# Patient Record
Sex: MALE | Race: WHITE | NOT HISPANIC OR LATINO | Employment: OTHER | URBAN - METROPOLITAN AREA
[De-identification: names, ages, dates, MRNs, and addresses within clinical notes are randomized per-mention and may not be internally consistent; named-entity substitution may affect disease eponyms.]

---

## 2017-02-07 ENCOUNTER — ANTICOAGULATION THERAPY VISIT (OUTPATIENT)
Dept: ANTICOAGULATION | Facility: CLINIC | Age: 82
End: 2017-02-07
Payer: COMMERCIAL

## 2017-02-07 VITALS — HEART RATE: 61 BPM | DIASTOLIC BLOOD PRESSURE: 90 MMHG | SYSTOLIC BLOOD PRESSURE: 159 MMHG

## 2017-02-07 DIAGNOSIS — D68.51 FACTOR V LEIDEN MUTATION (H): ICD-10-CM

## 2017-02-07 DIAGNOSIS — D68.52 PROTHROMBIN MUTATION (H): ICD-10-CM

## 2017-02-07 DIAGNOSIS — Z79.01 LONG-TERM (CURRENT) USE OF ANTICOAGULANTS: Primary | ICD-10-CM

## 2017-02-07 DIAGNOSIS — I82.90 VENOUS THROMBOSIS: ICD-10-CM

## 2017-02-07 LAB — INR POINT OF CARE: 3.2 (ref 0.86–1.14)

## 2017-02-07 PROCEDURE — 99207 ZZC NO CHARGE NURSE ONLY: CPT

## 2017-02-07 PROCEDURE — 85610 PROTHROMBIN TIME: CPT | Mod: QW

## 2017-02-07 PROCEDURE — 36416 COLLJ CAPILLARY BLOOD SPEC: CPT

## 2017-02-07 NOTE — PROGRESS NOTES
ANTICOAGULATION FOLLOW-UP CLINIC VISIT    Patient Name:  Robert Richard  Date:  2/7/2017  Contact Type:  Face to Face    SUBJECTIVE:     Patient Findings     Positives No Problem Findings    Comments Will stay on same dose and increase greens            OBJECTIVE    INR PROTIME   Date Value Ref Range Status   02/07/2017 3.2* 0.86 - 1.14 Final       ASSESSMENT / PLAN  INR assessment SUPRA    Recheck INR In: 4 WEEKS    INR Location Clinic      Anticoagulation Summary as of 2/7/2017     INR goal 2.0-3.0   Selected INR 3.2! (2/7/2017)   Maintenance plan 2.5 mg (5 mg x 0.5) on Fri; 5 mg (5 mg x 1) all other days   Full instructions 2/7: 2.5 mg; Otherwise 2.5 mg on Fri; 5 mg all other days   Weekly total 32.5 mg   Plan last modified Raquel Randle, RN (3/22/2016)   Next INR check 3/7/2017   Target end date     Indications   Factor V Leiden mutation (H) [D68.51]  Venous thrombosis [I82.90]  Prothrombin mutation (H) [D68.59]  Long-term (current) use of anticoagulants [Z79.01] [Z79.01]         Anticoagulation Episode Summary     INR check location     Preferred lab     Send INR reminders to [facility] POOL    Comments 5 mg tablets, use book        Anticoagulation Care Providers     Provider Role Specialty Phone number    Jesús Cifuentesrob Oconnell DO [facility] Internal Medicine [phone]            See the Encounter Report to view Anticoagulation Flowsheet and Dosing Calendar (Go to Encounters tab in chart review, and find the Anticoagulation Therapy Visit)    Dosage adjustment made based on physician directed care plan.    joshua increase greens    2.5 mg x1 then resume     Raquel Randle, RN

## 2017-02-09 ENCOUNTER — ANTICOAGULATION THERAPY VISIT (OUTPATIENT)
Dept: ANTICOAGULATION | Facility: OTHER | Age: 82
End: 2017-02-09
Payer: COMMERCIAL

## 2017-02-09 DIAGNOSIS — Z79.01 LONG-TERM (CURRENT) USE OF ANTICOAGULANTS: Primary | ICD-10-CM

## 2017-02-09 DIAGNOSIS — D68.52 PROTHROMBIN MUTATION (H): ICD-10-CM

## 2017-02-09 DIAGNOSIS — I82.90 VENOUS THROMBOSIS: ICD-10-CM

## 2017-02-09 DIAGNOSIS — D68.51 FACTOR V LEIDEN MUTATION (H): ICD-10-CM

## 2017-02-09 LAB — INR POINT OF CARE: 3.5 (ref 0.86–1.14)

## 2017-02-09 PROCEDURE — 85610 PROTHROMBIN TIME: CPT | Mod: QW

## 2017-02-09 PROCEDURE — 99207 ZZC NO CHARGE NURSE ONLY: CPT

## 2017-02-09 PROCEDURE — 36416 COLLJ CAPILLARY BLOOD SPEC: CPT

## 2017-02-09 NOTE — PROGRESS NOTES
ANTICOAGULATION FOLLOW-UP CLINIC VISIT    Patient Name:  Robert Richard  Date:  2/9/2017  Contact Type:  Face to Face    SUBJECTIVE:     Patient Findings     Positives Dental/Other Procedures (Pt here today becuase he had a dentist appt and is now scheduled to have a tooth pulled tomorrow (fri) around 12. )    Comments Pt's INR is too high today for extraction. He will hold his dose today (5 mg) and I have given him 1 mg Vit K in clinic (Lot [id number], exp 2/8/18). He will see Raquel in [place] tomorrow mid am before dental appt. Also, pt will be starting amoxicillin for 10 days ordered by the dentist for poss abcess.           OBJECTIVE    INR PROTIME   Date Value Ref Range Status   02/09/2017 3.5* 0.86 - 1.14 Final       ASSESSMENT / PLAN  INR assessment SUPRA    Recheck INR In: 1 DAY    INR Location Clinic      Anticoagulation Summary as of 2/9/2017     INR goal 2.0-3.0   Selected INR 3.5! (2/9/2017)   Maintenance plan 2.5 mg (5 mg x 0.5) on Fri; 5 mg (5 mg x 1) all other days   Full instructions 2/9: Hold; Otherwise 2.5 mg on Fri; 5 mg all other days   Weekly total 32.5 mg   Plan last modified Raquel Randle RN (3/22/2016)   Next INR check 2/10/2017   Target end date     Indications   Factor V Leiden mutation (H) [D68.51]  Venous thrombosis [I82.90]  Prothrombin mutation (H) [D68.59]  Long-term (current) use of anticoagulants [Z79.01] [Z79.01]         Anticoagulation Episode Summary     INR check location     Preferred lab     Send INR reminders to [facility] POOL    Comments 5 mg tablets, use book        Anticoagulation Care Providers     Provider Role Specialty Phone number    AftabmeetaJesús crumNaterob Oconnell DO [facility] Internal Medicine [phone]            See the Encounter Report to view Anticoagulation Flowsheet and Dosing Calendar (Go to Encounters tab in chart review, and find the Anticoagulation Therapy Visit)    Dosage adjustment made based on physician directed care plan.    Neelima Arreaga RN

## 2017-02-09 NOTE — MR AVS SNAPSHOT
Robert Richard   2/9/2017 1:45 PM   Anticoagulation Therapy Visit    Description:  81 year old male   Provider:  [facility] ANTI COAG   Department:  [facility]           INR as of 2/9/2017     Selected INR 3.5! (2/9/2017)      Anticoagulation Summary as of 2/9/2017     INR goal 2.0-3.0   Selected INR 3.5! (2/9/2017)   Full instructions 2/9: Hold; Otherwise 2.5 mg on Fri; 5 mg all other days   Next INR check 2/10/2017    Indications   Factor V Leiden mutation (H) [D68.51]  Venous thrombosis [I82.90]  Prothrombin mutation (H) [D68.59]  Long-term (current) use of anticoagulants [Z79.01] [Z79.01]         Your next Anticoagulation Clinic appointment(s)     Feb 10, 2017  8:15 AM   Anticoagulation Visit with PH ANTI COAG   [facility]    [address] [place] [id number]   [phone]            Mar 07, 2017  9:00 AM   Anticoagulation Visit with PH ANTI COAG   [facility] ([facility])    [address] [place] [address]   [phone]              Contact Numbers     Clinic Number:         February 2017 Details    Sun Mon Tue Wed Thu Fri Sat        1               2               3               4                 5               6               7               8               9      Hold   See details      10            11                 12               13               14               15               16               17               18                 19               20               21               22               23               24               25                 26               27               28                    Date Details   02/09 This INR check       Date of next INR:  2/10/2017         How to take your warfarin dose     To take:  2.5 mg Take 0.5 of a 5 mg tablet.    Hold Do not take your warfarin dose. See the Details table to the right for additional instructions.

## 2017-02-10 ENCOUNTER — ANTICOAGULATION THERAPY VISIT (OUTPATIENT)
Dept: ANTICOAGULATION | Facility: CLINIC | Age: 82
End: 2017-02-10
Payer: COMMERCIAL

## 2017-02-10 DIAGNOSIS — I82.90 VENOUS THROMBOSIS: ICD-10-CM

## 2017-02-10 DIAGNOSIS — D68.52 PROTHROMBIN MUTATION (H): ICD-10-CM

## 2017-02-10 DIAGNOSIS — Z79.01 LONG-TERM (CURRENT) USE OF ANTICOAGULANTS: Primary | ICD-10-CM

## 2017-02-10 DIAGNOSIS — D68.51 FACTOR V LEIDEN MUTATION (H): ICD-10-CM

## 2017-02-10 LAB — INR POINT OF CARE: 2.2 (ref 0.86–1.14)

## 2017-02-10 PROCEDURE — 99207 ZZC NO CHARGE NURSE ONLY: CPT

## 2017-02-10 PROCEDURE — 36416 COLLJ CAPILLARY BLOOD SPEC: CPT

## 2017-02-10 PROCEDURE — 85610 PROTHROMBIN TIME: CPT | Mod: QW

## 2017-02-10 NOTE — PROGRESS NOTES
ANTICOAGULATION FOLLOW-UP CLINIC VISIT    Patient Name:  Robert Richard  Date:  2/10/2017  Contact Type:  Face to Face    SUBJECTIVE:     Patient Findings     Positives Medication Changes (given Giana K on 2/9 to decrease INR for today's dental procedure), Intentional hold of therapy (held Thursday due to eleavted INR )           OBJECTIVE    INR PROTIME   Date Value Ref Range Status   02/10/2017 2.2* 0.86 - 1.14 Final       ASSESSMENT / PLAN  INR assessment THER    Recheck INR In: 5 DAYS    INR Location Clinic      Anticoagulation Summary as of 2/10/2017     INR goal 2.0-3.0   Selected INR 2.2 (2/10/2017)   Maintenance plan 2.5 mg (5 mg x 0.5) on Fri; 5 mg (5 mg x 1) all other days   Full instructions 2/10: Hold; 2/13: 2.5 mg; Otherwise 2.5 mg on Fri; 5 mg all other days   Weekly total 32.5 mg   Plan last modified Raquel Randle RN (3/22/2016)   Next INR check 2/15/2017   Target end date     Indications   Factor V Leiden mutation (H) [D68.51]  Venous thrombosis [I82.90]  Prothrombin mutation (H) [D68.59]  Long-term (current) use of anticoagulants [Z79.01] [Z79.01]         Anticoagulation Episode Summary     INR check location     Preferred lab     Send INR reminders to [facility] POOL    Comments 5 mg tablets, use book        Anticoagulation Care Providers     Provider Role Specialty Phone number    Nate CifuentesDO [facility] Internal Medicine [phone]            See the Encounter Report to view Anticoagulation Flowsheet and Dosing Calendar (Go to Encounters tab in chart review, and find the Anticoagulation Therapy Visit)    Dosage adjustment made based on physician directed care plan.    Hold Fri, 5 mg Sat, 5 mg Sun, 2.5 mg Mon, 5 mg Tues = 22.5 mg   Also on Amox TID x 10 days (2/10-2/20)    Raquel Randle, TITUS

## 2017-02-10 NOTE — MR AVS SNAPSHOT
Robert Richard   2/10/2017 8:15 AM   Anticoagulation Therapy Visit    Description:  81 year old male   Provider:  [facility] ANTI [facility]AG   Department:  [facility] Antico[facility]           INR as of 2/10/2017     Selected INR 2.2 (2/10/2017)      Anticoagulation Summary as of 2/10/2017     INR goal 2.0-3.0   Selected INR 2.2 (2/10/2017)   Full instructions 2/10: Hold; 2/13: 2.5 mg; Otherwise 2.5 mg on Fri; 5 mg all other days   Next INR check 2/15/2017    Indications   Factor V Leiden mutation (H) [D68.51]  Venous thrombosis [I82.90]  Prothrombin mutation (H) [D68.59]  Long-term (current) use of anticoagulants [Z79.01] [Z79.01]         Your next Anticoagulation Clinic appointment(s)     Feb 15, 2017  8:15 AM   Anticoagulation [facility] with PH ANTI COAG   [facility]    [address] [place] [address]   [phone]            Mar 07, 2017  9:00 AM   Anticoagulation Visit with PH ANTI COAG   [facility]    [address] [place] [address]   [phone]              Contact Numbers     Clinic Number:         February 2017 Details    Sun Mon Tue Wed Thu Fri Sat        1               2               3               4                 5               6               7               8               9               10      Hold   See details      11      5 mg           12      5 mg         13      2.5 mg         14      5 mg         15            16               17               18                 19               20               21               22               23               24               25                 26               27               28                    Date Details   02/10 This INR check       Date of next INR:  2/15/2017         How to take your warfarin dose     To take:  2.5 mg Take 0.5 of a 5 mg tablet.    To take:  5 mg Take 1 of the 5 mg tablets.    Hold Do not take your warfarin dose. See the Details  table to the right for additional instructions.

## 2017-02-15 ENCOUNTER — ANTICOAGULATION THERAPY VISIT (OUTPATIENT)
Dept: ANTICOAGULATION | Facility: CLINIC | Age: 82
End: 2017-02-15
Payer: COMMERCIAL

## 2017-02-15 VITALS — DIASTOLIC BLOOD PRESSURE: 85 MMHG | SYSTOLIC BLOOD PRESSURE: 157 MMHG | HEART RATE: 59 BPM

## 2017-02-15 DIAGNOSIS — I82.90 VENOUS THROMBOSIS: ICD-10-CM

## 2017-02-15 DIAGNOSIS — Z79.01 LONG-TERM (CURRENT) USE OF ANTICOAGULANTS: ICD-10-CM

## 2017-02-15 DIAGNOSIS — D68.51 FACTOR V LEIDEN MUTATION (H): ICD-10-CM

## 2017-02-15 DIAGNOSIS — D68.52 PROTHROMBIN MUTATION (H): ICD-10-CM

## 2017-02-15 LAB — INR POINT OF CARE: 1.3 (ref 0.86–1.14)

## 2017-02-15 PROCEDURE — 85610 PROTHROMBIN TIME: CPT | Mod: QW

## 2017-02-15 PROCEDURE — 99207 ZZC NO CHARGE NURSE ONLY: CPT

## 2017-02-15 PROCEDURE — 36416 COLLJ CAPILLARY BLOOD SPEC: CPT

## 2017-02-15 NOTE — PROGRESS NOTES
ANTICOAGULATION FOLLOW-UP CLINIC VISIT    Patient Name:  Robert Richard  Date:  2/15/2017  Contact Type:  Face to Face    SUBJECTIVE:     Patient Findings     Positives Change in medications (Amox TID x1 days (2/10-2/20)), Intentional hold of therapy (held on Thurs Fri last week due to procedure)           OBJECTIVE    INR Protime   Date Value Ref Range Status   02/15/2017 1.3 (A) 0.86 - 1.14 Final       ASSESSMENT / PLAN  INR assessment SUB    Recheck INR In: 2 DAYS    INR Location Clinic      Anticoagulation Summary as of 2/15/2017     INR goal 2.0-3.0   Today's INR 1.3!   Maintenance plan 2.5 mg (5 mg x 0.5) on Fri; 5 mg (5 mg x 1) all other days   Full instructions 2/15: 7.5 mg; Otherwise 2.5 mg on Fri; 5 mg all other days   Weekly total 32.5 mg   Plan last modified Raquel Randle RN (3/22/2016)   Next INR check 3/7/2017   Target end date     Indications   Factor V Leiden mutation (H) [D68.51]  Venous thrombosis [I82.90]  Prothrombin mutation (H) [D68.59]  Long-term (current) use of anticoagulants [Z79.01] [Z79.01]         Anticoagulation Episode Summary     INR check location     Preferred lab     Send INR reminders to [facility] POOL    Comments 5 mg tablets, use book        Anticoagulation Care Providers     Provider Role Specialty Phone number    Nate Cifuentes DO [facility] Internal Medicine [phone]            See the Encounter Report to view Anticoagulation Flowsheet and Dosing Calendar (Go to Encounters tab in chart review, and find the Anticoagulation Therapy Visit)    Dosage adjustment made based on physician directed care plan.        Raquel Randle, TITUS

## 2017-02-15 NOTE — MR AVS SNAPSHOT
Robert Richard   2/15/2017 8:15 AM   Anticoagulation Therapy Visit    Description:  81 year old male   Provider:  [facility] ANTI COAG   Department:  [facility]           INR as of 2/15/2017     Today's INR 1.3!      Anticoagulation Summary as of 2/15/2017     INR goal 2.0-3.0   Today's INR 1.3!   Full instructions 2/15: 7.5 mg; Otherwise 2.5 mg on Fri; 5 mg all other days   Next INR check 3/7/2017    Indications   Factor V Leiden mutation (H) [D68.51]  Venous thrombosis [I82.90]  Prothrombin mutation (H) [D68.59]  Long-term (current) use of anticoagulants [Z79.01] [Z79.01]         Your next Anticoagulation Clinic appointment(s)     Mar 07, 2017  9:00 AM CST   Anticoagulation Visit with ZAIRA ANTI IRENE   [facility]    [address] [place] [address]   [phone]              Contact Numbers     Clinic Number:         February 2017 Details    Sun Mon Tue Wed Thu Fri Sat        1               2               3               4                 5               6               7               8               9               10               11                 12               13               14               15      7.5 mg   See details      16      5 mg         17      2.5 mg         18      5 mg           19      5 mg         20      5 mg         21      5 mg         22      5 mg         23      5 mg         24      2.5 mg         25      5 mg           26      5 mg         27      5 mg         28      5 mg              Date Details   02/15 This INR check               How to take your warfarin dose     To take:  2.5 mg Take 0.5 of a 5 mg tablet.    To take:  5 mg Take 1 of the 5 mg tablets.    To take:  7.5 mg Take 1.5 of the 5 mg tablets.           March 2017 Details    Sun Mon Tue Wed Thu Fri Sat        1      5 mg         2      5 mg         3      2.5 mg         4      5 mg           5      5 mg         6      5 mg         7            8               9                10               11                 12               13               14               15               16               17               18                 19               20               21               22               23               24               25                 26               27               28               29               30               31                 Date Details   No additional details    Date of next INR:  3/7/2017         How to take your warfarin dose     To take:  2.5 mg Take 0.5 of a 5 mg tablet.    To take:  5 mg Take 1 of the 5 mg tablets.

## 2017-03-08 ENCOUNTER — ANTICOAGULATION THERAPY VISIT (OUTPATIENT)
Dept: ANTICOAGULATION | Facility: CLINIC | Age: 82
End: 2017-03-08
Payer: COMMERCIAL

## 2017-03-08 DIAGNOSIS — Z79.01 LONG-TERM (CURRENT) USE OF ANTICOAGULANTS: ICD-10-CM

## 2017-03-08 DIAGNOSIS — D68.52 PROTHROMBIN MUTATION (H): ICD-10-CM

## 2017-03-08 DIAGNOSIS — I82.90 VENOUS THROMBOSIS: ICD-10-CM

## 2017-03-08 DIAGNOSIS — D68.51 FACTOR V LEIDEN MUTATION (H): ICD-10-CM

## 2017-03-08 LAB — INR POINT OF CARE: 3.2 (ref 0.86–1.14)

## 2017-03-08 PROCEDURE — 85610 PROTHROMBIN TIME: CPT | Mod: QW

## 2017-03-08 PROCEDURE — 99207 ZZC NO CHARGE NURSE ONLY: CPT

## 2017-03-08 PROCEDURE — 36416 COLLJ CAPILLARY BLOOD SPEC: CPT

## 2017-03-08 NOTE — MR AVS SNAPSHOT
Robert Richard   3/8/2017 2:00 PM   Anticoagulation Therapy Visit    Description:  81 year old male   Provider:  [facility] ANTI COAG   Department:  [facility]           INR as of 3/8/2017     Today's INR 3.2!      Anticoagulation Summary as of 3/8/2017     INR goal 2.0-3.0   Today's INR 3.2!   Full instructions 3/8: 2.5 mg; Otherwise 2.5 mg on Fri; 5 mg all other days   Next INR check 3/28/2017    Indications   Factor V Leiden mutation (H) [D68.51]  Venous thrombosis [I82.90]  Prothrombin mutation (H) [D68.59]  Long-term (current) use of anticoagulants [Z79.01] [Z79.01]         Your next Anticoagulation Clinic appointment(s)     Mar 28, 2017  9:15 AM CDT   Anticoagulation Visit with PH ANTI COAG   [facility]    [address] [place] [id number]   [phone]              Contact Numbers     Clinic Number:         March 2017 Details    Sun Mon Tue Wed Thu Fri Sat        1               2               3               4                 5               6               7               8      2.5 mg   See details      9      5 mg         10      2.5 mg         11      5 mg           12      5 mg         13      5 mg         14      5 mg         15      5 mg         16      5 mg         17      2.5 mg         18      5 mg           19      5 mg         20      5 mg         21      5 mg         22      5 mg         23      5 mg         24      2.5 mg         25      5 mg           26      5 mg         27      5 mg         28            29               30               31                 Date Details   03/08 This INR check       Date of next INR:  3/28/2017         How to take your warfarin dose     To take:  2.5 mg Take 0.5 of a 5 mg tablet.    To take:  5 mg Take 1 of the 5 mg tablets.

## 2017-03-08 NOTE — PROGRESS NOTES
ANTICOAGULATION FOLLOW-UP CLINIC VISIT    Patient Name:  Robert Richard  Date:  3/8/2017  Contact Type:  Face to Face    SUBJECTIVE:     Patient Findings     Positives OTC meds (pt missed a few days of Centrum Silver 50+ which has Giana K in it)           OBJECTIVE    INR Protime   Date Value Ref Range Status   03/08/2017 3.2 (A) 0.86 - 1.14 Final       ASSESSMENT / PLAN  INR assessment THER    Recheck INR In: 3 WEEKS    INR Location Clinic      Anticoagulation Summary as of 3/8/2017     INR goal 2.0-3.0   Today's INR 3.2!   Maintenance plan 2.5 mg (5 mg x 0.5) on Fri; 5 mg (5 mg x 1) all other days   Full instructions 3/8: 2.5 mg; Otherwise 2.5 mg on Fri; 5 mg all other days   Weekly total 32.5 mg   Plan last modified Raquel Randle RN (3/22/2016)   Next INR check 3/28/2017   Target end date     Indications   Factor V Leiden mutation (H) [D68.51]  Venous thrombosis [I82.90]  Prothrombin mutation (H) [D68.59]  Long-term (current) use of anticoagulants [Z79.01] [Z79.01]         Anticoagulation Episode Summary     INR check location     Preferred lab     Send INR reminders to [facility] POOL    Comments 5 mg tablets, use book        Anticoagulation Care Providers     Provider Role Specialty Phone number    Vane Nate Oconnell DO [facility] Internal Medicine [phone]            See the Encounter Report to view Anticoagulation Flowsheet and Dosing Calendar (Go to Encounters tab in chart review, and find the Anticoagulation Therapy Visit)    Dosage adjustment made based on physician directed care plan.    Raquel Randle, RN

## 2017-03-09 ENCOUNTER — OFFICE VISIT (OUTPATIENT)
Dept: INTERNAL MEDICINE | Facility: CLINIC | Age: 82
End: 2017-03-09
Payer: COMMERCIAL

## 2017-03-09 VITALS
WEIGHT: 250 LBS | SYSTOLIC BLOOD PRESSURE: 162 MMHG | OXYGEN SATURATION: 98 % | TEMPERATURE: 96.8 F | HEART RATE: 72 BPM | HEIGHT: 70 IN | DIASTOLIC BLOOD PRESSURE: 92 MMHG | BODY MASS INDEX: 35.79 KG/M2

## 2017-03-09 DIAGNOSIS — G89.29 CHRONIC MIDLINE LOW BACK PAIN WITHOUT SCIATICA: Primary | ICD-10-CM

## 2017-03-09 DIAGNOSIS — I10 ESSENTIAL HYPERTENSION WITH GOAL BLOOD PRESSURE LESS THAN 140/90: ICD-10-CM

## 2017-03-09 DIAGNOSIS — Z86.718 PERSONAL HISTORY OF VENOUS THROMBOSIS AND EMBOLISM: ICD-10-CM

## 2017-03-09 DIAGNOSIS — M54.50 CHRONIC MIDLINE LOW BACK PAIN WITHOUT SCIATICA: Primary | ICD-10-CM

## 2017-03-09 PROCEDURE — 99214 OFFICE O/P EST MOD 30 MIN: CPT | Performed by: INTERNAL MEDICINE

## 2017-03-09 RX ORDER — SPIRONOLACTONE 25 MG/1
25 TABLET ORAL 2 TIMES DAILY
Qty: 60 TABLET | Refills: 1 | Status: SHIPPED | OUTPATIENT
Start: 2017-03-09 | End: 2017-04-23

## 2017-03-09 ASSESSMENT — PAIN SCALES - GENERAL: PAINLEVEL: NO PAIN (0)

## 2017-03-09 NOTE — NURSING NOTE
"Chief Complaint   Patient presents with     Hypertension       Initial BP (!) 162/92 (BP Location: Right arm, Patient Position: Chair, Cuff Size: Adult Large)  Pulse 72  Temp 96.8  F (36  C) (Temporal)  Ht 5' 10\" (1.778 m)  Wt 250 lb (113.4 kg)  SpO2 98%  BMI 35.87 kg/m2 Estimated body mass index is 35.87 kg/(m^2) as calculated from the following:    Height as of this encounter: 5' 10\" (1.778 m).    Weight as of this encounter: 250 lb (113.4 kg).  Medication Reconciliation: complete   Eli MOORE      "

## 2017-03-09 NOTE — PROGRESS NOTES
SUBJECTIVE:                                                    Robert Richard is a 81 year old male who presents to clinic today for the following health issues:    Hypertension Follow-up      Outpatient blood pressures are being checked at home.  Results are high like today.    Low Salt Diet: no added salt       Amount of exercise or physical activity: None    Problems taking medications regularly: No    Medication side effects: none  Diet: low salt    CHIEF COMPLAINT:    The patient is a pleasant 81-year-old gentleman who presents today for follow-up of his hypertension. He continues to have elevated blood pressure. He's had no chest pain, edema, headaches, shortness of breath associated with it. He does have a history of previous DVT and pulmonary embolism, he is currently on anticoagulation for this. Additionally, he's been having more and more back and hip discomfort. The tramadol does seem to give him some relief but does have a tendency to make him a little tired. He has difficulty with ambulating and does require the use of a cane. This is slowly progressing and he anticipates possible hip surgery in the future if symptoms worsen.                         PAST, FAMILY,SOCIAL HISTORY:     Medical  History:   has a past medical history of Basal cell carcinoma; Cancer (H); DVT (deep venous thrombosis) (H) (11/2003); DVT (deep venous thrombosis) (H) (12/08); DVT (deep venous thrombosis) (H) (10/2010); Gout; Other and unspecified hyperlipidemia; Restless leg syndrome; and Unspecified essential hypertension.     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     Social History:   reports that he has never smoked. He has never used smokeless tobacco. He reports that he drinks alcohol. He reports that he does not use illicit drugs.     Family History:  family history includes Prostate  Cancer in his paternal grandfather.            MEDICATIONS  Current Outpatient Prescriptions   Medication Sig Dispense Refill     spironolactone (ALDACTONE) 25 MG tablet Take 1 tablet (25 mg) by mouth 2 times daily 60 tablet 1     sildenafil (VIAGRA) 100 MG cap/tab Take 0.5-1 tablets ( mg) by mouth daily as needed for erectile dysfunction Take 30 min to 4 hours before intercourse.  Never use with nitroglycerin, terazosin or doxazosin. 6 tablet 11     atorvastatin (LIPITOR) 20 MG tablet Take 1 tablet (20 mg) by mouth twice a week 24 tablet 3     losartan (COZAAR) 50 MG tablet Take 1 tablet (50 mg) by mouth daily 90 tablet 1     pramipexole (MIRAPEX) 1 MG tablet TAKE 1 TABLET BY MOUTH AT 4 PM AND 1 TABLET BY MOUTH AT 9 PM AS NEEDED 180 tablet 1     traMADol (ULTRAM) 50 MG tablet Take 1 tablet (50 mg) by mouth At Bedtime 7 tablet 0     triamcinolone (NASACORT AQ) 55 MCG/ACT nasal inhaler Spray 2 sprays into both nostrils daily 1 Bottle 3     aspirin 81 MG tablet Take 81 mg by mouth daily       warfarin (COUMADIN) 5 MG tablet Take 2.5 mg Friday and 5 mg all other days, or as directed by the coumadin clinic. 90 tablet 2     atenolol (TENORMIN) 50 MG tablet Take 2 tablets (100 mg) by mouth 2 times daily 120 tablet 2     colchicine (COLCRYS) 0.6 MG tablet Take 2 tablets at the first sign of flare, take 1 additional tablet one hour later. 30 tablet 0     ORDER FOR DME Wear mask at night 1 Units 0         --------------------------------------------------------------------------------------------------------------------                          REVIEW OF SYSTEMS:         LUNGS: Pt denies: cough,excess sputum, hemoptysis, or shortness of breath.   HEART: Pt denies: chest pain, arrythmia, syncope, tachy or bradyarrhythmia or excess edema.   GI: Pt denies: nausea, vomitting, diarrhea, constipation, melena, or hematochezia.   NEURO: Pt denies: seizures, strokes, diplopia, weakness, paraesthesias, or paralysis.   SKIN: Pt  "denies: itching, rashes, discoloration, or specific lesions of concern. Denies recent hair loss.                          EXAMINATION:         BP (!) 162/92 (BP Location: Right arm, Patient Position: Chair, Cuff Size: Adult Large)  Pulse 72  Temp 96.8  F (36  C) (Temporal)  Ht 5' 10\" (1.778 m)  Wt 250 lb (113.4 kg)  SpO2 98%  BMI 35.87 kg/m2   Constitutional: The patient appears to be in no acute distress. The patient appears to be adequately hydrated. No acute respiratory or hemodynamic distress is noted at this time.   LUNGS: clear bilaterally, airflow is brisk, no intercostal retraction or stridor is noted. No coughing is noted during visit.   HEART:  regular without rubs, clicks, gallops, or murmurs. PMI is nondisplaced. Upstrokes are brisk. S1,S2 are heard.   GI: Abdomen is soft, without rebound, guarding or tenderness. Bowel sounds are appropriate. No renal bruits are heard.    NEURO: Pt is alert and appropriate. No neurologic lateralization is noted. Cranial nerves 2-12 are intact. Peripheral sensory and motor function are grossly normal   SKIN:  warm and dry. No erythema, or rashes are noted. No specific lesions of concern are noted.                           DECISION MAKIN. Chronic midline low back pain without sciatica  Given his chronic back pain and hip discomfort, I recommend against long periods of sitting.  Continue current medications including the tramadol but do not drive while taking it    2. Essential hypertension with goal blood pressure less than 140/90  Will add spironolactone and discontinue Lasix  Patient has not been taking the etodolac for some time. Therefore, will not need to discontinue it.    - spironolactone (ALDACTONE) 25 MG tablet; Take 1 tablet (25 mg) by mouth 2 times daily  Dispense: 60 tablet; Refill: 1  - Basic metabolic panel    3. Personal history of venous thrombosis and embolism  Continue anticoagulation with regular follow-up  Again, avoid long periods of " sitting.                           FOLLOW UP    I have asked the patient to make an appointment for follow up with me in 2 weeks        I have carefully explained the diagnosis and treatment options with the patient. The patient has displayed an understanding of the above, and all subsequent questions were answered.         DO ANANDA Guadarrama    Portions of this note were produced using [employer]  Although every attempt at real-time proof reading has been made, occasional grammar/syntax errors may have been missed.

## 2017-03-09 NOTE — LETTER
[address] [id number] [phone]        April 7, 2017    Robert Richard  [address] [facility] [address]              Dear Robert Richard    This is to remind you that your Basic profile is due.    You may call our office at [phone] to schedule an appointment.    Please disregard this notice if you have already had your labs drawn or made an appointment.        Sincerely,        Nate Cifuentes DO

## 2017-03-09 NOTE — LETTER
[address] [address] [phone]        June 26, 2017    Robert Richard  [address] [facility] [address]              Dear Robert Richard    This is to remind you that your Basic profile is due.    You may call our office at [phone] to schedule an appointment.    Please disregard this notice if you have already had your labs drawn or made an appointment.        Sincerely,        Nate Cifuentes DO

## 2017-03-27 ENCOUNTER — TELEPHONE (OUTPATIENT)
Dept: INTERNAL MEDICINE | Facility: CLINIC | Age: 82
End: 2017-03-27

## 2017-03-31 ENCOUNTER — OFFICE VISIT (OUTPATIENT)
Dept: FAMILY MEDICINE | Facility: OTHER | Age: 82
End: 2017-03-31
Payer: COMMERCIAL

## 2017-03-31 VITALS
BODY MASS INDEX: 36.16 KG/M2 | DIASTOLIC BLOOD PRESSURE: 92 MMHG | HEART RATE: 70 BPM | WEIGHT: 252 LBS | OXYGEN SATURATION: 97 % | TEMPERATURE: 96.8 F | SYSTOLIC BLOOD PRESSURE: 168 MMHG

## 2017-03-31 DIAGNOSIS — I10 HYPERTENSION GOAL BP (BLOOD PRESSURE) < 140/90: ICD-10-CM

## 2017-03-31 DIAGNOSIS — Z86.718 PERSONAL HISTORY OF VENOUS THROMBOSIS AND EMBOLISM: ICD-10-CM

## 2017-03-31 DIAGNOSIS — D68.51 FACTOR V LEIDEN MUTATION (H): Primary | ICD-10-CM

## 2017-03-31 PROCEDURE — 99213 OFFICE O/P EST LOW 20 MIN: CPT | Performed by: INTERNAL MEDICINE

## 2017-03-31 RX ORDER — DOXAZOSIN 1 MG/1
TABLET ORAL
Qty: 70 TABLET | Refills: 0 | Status: SHIPPED | OUTPATIENT
Start: 2017-03-31 | End: 2017-04-24

## 2017-03-31 RX ORDER — ATENOLOL 50 MG/1
50 TABLET ORAL 2 TIMES DAILY
Qty: 120 TABLET | Refills: 2 | COMMUNITY
Start: 2017-03-31 | End: 2018-10-11

## 2017-03-31 ASSESSMENT — PAIN SCALES - GENERAL: PAINLEVEL: NO PAIN (0)

## 2017-03-31 NOTE — NURSING NOTE
"Chief Complaint   Patient presents with     Hypertension       Initial BP (!) 168/92 (BP Location: Left arm, Patient Position: Chair, Cuff Size: Adult Large)  Pulse 70  Temp 96.8  F (36  C) (Temporal)  Wt 252 lb (114.3 kg)  SpO2 97%  BMI 36.16 kg/m2 Estimated body mass index is 36.16 kg/(m^2) as calculated from the following:    Height as of 3/9/17: 5' 10\" (1.778 m).    Weight as of this encounter: 252 lb (114.3 kg).  Medication Reconciliation: complete   Eli MOORE      "

## 2017-03-31 NOTE — MR AVS SNAPSHOT
After Visit Summary   3/31/2017    Robert Richard    MRN: [id number]           Patient Information     Date Of Birth          1935        Visit Information        Provider Department      3/31/2017 9:40 AM Nate Cifuentes DO [facility]        Today's Diagnoses     Factor V Leiden mutation (H)    -  1    Hypertension goal BP (blood pressure) < 140/90        Personal history of venous thrombosis and embolism           Follow-ups after your visit        Who to contact     If you have questions or need follow up information about today's clinic visit or your schedule please contact [facility] directly at [phone].  Normal or non-critical lab and imaging results will be communicated to you by [employer], letter or phone within 4 business days after the clinic has received the results. If you do not hear from us within 7 days, please contact the clinic through [employer] or phone. If you have a critical or abnormal lab result, we will notify you by phone as soon as possible.  Submit refill requests through [facility] or call your pharmacy and they will forward the refill request to us. Please allow 3 business days for your refill to be completed.          Additional Information About Your Visit        MyChart Information     [facility] gives you secure access to your electronic health record. If you see a primary care provider, you can also send messages to your care team and make appointments. If you have questions, please call your primary care clinic.  If you do not have a primary care provider, please call [phone] and they will assist you.        Care EveryWhere ID     This is your Care EveryWhere ID. This could be used by other organizations to access your [facility] medical records  [id number]        Your Vitals Were     Pulse Temperature Pulse Oximetry BMI (Body Mass Index)          70 96.8  F (36  C) (Temporal) 97% 36.16 kg/m2         Blood Pressure from Last  3 Encounters:   03/31/17 (!) 168/92   03/09/17 (!) 162/92   02/15/17 157/85    Weight from Last 3 Encounters:   03/31/17 252 lb (114.3 kg)   03/09/17 250 lb (113.4 kg)   12/21/16 248 lb (112.5 kg)              Today, you had the following     No orders found for display         Today's Medication Changes          These changes are accurate as of: 3/31/17 11:59 PM.  If you have any questions, ask your nurse or doctor.               Start taking these medicines.        Dose/Directions    doxazosin 1 MG tablet   Commonly known as:  CARDURA   Used for:  Hypertension goal BP (blood pressure) < 140/90   Started by:  Nate Cifuentes DO        Start with 1 tab (1 mg) daily for 1 week, 2 tabs (2 mg) daily for 1 week, 3 tabs (3 mg) daily for 1 week, then 4 tabs (4 mg) daily   Quantity:  70 tablet   Refills:  0         These medicines have changed or have updated prescriptions.        Dose/Directions    atenolol 50 MG tablet   Commonly known as:  TENORMIN   This may have changed:  how much to take   Used for:  Hypertension goal BP (blood pressure) < 140/90   Changed by:  Nate Cifuentes DO        Dose:  50 mg   Take 1 tablet (50 mg) by mouth 2 times daily   Quantity:  120 tablet   Refills:  2            Where to get your medicines      These medications were sent to [facility] [address] [place] [address] [address]  [address] [facility] [address]     Phone:  [phone]     doxazosin 1 MG tablet                Primary Care Provider Office Phone # Fax #    Nate Cifuentes -[phone]       [address]   [facility] [address]        Thank you!     Thank you for choosing [facility]  for your care. Our goal is always to provide you with excellent care. Hearing back from our patients is one way we can continue to improve our services. Please take a few minutes to complete the written survey that you may receive in the mail after your  visit with us. Thank you!             Your Updated Medication List - Protect others around you: Learn how to safely use, store and throw away your medicines at www.disposemymeds.org.          This list is accurate as of: 3/31/17 11:59 PM.  Always use your most recent med list.                   Brand Name Dispense Instructions for use    aspirin 81 MG tablet      Take 81 mg by mouth daily       atenolol 50 MG tablet    TENORMIN    120 tablet    Take 1 tablet (50 mg) by mouth 2 times daily       atorvastatin 20 MG tablet    LIPITOR    24 tablet    Take 1 tablet (20 mg) by mouth twice a week       colchicine 0.6 MG tablet    COLCRYS    30 tablet    Take 2 tablets at the first sign of flare, take 1 additional tablet one hour later.       doxazosin 1 MG tablet    CARDURA    70 tablet    Start with 1 tab (1 mg) daily for 1 week, 2 tabs (2 mg) daily for 1 week, 3 tabs (3 mg) daily for 1 week, then 4 tabs (4 mg) daily       losartan 50 MG tablet    COZAAR    90 tablet    Take 1 tablet (50 mg) by mouth daily       order for DME     1 Units    Wear mask at night       pramipexole 1 MG tablet    MIRAPEX    180 tablet    TAKE 1 TABLET BY MOUTH AT 4 PM AND 1 TABLET BY MOUTH AT 9 PM AS NEEDED       sildenafil 100 MG cap/tab    VIAGRA    6 tablet    Take 0.5-1 tablets ( mg) by mouth daily as needed for erectile dysfunction Take 30 min to 4 hours before intercourse.  Never use with nitroglycerin, terazosin or doxazosin.       spironolactone 25 MG tablet    ALDACTONE    60 tablet    Take 1 tablet (25 mg) by mouth 2 times daily       traMADol 50 MG tablet    ULTRAM    7 tablet    Take 1 tablet (50 mg) by mouth At Bedtime       triamcinolone 55 MCG/ACT Inhaler    NASACORT AQ    1 Bottle    Spray 2 sprays into both nostrils daily       warfarin 5 MG tablet    COUMADIN    90 tablet    Take 2.5 mg Friday and 5 mg all other days, or as directed by the coumadin clinic.

## 2017-03-31 NOTE — PROGRESS NOTES
SUBJECTIVE:                                                    Robert Richard is a 81 year old male who presents to clinic today for the following health issues:    Hypertension Follow-up      Outpatient blood pressures are being checked at home.  Results are 137-159 over .    Low Salt Diet: no added salt       Amount of exercise or physical activity: None    Problems taking medications regularly: No    Medication side effects: none    Diet: regular (no restrictions)        CHIEF COMPLAINT:    The patient is a well-known 81-year-old gentleman who presents today for follow-up of his hypertension. Blood pressures are still a bit high with systolic values approaching 150 most of the time. Today it is a little higher at 168/92, he notes no chest pain, shortness of breath, lightheadedness or other symptoms. His home blood pressure log is extremely complete and substantially better. He does have a history of factor V Leiden mutation and is on anticoagulation for this. He's had no bruising or bleeding. He follows his INRs closely. Medications are as listed. He's had no side effects from the medications. Overall, he appears to be substantially younger than his stated age.                       PAST, FAMILY,SOCIAL HISTORY:     Medical  History:   has a past medical history of Basal cell carcinoma; Cancer (H); DVT (deep venous thrombosis) (H) (11/2003); DVT (deep venous thrombosis) (H) (12/08); DVT (deep venous thrombosis) (H) (10/2010); Gout; Other and unspecified hyperlipidemia; Restless leg syndrome; and Unspecified essential hypertension.     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     Social History:   reports that he has never smoked. He has never used smokeless tobacco. He reports that he drinks alcohol. He reports that he does not use illicit drugs.     Family History:  family  history includes Prostate Cancer in his paternal grandfather.            MEDICATIONS  Current Outpatient Prescriptions   Medication Sig Dispense Refill     atenolol (TENORMIN) 50 MG tablet Take 1 tablet (50 mg) by mouth 2 times daily 120 tablet 2     doxazosin (CARDURA) 1 MG tablet Start with 1 tab (1 mg) daily for 1 week, 2 tabs (2 mg) daily for 1 week, 3 tabs (3 mg) daily for 1 week, then 4 tabs (4 mg) daily 70 tablet 0     spironolactone (ALDACTONE) 25 MG tablet Take 1 tablet (25 mg) by mouth 2 times daily 60 tablet 1     sildenafil (VIAGRA) 100 MG cap/tab Take 0.5-1 tablets ( mg) by mouth daily as needed for erectile dysfunction Take 30 min to 4 hours before intercourse.  Never use with nitroglycerin, terazosin or doxazosin. 6 tablet 11     atorvastatin (LIPITOR) 20 MG tablet Take 1 tablet (20 mg) by mouth twice a week 24 tablet 3     losartan (COZAAR) 50 MG tablet Take 1 tablet (50 mg) by mouth daily 90 tablet 1     pramipexole (MIRAPEX) 1 MG tablet TAKE 1 TABLET BY MOUTH AT 4 PM AND 1 TABLET BY MOUTH AT 9 PM AS NEEDED 180 tablet 1     traMADol (ULTRAM) 50 MG tablet Take 1 tablet (50 mg) by mouth At Bedtime 7 tablet 0     triamcinolone (NASACORT AQ) 55 MCG/ACT nasal inhaler Spray 2 sprays into both nostrils daily 1 Bottle 3     aspirin 81 MG tablet Take 81 mg by mouth daily       warfarin (COUMADIN) 5 MG tablet Take 2.5 mg Friday and 5 mg all other days, or as directed by the coumadin clinic. 90 tablet 2     colchicine (COLCRYS) 0.6 MG tablet Take 2 tablets at the first sign of flare, take 1 additional tablet one hour later. 30 tablet 0     ORDER FOR DME Wear mask at night 1 Units 0     [DISCONTINUED] atenolol (TENORMIN) 50 MG tablet Take 2 tablets (100 mg) by mouth 2 times daily 120 tablet 2         --------------------------------------------------------------------------------------------------------------------                            REVIEW OF SYSTEMS:         LUNGS: Pt denies: cough,excess  sputum, hemoptysis, or shortness of breath.   HEART: Pt denies: chest pain, arrythmia, syncope, tachy or bradyarrhythmia or excess edema.   GI: Pt denies: nausea, vomitting, diarrhea, constipation, melena, or hematochezia.   NEURO: Pt denies: seizures, strokes, diplopia, weakness, paraesthesias, or paralysis.   SKIN: Pt denies: itching, rashes, discoloration, or specific lesions of concern. Denies recent hair loss.                          EXAMINATION:         BP (!) 168/92 (BP Location: Left arm, Patient Position: Chair, Cuff Size: Adult Large)  Pulse 70  Temp 96.8  F (36  C) (Temporal)  Wt 252 lb (114.3 kg)  SpO2 97%  BMI 36.16 kg/m2   Constitutional: The patient appears to be in no acute distress. The patient appears to be adequately hydrated. No acute respiratory or hemodynamic distress is noted at this time.   LUNGS: clear bilaterally, airflow is brisk, no intercostal retraction or stridor is noted. No coughing is noted during visit.   HEART:  regular without rubs, clicks, gallops, or murmurs. PMI is nondisplaced. Upstrokes are brisk. S1,S2 are heard.   GI: Abdomen is soft, without rebound, guarding or tenderness. Bowel sounds are appropriate. No renal bruits are heard.    NEURO: Pt is alert and appropriate. No neurologic lateralization is noted. Cranial nerves 2-12 are intact. Peripheral sensory and motor function are grossly normal   SKIN:  warm and dry. No erythema, or rashes are noted. No specific lesions of concern are noted.                           DECISION MAKIN. Hypertension goal BP (blood pressure) < 140/90  Will add doxazosin. Patient has intolerance issues to calcium channel blockers.  Approaching bradycardia limits any further atenolol  Elevated creatinine eliminates any further ACE inhibitor or ARB    - atenolol (TENORMIN) 50 MG tablet; Take 1 tablet (50 mg) by mouth 2 times daily  Dispense: 120 tablet; Refill: 2  - doxazosin (CARDURA) 1 MG tablet; Start with 1 tab (1 mg) daily  for 1 week, 2 tabs (2 mg) daily for 1 week, 3 tabs (3 mg) daily for 1 week, then 4 tabs (4 mg) daily  Dispense: 70 tablet; Refill: 0    2. Factor V Leiden mutation (H)  Continue to follow INR and continue anticoagulation             3. Personal history of venous thrombosis and embolism  As above                FOLLOW UP    I have asked the patient to make an appointment for follow up with me in 1 month for blood pressure check. Or sooner if he has any problems in the interim.          I have carefully explained the diagnosis and treatment options with the patient. The patient has displayed an understanding of the above, and all subsequent questions were answered.         DO ANANDA Guadarrama    Portions of this note were produced using [employer]  Although every attempt at real-time proof reading has been made, occasional grammar/syntax errors may have been missed.

## 2017-04-14 ENCOUNTER — ANTICOAGULATION THERAPY VISIT (OUTPATIENT)
Dept: ANTICOAGULATION | Facility: CLINIC | Age: 82
End: 2017-04-14
Payer: COMMERCIAL

## 2017-04-14 VITALS — DIASTOLIC BLOOD PRESSURE: 80 MMHG | HEART RATE: 72 BPM | SYSTOLIC BLOOD PRESSURE: 144 MMHG

## 2017-04-14 DIAGNOSIS — D68.51 FACTOR V LEIDEN MUTATION (H): ICD-10-CM

## 2017-04-14 DIAGNOSIS — Z79.01 LONG-TERM (CURRENT) USE OF ANTICOAGULANTS: ICD-10-CM

## 2017-04-14 DIAGNOSIS — D68.52 PROTHROMBIN MUTATION (H): ICD-10-CM

## 2017-04-14 DIAGNOSIS — I82.90 VENOUS THROMBOSIS: ICD-10-CM

## 2017-04-14 LAB — INR POINT OF CARE: 2.2 (ref 0.86–1.14)

## 2017-04-14 PROCEDURE — 99207 ZZC NO CHARGE NURSE ONLY: CPT

## 2017-04-14 PROCEDURE — 85610 PROTHROMBIN TIME: CPT | Mod: QW

## 2017-04-14 PROCEDURE — 36416 COLLJ CAPILLARY BLOOD SPEC: CPT

## 2017-04-14 NOTE — MR AVS SNAPSHOT
Robert Hilary   4/14/2017 9:30 AM   Anticoagulation Therapy Visit    Description:  81 year old male   Provider:  [facility]   Department:  Ph Anticoag           INR as of 4/14/2017     Today's INR 2.2      Anticoagulation Summary as of 4/14/2017     INR goal 2.0-3.0   Today's INR 2.2   Full instructions 2.5 mg on Fri; 5 mg all other days   Next INR check 5/9/2017    Indications   Factor V Leiden mutation (H) [D68.51]  Venous thrombosis [I82.90]  Prothrombin mutation (H) [D68.59]  Long-term (current) use of anticoagulants [Z79.01] [Z79.01]         Your next Anticoagulation Clinic appointment(s)     May 10, 2017  8:30 AM CDT   Anticoagulation Visit with [facility]   [facility] ([facility])    [address] [place] [id number]   [phone]              Contact Numbers     Clinic Number:         April 2017 Details    Sun Mon Tue Wed Thu Fri Sat           1                 2               3               4               5               6               7               8                 9               10               11               12               13               14      2.5 mg   See details      15      5 mg           16      5 mg         17      5 mg         18      5 mg         19      5 mg         20      5 mg         21      2.5 mg         22      5 mg           23      5 mg         24      5 mg         25      5 mg         26      5 mg         27      5 mg         28      2.5 mg         29      5 mg           30      5 mg                Date Details   04/14 This INR check               How to take your warfarin dose     To take:  2.5 mg Take 0.5 of a 5 mg tablet.    To take:  5 mg Take 1 of the 5 mg tablets.           May 2017 Details    Sun Mon Tue Wed Thu Fri Sat      1      5 mg         2      5 mg         3      5 mg         4      5 mg         5      2.5 mg         6      5 mg           7      5 mg         8      5 mg         9            10                11               12               13                 14               15               16               17               18               19               20                 21               22               23               24               25               26               27                 28               29               30               31                   Date Details   No additional details    Date of next INR:  5/9/2017         How to take your warfarin dose     To take:  2.5 mg Take 0.5 of a 5 mg tablet.    To take:  5 mg Take 1 of the 5 mg tablets.

## 2017-04-14 NOTE — PROGRESS NOTES
ANTICOAGULATION FOLLOW-UP CLINIC VISIT    Patient Name:  Robert Richard  Date:  4/14/2017  Contact Type:  Face to Face    SUBJECTIVE:     Patient Findings     Positives No Problem Findings           OBJECTIVE    INR Protime   Date Value Ref Range Status   04/14/2017 2.2 (A) 0.86 - 1.14 Final       ASSESSMENT / PLAN  INR assessment THER    Recheck INR In: 4 WEEKS    INR Location Clinic      Anticoagulation Summary as of 4/14/2017     INR goal 2.0-3.0   Today's INR 2.2   Maintenance plan 2.5 mg (5 mg x 0.5) on Fri; 5 mg (5 mg x 1) all other days   Full instructions 2.5 mg on Fri; 5 mg all other days   Weekly total 32.5 mg   No change documented Raquel Randle RN   Plan last modified Raquel Randle RN (3/22/2016)   Next INR check 5/9/2017   Target end date     Indications   Factor V Leiden mutation (H) [D68.51]  Venous thrombosis [I82.90]  Prothrombin mutation (H) [D68.59]  Long-term (current) use of anticoagulants [Z79.01] [Z79.01]         Anticoagulation Episode Summary     INR check location     Preferred lab     Send INR reminders to [facility] POOL    Comments 5 mg tablets, use book        Anticoagulation Care Providers     Provider Role Specialty Phone number    Nate Cifuentes DO [facility] Internal Medicine [phone]            See the Encounter Report to view Anticoagulation Flowsheet and Dosing Calendar (Go to Encounters tab in chart review, and find the Anticoagulation Therapy Visit)    Dosage adjustment made based on physician directed care plan.      Raquel Randle RN

## 2017-04-23 DIAGNOSIS — I10 ESSENTIAL HYPERTENSION WITH GOAL BLOOD PRESSURE LESS THAN 140/90: ICD-10-CM

## 2017-04-23 DIAGNOSIS — I10 HYPERTENSION GOAL BP (BLOOD PRESSURE) < 140/90: ICD-10-CM

## 2017-04-23 DIAGNOSIS — Z86.718 PERSONAL HISTORY OF VENOUS THROMBOSIS AND EMBOLISM: ICD-10-CM

## 2017-04-23 DIAGNOSIS — G25.81 RESTLESS LEG SYNDROME: ICD-10-CM

## 2017-04-24 ENCOUNTER — TELEPHONE (OUTPATIENT)
Dept: INTERNAL MEDICINE | Facility: CLINIC | Age: 82
End: 2017-04-24

## 2017-04-24 DIAGNOSIS — I10 HYPERTENSION GOAL BP (BLOOD PRESSURE) < 140/90: ICD-10-CM

## 2017-04-24 NOTE — TELEPHONE ENCOUNTER
atenolol (TENORMIN) 50 MG tablet     Last Written Prescription Date: 3/31/2017  Last Fill Quantity: 120, # refills: 2    Last Office Visit with [facility], [facility] or [facility] prescribing provider: 4/4/2017     Future Office Visit:        BP Readings from Last 3 Encounters:   04/14/17 144/80   03/31/17 (!) 168/92 03/09/17 (!) 162/92         spironolactone (ALDACTONE) 25 MG tablet  Last Written Prescription Date: 3/9/2017  Last Fill Quantity: 60, # refills: 1  Last Office Visit with [facility], [facility] or [facility] prescribing provider: 4/4/2017       Potassium   Date Value Ref Range Status   12/21/2016 4.2 3.4 - 5.3 mmol/L Final     Creatinine   Date Value Ref Range Status   12/21/2016 1.46 (H) 0.66 - 1.25 mg/dL Final     BP Readings from Last 3 Encounters:   04/14/17 144/80   03/31/17 (!) 168/92 03/09/17 (!) 162/92     pramipexole (MIRAPEX) 1 MG tablet     Last Written Prescription Date: 10/12/2016  Last Fill Quantity: 180, # refills: 1  Last Office Visit with [facility], [facility] or [facility] prescribing provider: 4/4/2017        BP Readings from Last 3 Encounters:   04/14/17 144/80   03/31/17 (!) 168/92 03/09/17 (!) 162/92         warfarin (COUMADIN) 5 MG tablet  Last Written Prescription Date: 4/7/2017  Last Fill Qty: 90, # refills: 2  Last Office Visit with [facility], [facility] or [facility] prescribing provider: 4/4/2017       Date and Result of Last PT/INR:   Lab Results   Component Value Date    INR 2.2 04/14/2017    INR 3.2 03/08/2017    INR 2.1 04/18/2016    INR 1.1 06/01/2015    PT 17.4 02/09/2012      Krystyna Beaulieu [facility]

## 2017-04-24 NOTE — TELEPHONE ENCOUNTER
Doxazosin       Last Written Prescription Date: 3/31/2017  Last Fill Quantity: 70, # refills: 0    Last Office Visit with [facility] or [facility] prescribing provider:  3/31/2017   Future Office Visit:        BP Readings from Last 3 Encounters:   04/14/17 144/80   03/31/17 (!) 168/92   03/09/17 (!) 162/92

## 2017-04-26 RX ORDER — ATENOLOL 50 MG/1
TABLET ORAL
Qty: 90 TABLET | Refills: 0 | Status: SHIPPED | OUTPATIENT
Start: 2017-04-26 | End: 2017-06-19

## 2017-04-26 RX ORDER — PRAMIPEXOLE DIHYDROCHLORIDE 1 MG/1
TABLET ORAL
Qty: 180 TABLET | Refills: 0 | Status: SHIPPED | OUTPATIENT
Start: 2017-04-26 | End: 2017-06-19

## 2017-04-26 RX ORDER — SPIRONOLACTONE 25 MG/1
TABLET ORAL
Qty: 60 TABLET | Refills: 0 | Status: SHIPPED | OUTPATIENT
Start: 2017-04-26 | End: 2017-05-15

## 2017-04-26 RX ORDER — WARFARIN SODIUM 5 MG/1
TABLET ORAL
Qty: 90 TABLET | Refills: 0 | Status: SHIPPED | OUTPATIENT
Start: 2017-04-26 | End: 2017-07-31

## 2017-04-26 NOTE — TELEPHONE ENCOUNTER
Routing refill request to provider for review/approval because:  Drug interaction warning  **Also I changed it to 1 pill of 4mg instead of (4) 1mg tabs since patient should be up to that dose now.        Melanie Mulligan RN

## 2017-04-26 NOTE — TELEPHONE ENCOUNTER
Pt calls in regards to his refill requests.  Pt states he is going out of town on Sunday and will need to have his Rxs approved to take with him.

## 2017-04-26 NOTE — TELEPHONE ENCOUNTER
Attempted to call patient and verify that he is up to 4mg per day.  Unable to reach via home or cell.   Note added on pharmacy script for pharmacist to review change in dose and number of pills since he should have worked up to the higher dose by now.    Melanie Mulligan RN

## 2017-04-26 NOTE — TELEPHONE ENCOUNTER
Routing refill request to provider for review/approval because:  Drug interaction warning: Coumadin and ASA  Labs out of range:  Cr 1.46    Melanie Mulligan RN

## 2017-04-27 RX ORDER — DOXAZOSIN 1 MG/1
TABLET ORAL
Qty: 70 TABLET | Refills: 0 | OUTPATIENT
Start: 2017-04-27 | End: 2024-08-08

## 2017-04-27 RX ORDER — DOXAZOSIN 4 MG/1
4 TABLET ORAL DAILY
Qty: 1 TABLET | Refills: 3 | Status: SHIPPED | OUTPATIENT
Start: 2017-04-27 | End: 2017-04-27

## 2017-04-27 NOTE — TELEPHONE ENCOUNTER
Vlad from [facility] called asking for clarification on the below RX. Please advise.   # [phone]    Thank you,   Sara Martin   for [facility]

## 2017-05-08 ENCOUNTER — DOCUMENTATION ONLY (OUTPATIENT)
Dept: INTERNAL MEDICINE | Facility: CLINIC | Age: 82
End: 2017-05-08

## 2017-05-08 DIAGNOSIS — I10 HYPERTENSION GOAL BP (BLOOD PRESSURE) < 140/90: ICD-10-CM

## 2017-05-08 RX ORDER — DOXAZOSIN 4 MG/1
4 TABLET ORAL DAILY
Qty: 30 TABLET | Refills: 3 | Status: SHIPPED | OUTPATIENT
Start: 2017-05-08 | End: 2017-07-31

## 2017-05-15 DIAGNOSIS — I10 ESSENTIAL HYPERTENSION WITH GOAL BLOOD PRESSURE LESS THAN 140/90: ICD-10-CM

## 2017-05-15 RX ORDER — SPIRONOLACTONE 25 MG/1
25 TABLET ORAL 2 TIMES DAILY
Qty: 60 TABLET | Refills: 0 | Status: SHIPPED | OUTPATIENT
Start: 2017-05-15 | End: 2017-06-19

## 2017-05-15 NOTE — TELEPHONE ENCOUNTER
ALDACTONE      Last Written Prescription Date:  4/26/2017  Last Fill Quantity: 60,   # refills: 0  Last Office Visit with [facility], [facility] or [facility] prescribing provider: 3/31/2017  Future Office visit:

## 2017-06-02 ENCOUNTER — TELEPHONE (OUTPATIENT)
Dept: FAMILY MEDICINE | Facility: OTHER | Age: 82
End: 2017-06-02

## 2017-06-02 NOTE — TELEPHONE ENCOUNTER
Attempted outreach to patient: Left message    Patient is due for:  Blood pressure follow up    If patient had this completed elsewhere, please obtain approximate date, clinic/hospital where test was done and the result (abnormal/normal).    Please assist in scheduling if not completed.      Panel Management Review      Patient has the following on his problem list: None      Composite cancer screening  Chart review shows that this patient is due/due soon for the following None  Summary:    Patient is due/failing the following:   BP CHECK    Action needed:   Patient needs office visit for blood pressure check.    Type of outreach:    Phone, left message for patient to call back.     Questions for provider review:    None                                                                                                                                    Eli MOORE       Chart routed to Care Team .

## 2017-06-19 ENCOUNTER — ANTICOAGULATION THERAPY VISIT (OUTPATIENT)
Dept: ANTICOAGULATION | Facility: CLINIC | Age: 82
End: 2017-06-19
Payer: COMMERCIAL

## 2017-06-19 ENCOUNTER — OFFICE VISIT (OUTPATIENT)
Dept: INTERNAL MEDICINE | Facility: CLINIC | Age: 82
End: 2017-06-19
Payer: COMMERCIAL

## 2017-06-19 VITALS
WEIGHT: 239 LBS | DIASTOLIC BLOOD PRESSURE: 86 MMHG | HEART RATE: 80 BPM | RESPIRATION RATE: 16 BRPM | OXYGEN SATURATION: 96 % | SYSTOLIC BLOOD PRESSURE: 148 MMHG | BODY MASS INDEX: 34.29 KG/M2 | TEMPERATURE: 96.9 F

## 2017-06-19 DIAGNOSIS — I10 ESSENTIAL HYPERTENSION WITH GOAL BLOOD PRESSURE LESS THAN 140/90: ICD-10-CM

## 2017-06-19 DIAGNOSIS — Z79.01 LONG-TERM (CURRENT) USE OF ANTICOAGULANTS: ICD-10-CM

## 2017-06-19 DIAGNOSIS — I10 HYPERTENSION GOAL BP (BLOOD PRESSURE) < 140/90: ICD-10-CM

## 2017-06-19 DIAGNOSIS — D68.52 PROTHROMBIN MUTATION (H): ICD-10-CM

## 2017-06-19 DIAGNOSIS — D68.51 FACTOR V LEIDEN MUTATION (H): ICD-10-CM

## 2017-06-19 DIAGNOSIS — I82.90 VENOUS THROMBOSIS: ICD-10-CM

## 2017-06-19 DIAGNOSIS — G25.81 RESTLESS LEG SYNDROME: ICD-10-CM

## 2017-06-19 DIAGNOSIS — Z86.718 PERSONAL HISTORY OF VENOUS THROMBOSIS AND EMBOLISM: ICD-10-CM

## 2017-06-19 DIAGNOSIS — D68.51 FACTOR V LEIDEN MUTATION (H): Primary | ICD-10-CM

## 2017-06-19 LAB — INR POINT OF CARE: 2.4 (ref 0.86–1.14)

## 2017-06-19 PROCEDURE — 36416 COLLJ CAPILLARY BLOOD SPEC: CPT

## 2017-06-19 PROCEDURE — 99207 ZZC NO CHARGE NURSE ONLY: CPT

## 2017-06-19 PROCEDURE — 99213 OFFICE O/P EST LOW 20 MIN: CPT | Performed by: INTERNAL MEDICINE

## 2017-06-19 PROCEDURE — 85610 PROTHROMBIN TIME: CPT | Mod: QW

## 2017-06-19 RX ORDER — PRAMIPEXOLE DIHYDROCHLORIDE 1 MG/1
TABLET ORAL
Qty: 180 TABLET | Refills: 0 | Status: SHIPPED | OUTPATIENT
Start: 2017-06-19 | End: 2017-07-31

## 2017-06-19 RX ORDER — ATENOLOL 50 MG/1
50 TABLET ORAL 2 TIMES DAILY
Qty: 180 TABLET | Refills: 0 | Status: SHIPPED | OUTPATIENT
Start: 2017-06-19 | End: 2017-07-31

## 2017-06-19 RX ORDER — ATENOLOL 50 MG/1
50 TABLET ORAL 2 TIMES DAILY
Qty: 120 TABLET | Refills: 2 | Status: CANCELLED | OUTPATIENT
Start: 2017-06-19

## 2017-06-19 RX ORDER — SPIRONOLACTONE 25 MG/1
25 TABLET ORAL DAILY
Qty: 90 TABLET | Refills: 0 | Status: SHIPPED | OUTPATIENT
Start: 2017-06-19 | End: 2017-12-04

## 2017-06-19 RX ORDER — LOSARTAN POTASSIUM 50 MG/1
50 TABLET ORAL DAILY
Qty: 90 TABLET | Refills: 1 | Status: SHIPPED | OUTPATIENT
Start: 2017-06-19 | End: 2017-12-04

## 2017-06-19 ASSESSMENT — PAIN SCALES - GENERAL: PAINLEVEL: NO PAIN (0)

## 2017-06-19 NOTE — PROGRESS NOTES
SUBJECTIVE:                                                    Robert Richard is a 81 year old male who presents to clinic today for the following health issues:      Hypertension Follow-up      Outpatient blood pressures are being checked at home.  Results are 122//94.    Low Salt Diet: low salt       Amount of exercise or physical activity: 6-7 days/week for an average of greater than 60 minutes  Plays golf 2 hours every day    Problems taking medications regularly: No    Medication side effects: muscle aches    Diet: low salt and low fat/cholesterol    CHIEF COMPLAINT:    The patient is a pleasant 81-year-old gentleman who presents today for follow-up of his hypertension. Notably, he has been doing quite well. His medications have been reviewed and are as noted. He is taking them compliantly without any side effects from the medication. He does have a history of factor V Leiden mutation but has had no recent blood clots. He continues warfarin without difficulty. He denies any bruising or bleeding. His restless leg syndrome is fairly well controlled at this time and the medications are working well. He does have a history of prostate cancer and has had no bone pain or symptoms of metastasis.                         PAST, FAMILY,SOCIAL HISTORY:     Medical  History:   has a past medical history of Basal cell carcinoma; Cancer (H); DVT (deep venous thrombosis) (H) (11/2003); DVT (deep venous thrombosis) (H) (12/08); DVT (deep venous thrombosis) (H) (10/2010); Gout; Other and unspecified hyperlipidemia; Restless leg syndrome; and Unspecified essential hypertension.     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     Social History:   reports that he has never smoked. He has never used smokeless tobacco. He reports that he drinks alcohol. He reports that he does not use illicit  drugs.     Family History:  family history includes Prostate Cancer in his paternal grandfather.            MEDICATIONS  Current Outpatient Prescriptions   Medication Sig Dispense Refill     spironolactone (ALDACTONE) 25 MG tablet Take 1 tablet (25 mg) by mouth daily 90 tablet 0     pramipexole (MIRAPEX) 1 MG tablet TAKE 1 TABLET BY MOUTH AT 4PM AND 1 TABLET BY MOUTH AT 9PM AS NEEDED 180 tablet 0     losartan (COZAAR) 50 MG tablet Take 1 tablet (50 mg) by mouth daily 90 tablet 1     atenolol (TENORMIN) 50 MG tablet Take 1 tablet (50 mg) by mouth 2 times daily 180 tablet 0     doxazosin (CARDURA) 4 MG tablet Take 1 tablet (4 mg) by mouth daily 30 tablet 3     warfarin (COUMADIN) 5 MG tablet TAKE HALF A TABLET BY MOUTH ON FRIDAY AND 1 TABLET ON ALL OTHER DAYS, OR AS DIRECTED BY THE COUMADIN CLINIC 90 tablet 0     atenolol (TENORMIN) 50 MG tablet Take 1 tablet (50 mg) by mouth 2 times daily 120 tablet 2     sildenafil (VIAGRA) 100 MG cap/tab Take 0.5-1 tablets ( mg) by mouth daily as needed for erectile dysfunction Take 30 min to 4 hours before intercourse.  Never use with nitroglycerin, terazosin or doxazosin. 6 tablet 11     atorvastatin (LIPITOR) 20 MG tablet Take 1 tablet (20 mg) by mouth twice a week 24 tablet 3     traMADol (ULTRAM) 50 MG tablet Take 1 tablet (50 mg) by mouth At Bedtime 7 tablet 0     triamcinolone (NASACORT AQ) 55 MCG/ACT nasal inhaler Spray 2 sprays into both nostrils daily 1 Bottle 3     aspirin 81 MG tablet Take 81 mg by mouth daily       colchicine (COLCRYS) 0.6 MG tablet Take 2 tablets at the first sign of flare, take 1 additional tablet one hour later. 30 tablet 0     ORDER FOR DME Wear mask at night 1 Units 0     [DISCONTINUED] spironolactone (ALDACTONE) 25 MG tablet Take 1 tablet (25 mg) by mouth 2 times daily 60 tablet 0     [DISCONTINUED] pramipexole (MIRAPEX) 1 MG tablet TAKE 1 TABLET BY MOUTH AT 4PM AND 1 TABLET BY MOUTH AT 9PM AS NEEDED 180 tablet 0     [DISCONTINUED] atenolol  (TENORMIN) 50 MG tablet TAKE ONE TABLET BY MOUTH THREE TIMES DAILY 90 tablet 0     [DISCONTINUED] losartan (COZAAR) 50 MG tablet Take 1 tablet (50 mg) by mouth daily 90 tablet 1         --------------------------------------------------------------------------------------------------------------------                          REVIEW OF SYSTEMS:         LUNGS: Pt denies: cough,excess sputum, hemoptysis, or shortness of breath.   HEART: Pt denies: chest pain, arrythmia, syncope, tachy or bradyarrhythmia or excess edema.   GI: Pt denies: nausea, vomitting, diarrhea, constipation, melena, or hematochezia.   NEURO: Pt denies: seizures, strokes, diplopia, weakness, paraesthesias, or paralysis. Restless leg syndrome is fairly well-controlled on current medication.   SKIN: Pt denies: itching, rashes, discoloration, or specific lesions of concern. Denies recent hair loss.                          EXAMINATION:         /86 (Cuff Size: Adult Large)  Pulse 80  Temp 96.9  F (36.1  C) (Tympanic)  Resp 16  Wt 239 lb (108.4 kg)  SpO2 96%  BMI 34.29 kg/m2   Constitutional: The patient appears to be in no acute distress. The patient appears to be adequately hydrated. No acute respiratory or hemodynamic distress is noted at this time.   LUNGS: clear bilaterally, airflow is brisk, no intercostal retraction or stridor is noted. No coughing is noted during visit.   HEART:  regular without rubs, clicks, gallops, or murmurs. PMI is nondisplaced. Upstrokes are brisk. S1,S2 are heard.   GI: Abdomen is soft, without rebound, guarding or tenderness. Bowel sounds are appropriate. No renal bruits are heard.    NEURO: Pt is alert and appropriate. No neurologic lateralization is noted. Cranial nerves 2-12 are intact. Peripheral sensory and motor function are grossly normal                        DECISION MAKIN. Essential hypertension with goal blood pressure less than 140/90  Continue current medication  - spironolactone  (ALDACTONE) 25 MG tablet; Take 1 tablet (25 mg) by mouth daily  Dispense: 90 tablet; Refill: 0  - losartan (COZAAR) 50 MG tablet; Take 1 tablet (50 mg) by mouth daily  Dispense: 90 tablet; Refill: 1    2. Restless leg syndrome  Continue current medication  - pramipexole (MIRAPEX) 1 MG tablet; TAKE 1 TABLET BY MOUTH AT 4PM AND 1 TABLET BY MOUTH AT 9PM AS NEEDED  Dispense: 180 tablet; Refill: 0    3. Hypertension goal BP (blood pressure) < 140/90  As above  - atenolol (TENORMIN) 50 MG tablet; Take 1 tablet (50 mg) by mouth 2 times daily  Dispense: 180 tablet; Refill: 0    4. Factor V Leiden mutation (H)  Continue anticoagulation and follow-up    5. Personal history of venous thrombosis and embolism  As above                               FOLLOW UP    I have asked the patient to make an appointment for follow up with me in 3 or 4 months        I have carefully explained the diagnosis and treatment options with the patient. The patient has displayed an understanding of the above, and all subsequent questions were answered.         DO ANANDA Guadarrama    Portions of this note were produced using [employer]  Although every attempt at real-time proof reading has been made, occasional grammar/syntax errors may have been missed.

## 2017-06-19 NOTE — MR AVS SNAPSHOT
After Visit Summary   6/19/2017    Robert Richard    MRN: [id number]           Patient Information     Date Of Birth          1935        Visit Information        Provider Department      6/19/2017 8:20 AM Nate Cifuentes DO [facility]        Today's Diagnoses     Factor V Leiden mutation (H)    -  1    Essential hypertension with goal blood pressure less than 140/90        Restless leg syndrome        Hypertension goal BP (blood pressure) < 140/90        Personal history of venous thrombosis and embolism           Follow-ups after your visit        Your next 10 appointments already scheduled     Jul 31, 2017 10:30 AM CDT   Anticoagulation Visit with PH ANTI COAG   [facility]    [address] [facility] [address] [address] [phone]              Who to contact     If you have questions or need follow up information about today's clinic visit or your schedule please contact [facility] directly at [phone].  Normal or non-critical lab and imaging results will be communicated to you by [employer], letter or phone within 4 business days after the clinic has received the results. If you do not hear from us within 7 days, please contact the clinic through [employer] or phone. If you have a critical or abnormal lab result, we will notify you by phone as soon as possible.  Submit refill requests through [employer] or call your pharmacy and they will forward the refill request to us. Please allow 3 business days for your refill to be completed.          Additional Information About Your Visit        MyChart Information     [employer] gives you secure access to your electronic health record. If you see a primary care provider, you can also send messages to your care team and make appointments. If you have questions, please call your primary care clinic.  If you do not have a primary care provider, please call [phone]  and they will assist you.        Care EveryWhere ID     This is your Care EveryWhere ID. This could be used by other organizations to access your [facility] medical records  [id number]        Your Vitals Were     Pulse Temperature Respirations Pulse Oximetry BMI (Body Mass Index)       80 96.9  F (36.1  C) (Tympanic) 16 96% 34.29 kg/m2        Blood Pressure from Last 3 Encounters:   06/19/17 148/86   04/14/17 144/80   03/31/17 (!) 168/92    Weight from Last 3 Encounters:   06/19/17 239 lb (108.4 kg)   03/31/17 252 lb (114.3 kg)   03/09/17 250 lb (113.4 kg)              Today, you had the following     No orders found for display         Today's Medication Changes          These changes are accurate as of: 6/19/17  5:49 PM.  If you have any questions, ask your nurse or doctor.               These medicines have changed or have updated prescriptions.        Dose/Directions    * atenolol 50 MG tablet   Commonly known as:  TENORMIN   This may have changed:  Another medication with the same name was changed. Make sure you understand how and when to take each.   Used for:  Hypertension goal BP (blood pressure) < 140/90   Changed by:  Nate Cifuentes DO        Dose:  50 mg   Take 1 tablet (50 mg) by mouth 2 times daily   Quantity:  120 tablet   Refills:  2       * atenolol 50 MG tablet   Commonly known as:  TENORMIN   This may have changed:  See the new instructions.   Used for:  Hypertension goal BP (blood pressure) < 140/90   Changed by:  Nate Cifuentes DO        Dose:  50 mg   Take 1 tablet (50 mg) by mouth 2 times daily   Quantity:  180 tablet   Refills:  0       pramipexole 1 MG tablet   Commonly known as:  MIRAPEX   This may have changed:  See the new instructions.   Used for:  Restless leg syndrome   Changed by:  Nate Cifuentes DO        TAKE 1 TABLET BY MOUTH AT 4PM AND 1 TABLET BY MOUTH AT 9PM AS NEEDED   Quantity:  180 tablet   Refills:  0       spironolactone 25 MG tablet    Commonly known as:  ALDACTONE   This may have changed:  when to take this   Used for:  Essential hypertension with goal blood pressure less than 140/90   Changed by:  Nate Cifuentes DO        Dose:  25 mg   Take 1 tablet (25 mg) by mouth daily   Quantity:  90 tablet   Refills:  0       * Notice:  This list has 2 medication(s) that are the same as other medications prescribed for you. Read the directions carefully, and ask your doctor or other care provider to review them with you.         Where to get your medicines      These medications were sent to [facility] [address] [place] [address] - [address]  [address], [facility] [address]     Phone:  [phone]     atenolol 50 MG tablet    losartan 50 MG tablet    pramipexole 1 MG tablet    spironolactone 25 MG tablet                Primary Care Provider Office Phone # Fax #    Nate Cifuentes [phone]       [address]   [facility] [address]        Thank you!     Thank you for choosing [facility]  for your care. Our goal is always to provide you with excellent care. Hearing back from our patients is one way we can continue to improve our services. Please take a few minutes to complete the written survey that you may receive in the mail after your visit with us. Thank you!             Your Updated Medication List - Protect others around you: Learn how to safely use, store and throw away your medicines at www.disposemymeds.org.          This list is accurate as of: 6/19/17  5:49 PM.  Always use your most recent med list.                   Brand Name Dispense Instructions for use    aspirin 81 MG tablet      Take 81 mg by mouth daily       * atenolol 50 MG tablet    TENORMIN    120 tablet    Take 1 tablet (50 mg) by mouth 2 times daily       * atenolol 50 MG tablet    TENORMIN    180 tablet    Take 1 tablet (50 mg) by mouth 2 times daily       atorvastatin 20 MG tablet    LIPITOR     24 tablet    Take 1 tablet (20 mg) by mouth twice a week       colchicine 0.6 MG tablet    COLCRYS    30 tablet    Take 2 tablets at the first sign of flare, take 1 additional tablet one hour later.       doxazosin 4 MG tablet    CARDURA    30 tablet    Take 1 tablet (4 mg) by mouth daily       losartan 50 MG tablet    COZAAR    90 tablet    Take 1 tablet (50 mg) by mouth daily       order for DME     1 Units    Wear mask at night       pramipexole 1 MG tablet    MIRAPEX    180 tablet    TAKE 1 TABLET BY MOUTH AT 4PM AND 1 TABLET BY MOUTH AT 9PM AS NEEDED       sildenafil 100 MG cap/tab    VIAGRA    6 tablet    Take 0.5-1 tablets ( mg) by mouth daily as needed for erectile dysfunction Take 30 min to 4 hours before intercourse.  Never use with nitroglycerin, terazosin or doxazosin.       spironolactone 25 MG tablet    ALDACTONE    90 tablet    Take 1 tablet (25 mg) by mouth daily       traMADol 50 MG tablet    ULTRAM    7 tablet    Take 1 tablet (50 mg) by mouth At Bedtime       triamcinolone 55 MCG/ACT Inhaler    NASACORT AQ    1 Bottle    Spray 2 sprays into both nostrils daily       warfarin 5 MG tablet    COUMADIN    90 tablet    TAKE HALF A TABLET BY MOUTH ON FRIDAY AND 1 TABLET ON ALL OTHER DAYS, OR AS DIRECTED BY THE COUMADIN CLINIC       * Notice:  This list has 2 medication(s) that are the same as other medications prescribed for you. Read the directions carefully, and ask your doctor or other care provider to review them with you.

## 2017-06-19 NOTE — PROGRESS NOTES
ANTICOAGULATION FOLLOW-UP CLINIC VISIT    Patient Name:  Robert Richard  Date:  6/19/2017  Contact Type:  Face to Face    SUBJECTIVE:     Patient Findings     Positives No Problem Findings           OBJECTIVE    INR Protime   Date Value Ref Range Status   06/19/2017 2.4 (A) 0.86 - 1.14 Final       ASSESSMENT / PLAN  INR assessment THER    Recheck INR In: 6 WEEKS    INR Location Clinic      Anticoagulation Summary as of 6/19/2017     INR goal 2.0-3.0   Today's INR 2.4   Maintenance plan 2.5 mg (5 mg x 0.5) on Fri; 5 mg (5 mg x 1) all other days   Full instructions 2.5 mg on Fri; 5 mg all other days   Weekly total 32.5 mg   No change documented Raquel Pelletier RN   Plan last modified Raquel Pelletier RN (3/22/2016)   Next INR check 7/31/2017   Target end date     Indications   Factor V Leiden mutation (H) [D68.51]  Venous thrombosis [I82.90]  Prothrombin mutation (H) [D68.59]  Long-term (current) use of anticoagulants [Z79.01] [Z79.01]         Anticoagulation Episode Summary     INR check location     Preferred lab     Send INR reminders to [facility] POOL    Comments 5 mg tablets, use book        Anticoagulation Care Providers     Provider Role Specialty Phone number    Nate Cifuentes DO [facility] Internal Medicine [phone]            See the Encounter Report to view Anticoagulation Flowsheet and Dosing Calendar (Go to Encounters tab in chart review, and find the Anticoagulation Therapy Visit)    Dosage adjustment made based on physician directed care plan.      Raquel Pelletier RN

## 2017-06-19 NOTE — MR AVS SNAPSHOT
Robert Hilary   6/19/2017 9:15 AM   Anticoagulation Therapy Visit    Description:  81 year old male   Provider:  ZAIRA ANTI COAG   Department:  Ph Anticoag           INR as of 6/19/2017     Today's INR 2.4      Anticoagulation Summary as of 6/19/2017     INR goal 2.0-3.0   Today's INR 2.4   Full instructions 2.5 mg on Fri; 5 mg all other days   Next INR check 7/31/2017    Indications   Factor V Leiden mutation (H) [D68.51]  Venous thrombosis [I82.90]  Prothrombin mutation (H) [D68.59]  Long-term (current) use of anticoagulants [Z79.01] [Z79.01]         Your next Anticoagulation Clinic appointment(s)     Jul 31, 2017 10:30 AM CDT   Anticoagulation Visit with ZAIRA ANTI COAG   [facility] ([facility]    [address] [place] [place] [address]   [phone]              Contact Numbers     Clinic Number:         June 2017 Details    Sun Mon Tue Wed Thu Fri Sat         1               2               3                 4               5               6               7               8               9               10                 11               12               13               14               15               16               17                 18               19      5 mg   See details      20      5 mg         21      5 mg         22      5 mg         23      2.5 mg         24      5 mg           25      5 mg         26      5 mg         27      5 mg         28      5 mg         29      5 mg         30      2.5 mg           Date Details   06/19 This INR check               How to take your warfarin dose     To take:  2.5 mg Take 0.5 of a 5 mg tablet.    To take:  5 mg Take 1 of the 5 mg tablets.           July 2017 Details    Sun Mon Tue Wed Thu Fri Sat           1      5 mg           2      5 mg         3      5 mg         4      5 mg         5      5 mg         6      5 mg         7      2.5 mg         8      5 mg           9      5 mg         10      5 mg         11       5 mg         12      5 mg         13      5 mg         14      2.5 mg         15      5 mg           16      5 mg         17      5 mg         18      5 mg         19      5 mg         20      5 mg         21      2.5 mg         22      5 mg           23      5 mg         24      5 mg         25      5 mg         26      5 mg         27      5 mg         28      2.5 mg         29      5 mg           30      5 mg         31                  Date Details   No additional details    Date of next INR:  7/31/2017         How to take your warfarin dose     To take:  2.5 mg Take 0.5 of a 5 mg tablet.    To take:  5 mg Take 1 of the 5 mg tablets.

## 2017-06-19 NOTE — NURSING NOTE
"Chief Complaint   Patient presents with     Hypertension     Follow up       Initial /86 (Cuff Size: Adult Large)  Pulse 80  Temp 96.9  F (36.1  C) (Tympanic)  Resp 16  Wt 239 lb (108.4 kg)  SpO2 96%  BMI 34.29 kg/m2 Estimated body mass index is 34.29 kg/(m^2) as calculated from the following:    Height as of 3/9/17: 5' 10\" (1.778 m).    Weight as of this encounter: 239 lb (108.4 kg).  Medication Reconciliation: complete   Patient needs refills.  Travis Posey MA      "

## 2017-06-30 DIAGNOSIS — I10 ESSENTIAL HYPERTENSION WITH GOAL BLOOD PRESSURE LESS THAN 140/90: ICD-10-CM

## 2017-06-30 LAB
ANION GAP SERPL CALCULATED.3IONS-SCNC: 9 MMOL/L (ref 3–14)
BUN SERPL-MCNC: 39 MG/DL (ref 7–30)
CALCIUM SERPL-MCNC: 9.1 MG/DL (ref 8.5–10.1)
CHLORIDE SERPL-SCNC: 111 MMOL/L (ref 94–109)
CO2 SERPL-SCNC: 22 MMOL/L (ref 20–32)
CREAT SERPL-MCNC: 1.7 MG/DL (ref 0.66–1.25)
GFR SERPL CREATININE-BSD FRML MDRD: 39 ML/MIN/1.7M2
GLUCOSE SERPL-MCNC: 117 MG/DL (ref 70–99)
POTASSIUM SERPL-SCNC: 4.7 MMOL/L (ref 3.4–5.3)
SODIUM SERPL-SCNC: 142 MMOL/L (ref 133–144)

## 2017-06-30 PROCEDURE — 80048 BASIC METABOLIC PNL TOTAL CA: CPT | Performed by: INTERNAL MEDICINE

## 2017-06-30 PROCEDURE — 36415 COLL VENOUS BLD VENIPUNCTURE: CPT | Performed by: INTERNAL MEDICINE

## 2017-07-05 NOTE — PROGRESS NOTES
Dear Robert, your recent test results are attached.   Your chemistry test demonstrated a slight worsening of your kidney function.  You should discontinue the spironolactone and we should recheck your kidney function in about 2 weeks.    Feel free to contact me via the office or My Chart if you have any questions regarding the above.  Sincerely,  DO ANANDA Guadarrama

## 2017-07-31 ENCOUNTER — OFFICE VISIT (OUTPATIENT)
Dept: INTERNAL MEDICINE | Facility: CLINIC | Age: 82
End: 2017-07-31
Payer: COMMERCIAL

## 2017-07-31 ENCOUNTER — ANTICOAGULATION THERAPY VISIT (OUTPATIENT)
Dept: ANTICOAGULATION | Facility: CLINIC | Age: 82
End: 2017-07-31
Payer: COMMERCIAL

## 2017-07-31 VITALS
DIASTOLIC BLOOD PRESSURE: 85 MMHG | TEMPERATURE: 96.9 F | HEART RATE: 71 BPM | OXYGEN SATURATION: 96 % | BODY MASS INDEX: 35.67 KG/M2 | SYSTOLIC BLOOD PRESSURE: 145 MMHG | WEIGHT: 248.6 LBS

## 2017-07-31 DIAGNOSIS — D68.51 FACTOR V LEIDEN MUTATION (H): ICD-10-CM

## 2017-07-31 DIAGNOSIS — Z71.89 ADVANCED DIRECTIVES, COUNSELING/DISCUSSION: ICD-10-CM

## 2017-07-31 DIAGNOSIS — Z79.01 LONG-TERM (CURRENT) USE OF ANTICOAGULANTS: ICD-10-CM

## 2017-07-31 DIAGNOSIS — I82.90 VENOUS THROMBOSIS: ICD-10-CM

## 2017-07-31 DIAGNOSIS — I10 HYPERTENSION GOAL BP (BLOOD PRESSURE) < 140/90: Primary | ICD-10-CM

## 2017-07-31 DIAGNOSIS — D68.52 PROTHROMBIN MUTATION (H): ICD-10-CM

## 2017-07-31 DIAGNOSIS — Z86.718 PERSONAL HISTORY OF VENOUS THROMBOSIS AND EMBOLISM: ICD-10-CM

## 2017-07-31 DIAGNOSIS — G25.81 RESTLESS LEG SYNDROME: ICD-10-CM

## 2017-07-31 DIAGNOSIS — E66.01 MORBID OBESITY DUE TO EXCESS CALORIES (H): ICD-10-CM

## 2017-07-31 LAB — INR POINT OF CARE: 2.2 (ref 0.86–1.14)

## 2017-07-31 PROCEDURE — 36416 COLLJ CAPILLARY BLOOD SPEC: CPT

## 2017-07-31 PROCEDURE — 99207 ZZC NO CHARGE NURSE ONLY: CPT

## 2017-07-31 PROCEDURE — 85610 PROTHROMBIN TIME: CPT | Mod: QW

## 2017-07-31 PROCEDURE — 99213 OFFICE O/P EST LOW 20 MIN: CPT | Performed by: INTERNAL MEDICINE

## 2017-07-31 RX ORDER — DOXAZOSIN 8 MG/1
8 TABLET ORAL DAILY
Qty: 30 TABLET | Refills: 3 | Status: SHIPPED | OUTPATIENT
Start: 2017-07-31 | End: 2017-12-04

## 2017-07-31 RX ORDER — WARFARIN SODIUM 5 MG/1
TABLET ORAL
Qty: 90 TABLET | Refills: 0 | Status: SHIPPED | OUTPATIENT
Start: 2017-07-31 | End: 2017-11-06

## 2017-07-31 RX ORDER — PRAMIPEXOLE DIHYDROCHLORIDE 1 MG/1
TABLET ORAL
Qty: 180 TABLET | Refills: 0 | Status: SHIPPED | OUTPATIENT
Start: 2017-07-31 | End: 2017-12-06

## 2017-07-31 ASSESSMENT — PAIN SCALES - GENERAL: PAINLEVEL: NO PAIN (0)

## 2017-07-31 NOTE — PROGRESS NOTES
ANTICOAGULATION FOLLOW-UP CLINIC VISIT    Patient Name:  Robert Richard  Date:  7/31/2017  Contact Type:  Face to Face    SUBJECTIVE:     Patient Findings     Positives No Problem Findings           OBJECTIVE    INR Protime   Date Value Ref Range Status   07/31/2017 2.2 (A) 0.86 - 1.14 Final       ASSESSMENT / PLAN  INR assessment THER    Recheck INR In: 6 WEEKS    INR Location Clinic      Anticoagulation Summary as of 7/31/2017     INR goal 2.0-3.0   Today's INR 2.2   Maintenance plan 2.5 mg (5 mg x 0.5) on Fri; 5 mg (5 mg x 1) all other days   Full instructions 2.5 mg on Fri; 5 mg all other days   Weekly total 32.5 mg   No change documented Raquel Pelletier RN   Plan last modified Raquel Pelletier RN (3/22/2016)   Next INR check 9/11/2017   Target end date     Indications   Factor V Leiden mutation (H) [D68.51]  Venous thrombosis [I82.90]  Prothrombin mutation (H) [D68.52]  Long-term (current) use of anticoagulants [Z79.01] [Z79.01]         Anticoagulation Episode Summary     INR check location     Preferred lab     Send INR reminders to [facility] POOL    Comments 5 mg tablets, use book        Anticoagulation Care Providers     Provider Role Specialty Phone number    Nate Cifuentes DO [facility] [phone]            See the Encounter Report to view Anticoagulation Flowsheet and Dosing Calendar (Go to Encounters tab in chart review, and find the Anticoagulation Therapy Visit)    Dosage adjustment made based on physician directed care plan.      Raquel Pelletier RN

## 2017-07-31 NOTE — MR AVS SNAPSHOT
Robert Hilary   7/31/2017 9:15 AM   Anticoagulation Therapy Visit    Description:  81 year old male   Provider:  PH ANTI COAG   Department:  Ph Anticoag           INR as of 7/31/2017     Today's INR 2.2      Anticoagulation Summary as of 7/31/2017     INR goal 2.0-3.0   Today's INR 2.2   Full instructions 2.5 mg on Fri; 5 mg all other days   Next INR check 9/11/2017    Indications   Factor V Leiden mutation (H) [D68.51]  Venous thrombosis [I82.90]  Prothrombin mutation (H) [D68.52]  Long-term (current) use of anticoagulants [Z79.01] [Z79.01]         Your next Anticoagulation Clinic appointment(s)     Jul 31, 2017  9:15 AM CDT   Anticoagulation Visit with PH ANTI COAG   [facility] ([address] [address]   [phone]            Sep 11, 2017  8:30 AM CDT   Anticoagulation Visit with PH ANTI COAG   [facility] ([address] [address]   [phone]              Contact Numbers     Clinic Number:         July 2017 Details    Sun Mon Tue Wed Thu Fri Sat           1                 2               3               4               5               6               7               8                 9               10               11               12               13               14               15                 16               17               18               19               20               21               22                 23               24               25               26               27               28               29                 30               31      5 mg   See details            Date Details   07/31 This INR check               How to take your warfarin dose     To take:  5 mg Take 1 of the 5 mg tablets.           August 2017 Details    Sun Mon Tue Wed Thu Fri Sat       1      5 mg         2      5 mg         3      5 mg         4      2.5 mg         5       5 mg           6      5 mg         7      5 mg         8      5 mg         9      5 mg         10      5 mg         11      2.5 mg         12      5 mg           13      5 mg         14      5 mg         15      5 mg         16      5 mg         17      5 mg         18      2.5 mg         19      5 mg           20      5 mg         21      5 mg         22      5 mg         23      5 mg         24      5 mg         25      2.5 mg         26      5 mg           27      5 mg         28      5 mg         29      5 mg         30      5 mg         31      5 mg            Date Details   No additional details            How to take your warfarin dose     To take:  2.5 mg Take 0.5 of a 5 mg tablet.    To take:  5 mg Take 1 of the 5 mg tablets.           September 2017 Details    Sun Mon Tue Wed Thu Fri Sat          1      2.5 mg         2      5 mg           3      5 mg         4      5 mg         5      5 mg         6      5 mg         7      5 mg         8      2.5 mg         9      5 mg           10      5 mg         11            12               13               14               15               16                 17               18               19               20               21               22               23                 24               25               26               27               28               29               30                Date Details   No additional details    Date of next INR:  9/11/2017         How to take your warfarin dose     To take:  2.5 mg Take 0.5 of a 5 mg tablet.    To take:  5 mg Take 1 of the 5 mg tablets.

## 2017-07-31 NOTE — NURSING NOTE
"Chief Complaint   Patient presents with     Hypertension       Initial /86 (BP Location: Left arm, Patient Position: Chair, Cuff Size: Adult Large)  Pulse 71  Temp 96.9  F (36.1  C) (Temporal)  Wt 248 lb 9.6 oz (112.8 kg)  SpO2 96%  BMI 35.67 kg/m2 Estimated body mass index is 35.67 kg/(m^2) as calculated from the following:    Height as of 3/9/17: 5' 10\" (1.778 m).    Weight as of this encounter: 248 lb 9.6 oz (112.8 kg).  Medication Reconciliation: complete  Eli MOORE    "

## 2017-07-31 NOTE — MR AVS SNAPSHOT
After Visit Summary   7/31/2017    Robert Richard    MRN: [id number]           Patient Information     Date Of Birth          1935        Visit Information        Provider Department      7/31/2017 7:40 AM Nate Cifuentes DO [facility]        Today's Diagnoses     Hypertension goal BP (blood pressure) < 140/90    -  1    Restless leg syndrome        Personal history of venous thrombosis and embolism        Factor V Leiden mutation (H)        Advanced directives, counseling/discussion        Morbid obesity due to excess calories (H)           Follow-ups after your visit        Your next 10 appointments already scheduled     Jul 31, 2017  9:15 AM CDT   Anticoagulation Visit with PH ANTI COAG   [facility] ([facility]    [address] [place] [address] [phone]              Who to contact     If you have questions or need follow up information about today's clinic visit or your schedule please contact [facility] directly at [phone].  Normal or non-critical lab and imaging results will be communicated to you by [email or web address], letter or phone within 4 business days after the clinic has received the results. If you do not hear from us within 7 days, please contact the clinic through [employer] or phone. If you have a critical or abnormal lab result, we will notify you by phone as soon as possible.  Submit refill requests through [employer] or call your pharmacy and they will forward the refill request to us. Please allow 3 business days for your refill to be completed.          Additional Information About Your Visit        [email or web address] Information     [employer] gives you secure access to your electronic health record. If you see a primary care provider, you can also send messages to your care team and make appointments. If you have questions, please call your primary care clinic.  If you do not have a primary care  provider, please call [phone] and they will assist you.        Care EveryWhere ID     This is your Care EveryWhere ID. This could be used by other organizations to access your [place] medical records  [id number]        Your Vitals Were     Pulse Temperature Pulse Oximetry BMI (Body Mass Index)          71 96.9  F (36.1  C) (Temporal) 96% 35.67 kg/m2         Blood Pressure from Last 3 Encounters:   07/31/17 145/85   06/19/17 148/86   04/14/17 144/80    Weight from Last 3 Encounters:   07/31/17 248 lb 9.6 oz (112.8 kg)   06/19/17 239 lb (108.4 kg)   03/31/17 252 lb (114.3 kg)              Today, you had the following     No orders found for display         Today's Medication Changes          These changes are accurate as of: 7/31/17  8:05 AM.  If you have any questions, ask your nurse or doctor.               These medicines have changed or have updated prescriptions.        Dose/Directions    doxazosin 8 MG tablet   Commonly known as:  CARDURA   This may have changed:    - medication strength  - how much to take   Used for:  Hypertension goal BP (blood pressure) < 140/90   Changed by:  Nate Cifuentes DO        Dose:  8 mg   Take 1 tablet (8 mg) by mouth daily   Quantity:  30 tablet   Refills:  3       warfarin 5 MG tablet   Commonly known as:  COUMADIN   This may have changed:  See the new instructions.   Used for:  Personal history of venous thrombosis and embolism   Changed by:  Nate Cifuentes DO        TAKE HALF A TABLET BY MOUTH ON FRIDAY AND 1 TABLET ON ALL OTHER DAYS, OR AS DIRECTED BY THE COUMADIN CLINIC   Quantity:  90 tablet   Refills:  0            Where to get your medicines      These medications were sent to [facility] [address] [place] [address] - [address]  [address] [facility] [address]     Phone:  [phone]     doxazosin 8 MG tablet    pramipexole 1 MG tablet    warfarin 5 MG tablet                Primary Care Provider Office Phone # Fax #    Nate  Anish Cifuentes,  [phone]       [address]   [place] [address]        Equal Access to Services     PAM CARTWRIGHT : Hadii aad ku hadanup Puentes, wachrissyda luqdorinda, qamarcinta kaalmada mishel, jessica bethin hayaan eunicejuan miguel yuen brendon perez. So [facility] [phone].    ATENCIÓN: Si habla español, tiene a colin disposición servicios gratuitos de asistencia lingüística. Llame al [phone].    We comply with applicable federal civil rights laws and [place] laws. We do not discriminate on the basis of race, color, national origin, age, disability sex, sexual orientation or gender identity.            Thank you!     Thank you for choosing [facility]  for your care. Our goal is always to provide you with excellent care. Hearing back from our patients is one way we can continue to improve our services. Please take a few minutes to complete the written survey that you may receive in the mail after your visit with us. Thank you!             Your Updated Medication List - Protect others around you: Learn how to safely use, store and throw away your medicines at www.disposemymeds.org.          This list is accurate as of: 7/31/17  8:05 AM.  Always use your most recent med list.                   Brand Name Dispense Instructions for use Diagnosis    aspirin 81 MG tablet      Take 81 mg by mouth daily        atenolol 50 MG tablet    TENORMIN    120 tablet    Take 1 tablet (50 mg) by mouth 2 times daily    Hypertension goal BP (blood pressure) < 140/90       colchicine 0.6 MG tablet    COLCRYS    30 tablet    Take 2 tablets at the first sign of flare, take 1 additional tablet one hour later.    Gout       doxazosin 8 MG tablet    CARDURA    30 tablet    Take 1 tablet (8 mg) by mouth daily    Hypertension goal BP (blood pressure) < 140/90       losartan 50 MG tablet    COZAAR    90 tablet    Take 1 tablet (50 mg) by mouth daily    Essential hypertension with goal blood pressure  less than 140/90       order for DME     1 Units    Wear mask at night    Sleep apnea       pramipexole 1 MG tablet    MIRAPEX    180 tablet    TAKE 1 TABLET BY MOUTH AT 4PM AND 1 TABLET BY MOUTH AT 9PM AS NEEDED    Restless leg syndrome       sildenafil 100 MG cap/tab    VIAGRA    6 tablet    Take 0.5-1 tablets ( mg) by mouth daily as needed for erectile dysfunction Take 30 min to 4 hours before intercourse.  Never use with nitroglycerin, terazosin or doxazosin.    Combined arterial insufficiency and corporo-venous occlusive erectile dysfunction       spironolactone 25 MG tablet    ALDACTONE    90 tablet    Take 1 tablet (25 mg) by mouth daily    Essential hypertension with goal blood pressure less than 140/90       traMADol 50 MG tablet    ULTRAM    7 tablet    Take 1 tablet (50 mg) by mouth At Bedtime    Biceps strain, left, initial encounter       triamcinolone 55 MCG/ACT Inhaler    NASACORT AQ    1 Bottle    Spray 2 sprays into both nostrils daily    Seasonal allergic rhinitis       warfarin 5 MG tablet    COUMADIN    90 tablet    TAKE HALF A TABLET BY MOUTH ON FRIDAY AND 1 TABLET ON ALL OTHER DAYS, OR AS DIRECTED BY THE COUMADIN CLINIC    Personal history of venous thrombosis and embolism

## 2017-07-31 NOTE — PROGRESS NOTES
SUBJECTIVE:                                                    Robert Richard is a 81 year old male who presents to clinic today for the following health issues:    Hypertension Follow-up      Outpatient blood pressures are being checked at home..    Low Salt Diet: no added salt        Amount of exercise or physical activity: 4-5 days/week for an average of 30-45 minutes    Problems taking medications regularly: No    Medication side effects: none  Diet: low salt        CHIEF COMPLAINT:    The patient is a pleasant 81-year-old gentleman who presents today for follow-up of his hypertension. He is currently on 50 mg of atenolol twice daily, losartan 50 mg daily, spironolactone 25 mg daily, Cardura 4 mg daily. He does have some chronic renal insufficiency negating the increase of Cozaar. He also has some relative bradycardia negating increasing the atenolol dosage. His blood pressures have been running consistently high in the 150 systolic range. We have discussed many options and at this time I believe increasing the doxazosin 3 mg may be helpful. He is tolerating the medication without difficulty and has had no syncope or near syncope. Does have a history of factor V Leiden mutation and has a personal history of DVT and pulmonary embolism, he is on chronic anticoagulation and has not had any bruising or bleeding. His restless leg syndrome does seem to be controlled well with the Mirapex. He does need a refill.  Extended history                        REVIEW OF SYSTEMS:         LUNGS: Pt denies: cough,excess sputum, hemoptysis, or shortness of breath.   HEART: Pt denies: chest pain, arrythmia, syncope, tachy or bradyarrhythmia or excess edema.   GI: Pt denies: nausea, vomitting, diarrhea, constipation, melena, or hematochezia.   NEURO: Pt denies: seizures, strokes, diplopia, weakness, paraesthesias, or paralysis.   SKIN: Pt denies: itching, rashes, discoloration, or specific lesions of concern. Denies recent hair  loss.                          EXAMINATION:       /85 (BP Location: Left arm, Patient Position: Chair, Cuff Size: Adult Large)  Pulse 71  Temp 96.9  F (36.1  C) (Temporal)  Wt 248 lb 9.6 oz (112.8 kg)  SpO2 96%  BMI 35.67 kg/m2   Constitutional: The patient appears to be in no acute distress. The patient appears to be adequately hydrated. No acute respiratory or hemodynamic distress is noted at this time.   LUNGS: clear bilaterally, airflow is brisk, no intercostal retraction or stridor is noted. No coughing is noted during visit.   HEART:  regular without rubs, clicks, gallops, or murmurs. PMI is nondisplaced. Upstrokes are brisk. S1,S2 are heard.   GI: Abdomen is soft, without rebound, guarding or tenderness. Bowel sounds are appropriate. No renal bruits are heard.    NEURO: Pt is alert and appropriate. No neurologic lateralization is noted. Cranial nerves 2-12 are intact. Peripheral sensory and motor function are grossly normal   SKIN:  warm and dry. No erythema, or rashes are noted. No specific lesions of concern are noted.                           DECISION MAKIN. Advanced directives, counseling/discussion  Discussed    2. Restless leg syndrome  Stable  - pramipexole (MIRAPEX) 1 MG tablet; TAKE 1 TABLET BY MOUTH AT 4PM AND 1 TABLET BY MOUTH AT 9PM AS NEEDED  Dispense: 180 tablet; Refill: 0    3. Personal history of venous thrombosis and embolism  Continue anticoagulation  - warfarin (COUMADIN) 5 MG tablet; TAKE HALF A TABLET BY MOUTH ON FRIDAY AND 1 TABLET ON ALL OTHER DAYS, OR AS DIRECTED BY THE COUMADIN CLINIC  Dispense: 90 tablet; Refill: 0    4. Hypertension goal BP (blood pressure) < 140/90  Increase doxazosin to 8 mg  - doxazosin (CARDURA) 4 MG tablet; Take 2 tablets (8 mg) by mouth daily  Dispense: 30 tablet; Refill: 3    5. Factor V Leiden mutation (H)  Continue anticoagulation    And, of course, obesity                           FOLLOW UP    I have asked the patient to make an  appointment for follow up with me 1 month with blood pressure log        I have carefully explained the diagnosis and treatment options with the patient. The patient has displayed an understanding of the above, and all subsequent questions were answered.         DO ANANDA Guadarrama    Portions of this note were produced using [employer]  Although every attempt at real-time proof reading has been made, occasional grammar/syntax errors may have been missed.

## 2017-09-11 ENCOUNTER — ANTICOAGULATION THERAPY VISIT (OUTPATIENT)
Dept: ANTICOAGULATION | Facility: CLINIC | Age: 82
End: 2017-09-11
Payer: COMMERCIAL

## 2017-09-11 VITALS — HEART RATE: 64 BPM | SYSTOLIC BLOOD PRESSURE: 131 MMHG | DIASTOLIC BLOOD PRESSURE: 81 MMHG

## 2017-09-11 DIAGNOSIS — I82.90 VENOUS THROMBOSIS: ICD-10-CM

## 2017-09-11 DIAGNOSIS — D68.52 PROTHROMBIN MUTATION (H): ICD-10-CM

## 2017-09-11 DIAGNOSIS — Z79.01 LONG-TERM (CURRENT) USE OF ANTICOAGULANTS: ICD-10-CM

## 2017-09-11 DIAGNOSIS — D68.51 FACTOR V LEIDEN MUTATION (H): ICD-10-CM

## 2017-09-11 LAB — INR POINT OF CARE: 1.8 (ref 0.86–1.14)

## 2017-09-11 PROCEDURE — 99207 ZZC NO CHARGE NURSE ONLY: CPT

## 2017-09-11 PROCEDURE — 85610 PROTHROMBIN TIME: CPT | Mod: QW

## 2017-09-11 PROCEDURE — 36416 COLLJ CAPILLARY BLOOD SPEC: CPT

## 2017-09-11 NOTE — PROGRESS NOTES
ANTICOAGULATION FOLLOW-UP CLINIC VISIT    Patient Name:  Robert Richard  Date:  9/11/2017  Contact Type:  Face to Face    SUBJECTIVE:     Patient Findings     Positives Change in diet/appetite (just a little more greens this week)           OBJECTIVE    INR Protime   Date Value Ref Range Status   09/11/2017 1.8 (A) 0.86 - 1.14 Final       ASSESSMENT / PLAN  INR assessment SUB    Recheck INR In: 8 DAYS    INR Location Clinic      Anticoagulation Summary as of 9/11/2017     INR goal 2.0-3.0   Today's INR 1.8!   Maintenance plan 2.5 mg (5 mg x 0.5) on Fri; 5 mg (5 mg x 1) all other days   Full instructions 9/11: 7.5 mg; Otherwise 2.5 mg on Fri; 5 mg all other days   Weekly total 32.5 mg   Plan last modified Raquel Pelletier, RN (3/22/2016)   Next INR check 9/19/2017   Target end date     Indications   Factor V Leiden mutation (H) [D68.51]  Venous thrombosis [I82.90]  Prothrombin mutation (H) [D68.52]  Long-term (current) use of anticoagulants [Z79.01] [Z79.01]         Anticoagulation Episode Summary     INR check location     Preferred lab     Send INR reminders to [facility] POOL    Comments 5 mg tablets, use book        Anticoagulation Care Providers     Provider Role Specialty Phone number    Jesús Cifuentesrob Oconnell DO [facility] Internal Medicine [phone]            See the Encounter Report to view Anticoagulation Flowsheet and Dosing Calendar (Go to Encounters tab in chart review, and find the Anticoagulation Therapy Visit)    Dosage adjustment made based on physician directed care plan.    Pt would like to stay on the same dose and recheck in a week. If INR is still low, dose will need to increase.     Raquel Pelletier, RN

## 2017-09-11 NOTE — MR AVS SNAPSHOT
Robert Richard   9/11/2017 8:30 AM   Anticoagulation Therapy Visit    Description:  82 year old male   Provider:  ZAIRA ANTI COAEDWARDO   Department:   Anticoag           INR as of 9/11/2017     Today's INR 1.8!      Anticoagulation Summary as of 9/11/2017     INR goal 2.0-3.0   Today's INR 1.8!   Full instructions 9/11: 7.5 mg; Otherwise 2.5 mg on Fri; 5 mg all other days   Next INR check 9/19/2017    Indications   Factor V Leiden mutation (H) [D68.51]  Venous thrombosis [I82.90]  Prothrombin mutation (H) [D68.52]  Long-term (current) use of anticoagulants [Z79.01] [Z79.01]         Your next Anticoagulation Clinic appointment(s)     Sep 11, 2017  8:30 AM CDT   Anticoagulation Visit with PH ANTI COAG   [facility]    [address] [place] [address]   [phone]            Sep 20, 2017  1:15 PM CDT   Anticoagulation Visit with PH ANTI COAG   [facility] ([address] [address]   [phone]              Contact Numbers     Clinic Number:         September 2017 Details    Sun Mon Tue Wed Thu Fri Sat          1               2                 3               4               5               6               7               8               9                 10               11      7.5 mg   See details      12      5 mg         13      5 mg         14      5 mg         15      2.5 mg         16      5 mg           17      5 mg         18      5 mg         19            20               21               22               23                 24               25               26               27               28               29               30                Date Details   09/11 This INR check       Date of next INR:  9/19/2017         How to take your warfarin dose     To take:  2.5 mg Take 0.5 of a 5 mg tablet.    To take:  5 mg Take 1 of the 5 mg tablets.    To take:  7.5 mg Take 1.5 of the 5 mg  tablets.

## 2017-09-18 ENCOUNTER — TELEPHONE (OUTPATIENT)
Dept: INTERNAL MEDICINE | Facility: CLINIC | Age: 82
End: 2017-09-18

## 2017-09-18 DIAGNOSIS — I10 ESSENTIAL HYPERTENSION WITH GOAL BLOOD PRESSURE LESS THAN 140/90: Primary | ICD-10-CM

## 2017-09-18 NOTE — TELEPHONE ENCOUNTER
[facility] pharmacy in [place] is requesting a substitute medication for atenolol as there is a shortage. Please send a new prescription. Katharine Jerome CMA (AAMA)

## 2017-09-19 ENCOUNTER — ANTICOAGULATION THERAPY VISIT (OUTPATIENT)
Dept: ANTICOAGULATION | Facility: CLINIC | Age: 82
End: 2017-09-19
Payer: COMMERCIAL

## 2017-09-19 VITALS — HEART RATE: 56 BPM | SYSTOLIC BLOOD PRESSURE: 143 MMHG | DIASTOLIC BLOOD PRESSURE: 89 MMHG

## 2017-09-19 DIAGNOSIS — Z79.01 LONG-TERM (CURRENT) USE OF ANTICOAGULANTS: ICD-10-CM

## 2017-09-19 DIAGNOSIS — I82.90 VENOUS THROMBOSIS: ICD-10-CM

## 2017-09-19 DIAGNOSIS — D68.52 PROTHROMBIN MUTATION (H): ICD-10-CM

## 2017-09-19 DIAGNOSIS — D68.51 FACTOR V LEIDEN MUTATION (H): ICD-10-CM

## 2017-09-19 LAB — INR POINT OF CARE: 2 (ref 0.86–1.14)

## 2017-09-19 PROCEDURE — 85610 PROTHROMBIN TIME: CPT | Mod: QW

## 2017-09-19 PROCEDURE — 36416 COLLJ CAPILLARY BLOOD SPEC: CPT

## 2017-09-19 PROCEDURE — 99207 ZZC NO CHARGE NURSE ONLY: CPT

## 2017-09-19 RX ORDER — METOPROLOL SUCCINATE 50 MG/1
50 TABLET, EXTENDED RELEASE ORAL DAILY
Qty: 90 TABLET | Refills: 3 | Status: SHIPPED | OUTPATIENT
Start: 2017-09-19 | End: 2017-09-21

## 2017-09-19 NOTE — PROGRESS NOTES
ANTICOAGULATION FOLLOW-UP CLINIC VISIT    Patient Name:  Robert Richard  Date:  9/19/2017  Contact Type:  Face to Face    SUBJECTIVE:     Patient Findings     Positives No Problem Findings           OBJECTIVE    INR Protime   Date Value Ref Range Status   09/19/2017 2.3 (A) 0.86 - 1.14 Final       ASSESSMENT / PLAN  INR assessment THER    Recheck INR In: 4 WEEKS    INR Location Clinic      Anticoagulation Summary as of 9/19/2017     INR goal 2.0-3.0   Today's INR 2.3   Maintenance plan 2.5 mg (5 mg x 0.5) on Fri; 5 mg (5 mg x 1) all other days   Full instructions 2.5 mg on Fri; 5 mg all other days   Weekly total 32.5 mg   No change documented Raquel Pelletier RN   Plan last modified Raquel Pelletier RN (3/22/2016)   Next INR check 10/17/2017   Target end date     Indications   Factor V Leiden mutation (H) [D68.51]  Venous thrombosis [I82.90]  Prothrombin mutation (H) [D68.52]  Long-term (current) use of anticoagulants [Z79.01] [Z79.01]         Anticoagulation Episode Summary     INR check location     Preferred lab     Send INR reminders to [facility] POOL    Comments 5 mg tablets, use book        Anticoagulation Care Providers     Provider Role Specialty Phone number    Nate Cifuentes DO [facility] Internal Medicine [phone]            See the Encounter Report to view Anticoagulation Flowsheet and Dosing Calendar (Go to Encounters tab in chart review, and find the Anticoagulation Therapy Visit)    Dosage adjustment made based on physician directed care plan.      Raquel Pelletier RN

## 2017-09-21 ENCOUNTER — OFFICE VISIT (OUTPATIENT)
Dept: INTERNAL MEDICINE | Facility: CLINIC | Age: 82
End: 2017-09-21
Payer: COMMERCIAL

## 2017-09-21 VITALS
HEIGHT: 70 IN | BODY MASS INDEX: 35.65 KG/M2 | DIASTOLIC BLOOD PRESSURE: 62 MMHG | OXYGEN SATURATION: 96 % | HEART RATE: 70 BPM | RESPIRATION RATE: 18 BRPM | WEIGHT: 249 LBS | TEMPERATURE: 98.5 F | SYSTOLIC BLOOD PRESSURE: 126 MMHG

## 2017-09-21 DIAGNOSIS — Z86.39 HISTORY OF ELEVATED GLUCOSE: Primary | ICD-10-CM

## 2017-09-21 DIAGNOSIS — Z86.718 PERSONAL HISTORY OF VENOUS THROMBOSIS AND EMBOLISM: ICD-10-CM

## 2017-09-21 DIAGNOSIS — I10 ESSENTIAL HYPERTENSION WITH GOAL BLOOD PRESSURE LESS THAN 140/90: ICD-10-CM

## 2017-09-21 DIAGNOSIS — Z79.01 LONG-TERM (CURRENT) USE OF ANTICOAGULANTS: ICD-10-CM

## 2017-09-21 PROCEDURE — 99213 OFFICE O/P EST LOW 20 MIN: CPT | Performed by: INTERNAL MEDICINE

## 2017-09-21 ASSESSMENT — PAIN SCALES - GENERAL: PAINLEVEL: NO PAIN (0)

## 2017-09-21 NOTE — NURSING NOTE
"Chief Complaint   Patient presents with     Hypertension     Recheck Medication       Initial /62  Pulse 70  Temp 98.5  F (36.9  C) (Temporal)  Resp 18  Ht 5' 10\" (1.778 m)  Wt 249 lb (112.9 kg)  SpO2 96%  BMI 35.73 kg/m2 Estimated body mass index is 35.73 kg/(m^2) as calculated from the following:    Height as of this encounter: 5' 10\" (1.778 m).    Weight as of this encounter: 249 lb (112.9 kg).  Medication Reconciliation: complete    "

## 2017-09-21 NOTE — PROGRESS NOTES
SUBJECTIVE:   Robert Richard is a 82 year old male who presents to clinic today for the following health issues:      Hypertension Follow-up      Outpatient blood pressures are being checked at home.  Results are brought in numbers .    Low Salt Diet: no added salt        Amount of exercise or physical activity: 2-3 days/week for an average of 30-45 minutes    Problems taking medications regularly: No    Medication side effects: none  Diet: regular (no restrictions)    CHIEF COMPLAINT:    The patient is a very pleasant 82-year-old gentleman who presents today for follow-up of his hypertension. He has been taking his medications compliantly without any problems. He's had no lightheadedness, dizziness or chest pain. 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 He denies any bleeding or bruising. 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                       PAST, FAMILY,SOCIAL HISTORY:     Medical  History:   has a past medical history of Basal cell carcinoma; Cancer (H); DVT (deep venous thrombosis) (H) (11/2003); DVT (deep venous thrombosis) (H) (12/08); DVT (deep venous thrombosis) (H) (10/2010); Gout; Other and unspecified hyperlipidemia; Restless leg syndrome; and Unspecified essential hypertension.     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     Social History:   reports that he has never smoked. He has never used smokeless tobacco. He reports that he drinks alcohol. He reports that he does not use illicit drugs.     Family History:  family history includes Prostate Cancer in his paternal grandfather.            MEDICATIONS  Current Outpatient Prescriptions   Medication Sig Dispense Refill     pramipexole (MIRAPEX) 1 MG tablet TAKE 1 TABLET BY MOUTH AT 4PM AND 1 TABLET BY MOUTH AT 9PM AS NEEDED 180 tablet 0      warfarin (COUMADIN) 5 MG tablet TAKE HALF A TABLET BY MOUTH ON FRIDAY AND 1 TABLET ON ALL OTHER DAYS, OR AS DIRECTED BY THE COUMADIN CLINIC 90 tablet 0     doxazosin (CARDURA) 8 MG tablet Take 1 tablet (8 mg) by mouth daily 30 tablet 3     spironolactone (ALDACTONE) 25 MG tablet Take 1 tablet (25 mg) by mouth daily 90 tablet 0     losartan (COZAAR) 50 MG tablet Take 1 tablet (50 mg) by mouth daily 90 tablet 1     atenolol (TENORMIN) 50 MG tablet Take 1 tablet (50 mg) by mouth 2 times daily 120 tablet 2     sildenafil (VIAGRA) 100 MG cap/tab Take 0.5-1 tablets ( mg) by mouth daily as needed for erectile dysfunction Take 30 min to 4 hours before intercourse.  Never use with nitroglycerin, terazosin or doxazosin. 6 tablet 11     traMADol (ULTRAM) 50 MG tablet Take 1 tablet (50 mg) by mouth At Bedtime 7 tablet 0     triamcinolone (NASACORT AQ) 55 MCG/ACT nasal inhaler Spray 2 sprays into both nostrils daily 1 Bottle 3     aspirin 81 MG tablet Take 81 mg by mouth daily       colchicine (COLCRYS) 0.6 MG tablet Take 2 tablets at the first sign of flare, take 1 additional tablet one hour later. 30 tablet 0     ORDER FOR DME Wear mask at night 1 Units 0         --------------------------------------------------------------------------------------------------------------------                          REVIEW OF SYSTEMS:         LUNGS: Pt denies: cough,excess sputum, hemoptysis, or shortness of breath.   HEART: Pt denies: chest pain, arrythmia, syncope, tachy or bradyarrhythmia or excess edema.   GI: Pt denies: nausea, vomitting, diarrhea, constipation, melena, or hematochezia.   NEURO: Pt denies: seizures, strokes, diplopia, weakness, paraesthesias, or paralysis.   SKIN: Pt denies: itching, rashes, discoloration, or current lesions of concern. Denies recent hair loss. Patient does have a history of multiple skin cancers which have been excised.                          EXAMINATION:         /62  Pulse 70   "Temp 98.5  F (36.9  C) (Temporal)  Resp 18  Ht 5' 10\" (1.778 m)  Wt 249 lb (112.9 kg)  SpO2 96%  BMI 35.73 kg/m2   LUNGS: clear bilaterally, airflow is brisk, no intercostal retraction or stridor is noted. No coughing is noted during visit.   HEART:  regular without rubs, clicks, gallops, or murmurs. PMI is nondisplaced. Upstrokes are brisk. S1,S2 are heard.   GI: Abdomen is soft, without rebound, guarding or tenderness. Bowel sounds are appropriate. No renal bruits are heard. Abdomen is obese.   NEURO: Pt is alert and appropriate. No neurologic lateralization is noted. Cranial nerves 2-12 are intact. Peripheral sensory and motor function are grossly normal   SKIN:  warm and dry. No erythema, or rashes are noted. No specific lesions of concern are noted.                           DECISION MAKIN. History of elevated glucose  We'll follow A1c at next blood draw    2. Long-term (current) use of anticoagulants [Z79.01]  Continue to follow INR    3. Personal history of venous thrombosis and embolism    4. Essential hypertension with goal blood pressure less than 140/90  Currently controlled, continue current medication                                 FOLLOW UP    I have asked the patient to make an appointment for follow up with me in 2 months. He will return fasting.        I have carefully explained the diagnosis and treatment options with the patient. The patient has displayed an understanding of the above, and all subsequent questions were answered.         DO ANANDA Guadarrama    Portions of this note were produced using [employer]  Although every attempt at real-time proof reading has been made, occasional grammar/syntax errors may have been missed.             "

## 2017-09-21 NOTE — MR AVS SNAPSHOT
After Visit Summary   9/21/2017    Robert Richard    MRN: [id number]           Patient Information     Date Of Birth          1935        Visit Information        Provider Department      9/21/2017 9:20 AM Nate Cifuentes DO [facility]        Today's Diagnoses     History of elevated glucose    -  1    Long-term (current) use of anticoagulants [Z79.01]        Personal history of venous thrombosis and embolism        Essential hypertension with goal blood pressure less than 140/90           Follow-ups after your visit        Your next 10 appointments already scheduled     Oct 17, 2017  8:30 AM CDT   Anticoagulation Visit with PH ANTI COAG   [facility] ([facility])    [address] [id number] [phone]              Future tests that were ordered for you today     Open Future Orders        Priority Expected Expires Ordered    **A1C FUTURE 3mo Routine 12/20/2017 1/19/2018 9/21/2017            Who to contact     If you have questions or need follow up information about today's clinic visit or your schedule please contact [facility] directly at [phone].  Normal or non-critical lab and imaging results will be communicated to you by [employer], letter or phone within 4 business days after the clinic has received the results. If you do not hear from us within 7 days, please contact the clinic through [employer] or phone. If you have a critical or abnormal lab result, we will notify you by phone as soon as possible.  Submit refill requests through [facility] or call your pharmacy and they will forward the refill request to us. Please allow 3 business days for your refill to be completed.          Additional Information About Your Visit        [employer] Information     [facility] gives you secure access to your electronic health record. If you see a primary care provider, you can also send messages to your care team  "and make appointments. If you have questions, please call your primary care clinic.  If you do not have a primary care provider, please call [phone] and they will assist you.        Care EveryWhere ID     This is your Care EveryWhere ID. This could be used by other organizations to access your [facility] medical records  [id number]        Your Vitals Were     Pulse Temperature Respirations Height Pulse Oximetry BMI (Body Mass Index)    70 98.5  F (36.9  C) (Temporal) 18 5' 10\" (1.778 m) 96% 35.73 kg/m2       Blood Pressure from Last 3 Encounters:   09/21/17 126/62   09/19/17 143/89   09/11/17 131/81    Weight from Last 3 Encounters:   09/21/17 249 lb (112.9 kg)   07/31/17 248 lb 9.6 oz (112.8 kg)   06/19/17 239 lb (108.4 kg)                 Today's Medication Changes          These changes are accurate as of: 9/21/17  5:47 PM.  If you have any questions, ask your nurse or doctor.               Stop taking these medicines if you haven't already. Please contact your care team if you have questions.     metoprolol 50 MG 24 hr tablet   Commonly known as:  TOPROL-XL   Stopped by:  Nate Cifuentes DO                    Primary Care Provider Office Phone # Fax #    Nate Cifuentes [phone] [address] [place] [address]        Equal Access to Services     [facility] AH: Armand Puentes, waaxda luqadaha, qaybta kaalmajessica coyle. So [facility] [phone].    ATENCIÓN: Si habla español, tiene a colin disposición servicios gratuitos de asistencia lingüística. Llame al [phone].    We comply with applicable federal civil rights laws and [place] laws. We do not discriminate on the basis of race, color, national origin, age, disability sex, sexual orientation or gender identity.            Thank you!     Thank you for choosing [facility]  for your care. Our goal is always to provide you with " excellent care. Hearing back from our patients is one way we can continue to improve our services. Please take a few minutes to complete the written survey that you may receive in the mail after your visit with us. Thank you!             Your Updated Medication List - Protect others around you: Learn how to safely use, store and throw away your medicines at www.disposemymeds.org.          This list is accurate as of: 9/21/17  5:47 PM.  Always use your most recent med list.                   Brand Name Dispense Instructions for use Diagnosis    aspirin 81 MG tablet      Take 81 mg by mouth daily        atenolol 50 MG tablet    TENORMIN    120 tablet    Take 1 tablet (50 mg) by mouth 2 times daily    Hypertension goal BP (blood pressure) < 140/90       colchicine 0.6 MG tablet    COLCRYS    30 tablet    Take 2 tablets at the first sign of flare, take 1 additional tablet one hour later.    Gout       doxazosin 8 MG tablet    CARDURA    30 tablet    Take 1 tablet (8 mg) by mouth daily    Hypertension goal BP (blood pressure) < 140/90       losartan 50 MG tablet    COZAAR    90 tablet    Take 1 tablet (50 mg) by mouth daily    Essential hypertension with goal blood pressure less than 140/90       order for DME     1 Units    Wear mask at night    Sleep apnea       pramipexole 1 MG tablet    MIRAPEX    180 tablet    TAKE 1 TABLET BY MOUTH AT 4PM AND 1 TABLET BY MOUTH AT 9PM AS NEEDED    Restless leg syndrome       sildenafil 100 MG tablet    VIAGRA    6 tablet    Take 0.5-1 tablets ( mg) by mouth daily as needed for erectile dysfunction Take 30 min to 4 hours before intercourse.  Never use with nitroglycerin, terazosin or doxazosin.    Combined arterial insufficiency and corporo-venous occlusive erectile dysfunction       spironolactone 25 MG tablet    ALDACTONE    90 tablet    Take 1 tablet (25 mg) by mouth daily    Essential hypertension with goal blood pressure less than 140/90       traMADol 50 MG tablet     ULTRAM    7 tablet    Take 1 tablet (50 mg) by mouth At Bedtime    Biceps strain, left, initial encounter       triamcinolone 55 MCG/ACT Inhaler    NASACORT AQ    1 Bottle    Spray 2 sprays into both nostrils daily    Seasonal allergic rhinitis       warfarin 5 MG tablet    COUMADIN    90 tablet    TAKE HALF A TABLET BY MOUTH ON FRIDAY AND 1 TABLET ON ALL OTHER DAYS, OR AS DIRECTED BY THE COUMADIN CLINIC    Personal history of venous thrombosis and embolism

## 2017-10-17 ENCOUNTER — ANTICOAGULATION THERAPY VISIT (OUTPATIENT)
Dept: ANTICOAGULATION | Facility: CLINIC | Age: 82
End: 2017-10-17
Payer: COMMERCIAL

## 2017-10-17 VITALS — DIASTOLIC BLOOD PRESSURE: 79 MMHG | HEART RATE: 60 BPM | SYSTOLIC BLOOD PRESSURE: 149 MMHG

## 2017-10-17 DIAGNOSIS — D68.51 FACTOR V LEIDEN MUTATION (H): ICD-10-CM

## 2017-10-17 DIAGNOSIS — I82.90 VENOUS THROMBOSIS: ICD-10-CM

## 2017-10-17 DIAGNOSIS — D68.52 PROTHROMBIN MUTATION (H): ICD-10-CM

## 2017-10-17 DIAGNOSIS — Z79.01 LONG-TERM (CURRENT) USE OF ANTICOAGULANTS: ICD-10-CM

## 2017-10-17 LAB — INR POINT OF CARE: 3 (ref 0.86–1.14)

## 2017-10-17 PROCEDURE — 85610 PROTHROMBIN TIME: CPT | Mod: QW

## 2017-10-17 PROCEDURE — 99207 ZZC NO CHARGE NURSE ONLY: CPT

## 2017-10-17 PROCEDURE — 36416 COLLJ CAPILLARY BLOOD SPEC: CPT

## 2017-10-17 NOTE — PROGRESS NOTES
ANTICOAGULATION FOLLOW-UP CLINIC VISIT    Patient Name:  Robert Richard  Date:  10/17/2017  Contact Type:  Face to Face    SUBJECTIVE:     Patient Findings     Positives No Problem Findings           OBJECTIVE    INR Protime   Date Value Ref Range Status   10/17/2017 3.0 (A) 0.86 - 1.14 Final       ASSESSMENT / PLAN  INR assessment THER    Recheck INR In: 4 WEEKS    INR Location Clinic      Anticoagulation Summary as of 10/17/2017     INR goal 2.0-3.0   Today's INR 3.0   Maintenance plan 2.5 mg (5 mg x 0.5) on Fri; 5 mg (5 mg x 1) all other days   Full instructions 2.5 mg on Fri; 5 mg all other days   Weekly total 32.5 mg   No change documented Raquel Pelletier RN   Plan last modified Raquel Pelletier RN (3/22/2016)   Next INR check 11/14/2017   Target end date     Indications   Factor V Leiden mutation (H) [D68.51]  Venous thrombosis [I82.90]  Prothrombin mutation (H) [D68.52]  Long-term (current) use of anticoagulants [Z79.01] [Z79.01]         Anticoagulation Episode Summary     INR check location     Preferred lab     Send INR reminders to [facility] POOL    Comments 5 mg tablets, use book        Anticoagulation Care Providers     Provider Role Specialty Phone number    Nate Cifuentes DO [facility] Internal Medicine [phone]            See the Encounter Report to view Anticoagulation Flowsheet and Dosing Calendar (Go to Encounters tab in chart review, and find the Anticoagulation Therapy Visit)    Dosage adjustment made based on physician directed care plan.      Raquel Pelletier RN

## 2017-10-17 NOTE — MR AVS SNAPSHOT
Robert Castanongeovany   10/17/2017 8:30 AM   Anticoagulation Therapy Visit    Description:  82 year old male   Provider:  PH ANTI COAG   Department:  Ph Anticoag           INR as of 10/17/2017     Today's INR 3.0      Anticoagulation Summary as of 10/17/2017     INR goal 2.0-3.0   Today's INR 3.0   Full instructions 2.5 mg on Fri; 5 mg all other days   Next INR check 11/14/2017    Indications   Factor V Leiden mutation (H) [D68.51]  Venous thrombosis [I82.90]  Prothrombin mutation (H) [D68.52]  Long-term (current) use of anticoagulants [Z79.01] [Z79.01]         Your next Anticoagulation Clinic appointment(s)     Oct 17, 2017  8:30 AM CDT   Anticoagulation Visit with PH ANTI COAG   [facility] ([address] [address]   [phone]            Nov 14, 2017  8:15 AM CST   Anticoagulation Visit with PH ANTI COAG   [facility] ([address] [address]   [phone]              Contact Numbers     Clinic Number:         October 2017 Details    Sun Mon Tue Wed Thu Fri Sat     1               2               3               4               5               6               7                 8               9               10               11               12               13               14                 15               16               17      5 mg   See details      18      5 mg         19      5 mg         20      2.5 mg         21      5 mg           22      5 mg         23      5 mg         24      5 mg         25      5 mg         26      5 mg         27      2.5 mg         28      5 mg           29      5 mg         30      5 mg         31      5 mg              Date Details   10/17 This INR check               How to take your warfarin dose     To take:  2.5 mg Take 0.5 of a 5 mg tablet.    To take:  5 mg Take 1 of the 5 mg tablets.           November 2017 Details    Sun Mon Tue  Wed Thu Fri Sat        1      5 mg         2      5 mg         3      2.5 mg         4      5 mg           5      5 mg         6      5 mg         7      5 mg         8      5 mg         9      5 mg         10      2.5 mg         11      5 mg           12      5 mg         13      5 mg         14            15               16               17               18                 19               20               21               22               23               24               25                 26               27               28               29               30                  Date Details   No additional details    Date of next INR:  11/14/2017         How to take your warfarin dose     To take:  2.5 mg Take 0.5 of a 5 mg tablet.    To take:  5 mg Take 1 of the 5 mg tablets.

## 2017-10-18 ENCOUNTER — OFFICE VISIT (OUTPATIENT)
Dept: FAMILY MEDICINE | Facility: OTHER | Age: 82
End: 2017-10-18
Payer: COMMERCIAL

## 2017-10-18 VITALS
OXYGEN SATURATION: 96 % | DIASTOLIC BLOOD PRESSURE: 84 MMHG | TEMPERATURE: 96.5 F | SYSTOLIC BLOOD PRESSURE: 136 MMHG | WEIGHT: 249 LBS | HEART RATE: 73 BPM | BODY MASS INDEX: 35.73 KG/M2

## 2017-10-18 DIAGNOSIS — Z86.718 PERSONAL HISTORY OF VENOUS THROMBOSIS AND EMBOLISM: ICD-10-CM

## 2017-10-18 DIAGNOSIS — I10 ESSENTIAL HYPERTENSION WITH GOAL BLOOD PRESSURE LESS THAN 140/90: ICD-10-CM

## 2017-10-18 DIAGNOSIS — E66.812 CLASS 2 OBESITY DUE TO EXCESS CALORIES WITHOUT SERIOUS COMORBIDITY IN ADULT, UNSPECIFIED BMI: ICD-10-CM

## 2017-10-18 DIAGNOSIS — R73.09 ELEVATED GLUCOSE: ICD-10-CM

## 2017-10-18 DIAGNOSIS — Z23 NEED FOR PROPHYLACTIC VACCINATION AND INOCULATION AGAINST INFLUENZA: Primary | ICD-10-CM

## 2017-10-18 DIAGNOSIS — E66.09 CLASS 2 OBESITY DUE TO EXCESS CALORIES WITHOUT SERIOUS COMORBIDITY IN ADULT, UNSPECIFIED BMI: ICD-10-CM

## 2017-10-18 DIAGNOSIS — D68.51 FACTOR V LEIDEN MUTATION (H): ICD-10-CM

## 2017-10-18 DIAGNOSIS — Z85.46 PERSONAL HISTORY OF PROSTATE CANCER: ICD-10-CM

## 2017-10-18 LAB
ANION GAP SERPL CALCULATED.3IONS-SCNC: 4 MMOL/L (ref 3–14)
BUN SERPL-MCNC: 26 MG/DL (ref 7–30)
CALCIUM SERPL-MCNC: 8.9 MG/DL (ref 8.5–10.1)
CHLORIDE SERPL-SCNC: 110 MMOL/L (ref 94–109)
CO2 SERPL-SCNC: 29 MMOL/L (ref 20–32)
CREAT SERPL-MCNC: 1.4 MG/DL (ref 0.66–1.25)
GFR SERPL CREATININE-BSD FRML MDRD: 49 ML/MIN/1.7M2
GLUCOSE SERPL-MCNC: 156 MG/DL (ref 70–99)
HBA1C MFR BLD: 6.6 % (ref 4.3–6)
POTASSIUM SERPL-SCNC: 4.1 MMOL/L (ref 3.4–5.3)
SODIUM SERPL-SCNC: 143 MMOL/L (ref 133–144)

## 2017-10-18 PROCEDURE — 83036 HEMOGLOBIN GLYCOSYLATED A1C: CPT | Performed by: INTERNAL MEDICINE

## 2017-10-18 PROCEDURE — 90662 IIV NO PRSV INCREASED AG IM: CPT | Performed by: INTERNAL MEDICINE

## 2017-10-18 PROCEDURE — 99214 OFFICE O/P EST MOD 30 MIN: CPT | Mod: 25 | Performed by: INTERNAL MEDICINE

## 2017-10-18 PROCEDURE — G0008 ADMIN INFLUENZA VIRUS VAC: HCPCS | Performed by: INTERNAL MEDICINE

## 2017-10-18 PROCEDURE — 84153 ASSAY OF PSA TOTAL: CPT | Performed by: INTERNAL MEDICINE

## 2017-10-18 PROCEDURE — 36415 COLL VENOUS BLD VENIPUNCTURE: CPT | Performed by: INTERNAL MEDICINE

## 2017-10-18 PROCEDURE — 80048 BASIC METABOLIC PNL TOTAL CA: CPT | Performed by: INTERNAL MEDICINE

## 2017-10-18 ASSESSMENT — PAIN SCALES - GENERAL: PAINLEVEL: NO PAIN (0)

## 2017-10-18 NOTE — NURSING NOTE
"Chief Complaint   Patient presents with     Hypertension       Initial /84 (BP Location: Left arm, Patient Position: Chair, Cuff Size: Adult Large)  Pulse 73  Temp 96.5  F (35.8  C) (Temporal)  Wt 249 lb (112.9 kg)  SpO2 96%  BMI 35.73 kg/m2 Estimated body mass index is 35.73 kg/(m^2) as calculated from the following:    Height as of 9/21/17: 5' 10\" (1.778 m).    Weight as of this encounter: 249 lb (112.9 kg).  Medication Reconciliation: complete  Eli VELAZQUEZA    "

## 2017-10-18 NOTE — MR AVS SNAPSHOT
After Visit Summary   10/18/2017    Robert Richard    MRN: [id number]           Patient Information     Date Of Birth          1935        Visit Information        Provider Department      10/18/2017 8:00 AM Nate Cifuentes DO [facility]        Today's Diagnoses     Need for prophylactic vaccination and inoculation against influenza    -  1    Essential hypertension with goal blood pressure less than 140/90        Factor V Leiden mutation (H)        Personal history of venous thrombosis and embolism        Personal history of prostate cancer        Elevated glucose        Class 2 obesity due to excess calories without serious comorbidity in adult, unspecified BMI           Follow-ups after your visit        Additional Services     INR CLINIC REFERRAL       Your provider has referred you to INR Services.    Please be aware that coverage of these services is subject to the terms and limitations of your health insurance plan.  Call member services at your health plan with any benefit or coverage questions.    Indication for Anticoagulation: Pulmonary Embolism  If nonstandard INR is desired, indicate goal range and explanation:   Expected Duration of Therapy: Lifetime                  Your next 10 appointments already scheduled     Nov 14, 2017  8:15 AM CST   [facility]    [address] [place] [id number] [phone]              Who to contact     If you have questions or need follow up information about today's clinic visit or your schedule please contact [facility] directly at [phone].  Normal or non-critical lab and imaging results will be communicated to you by MyChart, letter or phone within 4 business days after the clinic has received the results. If you do not hear from us within 7 days, please contact the clinic through MyChart or phone. If you  have a critical or abnormal lab result, we will notify you by phone as soon as possible.  Submit refill requests through [employer] or call your pharmacy and they will forward the refill request to us. Please allow 3 business days for your refill to be completed.          Additional Information About Your Visit        [employer] Information     [employer] gives you secure access to your electronic health record. If you see a primary care provider, you can also send messages to your care team and make appointments. If you have questions, please call your primary care clinic.  If you do not have a primary care provider, please call [phone] and they will assist you.        Care EveryWhere ID     This is your Care EveryWhere ID. This could be used by other organizations to access your [facility] medical records  [id number]        Your Vitals Were     Pulse Temperature Pulse Oximetry BMI (Body Mass Index)          73 96.5  F (35.8  C) (Temporal) 96% 35.73 kg/m2         Blood Pressure from Last 3 Encounters:   10/18/17 136/84   10/17/17 149/79   09/21/17 126/62    Weight from Last 3 Encounters:   10/18/17 249 lb (112.9 kg)   09/21/17 249 lb (112.9 kg)   07/31/17 248 lb 9.6 oz (112.8 kg)              We Performed the Following     ADMIN INFLUENZA (For MEDICARE Patients ONLY) []     Basic metabolic panel     FLU VACCINE, INCREASED ANTIGEN, PRESV FREE, AGE 65+ [63180]     Hemoglobin A1c     INR CLINIC REFERRAL     PSA, tumor marker        Primary Care Provider Office Phone # Fax #    Nate Anish Cifuentes [phone]       [address] [place] [address]        Equal Access to Services     [facility] : Hadii aad ku hadasho Soomaali, waaxda luqadaha, qaybta kaalmada mishel, jessica perez. So [facility] [phone].    ATENCIÓN: Si habla español, tiene a colin disposición servicios gratuitos de asistencia lingüística. Llame al [phone].    We comply with applicable  federal civil rights laws and [place] laws. We do not discriminate on the basis of race, color, national origin, age, disability, sex, sexual orientation, or gender identity.            Thank you!     Thank you for choosing [facility]  for your care. Our goal is always to provide you with excellent care. Hearing back from our patients is one way we can continue to improve our services. Please take a few minutes to complete the written survey that you may receive in the mail after your visit with us. Thank you!             Your Updated Medication List - Protect others around you: Learn how to safely use, store and throw away your medicines at www.disposemymeds.org.          This list is accurate as of: 10/18/17 11:59 PM.  Always use your most recent med list.                   Brand Name Dispense Instructions for use Diagnosis    aspirin 81 MG tablet      Take 81 mg by mouth daily        atenolol 50 MG tablet    TENORMIN    120 tablet    Take 1 tablet (50 mg) by mouth 2 times daily    Hypertension goal BP (blood pressure) < 140/90       colchicine 0.6 MG tablet    COLCRYS    30 tablet    Take 2 tablets at the first sign of flare, take 1 additional tablet one hour later.    Gout       doxazosin 8 MG tablet    CARDURA    30 tablet    Take 1 tablet (8 mg) by mouth daily    Hypertension goal BP (blood pressure) < 140/90       losartan 50 MG tablet    COZAAR    90 tablet    Take 1 tablet (50 mg) by mouth daily    Essential hypertension with goal blood pressure less than 140/90       order for DME     1 Units    Wear mask at night    Sleep apnea       pramipexole 1 MG tablet    MIRAPEX    180 tablet    TAKE 1 TABLET BY MOUTH AT 4PM AND 1 TABLET BY MOUTH AT 9PM AS NEEDED    Restless leg syndrome       sildenafil 100 MG tablet    VIAGRA    6 tablet    Take 0.5-1 tablets ( mg) by mouth daily as needed for erectile dysfunction Take 30 min to 4 hours before intercourse.  Never use with nitroglycerin,  terazosin or doxazosin.    Combined arterial insufficiency and corporo-venous occlusive erectile dysfunction       spironolactone 25 MG tablet    ALDACTONE    90 tablet    Take 1 tablet (25 mg) by mouth daily    Essential hypertension with goal blood pressure less than 140/90       traMADol 50 MG tablet    ULTRAM    7 tablet    Take 1 tablet (50 mg) by mouth At Bedtime    Biceps strain, left, initial encounter       triamcinolone 55 MCG/ACT Inhaler    NASACORT AQ    1 Bottle    Spray 2 sprays into both nostrils daily    Seasonal allergic rhinitis       warfarin 5 MG tablet    COUMADIN    90 tablet    TAKE HALF A TABLET BY MOUTH ON FRIDAY AND 1 TABLET ON ALL OTHER DAYS, OR AS DIRECTED BY THE COUMADIN CLINIC    Personal history of venous thrombosis and embolism

## 2017-10-18 NOTE — PROGRESS NOTES
"  SUBJECTIVE:   Robert Richard is a 82 year old male who presents to clinic today for the following health issues:    Hypertension Follow-up      Outpatient blood pressures are being checked at home.  Results are 111/73 to 159/96.    Low Salt Diet: no added salt        Amount of exercise or physical activity: 4-5 days/week for an average of 30-45 minutes    Problems taking medications regularly: No    Medication side effects: none  Diet: low salt      CHIEF COMPLAINT:    The patient is a pleasant 82-year-old gentleman who presents today for follow-up of his hypertension. He was recently started on 8 mg of doxazosin at bedtime and notes that he is doing well. On occasion he does have a little lightheadedness when he first gets up in the morning with this lasts only a few minutes. He's had no falls or injuries. His blood pressures are markedly improved post values in the 130 systolic range. He is having no significant fatigue or lethargy. He continues his losartan as well as the beta blocker and is tolerating these well. He does have a history of prostate cancer and we will be checking a PSA today for surveillance. 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 He does have an underlying factor V Leiden mutation.he is extremely active for his age and has a lot of hobbies. He does note that his been gaining some weight and reduce his primary to his wife's excellent cooking. We discussed  Dietary options including not eating so much food. He states that probably would be the most effective course given his decreased ability to \"jog down the road\".                           PAST, FAMILY,SOCIAL HISTORY:     Medical  History:   has a past medical history of Basal cell carcinoma; Cancer (H); DVT (deep venous thrombosis) (H) (11/2003); DVT (deep venous thrombosis) (H) (12/08); DVT (deep venous thrombosis) (H) (10/2010); Gout; Other and unspecified " hyperlipidemia; Restless leg syndrome; and Unspecified essential hypertension.     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     Social History:   reports that he has never smoked. He has never used smokeless tobacco. He reports that he drinks alcohol. He reports that he does not use illicit drugs.     Family History:  family history includes Prostate Cancer in his paternal grandfather.            MEDICATIONS  Current Outpatient Prescriptions   Medication Sig Dispense Refill     pramipexole (MIRAPEX) 1 MG tablet TAKE 1 TABLET BY MOUTH AT 4PM AND 1 TABLET BY MOUTH AT 9PM AS NEEDED 180 tablet 0     warfarin (COUMADIN) 5 MG tablet TAKE HALF A TABLET BY MOUTH ON FRIDAY AND 1 TABLET ON ALL OTHER DAYS, OR AS DIRECTED BY THE COUMADIN CLINIC 90 tablet 0     doxazosin (CARDURA) 8 MG tablet Take 1 tablet (8 mg) by mouth daily 30 tablet 3     spironolactone (ALDACTONE) 25 MG tablet Take 1 tablet (25 mg) by mouth daily 90 tablet 0     losartan (COZAAR) 50 MG tablet Take 1 tablet (50 mg) by mouth daily 90 tablet 1     atenolol (TENORMIN) 50 MG tablet Take 1 tablet (50 mg) by mouth 2 times daily 120 tablet 2     sildenafil (VIAGRA) 100 MG cap/tab Take 0.5-1 tablets ( mg) by mouth daily as needed for erectile dysfunction Take 30 min to 4 hours before intercourse.  Never use with nitroglycerin, terazosin or doxazosin. 6 tablet 11     traMADol (ULTRAM) 50 MG tablet Take 1 tablet (50 mg) by mouth At Bedtime 7 tablet 0     triamcinolone (NASACORT AQ) 55 MCG/ACT nasal inhaler Spray 2 sprays into both nostrils daily 1 Bottle 3     aspirin 81 MG tablet Take 81 mg by mouth daily       colchicine (COLCRYS) 0.6 MG tablet Take 2 tablets at the first sign of flare, take 1 additional tablet one hour later. 30 tablet 0     ORDER FOR DME Wear mask at night 1 Units 0          --------------------------------------------------------------------------------------------------------------------                          REVIEW OF SYSTEMS:         LUNGS: Pt denies: cough,excess sputum, hemoptysis, or shortness of breath.   HEART: Pt denies: chest pain, arrythmia, syncope, tachy or bradyarrhythmia or excess edema.   GI: Pt denies: nausea, vomitting, diarrhea, constipation, melena, or hematochezia.   NEURO: Pt denies: seizures, strokes, diplopia, weakness, paraesthesias, or paralysis.   SKIN: Pt denies: itching, rashes, discoloration, or specific lesions of concern. Denies recent hair loss.                          EXAMINATION:         /84 (BP Location: Left arm, Patient Position: Chair, Cuff Size: Adult Large)  Pulse 73  Temp 96.5  F (35.8  C) (Temporal)  Wt 249 lb (112.9 kg)  SpO2 96%  BMI 35.73 kg/m2   Constitutional: The patient appears to be in no acute distress. The patient appears to be adequately hydrated. No acute respiratory or hemodynamic distress is noted at this time.   LUNGS: clear bilaterally, airflow is brisk, no intercostal retraction or stridor is noted. No coughing is noted during visit.   HEART:  regular without rubs, clicks, gallops, or murmurs. PMI is nondisplaced. Upstrokes are brisk. S1,S2 are heard.   GI: Abdomen is soft, without rebound, guarding or tenderness. Bowel sounds are appropriate. No renal bruits are heard. Abdomen is obese.   NEURO: Pt is alert and appropriate. No neurologic lateralization is noted. Cranial nerves 2-12 are intact. Peripheral sensory and motor function are grossly normal   SKIN:  warm and dry. No erythema, or rashes are noted. No specific lesions of concern are noted.                           DECISION MAKING:     Continue current medications  1. Essential hypertension with goal blood pressure less than 140/90  Continue current medication    2. Factor V Leiden mutation (H)    - INR CLINIC REFERRAL    3. Personal history of  venous thrombosis and embolism  Continue anticoagulation    4. Personal history of prostate cancer  Check PSA  - PSA, tumor marker    5. Elevated glucose  Rule out diabetes  - Basic metabolic panel  - Hemoglobin A1c    6. Class 2 obesity due to excess calories without serious comorbidity in adult, unspecified BMI  Discussed weight loss to responsible  restriction and exercise as tolerated    7. Need for prophylactic vaccination and inoculation against influenza  Given  - FLU VACCINE, INCREASED ANTIGEN, PRESV FREE, AGE 65+ [95814]  - ADMIN INFLUENZA (For MEDICARE Patients ONLY) []                                 FOLLOW UP    I have asked the patient to make an appointment for follow up with me in 3 months          I have carefully explained the diagnosis and treatment options with the patient. The patient has displayed an understanding of the above, and all subsequent questions were answered.         DO ANANDA Guadarrama    Portions of this note were produced using [employer]  Although every attempt at real-time proof reading has been made, occasional grammar/syntax errors may have been missed.                   Injectable Influenza Immunization Documentation    1.  Is the person to be vaccinated sick today?   No    2. Does the person to be vaccinated have an allergy to a component   of the vaccine?   No    3. Has the person to be vaccinated ever had a serious reaction   to influenza vaccine in the past?   No    4. Has the person to be vaccinated ever had Guillain-Barré syndrome?   No    Form completed by Eli MOORE

## 2017-10-19 LAB — PSA SERPL-MCNC: 0.01 UG/L (ref 0–4)

## 2017-10-19 NOTE — PROGRESS NOTES
Please contact the patient and notify him of the following:  His blood sugar was a little high at 156.  Kidney function has improved significantly.  His hemoglobin A1c is stable at 6.6.  This suggests very good blood sugar control.  Thank you.  DO ANANDA Guadarrama

## 2017-10-25 NOTE — PROGRESS NOTES
Dear Robert, your recent test results are attached.  Your PSA is nondetectable.  This is really good since you don't have a prostate.    Feel free to contact me via the office or My Chart if you have any questions regarding the above.  Sincerely,  DO ANANDA Guadarrama

## 2017-11-06 DIAGNOSIS — Z86.718 PERSONAL HISTORY OF VENOUS THROMBOSIS AND EMBOLISM: ICD-10-CM

## 2017-11-06 NOTE — TELEPHONE ENCOUNTER
Warfarin (COUMADIN) 5 MG tablet    Last Written Prescription Date: 07/31/2017  Last Fill Qty: 90, # refills: 0  Last Office Visit with G, P or [facility] prescribing provider: 10/18/2017       Date and Result of Last PT/INR:   Lab Results   Component Value Date    INR 3.0 10/17/2017    INR 2.0 09/19/2017    INR 2.1 04/18/2016    INR 1.1 06/01/2015    PT 17.4 02/09/2012          Kenna Connolly CMA

## 2017-11-07 RX ORDER — WARFARIN SODIUM 5 MG/1
TABLET ORAL
Qty: 80 TABLET | Refills: 1 | Status: SHIPPED | OUTPATIENT
Start: 2017-11-07 | End: 2018-05-02

## 2017-11-14 ENCOUNTER — ANTICOAGULATION THERAPY VISIT (OUTPATIENT)
Dept: ANTICOAGULATION | Facility: CLINIC | Age: 82
End: 2017-11-14
Payer: COMMERCIAL

## 2017-11-14 VITALS — DIASTOLIC BLOOD PRESSURE: 62 MMHG | HEART RATE: 59 BPM | SYSTOLIC BLOOD PRESSURE: 121 MMHG

## 2017-11-14 DIAGNOSIS — D68.52 PROTHROMBIN MUTATION (H): ICD-10-CM

## 2017-11-14 DIAGNOSIS — D68.51 FACTOR V LEIDEN MUTATION (H): ICD-10-CM

## 2017-11-14 DIAGNOSIS — I82.90 VENOUS THROMBOSIS: ICD-10-CM

## 2017-11-14 DIAGNOSIS — Z79.01 LONG-TERM (CURRENT) USE OF ANTICOAGULANTS: ICD-10-CM

## 2017-11-14 LAB — INR POINT OF CARE: 2.1 (ref 0.86–1.14)

## 2017-11-14 PROCEDURE — 99207 ZZC NO CHARGE NURSE ONLY: CPT

## 2017-11-14 PROCEDURE — 36416 COLLJ CAPILLARY BLOOD SPEC: CPT

## 2017-11-14 PROCEDURE — 85610 PROTHROMBIN TIME: CPT | Mod: QW

## 2017-11-14 NOTE — MR AVS SNAPSHOT
Roebrt Hilary   11/14/2017 8:15 AM   Anticoagulation Therapy Visit    Description:  82 year old male   Provider:  PH ANTI COAG   Department:  Ph Anticoag           INR as of 11/14/2017     Today's INR 2.1      Anticoagulation Summary as of 11/14/2017     INR goal 2.0-3.0   Today's INR 2.1   Full instructions 2.5 mg on Fri; 5 mg all other days   Next INR check 12/19/2017    Indications   Factor V Leiden mutation (H) [D68.51]  Venous thrombosis [I82.90]  Prothrombin mutation (H) [D68.52]  Long-term (current) use of anticoagulants [Z79.01] [Z79.01]         Your next Anticoagulation Clinic appointment(s)     Nov 14, 2017  8:15 AM CST   Anticoagulation Visit with PH ANTI COAG   [facility] ([address] [address]   [phone]            Dec 19, 2017  8:15 AM CST   Anticoagulation Visit with PH ANTI COAG   [facility] ([address] [address]   [phone]              Contact Numbers     Clinic Number:         November 2017 Details    Sun Mon Tue Wed Thu Fri Sat        1               2               3               4                 5               6               7               8               9               10               11                 12               13               14      5 mg   See details      15      5 mg         16      5 mg         17      2.5 mg         18      5 mg           19      5 mg         20      5 mg         21      5 mg         22      5 mg         23      5 mg         24      2.5 mg         25      5 mg           26      5 mg         27      5 mg         28      5 mg         29      5 mg         30      5 mg            Date Details   11/14 This INR check               How to take your warfarin dose     To take:  2.5 mg Take 0.5 of a 5 mg tablet.    To take:  5 mg Take 1 of the 5 mg tablets.           December 2017 Details    Sun Mon Tue Wed Thu  Fri Sat          1      2.5 mg         2      5 mg           3      5 mg         4      5 mg         5      5 mg         6      5 mg         7      5 mg         8      2.5 mg         9      5 mg           10      5 mg         11      5 mg         12      5 mg         13      5 mg         14      5 mg         15      2.5 mg         16      5 mg           17      5 mg         18      5 mg         19            20               21               22               23                 24               25               26               27               28               29               30                 31                      Date Details   No additional details    Date of next INR:  12/19/2017         How to take your warfarin dose     To take:  2.5 mg Take 0.5 of a 5 mg tablet.    To take:  5 mg Take 1 of the 5 mg tablets.

## 2017-11-14 NOTE — PROGRESS NOTES
ANTICOAGULATION FOLLOW-UP CLINIC VISIT    Patient Name:  Robert Richard  Date:  11/14/2017  Contact Type:  Face to Face    SUBJECTIVE:     Patient Findings     Positives No Problem Findings           OBJECTIVE    INR Protime   Date Value Ref Range Status   11/14/2017 2.1 (A) 0.86 - 1.14 Final       ASSESSMENT / PLAN  INR assessment THER    Recheck INR In: 5 WEEKS    INR Location Clinic      Anticoagulation Summary as of 11/14/2017     INR goal 2.0-3.0   Today's INR 2.1   Maintenance plan 2.5 mg (5 mg x 0.5) on Fri; 5 mg (5 mg x 1) all other days   Full instructions 2.5 mg on Fri; 5 mg all other days   Weekly total 32.5 mg   No change documented Raquel Pelletier RN   Plan last modified Raquel Pelletier RN (3/22/2016)   Next INR check 12/19/2017   Target end date     Indications   Factor V Leiden mutation (H) [D68.51]  Venous thrombosis [I82.90]  Prothrombin mutation (H) [D68.52]  Long-term (current) use of anticoagulants [Z79.01] [Z79.01]         Anticoagulation Episode Summary     INR check location     Preferred lab     Send INR reminders to [facility] POOL    Comments 5 mg tablets, use book        Anticoagulation Care Providers     Provider Role Specialty Phone number    Nate Cifuentes DO [facility] Internal Medicine [phone]            See the Encounter Report to view Anticoagulation Flowsheet and Dosing Calendar (Go to Encounters tab in chart review, and find the Anticoagulation Therapy Visit)    Dosage adjustment made based on physician directed care plan.      Raquel Pelletier RN

## 2017-12-04 DIAGNOSIS — I10 HYPERTENSION GOAL BP (BLOOD PRESSURE) < 140/90: ICD-10-CM

## 2017-12-04 DIAGNOSIS — I10 ESSENTIAL HYPERTENSION WITH GOAL BLOOD PRESSURE LESS THAN 140/90: ICD-10-CM

## 2017-12-04 NOTE — TELEPHONE ENCOUNTER
Requested Prescriptions   Pending Prescriptions Disp Refills     losartan (COZAAR) 50 MG tablet [Pharmacy Med Name: LOSARTAN 50MG TAB] 90 tablet 1     Sig: TAKE ONE TABLET BY MOUTH ONCE DAILY    Angiotensin-II Receptors Failed    12/4/2017  2:34 PM       Failed - Normal serum creatinine on file in past 12 months    Recent Labs   Lab Test  10/18/17   0820   CR  1.40*            Passed - Blood pressure under 140/90 in past 12 months.    BP Readings from Last 3 Encounters:   11/14/17 121/62   10/18/17 136/84   10/17/17 149/79                Passed - Recent or future visit with authorizing provider's specialty    Patient had office visit in the last year or has a visit in the next 30 days with authorizing provider.  See chart review.              Passed - Patient is age 18 or older       Passed - Normal serum potassium on file in past 12 months    Recent Labs   Lab Test  10/18/17   0820   POTASSIUM  4.1                    doxazosin (CARDURA) 8 MG tablet [Pharmacy Med Name: DOXAZOSIN 8MG       TAB] 30 tablet 3     Sig: TAKE ONE TABLET BY MOUTH ONCE DAILY    Alpha Blockers Failed    12/4/2017  2:34 PM       Failed - Patient does not have Tadalafil, Vardenafil, or Sildenafil on their medication list       Passed - BP is less than 140/90    BP Readings from Last 3 Encounters:   11/14/17 121/62   10/18/17 136/84   10/17/17 149/79                Passed - Recent or future visit with authorizing provider's specialty    Patient had office visit in the last year or has a visit in the next 30 days with authorizing provider.  See chart review.              Passed - Patient is 18 years of age or older        spironolactone (ALDACTONE) 25 MG tablet [Pharmacy Med Name: SPIRONOLACTONE 25MG    TAB] 90 tablet 0     Sig: TAKE ONE TABLET BY MOUTH ONCE DAILY    Diuretics (Including Combos) Protocol Failed    12/4/2017  2:34 PM       Failed - Normal serum creatinine on file in past 12 months    Recent Labs   Lab Test  10/18/17   0820   CR   1.40*             Passed - Blood pressure under 140/90    BP Readings from Last 3 Encounters:   11/14/17 121/62   10/18/17 136/84   10/17/17 149/79                Passed - Recent or future visit with authorizing provider's specialty    Patient had office visit in the last year or has a visit in the next 30 days with authorizing provider.  See chart review.              Passed - Patient is age 18 or older       Passed - Normal serum potassium on file in past 12 months    Recent Labs   Lab Test  10/18/17   0820   POTASSIUM  4.1                   Passed - Normal serum sodium on file in past 12 months    Recent Labs   Lab Test  10/18/17   0820   NA  143

## 2017-12-05 RX ORDER — LOSARTAN POTASSIUM 50 MG/1
TABLET ORAL
Qty: 90 TABLET | Refills: 1 | Status: SHIPPED | OUTPATIENT
Start: 2017-12-05 | End: 2018-06-13

## 2017-12-05 RX ORDER — DOXAZOSIN 8 MG/1
TABLET ORAL
Qty: 90 TABLET | Refills: 1 | Status: SHIPPED | OUTPATIENT
Start: 2017-12-05 | End: 2018-06-20

## 2017-12-05 RX ORDER — SPIRONOLACTONE 25 MG/1
TABLET ORAL
Qty: 90 TABLET | Refills: 1 | Status: SHIPPED | OUTPATIENT
Start: 2017-12-05 | End: 2018-12-10

## 2017-12-05 NOTE — TELEPHONE ENCOUNTER
Routing refill request to provider for review/approval because:  Labs out of range:  Creatinine    Sabrina Haywood RN  [facility]

## 2017-12-06 ENCOUNTER — TELEPHONE (OUTPATIENT)
Dept: FAMILY MEDICINE | Facility: OTHER | Age: 82
End: 2017-12-06

## 2017-12-06 NOTE — TELEPHONE ENCOUNTER
Patient called and is requesting the status of possibly being worked in this morning. He is wondering if he can get a call back as soon as possible. He can be reached at [phone]. Please advise.     Thank you  Dixon Roblero  Patient Representative

## 2017-12-06 NOTE — TELEPHONE ENCOUNTER
Off Mirapex, Gabapentin 100 mg bid since 11-14-17. He would like to taper off Gabapentin because the gabapentin is keeping him awake and he is restless all over now. Eli VELAZQUEZA

## 2017-12-06 NOTE — TELEPHONE ENCOUNTER
Reason for Call:  Same Day Appointment, Requested Provider:  Nate Cifuentes D.O.    PCP: Nate Cifuentes    Reason for visit: trouble with medication (gabapentin given to him from a doctor at [facility])    Duration of symptoms: about 4 days     Have you been treated for this in the past? Yes    Additional comments: Patient called and is wondering if Dr. Cifuentes would be able to work him in some time today, the earlier the better. He said that he was at the [facility] on 11/13 and said they have a restless leg department there so he stopped to see if they could help him. He said he previously took gabapentin before and he didn't like the side effects so he quit taking. When he shared this with then they said maybe trying a different dose would work better so he tried it. He said that it is making his restless leg worse and he feels it in his whole body. He said that he hasn't been able to sleep for about 3 days now and it makes him nervous to drive because of how tired he is. He said on the instructions it says to contact your doctor before discontinuing to take it so he is wondering if Dr. Cifuentes would be able to work him in today so that he can discuss these symptoms from the medication and possibly stop it. He is aware Dr. Cifuentes is in [place] and is okay going there for an appointment. Please advise.     Can we leave a detailed message on this number? YES    Phone number patient can be reached at: Cell number on file:    Telephone Information:   Mobile [phone]       Best Time: any     Call taken on 12/6/2017 at 7:34 AM by Dixon Roblero

## 2017-12-06 NOTE — TELEPHONE ENCOUNTER
Robert Richard is a 82 year old male who calls with Patient reports that he was recently at the [facility] to have his CPAP checked.  He stopped in at the restless leg department and spoke with them about his symptoms.  Patient states they recommended a tapering dose of Mirapex and to start Gabapentin.  On November 11th, patient started taking Gabapentin 100mg 1 cap at 4pm and at 8pm.  Reports that since starting the Gabapentin he has not felt well.  Reports that his restless leg has gotten worse and he is not sleeping at all.  Recommendations on Gabapentin per Pharmacy are to speak with PCP about tapering dose and not just stopping completely.  Patient is requesting a work-in appointment this morning to talk with PCP about Gabapentin.     NURSING ASSESSMENT:  Description:  Medication concern.   Onset/duration:  11/13/2017.  Associated symptoms:  No sleeping well.   Improves/worsens symptoms:  No improvement.   Pain scale (0-10)   4/10  Last exam/Treatment:  10/18/2017  Allergies:   Allergies   Allergen Reactions     Atorvastatin Calcium      Muscle pain, weakness     NURSING PLAN: Routed to provider Yes    RECOMMENDED DISPOSITION:  Patient is requesting a work-in appointment with PCP to discuss tapering Gabapentin.  Reports that he is aware that PCP is currently in [place].   Will comply with recommendation: Yes  If further questions/concerns or if symptoms do not improve, worsen or new symptoms develop, call your PCP or [facility] as soon as possible.    Guideline used:  Telephone Triage Protocols for Nurses, Fifth Edition, Nae Gomez RN

## 2017-12-06 NOTE — TELEPHONE ENCOUNTER
Decrease gabapentin to 1Bwte70 mg daily for one week and then discontinue.  Patient should notify his care providers at the [facility] that these changes have been made.  Otherwise, they will be sitting there thinking that they have done a fabulous job and that he is doing well when in fact he is not.    Vane

## 2018-01-02 ENCOUNTER — ANTICOAGULATION THERAPY VISIT (OUTPATIENT)
Dept: ANTICOAGULATION | Facility: CLINIC | Age: 83
End: 2018-01-02
Payer: COMMERCIAL

## 2018-01-02 VITALS — DIASTOLIC BLOOD PRESSURE: 78 MMHG | HEART RATE: 62 BPM | SYSTOLIC BLOOD PRESSURE: 136 MMHG

## 2018-01-02 DIAGNOSIS — D68.51 FACTOR V LEIDEN MUTATION (H): ICD-10-CM

## 2018-01-02 DIAGNOSIS — I82.90 VENOUS THROMBOSIS: ICD-10-CM

## 2018-01-02 DIAGNOSIS — D68.52 PROTHROMBIN MUTATION (H): ICD-10-CM

## 2018-01-02 DIAGNOSIS — Z79.01 LONG-TERM (CURRENT) USE OF ANTICOAGULANTS: ICD-10-CM

## 2018-01-02 LAB — INR POINT OF CARE: 3.1 (ref 0.86–1.14)

## 2018-01-02 PROCEDURE — 85610 PROTHROMBIN TIME: CPT | Mod: QW

## 2018-01-02 PROCEDURE — 36416 COLLJ CAPILLARY BLOOD SPEC: CPT

## 2018-01-02 PROCEDURE — 99207 ZZC NO CHARGE NURSE ONLY: CPT

## 2018-01-02 NOTE — PROGRESS NOTES
ANTICOAGULATION FOLLOW-UP CLINIC VISIT    Patient Name:  Robert Richard  Date:  1/2/2018  Contact Type:  Face to Face    SUBJECTIVE:     Patient Findings     Positives No Problem Findings           OBJECTIVE    INR Protime   Date Value Ref Range Status   01/02/2018 3.1 (A) 0.86 - 1.14 Final       ASSESSMENT / PLAN  INR assessment THER    Recheck INR In: 6 WEEKS    INR Location Clinic      Anticoagulation Summary as of 1/2/2018     INR goal 2.0-3.0   Today's INR 3.1!   Maintenance plan 2.5 mg (5 mg x 0.5) on Fri; 5 mg (5 mg x 1) all other days   Full instructions 2.5 mg on Fri; 5 mg all other days   Weekly total 32.5 mg   No change documented Raquel Pelletier RN   Plan last modified Raquel Pelletier RN (3/22/2016)   Next INR check 2/13/2018   Target end date     Indications   Factor V Leiden mutation (H) [D68.51]  Venous thrombosis [I82.90]  Prothrombin mutation (H) [D68.52]  Long-term (current) use of anticoagulants [Z79.01] [Z79.01]         Anticoagulation Episode Summary     INR check location     Preferred lab     Send INR reminders to [facility] POOL    Comments 5 mg tablets, use book        Anticoagulation Care Providers     Provider Role Specialty Phone number    Nate Cifuentes DO [facility] [phone]            See the Encounter Report to view Anticoagulation Flowsheet and Dosing Calendar (Go to Encounters tab in chart review, and find the Anticoagulation Therapy Visit)    Dosage adjustment made based on physician directed care plan.      Raquel Pelletier RN

## 2018-01-02 NOTE — MR AVS SNAPSHOT
Robert Richard   1/2/2018 10:45 AM   Anticoagulation Therapy Visit    Description:  82 year old male   Provider:  [facility]   Department:  Ph Anticoag           INR as of 1/2/2018     Today's INR 3.1!      Anticoagulation Summary as of 1/2/2018     INR goal 2.0-3.0   Today's INR 3.1!   Full instructions 2.5 mg on Fri; 5 mg all other days   Next INR check 2/13/2018    Indications   Factor V Leiden mutation (H) [D68.51]  Venous thrombosis [I82.90]  Prothrombin mutation (H) [D68.52]  Long-term (current) use of anticoagulants [Z79.01] [Z79.01]         Your next Anticoagulation Clinic appointment(s)     Feb 13, 2018  9:15 AM CST   [facility] with [facility]   [facility]    [address] [place] [id number]   [phone]              Contact Numbers     Clinic Number:         January 2018 Details    Sun Mon Tue Wed Thu Fri Sat      1               2      5 mg   See details      3      5 mg         4      5 mg         5      2.5 mg         6      5 mg           7      5 mg         8      5 mg         9      5 mg         10      5 mg         11      5 mg         12      2.5 mg         13      5 mg           14      5 mg         15      5 mg         16      5 mg         17      5 mg         18      5 mg         19      2.5 mg         20      5 mg           21      5 mg         22      5 mg         23      5 mg         24      5 mg         25      5 mg         26      2.5 mg         27      5 mg           28      5 mg         29      5 mg         30      5 mg         31      5 mg             Date Details   01/02 This INR check               How to take your warfarin dose     To take:  2.5 mg Take 0.5 of a 5 mg tablet.    To take:  5 mg Take 1 of the 5 mg tablets.           February 2018 Details    Sun Mon Tue Wed Thu Fri Sat         1      5 mg         2      2.5 mg         3      5 mg           4      5 mg         5      5 mg         6      5 mg          7      5 mg         8      5 mg         9      2.5 mg         10      5 mg           11      5 mg         12      5 mg         13            14               15               16               17                 18               19               20               21               22               23               24                 25               26               27               28                   Date Details   No additional details    Date of next INR:  2/13/2018         How to take your warfarin dose     To take:  2.5 mg Take 0.5 of a 5 mg tablet.    To take:  5 mg Take 1 of the 5 mg tablets.

## 2018-02-13 ENCOUNTER — ANTICOAGULATION THERAPY VISIT (OUTPATIENT)
Dept: ANTICOAGULATION | Facility: CLINIC | Age: 83
End: 2018-02-13
Payer: COMMERCIAL

## 2018-02-13 VITALS — SYSTOLIC BLOOD PRESSURE: 138 MMHG | HEART RATE: 57 BPM | DIASTOLIC BLOOD PRESSURE: 85 MMHG

## 2018-02-13 DIAGNOSIS — Z79.01 LONG-TERM (CURRENT) USE OF ANTICOAGULANTS: ICD-10-CM

## 2018-02-13 DIAGNOSIS — I82.90 VENOUS THROMBOSIS: ICD-10-CM

## 2018-02-13 DIAGNOSIS — D68.51 FACTOR V LEIDEN MUTATION (H): ICD-10-CM

## 2018-02-13 DIAGNOSIS — D68.52 PROTHROMBIN MUTATION (H): ICD-10-CM

## 2018-02-13 LAB — INR POINT OF CARE: 2.3 (ref 0.86–1.14)

## 2018-02-13 PROCEDURE — 85610 PROTHROMBIN TIME: CPT | Mod: QW

## 2018-02-13 PROCEDURE — 99207 ZZC NO CHARGE NURSE ONLY: CPT

## 2018-02-13 PROCEDURE — 36416 COLLJ CAPILLARY BLOOD SPEC: CPT

## 2018-02-13 NOTE — PROGRESS NOTES
ANTICOAGULATION FOLLOW-UP CLINIC VISIT    Patient Name:  Robert Richard  Date:  2/13/2018  Contact Type:  Face to Face    SUBJECTIVE:     Patient Findings     Positives No Problem Findings           OBJECTIVE    INR Protime   Date Value Ref Range Status   02/13/2018 2.3 (A) 0.86 - 1.14 Final       ASSESSMENT / PLAN  INR assessment THER    Recheck INR In: 6 WEEKS    INR Location Clinic      Anticoagulation Summary as of 2/13/2018     INR goal 2.0-3.0   Today's INR 2.3   Maintenance plan 2.5 mg (5 mg x 0.5) on Fri; 5 mg (5 mg x 1) all other days   Full instructions 2.5 mg on Fri; 5 mg all other days   Weekly total 32.5 mg   No change documented Raquel Pelletier RN   Plan last modified Raquel Pelletier RN (3/22/2016)   Next INR check 3/26/2018   Target end date     Indications   Factor V Leiden mutation (H) [D68.51]  Venous thrombosis [I82.90]  Prothrombin mutation (H) [D68.52]  Long-term (current) use of anticoagulants [Z79.01] [Z79.01]         Anticoagulation Episode Summary     INR check location     Preferred lab     Send INR reminders to [facility] POOL    Comments 5 mg tablets, use book        Anticoagulation Care Providers     Provider Role Specialty Phone number    Nate Cifuentes DO [facility] Internal Medicine [phone]            See the Encounter Report to view Anticoagulation Flowsheet and Dosing Calendar (Go to Encounters tab in chart review, and find the Anticoagulation Therapy Visit)    Dosage adjustment made based on physician directed care plan.      Raquel Pelletier RN

## 2018-02-13 NOTE — MR AVS SNAPSHOT
Robert Hilary   2/13/2018 8:15 AM   Anticoagulation Therapy Visit    Description:  82 year old male   Provider:  [facility]AG   Department:  Ph Anticoag           INR as of 2/13/2018     Today's INR 2.3      Anticoagulation Summary as of 2/13/2018     INR goal 2.0-3.0   Today's INR 2.3   Full instructions 2.5 mg on Fri; 5 mg all other days   Next INR check 3/26/2018    Indications   Factor V Leiden mutation (H) [D68.51]  Venous thrombosis [I82.90]  Prothrombin mutation (H) [D68.52]  Long-term (current) use of anticoagulants [Z79.01] [Z79.01]         Your next Anticoagulation Clinic appointment(s)     Mar 26, 2018  8:15 AM CDT   Anticoagulation [facility] with [facility]TI [facility]AG   [facility]    [address] [place] [id number]   [phone]              Contact Numbers     Clinic Number:         February 2018 Details    Sun Mon Tue Wed Thu Fri Sat         1               2               3                 4               5               6               7               8               9               10                 11               12               13      5 mg   See details      14      5 mg         15      5 mg         16      2.5 mg         17      5 mg           18      5 mg         19      5 mg         20      5 mg         21      5 mg         22      5 mg         23      2.5 mg         24      5 mg           25      5 mg         26      5 mg         27      5 mg         28      5 mg             Date Details   02/13 This INR check               How to take your warfarin dose     To take:  2.5 mg Take 0.5 of a 5 mg tablet.    To take:  5 mg Take 1 of the 5 mg tablets.           March 2018 Details    Sun Mon Tue Wed Thu Fri Sat         1      5 mg         2      2.5 mg         3      5 mg           4      5 mg         5      5 mg         6      5 mg         7      5 mg         8      5 mg         9      2.5 mg         10      5 mg           11      5 mg          12      5 mg         13      5 mg         14      5 mg         15      5 mg         16      2.5 mg         17      5 mg           18      5 mg         19      5 mg         20      5 mg         21      5 mg         22      5 mg         23      2.5 mg         24      5 mg           25      5 mg         26            27               28               29               30               31                Date Details   No additional details    Date of next INR:  3/26/2018         How to take your warfarin dose     To take:  2.5 mg Take 0.5 of a 5 mg tablet.    To take:  5 mg Take 1 of the 5 mg tablets.

## 2018-03-26 ENCOUNTER — ANTICOAGULATION THERAPY VISIT (OUTPATIENT)
Dept: ANTICOAGULATION | Facility: CLINIC | Age: 83
End: 2018-03-26
Payer: COMMERCIAL

## 2018-03-26 DIAGNOSIS — D68.52 PROTHROMBIN MUTATION (H): ICD-10-CM

## 2018-03-26 DIAGNOSIS — Z79.01 LONG-TERM (CURRENT) USE OF ANTICOAGULANTS: ICD-10-CM

## 2018-03-26 DIAGNOSIS — I82.90 VENOUS THROMBOSIS: ICD-10-CM

## 2018-03-26 DIAGNOSIS — D68.51 FACTOR V LEIDEN MUTATION (H): ICD-10-CM

## 2018-03-26 LAB — INR POINT OF CARE: 2.2 (ref 0.86–1.14)

## 2018-03-26 PROCEDURE — 85610 PROTHROMBIN TIME: CPT | Mod: QW

## 2018-03-26 PROCEDURE — 36416 COLLJ CAPILLARY BLOOD SPEC: CPT

## 2018-03-26 PROCEDURE — 99207 ZZC NO CHARGE NURSE ONLY: CPT

## 2018-03-26 NOTE — MR AVS SNAPSHOT
Robert Castanongeovany   3/26/2018 8:15 AM   Anticoagulation Therapy Visit    Description:  82 year old male   Provider:  [facility]AG   Department:  [facility]           INR as of 3/26/2018     Today's INR 2.2      Anticoagulation Summary as of 3/26/2018     INR goal 2.0-3.0   Today's INR 2.2   Full instructions 2.5 mg on Fri; 5 mg all other days   Next INR check 5/7/2018    Indications   Factor V Leiden mutation (H) [D68.51]  Venous thrombosis [I82.90]  Prothrombin mutation (H) [D68.52]  Long-term (current) use of anticoagulants [Z79.01] [Z79.01]         Your next Anticoagulation Clinic appointment(s)     May 07, 2018  8:15 AM CDT   Anticoagulation Visit with ZAIRA ANTI IRENE   [facility]    [address] [place] [id number]   [phone]              Contact Numbers     Clinic Number:         March 2018 Details    Sun Mon Tue Wed Thu Fri Sat         1               2               3                 4               5               6               7               8               9               10                 11               12               13               14               15               16               17                 18               19               20               21               22               23               24                 25               26      5 mg   See details      27      5 mg         28      5 mg         29      5 mg         30      2.5 mg         31      5 mg          Date Details   03/26 This INR check               How to take your warfarin dose     To take:  2.5 mg Take 0.5 of a 5 mg tablet.    To take:  5 mg Take 1 of the 5 mg tablets.           April 2018 Details    Sun Mon Tue Wed Thu Fri Sat     1      5 mg         2      5 mg         3      5 mg         4      5 mg         5      5 mg         6      2.5 mg         7      5 mg           8      5 mg         9      5 mg         10      5 mg         11      5 mg         12       5 mg         13      2.5 mg         14      5 mg           15      5 mg         16      5 mg         17      5 mg         18      5 mg         19      5 mg         20      2.5 mg         21      5 mg           22      5 mg         23      5 mg         24      5 mg         25      5 mg         26      5 mg         27      2.5 mg         28      5 mg           29      5 mg         30      5 mg               Date Details   No additional details            How to take your warfarin dose     To take:  2.5 mg Take 0.5 of a 5 mg tablet.    To take:  5 mg Take 1 of the 5 mg tablets.           May 2018 Details    Sun Mon Tue Wed Thu Fri Sat       1      5 mg         2      5 mg         3      5 mg         4      2.5 mg         5      5 mg           6      5 mg         7            8               9               10               11               12                 13               14               15               16               17               18               19                 20               21               22               23               24               25               26                 27               28               29               30               31                  Date Details   No additional details    Date of next INR:  5/7/2018         How to take your warfarin dose     To take:  2.5 mg Take 0.5 of a 5 mg tablet.    To take:  5 mg Take 1 of the 5 mg tablets.

## 2018-03-26 NOTE — PROGRESS NOTES
ANTICOAGULATION FOLLOW-UP CLINIC VISIT    Patient Name:  Robert Richard  Date:  3/26/2018  Contact Type:  Face to Face    SUBJECTIVE:     Patient Findings     Positives No Problem Findings           OBJECTIVE    INR Protime   Date Value Ref Range Status   03/26/2018 2.2 (A) 0.86 - 1.14 Final       ASSESSMENT / PLAN  INR assessment THER    Recheck INR In: 6 WEEKS    INR Location Clinic      Anticoagulation Summary as of 3/26/2018     INR goal 2.0-3.0   Today's INR 2.2   Maintenance plan 2.5 mg (5 mg x 0.5) on Fri; 5 mg (5 mg x 1) all other days   Full instructions 2.5 mg on Fri; 5 mg all other days   Weekly total 32.5 mg   No change documented Raquel Pelletier RN   Plan last modified Raquel Pelletier RN (3/22/2016)   Next INR check 5/7/2018   Target end date     Indications   Factor V Leiden mutation (H) [D68.51]  Venous thrombosis [I82.90]  Prothrombin mutation (H) [D68.52]  Long-term (current) use of anticoagulants [Z79.01] [Z79.01]         Anticoagulation Episode Summary     INR check location     Preferred lab     Send INR reminders to [facility] POOL    Comments 5 mg tablets, uses book, BP, PM dose        Anticoagulation Care Providers     Provider Role Specialty Phone number    Nate Cifuentes DO [facility] Internal Medicine [phone]            See the Encounter Report to view Anticoagulation Flowsheet and Dosing Calendar (Go to Encounters tab in chart review, and find the Anticoagulation Therapy Visit)    Dosage adjustment made based on physician directed care plan.        Raquel Pelletier RN

## 2018-05-02 DIAGNOSIS — Z86.718 PERSONAL HISTORY OF VENOUS THROMBOSIS AND EMBOLISM: ICD-10-CM

## 2018-05-02 RX ORDER — WARFARIN SODIUM 5 MG/1
TABLET ORAL
Qty: 80 TABLET | Refills: 1 | Status: SHIPPED | OUTPATIENT
Start: 2018-05-02 | End: 2018-09-17

## 2018-05-02 NOTE — TELEPHONE ENCOUNTER
"Last Written Prescription Date:  11/07/2017  Last Fill Quantity: 80,  # refills: 1   Last office visit: 10/18/2017 with prescribing provider:  Dr. Cifuentes   Future Office Visit:    Requested Prescriptions   Pending Prescriptions Disp Refills     warfarin (COUMADIN) 5 MG tablet [Pharmacy Med Name: WARFARIN 5MG        TAB] 80 tablet 1     Sig: TAKE 1/2 TABLET BY MOUTH ON FRIDAY AND 1 TABLET ON ALL OTHER DAYS, OR AS DIRECTED BY THE COUMADIN CLINIC    Vitamin K Antagonists Failed    5/2/2018 10:11 AM       Failed - INR is within goal in the past 6 weeks    Confirm INR is within goal in the past 6 weeks.     Recent Labs   Lab Test 03/26/18   INR  2.2*                      Passed - Recent (12 mo) or future (30 days) visit within the authorizing provider's specialty    Patient had office visit in the last 12 months or has a visit in the next 30 days with authorizing provider or within the authorizing provider's specialty.  See \"Patient Info\" tab in inbasket, or \"Choose Columns\" in Meds & Orders section of the refill encounter.           Passed - Patient is 18 years of age or older          "

## 2018-05-07 ENCOUNTER — ANTICOAGULATION THERAPY VISIT (OUTPATIENT)
Dept: ANTICOAGULATION | Facility: CLINIC | Age: 83
End: 2018-05-07
Payer: COMMERCIAL

## 2018-05-07 VITALS — HEART RATE: 61 BPM | SYSTOLIC BLOOD PRESSURE: 152 MMHG | DIASTOLIC BLOOD PRESSURE: 84 MMHG

## 2018-05-07 DIAGNOSIS — I82.90 VENOUS THROMBOSIS: ICD-10-CM

## 2018-05-07 DIAGNOSIS — D68.51 FACTOR V LEIDEN MUTATION (H): ICD-10-CM

## 2018-05-07 DIAGNOSIS — D68.52 PROTHROMBIN MUTATION (H): ICD-10-CM

## 2018-05-07 DIAGNOSIS — Z79.01 LONG-TERM (CURRENT) USE OF ANTICOAGULANTS: ICD-10-CM

## 2018-05-07 LAB — INR POINT OF CARE: 2.5 (ref 0.86–1.14)

## 2018-05-07 PROCEDURE — 99207 ZZC NO CHARGE NURSE ONLY: CPT

## 2018-05-07 PROCEDURE — 85610 PROTHROMBIN TIME: CPT | Mod: QW

## 2018-05-07 PROCEDURE — 36416 COLLJ CAPILLARY BLOOD SPEC: CPT

## 2018-05-07 NOTE — PROGRESS NOTES
ANTICOAGULATION FOLLOW-UP CLINIC VISIT    Patient Name:  Robert Richard  Date:  5/7/2018  Contact Type:  Face to Face    SUBJECTIVE:     Patient Findings     Positives No Problem Findings           OBJECTIVE    INR Protime   Date Value Ref Range Status   05/07/2018 2.5 (A) 0.86 - 1.14 Final       ASSESSMENT / PLAN  INR assessment THER    Recheck INR In: 6 WEEKS    INR Location Clinic      Anticoagulation Summary as of 5/7/2018     INR goal 2.0-3.0   Today's INR 2.5   Maintenance plan 2.5 mg (5 mg x 0.5) on Fri; 5 mg (5 mg x 1) all other days   Full instructions 2.5 mg on Fri; 5 mg all other days   Weekly total 32.5 mg   No change documented Raquel Pelletier RN   Plan last modified Raquel Pelletier RN (3/22/2016)   Next INR check 6/18/2018   Target end date     Indications   Factor V Leiden mutation (H) [D68.51]  Venous thrombosis [I82.90]  Prothrombin mutation (H) [D68.52]  Long-term (current) use of anticoagulants [Z79.01] [Z79.01]         Anticoagulation Episode Summary     INR check location     Preferred lab     Send INR reminders to [facility] POOL    Comments 5 mg tablets, uses book, BP, PM dose        Anticoagulation Care Providers     Provider Role Specialty Phone number    Nate Cifuentes DO [facility] Internal Medicine [phone]            See the Encounter Report to view Anticoagulation Flowsheet and Dosing Calendar (Go to Encounters tab in chart review, and find the Anticoagulation Therapy Visit)    Dosage adjustment made based on physician directed care plan.      Raquel Pelletier RN

## 2018-05-07 NOTE — MR AVS SNAPSHOT
Robert Hilary   5/7/2018 8:15 AM   Anticoagulation Therapy Visit    Description:  82 year old male   Provider:  [facility]   Department:  [facility]           INR as of 5/7/2018     Today's INR 2.5      Anticoagulation Summary as of 5/7/2018     INR goal 2.0-3.0   Today's INR 2.5   Full instructions 2.5 mg on Fri; 5 mg all other days   Next INR check 6/18/2018    Indications   Factor V Leiden mutation (H) [D68.51]  Venous thrombosis [I82.90]  Prothrombin mutation (H) [D68.52]  Long-term (current) use of anticoagulants [Z79.01] [Z79.01]         Your next Anticoagulation Clinic appointment(s)     Jun 18, 2018  8:15 AM CDT   Anticoagulation [facility] with ZAIRA ANTI COAG   [facility]    [address] [place] [id number]   [phone]              Contact Numbers     Clinic Number:         May 2018 Details    Sun Mon Tue Wed Thu Fri Sat       1               2               3               4               5                 6               7      5 mg   See details      8      5 mg         9      5 mg         10      5 mg         11      2.5 mg         12      5 mg           13      5 mg         14      5 mg         15      5 mg         16      5 mg         17      5 mg         18      2.5 mg         19      5 mg           20      5 mg         21      5 mg         22      5 mg         23      5 mg         24      5 mg         25      2.5 mg         26      5 mg           27      5 mg         28      5 mg         29      5 mg         30      5 mg         31      5 mg            Date Details   05/07 This INR check               How to take your warfarin dose     To take:  2.5 mg Take 0.5 of a 5 mg tablet.    To take:  5 mg Take 1 of the 5 mg tablets.           June 2018 Details    Sun Mon Tue Wed Thu Fri Sat          1      2.5 mg         2      5 mg           3      5 mg         4      5 mg         5      5 mg         6      5 mg         7      5 mg         8       2.5 mg         9      5 mg           10      5 mg         11      5 mg         12      5 mg         13      5 mg         14      5 mg         15      2.5 mg         16      5 mg           17      5 mg         18            19               20               21               22               23                 24               25               26               27               28               29               30                Date Details   No additional details    Date of next INR:  6/18/2018         How to take your warfarin dose     To take:  2.5 mg Take 0.5 of a 5 mg tablet.    To take:  5 mg Take 1 of the 5 mg tablets.

## 2018-05-15 ENCOUNTER — OFFICE VISIT (OUTPATIENT)
Dept: FAMILY MEDICINE | Facility: OTHER | Age: 83
End: 2018-05-15
Payer: COMMERCIAL

## 2018-05-15 VITALS
HEIGHT: 70 IN | HEART RATE: 65 BPM | WEIGHT: 251.1 LBS | OXYGEN SATURATION: 98 % | BODY MASS INDEX: 35.95 KG/M2 | TEMPERATURE: 97.4 F | RESPIRATION RATE: 20 BRPM | SYSTOLIC BLOOD PRESSURE: 162 MMHG | DIASTOLIC BLOOD PRESSURE: 92 MMHG

## 2018-05-15 DIAGNOSIS — K42.9 UMBILICAL HERNIA WITHOUT OBSTRUCTION AND WITHOUT GANGRENE: ICD-10-CM

## 2018-05-15 DIAGNOSIS — R73.09 ELEVATED GLUCOSE: ICD-10-CM

## 2018-05-15 DIAGNOSIS — Z85.46 PERSONAL HISTORY OF PROSTATE CANCER: ICD-10-CM

## 2018-05-15 DIAGNOSIS — D68.51 FACTOR V LEIDEN MUTATION (H): ICD-10-CM

## 2018-05-15 DIAGNOSIS — I95.1 ORTHOSTATIC HYPOTENSION: Primary | ICD-10-CM

## 2018-05-15 DIAGNOSIS — Z86.718 PERSONAL HISTORY OF VENOUS THROMBOSIS AND EMBOLISM: ICD-10-CM

## 2018-05-15 DIAGNOSIS — G25.81 RESTLESS LEG SYNDROME: ICD-10-CM

## 2018-05-15 DIAGNOSIS — R60.0 LOCALIZED EDEMA: ICD-10-CM

## 2018-05-15 DIAGNOSIS — I10 ESSENTIAL HYPERTENSION WITH GOAL BLOOD PRESSURE LESS THAN 140/90: ICD-10-CM

## 2018-05-15 LAB
ALBUMIN SERPL-MCNC: 3.7 G/DL (ref 3.4–5)
ALP SERPL-CCNC: 117 U/L (ref 40–150)
ALT SERPL W P-5'-P-CCNC: 30 U/L (ref 0–70)
ANION GAP SERPL CALCULATED.3IONS-SCNC: 8 MMOL/L (ref 3–14)
AST SERPL W P-5'-P-CCNC: 20 U/L (ref 0–45)
BILIRUB DIRECT SERPL-MCNC: 0.1 MG/DL (ref 0–0.2)
BILIRUB SERPL-MCNC: 0.3 MG/DL (ref 0.2–1.3)
BUN SERPL-MCNC: 32 MG/DL (ref 7–30)
CALCIUM SERPL-MCNC: 8.6 MG/DL (ref 8.5–10.1)
CHLORIDE SERPL-SCNC: 110 MMOL/L (ref 94–109)
CO2 SERPL-SCNC: 24 MMOL/L (ref 20–32)
CREAT SERPL-MCNC: 1.46 MG/DL (ref 0.66–1.25)
GFR SERPL CREATININE-BSD FRML MDRD: 46 ML/MIN/1.7M2
GLUCOSE SERPL-MCNC: 103 MG/DL (ref 70–99)
HBA1C MFR BLD: 6.5 % (ref 0–5.6)
POTASSIUM SERPL-SCNC: 4.4 MMOL/L (ref 3.4–5.3)
PROT SERPL-MCNC: 7.1 G/DL (ref 6.8–8.8)
PSA SERPL-MCNC: <0.01 UG/L (ref 0–4)
SODIUM SERPL-SCNC: 142 MMOL/L (ref 133–144)

## 2018-05-15 PROCEDURE — 80048 BASIC METABOLIC PNL TOTAL CA: CPT | Performed by: INTERNAL MEDICINE

## 2018-05-15 PROCEDURE — 36415 COLL VENOUS BLD VENIPUNCTURE: CPT | Performed by: INTERNAL MEDICINE

## 2018-05-15 PROCEDURE — 80076 HEPATIC FUNCTION PANEL: CPT | Performed by: INTERNAL MEDICINE

## 2018-05-15 PROCEDURE — 83036 HEMOGLOBIN GLYCOSYLATED A1C: CPT | Performed by: INTERNAL MEDICINE

## 2018-05-15 PROCEDURE — 99214 OFFICE O/P EST MOD 30 MIN: CPT | Performed by: INTERNAL MEDICINE

## 2018-05-15 PROCEDURE — 84153 ASSAY OF PSA TOTAL: CPT | Performed by: INTERNAL MEDICINE

## 2018-05-15 RX ORDER — PRAMIPEXOLE DIHYDROCHLORIDE 1 MG/1
1 TABLET ORAL
COMMUNITY
Start: 2017-05-01 | End: 2018-10-12

## 2018-05-15 ASSESSMENT — PAIN SCALES - GENERAL: PAINLEVEL: NO PAIN (0)

## 2018-05-15 NOTE — MR AVS SNAPSHOT
After Visit Summary   5/15/2018    Robert Richard    MRN: [id number]           Patient Information     Date Of Birth          1935        Visit Information        Provider Department      5/15/2018 10:20 AM Nate Cifuentes DO [facility]        Today's Diagnoses     Orthostatic hypotension    -  1    Personal history of venous thrombosis and embolism        Essential hypertension with goal blood pressure less than 140/90        Localized edema        Restless leg syndrome        Factor V Leiden mutation (H)        Umbilical hernia without obstruction and without gangrene        Elevated glucose        Personal history of prostate cancer           Follow-ups after your visit        Follow-up notes from your care team     Return in about 4 months (around 9/15/2018).      Your next 10 appointments already scheduled     Jun 18, 2018  8:15 AM CDT   [facility] with [facility] ANTI [facility]AG   [facility]    [address] [place] [id number] [phone]              Who to contact     If you have questions or need follow up information about today's clinic visit or your schedule please contact [facility] directly at [phone].  Normal or non-critical lab and imaging results will be communicated to you by [employer], letter or phone within 4 business days after the clinic has received the results. If you do not hear from us within 7 days, please contact the clinic through [employer] or phone. If you have a critical or abnormal lab result, we will notify you by phone as soon as possible.  Submit refill requests through [employer] or call your pharmacy and they will forward the refill request to us. Please allow 3 business days for your refill to be completed.          Additional Information About Your Visit        [employer] Information     [employer] gives you secure access to your electronic health record. If you see a  "primary care provider, you can also send messages to your care team and make appointments. If you have questions, please call your primary care clinic.  If you do not have a primary care provider, please call [phone] and they will assist you.        Care EveryWhere ID     This is your Care EveryWhere ID. This could be used by other organizations to access your [facility] medical records  [id number]        Your Vitals Were     Pulse Temperature Respirations Height Pulse Oximetry BMI (Body Mass Index)    65 97.4  F (36.3  C) (Temporal) 20 5' 10\" (1.778 m) 98% 36.03 kg/m2       Blood Pressure from Last 3 Encounters:   05/15/18 (!) 162/92   05/07/18 152/84   02/13/18 138/85    Weight from Last 3 Encounters:   05/15/18 251 lb 1.6 oz (113.9 kg)   10/18/17 249 lb (112.9 kg)   09/21/17 249 lb (112.9 kg)              We Performed the Following     Basic metabolic panel     Hemoglobin A1c     Hepatic panel     PSA, tumor marker          Today's Medication Changes          These changes are accurate as of 5/15/18 12:01 PM.  If you have any questions, ask your nurse or doctor.               Stop taking these medicines if you haven't already. Please contact your care team if you have questions.     colchicine 0.6 MG tablet   Commonly known as:  COLCRYS   Stopped by:  Nate Cifuentes DO           sildenafil 100 MG tablet   Commonly known as:  VIAGRA   Stopped by:  Nate Cifuentes DO           traMADol 50 MG tablet   Commonly known as:  ULTRAM   Stopped by:  Nate Cifuentes DO           triamcinolone 55 MCG/ACT Inhaler   Commonly known as:  NASACORT AQ   Stopped by:  Nate Cifuentes DO                    Primary Care Provider Office Phone # Fax #    Nate Cifuentes [phone]       [address] [place] [address]        Equal Access to Services     PAM CARTWRIGHT AH: Hadii terrence sears hadasho Soomaali, waaxda luqadaha, qaybta kaalmada adeegyada, waxkulwinder " honey dawsonjuan miguel scott'aan ah. So [facility] [phone].    ATENCIÓN: Si luisla yesi, tiene a colin disposición servicios gratuitos de asistencia lingüística. Beverley coffey [phone].    We comply with applicable federal civil rights laws and [place] laws. We do not discriminate on the basis of race, color, national origin, age, disability, sex, sexual orientation, or gender identity.            Thank you!     Thank you for choosing [facility]  for your care. Our goal is always to provide you with excellent care. Hearing back from our patients is one way we can continue to improve our services. Please take a few minutes to complete the written survey that you may receive in the mail after your visit with us. Thank you!             Your Updated Medication List - Protect others around you: Learn how to safely use, store and throw away your medicines at www.disposemymeds.org.          This list is accurate as of 5/15/18 12:01 PM.  Always use your most recent med list.                   Brand Name Dispense Instructions for use Diagnosis    aspirin 81 MG tablet      Take 81 mg by mouth daily        atenolol 50 MG tablet    TENORMIN    120 tablet    Take 1 tablet (50 mg) by mouth 2 times daily    Hypertension goal BP (blood pressure) < 140/90       doxazosin 8 MG tablet    CARDURA    90 tablet    TAKE ONE TABLET BY MOUTH ONCE DAILY    Hypertension goal BP (blood pressure) < 140/90       losartan 50 MG tablet    COZAAR    90 tablet    TAKE ONE TABLET BY MOUTH ONCE DAILY    Essential hypertension with goal blood pressure less than 140/90       order for DME     1 Units    Wear mask at night    Sleep apnea       pramipexole 1 MG tablet    MIRAPEX     Take 1 tablet by mouth        spironolactone 25 MG tablet    ALDACTONE    90 tablet    TAKE ONE TABLET BY MOUTH ONCE DAILY    Essential hypertension with goal blood pressure less than 140/90       warfarin 5 MG tablet    COUMADIN    80 tablet    TAKE 1/2 TABLET BY  MOUTH ON FRIDAY AND 1 TABLET ON ALL OTHER DAYS, OR AS DIRECTED BY THE COUMADIN CLINIC    Personal history of venous thrombosis and embolism

## 2018-05-21 ENCOUNTER — MYC MEDICAL ADVICE (OUTPATIENT)
Dept: FAMILY MEDICINE | Facility: OTHER | Age: 83
End: 2018-05-21

## 2018-05-21 DIAGNOSIS — M1A.9XX0 CHRONIC GOUT WITHOUT TOPHUS, UNSPECIFIED CAUSE, UNSPECIFIED SITE: Primary | ICD-10-CM

## 2018-05-21 RX ORDER — COLCHICINE 0.6 MG/1
TABLET ORAL
Qty: 30 TABLET | Refills: 0 | Status: SHIPPED | OUTPATIENT
Start: 2018-05-21 | End: 2018-10-12

## 2018-05-21 NOTE — TELEPHONE ENCOUNTER
colchicine      Last Written Prescription Date:  7/15/15  Last Fill Quantity: 30,   # refills: 0  Last Office Visit: 5/15/18  Future Office visit:       Routing refill request to provider for review/approval because:  Drug not active on patient's medication list

## 2018-05-29 NOTE — PROGRESS NOTES
Dear Robert, your recent test results are attached.  The liver tests are normal.  The chemistry panel shows decreased kidney function to be stable.  The prostate antigen is nondetectable.  The hemoglobin A1c is stable at 6.5 suggesting very good blood sugar control.    Feel free to contact me via the office or My Chart if you have any questions regarding the above.  Sincerely,  DO AUNG GuadarramaOI

## 2018-06-01 ENCOUNTER — TELEPHONE (OUTPATIENT)
Dept: FAMILY MEDICINE | Facility: OTHER | Age: 83
End: 2018-06-01

## 2018-06-01 DIAGNOSIS — Z96.60 HISTORY OF ARTHROPLASTY: ICD-10-CM

## 2018-06-01 RX ORDER — AMOXICILLIN 500 MG/1
CAPSULE ORAL
Qty: 20 CAPSULE | Refills: 0 | Status: SHIPPED | OUTPATIENT
Start: 2018-06-01 | End: 2018-07-16

## 2018-06-01 NOTE — TELEPHONE ENCOUNTER
Reason for Call:  Other prescription    Detailed comments: Carlos calls to request Amoxicillin for his dental appt on Monday.  Carlos apologizes for asking for this so late bu the thought he had some left.    Please call him to let him know this is done.    [facility] [place]    Phone Number Patient can be reached at: [phone]    Best Time: any    Can we leave a detailed message on this number? YES    Call taken on 6/1/2018 at 3:42 PM by Raquel Hernandez

## 2018-06-05 ENCOUNTER — TELEPHONE (OUTPATIENT)
Dept: INTERNAL MEDICINE | Facility: CLINIC | Age: 83
End: 2018-06-05

## 2018-06-05 NOTE — TELEPHONE ENCOUNTER
PRIOR AUTHORIZATION DENIED    Medication: colchicine (COLCRYS) 0.6 MG tablet    Denial Date: 6/5/2018    Denial Rational: Insurance covers brand Colcrys        Appeal Information:  If provider would like to appeal please provide a letter of medical necessity and route back to PA team.

## 2018-06-05 NOTE — TELEPHONE ENCOUNTER
Central Prior Authorization Team   Phone: [phone]    PA Initiation    Medication: colchicine (COLCRYS) 0.6 MG tablet  Insurance Company: "[employer] - Phone [phone] Fax [phone]  Pharmacy Filling the Rx: [facility] [address] [place] [address] - [facility] [address]  Filling Pharmacy Phone: [phone]  Filling Pharmacy Fax:    Start Date: 6/5/2018

## 2018-06-05 NOTE — TELEPHONE ENCOUNTER
Prior Authorization Retail Medication Request    Medication/Dose:colchicine (COLCRYS) 0.6 MG tablet   ICD code (if different than what is on RX):    Previously Tried and Failed:  allopurinol (ZYLOPRIM) 100 MG tablet   Rationale:      Insurance Name:  MED.RX-D [employer]MAR[employer]ISA BR[employer]KE  Insurance ID:  [id number]      Covermymeds info  Key: [id number]

## 2018-06-13 DIAGNOSIS — I10 ESSENTIAL HYPERTENSION WITH GOAL BLOOD PRESSURE LESS THAN 140/90: ICD-10-CM

## 2018-06-13 RX ORDER — LOSARTAN POTASSIUM 50 MG/1
TABLET ORAL
Qty: 90 TABLET | Refills: 0 | Status: SHIPPED | OUTPATIENT
Start: 2018-06-13 | End: 2018-09-16

## 2018-06-13 NOTE — TELEPHONE ENCOUNTER
Routing refill request to provider for review/approval because:  Labs out of range:  Cr  Last two Bp's on file are above goal.  Vannessa Marcelino, RN, BSN

## 2018-06-13 NOTE — TELEPHONE ENCOUNTER
"Last Written Prescription Date:  12/05/2017  Last Fill Quantity: 90,  # refills: 1   Last office visit: 5/15/2018 with prescribing provider:  Dr. Cifuentes   Future Office Visit:    Requested Prescriptions   Pending Prescriptions Disp Refills     losartan (COZAAR) 50 MG tablet [Pharmacy Med Name: LOSARTAN 50MG TAB] 90 tablet 1     Sig: TAKE ONE TABLET BY MOUTH ONCE DAILY    Angiotensin-II Receptors Failed    6/13/2018  9:42 AM       Failed - Blood pressure under 140/90 in past 12 months    BP Readings from Last 3 Encounters:   05/15/18 (!) 162/92   05/07/18 152/84   02/13/18 138/85                Failed - Normal serum creatinine on file in past 12 months    Recent Labs   Lab Test  05/15/18   1054   CR  1.46*            Passed - Recent (12 mo) or future (30 days) visit within the authorizing provider's specialty    Patient had office visit in the last 12 months or has a visit in the next 30 days with authorizing provider or within the authorizing provider's specialty.  See \"Patient Info\" tab in inbasket, or \"Choose Columns\" in Meds & Orders section of the refill encounter.           Passed - Patient is age 18 or older       Passed - Normal serum potassium on file in past 12 months    Recent Labs   Lab Test  05/15/18   1054   POTASSIUM  4.4                      "

## 2018-06-20 DIAGNOSIS — G25.81 RESTLESS LEG SYNDROME: ICD-10-CM

## 2018-06-20 DIAGNOSIS — I10 HYPERTENSION GOAL BP (BLOOD PRESSURE) < 140/90: ICD-10-CM

## 2018-06-20 NOTE — TELEPHONE ENCOUNTER
"Requested Prescriptions   Pending Prescriptions Disp Refills     pramipexole (MIRAPEX) 1 MG tablet [Pharmacy Med Name: PRAMIPEXOLE 1MG TAB] 180 tablet 0    Last Written Prescription Date:  5/1/17  Last Fill Quantity: Na,  # refills: NA   Last office visit: 9/21/2017 with prescribing provider:  9/21/17   Future Office Visit:     Sig: TAKE ONE TABLET BY MOUTH TWICE DAILY AT  4PM  AND  AT  9PM  AS  NEEDED    Antiparkinson's Agents Protocol Failed    6/20/2018  2:49 PM       Failed - Blood pressure under 140/90 in past 12 months    BP Readings from Last 3 Encounters:   05/15/18 (!) 162/92   05/07/18 152/84   02/13/18 138/85                Failed - CBC on record in past 12 months    Recent Labs   Lab Test  11/15/13   1102   04/01/11   1210   WBC   --    --   6.5   RBC   --    --   4.25*   HGB  14.1   < >  12.8*   HCT   --    --   39.5*   PLT   --    --   204    < > = values in this interval not displayed.       For [facility] ONLY: [id number] = WBC, HEZL389 = RBC         Passed - ALT on record in past 12 months        Recent Labs   Lab Test  05/15/18   1054   ALT  30            Passed - Serum Creatinine on file in past 12 months    Recent Labs   Lab Test  05/15/18   1054   CR  1.46*            Passed - Patient is age 18 or older       Passed - Recent (6 mo) or future (30 days) visit within the authorizing provider's specialty    Patient had office visit in the last 6 months or has a visit in the next 30 days with authorizing provider or within the authorizing provider's specialty.  See \"Patient Info\" tab in inbasket, or \"Choose Columns\" in Meds & Orders section of the refill encounter.            doxazosin (CARDURA) 8 MG tablet [Pharmacy Med Name: DOXAZOSIN 8MG       TAB] 90 tablet 1    Last Written Prescription Date:  12/5/17  Last Fill Quantity: 90,  # refills: 1   Last office visit: 9/21/2017 with prescribing provider:  9/21/17   Future Office Visit:     Sig: TAKE ONE TABLET BY MOUTH ONCE DAILY    Alpha Blockers Failed    " "6/20/2018  2:49 PM       Failed - Blood pressure under 140/90 in past 12 months    BP Readings from Last 3 Encounters:   05/15/18 (!) 162/92   05/07/18 152/84   02/13/18 138/85                Passed - Recent (12 mo) or future (30 days) visit within the authorizing provider's specialty    Patient had office visit in the last 12 months or has a visit in the next 30 days with authorizing provider or within the authorizing provider's specialty.  See \"Patient Info\" tab in inbasket, or \"Choose Columns\" in Meds & Orders section of the refill encounter.           Passed - Patient does not have Tadalafil, Vardenafil, or Sildenafil on their medication list       Passed - Patient is 18 years of age or older          "

## 2018-06-21 RX ORDER — DOXAZOSIN 8 MG/1
TABLET ORAL
Qty: 90 TABLET | Refills: 1 | Status: SHIPPED | OUTPATIENT
Start: 2018-06-21 | End: 2018-12-10

## 2018-06-21 RX ORDER — PRAMIPEXOLE DIHYDROCHLORIDE 1 MG/1
TABLET ORAL
Qty: 180 TABLET | Refills: 1 | Status: SHIPPED | OUTPATIENT
Start: 2018-06-21 | End: 2018-07-16

## 2018-07-16 ENCOUNTER — OFFICE VISIT (OUTPATIENT)
Dept: FAMILY MEDICINE | Facility: OTHER | Age: 83
End: 2018-07-16
Payer: COMMERCIAL

## 2018-07-16 ENCOUNTER — ANTICOAGULATION THERAPY VISIT (OUTPATIENT)
Dept: ANTICOAGULATION | Facility: OTHER | Age: 83
End: 2018-07-16
Payer: COMMERCIAL

## 2018-07-16 VITALS
BODY MASS INDEX: 34.9 KG/M2 | RESPIRATION RATE: 20 BRPM | HEART RATE: 75 BPM | OXYGEN SATURATION: 96 % | SYSTOLIC BLOOD PRESSURE: 135 MMHG | TEMPERATURE: 97.2 F | WEIGHT: 243.25 LBS | DIASTOLIC BLOOD PRESSURE: 70 MMHG

## 2018-07-16 DIAGNOSIS — Z79.01 LONG-TERM (CURRENT) USE OF ANTICOAGULANTS: ICD-10-CM

## 2018-07-16 DIAGNOSIS — Z86.718 PERSONAL HISTORY OF VENOUS THROMBOSIS AND EMBOLISM: ICD-10-CM

## 2018-07-16 DIAGNOSIS — D68.52 PROTHROMBIN MUTATION (H): ICD-10-CM

## 2018-07-16 DIAGNOSIS — D68.51 FACTOR V LEIDEN MUTATION (H): ICD-10-CM

## 2018-07-16 DIAGNOSIS — L91.8 SKIN TAG: ICD-10-CM

## 2018-07-16 DIAGNOSIS — I87.2 VENOUS STASIS DERMATITIS OF BOTH LOWER EXTREMITIES: ICD-10-CM

## 2018-07-16 DIAGNOSIS — I10 ESSENTIAL HYPERTENSION WITH GOAL BLOOD PRESSURE LESS THAN 140/90: ICD-10-CM

## 2018-07-16 DIAGNOSIS — D68.51 FACTOR V LEIDEN MUTATION (H): Primary | ICD-10-CM

## 2018-07-16 DIAGNOSIS — I82.90 VENOUS THROMBOSIS: ICD-10-CM

## 2018-07-16 LAB — INR BLD: 1.8 (ref 0.86–1.14)

## 2018-07-16 PROCEDURE — 36416 COLLJ CAPILLARY BLOOD SPEC: CPT | Performed by: INTERNAL MEDICINE

## 2018-07-16 PROCEDURE — 99207 ZZC NO CHARGE NURSE ONLY: CPT

## 2018-07-16 PROCEDURE — 85610 PROTHROMBIN TIME: CPT | Mod: QW | Performed by: INTERNAL MEDICINE

## 2018-07-16 PROCEDURE — 99213 OFFICE O/P EST LOW 20 MIN: CPT | Performed by: INTERNAL MEDICINE

## 2018-07-16 ASSESSMENT — PAIN SCALES - GENERAL: PAINLEVEL: NO PAIN (0)

## 2018-07-16 NOTE — PROGRESS NOTES
SUBJECTIVE:   Robert Richard is a 82 year old male who presents to clinic today for the following health issues:    Hypertension Follow-up      Outpatient blood pressures are being checked at home.  Results are vary mid 138-156/78- 90 .    Low Salt Diet: low salt      Amount of exercise or physical activity: 6-7 days/week for an average of 30-45 minutes    Problems taking medications regularly: No    Medication side effects: none    Diet: low salt                     Chief Complaint    The patient is a pleasant 82-year-old gentleman who seems somewhat younger than stated age.  He presents today for follow-up of his hypertension as well as his chronic anticoagulation.  He has been complaining of some hypersensitivity in his lower legs.  He has a history of previous DVT and subsequent venous stasis.  Subsequent discoloration and hyperesthesia is noted in the lower extremities.  No open ulceration is seen at this time.  Is also concerned regarding skin tags.  He has a couple small skin tags in his right axillary and wants to know if he is on his own.  There are very small paste and I recommended that he does in the morning because he noticed his finger on some time.  Also recommended the application of an ice cube before and after to decrease vasoconstriction.  He does not want me to do it today.                       PAST, FAMILY,SOCIAL HISTORY:     Medical  History:   has a past medical history of Basal cell carcinoma; Cancer (H); DVT (deep venous thrombosis) (H) (11/2003); DVT (deep venous thrombosis) (H) (12/08); DVT (deep venous thrombosis) (H) (10/2010); Gout; Other and unspecified hyperlipidemia; Restless leg syndrome; and Unspecified essential hypertension.     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     Social History:   reports that he has never smoked. He has never used  smokeless tobacco. He reports that he drinks alcohol. He reports that he does not use illicit drugs.     Family History:  family history includes Prostate Cancer in his paternal grandfather.            MEDICATIONS  Current Outpatient Prescriptions   Medication Sig Dispense Refill     aspirin 81 MG tablet Take 81 mg by mouth daily       atenolol (TENORMIN) 50 MG tablet Take 1 tablet (50 mg) by mouth 2 times daily 120 tablet 2     colchicine (COLCRYS) 0.6 MG tablet Take 2 tablets at the first sign of flare, take 1 additional tablet one hour later. 30 tablet 0     doxazosin (CARDURA) 8 MG tablet TAKE ONE TABLET BY MOUTH ONCE DAILY 90 tablet 1     losartan (COZAAR) 50 MG tablet TAKE ONE TABLET BY MOUTH ONCE DAILY 90 tablet 0     ORDER FOR DME Wear mask at night 1 Units 0     pramipexole (MIRAPEX) 1 MG tablet Take 1 tablet by mouth       spironolactone (ALDACTONE) 25 MG tablet TAKE ONE TABLET BY MOUTH ONCE DAILY 90 tablet 1     warfarin (COUMADIN) 5 MG tablet TAKE 1/2 TABLET BY MOUTH ON FRIDAY AND 1 TABLET ON ALL OTHER DAYS, OR AS DIRECTED BY THE COUMADIN CLINIC 80 tablet 1     [DISCONTINUED] pramipexole (MIRAPEX) 1 MG tablet TAKE ONE TABLET BY MOUTH TWICE DAILY AT  4PM  AND  AT  9PM  AS  NEEDED 180 tablet 1         --------------------------------------------------------------------------------------------------------------------                              Review of Systems     LUNGS: Pt denies: cough,excess sputum, hemoptysis, or shortness of breath.   HEART: Pt denies: chest pain, arrythmia, syncope, tachy or bradyarrhythmia.   GI: Pt denies: nausea, vomitting, diarrhea, constipation, melena, or hematochezia.   NEURO: Pt denies: seizures, strokes, diplopia, weakness, paraesthesias, or paralysis.   SKIN: Pt denies: itching, rashes, or additional lesions of concern. Denies recent hair loss.  Significant discoloration of the lower legs is noted.  No acute infection is seen.                                    Examination      /70 (BP Location: Left arm, Patient Position: Chair, Cuff Size: Adult Regular)  Pulse 75  Temp 97.2  F (36.2  C) (Temporal)  Resp 20  Wt 243 lb 4 oz (110.3 kg)  SpO2 96%  BMI 34.9 kg/m2   Constitutional: The patient appears to be in no acute distress. The patient appears to be adequately hydrated. No acute respiratory or hemodynamic distress is noted at this time.   LUNGS: clear bilaterally, airflow is brisk, no intercostal retraction or stridor is noted. No coughing is noted during visit.   HEART:  regular without rubs, clicks, gallops, or murmurs. PMI is nondisplaced. Upstrokes are brisk. S1,S2 are heard.   GI: Abdomen is soft, without rebound, guarding or tenderness. Bowel sounds are appropriate. No renal bruits are heard.  Abdomen is obese.   NEURO: Pt is alert and appropriate. No neurologic lateralization is noted. Cranial nerves 2-12 are intact. Peripheral sensory and motor function are grossly normal.  Some hyperesthesia is noted in the lower legs.                                        Decision Making    1. Venous stasis dermatitis of both lower extremities  Hyperesthesia.  Recommend over-the-counter 4% lidocaine cream    2. Personal history of venous thrombosis and embolism  Secondary to factor V Leiden mutation    3. Factor V Leiden mutation (H)  Continue anticoagulation  - INR    4. Long-term (current) use of anticoagulants [Z79.01]  Follow INR    5. Skin tag  Instructed on how to remove skin tags    6. Essential hypertension with goal blood pressure less than 140/90  Currently controlled                            FOLLOW UP   I have asked the patient to make an appointment for followup with me in 4 months or as needed        I have carefully explained the diagnosis and treatment options to the patient.  The patient has displayed an understanding of the above, and all subsequent questions were answered.      DO ANANDA Guadarrama    Portions of this note were produced  using [employer]  Although every attempt at real-time proof reading has been made, occasional grammar/syntax errors may have been missed.

## 2018-07-16 NOTE — MR AVS SNAPSHOT
After Visit Summary   7/16/2018    Robert Richard    MRN: [id number]           Patient Information     Date Of Birth          1935        Visit Information        Provider Department      7/16/2018 1:00 PM Nate Cifuentes DO [facility]        Today's Diagnoses     Factor V Leiden mutation (H)    -  1    Venous stasis dermatitis of both lower extremities        Personal history of venous thrombosis and embolism        Long-term (current) use of anticoagulants [Z79.01]        Skin tag        Essential hypertension with goal blood pressure less than 140/90           Follow-ups after your visit        Your next 10 appointments already scheduled     Jul 16, 2018  1:45 PM CDT   [facility] with  [facility]AG   [facility]    [address] [facility] [address] [phone]              Who to contact     If you have questions or need follow up information about today's clinic visit or your schedule please contact [facility] directly at [phone].  Normal or non-critical lab and imaging results will be communicated to you by Compass[employer], letter or phone within 4 business days after the clinic has received the results. If you do not hear from us within 7 days, please contact the clinic through [employer] or phone. If you have a critical or abnormal lab result, we will notify you by phone as soon as possible.  Submit refill requests through [employer] or call your pharmacy and they will forward the refill request to us. Please allow 3 business days for your refill to be completed.          Additional Information About Your Visit        CompassMDhart Information     [employer] gives you secure access to your electronic health record. If you see a primary care provider, you can also send messages to your care team and make appointments. If you have questions, please call your primary care clinic.  If you do not have a primary care  provider, please call [phone] and they will assist you.        Care EveryWhere ID     This is your Care EveryWhere ID. This could be used by other organizations to access your [place] medical records  [id number]        Your Vitals Were     Pulse Temperature Respirations Pulse Oximetry BMI (Body Mass Index)       75 97.2  F (36.2  C) (Temporal) 20 96% 34.9 kg/m2        Blood Pressure from Last 3 Encounters:   07/16/18 135/70   05/15/18 (!) 162/92   05/07/18 152/84    Weight from Last 3 Encounters:   07/16/18 243 lb 4 oz (110.3 kg)   05/15/18 251 lb 1.6 oz (113.9 kg)   10/18/17 249 lb (112.9 kg)              We Performed the Following     INR          Today's Medication Changes          These changes are accurate as of 7/16/18  1:43 PM.  If you have any questions, ask your nurse or doctor.               Stop taking these medicines if you haven't already. Please contact your care team if you have questions.     amoxicillin 500 MG capsule   Commonly known as:  AMOXIL   Stopped by:  Nate Cifuentes DO                    Primary Care Provider Office Phone # Fax #    Nate Cifuentes [phone]       [address] [place] [address]        Equal Access to Services     [facility]: Hadii terrence umana Soleeann, waaxda luqadaha, qaybta kaalmada adeegyada, jessica olivas . So [facility] [phone].    ATENCIÓN: Si habla español, tiene a colin disposición servicios gratuitos de asistencia lingüística. Llame al [phone].    We comply with applicable federal civil rights laws and [place] laws. We do not discriminate on the basis of race, color, national origin, age, disability, sex, sexual orientation, or gender identity.            Thank you!     Thank you for choosing [facility]  for your care. Our goal is always to provide you with excellent care. Hearing back from our patients is one way we can continue to improve our services.  Please take a few minutes to complete the written survey that you may receive in the mail after your visit with us. Thank you!             Your Updated Medication List - Protect others around you: Learn how to safely use, store and throw away your medicines at www.disposemymeds.org.          This list is accurate as of 7/16/18  1:43 PM.  Always use your most recent med list.                   Brand Name Dispense Instructions for use Diagnosis    aspirin 81 MG tablet      Take 81 mg by mouth daily        atenolol 50 MG tablet    TENORMIN    120 tablet    Take 1 tablet (50 mg) by mouth 2 times daily    Hypertension goal BP (blood pressure) < 140/90       colchicine 0.6 MG tablet    COLCRYS    30 tablet    Take 2 tablets at the first sign of flare, take 1 additional tablet one hour later.    Chronic gout without tophus, unspecified cause, unspecified site       doxazosin 8 MG tablet    CARDURA    90 tablet    TAKE ONE TABLET BY MOUTH ONCE DAILY    Hypertension goal BP (blood pressure) < 140/90       losartan 50 MG tablet    COZAAR    90 tablet    TAKE ONE TABLET BY MOUTH ONCE DAILY    Essential hypertension with goal blood pressure less than 140/90       order for DME     1 Units    Wear mask at night    Sleep apnea       pramipexole 1 MG tablet    MIRAPEX     Take 1 tablet by mouth        spironolactone 25 MG tablet    ALDACTONE    90 tablet    TAKE ONE TABLET BY MOUTH ONCE DAILY    Essential hypertension with goal blood pressure less than 140/90       warfarin 5 MG tablet    COUMADIN    80 tablet    TAKE 1/2 TABLET BY MOUTH ON FRIDAY AND 1 TABLET ON ALL OTHER DAYS, OR AS DIRECTED BY THE COUMADIN CLINIC    Personal history of venous thrombosis and embolism

## 2018-07-16 NOTE — MR AVS SNAPSHOT
Robert Richard   7/16/2018 1:45 PM   Anticoagulation Therapy Visit    Description:  82 year old male   Provider:  [facility]   Department:  [facility]           INR as of 7/16/2018     Today's INR 1.8!      Anticoagulation Summary as of 7/16/2018     INR goal 2.0-3.0   Today's INR 1.8!   Full warfarin instructions 2.5 mg on Fri; 5 mg all other days   Next INR check 8/6/2018    Indications   Factor V Leiden mutation (H) [D68.51]  Venous thrombosis [I82.90]  Prothrombin mutation (H) [D68.52]  Long-term (current) use of anticoagulants [Z79.01] [Z79.01]         Contact Numbers     Clinic Number:         July 2018 Details    Sun Mon Tue Wed Thu Fri Sat     1               2               3               4               5               6               7                 8               9               10               11               12               13               14                 15               16      5 mg   See details      17      5 mg         18      5 mg         19      5 mg         20      2.5 mg         21      5 mg           22      5 mg         23      5 mg         24      5 mg         25      5 mg         26      5 mg         27      2.5 mg         28      5 mg           29      5 mg         30      5 mg         31      5 mg              Date Details   07/16 This INR check               How to take your warfarin dose     To take:  2.5 mg Take 0.5 of a 5 mg tablet.    To take:  5 mg Take 1 of the 5 mg tablets.           August 2018 Details    Sun Mon Tue Wed Thu Fri Sat        1      5 mg         2      5 mg         3      2.5 mg         4      5 mg           5      5 mg         6            7               8               9               10               11                 12               13               14               15               16               17               18                 19               20               21               22               23               24               25                  26               27               28               29               30               31                 Date Details   No additional details    Date of next INR:  8/6/2018         How to take your warfarin dose     To take:  2.5 mg Take 0.5 of a 5 mg tablet.    To take:  5 mg Take 1 of the 5 mg tablets.

## 2018-08-02 NOTE — PROGRESS NOTES
ANTICOAGULATION FOLLOW-UP CLINIC VISIT    Patient Name:  Robert Richard  Date:  8/2/2018  Contact Type:  Telephone/ spoke to wife    SUBJECTIVE:     Patient Findings     Positives No Problem Findings (Patient was never called about this result.  Is called and asked to return for another reading.)           OBJECTIVE    INR Point of Care   Date Value Ref Range Status   07/16/2018 1.8 (H) 0.86 - 1.14 Final     Comment:     This test is intended for monitoring Coumadin therapy.  Results are not   accurate in patients with prolonged INR due to factor deficiency.         ASSESSMENT / PLAN  INR assessment THER    Recheck INR In: 3 WEEKS    INR Location Clinic      Anticoagulation Summary as of 7/16/2018     INR goal 2.0-3.0   Today's INR 1.8!   Warfarin maintenance plan 2.5 mg (5 mg x 0.5) on Fri; 5 mg (5 mg x 1) all other days   Full warfarin instructions 2.5 mg on Fri; 5 mg all other days   Weekly warfarin total 32.5 mg   No change documented Zaina Hughes, RN   Plan last modified Raquel Pelletier RN (3/22/2016)   Next INR check 8/6/2018   Target end date     Indications   Factor V Leiden mutation (H) [D68.51]  Venous thrombosis [I82.90]  Prothrombin mutation (H) [D68.52]  Long-term (current) use of anticoagulants [Z79.01] [Z79.01]         Anticoagulation Episode Summary     INR check location     Preferred lab     Send INR reminders to [facility] POOL    Comments 5 mg tablets, uses book, BP, PM dose        Anticoagulation Care Providers     Provider Role Specialty Phone number    Jesús Cifuentesrob Oconnell DO [facility] Internal Medicine [phone]            See the Encounter Report to view Anticoagulation Flowsheet and Dosing Calendar (Go to Encounters tab in chart review, and find the Anticoagulation Therapy Visit)    Dosage adjustment made based on physician directed care plan.  No Problem Findings (Patient was never called about this result.  Is called and asked to return for another reading.)    Zaina Hughes  RN

## 2018-08-10 ENCOUNTER — ANTICOAGULATION THERAPY VISIT (OUTPATIENT)
Dept: ANTICOAGULATION | Facility: CLINIC | Age: 83
End: 2018-08-10
Payer: COMMERCIAL

## 2018-08-10 DIAGNOSIS — D68.52 PROTHROMBIN MUTATION (H): ICD-10-CM

## 2018-08-10 DIAGNOSIS — Z79.01 LONG-TERM (CURRENT) USE OF ANTICOAGULANTS: ICD-10-CM

## 2018-08-10 DIAGNOSIS — D68.51 FACTOR V LEIDEN MUTATION (H): ICD-10-CM

## 2018-08-10 DIAGNOSIS — I82.90 VENOUS THROMBOSIS: ICD-10-CM

## 2018-08-10 LAB — INR POINT OF CARE: 2.3 (ref 0.86–1.14)

## 2018-08-10 PROCEDURE — 99207 ZZC NO CHARGE NURSE ONLY: CPT

## 2018-08-10 PROCEDURE — 85610 PROTHROMBIN TIME: CPT | Mod: QW

## 2018-08-10 PROCEDURE — 36416 COLLJ CAPILLARY BLOOD SPEC: CPT

## 2018-08-10 NOTE — MR AVS SNAPSHOT
Robert Richard   8/10/2018 8:15 AM   Anticoagulation Therapy Visit    Description:  82 year old male   Provider:  [facility]   Department:  [facility] Antico[facility]           INR as of 8/10/2018     Today's INR 2.3      Anticoagulation Summary as of 8/10/2018     INR goal 2.0-3.0   Today's INR 2.3   Full warfarin instructions 2.5 mg on Fri; 5 mg all other days   Next INR check 9/18/2018    Indications   Factor V Leiden mutation (H) [D68.51]  Venous thrombosis [I82.90]  Prothrombin mutation (H) [D68.52]  Long-term (current) use of anticoagulants [Z79.01] [Z79.01]         Your next Anticoagulation Clinic appointment(s)     Aug 10, 2018  8:15 AM CDT   Anticoagulation Visit with [facility] ANTI COAG   [facility] ([address] [address]   [phone]            Sep 18, 2018  8:15 AM CDT   Anticoagulation Visit with PH ANTI COAG   [facility] ([address] [address]   [phone]              Contact Numbers     Clinic Number:         August 2018 Details    Sun Mon Tue Wed Thu Fri Sat        1               2               3               4                 5               6               7               8               9               10      2.5 mg   See details      11      5 mg           12      5 mg         13      5 mg         14      5 mg         15      5 mg         16      5 mg         17      2.5 mg         18      5 mg           19      5 mg         20      5 mg         21      5 mg         22      5 mg         23      5 mg         24      2.5 mg         25      5 mg           26      5 mg         27      5 mg         28      5 mg         29      5 mg         30      5 mg         31      2.5 mg           Date Details   08/10 This INR check               How to take your warfarin dose     To take:  2.5 mg Take 0.5 of a 5 mg tablet.    To take:  5 mg Take 1 of the 5 mg tablets.            September 2018 Details    Sun Mon Tue Wed Thu Fri Sat           1      5 mg           2      5 mg         3      5 mg         4      5 mg         5      5 mg         6      5 mg         7      2.5 mg         8      5 mg           9      5 mg         10      5 mg         11      5 mg         12      5 mg         13      5 mg         14      2.5 mg         15      5 mg           16      5 mg         17      5 mg         18            19               20               21               22                 23               24               25               26               27               28               29                 30                      Date Details   No additional details    Date of next INR:  9/18/2018         How to take your warfarin dose     To take:  2.5 mg Take 0.5 of a 5 mg tablet.    To take:  5 mg Take 1 of the 5 mg tablets.

## 2018-08-10 NOTE — PROGRESS NOTES
ANTICOAGULATION FOLLOW-UP CLINIC VISIT    Patient Name:  Robert Richard  Date:  8/10/2018  Contact Type:  Face to Face    SUBJECTIVE:     Patient Findings     Positives No Problem Findings           OBJECTIVE    INR Protime   Date Value Ref Range Status   08/10/2018 2.3 (A) 0.86 - 1.14 Final       ASSESSMENT / PLAN  INR assessment THER    Recheck INR In: 6 WEEKS    INR Location Clinic      Anticoagulation Summary as of 8/10/2018     INR goal 2.0-3.0   Today's INR 2.3   Warfarin maintenance plan 2.5 mg (5 mg x 0.5) on Fri; 5 mg (5 mg x 1) all other days   Full warfarin instructions 2.5 mg on Fri; 5 mg all other days   Weekly warfarin total 32.5 mg   Plan last modified Raquel Pelletier RN (3/22/2016)   Next INR check 9/18/2018   Target end date     Indications   Factor V Leiden mutation (H) [D68.51]  Venous thrombosis [I82.90]  Prothrombin mutation (H) [D68.52]  Long-term (current) use of anticoagulants [Z79.01] [Z79.01]         Anticoagulation Episode Summary     INR check location     Preferred lab     Send INR reminders to [facility] POOL    Comments 5 mg tablets, uses book, BP, PM dose        Anticoagulation Care Providers     Provider Role Specialty Phone number    Nate Cifuentes DO [facility] Internal Medicine [phone]            See the Encounter Report to view Anticoagulation Flowsheet and Dosing Calendar (Go to Encounters tab in chart review, and find the Anticoagulation Therapy Visit)    Dosage adjustment made based on physician directed care plan.    Zaina Hughes RN

## 2018-09-16 DIAGNOSIS — I10 ESSENTIAL HYPERTENSION WITH GOAL BLOOD PRESSURE LESS THAN 140/90: ICD-10-CM

## 2018-09-17 DIAGNOSIS — Z86.718 PERSONAL HISTORY OF VENOUS THROMBOSIS AND EMBOLISM: ICD-10-CM

## 2018-09-18 ENCOUNTER — ANTICOAGULATION THERAPY VISIT (OUTPATIENT)
Dept: ANTICOAGULATION | Facility: CLINIC | Age: 83
End: 2018-09-18
Payer: COMMERCIAL

## 2018-09-18 DIAGNOSIS — Z79.01 LONG-TERM (CURRENT) USE OF ANTICOAGULANTS: ICD-10-CM

## 2018-09-18 DIAGNOSIS — D68.52 PROTHROMBIN MUTATION (H): ICD-10-CM

## 2018-09-18 DIAGNOSIS — D68.51 FACTOR V LEIDEN MUTATION (H): ICD-10-CM

## 2018-09-18 DIAGNOSIS — I82.90 VENOUS THROMBOSIS: ICD-10-CM

## 2018-09-18 LAB — INR POINT OF CARE: 2.2 (ref 0.86–1.14)

## 2018-09-18 PROCEDURE — 85610 PROTHROMBIN TIME: CPT | Mod: QW

## 2018-09-18 PROCEDURE — 99207 ZZC NO CHARGE NURSE ONLY: CPT

## 2018-09-18 PROCEDURE — 36416 COLLJ CAPILLARY BLOOD SPEC: CPT

## 2018-09-18 RX ORDER — WARFARIN SODIUM 5 MG/1
TABLET ORAL
Qty: 80 TABLET | Refills: 1 | Status: SHIPPED | OUTPATIENT
Start: 2018-09-18 | End: 2019-02-19

## 2018-09-18 RX ORDER — LOSARTAN POTASSIUM 50 MG/1
TABLET ORAL
Qty: 90 TABLET | Refills: 0 | Status: SHIPPED | OUTPATIENT
Start: 2018-09-18 | End: 2018-12-10

## 2018-09-18 NOTE — MR AVS SNAPSHOT
Robert Hilary   9/18/2018 8:15 AM   Anticoagulation Therapy Visit    Description:  83 year old male   Provider:  [facility]   Department:  [facility]           INR as of 9/18/2018     Today's INR 2.2      Anticoagulation Summary as of 9/18/2018     INR goal 2.0-3.0   Today's INR 2.2   Full warfarin instructions 2.5 mg on Fri; 5 mg all other days   Next INR check 10/30/2018    Indications   Factor V Leiden mutation (H) [D68.51]  Venous thrombosis [I82.90]  Prothrombin mutation (H) [D68.52]  Long-term (current) use of anticoagulants [Z79.01] [Z79.01]         Your next Anticoagulation Clinic appointment(s)     Oct 30, 2018  8:15 AM CDT   Anticoagulation Visit with [facility]   [facility]    [address] [place] [address]   [phone]              Contact Numbers     Clinic Number:         September 2018 Details    Sun Mon Tue Wed Thu Fri Sat           1                 2               3               4               5               6               7               8                 9               10               11               12               13               14               15                 16               17               18      5 mg   See details      19      5 mg         20      5 mg         21      2.5 mg         22      5 mg           23      5 mg         24      5 mg         25      5 mg         26      5 mg         27      5 mg         28      2.5 mg         29      5 mg           30      5 mg                Date Details   09/18 This INR check               How to take your warfarin dose     To take:  2.5 mg Take 0.5 of a 5 mg tablet.    To take:  5 mg Take 1 of the 5 mg tablets.           October 2018 Details    Sun Mon Tue Wed Thu Fri Sat      1      5 mg         2      5 mg         3      5 mg         4      5 mg         5      2.5 mg         6      5 mg           7      5 mg         8      5 mg         9      5 mg         10       5 mg         11      5 mg         12      2.5 mg         13      5 mg           14      5 mg         15      5 mg         16      5 mg         17      5 mg         18      5 mg         19      2.5 mg         20      5 mg           21      5 mg         22      5 mg         23      5 mg         24      5 mg         25      5 mg         26      2.5 mg         27      5 mg           28      5 mg         29      5 mg         30            31                   Date Details   No additional details    Date of next INR:  10/30/2018         How to take your warfarin dose     To take:  2.5 mg Take 0.5 of a 5 mg tablet.    To take:  5 mg Take 1 of the 5 mg tablets.

## 2018-09-18 NOTE — PROGRESS NOTES
ANTICOAGULATION FOLLOW-UP CLINIC VISIT    Patient Name:  Robert Richard  Date:  9/18/2018  Contact Type:  Face to Face    SUBJECTIVE:     Patient Findings     Positives No Problem Findings           OBJECTIVE    INR Protime   Date Value Ref Range Status   09/18/2018 2.2 (A) 0.86 - 1.14 Final       ASSESSMENT / PLAN  INR assessment THER    Recheck INR In: 6 WEEKS    INR Location Clinic      Anticoagulation Summary as of 9/18/2018     INR goal 2.0-3.0   Today's INR 2.2   Warfarin maintenance plan 2.5 mg (5 mg x 0.5) on Fri; 5 mg (5 mg x 1) all other days   Full warfarin instructions 2.5 mg on Fri; 5 mg all other days   Weekly warfarin total 32.5 mg   No change documented Chantell Boston RN   Plan last modified Raquel Pelletier RN (3/22/2016)   Next INR check 10/30/2018   Target end date     Indications   Factor V Leiden mutation (H) [D68.51]  Venous thrombosis [I82.90]  Prothrombin mutation (H) [D68.52]  Long-term (current) use of anticoagulants [Z79.01] [Z79.01]         Anticoagulation Episode Summary     INR check location     Preferred lab     Send INR reminders to [facility] POOL    Comments 5 mg tablets, print out, BP, PM dose        Anticoagulation Care Providers     Provider Role Specialty Phone number    Nate Cifuentes DO [facility] Internal Medicine [phone]            See the Encounter Report to view Anticoagulation Flowsheet and Dosing Calendar (Go to Encounters tab in chart review, and find the Anticoagulation Therapy Visit)    Dosage adjustment made based on physician directed care plan.    Chantell Boston RN

## 2018-09-18 NOTE — TELEPHONE ENCOUNTER
Routing refill request to provider for review/approval because:  Labs out of range:  CR, BP    Creatinine   Date Value Ref Range Status   05/15/2018 1.46 (H) 0.66 - 1.25 mg/dL Final     BP Readings from Last 3 Encounters:   07/16/18 135/70   05/15/18 (!) 162/92   05/07/18 152/84     Eliana Avila RN

## 2018-09-18 NOTE — TELEPHONE ENCOUNTER
"Requested Prescriptions   Pending Prescriptions Disp Refills     losartan (COZAAR) 50 MG tablet [Pharmacy Med Name: LOSARTAN 50MG TAB] 90 tablet 0     Sig: TAKE 1 TABLET BY MOUTH ONCE DAILY    Angiotensin-II Receptors Failed    9/16/2018 11:42 AM       Failed - Normal serum creatinine on file in past 12 months    Recent Labs   Lab Test  05/15/18   1054   CR  1.46*            Passed - Blood pressure under 140/90 in past 12 months    BP Readings from Last 3 Encounters:   07/16/18 135/70   05/15/18 (!) 162/92   05/07/18 152/84                Passed - Recent (12 mo) or future (30 days) visit within the authorizing provider's specialty    Patient had office visit in the last 12 months or has a visit in the next 30 days with authorizing provider or within the authorizing provider's specialty.  See \"Patient Info\" tab in inbasket, or \"Choose Columns\" in Meds & Orders section of the refill encounter.           Passed - Patient is age 18 or older       Passed - Normal serum potassium on file in past 12 months    Recent Labs   Lab Test  05/15/18   1054   POTASSIUM  4.4                    Last Written Prescription Date:  6/13/18  Last Fill Quantity: 90,  # refills: 0   Last office visit: 9/21/2017 with prescribing provider:  Vane - seen 7/16/18   Future Office Visit:      "

## 2018-10-11 DIAGNOSIS — I10 HYPERTENSION GOAL BP (BLOOD PRESSURE) < 140/90: ICD-10-CM

## 2018-10-11 RX ORDER — ATENOLOL 50 MG/1
TABLET ORAL
Qty: 180 TABLET | Refills: 1 | Status: SHIPPED | OUTPATIENT
Start: 2018-10-11 | End: 2018-10-12

## 2018-10-11 NOTE — TELEPHONE ENCOUNTER
Routing refill request to provider for review/approval because:  Medication is reported/historical    DONG BillsN, RN  [facility]

## 2018-10-11 NOTE — TELEPHONE ENCOUNTER
"ATENOLOL 50MG TAB  Requested Prescriptions   Pending Prescriptions Disp Refills     atenolol (TENORMIN) 50 MG tablet [Pharmacy Med Name: ATENOLOL 50MG       TAB] 180 tablet 1     Sig: TAKE ONE TABLET BY MOUTH TWICE DAILY    Beta-Blockers Protocol Passed    10/11/2018  9:23 AM       Passed - Blood pressure under 140/90 in past 12 months    BP Readings from Last 3 Encounters:   07/16/18 135/70   05/15/18 (!) 162/92   05/07/18 152/84                Passed - Patient is age 6 or older       Passed - Recent (12 mo) or future (30 days) visit within the authorizing provider's specialty    Patient had office visit in the last 12 months or has a visit in the next 30 days with authorizing provider or within the authorizing provider's specialty.  See \"Patient Info\" tab in inbasket, or \"Choose Columns\" in Meds & Orders section of the refill encounter.            Last Written Prescription Date:  3/31/2017  Last Fill Quantity: 120,  # refills: 2   Last office visit: 7/16/2018 with prescribing provider:  MERON   Future Office Visit:   Next 5 appointments (look out 90 days)     Oct 12, 2018  7:20 AM CDT   [facility]    [address] [facility] [address] [phone]                   "

## 2018-10-12 ENCOUNTER — OFFICE VISIT (OUTPATIENT)
Dept: SURGERY | Facility: CLINIC | Age: 83
End: 2018-10-12
Payer: COMMERCIAL

## 2018-10-12 ENCOUNTER — OFFICE VISIT (OUTPATIENT)
Dept: FAMILY MEDICINE | Facility: OTHER | Age: 83
End: 2018-10-12
Payer: COMMERCIAL

## 2018-10-12 VITALS
RESPIRATION RATE: 20 BRPM | HEART RATE: 76 BPM | DIASTOLIC BLOOD PRESSURE: 80 MMHG | BODY MASS INDEX: 35.96 KG/M2 | SYSTOLIC BLOOD PRESSURE: 146 MMHG | TEMPERATURE: 97.6 F | OXYGEN SATURATION: 97 % | WEIGHT: 250.6 LBS

## 2018-10-12 VITALS
DIASTOLIC BLOOD PRESSURE: 80 MMHG | SYSTOLIC BLOOD PRESSURE: 146 MMHG | TEMPERATURE: 97 F | BODY MASS INDEX: 35.79 KG/M2 | WEIGHT: 250 LBS | HEIGHT: 70 IN

## 2018-10-12 DIAGNOSIS — E78.5 HYPERLIPIDEMIA LDL GOAL <100: ICD-10-CM

## 2018-10-12 DIAGNOSIS — K42.9 UMBILICAL HERNIA WITHOUT OBSTRUCTION AND WITHOUT GANGRENE: Primary | ICD-10-CM

## 2018-10-12 DIAGNOSIS — Z23 NEED FOR PROPHYLACTIC VACCINATION AND INOCULATION AGAINST INFLUENZA: ICD-10-CM

## 2018-10-12 DIAGNOSIS — E66.01 MORBID OBESITY DUE TO EXCESS CALORIES (H): ICD-10-CM

## 2018-10-12 DIAGNOSIS — I10 HYPERTENSION GOAL BP (BLOOD PRESSURE) < 140/90: ICD-10-CM

## 2018-10-12 DIAGNOSIS — G25.81 RESTLESS LEG SYNDROME: ICD-10-CM

## 2018-10-12 DIAGNOSIS — E11.8 TYPE 2 DIABETES MELLITUS WITH COMPLICATION, WITHOUT LONG-TERM CURRENT USE OF INSULIN (H): ICD-10-CM

## 2018-10-12 DIAGNOSIS — M1A.9XX0 CHRONIC GOUT WITHOUT TOPHUS, UNSPECIFIED CAUSE, UNSPECIFIED SITE: ICD-10-CM

## 2018-10-12 PROBLEM — E11.9 DIABETES MELLITUS, TYPE 2 (H): Status: ACTIVE | Noted: 2018-10-12

## 2018-10-12 PROCEDURE — G0008 ADMIN INFLUENZA VIRUS VAC: HCPCS | Performed by: INTERNAL MEDICINE

## 2018-10-12 PROCEDURE — 90662 IIV NO PRSV INCREASED AG IM: CPT | Performed by: INTERNAL MEDICINE

## 2018-10-12 PROCEDURE — 99214 OFFICE O/P EST MOD 30 MIN: CPT | Mod: 25 | Performed by: INTERNAL MEDICINE

## 2018-10-12 PROCEDURE — 99204 OFFICE O/P NEW MOD 45 MIN: CPT | Performed by: SURGERY

## 2018-10-12 RX ORDER — CEFAZOLIN SODIUM 2 G/100ML
2 INJECTION, SOLUTION INTRAVENOUS
Status: CANCELLED | OUTPATIENT
Start: 2018-10-12

## 2018-10-12 RX ORDER — CEFAZOLIN SODIUM 1 G/50ML
1 SOLUTION INTRAVENOUS SEE ADMIN INSTRUCTIONS
Status: CANCELLED | OUTPATIENT
Start: 2018-10-12

## 2018-10-12 RX ORDER — ATENOLOL 50 MG/1
50 TABLET ORAL 2 TIMES DAILY
Qty: 180 TABLET | Refills: 1 | Status: SHIPPED | OUTPATIENT
Start: 2018-10-12 | End: 2019-06-20

## 2018-10-12 RX ORDER — PRAMIPEXOLE DIHYDROCHLORIDE 1 MG/1
1 TABLET ORAL 2 TIMES DAILY
Qty: 180 TABLET | Refills: 1 | Status: SHIPPED | OUTPATIENT
Start: 2018-10-12 | End: 2019-09-07

## 2018-10-12 RX ORDER — COLCHICINE 0.6 MG/1
TABLET ORAL
Qty: 30 TABLET | Refills: 0 | Status: SHIPPED | OUTPATIENT
Start: 2018-10-12 | End: 2021-06-29

## 2018-10-12 ASSESSMENT — PAIN SCALES - GENERAL: PAINLEVEL: NO PAIN (0)

## 2018-10-12 NOTE — MR AVS SNAPSHOT
After Visit Summary   10/12/2018    Robert Richard    MRN: [id number]           Patient Information     Date Of Birth          1935        Visit Information        Provider Department      10/12/2018 9:30 AM Jamal Thompson,  [facility]        Today's Diagnoses     Umbilical hernia without obstruction and without gangrene    -  1       Follow-ups after your visit        Your next 10 appointments already scheduled     Oct 30, 2018  8:15 AM CDT   Anticoagulation Visit with PH ANTI COAG   [facility] ([facility]    [address] [place] [address] [id number] [phone]              Future tests that were ordered for you today     Open Future Orders        Priority Expected Expires Ordered    Lipid panel reflex to direct LDL Fasting Routine  11/12/2018 10/12/2018    Hemoglobin A1c Routine  11/12/2018 10/12/2018            Who to contact     If you have questions or need follow up information about today's clinic visit or your schedule please contact [facility] directly at [phone].  Normal or non-critical lab and imaging results will be communicated to you by Paywardhart, letter or phone within 4 business days after the clinic has received the results. If you do not hear from us within 7 days, please contact the clinic through [facility] or phone. If you have a critical or abnormal lab result, we will notify you by phone as soon as possible.  Submit refill requests through Beabloo or call your pharmacy and they will forward the refill request to us. Please allow 3 business days for your refill to be completed.          Additional Information About Your Visit        Paywardhart Information     Beabloo gives you secure access to your electronic health record. If you see a primary care provider, you can also send messages to your care team and make appointments. If you have questions, please call your primary care clinic.  If you do  "not have a primary care provider, please call [phone] and they will assist you.        Care EveryWhere ID     This is your Care EveryWhere ID. This could be used by other organizations to access your [facility] medical records  [id number]        Your Vitals Were     Temperature Height BMI (Body Mass Index)             97  F (36.1  C) (Temporal) 1.778 m (5' 10\") 35.87 kg/m2          Blood Pressure from Last 3 Encounters:   10/12/18 146/80   10/12/18 146/80   07/16/18 135/70    Weight from Last 3 Encounters:   10/12/18 113.4 kg (250 lb)   10/12/18 113.7 kg (250 lb 9.6 oz)   07/16/18 110.3 kg (243 lb 4 oz)              We Performed the Following     Chitra-Operative Worksheet - Laparoscopic Ventral Hernia Repair          Today's Medication Changes          These changes are accurate as of 10/12/18 10:02 AM.  If you have any questions, ask your nurse or doctor.               These medicines have changed or have updated prescriptions.        Dose/Directions    atenolol 50 MG tablet   Commonly known as:  TENORMIN   This may have changed:  See the new instructions.   Used for:  Hypertension goal BP (blood pressure) < 140/90   Changed by:  Nate Cifuentes DO        Dose:  50 mg   Take 1 tablet (50 mg) by mouth 2 times daily   Quantity:  180 tablet   Refills:  1       pramipexole 1 MG tablet   Commonly known as:  MIRAPEX   This may have changed:  when to take this   Used for:  Restless leg syndrome   Changed by:  Nate Cifuentes DO        Dose:  1 mg   Take 1 tablet (1 mg) by mouth 2 times daily   Quantity:  180 tablet   Refills:  1            Where to get your medicines      These medications were sent to [facility] [address] [place] [address] - [address]  [address] [facility] [address]     Phone:  [phone]     atenolol 50 MG tablet    colchicine 0.6 MG tablet    pramipexole 1 MG tablet                Primary Care Provider Office Phone # Fax #    Nate Cifuentes DO " [phone]       [address] [place] [address]        Equal Access to Services     PAM CARTWRIGHT : Hadjulio c aad ku hadanup Puentes, wachrissyda luqadaha, qaybta kaalmada mishel, jessica bethin hayaan eunicejuan miguel yuen laGenetshira ana. So [facility] [phone].    ATENCIÓN: Si habla español, tiene a colin disposición servicios gratuitos de asistencia lingüística. Llame al [phone].    We comply with applicable federal civil rights laws and [place] laws. We do not discriminate on the basis of race, color, national origin, age, disability, sex, sexual orientation, or gender identity.            Thank you!     Thank you for choosing [facility]  for your care. Our goal is always to provide you with excellent care. Hearing back from our patients is one way we can continue to improve our services. Please take a few minutes to complete the written survey that you may receive in the mail after your visit with us. Thank you!             Your Updated Medication List - Protect others around you: Learn how to safely use, store and throw away your medicines at www.disposemymeds.org.          This list is accurate as of 10/12/18 10:02 AM.  Always use your most recent med list.                   Brand Name Dispense Instructions for use Diagnosis    aspirin 81 MG tablet      Take 81 mg by mouth daily        atenolol 50 MG tablet    TENORMIN    180 tablet    Take 1 tablet (50 mg) by mouth 2 times daily    Hypertension goal BP (blood pressure) < 140/90       colchicine 0.6 MG tablet    COLCRYS    30 tablet    Take 2 tablets at the first sign of flare, take 1 additional tablet one hour later.    Chronic gout without tophus, unspecified cause, unspecified site       doxazosin 8 MG tablet    CARDURA    90 tablet    TAKE ONE TABLET BY MOUTH ONCE DAILY    Hypertension goal BP (blood pressure) < 140/90       losartan 50 MG tablet    COZAAR    90 tablet    TAKE 1 TABLET BY MOUTH ONCE DAILY    Essential hypertension with  goal blood pressure less than 140/90       MULTIVITAMIN ADULT PO           order for DME     1 Units    Wear mask at night    Sleep apnea       pramipexole 1 MG tablet    MIRAPEX    180 tablet    Take 1 tablet (1 mg) by mouth 2 times daily    Restless leg syndrome       spironolactone 25 MG tablet    ALDACTONE    90 tablet    TAKE ONE TABLET BY MOUTH ONCE DAILY    Essential hypertension with goal blood pressure less than 140/90       warfarin 5 MG tablet    COUMADIN    80 tablet    TAKE 1/2 TABLET BY MOUTH ON FRIDAY AND 1 TABLET ON ALL OTHER DAYS, OR AS DIRECTED BY THE COUMADIN CLINIC    Personal history of venous thrombosis and embolism

## 2018-10-12 NOTE — PROGRESS NOTES
SUBJECTIVE:   Robert Richard is a 83 year old male who presents to clinic today for the following health issues:    Umbilical hernia that he wants checked out.  Has had for several years and doesn't seem to be enlarging but does cause him some discomfort.  Denies constipation.  Denies redness/warmth to the area.    He also has h/o HTN and is tolerating his atenolol, spironolactone and losartan w/o complaints.  Denies HA/CP.  Checks him BP at home and typically 130-140/70-80.  He uses colchicine occasionally for gout flares.  States he only needs this a couple times a year and isn't interested in suppressive therapy at this time.  He uses mirapex for restless leg syndrome and this manages his symptoms.  H/o T2DM w/ A1C mid 6s and he doesn't check BG at home.  Not on T2DM medications.      Problem list and histories reviewed & adjusted, as indicated.  Additional history: as documented    Patient Active Problem List   Diagnosis     Venous thrombosis     Restless leg syndrome     Sleep apnea     Factor V Leiden mutation (H)     Prothrombin mutation (H)     Back pain     Hyperlipidemia LDL goal <100     Gout     Malignant basal cell neoplasm of skin     Advanced directives, counseling/discussion     Low back pain     Personal history of venous thrombosis and embolism     Hip joint replacement status - right     Abnormal gait     Obesity     Personal history of prostate cancer     Spinal stenosis     Long-term (current) use of anticoagulants [Z79.01]     Essential hypertension with goal blood pressure less than 140/90     Morbid obesity due to excess calories (H)     Diabetes mellitus, type 2 (H)     Past Surgical History:   Procedure Laterality Date     C REVISE TOTAL HIP REPLACEMENT  1/18/13    R hip     JOINT REPLACEMENT  4/2011    R hip     LAMINECTOMY LUMBAR ONE LEVEL  12/2008       Social History   Substance Use Topics     Smoking status: Never Smoker     Smokeless tobacco: Never Used     Alcohol use 0.0 oz/week      0 Standard drinks or equivalent per week      Comment: 1 drink every 1-2 weeks.     Family History   Problem Relation Age of Onset     Prostate Cancer Paternal Grandfather          Current Outpatient Prescriptions   Medication Sig Dispense Refill     atenolol (TENORMIN) 50 MG tablet Take 1 tablet (50 mg) by mouth 2 times daily 180 tablet 1     colchicine (COLCRYS) 0.6 MG tablet Take 2 tablets at the first sign of flare, take 1 additional tablet one hour later. 30 tablet 0     doxazosin (CARDURA) 8 MG tablet TAKE ONE TABLET BY MOUTH ONCE DAILY 90 tablet 1     losartan (COZAAR) 50 MG tablet TAKE 1 TABLET BY MOUTH ONCE DAILY 90 tablet 0     Multiple Vitamins-Minerals (MULTIVITAMIN ADULT PO)        ORDER FOR DME Wear mask at night 1 Units 0     pramipexole (MIRAPEX) 1 MG tablet Take 1 tablet (1 mg) by mouth 2 times daily 180 tablet 1     spironolactone (ALDACTONE) 25 MG tablet TAKE ONE TABLET BY MOUTH ONCE DAILY 90 tablet 1     warfarin (COUMADIN) 5 MG tablet TAKE 1/2 TABLET BY MOUTH ON FRIDAY AND 1 TABLET ON ALL OTHER DAYS, OR AS DIRECTED BY THE COUMADIN CLINIC 80 tablet 1     aspirin 81 MG tablet Take 81 mg by mouth daily       [DISCONTINUED] atenolol (TENORMIN) 50 MG tablet TAKE ONE TABLET BY MOUTH TWICE DAILY 180 tablet 1     [DISCONTINUED] pramipexole (MIRAPEX) 1 MG tablet Take 1 tablet by mouth       Allergies   Allergen Reactions     Atorvastatin Calcium      Muscle pain, weakness     Pravastatin Other (See Comments)     muscle aches     Simvastatin Other (See Comments)     muscle aches       Reviewed and updated as needed this visit by clinical staff  Tobacco  Allergies  Meds  Med Hx  Surg Hx  Fam Hx  Soc Hx      Reviewed and updated as needed this visit by Provider         ROS:  CONSTITUTIONAL: NEGATIVE for fever, chills, change in weight  INTEGUMENTARY/SKIN: NEGATIVE for worrisome rashes, moles or lesions  RESP: NEGATIVE for significant cough or SOB  CV: NEGATIVE for chest pain, palpitations or  peripheral edema  GI: POSITIVE for umbilical hernia w/ some discomfort and NEGATIVE for nausea, heartburn, or change in bowel habits  : NEGATIVE for frequency, dysuria, or hematuria  MUSCULOSKELETAL: NEGATIVE for significant arthralgias or myalgia  NEURO: NEGATIVE for weakness, dizziness or paresthesias  ENDOCRINE: NEGATIVE for temperature intolerance, skin/hair changes  HEME: NEGATIVE for bleeding problems  PSYCHIATRIC: NEGATIVE for changes in mood or affect    OBJECTIVE:     /80 (BP Location: Right arm, Patient Position: Chair, Cuff Size: Adult Large)  Pulse 76  Temp 97.6  F (36.4  C) (Temporal)  Resp 20  Wt 250 lb 9.6 oz (113.7 kg)  SpO2 97%  BMI 35.96 kg/m2  Body mass index is 35.96 kg/(m^2).  GENERAL: healthy, alert and no distress  NECK: no adenopathy, no asymmetry, masses, or scars and thyroid normal to palpation  RESP: lungs clear to auscultation - no rales, rhonchi or wheezes  CV: regular rate and rhythm, normal S1 S2, no S3 or S4, no murmur, click or rub, no peripheral edema and peripheral pulses strong  ABDOMEN: Hernia just superior to umbilicus that feels to have an air bubble w/in it.  I am unable to reduce the hernia.  Otherwise, abdomen is soft, nontender, no hepatosplenomegaly and bowel sounds normal  MS: no gross musculoskeletal defects noted, no edema  SKIN: no suspicious lesions or rashes  NEURO: Normal strength and tone, mentation intact and speech normal  PSYCH: mentation appears normal, affect normal/bright    ASSESSMENT/PLAN:     (K42.9) Umbilical hernia without obstruction and without gangrene  (primary encounter diagnosis)  Comment: Will have him see Gen Surg to discuss possible hernia repair due to his discomfort.  This is not strangulated at this time.  Plan: GENERAL SURG ADULT REFERRAL            (I10) Hypertension goal BP (blood pressure) < 140/90  Comment: BP slightly above goal today but home BP readings WNL.  Continue HTN meds  Plan: atenolol (TENORMIN) 50 MG tablet             (M1A.9XX0) Chronic gout without tophus, unspecified cause, unspecified site  Comment: Continue PRN colchicine  Plan: colchicine (COLCRYS) 0.6 MG tablet            (G25.81) Restless leg syndrome  Comment: Continue mirapex  Plan: pramipexole (MIRAPEX) 1 MG tablet            (Z23) Need for prophylactic vaccination and inoculation against influenza  Plan: FLU VACCINE, INCREASED ANTIGEN, PRESV FREE, AGE        65+ [07515], ADMIN INFLUENZA (For MEDICARE         Patients ONLY) []            (E11.8) Type 2 diabetes mellitus with complication, without long-term current use of insulin (H)  Comment: Will update A1C.  Plan: Hemoglobin A1c            (E78.5) Hyperlipidemia LDL goal <100  Comment: He had Michelle[employer] breakfast sandwich this AM so will have him come back another morning for fasting lipid  Plan: Lipid panel reflex to direct LDL Fasting         The patient is obese.  We have discussed weight management and I have recommended responsible caloric restriction in combination with exercise as tolerated.    This patient has been interviewed, examined, diagnosed, and informed of the above by me personally.  Medical records and available pertinent information has been reviewed by me personally.  All decisions and discussion have been between myself and the patient/family.  This was done in the presence of Brian Moritz PA-s  , who acted as a medical scribe and recorded the events above.  No diagnosis or decision making was made by the above-mentioned scribe.  The patient, and or his/her ensurors will not be billed for the presence or actions of this scribe.  The information recorded by the scribe has been reviewed by me and found to be accurate.                             FOLLOW UP   I have asked the patient to make an appointment for followup with me in 4 months      Nate Cifuentes, [facility]    Injectable Influenza Immunization Documentation    1.  Is the person to be vaccinated sick  today?   No    2. Does the person to be vaccinated have an allergy to a component   of the vaccine?   No  Egg Allergy Algorithm Link    3. Has the person to be vaccinated ever had a serious reaction   to influenza vaccine in the past?   No    4. Has the person to be vaccinated ever had Guillain-Barré syndrome?   No    Form completed by Molly Brown MA 10/12/2018  7:56 AM

## 2018-10-12 NOTE — PROGRESS NOTES
Patient seen in consultation for umbilical hernia by Nate Cifuentes    HPI:  Patient is a 83 year old male with ~1 year history of umbilical hernia. He states that he first noticed it at that time and initially it wouldn't bother him that much, More recently he has noticed with pressure on the abdomen or lifting that he has pain in that area. There is always a bulge there but he denies ever having to press it back into place. He has had some hip and back issue but he is still active for his age and plays golf regularly. 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    Review Of Systems    Skin: negative  Ears/Nose/Throat: negative  Respiratory: No shortness of breath, dyspnea on exertion, cough, or hemoptysis  Cardiovascular: negative  Gastrointestinal: negative  Genitourinary: negative  Musculoskeletal: back pain, neck pain and hip pain  Neurologic: negative  Hematologic/Lymphatic/Immunologic: negative  Endocrine: negative      Past Medical History:   Diagnosis Date     Basal cell carcinoma      Cancer (H)      DVT (deep venous thrombosis) (H) 11/2003     DVT (deep venous thrombosis) (H) 12/08     DVT (deep venous thrombosis) (H) 10/2010     Gout      Other and unspecified hyperlipidemia      Restless leg syndrome      Unspecified essential hypertension        Past Surgical History:   Procedure Laterality Date     C REVISE TOTAL HIP REPLACEMENT  1/18/13    R hip     JOINT REPLACEMENT  4/2011    R hip     LAMINECTOMY LUMBAR ONE LEVEL  12/2008       Family History   Problem Relation Age of Onset     Prostate Cancer Paternal Grandfather        Social History     Social History     Marital status:      Spouse name: N/A     Number of children: N/A     Years of education: N/A     Occupational History     Not on file.     Social History Main Topics     Smoking status: Never Smoker     Smokeless tobacco: Never Used     Alcohol use 0.0 oz/week     0 Standard drinks or equivalent per week      Comment: 1 drink  "every 1-2 weeks.     Drug use: No     Sexual activity: Yes     Partners: Female     Other Topics Concern     Not on file     Social History Narrative       Current Outpatient Prescriptions   Medication Sig Dispense Refill     aspirin 81 MG tablet Take 81 mg by mouth daily       atenolol (TENORMIN) 50 MG tablet Take 1 tablet (50 mg) by mouth 2 times daily 180 tablet 1     colchicine (COLCRYS) 0.6 MG tablet Take 2 tablets at the first sign of flare, take 1 additional tablet one hour later. 30 tablet 0     doxazosin (CARDURA) 8 MG tablet TAKE ONE TABLET BY MOUTH ONCE DAILY 90 tablet 1     losartan (COZAAR) 50 MG tablet TAKE 1 TABLET BY MOUTH ONCE DAILY 90 tablet 0     Multiple Vitamins-Minerals (MULTIVITAMIN ADULT PO)        ORDER FOR DME Wear mask at night 1 Units 0     pramipexole (MIRAPEX) 1 MG tablet Take 1 tablet (1 mg) by mouth 2 times daily 180 tablet 1     spironolactone (ALDACTONE) 25 MG tablet TAKE ONE TABLET BY MOUTH ONCE DAILY 90 tablet 1     warfarin (COUMADIN) 5 MG tablet TAKE 1/2 TABLET BY MOUTH ON FRIDAY AND 1 TABLET ON ALL OTHER DAYS, OR AS DIRECTED BY THE COUMADIN CLINIC 80 tablet 1     [DISCONTINUED] atenolol (TENORMIN) 50 MG tablet TAKE ONE TABLET BY MOUTH TWICE DAILY 180 tablet 1     [DISCONTINUED] pramipexole (MIRAPEX) 1 MG tablet Take 1 tablet by mouth         Medications and history reviewed    Physical exam:  Vitals: /80  Temp 97  F (36.1  C) (Temporal)  Ht 1.778 m (5' 10\")  Wt 113.4 kg (250 lb)  BMI 35.87 kg/m2  BMI= Body mass index is 35.87 kg/(m^2).    Constitutional: Healthy, alert, non-distressed   Head: Normo-cephalic, atraumatic, no lesions, masses or tenderness   Cardiovascular: RRR, no new murmurs, +S1, +S2 heart sounds, no clicks, rubs or gallops   Respiratory: CTAB, no rales, rhonchi or wheezing, equal chest rise, good respiratory effort   Gastrointestinal: Soft, obvious umbilical hernia with incarcerated contents, I was able to partially reduce the hernia, this caused him " pain  : Deferred  Musculoskeletal: Moves all extremities, normal  strength, no deformities noted   Skin: No suspicious lesions or rashes   Psychiatric: Mentation appears normal, affect appropriate   Hematologic/Lymphatic/Immunologic: Normal cervical and supraclavicular lymph nodes   Patient able to get up on table with some difficulty, he uses a cane    Labs show:  No results found for this or any previous visit (from the past 24 hour(s)).      Assessment:     ICD-10-CM    1. Umbilical hernia without obstruction and without gangrene K42.9 Chitra-Operative Worksheet - Laparoscopic Ventral Hernia Repair     Plan: Carlos is still very active for his age and his hernia is becoming more symptomatic. For this reason, I recommended laparoscopic hernia repair with mesh. We discussed the procedure in detail. We also discussed the risks, benefits, alternatives and post-op care and restrictions. After our informed discussion we decided to proceed with the proposed surgery.    He is going to call into the clinic to schedule after he discusses things with his wife.      Jamal Thompson, DO

## 2018-10-12 NOTE — MR AVS SNAPSHOT
After Visit Summary   10/12/2018    Robert Richard    MRN: [id number]           Patient Information     Date Of Birth          1935        Visit Information        Provider Department      10/12/2018 7:20 AM Nate Cifuentes DO [facility]        Today's Diagnoses     Umbilical hernia without obstruction and without gangrene    -  1    Hypertension goal BP (blood pressure) < 140/90        Chronic gout without tophus, unspecified cause, unspecified site        Restless leg syndrome        Need for prophylactic vaccination and inoculation against influenza        Type 2 diabetes mellitus with complication, without long-term current use of insulin (H)        Hyperlipidemia LDL goal <100           Follow-ups after your visit        Additional Services     GENERAL SURG ADULT REFERRAL       Dr Anne, please      Your provider has referred you to: [facility]: [facility] [phone]   [email or web address]    Please be aware that coverage of these services is subject to the terms and limitations of your health insurance plan.  Call member services at your health plan with any benefit or coverage questions.      Please bring the following with you to your appointment:    (1) Any X-Rays, CTs or MRIs which have been performed.  Contact the facility where they were done to arrange for  prior to your scheduled appointment.   (2) List of current medications   (3) This referral request   (4) Any documents/labs given to you for this referral                  Your next 10 appointments already scheduled     Oct 12, 2018  9:30 AM CDT   New Visit with Jamal Thompson DO   [facility] ([facility])    [address] [place] [id number] [phone]            Oct 30, 2018  8:15 AM CDT   Anticoagulation Visit with PH ANTI COAG   [facility] ([facility])    [address]  [facility] [id number] [phone]              Future tests that were ordered for you today     Open Future Orders        Priority Expected Expires Ordered    Lipid panel reflex to direct LDL Fasting Routine  11/12/2018 10/12/2018    Hemoglobin A1c Routine  11/12/2018 10/12/2018            Who to contact     If you have questions or need follow up information about today's clinic visit or your schedule please contact [facility] directly at [phone].  Normal or non-critical lab and imaging results will be communicated to you by Newserhart, letter or phone within 4 business days after the clinic has received the results. If you do not hear from us within 7 days, please contact the clinic through Game Cook[employer] or phone. If you have a critical or abnormal lab result, we will notify you by phone as soon as possible.  Submit refill requests through [employer] Te[employer] or call your pharmacy and they will forward the refill request to us. Please allow 3 business days for your refill to be completed.          Additional Information About Your Visit        NewserhareyesFinder Information     [employer] Te[employer] gives you secure access to your electronic health record. If you see a primary care provider, you can also send messages to your care team and make appointments. If you have questions, please call your primary care clinic.  If you do not have a primary care provider, please call [phone] and they will assist you.        Care EveryWhere ID     This is your Care EveryWhere ID. This could be used by other organizations to access your [place] medical records  [id number]        Your Vitals Were     Pulse Temperature Respirations Pulse Oximetry BMI (Body Mass Index)       76 97.6  F (36.4  C) (Temporal) 20 97% 35.96 kg/m2        Blood Pressure from Last 3 Encounters:   10/12/18 146/80   07/16/18 135/70   05/15/18 (!) 162/92    Weight from Last 3 Encounters:   10/12/18 250 lb 9.6 oz (113.7 kg)   07/16/18 243 lb 4 oz (110.3  kg)   05/15/18 251 lb 1.6 oz (113.9 kg)              We Performed the Following     ADMIN INFLUENZA (For MEDICARE Patients ONLY) []     FLU VACCINE, INCREASED ANTIGEN, PRESV FREE, AGE 65+ [40706]     GENERAL SURG ADULT REFERRAL          Today's Medication Changes          These changes are accurate as of 10/12/18  8:39 AM.  If you have any questions, ask your nurse or doctor.               These medicines have changed or have updated prescriptions.        Dose/Directions    atenolol 50 MG tablet   Commonly known as:  TENORMIN   This may have changed:  See the new instructions.   Used for:  Hypertension goal BP (blood pressure) < 140/90   Changed by:  Nate Cifuentes DO        Dose:  50 mg   Take 1 tablet (50 mg) by mouth 2 times daily   Quantity:  180 tablet   Refills:  1       pramipexole 1 MG tablet   Commonly known as:  MIRAPEX   This may have changed:  when to take this   Used for:  Restless leg syndrome   Changed by:  Nate Cifuentes DO        Dose:  1 mg   Take 1 tablet (1 mg) by mouth 2 times daily   Quantity:  180 tablet   Refills:  1            Where to get your medicines      These medications were sent to [facility] [address] [place] [address] [address]  [address] [facility] [address]     Phone:  [phone]     atenolol 50 MG tablet    colchicine 0.6 MG tablet    pramipexole 1 MG tablet                Primary Care Provider Office Phone # Fax #    Nate Cifuentes [phone]       [address] [facility] [facility]   [facility] [address]        Equal Access to Services     [facility] AH: Hadii terrence ku hadasho Soomaali, waaxda luqadaha, qaybta kaalmada adeegyada, jessica perez. So [facility] [phone].    ATENCIÓN: Si habla español, tiene a colin disposición servicios gratuitos de asistencia lingüística. Llame al [phone].    We comply with applicable federal civil rights laws and [place] laws. We do not discriminate on the basis  of race, color, national origin, age, disability, sex, sexual orientation, or gender identity.            Thank you!     Thank you for choosing [facility]  for your care. Our goal is always to provide you with excellent care. Hearing back from our patients is one way we can continue to improve our services. Please take a few minutes to complete the written survey that you may receive in the mail after your visit with us. Thank you!             Your Updated Medication List - Protect others around you: Learn how to safely use, store and throw away your medicines at www.disposemymeds.org.          This list is accurate as of 10/12/18  8:39 AM.  Always use your most recent med list.                   Brand Name Dispense Instructions for use Diagnosis    aspirin 81 MG tablet      Take 81 mg by mouth daily        atenolol 50 MG tablet    TENORMIN    180 tablet    Take 1 tablet (50 mg) by mouth 2 times daily    Hypertension goal BP (blood pressure) < 140/90       colchicine 0.6 MG tablet    COLCRYS    30 tablet    Take 2 tablets at the first sign of flare, take 1 additional tablet one hour later.    Chronic gout without tophus, unspecified cause, unspecified site       doxazosin 8 MG tablet    CARDURA    90 tablet    TAKE ONE TABLET BY MOUTH ONCE DAILY    Hypertension goal BP (blood pressure) < 140/90       losartan 50 MG tablet    COZAAR    90 tablet    TAKE 1 TABLET BY MOUTH ONCE DAILY    Essential hypertension with goal blood pressure less than 140/90       MULTIVITAMIN ADULT PO           order for DME     1 Units    Wear mask at night    Sleep apnea       pramipexole 1 MG tablet    MIRAPEX    180 tablet    Take 1 tablet (1 mg) by mouth 2 times daily    Restless leg syndrome       spironolactone 25 MG tablet    ALDACTONE    90 tablet    TAKE ONE TABLET BY MOUTH ONCE DAILY    Essential hypertension with goal blood pressure less than 140/90       warfarin 5 MG tablet    COUMADIN    80 tablet    TAKE 1/2  TABLET BY MOUTH ON FRIDAY AND 1 TABLET ON ALL OTHER DAYS, OR AS DIRECTED BY THE COUMADIN CLINIC    Personal history of venous thrombosis and embolism

## 2018-10-12 NOTE — NURSING NOTE
Prior to injection verified patient identity using patient's name and date of birth.  Due to injection administration, patient instructed to remain in clinic for 15 minutes  afterwards, and to report any adverse reaction to me immediately.    Molly Brown MA 10/12/2018  8:02 AM

## 2018-10-16 DIAGNOSIS — E78.5 HYPERLIPIDEMIA LDL GOAL <100: ICD-10-CM

## 2018-10-16 DIAGNOSIS — E11.8 TYPE 2 DIABETES MELLITUS WITH COMPLICATION, WITHOUT LONG-TERM CURRENT USE OF INSULIN (H): ICD-10-CM

## 2018-10-16 LAB
CHOLEST SERPL-MCNC: 231 MG/DL
HBA1C MFR BLD: 6.4 % (ref 0–5.6)
HDLC SERPL-MCNC: 64 MG/DL
LDLC SERPL CALC-MCNC: 136 MG/DL
NONHDLC SERPL-MCNC: 167 MG/DL
TRIGL SERPL-MCNC: 153 MG/DL

## 2018-10-16 PROCEDURE — 80061 LIPID PANEL: CPT | Performed by: INTERNAL MEDICINE

## 2018-10-16 PROCEDURE — 36415 COLL VENOUS BLD VENIPUNCTURE: CPT | Performed by: INTERNAL MEDICINE

## 2018-10-16 PROCEDURE — 83036 HEMOGLOBIN GLYCOSYLATED A1C: CPT | Mod: QW | Performed by: INTERNAL MEDICINE

## 2018-10-17 NOTE — PROGRESS NOTES
Dear Robert, your recent test results are attached.  The cholesterol is elevated with an LDL of 136.  The hemoglobin A1c has returned at 6.4 suggesting very good blood sugar control.  Noting that your previous cholesterol levels were markedly improved, I would recommend stepping up your dietary restriction of fat.  I do not believe cholesterol medications are necessary at this time.    Feel free to contact me via the office or My Chart if you have any questions regarding the above.  Sincerely,  DO AUNG GuadarramaOI

## 2018-10-20 ENCOUNTER — MYC MEDICAL ADVICE (OUTPATIENT)
Dept: FAMILY MEDICINE | Facility: OTHER | Age: 83
End: 2018-10-20

## 2018-10-20 DIAGNOSIS — N52.03 COMBINED ARTERIAL INSUFFICIENCY AND CORPORO-VENOUS OCCLUSIVE ERECTILE DYSFUNCTION: Primary | ICD-10-CM

## 2018-10-22 RX ORDER — SILDENAFIL 25 MG/1
TABLET, FILM COATED ORAL
Qty: 20 TABLET | Refills: 11 | Status: SHIPPED | OUTPATIENT
Start: 2018-10-22 | End: 2020-06-05

## 2018-10-30 ENCOUNTER — ANTICOAGULATION THERAPY VISIT (OUTPATIENT)
Dept: ANTICOAGULATION | Facility: CLINIC | Age: 83
End: 2018-10-30
Payer: COMMERCIAL

## 2018-10-30 ENCOUNTER — TELEPHONE (OUTPATIENT)
Dept: ANTICOAGULATION | Facility: CLINIC | Age: 83
End: 2018-10-30

## 2018-10-30 DIAGNOSIS — D68.52 PROTHROMBIN MUTATION (H): ICD-10-CM

## 2018-10-30 DIAGNOSIS — I82.90 VENOUS THROMBOSIS: ICD-10-CM

## 2018-10-30 DIAGNOSIS — D68.52 PROTHROMBIN GENE MUTATION (H): ICD-10-CM

## 2018-10-30 DIAGNOSIS — D68.51 FACTOR V LEIDEN MUTATION (H): ICD-10-CM

## 2018-10-30 DIAGNOSIS — D68.51 FACTOR V LEIDEN (H): ICD-10-CM

## 2018-10-30 DIAGNOSIS — I82.409 DVT (DEEP VENOUS THROMBOSIS) (H): Primary | ICD-10-CM

## 2018-10-30 LAB — INR POINT OF CARE: 2.6 (ref 0.86–1.14)

## 2018-10-30 PROCEDURE — 85610 PROTHROMBIN TIME: CPT | Mod: QW

## 2018-10-30 PROCEDURE — 36416 COLLJ CAPILLARY BLOOD SPEC: CPT

## 2018-10-30 PROCEDURE — 99207 ZZC NO CHARGE NURSE ONLY: CPT

## 2018-10-30 NOTE — TELEPHONE ENCOUNTER
Please review and renew this patients INR referral, orders pending. Thank you!      Raquel Pelletier RN

## 2018-10-30 NOTE — MR AVS SNAPSHOT
Robert Richard   10/30/2018 8:15 AM   Anticoagulation Therapy Visit    Description:  83 year old male   Provider:  ZAIRA ANTI COAG   Department:  Ph Anticoag           INR as of 10/30/2018     Today's INR 2.6      Anticoagulation Summary as of 10/30/2018     INR goal 2.0-3.0   Today's INR 2.6   Full warfarin instructions 2.5 mg on Fri; 5 mg all other days   Next INR check 12/11/2018    Indications   Factor V Leiden mutation (H) [D68.51]  Venous thrombosis [I82.90]  Prothrombin mutation (H) [D68.52]  Long-term (current) use of anticoagulants [Z79.01] [Z79.01]         Your next Anticoagulation Clinic appointment(s)     Dec 11, 2018  9:30 AM CST   Anticoagulation Visit with ZAIRA ANTI IRENE   [facility] ([facility])    [address] [place] [id number]   [phone]              Contact Numbers     Clinic Number:         October 2018 Details    Sun Mon Tue Wed Thu Fri Sat      1               2               3               4               5               6                 7               8               9               10               11               12               13                 14               15               16               17               18               19               20                 21               22               23               24               25               26               27                 28               29               30      5 mg   See details      31      5 mg             Date Details   10/30 This INR check               How to take your warfarin dose     To take:  5 mg Take 1 of the 5 mg tablets.           November 2018 Details    Sun Mon Tue Wed Thu Fri Sat         1      5 mg         2      2.5 mg         3      5 mg           4      5 mg         5      5 mg         6      5 mg         7      5 mg         8      5 mg         9      2.5 mg         10      5 mg           11      5 mg         12      5 mg         13      5 mg          14      5 mg         15      5 mg         16      2.5 mg         17      5 mg           18      5 mg         19      5 mg         20      5 mg         21      5 mg         22      5 mg         23      2.5 mg         24      5 mg           25      5 mg         26      5 mg         27      5 mg         28      5 mg         29      5 mg         30      2.5 mg           Date Details   No additional details            How to take your warfarin dose     To take:  2.5 mg Take 0.5 of a 5 mg tablet.    To take:  5 mg Take 1 of the 5 mg tablets.           December 2018 Details    Sun Mon Tue Wed Thu Fri Sat           1      5 mg           2      5 mg         3      5 mg         4      5 mg         5      5 mg         6      5 mg         7      2.5 mg         8      5 mg           9      5 mg         10      5 mg         11            12               13               14               15                 16               17               18               19               20               21               22                 23               24               25               26               27               28               29                 30               31                     Date Details   No additional details    Date of next INR:  12/11/2018         How to take your warfarin dose     To take:  2.5 mg Take 0.5 of a 5 mg tablet.    To take:  5 mg Take 1 of the 5 mg tablets.

## 2018-10-30 NOTE — PROGRESS NOTES
ANTICOAGULATION FOLLOW-UP CLINIC VISIT    Patient Name:  Robert Richard  Date:  10/30/2018  Contact Type:  Face to Face    SUBJECTIVE:     Patient Findings     Positives No Problem Findings           OBJECTIVE    INR Protime   Date Value Ref Range Status   10/30/2018 2.6 (A) 0.86 - 1.14 Final       ASSESSMENT / PLAN  INR assessment THER    Recheck INR In: 6 WEEKS    INR Location Clinic      Anticoagulation Summary as of 10/30/2018     INR goal 2.0-3.0   Today's INR 2.6   Warfarin maintenance plan 2.5 mg (5 mg x 0.5) on Fri; 5 mg (5 mg x 1) all other days   Full warfarin instructions 2.5 mg on Fri; 5 mg all other days   Weekly warfarin total 32.5 mg   No change documented Raquel Pelletier RN   Plan last modified aRquel Pelletier RN (3/22/2016)   Next INR check 12/11/2018   Target end date     Indications   Factor V Leiden mutation (H) [D68.51]  Venous thrombosis [I82.90]  Prothrombin mutation (H) [D68.52]  Long-term (current) use of anticoagulants [Z79.01] [Z79.01]         Anticoagulation Episode Summary     INR check location     Preferred lab     Send INR reminders to [facility] POOL    Comments 5 mg tablets, print out, BP, PM dose        Anticoagulation Care Providers     Provider Role Specialty Phone number    Nate Cifuentes DO [facility] [phone]            See the Encounter Report to view Anticoagulation Flowsheet and Dosing Calendar (Go to Encounters tab in chart review, and find the Anticoagulation Therapy Visit)    Dosage adjustment made based on physician directed care plan.    Raquel Pelletier RN

## 2018-12-05 ENCOUNTER — TELEPHONE (OUTPATIENT)
Dept: SURGERY | Facility: CLINIC | Age: 83
End: 2018-12-05

## 2018-12-05 NOTE — TELEPHONE ENCOUNTER
Type of surgery: laparoscopic umbilical hernia repair with mesh  Location of surgery: [facility]   Date of surgery: 1/3/19  Surgeon: Dr. Thompson  Pre-Op Appt Date: 12/27/18  Post-Op Appt Date: 1/14/19   Packet sent out: Surgery packet was given to patient in clinic.   Pre-cert/Authorization completed: NA  Date: 12/5/2018    Lashawn Guevara  Surgery Scheduler

## 2018-12-10 DIAGNOSIS — I10 ESSENTIAL HYPERTENSION WITH GOAL BLOOD PRESSURE LESS THAN 140/90: ICD-10-CM

## 2018-12-10 DIAGNOSIS — I10 HYPERTENSION GOAL BP (BLOOD PRESSURE) < 140/90: ICD-10-CM

## 2018-12-11 ENCOUNTER — ANTICOAGULATION THERAPY VISIT (OUTPATIENT)
Dept: ANTICOAGULATION | Facility: CLINIC | Age: 83
End: 2018-12-11
Payer: COMMERCIAL

## 2018-12-11 DIAGNOSIS — I82.90 VENOUS THROMBOSIS: ICD-10-CM

## 2018-12-11 DIAGNOSIS — D68.51 FACTOR V LEIDEN MUTATION (H): ICD-10-CM

## 2018-12-11 DIAGNOSIS — D68.52 PROTHROMBIN MUTATION (H): ICD-10-CM

## 2018-12-11 LAB — INR POINT OF CARE: 2.1 (ref 0.86–1.14)

## 2018-12-11 PROCEDURE — 85610 PROTHROMBIN TIME: CPT | Mod: QW

## 2018-12-11 PROCEDURE — 36416 COLLJ CAPILLARY BLOOD SPEC: CPT

## 2018-12-11 PROCEDURE — 99207 ZZC NO CHARGE NURSE ONLY: CPT

## 2018-12-11 RX ORDER — LOSARTAN POTASSIUM 50 MG/1
TABLET ORAL
Qty: 90 TABLET | Refills: 1 | Status: SHIPPED | OUTPATIENT
Start: 2018-12-11 | End: 2019-06-20

## 2018-12-11 RX ORDER — SPIRONOLACTONE 25 MG/1
TABLET ORAL
Qty: 90 TABLET | Refills: 1 | Status: SHIPPED | OUTPATIENT
Start: 2018-12-11 | End: 2019-06-20

## 2018-12-11 RX ORDER — DOXAZOSIN 8 MG/1
TABLET ORAL
Qty: 90 TABLET | Refills: 1 | Status: SHIPPED | OUTPATIENT
Start: 2018-12-11 | End: 2019-05-13

## 2018-12-11 NOTE — TELEPHONE ENCOUNTER
Routing refill request to provider for review/approval because:  Labs out of range:  BP, Creatinine    JULIAN Bills, RN  [facility]

## 2018-12-11 NOTE — TELEPHONE ENCOUNTER
"Requested Prescriptions   Pending Prescriptions Disp Refills     doxazosin (CARDURA) 8 MG tablet [Pharmacy Med Name: DOXAZOSIN 8MG       TAB] 90 tablet 1    Last Written Prescription Date:  6/21/18  Last Fill Quantity: 90,  # refills: 1   Last office visit: 9/21/2017 with prescribing provider:  10/12/18   Future Office Visit:   Next 5 appointments (look out 90 days)    Dec 27, 2018  8:20 AM CST  Pre-Op physical with Nate Cifuentes DO  [address] [address]  [phone]   Jan 14, 2019  9:30 AM CST  Return Visit with Jamal Thompson DO  [address] [address]  [phone]        Sig: TAKE 1 TABLET BY MOUTH ONCE DAILY    Alpha Blockers Failed - 12/10/2018 11:37 AM       Failed - Blood pressure under 140/90 in past 12 months    BP Readings from Last 3 Encounters:   10/12/18 146/80   10/12/18 146/80   07/16/18 135/70                Failed - Patient does not have Tadalafil, Vardenafil, or Sildenafil on their medication list       Passed - Recent (12 mo) or future (30 days) visit within the authorizing provider's specialty    Patient had office visit in the last 12 months or has a visit in the next 30 days with authorizing provider or within the authorizing provider's specialty.  See \"Patient Info\" tab in inbasket, or \"Choose Columns\" in Meds & Orders section of the refill encounter.             Passed - Patient is 18 years of age or older        spironolactone (ALDACTONE) 25 MG tablet [Pharmacy Med Name: SPIRONOLACTONE 25MG    TAB] 90 tablet 1    Last Written Prescription Date:  12/5/17  Last Fill Quantity: 90,  # refills: 1   Last office visit: 9/21/2017 with prescribing provider:  10/12/18   Future Office Visit:   Next 5 appointments (look out 90 days)    Dec 27, 2018  8:20 AM CST  Pre-Op physical with DO Dulce Maria Tirado " "[facility] ([address] [address]  [phone]   Jan 14, 2019  9:30 AM CST  Return Visit with Jamal Thompson DO  [facility] ([facility]) [address] [place] [address]  [phone]        Sig: TAKE ONE TABLET BY MOUTH ONCE DAILY    Diuretics (Including Combos) Protocol Failed - 12/10/2018 11:37 AM       Failed - Blood pressure under 140/90 in past 12 months    BP Readings from Last 3 Encounters:   10/12/18 146/80   10/12/18 146/80   07/16/18 135/70                Failed - Normal serum creatinine on file in past 12 months    Recent Labs   Lab Test 05/15/18  1054   CR 1.46*             Passed - Recent (12 mo) or future (30 days) visit within the authorizing provider's specialty    Patient had office visit in the last 12 months or has a visit in the next 30 days with authorizing provider or within the authorizing provider's specialty.  See \"Patient Info\" tab in inbasket, or \"Choose Columns\" in Meds & Orders section of the refill encounter.             Passed - Patient is age 18 or older       Passed - Normal serum potassium on file in past 12 months    Recent Labs   Lab Test 05/15/18  1054   POTASSIUM 4.4                   Passed - Normal serum sodium on file in past 12 months    Recent Labs   Lab Test 05/15/18  1054                 losartan (COZAAR) 50 MG tablet [Pharmacy Med Name: LOSARTAN 50MG TAB] 90 tablet 0    Last Written Prescription Date:  9/18/18  Last Fill Quantity: 90,  # refills: 0   Last office visit: 9/21/2017 with prescribing provider:  10/12/18   Future Office Visit:   Next 5 appointments (look out 90 days)    Dec 27, 2018  8:20 AM CST  Pre-Op physical with Nate Cifuentes DO  [facility] ([address] [address]  [phone]   Jan 14, 2019  9:30 AM CST  Return Visit with DO Dulce Maria Richmond" "[facility] ([facility]) [address] [facility] [id number] [phone]        Sig: TAKE 1 TABLET BY MOUTH ONCE DAILY    Angiotensin-II Receptors Failed - 12/10/2018 11:37 AM       Failed - Blood pressure under 140/90 in past 12 months    BP Readings from Last 3 Encounters:   10/12/18 146/80   10/12/18 146/80   07/16/18 135/70                Failed - Normal serum creatinine on file in past 12 months    Recent Labs   Lab Test 05/15/18  1054   CR 1.46*            Passed - Recent (12 mo) or future (30 days) visit within the authorizing provider's specialty    Patient had office visit in the last 12 months or has a visit in the next 30 days with authorizing provider or within the authorizing provider's specialty.  See \"Patient Info\" tab in inbasket, or \"Choose Columns\" in Meds & Orders section of the refill encounter.             Passed - Patient is age 18 or older       Passed - Normal serum potassium on file in past 12 months    Recent Labs   Lab Test 05/15/18  1054   POTASSIUM 4.4                    "

## 2018-12-27 ENCOUNTER — OFFICE VISIT (OUTPATIENT)
Dept: INTERNAL MEDICINE | Facility: CLINIC | Age: 83
End: 2018-12-27
Payer: COMMERCIAL

## 2018-12-27 VITALS
WEIGHT: 249.6 LBS | SYSTOLIC BLOOD PRESSURE: 138 MMHG | HEIGHT: 70 IN | TEMPERATURE: 97 F | BODY MASS INDEX: 35.73 KG/M2 | HEART RATE: 82 BPM | RESPIRATION RATE: 20 BRPM | DIASTOLIC BLOOD PRESSURE: 86 MMHG | OXYGEN SATURATION: 93 %

## 2018-12-27 DIAGNOSIS — K42.9 UMBILICAL HERNIA WITHOUT OBSTRUCTION AND WITHOUT GANGRENE: ICD-10-CM

## 2018-12-27 DIAGNOSIS — I10 ESSENTIAL HYPERTENSION WITH GOAL BLOOD PRESSURE LESS THAN 140/90: ICD-10-CM

## 2018-12-27 DIAGNOSIS — E11.8 TYPE 2 DIABETES MELLITUS WITH COMPLICATION, WITHOUT LONG-TERM CURRENT USE OF INSULIN (H): ICD-10-CM

## 2018-12-27 DIAGNOSIS — Z01.818 PREOP GENERAL PHYSICAL EXAM: Primary | ICD-10-CM

## 2018-12-27 DIAGNOSIS — D68.51 FACTOR V LEIDEN MUTATION (H): ICD-10-CM

## 2018-12-27 DIAGNOSIS — E03.9 ACQUIRED HYPOTHYROIDISM: ICD-10-CM

## 2018-12-27 DIAGNOSIS — Z79.01 LONG TERM CURRENT USE OF ANTICOAGULANT THERAPY: ICD-10-CM

## 2018-12-27 LAB
ANION GAP SERPL CALCULATED.3IONS-SCNC: 6 MMOL/L (ref 3–14)
BUN SERPL-MCNC: 25 MG/DL (ref 7–30)
CALCIUM SERPL-MCNC: 8.6 MG/DL (ref 8.5–10.1)
CHLORIDE SERPL-SCNC: 109 MMOL/L (ref 94–109)
CO2 SERPL-SCNC: 27 MMOL/L (ref 20–32)
CREAT SERPL-MCNC: 1.51 MG/DL (ref 0.66–1.25)
CREAT UR-MCNC: 185 MG/DL
ERYTHROCYTE [DISTWIDTH] IN BLOOD BY AUTOMATED COUNT: 12.9 % (ref 10–15)
GFR SERPL CREATININE-BSD FRML MDRD: 42 ML/MIN/{1.73_M2}
GLUCOSE SERPL-MCNC: 144 MG/DL (ref 70–99)
HCT VFR BLD AUTO: 42.5 % (ref 40–53)
HGB BLD-MCNC: 13.9 G/DL (ref 13.3–17.7)
MCH RBC QN AUTO: 30.4 PG (ref 26.5–33)
MCHC RBC AUTO-ENTMCNC: 32.7 G/DL (ref 31.5–36.5)
MCV RBC AUTO: 93 FL (ref 78–100)
MICROALBUMIN UR-MCNC: 95 MG/L
MICROALBUMIN/CREAT UR: 51.3 MG/G CR (ref 0–17)
PLATELET # BLD AUTO: 142 10E9/L (ref 150–450)
POTASSIUM SERPL-SCNC: 4.1 MMOL/L (ref 3.4–5.3)
RBC # BLD AUTO: 4.57 10E12/L (ref 4.4–5.9)
SODIUM SERPL-SCNC: 142 MMOL/L (ref 133–144)
TSH SERPL DL<=0.005 MIU/L-ACNC: 1.44 MU/L (ref 0.4–4)
WBC # BLD AUTO: 5.8 10E9/L (ref 4–11)

## 2018-12-27 PROCEDURE — 93000 ELECTROCARDIOGRAM COMPLETE: CPT | Performed by: INTERNAL MEDICINE

## 2018-12-27 PROCEDURE — 85027 COMPLETE CBC AUTOMATED: CPT | Performed by: INTERNAL MEDICINE

## 2018-12-27 PROCEDURE — 82043 UR ALBUMIN QUANTITATIVE: CPT | Performed by: INTERNAL MEDICINE

## 2018-12-27 PROCEDURE — 84443 ASSAY THYROID STIM HORMONE: CPT | Performed by: INTERNAL MEDICINE

## 2018-12-27 PROCEDURE — 36415 COLL VENOUS BLD VENIPUNCTURE: CPT | Performed by: INTERNAL MEDICINE

## 2018-12-27 PROCEDURE — 80048 BASIC METABOLIC PNL TOTAL CA: CPT | Performed by: INTERNAL MEDICINE

## 2018-12-27 PROCEDURE — 99214 OFFICE O/P EST MOD 30 MIN: CPT | Performed by: INTERNAL MEDICINE

## 2018-12-27 ASSESSMENT — PAIN SCALES - GENERAL: PAINLEVEL: NO PAIN (0)

## 2018-12-27 ASSESSMENT — MIFFLIN-ST. JEOR: SCORE: 1838.43

## 2018-12-27 NOTE — PROGRESS NOTES
[address] [address]  [phone]  Dept: [phone]    PRE-OP EVALUATION:  Today's date: 2018    Robert Richard (: 1935) presents for pre-operative evaluation assessment as requested by Dr. Thompson.  He requires evaluation and anesthesia risk assessment prior to undergoing surgery/procedure for treatment of hernia .    Primary Physician: Nate Cifuentes  Type of Anesthesia Anticipated: General    Patient has a Health Care Directive or Living Will:  NO    Preop Questions 2018   Who is doing your surgery? Donna   What are you having done? hernia   Date of Surgery/Procedure: 2019   1.  Do you have a history of Heart attack, stroke, stent, coronary bypass surgery, or other heart surgery? No   2.  Do you ever have any pain or discomfort in your chest? No   3.  Do you have a history of  Heart Failure? No   4.   Are you troubled by shortness of breath when:  walking on a level surface, or up a slight hill, or at night? No   5.  Do you currently have a cold, bronchitis or other respiratory infection? No   6.  Do you have a cough, shortness of breath, or wheezing? No   7.  Do you sometimes get pains in the calves of your legs when you walk? No   8. Do you or anyone in your family have previous history of blood clots? YES - right leg   9.  Do you or does anyone in your family have a serious bleeding problem such as prolonged bleeding following surgeries or cuts? No   10. Have you ever had problems with anemia or been told to take iron pills? No   11. Have you had any abnormal blood loss such as black, tarry or bloody stools? No   12. Have you ever had a blood transfusion? UNKNOWN    13. Have you or any of your relatives ever had problems with anesthesia? No   14. Do you have sleep apnea, excessive snoring or daytime drowsiness? YES - snoring, uses CPAP    15. Do you have any prosthetic heart valves? No   16. Do you have  prosthetic joints? YES - right hip         HPI:     HPI related to upcoming procedure: The patient has a umbilical hernia which is becoming somewhat more painful.  No current obstruction or incarceration is noted.      See problem list for active medical problems.  Problems all longstanding and stable, except as noted/documented.  See ROS for pertinent symptoms related to these conditions.                                                                                                                                                          .    MEDICAL HISTORY:     Patient Active Problem List    Diagnosis Date Noted     Diabetes mellitus, type 2 (H) 10/12/2018     Priority: Medium     Morbid obesity due to excess calories (H) 07/31/2017     Priority: Medium     Essential hypertension with goal blood pressure less than 140/90 09/06/2016     Priority: Medium     Long-term (current) use of anticoagulants [Z79.01] 03/21/2016     Priority: Medium     Spinal stenosis 10/17/2014     Priority: Medium     Low back pain 01/21/2014     Priority: Medium     Diagnosis updated by automated process. Provider to review and confirm.       Personal history of venous thrombosis and embolism 01/21/2014     Priority: Medium     Hip joint replacement status - right 01/21/2014     Priority: Medium     Abnormal gait 01/21/2014     Priority: Medium     Obesity 01/21/2014     Priority: Medium     Personal history of prostate cancer 01/21/2014     Priority: Medium     Malignant basal cell neoplasm of skin 07/24/2012     Priority: Medium     Do you wish to do the replacement in the background? yes         Advanced directives, counseling/discussion 07/24/2012     Priority: Medium     Patient states has Advance Directive and will bring in a copy to clinic. 7/24/2012          Gout 07/03/2012     Priority: Medium     Back pain 09/27/2011     Priority: Medium     Hyperlipidemia LDL goal <100 09/27/2011     Priority: Medium     Factor V Leiden  mutation (H) 09/08/2011     Priority: Medium     Prothrombin mutation (H) 09/08/2011     Priority: Medium     [id number]       Sleep apnea 05/19/2011     Priority: Medium     Venous thrombosis 10/20/2010     Priority: Medium     Restless leg syndrome 10/20/2010     Priority: Medium      Past Medical History:   Diagnosis Date     Basal cell carcinoma      Cancer (H)      DVT (deep venous thrombosis) (H) 11/2003     DVT (deep venous thrombosis) (H) 12/08     DVT (deep venous thrombosis) (H) 10/2010     Gout      Other and unspecified hyperlipidemia      Restless leg syndrome      Unspecified essential hypertension      Past Surgical History:   Procedure Laterality Date     C REVISE TOTAL HIP REPLACEMENT  1/18/13    R hip     JOINT REPLACEMENT  4/2011    R hip     LAMINECTOMY LUMBAR ONE LEVEL  12/2008     Current Outpatient Medications   Medication Sig Dispense Refill     atenolol (TENORMIN) 50 MG tablet Take 1 tablet (50 mg) by mouth 2 times daily 180 tablet 1     doxazosin (CARDURA) 8 MG tablet TAKE 1 TABLET BY MOUTH ONCE DAILY 90 tablet 1     losartan (COZAAR) 50 MG tablet TAKE 1 TABLET BY MOUTH ONCE DAILY 90 tablet 1     Multiple Vitamins-Minerals (MULTIVITAMIN ADULT PO)        ORDER FOR DME Wear mask at night 1 Units 0     pramipexole (MIRAPEX) 1 MG tablet Take 1 tablet (1 mg) by mouth 2 times daily 180 tablet 1     sildenafil (VIAGRA) 25 MG tablet Take 1-3 tablets 30 min to 4 hrs before sex. Do not use with nitroglycerin, terazosin or doxazosin. 20 tablet 11     spironolactone (ALDACTONE) 25 MG tablet TAKE ONE TABLET BY MOUTH ONCE DAILY 90 tablet 1     warfarin (COUMADIN) 5 MG tablet TAKE 1/2 TABLET BY MOUTH ON FRIDAY AND 1 TABLET ON ALL OTHER DAYS, OR AS DIRECTED BY THE COUMADIN CLINIC 80 tablet 1     aspirin 81 MG tablet Take 81 mg by mouth daily       colchicine (COLCRYS) 0.6 MG tablet Take 2 tablets at the first sign of flare, take 1 additional tablet one hour later. (Patient not taking: Reported on 12/27/2018)  "30 tablet 0     OTC products: None, except as noted above    Allergies   Allergen Reactions     Atorvastatin Calcium      Muscle pain, weakness     Pravastatin Other (See Comments)     muscle aches     Simvastatin Other (See Comments)     muscle aches      Latex Allergy: NO    Social History     Tobacco Use     Smoking status: Never Smoker     Smokeless tobacco: Never Used   Substance Use Topics     Alcohol use: Yes     Alcohol/week: 0.0 oz     Comment: 1 drink every 1-2 weeks.     History   Drug Use No       REVIEW OF SYSTEMS:   CONSTITUTIONAL: NEGATIVE for fever, chills, change in weight  INTEGUMENTARY/SKIN: NEGATIVE for worrisome rashes, moles or lesions  EYES: NEGATIVE for vision changes or irritation  ENT/MOUTH: NEGATIVE for ear, mouth and throat problems  RESP: NEGATIVE for significant cough or SOB  CV: NEGATIVE for chest pain, palpitations or peripheral edema  GI: NEGATIVE for nausea,  heartburn, or change in bowel habits.  Patient does have some abdominal pain associated with the hernia.  It is still reducible with some effort.  : NEGATIVE for frequency, dysuria, or hematuria  MUSCULOSKELETAL: NEGATIVE for significant arthralgias or myalgia  NEURO: NEGATIVE for weakness, dizziness or paresthesias  ENDOCRINE: NEGATIVE for temperature intolerance, skin/hair changes  HEME: NEGATIVE for bleeding problems  PSYCHIATRIC: NEGATIVE for changes in mood or affect    EXAM:   /86 (BP Location: Left arm, Patient Position: Sitting, Cuff Size: Adult Large)   Pulse 82   Temp 97  F (36.1  C) (Temporal)   Resp 20   Ht 1.778 m (5' 10\")   Wt 113.2 kg (249 lb 9.6 oz)   SpO2 93%   BMI 35.81 kg/m      GENERAL APPEARANCE: healthy, alert and no distress     EYES: EOMI,  PERRL     HENT: ear canals and TM's normal and nose and mouth without ulcers or lesions     NECK: no adenopathy, no asymmetry, masses, or scars and thyroid normal to palpation     RESP: lungs clear to auscultation - no rales, rhonchi or wheezes     CV: " regular rates and rhythm, normal S1 S2, no S3 or S4 and no murmur, click or rub     ABDOMEN: Abdomen is soft without rebound, rigidity or guarding.  Some tenderness in the periumbilical area is noted.  Reducible hernia is present.  It is tender to touch     MS: extremities normal- no gross deformities noted, no evidence of inflammation in joints, FROM in all extremities.     SKIN: no suspicious lesions or rashes     NEURO: Normal strength and tone, sensory exam grossly normal, mentation intact and speech normal     PSYCH: mentation appears normal. and affect normal/bright     LYMPHATICS: No cervical adenopathy    DIAGNOSTICS:   EKG demonstrates normal sinus rhythm without evidence of ischemia or arrhythmia.  Laboratory work is pending and will be reviewed when available in the EMR.      IMPRESSION:   Reason for surgery/procedure: Umbilical hernia with progressing discomfort.  Diagnosis/reason for consult: Perioperative risk assessment in a pleasant 83-year-old gentleman with:  1. Umbilical hernia without obstruction and without gangrene    2. Preop general physical exam  - Albumin Random Urine Quantitative with Creat Ratio    3. Essential hypertension with goal blood pressure less than 140/90  - EKG 12-lead complete w/read - Clinics  - CBC with platelets  - Basic metabolic panel    4. Type 2 diabetes mellitus with complication, without long-term current use of insulin (H)    5. Factor V Leiden mutation (H)    6. Long term current use of anticoagulant therapy    7. Acquired hypothyroidism  - TSH WITH FREE T4 REFLEX      The proposed surgical procedure is considered INTERMEDIATE risk.    REVISED CARDIAC RISK INDEX  The patient has the following serious cardiovascular risks for perioperative complications such as (MI, PE, VFib and 3  AV Block):  No serious cardiac risks  INTERPRETATION: 1 risks: Class II (low risk - 0.9% complication rate)    The patient has the following additional risks for perioperative  complications:  No identified additional risks      ICD-10-CM    1. Preop general physical exam Z01.818 Albumin Random Urine Quantitative with Creat Ratio     TSH WITH FREE T4 REFLEX       RECOMMENDATIONS:         --Patient is to take all scheduled medications on the day of surgery EXCEPT for modifications listed below.    Patient is instructed to discontinue warfarin 5 days prior to the procedure.  Bridging will not be necessary.    APPROVAL GIVEN to proceed with proposed procedure, without further diagnostic evaluation       Signed Electronically by: Nate Cifuentes DO    Copy of this evaluation report is provided to requesting physician.    [facility] Preop Guidelines    Revised Cardiac Risk Index

## 2019-01-03 ENCOUNTER — HOSPITAL ENCOUNTER (OUTPATIENT)
Facility: CLINIC | Age: 84
Discharge: HOME | End: 2019-01-03
Attending: SURGERY | Admitting: SURGERY
Payer: MEDICARE

## 2019-01-03 ENCOUNTER — ANESTHESIA EVENT (OUTPATIENT)
Dept: SURGERY | Facility: CLINIC | Age: 84
End: 2019-01-03
Payer: MEDICARE

## 2019-01-03 ENCOUNTER — ANESTHESIA (OUTPATIENT)
Dept: SURGERY | Facility: CLINIC | Age: 84
End: 2019-01-03
Payer: MEDICARE

## 2019-01-03 VITALS
HEART RATE: 62 BPM | DIASTOLIC BLOOD PRESSURE: 91 MMHG | SYSTOLIC BLOOD PRESSURE: 154 MMHG | TEMPERATURE: 98 F | RESPIRATION RATE: 6 BRPM | OXYGEN SATURATION: 96 %

## 2019-01-03 DIAGNOSIS — Z87.19 S/P LAPAROSCOPIC HERNIA REPAIR: Primary | ICD-10-CM

## 2019-01-03 DIAGNOSIS — Z98.890 S/P LAPAROSCOPIC HERNIA REPAIR: Primary | ICD-10-CM

## 2019-01-03 LAB — INR PPP: 1.02 (ref 0.86–1.14)

## 2019-01-03 PROCEDURE — 49653 ZZHC LAP VENT/ABD HERN PROC COMP: CPT | Performed by: SURGERY

## 2019-01-03 PROCEDURE — 36000056 ZZH SURGERY LEVEL 3 1ST 30 MIN: Performed by: SURGERY

## 2019-01-03 PROCEDURE — 37000008 ZZH ANESTHESIA TECHNICAL FEE, 1ST 30 MIN: Performed by: SURGERY

## 2019-01-03 PROCEDURE — 71000027 ZZH RECOVERY PHASE 2 EACH 15 MINS: Performed by: SURGERY

## 2019-01-03 PROCEDURE — 40000306 ZZH STATISTIC PRE PROC ASSESS II: Performed by: SURGERY

## 2019-01-03 PROCEDURE — 25000128 H RX IP 250 OP 636: Performed by: NURSE ANESTHETIST, CERTIFIED REGISTERED

## 2019-01-03 PROCEDURE — 25000125 ZZHC RX 250: Performed by: SURGERY

## 2019-01-03 PROCEDURE — 85610 PROTHROMBIN TIME: CPT | Performed by: NURSE ANESTHETIST, CERTIFIED REGISTERED

## 2019-01-03 PROCEDURE — 25000566 ZZH SEVOFLURANE, EA 15 MIN: Performed by: SURGERY

## 2019-01-03 PROCEDURE — C1781 MESH (IMPLANTABLE): HCPCS | Performed by: SURGERY

## 2019-01-03 PROCEDURE — 25000132 ZZH RX MED GY IP 250 OP 250 PS 637: Mod: GY | Performed by: SURGERY

## 2019-01-03 PROCEDURE — 36000058 ZZH SURGERY LEVEL 3 EA 15 ADDTL MIN: Performed by: SURGERY

## 2019-01-03 PROCEDURE — 25000128 H RX IP 250 OP 636: Performed by: SURGERY

## 2019-01-03 PROCEDURE — 25000125 ZZHC RX 250: Performed by: NURSE ANESTHETIST, CERTIFIED REGISTERED

## 2019-01-03 PROCEDURE — 71000014 ZZH RECOVERY PHASE 1 LEVEL 2 FIRST HR: Performed by: SURGERY

## 2019-01-03 PROCEDURE — 37000009 ZZH ANESTHESIA TECHNICAL FEE, EACH ADDTL 15 MIN: Performed by: SURGERY

## 2019-01-03 PROCEDURE — 27210794 ZZH OR GENERAL SUPPLY STERILE: Performed by: SURGERY

## 2019-01-03 PROCEDURE — A9270 NON-COVERED ITEM OR SERVICE: HCPCS | Mod: GY | Performed by: SURGERY

## 2019-01-03 DEVICE — IMPLANTABLE DEVICE: Type: IMPLANTABLE DEVICE | Site: UMBILICAL | Status: FUNCTIONAL

## 2019-01-03 RX ORDER — OXYCODONE AND ACETAMINOPHEN 5; 325 MG/1; MG/1
1-2 TABLET ORAL
Status: COMPLETED | OUTPATIENT
Start: 2019-01-03 | End: 2019-01-03

## 2019-01-03 RX ORDER — EPHEDRINE SULFATE 50 MG/ML
INJECTION, SOLUTION INTRAMUSCULAR; INTRAVENOUS; SUBCUTANEOUS PRN
Status: DISCONTINUED | OUTPATIENT
Start: 2019-01-03 | End: 2019-01-03

## 2019-01-03 RX ORDER — OXYCODONE AND ACETAMINOPHEN 5; 325 MG/1; MG/1
1 TABLET ORAL
Status: DISCONTINUED | OUTPATIENT
Start: 2019-01-03 | End: 2019-01-03

## 2019-01-03 RX ORDER — SODIUM CHLORIDE, SODIUM LACTATE, POTASSIUM CHLORIDE, CALCIUM CHLORIDE 600; 310; 30; 20 MG/100ML; MG/100ML; MG/100ML; MG/100ML
INJECTION, SOLUTION INTRAVENOUS CONTINUOUS
Status: DISCONTINUED | OUTPATIENT
Start: 2019-01-03 | End: 2019-01-03 | Stop reason: HOSPADM

## 2019-01-03 RX ORDER — PROPOFOL 10 MG/ML
INJECTION, EMULSION INTRAVENOUS PRN
Status: DISCONTINUED | OUTPATIENT
Start: 2019-01-03 | End: 2019-01-03

## 2019-01-03 RX ORDER — ONDANSETRON 4 MG/1
4 TABLET, ORALLY DISINTEGRATING ORAL EVERY 30 MIN PRN
Status: DISCONTINUED | OUTPATIENT
Start: 2019-01-03 | End: 2019-01-03 | Stop reason: HOSPADM

## 2019-01-03 RX ORDER — NALOXONE HYDROCHLORIDE 0.4 MG/ML
.1-.4 INJECTION, SOLUTION INTRAMUSCULAR; INTRAVENOUS; SUBCUTANEOUS
Status: DISCONTINUED | OUTPATIENT
Start: 2019-01-03 | End: 2019-01-03 | Stop reason: HOSPADM

## 2019-01-03 RX ORDER — BUPIVACAINE HYDROCHLORIDE AND EPINEPHRINE 2.5; 5 MG/ML; UG/ML
INJECTION, SOLUTION INFILTRATION; PERINEURAL PRN
Status: DISCONTINUED | OUTPATIENT
Start: 2019-01-03 | End: 2019-01-03 | Stop reason: HOSPADM

## 2019-01-03 RX ORDER — CEFAZOLIN SODIUM 1 G/3ML
1 INJECTION, POWDER, FOR SOLUTION INTRAMUSCULAR; INTRAVENOUS SEE ADMIN INSTRUCTIONS
Status: DISCONTINUED | OUTPATIENT
Start: 2019-01-03 | End: 2019-01-03 | Stop reason: HOSPADM

## 2019-01-03 RX ORDER — ONDANSETRON 2 MG/ML
4 INJECTION INTRAMUSCULAR; INTRAVENOUS EVERY 30 MIN PRN
Status: DISCONTINUED | OUTPATIENT
Start: 2019-01-03 | End: 2019-01-03 | Stop reason: HOSPADM

## 2019-01-03 RX ORDER — OXYCODONE AND ACETAMINOPHEN 5; 325 MG/1; MG/1
1-2 TABLET ORAL EVERY 4 HOURS PRN
Qty: 16 TABLET | Refills: 0 | Status: SHIPPED | OUTPATIENT
Start: 2019-01-03 | End: 2019-06-20

## 2019-01-03 RX ORDER — LIDOCAINE 40 MG/G
CREAM TOPICAL
Status: DISCONTINUED | OUTPATIENT
Start: 2019-01-03 | End: 2019-01-03 | Stop reason: HOSPADM

## 2019-01-03 RX ORDER — ALBUTEROL SULFATE 0.83 MG/ML
2.5 SOLUTION RESPIRATORY (INHALATION) ONCE
Status: DISCONTINUED | OUTPATIENT
Start: 2019-01-03 | End: 2019-01-03 | Stop reason: HOSPADM

## 2019-01-03 RX ORDER — DEXAMETHASONE SODIUM PHOSPHATE 10 MG/ML
4 INJECTION INTRAMUSCULAR; INTRAVENOUS EVERY 10 MIN PRN
Status: DISCONTINUED | OUTPATIENT
Start: 2019-01-03 | End: 2019-01-03 | Stop reason: HOSPADM

## 2019-01-03 RX ORDER — FENTANYL CITRATE 50 UG/ML
25-50 INJECTION, SOLUTION INTRAMUSCULAR; INTRAVENOUS
Status: DISCONTINUED | OUTPATIENT
Start: 2019-01-03 | End: 2019-01-03 | Stop reason: HOSPADM

## 2019-01-03 RX ORDER — LIDOCAINE HYDROCHLORIDE 20 MG/ML
INJECTION, SOLUTION INFILTRATION; PERINEURAL PRN
Status: DISCONTINUED | OUTPATIENT
Start: 2019-01-03 | End: 2019-01-03

## 2019-01-03 RX ORDER — CEFAZOLIN SODIUM 2 G/100ML
2 INJECTION, SOLUTION INTRAVENOUS
Status: COMPLETED | OUTPATIENT
Start: 2019-01-03 | End: 2019-01-03

## 2019-01-03 RX ORDER — MEPERIDINE HYDROCHLORIDE 25 MG/ML
12.5 INJECTION INTRAMUSCULAR; INTRAVENOUS; SUBCUTANEOUS
Status: DISCONTINUED | OUTPATIENT
Start: 2019-01-03 | End: 2019-01-03 | Stop reason: HOSPADM

## 2019-01-03 RX ADMIN — Medication 10 MG: at 07:54

## 2019-01-03 RX ADMIN — FENTANYL CITRATE 50 MCG: 50 INJECTION, SOLUTION INTRAMUSCULAR; INTRAVENOUS at 07:28

## 2019-01-03 RX ADMIN — ONDANSETRON 4 MG: 2 INJECTION INTRAMUSCULAR; INTRAVENOUS at 08:05

## 2019-01-03 RX ADMIN — LIDOCAINE HYDROCHLORIDE 80 MG: 20 INJECTION, SOLUTION INFILTRATION; PERINEURAL at 07:35

## 2019-01-03 RX ADMIN — ROCURONIUM BROMIDE 20 MG: 10 INJECTION INTRAVENOUS at 07:59

## 2019-01-03 RX ADMIN — FENTANYL CITRATE 50 MCG: 50 INJECTION, SOLUTION INTRAMUSCULAR; INTRAVENOUS at 07:35

## 2019-01-03 RX ADMIN — DEXAMETHASONE SODIUM PHOSPHATE 10 MG: 10 INJECTION INTRAMUSCULAR; INTRAVENOUS at 08:03

## 2019-01-03 RX ADMIN — SODIUM CHLORIDE, POTASSIUM CHLORIDE, SODIUM LACTATE AND CALCIUM CHLORIDE: 600; 310; 30; 20 INJECTION, SOLUTION INTRAVENOUS at 08:24

## 2019-01-03 RX ADMIN — CEFAZOLIN SODIUM 2 G: 2 INJECTION, SOLUTION INTRAVENOUS at 07:41

## 2019-01-03 RX ADMIN — OXYCODONE HYDROCHLORIDE AND ACETAMINOPHEN 2 TABLET: 5; 325 TABLET ORAL at 09:46

## 2019-01-03 RX ADMIN — Medication 5 MG: at 08:09

## 2019-01-03 RX ADMIN — MIDAZOLAM 0.5 MG: 1 INJECTION INTRAMUSCULAR; INTRAVENOUS at 07:28

## 2019-01-03 RX ADMIN — ROCURONIUM BROMIDE 30 MG: 10 INJECTION INTRAVENOUS at 07:35

## 2019-01-03 RX ADMIN — PROPOFOL 170 MG: 10 INJECTION, EMULSION INTRAVENOUS at 07:35

## 2019-01-03 RX ADMIN — SUGAMMADEX 200 MG: 100 INJECTION, SOLUTION INTRAVENOUS at 08:44

## 2019-01-03 RX ADMIN — LIDOCAINE HYDROCHLORIDE 1 ML: 10 INJECTION, SOLUTION EPIDURAL; INFILTRATION; INTRACAUDAL; PERINEURAL at 06:53

## 2019-01-03 RX ADMIN — SODIUM CHLORIDE, POTASSIUM CHLORIDE, SODIUM LACTATE AND CALCIUM CHLORIDE: 600; 310; 30; 20 INJECTION, SOLUTION INTRAVENOUS at 06:53

## 2019-01-03 ASSESSMENT — LIFESTYLE VARIABLES: TOBACCO_USE: 0

## 2019-01-03 NOTE — DISCHARGE INSTRUCTIONS
[facility]    Home Care Following Hernia Repair (Open or Laparoscopic)    Dr. Thompson    Hernia Type:  Umbilical    Care of the Incision:    Remove gauze dressing (if present) after 24 hours and then it is ok to shower.     If surgical glue was used, keep your incision dry for 24 hours.  Then you may shower, but don t submerge under water for at least 2 weeks.  Gently pat your incision dry with a freshly laundered towel.    Do not touch your incision with bare hands or pick at scabs.    Leave your incision open to air.  Cover it only if clothing rubs or irritates it.  Activity:    Gradually increase your activity.  Walk short distances several times each day and increase the distance as your strength allows.    To promote circulation, do not cross your legs while sitting.    No strenuous lifting or straining for 2 weeks.   Do not lift anything over 20 pounds for 2 weeks.    Return to work will be determined by the type of work you do and should be discussed with your physician.    Do not drive or operate equipment while taking prescription pain medicines.  You may drive 1 week after surgery if you have stopped taking prescription pain medicines and are pain-free enough to react quickly and make an emergency stop if necessary.    Diet:    Return to the diet you were on before surgery.    Drink plenty of  water.    Avoid foods that cause constipation.      REMEMBER--most prescription pain pills cause constipation.  Walking, extra fluids, and increased fiber (fresh fruits and vegetables, etc.) are natural remedies for constipation.  You can also take mineral oil, 1-2 Tablespoons per day.  If still constipated you may try a stool softener such as Colace or Miralax.    Call Your Physician if You Have:    Redness, increased swelling or cloudy drainage from your incision.    A temperature of more than 101 degrees F.    Worsening pain in your incision not relieved by your prescription pain pills and/or a  short rest.    Any questions or concerns about your recovery, please call     Business hours [phone]    After hours [phone] Nurse Advice Line (24 hours a day)    Follow-up Care:    Make an appointment 2-3 weeks after your surgery if not already done.  Call [phone]  [facility] Same-Day Surgery   Adult Discharge Orders & Instructions     For 24 hours after surgery    1. Get plenty of rest.  A responsible adult must stay with you for at least 24 hours after you leave the hospital.   2. Do not drive or use heavy equipment.  If you have weakness or tingling, don't drive or use heavy equipment until this feeling goes away.  3. Do not drink alcohol.  4. Avoid strenuous or risky activities.  Ask for help when climbing stairs.   5. You may feel lightheaded.  If so, sit for a few minutes before standing.  Have someone help you get up.   6. You may have a slight fever. Call the doctor if your fever is over 100 F (37.7 C) (taken under the tongue) or lasts longer than 24 hours.  7. You may have a dry mouth, a sore throat, muscle aches or trouble sleeping.  These should go away after 24 hours.  8. Do not make important or legal decisions.  Based on the surgery/procedure that you had today, we do not expect that you will have any problems.  However, we want you to know what to do if you have pain, nausea, bleeding, or infection:  To control pain:  Take medicines your physician has prescribed or or over-the counter medicine he or she advises.  Ice packs and periods of rest are often helpful.  For surgery on an arm or leg, raise it on a pillow to ease swelling.  If your pain is not managed with the above methods, contact your physician.  Copyright [employer], Licensed under [employer]  To control nausea:  Take anti-nausea medicine approved by your physician.  Drink clear liquids such as apple juice, ginger ale, broth or 7-Up. Be sure to drink enough fluids.  Move to a regular diet as you feel  able.  Rest may also help.  Bleeding:  You may see a little blood on your dressing, about the size of a quarter in the first 24 hours.  If you see this, there is no reason to be alarmed.  However, if this continues to increase in size, apply pressure if able, and notify your physician.  Copyright [employer], Licensed under [employer]  Infection: Please contact your physician if you have any of the following signs:  redness, swelling, heat, increasing pain or foul-smelling drainage at your surgery site, fever or chills.    Nurse advice line: [phone]

## 2019-01-03 NOTE — BRIEF OP NOTE
[facility] Brief Operative Note    Pre-operative diagnosis: umbilical hernia    Post-operative diagnosis incarcerated umbilical hernia     Procedure: Procedure(s):  laparoscopic umbilical hernia repair with mesh   Surgeon(s): Surgeon(s) and Role:     * Jamal Thompson, DO - Primary   Estimated blood loss: 10 mL    Specimens: * No specimens in log *   Findings: See dictation

## 2019-01-03 NOTE — ANESTHESIA CARE TRANSFER NOTE
Patient: Robert Richard    Procedure(s):  laparoscopic umbilical hernia repair with mesh    Diagnosis: umbilical hernia   Diagnosis Additional Information: No value filed.    Anesthesia Type:   General, ETT     Note:  Airway :Oral Airway and Face Mask  Patient transferred to:PACU  Handoff Report: Identifed the Patient, Identified the Reponsible Provider, Reviewed the pertinent medical history, Discussed the surgical course, Reviewed Intra-OP anesthesia mangement and issues during anesthesia, Set expectations for post-procedure period and Allowed opportunity for questions and acknowledgement of understanding      Vitals: (Last set prior to Anesthesia Care Transfer)    CRNA VITALS  1/3/2019 0828 - 1/3/2019 0907      1/3/2019             SpO2:  97 %                Electronically Signed By: SKIP Vasquez CRNA  January 3, 2019  9:07 AM

## 2019-01-03 NOTE — ANESTHESIA POSTPROCEDURE EVALUATION
Patient: Robert Richard    Procedure(s):  laparoscopic umbilical hernia repair with mesh    Diagnosis:umbilical hernia   Diagnosis Additional Information: No value filed.    Anesthesia Type:  General, ETT    Note:  Anesthesia Post Evaluation    Patient location during evaluation: Phase 2 and Bedside  Patient participation: Able to fully participate in evaluation  Level of consciousness: awake and alert  Pain management: adequate  Airway patency: patent  Cardiovascular status: acceptable  Respiratory status: acceptable  Hydration status: acceptable  PONV: none     Anesthetic complications: None    Comments: Patient was pleased with his care today. He is comfortable. No complications noted. Will follow as needed.        Last vitals:  Vitals:    01/03/19 1045 01/03/19 1100 01/03/19 1115   BP: (!) 146/96 (!) 159/95 (!) 154/91   Pulse: 60 58 62   Resp:      Temp:      SpO2: 95% 96% 96%         Electronically Signed By: SKIP Vasquez CRNA  January 3, 2019  1:42 PM

## 2019-01-03 NOTE — OP NOTE
Procedure Date: 01/03/2019      PROCEDURE:  Open assisted laparoscopic umbilical hernia repair with mesh.      PREOPERATIVE DIAGNOSIS:  Umbilical hernia.      POSTOPERATIVE DIAGNOSIS:  Incarcerated umbilical hernia.      SURGEON:  Jamal Thompson DO      ASSISTANT:  None.      ANESTHESIA:  General endotracheal anesthesia.      ESTIMATED BLOOD LOSS:  10 mL.      SPECIMENS:  None.      COMPLICATIONS:  None immediately apparent.      INDICATIONS FOR PROCEDURE:  Carlos is an 83-year-old male who I met in the surgical clinic with complaints a long history of umbilical hernia.  He states that when he first noticed this a year or 2 ago, it never bothered him much, but overtime it became larger, more painful and cosmetically displeasing to him.  He is fairly active for an 81-year-old gentleman with comorbidities including atrial fibrillation on Coumadin, diabetes and hypertension.  We discussed watchful waiting versus hernia repair with mesh.  Due to his relative good overall health and his activity level, I recommended a laparoscopic umbilical hernia repair with mesh, possible open.  We discussed the procedure in detail, risks, benefits, alternatives and postop care and after informed discussion, he agreed to schedule the procedure.      DESCRIPTION OF PROCEDURE:  After informed consent was obtained, the patient was brought from the preoperative holding area to the operating room and placed in the supine position.  Anesthesia was induced.  He was prepped and draped in the normal sterile fashion.  A timeout was performed.  After the correct patient and correct procedure were verified, we began by making a left upper quadrant 5 mm incision.  A Veress needle was inserted into the peritoneal cavity, and the abdomen was insufflated to 15 mmHg.  The trocar was inserted and a general survey of the abdomen revealed a medium-sized umbilical hernia with incarcerated preperitoneal fat contents.  No other gross abnormalities were  noted outside of a moderate to large amount of intra-abdominal fat.  I placed 2 additional 5 mm trocars in the mid left lower quadrant under direct visualization.  Using EndoShears and electrocautery, I took down the peritoneum and the preperitoneal fat from several centimeters above the umbilical defect to below the umbilical defect.  Doing this, I took the peritoneum and the preperitoneal fat, as well as the hernia sac and incarcerated fat contents with it.  These were completely  from the posterior sheathing, placed into an Endocatch bag.  I actually made a 1.5 cm curvilinear incision inferior to the umbilicus, placed a 12 mm port directly through this incision through the defect in order to accommodate easier removal of the preperitoneal fat.  Once I removed this through the EndoCatch bag, I placed my 1.2 cm round Bard Ventralight ST Echo mesh through the umbilical incision through the hernia defect.  I then, using the open incision at the umbilicus, closed the umbilical hernia defect with 0 Vicryl suture with a figure-of-eight stitch.  I then grasped the suture on the mesh, pulled it up through this umbilical incision and secured this in place with a hemostat.  Next, I used the OptiFix tacking device to secure the mesh circumferentially on the outer ring with about a 0.5 cm to 1 cm gap in between tacks.  The scaffolding of the mesh was then removed through the left lower quadrant port without issue.  I placed an additional inner ring of tacks using the OptiFix tacking device, and with that the mesh was sufficiently secured against the abdominal wall.  There was no bleeding and no other complications were noted.  The patient's abdomen was then desufflated and all ports were removed under direct visualization.  The skin was instilled with 0.25% Marcaine with epinephrine for local anesthesia.  The skin was then closed with inverted 4-0 Monocryl sutures.  A Dermabond dressing was applied and tonsil ball  was applied to the umbilicus.  At the completion of the case, all instruments, needles and sponges were accounted for, after correct count.  The patient was then awoken from anesthesia and brought to the recovery room in stable condition.         TORSTEN GARDNER DO             D: 2019   T: 2019   MT: ALFRED      Name:     MARC BRAGG   MRN:      [id number]        Account:        [id number]   :      1935           Procedure Date: 2019      Document: [id number]

## 2019-01-03 NOTE — ANESTHESIA PREPROCEDURE EVALUATION
Anesthesia Pre-Procedure Evaluation    Patient: Robert Richard   MRN: [id number] : 1935          Preoperative Diagnosis: umbilical hernia     Procedure(s):  laparoscopic umbilical hernia repair with mesh    Past Medical History:   Diagnosis Date     Basal cell carcinoma      Cancer (H)      DVT (deep venous thrombosis) (H) 2003     DVT (deep venous thrombosis) (H)      DVT (deep venous thrombosis) (H) 10/2010     Gout      Other and unspecified hyperlipidemia      Restless leg syndrome      Unspecified essential hypertension      Past Surgical History:   Procedure Laterality Date     C REVISE TOTAL HIP REPLACEMENT  13    R hip     JOINT REPLACEMENT  2011    R hip     LAMINECTOMY LUMBAR ONE LEVEL  2008       Anesthesia Evaluation     . Pt has had prior anesthetic. Type: General, MAC and Regional           ROS/MED HX    ENT/Pulmonary:     (+)sleep apnea, uses CPAP , . .   (-) tobacco use   Neurologic:  - neg neurologic ROS     Cardiovascular:     (+) hypertension----. : . . . :. . Previous cardiac testing date:results:date: results:ECG reviewed date:18 results:SB date: results:          METS/Exercise Tolerance:     Hematologic:     (+) History of blood clots pt is anticoagulated, -      Musculoskeletal:  - neg musculoskeletal ROS       GI/Hepatic:  - neg GI/hepatic ROS       Renal/Genitourinary:  - ROS Renal section negative       Endo:     (+) type II DM Not using insulin - not using insulin pump Obesity, .   Type I DM: 6.4.   Psychiatric:  - neg psychiatric ROS       Infectious Disease:  - neg infectious disease ROS       Malignancy:      - no malignancy   Other:    - neg other ROS                      Physical Exam  Normal systems: cardiovascular, pulmonary and dental    Airway   Mallampati: II  TM distance: <3 FB  Neck ROM: full    Dental     Cardiovascular   Rhythm and rate: regular and normal      Pulmonary    breath sounds clear to auscultation            Lab Results   Component  "Value Date    WBC 5.8 12/27/2018    HGB 13.9 12/27/2018    HCT 42.5 12/27/2018     (L) 12/27/2018    SED 12 06/14/2012     12/27/2018    POTASSIUM 4.1 12/27/2018    CHLORIDE 109 12/27/2018    CO2 27 12/27/2018    BUN 25 12/27/2018    CR 1.51 (H) 12/27/2018     (H) 12/27/2018    FREDI 8.6 12/27/2018    MAG 1.8 08/26/2015    ALBUMIN 3.7 05/15/2018    PROTTOTAL 7.1 05/15/2018    ALT 30 05/15/2018    AST 20 05/15/2018    ALKPHOS 117 05/15/2018    BILITOTAL 0.3 05/15/2018    PTT 34 10/09/2010    INR 2.1 (A) 12/11/2018    TSH 1.44 12/27/2018       Preop Vitals  BP Readings from Last 3 Encounters:   12/27/18 138/86   10/12/18 146/80   10/12/18 146/80    Pulse Readings from Last 3 Encounters:   12/27/18 82   10/12/18 76   07/16/18 75      Resp Readings from Last 3 Encounters:   12/27/18 20   10/12/18 20   07/16/18 20    SpO2 Readings from Last 3 Encounters:   12/27/18 93%   10/12/18 97%   07/16/18 96%      Temp Readings from Last 1 Encounters:   12/27/18 97  F (36.1  C) (Temporal)    Ht Readings from Last 1 Encounters:   12/27/18 1.778 m (5' 10\")      Wt Readings from Last 1 Encounters:   12/27/18 113.2 kg (249 lb 9.6 oz)    Estimated body mass index is 35.81 kg/m  as calculated from the following:    Height as of 12/27/18: 1.778 m (5' 10\").    Weight as of 12/27/18: 113.2 kg (249 lb 9.6 oz).       Anesthesia Plan      History & Physical Review  History and physical reviewed and following examination; no interval change.    ASA Status:  2 .    NPO Status:  > 8 hours    Plan for General and ETT with Intravenous and Propofol induction. Maintenance will be Balanced.    PONV prophylaxis:  Ondansetron (or other 5HT-3) and Dexamethasone or Solumedrol       Postoperative Care  Postoperative pain management:  IV analgesics and Oral pain medications.      Consents  Anesthetic plan, risks, benefits and alternatives discussed with:  Patient.  Use of blood products discussed: No .   .                 Rakesh Maher, " APRN CRNA

## 2019-01-05 NOTE — RESULT ENCOUNTER NOTE
Dear Robert, your recent test results are attached.  The microalbumin is minimally elevated.  Thyroid is well adjusted.  The chemistry panel shows a slightly elevated blood sugar of 144 with decreased kidney function that is stable.  The blood cell count is normal without any significant abnormality.  No anemia or leukemia is noted.    Feel free to contact me via the office or My Chart if you have any questions regarding the above.  Sincerely,  Nate Cifuentes DO FACOI

## 2019-01-06 ENCOUNTER — APPOINTMENT (OUTPATIENT)
Dept: GENERAL RADIOLOGY | Facility: CLINIC | Age: 84
End: 2019-01-06
Payer: MEDICARE

## 2019-01-06 ENCOUNTER — HOSPITAL ENCOUNTER (EMERGENCY)
Facility: CLINIC | Age: 84
Discharge: HOME | End: 2019-01-06
Attending: PHYSICIAN ASSISTANT | Admitting: PHYSICIAN ASSISTANT
Payer: MEDICARE

## 2019-01-06 VITALS
RESPIRATION RATE: 18 BRPM | HEART RATE: 69 BPM | DIASTOLIC BLOOD PRESSURE: 104 MMHG | OXYGEN SATURATION: 98 % | TEMPERATURE: 97.6 F | SYSTOLIC BLOOD PRESSURE: 186 MMHG

## 2019-01-06 DIAGNOSIS — K59.03 DRUG-INDUCED CONSTIPATION: ICD-10-CM

## 2019-01-06 PROCEDURE — A9270 NON-COVERED ITEM OR SERVICE: HCPCS | Mod: GY | Performed by: PHYSICIAN ASSISTANT

## 2019-01-06 PROCEDURE — 99284 EMERGENCY DEPT VISIT MOD MDM: CPT | Mod: Z6 | Performed by: PHYSICIAN ASSISTANT

## 2019-01-06 PROCEDURE — 74019 RADEX ABDOMEN 2 VIEWS: CPT | Mod: TC

## 2019-01-06 PROCEDURE — 25000132 ZZH RX MED GY IP 250 OP 250 PS 637: Mod: GY | Performed by: PHYSICIAN ASSISTANT

## 2019-01-06 PROCEDURE — 99283 EMERGENCY DEPT VISIT LOW MDM: CPT | Performed by: PHYSICIAN ASSISTANT

## 2019-01-06 RX ADMIN — DOCUSATE SODIUM 286 ML: 50 LIQUID ORAL at 21:14

## 2019-01-06 NOTE — ED AVS SNAPSHOT
[facility] Emergency Department  [address] [place] [address]  Phone:  [phone]  Fax:  [phone]                                    Robert Richard   MRN: [id number]    Department:  [facility] Emergency Department   Date of Visit:  1/6/2019           After Visit Summary Signature Page    I have received my discharge instructions, and my questions have been answered. I have discussed any challenges I see with this plan with the nurse or doctor.    ..........................................................................................................................................  Patient/Patient Representative Signature      ..........................................................................................................................................  Patient Representative Print Name and Relationship to Patient    ..................................................               ................................................  Date                                   Time    ..........................................................................................................................................  Reviewed by Signature/Title    ...................................................              ..............................................  Date                                               Time          22EPIC Rev 08/18

## 2019-01-07 NOTE — ED NOTES
Large results after pink lady, is standing up dressed in the room feeling great.  States he is ready to go home, will update provider

## 2019-01-07 NOTE — ED NOTES
Tolerated all of pink lady enema,  Will follow up with patient in 20 minutes or he will use call light if unable to hold in for that about of time.  Pt ok with plan of care

## 2019-01-07 NOTE — ED PROVIDER NOTES
"  History     Chief Complaint   Patient presents with     Constipation     HPI  Robert Richard is a 83 year old male who presents to the emergency department for concerns of constipation.  On 1/3/19 the patient had a laparoscopic umbilical hernia repair.  He has been taking Percocet for pain, about 1 tablet/day but he did not take anything today.  He went into surgery \"empty\" of stool but has not had a bowel movement since then.  He never had the urge to do so until today and he spent quite a lot of time on the toilet attempting to go without success.  He complains of some mild lower abdominal discomfort and feeling bloated.  He has been eating and drinking normally and denies any nausea or vomiting.  Denies any fevers.  Reports feeling otherwise \"just great.\"  He did try a suppository without relief.  He also drinks 2 glasses of milk because usually that goes through him like liquid but that did not help either.        Problem List:    Patient Active Problem List    Diagnosis Date Noted     Diabetes mellitus, type 2 (H) 10/12/2018     Priority: Medium     Morbid obesity due to excess calories (H) 07/31/2017     Priority: Medium     Essential hypertension with goal blood pressure less than 140/90 09/06/2016     Priority: Medium     Long term current use of anticoagulant therapy 03/21/2016     Priority: Medium     Spinal stenosis 10/17/2014     Priority: Medium     Low back pain 01/21/2014     Priority: Medium     Diagnosis updated by automated process. Provider to review and confirm.       Personal history of venous thrombosis and embolism 01/21/2014     Priority: Medium     Hip joint replacement status - right 01/21/2014     Priority: Medium     Abnormal gait 01/21/2014     Priority: Medium     Obesity 01/21/2014     Priority: Medium     Personal history of prostate cancer 01/21/2014     Priority: Medium     Malignant basal cell neoplasm of skin 07/24/2012     Priority: Medium     Do you wish to do the " replacement in the background? yes         Advanced directives, counseling/discussion 07/24/2012     Priority: Medium     Patient states has Advance Directive and will bring in a copy to clinic. 7/24/2012          Gout 07/03/2012     Priority: Medium     Back pain 09/27/2011     Priority: Medium     Hyperlipidemia LDL goal <100 09/27/2011     Priority: Medium     Factor V Leiden mutation (H) 09/08/2011     Priority: Medium     Prothrombin mutation (H) 09/08/2011     Priority: Medium     [id number]       Sleep apnea 05/19/2011     Priority: Medium     Venous thrombosis 10/20/2010     Priority: Medium     Restless leg syndrome 10/20/2010     Priority: Medium        Past Medical History:    Past Medical History:   Diagnosis Date     Basal cell carcinoma      Cancer (H)      DVT (deep venous thrombosis) (H) 11/2003     DVT (deep venous thrombosis) (H) 12/08     DVT (deep venous thrombosis) (H) 10/2010     Gout      Other and unspecified hyperlipidemia      Restless leg syndrome      Unspecified essential hypertension        Past Surgical History:    Past Surgical History:   Procedure Laterality Date     C REVISE TOTAL HIP REPLACEMENT  1/18/13    R hip     JOINT REPLACEMENT  4/2011    R hip     LAMINECTOMY LUMBAR ONE LEVEL  12/2008     LAPAROSCOPIC HERNIORRHAPHY UMBILICAL N/A 1/3/2019    Procedure: laparoscopic umbilical hernia repair with mesh;  Surgeon: Jamal Thompson DO;  Location: [facility] OR       Family History:    Family History   Problem Relation Age of Onset     Prostate Cancer Paternal Grandfather        Social History:  Marital Status:   [2]  Social History     Tobacco Use     Smoking status: Never Smoker     Smokeless tobacco: Never Used   Substance Use Topics     Alcohol use: Yes     Alcohol/week: 0.0 oz     Comment: 1 drink every 1-2 weeks.     Drug use: No        Medications:      atenolol (TENORMIN) 50 MG tablet   doxazosin (CARDURA) 8 MG tablet   losartan (COZAAR) 50 MG tablet   Multiple  Vitamins-Minerals (MULTIVITAMIN ADULT PO)   ORDER FOR DME   pramipexole (MIRAPEX) 1 MG tablet   spironolactone (ALDACTONE) 25 MG tablet   warfarin (COUMADIN) 5 MG tablet   colchicine (COLCRYS) 0.6 MG tablet   sildenafil (VIAGRA) 25 MG tablet         Review of Systems   All other systems reviewed and are negative.      Physical Exam   BP: (!) 186/104  Pulse: 69  Temp: 97.6  F (36.4  C)  Resp: 18  SpO2: 98 %      Physical Exam   Constitutional: He is oriented to person, place, and time. He appears well-developed and well-nourished. No distress.   HENT:   Head: Normocephalic and atraumatic.   Eyes: Conjunctivae and EOM are normal. Pupils are equal, round, and reactive to light.   Neck: Neck supple.   Cardiovascular: Normal rate, regular rhythm and normal heart sounds.   Pulmonary/Chest: Effort normal and breath sounds normal. No respiratory distress.   Abdominal: Soft. He exhibits distension. Bowel sounds are decreased. There is no tenderness (mild in periumbilical region). There is no rebound and no guarding.   Bruising around umbilical area, surgical incision sites appear intact, no drainage or surrounding erythema   Musculoskeletal: He exhibits no deformity.   Neurological: He is alert and oriented to person, place, and time. Coordination normal.   Skin: Skin is warm and dry. He is not diaphoretic.   Psychiatric: He has a normal mood and affect.   Nursing note and vitals reviewed.      ED Course        Procedures        Results for orders placed or performed during the hospital encounter of 01/06/19 (from the past 24 hour(s))   XR Abdomen 2 Views    Narrative    ABDOMEN TWO VIEWS   1/6/2019 8:52 PM     HISTORY: Abdominal bloating, no bowel movement for 3 days.    COMPARISON: None.    FINDINGS:  There is a moderate amount of stool projected over the  colon. There are no abnormally dilated gas-filled loops of bowel to  suggest bowel obstruction. The stomach is gas-filled and mildly  distended. No free air seen  underneath the hemidiaphragms on the  upright studies. No definite urinary system calculus.    Right hip arthroplasty is noted. There are surgical clips in the  pelvis. Severe degenerative changes throughout the visualized spine  are partially imaged on this study. No obvious acute fracture is seen.      Impression    IMPRESSION:    1. No evidence for bowel obstruction or free air.  2. Moderate amount of stool is seen in the colon.  3. Severe degenerative changes are partially imaged in the spine.  4. Postop changes in the pelvis include right hip arthroplasty and  probable prior reena dissection with clips in the bilateral aspects of  the pelvis.    THEA GUALLPA MD       Medications   pink lady enema (COMPOUNDED: docusate, magnesium citrate, mineral oil, sodium phosphate) (286 mLs Rectal Given 1/6/19 2114)       Assessments & Plan (with Medical Decision Making)  Robert Richard is a 83 year old male who presented to the ED complaining of constipation since his umbilical hernia surgery a few days ago.  Reports feeling uncomfortable in the abdomen but no severe pain, no fevers, no nausea or vomiting.  On arrival to the ED blood pressure elevated, otherwise normal vital signs.  Surgical incisions appeared intact without drainage or surrounding erythema.  Abdomen was distended with mild periumbilical tenderness but otherwise no rebound or guarding.  Since patient was otherwise well-appearing I did not feel the labs indicated at this point.  We obtained an x-ray of his abdomen which was negative for obstruction or free air.  He did have a moderate amount of stool seen suggestive of constipation.  Patient was agreeable to trying an enema here for symptomatic relief of his constipation.  This was administered with excellent results, had a large BM and reported feeling drastically improved.  He was feeling ready to go home at this time.  He was encouraged to use MiraLAX or Colace at home, particularly if he intends to  keep taking his Percocet but he stated he will stick to Tylenol to avoid this complication in the future.  He was provided instructions on when to return to the ED otherwise.  All questions answered and patient discharged home in suitable condition.     I have reviewed the nursing notes.    I have reviewed the findings, diagnosis, plan and need for follow up with the patient.       Medication List      ASK your doctor about these medications    oxyCODONE-acetaminophen 5-325 MG tablet  Commonly known as:  PERCOCET  1-2 tablets, Oral, EVERY 4 HOURS PRN  Ask about: Should I take this medication?            Final diagnoses:   Drug-induced constipation     Note: Chart documentation done in part with Dragon Voice Recognition software. Although reviewed after completion, some word and grammatical errors may remain.     1/6/2019   [facility] EMERGENCY DEPARTMENT     Neelima Go PA-C  01/07/19 [id number]

## 2019-01-14 ENCOUNTER — OFFICE VISIT (OUTPATIENT)
Dept: SURGERY | Facility: CLINIC | Age: 84
End: 2019-01-14
Payer: MEDICARE

## 2019-01-14 VITALS
SYSTOLIC BLOOD PRESSURE: 138 MMHG | HEIGHT: 70 IN | WEIGHT: 248 LBS | BODY MASS INDEX: 35.5 KG/M2 | DIASTOLIC BLOOD PRESSURE: 78 MMHG

## 2019-01-14 DIAGNOSIS — Z87.19 S/P LAPAROSCOPIC HERNIA REPAIR: Primary | ICD-10-CM

## 2019-01-14 DIAGNOSIS — Z98.890 S/P LAPAROSCOPIC HERNIA REPAIR: Primary | ICD-10-CM

## 2019-01-14 PROCEDURE — 99024 POSTOP FOLLOW-UP VISIT: CPT | Performed by: SURGERY

## 2019-01-14 ASSESSMENT — MIFFLIN-ST. JEOR: SCORE: 1831.17

## 2019-01-14 NOTE — LETTER
1/14/2019         RE: [facility]  [address] [facility] [facility] [address]        Dear Colleague,    Thank you for referring your patient, Robert Richard, to the [facility]. Please see a copy of my visit note below.    General Surgery Follow Up    Pt returns for follow up visit s/p lap UHR with mesh    HPI:  Carlos is doing well. He did have constipation for first few days while taking the narcotic pain medication, but this is now resolved. He is experiencing no issues with bladder or bowels. Appetite is good.      Past Medical History:   Diagnosis Date     Basal cell carcinoma      Cancer (H)      DVT (deep venous thrombosis) (H) 11/2003     DVT (deep venous thrombosis) (H) 12/08     DVT (deep venous thrombosis) (H) 10/2010     Gout      Other and unspecified hyperlipidemia      Restless leg syndrome      Unspecified essential hypertension        Past Surgical History:   Procedure Laterality Date     C REVISE TOTAL HIP REPLACEMENT  1/18/13    R hip     JOINT REPLACEMENT  4/2011    R hip     LAMINECTOMY LUMBAR ONE LEVEL  12/2008     LAPAROSCOPIC HERNIORRHAPHY UMBILICAL N/A 1/3/2019    Procedure: laparoscopic umbilical hernia repair with mesh;  Surgeon: Jamal Thompson DO;  Location:  OR       Social History     Socioeconomic History     Marital status:      Spouse name: Not on file     Number of children: Not on file     Years of education: Not on file     Highest education level: Not on file   Social Needs     Financial resource strain: Not on file     Food insecurity - worry: Not on file     Food insecurity - inability: Not on file     Transportation needs - medical: Not on file     Transportation needs - non-medical: Not on file   Occupational History     Not on file   Tobacco Use     Smoking status: Never Smoker     Smokeless tobacco: Never Used   Substance and Sexual Activity     Alcohol use: Yes     Alcohol/week: 0.0 oz     Comment: 1 drink every 1-2 weeks.     Drug  "use: No     Sexual activity: Yes     Partners: Female   Other Topics Concern     Parent/sibling w/ CABG, MI or angioplasty before 65F 55M? Not Asked   Social History Narrative     Not on file       Current Outpatient Medications   Medication Sig Dispense Refill     atenolol (TENORMIN) 50 MG tablet Take 1 tablet (50 mg) by mouth 2 times daily 180 tablet 1     colchicine (COLCRYS) 0.6 MG tablet Take 2 tablets at the first sign of flare, take 1 additional tablet one hour later. (Patient not taking: Reported on 12/27/2018) 30 tablet 0     doxazosin (CARDURA) 8 MG tablet TAKE 1 TABLET BY MOUTH ONCE DAILY 90 tablet 1     losartan (COZAAR) 50 MG tablet TAKE 1 TABLET BY MOUTH ONCE DAILY 90 tablet 1     Multiple Vitamins-Minerals (MULTIVITAMIN ADULT PO)        ORDER FOR DME Wear mask at night 1 Units 0     pramipexole (MIRAPEX) 1 MG tablet Take 1 tablet (1 mg) by mouth 2 times daily 180 tablet 1     sildenafil (VIAGRA) 25 MG tablet Take 1-3 tablets 30 min to 4 hrs before sex. Do not use with nitroglycerin, terazosin or doxazosin. 20 tablet 11     spironolactone (ALDACTONE) 25 MG tablet TAKE ONE TABLET BY MOUTH ONCE DAILY 90 tablet 1     warfarin (COUMADIN) 5 MG tablet TAKE 1/2 TABLET BY MOUTH ON FRIDAY AND 1 TABLET ON ALL OTHER DAYS, OR AS DIRECTED BY THE COUMADIN CLINIC 80 tablet 1       Medications and history reviewed    Physical exam:  Vitals: /78   Ht 1.778 m (5' 10\")   Wt 112.5 kg (248 lb)   BMI 35.58 kg/m     BMI= Body mass index is 35.58 kg/m .    HEART: RRR, no new murmurs  LUNGS: CTAB, equal chest rise, good effort  ABD: soft, non tender, non distended  INCISIONS: c/d/i  EXT: SHEPPARD, no deformities      Assessment:     ICD-10-CM    1. S/P laparoscopic hernia repair Z98.890     Z87.19      Plan: Doing well. Questions answered and restrictions reviewed. He may return prn.    Jamal Thomspon, DO      Again, thank you for allowing me to participate in the care of your patient.        Sincerely,        Jamal" OUMAR Thompson, DO

## 2019-01-22 ENCOUNTER — ANTICOAGULATION THERAPY VISIT (OUTPATIENT)
Dept: ANTICOAGULATION | Facility: CLINIC | Age: 84
End: 2019-01-22
Payer: MEDICARE

## 2019-01-22 VITALS — HEART RATE: 61 BPM | DIASTOLIC BLOOD PRESSURE: 84 MMHG | SYSTOLIC BLOOD PRESSURE: 134 MMHG

## 2019-01-22 DIAGNOSIS — Z79.01 LONG TERM CURRENT USE OF ANTICOAGULANT THERAPY: ICD-10-CM

## 2019-01-22 DIAGNOSIS — D68.52 PROTHROMBIN MUTATION (H): ICD-10-CM

## 2019-01-22 DIAGNOSIS — I82.90 VENOUS THROMBOSIS: ICD-10-CM

## 2019-01-22 DIAGNOSIS — D68.51 FACTOR V LEIDEN MUTATION (H): ICD-10-CM

## 2019-01-22 LAB
INR POINT OF CARE: 2.5 (ref 0.86–1.14)
INR POINT OF CARE: 2.5 (ref 0.86–1.14)

## 2019-01-22 PROCEDURE — 85610 PROTHROMBIN TIME: CPT | Mod: QW

## 2019-01-22 PROCEDURE — 36416 COLLJ CAPILLARY BLOOD SPEC: CPT

## 2019-01-22 PROCEDURE — 99207 ZZC NO CHARGE NURSE ONLY: CPT

## 2019-01-22 NOTE — PROGRESS NOTES
ANTICOAGULATION FOLLOW-UP CLINIC VISIT    Patient Name:  Robert Richard  Date:  2019  Contact Type:  Face to Face    SUBJECTIVE:     Patient Findings     Positives:   No Problem Findings           OBJECTIVE    INR Protime   Date Value Ref Range Status   2019 2.5 (A) 0.86 - 1.14 Final       ASSESSMENT / PLAN  INR assessment THER    Recheck INR In: 6 WEEKS    INR Location Clinic      Anticoagulation Summary  As of 2019    INR goal:   2.0-3.0   TTR:   77.8 % (2.8 y)   INR used for dosin.5 (2019)   Warfarin maintenance plan:   2.5 mg (5 mg x 0.5) every Fri; 5 mg (5 mg x 1) all other days   Full warfarin instructions:   2.5 mg every Fri; 5 mg all other days   Weekly warfarin total:   32.5 mg   No change documented:   Raquel Pelletier RN   Plan last modified:   Raquel Pelletier RN (3/22/2016)   Next INR check:      Target end date:       Indications    Factor V Leiden mutation (H) [D68.51]  Venous thrombosis [I82.90]  Prothrombin mutation (H) [D68.52]  Long term current use of anticoagulant therapy [Z79.01]             Anticoagulation Episode Summary     INR check location:       Preferred lab:       Send INR reminders to:   SHERI YUSUF    Comments:   5 mg tablets, print out, BP, PM dose        Anticoagulation Care Providers     Provider Role Specialty Phone number    Nate Cifuentes DO [facility] Internal Medicine [phone]            See the Encounter Report to view Anticoagulation Flowsheet and Dosing Calendar (Go to Encounters tab in chart review, and find the Anticoagulation Therapy Visit)    Dosage adjustment made based on physician directed care plan.      Raquel Pelletier RN

## 2019-02-19 DIAGNOSIS — Z86.718 PERSONAL HISTORY OF VENOUS THROMBOSIS AND EMBOLISM: ICD-10-CM

## 2019-02-19 RX ORDER — WARFARIN SODIUM 5 MG/1
TABLET ORAL
Qty: 80 TABLET | Refills: 1 | Status: SHIPPED | OUTPATIENT
Start: 2019-02-19 | End: 2019-09-07

## 2019-03-22 ENCOUNTER — ANTICOAGULATION THERAPY VISIT (OUTPATIENT)
Dept: ANTICOAGULATION | Facility: CLINIC | Age: 84
End: 2019-03-22
Payer: MEDICARE

## 2019-03-22 VITALS — SYSTOLIC BLOOD PRESSURE: 140 MMHG | HEART RATE: 65 BPM | DIASTOLIC BLOOD PRESSURE: 84 MMHG

## 2019-03-22 DIAGNOSIS — D68.52 PROTHROMBIN MUTATION (H): ICD-10-CM

## 2019-03-22 DIAGNOSIS — D68.51 FACTOR V LEIDEN MUTATION (H): ICD-10-CM

## 2019-03-22 DIAGNOSIS — I82.90 VENOUS THROMBOSIS: ICD-10-CM

## 2019-03-22 DIAGNOSIS — Z79.01 LONG TERM CURRENT USE OF ANTICOAGULANT THERAPY: ICD-10-CM

## 2019-03-22 LAB — INR POINT OF CARE: 2.9 (ref 0.9–1.1)

## 2019-03-22 PROCEDURE — 85610 PROTHROMBIN TIME: CPT | Mod: QW

## 2019-03-22 PROCEDURE — 36416 COLLJ CAPILLARY BLOOD SPEC: CPT

## 2019-03-22 PROCEDURE — 99207 ZZC NO CHARGE NURSE ONLY: CPT

## 2019-03-22 NOTE — PROGRESS NOTES
ANTICOAGULATION FOLLOW-UP CLINIC VISIT    Patient Name:  Robert Richard  Date:  3/22/2019  Contact Type:  Face to Face    SUBJECTIVE:     Patient Findings     Comments:   The patient was assessed for diet, medication, and activity level changes, missed or extra doses, bruising or bleeding, with no problem findings.  Raquel Pelletier RN             OBJECTIVE    INR Protime   Date Value Ref Range Status   2019 2.9 (A) 0.9 - 1.1 Final       ASSESSMENT / PLAN  INR assessment THER    Recheck INR In: 6 WEEKS    INR Location Clinic      Anticoagulation Summary  As of 3/22/2019    INR goal:   2.0-3.0   TTR:   79.0 % (3 y)   INR used for dosin.9 (3/22/2019)   Warfarin maintenance plan:   2.5 mg (5 mg x 0.5) every Fri; 5 mg (5 mg x 1) all other days   Full warfarin instructions:   2.5 mg every Fri; 5 mg all other days   Weekly warfarin total:   32.5 mg   No change documented:   Raquel Pelletier RN   Plan last modified:   Raquel Pelletier RN (3/22/2016)   Next INR check:   5/10/2019   Target end date:       Indications    Factor V Leiden mutation (H) [D68.51]  Venous thrombosis [I82.90]  Prothrombin mutation (H) [D68.52]  Long term current use of anticoagulant therapy [Z79.01]             Anticoagulation Episode Summary     INR check location:       Preferred lab:       Send INR reminders to:   SHERI YUSUF    Comments:   5 mg tablets, appt card, BP, PM dose        Anticoagulation Care Providers     Provider Role Specialty Phone number    Nate Cifuentes DO [facility] Internal Medicine [phone]            See the Encounter Report to view Anticoagulation Flowsheet and Dosing Calendar (Go to Encounters tab in chart review, and find the Anticoagulation Therapy Visit)    Dosage adjustment made based on physician directed care plan.        Raquel Pelletier RN

## 2019-05-10 ENCOUNTER — ANTICOAGULATION THERAPY VISIT (OUTPATIENT)
Dept: ANTICOAGULATION | Facility: CLINIC | Age: 84
End: 2019-05-10
Payer: MEDICARE

## 2019-05-10 DIAGNOSIS — I82.90 VENOUS THROMBOSIS: ICD-10-CM

## 2019-05-10 DIAGNOSIS — Z79.01 LONG TERM CURRENT USE OF ANTICOAGULANT THERAPY: ICD-10-CM

## 2019-05-10 DIAGNOSIS — D68.51 FACTOR V LEIDEN MUTATION (H): ICD-10-CM

## 2019-05-10 DIAGNOSIS — D68.52 PROTHROMBIN MUTATION (H): ICD-10-CM

## 2019-05-10 LAB — INR POINT OF CARE: 2.7 (ref 0.9–1.1)

## 2019-05-10 PROCEDURE — 85610 PROTHROMBIN TIME: CPT | Mod: QW

## 2019-05-10 PROCEDURE — 36416 COLLJ CAPILLARY BLOOD SPEC: CPT

## 2019-05-10 PROCEDURE — 99207 ZZC NO CHARGE NURSE ONLY: CPT

## 2019-05-10 NOTE — PROGRESS NOTES
ANTICOAGULATION FOLLOW-UP CLINIC VISIT    Patient Name:  Robert Richard  Date:  5/10/2019  Contact Type:  Face to Face    SUBJECTIVE:  Patient Findings     Comments:   The patient was assessed for diet, medication, and activity level changes, missed or extra doses, bruising or bleeding, with no problem findings.  Raquel Pelletier RN          Clinical Outcomes     Negatives:   Major bleeding event, Thromboembolic event, Anticoagulation-related hospital admission, Anticoagulation-related ED visit, Anticoagulation-related fatality    Comments:   The patient was assessed for diet, medication, and activity level changes, missed or extra doses, bruising or bleeding, with no problem findings.  Raquel Pelletier RN             OBJECTIVE    INR Protime   Date Value Ref Range Status   05/10/2019 2.7 (A) 0.9 - 1.1 Final       ASSESSMENT / PLAN  INR assessment THER    Recheck INR In: 6 WEEKS    INR Location Clinic      Anticoagulation Summary  As of 5/10/2019    INR goal:   2.0-3.0   TTR:   79.9 % (3.1 y)   INR used for dosin.7 (5/10/2019)   Warfarin maintenance plan:   2.5 mg (5 mg x 0.5) every Fri; 5 mg (5 mg x 1) all other days   Full warfarin instructions:   2.5 mg every Fri; 5 mg all other days   Weekly warfarin total:   32.5 mg   No change documented:   Raquel Pelletier RN   Plan last modified:   Raquel Pelletier RN (3/22/2016)   Next INR check:   2019   Target end date:       Indications    Factor V Leiden mutation (H) [D68.51]  Venous thrombosis [I82.90]  Prothrombin mutation (H) [D68.52]  Long term current use of anticoagulant therapy [Z79.01]             Anticoagulation Episode Summary     INR check location:       Preferred lab:       Send INR reminders to:   [facility] PARAMJIT    Comments:   5 mg tablets, appt card, BP, PM dose        Anticoagulation Care Providers     Provider Role Specialty Phone number    Nate Cifuentes DO [facility] [phone]            See the  Encounter Report to view Anticoagulation Flowsheet and Dosing Calendar (Go to Encounters tab in chart review, and find the Anticoagulation Therapy Visit)    Dosage adjustment made based on physician directed care plan.    Raquel Pelletier RN               ANTICOAGULATION FOLLOW-UP CLINIC VISIT    Patient Name:  Robert Richard  Date:  5/10/2019  Contact Type:  Face to Face    SUBJECTIVE:  Patient Findings     Comments:   The patient was assessed for diet, medication, and activity level changes, missed or extra doses, bruising or bleeding, with no problem findings.  Raquel Pelletier RN          Clinical Outcomes     Negatives:   Major bleeding event, Thromboembolic event, Anticoagulation-related hospital admission, Anticoagulation-related ED visit, Anticoagulation-related fatality    Comments:   The patient was assessed for diet, medication, and activity level changes, missed or extra doses, bruising or bleeding, with no problem findings.  Raquel Pelletier RN             OBJECTIVE    INR Protime   Date Value Ref Range Status   05/10/2019 2.7 (A) 0.9 - 1.1 Final       ASSESSMENT / PLAN  INR assessment THER    Recheck INR In: 6 WEEKS    INR Location Clinic      Anticoagulation Summary  As of 5/10/2019    INR goal:   2.0-3.0   TTR:   79.9 % (3.1 y)   INR used for dosin.7 (5/10/2019)   Warfarin maintenance plan:   2.5 mg (5 mg x 0.5) every Fri; 5 mg (5 mg x 1) all other days   Full warfarin instructions:   2.5 mg every Fri; 5 mg all other days   Weekly warfarin total:   32.5 mg   No change documented:   Raquel Pelletier RN   Plan last modified:   Raquel Pelletier RN (3/22/2016)   Next INR check:   2019   Target end date:       Indications    Factor V Leiden mutation (H) [D68.51]  Venous thrombosis [I82.90]  Prothrombin mutation (H) [D68.52]  Long term current use of anticoagulant therapy [Z79.01]             Anticoagulation Episode Summary     INR check location:       Preferred lab:       Send INR  reminders to:   SHERI YUSUF    Comments:   5 mg tablets, appt card, BP, PM dose        Anticoagulation Care Providers     Provider Role Specialty Phone number    Nate Cifuentes DO [facility] Internal Medicine [phone]            See the Encounter Report to view Anticoagulation Flowsheet and Dosing Calendar (Go to Encounters tab in chart review, and find the Anticoagulation Therapy Visit)    Delete key to remove if not charging [id number]    Raquel Pelletier RN

## 2019-05-11 DIAGNOSIS — I10 ESSENTIAL HYPERTENSION WITH GOAL BLOOD PRESSURE LESS THAN 140/90: ICD-10-CM

## 2019-05-11 DIAGNOSIS — I10 HYPERTENSION GOAL BP (BLOOD PRESSURE) < 140/90: ICD-10-CM

## 2019-05-14 RX ORDER — LOSARTAN POTASSIUM 50 MG/1
TABLET ORAL
Qty: 90 TABLET | Refills: 1 | Status: SHIPPED | OUTPATIENT
Start: 2019-05-14 | End: 2019-06-20

## 2019-05-14 RX ORDER — DOXAZOSIN 8 MG/1
8 TABLET ORAL DAILY
Qty: 90 TABLET | Refills: 1 | Status: SHIPPED | OUTPATIENT
Start: 2019-05-14 | End: 2019-06-20

## 2019-05-14 NOTE — TELEPHONE ENCOUNTER
Routing refill request to provider for review/approval because:  Labs out of range:  BP, Creatinine    JULIAN Bills, RN  [facility]

## 2019-05-14 NOTE — TELEPHONE ENCOUNTER
"Requested Prescriptions   Pending Prescriptions Disp Refills     losartan (COZAAR) 50 MG tablet [Pharmacy Med Name: LOSARTAN 50MG TAB] 90 tablet 1     Sig: TAKE ONE TABLET BY MOUTH ONCE DAILY   Last Written Prescription Date:  12/11/18  Last Fill Quantity: 90,  # refills: 1   Last office visit: 12/27/2018 with prescribing provider:  12/27/18   Future Office Visit:        Angiotensin-II Receptors Failed - 5/13/2019  3:13 PM        Failed - Blood pressure under 140/90 in past 12 months     BP Readings from Last 3 Encounters:   03/22/19 140/84   01/22/19 134/84   01/14/19 138/78                 Failed - Normal serum creatinine on file in past 12 months     Recent Labs   Lab Test 12/27/18  0908   CR 1.51*             Passed - Recent (12 mo) or future (30 days) visit within the authorizing provider's specialty     Patient had office visit in the last 12 months or has a visit in the next 30 days with authorizing provider or within the authorizing provider's specialty.  See \"Patient Info\" tab in inbasket, or \"Choose Columns\" in Meds & Orders section of the refill encounter.              Passed - Medication is active on med list        Passed - Patient is age 18 or older        Passed - Normal serum potassium on file in past 12 months     Recent Labs   Lab Test 12/27/18  0908   POTASSIUM 4.1                    doxazosin (CARDURA) 8 MG tablet 90 tablet 1     Sig: Take 1 tablet (8 mg) by mouth daily   Last Written Prescription Date:  12/11/18  Last Fill Quantity: 90,  # refills: 1   Last office visit: 12/27/2018 with prescribing provider:  12/27/18   Future Office Visit:        Alpha Blockers Failed - 5/13/2019  3:13 PM        Failed - Blood pressure under 140/90 in past 12 months     BP Readings from Last 3 Encounters:   03/22/19 140/84   01/22/19 134/84   01/14/19 138/78                 Failed - Patient does not have Tadalafil, Vardenafil, or Sildenafil on their medication list        Passed - Recent (12 mo) or future (30 " "days) visit within the authorizing provider's specialty     Patient had office visit in the last 12 months or has a visit in the next 30 days with authorizing provider or within the authorizing provider's specialty.  See \"Patient Info\" tab in inbasket, or \"Choose Columns\" in Meds & Orders section of the refill encounter.              Passed - Medication is active on med list        Passed - Patient is 18 years of age or older        "

## 2019-06-20 ENCOUNTER — OFFICE VISIT (OUTPATIENT)
Dept: FAMILY MEDICINE | Facility: OTHER | Age: 84
End: 2019-06-20
Payer: MEDICARE

## 2019-06-20 VITALS
SYSTOLIC BLOOD PRESSURE: 156 MMHG | TEMPERATURE: 98 F | BODY MASS INDEX: 35.51 KG/M2 | RESPIRATION RATE: 14 BRPM | OXYGEN SATURATION: 95 % | HEART RATE: 76 BPM | WEIGHT: 247.5 LBS | DIASTOLIC BLOOD PRESSURE: 88 MMHG

## 2019-06-20 DIAGNOSIS — Z85.46 PERSONAL HISTORY OF PROSTATE CANCER: ICD-10-CM

## 2019-06-20 DIAGNOSIS — Z86.718 PERSONAL HISTORY OF VENOUS THROMBOSIS AND EMBOLISM: Primary | ICD-10-CM

## 2019-06-20 DIAGNOSIS — N18.30 CKD (CHRONIC KIDNEY DISEASE) STAGE 3, GFR 30-59 ML/MIN (H): ICD-10-CM

## 2019-06-20 DIAGNOSIS — E66.09 CLASS 2 OBESITY DUE TO EXCESS CALORIES WITHOUT SERIOUS COMORBIDITY IN ADULT, UNSPECIFIED BMI: ICD-10-CM

## 2019-06-20 DIAGNOSIS — E11.8 TYPE 2 DIABETES MELLITUS WITH COMPLICATION, WITHOUT LONG-TERM CURRENT USE OF INSULIN (H): ICD-10-CM

## 2019-06-20 DIAGNOSIS — D68.51 FACTOR V LEIDEN MUTATION (H): ICD-10-CM

## 2019-06-20 DIAGNOSIS — E66.812 CLASS 2 OBESITY DUE TO EXCESS CALORIES WITHOUT SERIOUS COMORBIDITY IN ADULT, UNSPECIFIED BMI: ICD-10-CM

## 2019-06-20 DIAGNOSIS — I10 ESSENTIAL HYPERTENSION WITH GOAL BLOOD PRESSURE LESS THAN 140/90: ICD-10-CM

## 2019-06-20 DIAGNOSIS — I95.1 ORTHOSTATIC HYPOTENSION: ICD-10-CM

## 2019-06-20 DIAGNOSIS — I10 HYPERTENSION GOAL BP (BLOOD PRESSURE) < 140/90: ICD-10-CM

## 2019-06-20 LAB
ALBUMIN UR-MCNC: 30 MG/DL
ANION GAP SERPL CALCULATED.3IONS-SCNC: 8 MMOL/L (ref 3–14)
APPEARANCE UR: CLEAR
BACTERIA #/AREA URNS HPF: ABNORMAL /HPF
BILIRUB UR QL STRIP: NEGATIVE
BUN SERPL-MCNC: 31 MG/DL (ref 7–30)
CALCIUM SERPL-MCNC: 8.8 MG/DL (ref 8.5–10.1)
CHLORIDE SERPL-SCNC: 110 MMOL/L (ref 94–109)
CO2 SERPL-SCNC: 24 MMOL/L (ref 20–32)
COLOR UR AUTO: YELLOW
CREAT SERPL-MCNC: 1.39 MG/DL (ref 0.66–1.25)
ERYTHROCYTE [DISTWIDTH] IN BLOOD BY AUTOMATED COUNT: 12.8 % (ref 10–15)
GFR SERPL CREATININE-BSD FRML MDRD: 47 ML/MIN/{1.73_M2}
GLUCOSE SERPL-MCNC: 108 MG/DL (ref 70–99)
GLUCOSE UR STRIP-MCNC: NEGATIVE MG/DL
HBA1C MFR BLD: 7.1 % (ref 0–5.6)
HCT VFR BLD AUTO: 42.3 % (ref 40–53)
HGB BLD-MCNC: 13.8 G/DL (ref 13.3–17.7)
HGB UR QL STRIP: ABNORMAL
KETONES UR STRIP-MCNC: NEGATIVE MG/DL
LEUKOCYTE ESTERASE UR QL STRIP: NEGATIVE
MCH RBC QN AUTO: 30.5 PG (ref 26.5–33)
MCHC RBC AUTO-ENTMCNC: 32.6 G/DL (ref 31.5–36.5)
MCV RBC AUTO: 94 FL (ref 78–100)
NITRATE UR QL: NEGATIVE
PH UR STRIP: 5.5 PH (ref 5–7)
PLATELET # BLD AUTO: 150 10E9/L (ref 150–450)
POTASSIUM SERPL-SCNC: 4.1 MMOL/L (ref 3.4–5.3)
PSA SERPL-MCNC: 0.02 UG/L (ref 0–4)
RBC # BLD AUTO: 4.52 10E12/L (ref 4.4–5.9)
RBC #/AREA URNS AUTO: ABNORMAL /HPF
SODIUM SERPL-SCNC: 142 MMOL/L (ref 133–144)
SOURCE: ABNORMAL
SP GR UR STRIP: 1.02 (ref 1–1.03)
UROBILINOGEN UR STRIP-ACNC: 0.2 EU/DL (ref 0.2–1)
WBC # BLD AUTO: 6 10E9/L (ref 4–11)
WBC #/AREA URNS AUTO: ABNORMAL /HPF

## 2019-06-20 PROCEDURE — 85027 COMPLETE CBC AUTOMATED: CPT | Performed by: INTERNAL MEDICINE

## 2019-06-20 PROCEDURE — 84153 ASSAY OF PSA TOTAL: CPT | Performed by: INTERNAL MEDICINE

## 2019-06-20 PROCEDURE — 83036 HEMOGLOBIN GLYCOSYLATED A1C: CPT | Performed by: INTERNAL MEDICINE

## 2019-06-20 PROCEDURE — 36415 COLL VENOUS BLD VENIPUNCTURE: CPT | Performed by: INTERNAL MEDICINE

## 2019-06-20 PROCEDURE — 99214 OFFICE O/P EST MOD 30 MIN: CPT | Performed by: INTERNAL MEDICINE

## 2019-06-20 PROCEDURE — 80048 BASIC METABOLIC PNL TOTAL CA: CPT | Performed by: INTERNAL MEDICINE

## 2019-06-20 PROCEDURE — 81001 URINALYSIS AUTO W/SCOPE: CPT | Performed by: INTERNAL MEDICINE

## 2019-06-20 RX ORDER — LOSARTAN POTASSIUM 50 MG/1
50 TABLET ORAL DAILY
Qty: 90 TABLET | Refills: 3 | Status: SHIPPED | OUTPATIENT
Start: 2019-06-20 | End: 2021-02-05

## 2019-06-20 RX ORDER — ATENOLOL 50 MG/1
50 TABLET ORAL 2 TIMES DAILY
Qty: 180 TABLET | Refills: 1 | Status: SHIPPED | OUTPATIENT
Start: 2019-06-20 | End: 2020-01-21

## 2019-06-20 RX ORDER — SPIRONOLACTONE 25 MG/1
25 TABLET ORAL DAILY
Qty: 90 TABLET | Refills: 3 | Status: SHIPPED | OUTPATIENT
Start: 2019-06-20 | End: 2020-08-05

## 2019-06-20 ASSESSMENT — LIFESTYLE VARIABLES: SMOKING_STATUS: 0

## 2019-06-20 ASSESSMENT — ENCOUNTER SYMPTOMS: COUGH: 1

## 2019-06-20 ASSESSMENT — PAIN SCALES - GENERAL: PAINLEVEL: NO PAIN (0)

## 2019-06-20 NOTE — PROGRESS NOTES
Subjective     Robert Richard is a 83 year old male who presents to clinic today for the following health issues:      Hypertension: He presents for follow up of hypertension.  He does check blood pressure  regularly outside of the clinic. Outside blood pressures have been over 140/90. He follows a low salt diet.   Cough   This is a new problem. The current episode started more than 1 week ago. The problem occurs hourly. The cough is non-productive. There has been no fever. He has tried decongestants and cough syrup for the symptoms. The treatment provided mild relief. He is not a smoker.                      Chief Complaint         The patient is a pleasant 83-year-old gentleman who presents today for follow-up of his hypertension.  He notes that his blood pressures at outside facility have been somewhat elevated.  Additionally, he is developed a cough of which he has now had for about the last week.  It is minimally productive and not associated with any fever, chills or significant shortness of breath.  He is not on an ACE inhibitor at this time.  He does have a history of factor V Leiden mutation and as a result of this is on chronic anticoagulation.  He denies any bruising or bleeding.  Additionally he has had some previous orthostatic hypotension which is now controlled.  Blood pressure today is 156/88.  Does have a history of type 2 diabetes without significant complication.  His A1c's have been acceptable.  This will be reevaluated today.  His chronic kidney disease is stable, will follow this closely and has had no symptoms.  He does have a history of prostate cancer and will follow his PSA serially.  He is had no signs of recurrence.                       PAST, FAMILY,SOCIAL HISTORY:     Medical  History:   has a past medical history of Basal cell carcinoma, Cancer (H), DVT (deep venous thrombosis) (H) (11/2003), DVT (deep venous thrombosis) (H) (12/08), DVT (deep venous thrombosis) (H) (10/2010), Gout,  Other and unspecified hyperlipidemia, Restless leg syndrome, and Unspecified essential hypertension.     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     Social History:   reports that he has never smoked. He has never used smokeless tobacco. He reports that he drinks alcohol. He reports that he does not use drugs.     Family History:  family history includes Prostate Cancer in his paternal grandfather.            MEDICATIONS  Current Outpatient Medications   Medication Sig Dispense Refill     atenolol (TENORMIN) 50 MG tablet Take 1 tablet (50 mg) by mouth 2 times daily 180 tablet 1     losartan (COZAAR) 50 MG tablet Take 1 tablet (50 mg) by mouth daily 90 tablet 3     Multiple Vitamins-Minerals (MULTIVITAMIN ADULT PO)        ORDER FOR DME Wear mask at night 1 Units 0     pramipexole (MIRAPEX) 1 MG tablet Take 1 tablet (1 mg) by mouth 2 times daily 180 tablet 1     sildenafil (VIAGRA) 25 MG tablet Take 1-3 tablets 30 min to 4 hrs before sex. Do not use with nitroglycerin, terazosin or doxazosin. 20 tablet 11     spironolactone (ALDACTONE) 25 MG tablet Take 1 tablet (25 mg) by mouth daily 90 tablet 3     warfarin (COUMADIN) 5 MG tablet TAKE ONE HALF TABLET BY MOUTH ON FRIDAY AND 1 TABLET BY MOUTH ON ALL OTHER DAYS OR AS DIRECTED BY THE COUMADIN CLINIC 80 tablet 1     amoxicillin (AMOXIL) 500 MG capsule TAKE FOUR CAPSULES BY MOUTH ONE HOUR BEFORE APPOINTMENT 20 capsule 0     colchicine (COLCRYS) 0.6 MG tablet Take 2 tablets at the first sign of flare, take 1 additional tablet one hour later. (Patient not taking: Reported on 12/27/2018) 30 tablet 0         --------------------------------------------------------------------------------------------------------------------                              Review of Systems       LUNGS: Pt  denies:  excess sputum, hemoptysis, or shortness of breath.   HEART: Pt denies: chest pain, arrhythmia, syncope, tachy or bradyarrhythmia.   GI: Pt denies: nausea, vomiting, diarrhea, constipation, melena, or hematochezia.   NEURO: Pt denies: seizures, strokes, diplopia, weakness, paraesthesias, or paralysis.   SKIN: Pt denies: itching, rashes, discoloration, or specific lesions of concern. Denies recent hair loss.   PSYCH: The patient denies significant depression, anxiety, mood imbalance. Specifically denies any suicidal ideation.                                     Examination    /88 (BP Location: Right arm, Patient Position: Sitting, Cuff Size: Adult Large)   Pulse 76   Temp 98  F (36.7  C) (Temporal)   Resp 14   Wt 112.3 kg (247 lb 8 oz)   SpO2 95%   BMI 35.51 kg/m       Constitutional: The patient appears to be in no acute distress. The patient appears to be adequately hydrated. No acute respiratory or hemodynamic distress is noted at this time.   LUNGS: clear bilaterally, airflow is brisk, no intercostal retraction or stridor is noted. No coughing is noted during visit.   HEART:  regular without rubs, clicks, gallops, or murmurs. PMI is nondisplaced. Upstrokes are brisk. S1,S2 are heard.   GI: Abdomen is soft, without rebound, guarding or tenderness. Bowel sounds are appropriate. No renal bruits are heard.   NEURO: Pt is alert and appropriate. No neurologic lateralization is noted. Cranial nerves 2-12 are intact. Peripheral sensory and motor function are grossly normal.    SKIN:  warm and dry. No erythema, or rashes are noted. No specific lesions of concern are noted.    PSYCH: The patient appears grossly appropriate. Maintains good eye contact, does not have any jittery or atypical motion. Displays appropriate affect.                                           Decision Making    1. Personal history of venous thrombosis and embolism  Continue anticoagulation.  No signs recurrence      2.  Hypertension goal BP (blood pressure) < 140/90  Check renal function and continue current medications.  Follow home blood pressures closely  - Basic metabolic panel  - *UA reflex to Microscopic and Culture ([facility] (except [place] and [place])  - losartan (COZAAR) 50 MG tablet; Take 1 tablet (50 mg) by mouth daily  Dispense: 90 tablet; Refill: 3  - atenolol (TENORMIN) 50 MG tablet; Take 1 tablet (50 mg) by mouth 2 times daily  Dispense: 180 tablet; Refill: 1  - spironolactone (ALDACTONE) 25 MG tablet; Take 1 tablet (25 mg) by mouth daily  Dispense: 90 tablet; Refill: 3    3. Type 2 diabetes mellitus with complication, without long-term current use of insulin (H)  Check A1c, continue ARB.  Patient hesitant/refusing statin  - HEMOGLOBIN A1C    4. Factor V Leiden mutation (H)  Check hemoglobin continue anticoagulation  - CBC with platelets    5. Class 2 obesity due to excess calories without serious comorbidity in adult, unspecified BMI  Recommend continued weight loss through exercise restriction of caloric intake*    6. Orthostatic hypotension  Markedly improved, rule out anemia  - CBC with platelets    7. Personal history of prostate cancer  Check tumor markers  - PSA, tumor marker    8. CKD (chronic kidney disease) stage 3, GFR 30-59 ml/min (H)  Renal function evaluated    9. Essential hypertension with goal blood pressure less than 140/90  Continue medications as directed  - losartan (COZAAR) 50 MG tablet; Take 1 tablet (50 mg) by mouth daily  Dispense: 90 tablet; Refill: 3  - spironolactone (ALDACTONE) 25 MG tablet; Take 1 tablet (25 mg) by mouth daily  Dispense: 90 tablet; Refill: 3                           FOLLOW UP   I have asked the patient to make an appointment for followup with me in 4 months assuming blood pressures are stable and lab work is acceptable.        I have carefully explained the diagnosis and treatment options to the patient.  The patient has displayed an understanding  of the above, and all subsequent questions were answered.      DO ANANDA Guadarrama    Portions of this note were produced using [employer]  Although every attempt at real-time proof reading has been made, occasional grammar/syntax errors may have been missed.

## 2019-06-25 NOTE — RESULT ENCOUNTER NOTE
Dear Robert, your recent test results are attached.  The urine sample was ordered  Chemistry panel shows a mildly elevated blood sugar 108.  Kidney function is decreased but actually improved over previous.  The hemoglobin A1c is 7.1 which is up slightly but still adequately controlled.  The prostate-specific antigen is consistent with previous prostate removal.  The blood cell count is normal without evidence of anemia or leukemia.    Feel free to contact me via the office or My Chart if you have any questions regarding the above.  Sincerely,  Nate Cifuentes DO [facility]

## 2019-07-08 DIAGNOSIS — Z96.60 HISTORY OF ARTHROPLASTY: ICD-10-CM

## 2019-07-09 RX ORDER — AMOXICILLIN 500 MG/1
CAPSULE ORAL
Qty: 20 CAPSULE | Refills: 0 | Status: SHIPPED | OUTPATIENT
Start: 2019-07-09 | End: 2019-09-09

## 2019-07-09 NOTE — TELEPHONE ENCOUNTER
"Requested Prescriptions   Pending Prescriptions Disp Refills     amoxicillin (AMOXIL) 500 MG capsule [Pharmacy Med Name: AMOXICILLIN 500MG   CAP] 20 capsule 0     Sig: TAKE FOUR CAPSULES BY MOUTH ONE HOUR BEFORE APPOINTMENT   Last Written Prescription Date:  6/1/18  Last Fill Quantity: 20,  # refills: 0   Last office visit: 6/20/2019 with prescribing provider:  6/20/19   Future Office Visit:        Oral Acne/Rosacea Medications Protocol Failed - 7/8/2019 11:12 AM        Failed - Confirmation of diagnosis is required     Please confirm diagnosis is acne or rosacea.     If NOT acne or rosacea; refer request to provider for further evaluation.    If diagnosis IS acne or rosacea, OK to refill BASED ON PREVIOUS REFILL CLINICAL NOTE RECOMMENDATION.          Failed - Medication is active on med list        Passed - Patient is 12 years of age or older        Passed - Recent (12 mo) or future (30 days) visit within the authorizing provider's specialty     Patient had office visit in the last 12 months or has a visit in the next 30 days with authorizing provider or within the authorizing provider's specialty.  See \"Patient Info\" tab in inbasket, or \"Choose Columns\" in Meds & Orders section of the refill encounter.              "

## 2019-07-09 NOTE — TELEPHONE ENCOUNTER
Routing refill request to provider for review/approval because:  Drug not on the [facility] refill protocol     JULIAN Bills, RN  [facility]

## 2019-07-24 ENCOUNTER — ANTICOAGULATION THERAPY VISIT (OUTPATIENT)
Dept: ANTICOAGULATION | Facility: CLINIC | Age: 84
End: 2019-07-24
Payer: MEDICARE

## 2019-07-24 VITALS — HEART RATE: 64 BPM | DIASTOLIC BLOOD PRESSURE: 94 MMHG | SYSTOLIC BLOOD PRESSURE: 145 MMHG

## 2019-07-24 DIAGNOSIS — Z79.01 LONG TERM CURRENT USE OF ANTICOAGULANT THERAPY: ICD-10-CM

## 2019-07-24 DIAGNOSIS — I82.90 VENOUS THROMBOSIS: ICD-10-CM

## 2019-07-24 DIAGNOSIS — D68.52 PROTHROMBIN MUTATION (H): ICD-10-CM

## 2019-07-24 DIAGNOSIS — D68.51 FACTOR V LEIDEN MUTATION (H): ICD-10-CM

## 2019-07-24 LAB — INR POINT OF CARE: 3.5 (ref 0.9–1.1)

## 2019-07-24 PROCEDURE — 36416 COLLJ CAPILLARY BLOOD SPEC: CPT

## 2019-07-24 PROCEDURE — 99207 ZZC NO CHARGE NURSE ONLY: CPT

## 2019-07-24 PROCEDURE — 85610 PROTHROMBIN TIME: CPT | Mod: QW

## 2019-07-24 NOTE — PROGRESS NOTES
ANTICOAGULATION FOLLOW-UP CLINIC VISIT    Patient Name:  Robert Richard  Date:  7/24/2019  Contact Type:  Face to Face    SUBJECTIVE:  Patient Findings     Positives:   Change in medications (more Tylenol the last few nights)             OBJECTIVE    INR Protime   Date Value Ref Range Status   07/24/2019 3.5 (A) 0.9 - 1.1 Final       ASSESSMENT / PLAN  INR assessment SUPRA    Recheck INR In: 4 WEEKS    INR Location Clinic      Anticoagulation Summary  As of 7/24/2019    INR goal:   2.0-3.0   TTR:   77.2 % (3.3 y)   INR used for dosing:   3.5! (7/24/2019)   Warfarin maintenance plan:   2.5 mg (5 mg x 0.5) every Fri; 5 mg (5 mg x 1) all other days   Full warfarin instructions:   7/24: 2.5 mg; Otherwise 2.5 mg every Fri; 5 mg all other days   Weekly warfarin total:   32.5 mg   Plan last modified:   Raquel Pelletier RN (3/22/2016)   Next INR check:   8/14/2019   Target end date:       Indications    Factor V Leiden mutation (H) [D68.51]  Venous thrombosis [I82.90]  Prothrombin mutation (H) [D68.52]  Long term current use of anticoagulant therapy [Z79.01]             Anticoagulation Episode Summary     INR check location:       Preferred lab:       Send INR reminders to:   [facility] [place]    Comments:   5 mg tablets, appt card, BP, PM dose        Anticoagulation Care Providers     Provider Role Specialty Phone number    Nate CifuentesDO [facility] [phone]            See the Encounter Report to view Anticoagulation Flowsheet and Dosing Calendar (Go to Encounters tab in chart review, and find the Anticoagulation Therapy Visit)    Dosage adjustment made based on physician directed care plan.    Raquel Pelletier, T[facility]

## 2019-08-14 ENCOUNTER — ANTICOAGULATION THERAPY VISIT (OUTPATIENT)
Dept: ANTICOAGULATION | Facility: CLINIC | Age: 84
End: 2019-08-14
Payer: MEDICARE

## 2019-08-14 VITALS — DIASTOLIC BLOOD PRESSURE: 86 MMHG | HEART RATE: 69 BPM | SYSTOLIC BLOOD PRESSURE: 144 MMHG

## 2019-08-14 DIAGNOSIS — D68.52 PROTHROMBIN MUTATION (H): ICD-10-CM

## 2019-08-14 DIAGNOSIS — D68.51 FACTOR V LEIDEN MUTATION (H): ICD-10-CM

## 2019-08-14 DIAGNOSIS — I82.90 VENOUS THROMBOSIS: ICD-10-CM

## 2019-08-14 DIAGNOSIS — Z79.01 LONG TERM CURRENT USE OF ANTICOAGULANT THERAPY: ICD-10-CM

## 2019-08-14 LAB — INR POINT OF CARE: 2.7 (ref 0.9–1.1)

## 2019-08-14 PROCEDURE — 99207 ZZC NO CHARGE NURSE ONLY: CPT

## 2019-08-14 PROCEDURE — 85610 PROTHROMBIN TIME: CPT | Mod: QW

## 2019-08-14 PROCEDURE — 36416 COLLJ CAPILLARY BLOOD SPEC: CPT

## 2019-08-14 NOTE — PROGRESS NOTES
ANTICOAGULATION FOLLOW-UP CLINIC VISIT    Patient Name:  Robert Richard  Date:  2019  Contact Type:  Face to Face    SUBJECTIVE:  Patient Findings     Comments:   The patient was assessed for diet, medication, and activity level changes, missed or extra doses, bruising or bleeding, with no problem findings.  Raquel Pelletier RN          Clinical Outcomes     Negatives:   Major bleeding event, Thromboembolic event, Anticoagulation-related hospital admission, Anticoagulation-related ED visit, Anticoagulation-related fatality    Comments:   The patient was assessed for diet, medication, and activity level changes, missed or extra doses, bruising or bleeding, with no problem findings.  Raquel Pelletier RN             OBJECTIVE    INR Protime   Date Value Ref Range Status   2019 2.7 (A) 0.9 - 1.1 Final       ASSESSMENT / PLAN  INR assessment THER    Recheck INR In: 6 WEEKS    INR Location Clinic      Anticoagulation Summary  As of 2019    INR goal:   2.0-3.0   TTR:   76.6 % (3.4 y)   INR used for dosin.7 (2019)   Warfarin maintenance plan:   2.5 mg (5 mg x 0.5) every Fri; 5 mg (5 mg x 1) all other days   Full warfarin instructions:   2.5 mg every Fri; 5 mg all other days   Weekly warfarin total:   32.5 mg   No change documented:   Raquel Pelletier RN   Plan last modified:   Raquel Pelletier RN (3/22/2016)   Next INR check:   2019   Target end date:       Indications    Factor V Leiden mutation (H) [D68.51]  Venous thrombosis [I82.90]  Prothrombin mutation (H) [D68.52]  Long term current use of anticoagulant therapy [Z79.01]             Anticoagulation Episode Summary     INR check location:       Preferred lab:       Send INR reminders to:   [facility] [place]    Comments:   5 mg tablets, appt card, BP, PM dose        Anticoagulation Care Providers     Provider Role Specialty Phone number    Nate Cifuentes DO [facility] [phone]            See  the Encounter Report to view Anticoagulation Flowsheet and Dosing Calendar (Go to Encounters tab in chart review, and find the Anticoagulation Therapy Visit)    Dosage adjustment made based on physician directed care plan.      Raquel Pelletier RN

## 2019-08-16 ENCOUNTER — HOSPITAL ENCOUNTER (EMERGENCY)
Facility: CLINIC | Age: 84
Discharge: HOME | End: 2019-08-16
Attending: EMERGENCY MEDICINE | Admitting: EMERGENCY MEDICINE
Payer: MEDICARE

## 2019-08-16 VITALS
SYSTOLIC BLOOD PRESSURE: 163 MMHG | DIASTOLIC BLOOD PRESSURE: 99 MMHG | HEART RATE: 62 BPM | RESPIRATION RATE: 18 BRPM | TEMPERATURE: 98.3 F | OXYGEN SATURATION: 95 %

## 2019-08-16 DIAGNOSIS — L98.9 SKIN LESION OF LEFT LEG: ICD-10-CM

## 2019-08-16 DIAGNOSIS — M79.662 PAIN OF LEFT LOWER LEG: ICD-10-CM

## 2019-08-16 PROBLEM — C61 PRIMARY MALIGNANT NEOPLASM OF PROSTATE (H): Status: ACTIVE | Noted: 2017-02-10

## 2019-08-16 LAB
ANION GAP SERPL CALCULATED.3IONS-SCNC: 6 MMOL/L (ref 3–14)
BASOPHILS # BLD AUTO: 0 10E9/L (ref 0–0.2)
BASOPHILS NFR BLD AUTO: 0.8 %
BUN SERPL-MCNC: 30 MG/DL (ref 7–30)
CALCIUM SERPL-MCNC: 8.8 MG/DL (ref 8.5–10.1)
CHLORIDE SERPL-SCNC: 111 MMOL/L (ref 94–109)
CO2 SERPL-SCNC: 26 MMOL/L (ref 20–32)
CREAT SERPL-MCNC: 1.42 MG/DL (ref 0.66–1.25)
CRP SERPL-MCNC: 4.4 MG/L (ref 0–8)
DIFFERENTIAL METHOD BLD: ABNORMAL
EOSINOPHIL NFR BLD AUTO: 7 %
ERYTHROCYTE [DISTWIDTH] IN BLOOD BY AUTOMATED COUNT: 12.6 % (ref 10–15)
GFR SERPL CREATININE-BSD FRML MDRD: 45 ML/MIN/{1.73_M2}
GLUCOSE SERPL-MCNC: 159 MG/DL (ref 70–99)
HCT VFR BLD AUTO: 39.5 % (ref 40–53)
HGB BLD-MCNC: 13.1 G/DL (ref 13.3–17.7)
IMM GRANULOCYTES # BLD: 0 10E9/L (ref 0–0.4)
IMM GRANULOCYTES NFR BLD: 0 %
LYMPHOCYTES # BLD AUTO: 1.2 10E9/L (ref 0.8–5.3)
LYMPHOCYTES NFR BLD AUTO: 24.3 %
MAGNESIUM SERPL-MCNC: 2.2 MG/DL (ref 1.6–2.3)
MCH RBC QN AUTO: 31 PG (ref 26.5–33)
MCHC RBC AUTO-ENTMCNC: 33.2 G/DL (ref 31.5–36.5)
MCV RBC AUTO: 94 FL (ref 78–100)
MONOCYTES # BLD AUTO: 0.5 10E9/L (ref 0–1.3)
MONOCYTES NFR BLD AUTO: 11.4 %
NEUTROPHILS # BLD AUTO: 2.7 10E9/L (ref 1.6–8.3)
NEUTROPHILS NFR BLD AUTO: 56.5 %
NRBC # BLD AUTO: 0 10*3/UL
NRBC BLD AUTO-RTO: 0 /100
PLATELET # BLD AUTO: 153 10E9/L (ref 150–450)
POTASSIUM SERPL-SCNC: 4.1 MMOL/L (ref 3.4–5.3)
RBC # BLD AUTO: 4.22 10E12/L (ref 4.4–5.9)
SODIUM SERPL-SCNC: 143 MMOL/L (ref 133–144)
URATE SERPL-MCNC: 6.7 MG/DL (ref 3.5–7.2)
WBC # BLD AUTO: 4.7 10E9/L (ref 4–11)

## 2019-08-16 PROCEDURE — 86140 C-REACTIVE PROTEIN: CPT | Performed by: EMERGENCY MEDICINE

## 2019-08-16 PROCEDURE — 83735 ASSAY OF MAGNESIUM: CPT | Performed by: EMERGENCY MEDICINE

## 2019-08-16 PROCEDURE — 84550 ASSAY OF BLOOD/URIC ACID: CPT | Performed by: EMERGENCY MEDICINE

## 2019-08-16 PROCEDURE — 80048 BASIC METABOLIC PNL TOTAL CA: CPT | Performed by: EMERGENCY MEDICINE

## 2019-08-16 PROCEDURE — 85025 COMPLETE CBC W/AUTO DIFF WBC: CPT | Performed by: EMERGENCY MEDICINE

## 2019-08-16 PROCEDURE — 99283 EMERGENCY DEPT VISIT LOW MDM: CPT | Performed by: EMERGENCY MEDICINE

## 2019-08-16 PROCEDURE — 99284 EMERGENCY DEPT VISIT MOD MDM: CPT | Mod: Z6 | Performed by: EMERGENCY MEDICINE

## 2019-08-16 RX ORDER — CEPHALEXIN 500 MG/1
500 CAPSULE ORAL 3 TIMES DAILY
Qty: 21 CAPSULE | Refills: 0 | Status: SHIPPED | OUTPATIENT
Start: 2019-08-16 | End: 2019-08-23

## 2019-08-16 NOTE — ED AVS SNAPSHOT
[facility] Emergency Department  [address] [place] [address]  Phone:  [phone]  Fax:  [phone]                                    Robert Richard   MRN: [id number]    Department:  [facility] Emergency Department   Date of Visit:  8/16/2019           After Visit Summary Signature Page    I have received my discharge instructions, and my questions have been answered. I have discussed any challenges I see with this plan with the nurse or doctor.    ..........................................................................................................................................  Patient/Patient Representative Signature      ..........................................................................................................................................  Patient Representative Print Name and Relationship to Patient    ..................................................               ................................................  Date                                   Time    ..........................................................................................................................................  Reviewed by Signature/Title    ...................................................              ..............................................  Date                                               Time          22EPIC Rev 08/18

## 2019-08-16 NOTE — ED TRIAGE NOTES
Patient had a onset of burning type pain  - Left Lower Leg about 1.5 days ago.  Patient states it worsened last night.  Patient has a scabbed over abrasion in the area - medial ankle.

## 2019-08-16 NOTE — ED PROVIDER NOTES
History     Chief Complaint   Patient presents with     Leg Pain     Left Lower     HPI  Robert Richard is a 83 year old male who presents with complaints of a burning sensation of his left lower leg.  Symptoms began a day and half ago.  He had a hard time sleeping last night due to discomfort.  Does have chronic leg issues with previous DVT and chronic stasis changes.  He is a type II diabetic.  Denies fever or chills.  Does admit to scraping the medial part of the ankle a few days ago.  There is been no drainage from the area.  Has generalized weakness or lightheadedness.  Denies chest pain or shortness of breath.  No nausea or vomiting.  No diarrhea.  No dysuria or frequency.  No treatment for his pain prior to arrival.  Patient did have his INR checked on Wednesday and it was 2.7.  That said is consistent numbering is not change the dose of his Coumadin.  History of restless legs.  Patient has had a previous laminectomy.  Denies radicular symptoms today.    Allergies:  Allergies   Allergen Reactions     Atorvastatin Calcium      Muscle pain, weakness     Pravastatin Other (See Comments)     muscle aches     Simvastatin Other (See Comments)     muscle aches       Problem List:    Patient Active Problem List    Diagnosis Date Noted     CKD (chronic kidney disease) stage 3, GFR 30-59 ml/min (H) 06/20/2019     Priority: Medium     Diabetes mellitus, type 2 (H) 10/12/2018     Priority: Medium     Morbid obesity due to excess calories (H) 07/31/2017     Priority: Medium     Primary malignant neoplasm of prostate (H) 02/10/2017     Priority: Medium     Essential hypertension with goal blood pressure less than 140/90 09/06/2016     Priority: Medium     Long term current use of anticoagulant therapy 03/21/2016     Priority: Medium     Spinal stenosis 10/17/2014     Priority: Medium     Low back pain 01/21/2014     Priority: Medium     Diagnosis updated by automated process. Provider to review and confirm.        Personal history of venous thrombosis and embolism 01/21/2014     Priority: Medium     Hip joint replacement status - right 01/21/2014     Priority: Medium     Abnormal gait 01/21/2014     Priority: Medium     Obesity 01/21/2014     Priority: Medium     Personal history of prostate cancer 01/21/2014     Priority: Medium     Malignant basal cell neoplasm of skin 07/24/2012     Priority: Medium     Do you wish to do the replacement in the background? yes         Advanced directives, counseling/discussion 07/24/2012     Priority: Medium     Patient states has Advance Directive and will bring in a copy to clinic. 7/24/2012          Gout 07/03/2012     Priority: Medium     Back pain 09/27/2011     Priority: Medium     Hyperlipidemia LDL goal <100 09/27/2011     Priority: Medium     Factor V Leiden mutation (H) 09/08/2011     Priority: Medium     Prothrombin mutation (H) 09/08/2011     Priority: Medium     [id number]       Sleep apnea 05/19/2011     Priority: Medium     Venous thrombosis 10/20/2010     Priority: Medium     Restless leg syndrome 10/20/2010     Priority: Medium        Past Medical History:    Past Medical History:   Diagnosis Date     Basal cell carcinoma      Cancer (H)      DVT (deep venous thrombosis) (H) 11/2003     DVT (deep venous thrombosis) (H) 12/08     DVT (deep venous thrombosis) (H) 10/2010     Gout      Other and unspecified hyperlipidemia      Restless leg syndrome      Unspecified essential hypertension        Past Surgical History:    Past Surgical History:   Procedure Laterality Date     C REVISE TOTAL HIP REPLACEMENT  1/18/13    R hip     JOINT REPLACEMENT  4/2011    R hip     LAMINECTOMY LUMBAR ONE LEVEL  12/2008     LAPAROSCOPIC HERNIORRHAPHY UMBILICAL N/A 1/3/2019    Procedure: laparoscopic umbilical hernia repair with mesh;  Surgeon: Jamal Thompson DO;  Location: [facility] OR       Family History:    Family History   Problem Relation Age of Onset     Prostate Cancer Paternal  Grandfather        Social History:  Marital Status:   [2]  Social History     Tobacco Use     Smoking status: Never Smoker     Smokeless tobacco: Never Used   Substance Use Topics     Alcohol use: Yes     Alcohol/week: 0.0 oz     Comment: 1 drink every 1-2 weeks.     Drug use: No        Medications:      cephALEXin (KEFLEX) 500 MG capsule   amoxicillin (AMOXIL) 500 MG capsule   atenolol (TENORMIN) 50 MG tablet   colchicine (COLCRYS) 0.6 MG tablet   losartan (COZAAR) 50 MG tablet   Multiple Vitamins-Minerals (MULTIVITAMIN ADULT PO)   ORDER FOR DME   pramipexole (MIRAPEX) 1 MG tablet   sildenafil (VIAGRA) 25 MG tablet   spironolactone (ALDACTONE) 25 MG tablet   warfarin (COUMADIN) 5 MG tablet         Review of Systems all other systems are reviewed and are negative.    Physical Exam   BP: (!) 153/84  Heart Rate: 62  Temp: 98.3  F (36.8  C)  Resp: 18  SpO2: 95 %      Physical Exam alert cooperative male in mild to moderate distress.  Examination of his legs reveal no obvious swelling of the legs.  He does have bronzing of both lower legs.  On the left medial lower calf/ankle he has a 1 cm circular lesion that is nonfluctuant nondraining.  He is got tenderness around this area and also tenderness on the lateral aspect of the distal calf.  Negative Homans.  Joint effusion at the ankle.  No redness above the bronzing area that would suggest cellulitis or lymphangitic spread.  No radicular pain with straight leg raising.        ED Course        Procedures               Critical Care time:  none               Results for orders placed or performed during the hospital encounter of 08/16/19 (from the past 24 hour(s))   CBC with platelets differential   Result Value Ref Range    WBC 4.7 4.0 - 11.0 10e9/L    RBC Count 4.22 (L) 4.4 - 5.9 10e12/L    Hemoglobin 13.1 (L) 13.3 - 17.7 g/dL    Hematocrit 39.5 (L) 40.0 - 53.0 %    MCV 94 78 - 100 fl    MCH 31.0 26.5 - 33.0 pg    MCHC 33.2 31.5 - 36.5 g/dL    RDW 12.6 10.0 - 15.0  %    Platelet Count 153 150 - 450 10e9/L    Diff Method Automated Method     % Neutrophils 56.5 %    % Lymphocytes 24.3 %    % Monocytes 11.4 %    % Eosinophils 7.0 %    % Basophils 0.8 %    % Immature Granulocytes 0.0 %    Nucleated RBCs 0 0 /100    Absolute Neutrophil 2.7 1.6 - 8.3 10e9/L    Absolute Lymphocytes 1.2 0.8 - 5.3 10e9/L    Absolute Monocytes 0.5 0.0 - 1.3 10e9/L    Absolute Basophils 0.0 0.0 - 0.2 10e9/L    Abs Immature Granulocytes 0.0 0 - 0.4 10e9/L    Absolute Nucleated RBC 0.0    Basic metabolic panel   Result Value Ref Range    Sodium 143 133 - 144 mmol/L    Potassium 4.1 3.4 - 5.3 mmol/L    Chloride 111 (H) 94 - 109 mmol/L    Carbon Dioxide 26 20 - 32 mmol/L    Anion Gap 6 3 - 14 mmol/L    Glucose 159 (H) 70 - 99 mg/dL    Urea Nitrogen 30 7 - 30 mg/dL    Creatinine 1.42 (H) 0.66 - 1.25 mg/dL    GFR Estimate 45 (L) >60 mL/min/[1.73_m2]    GFR Estimate If Black 53 (L) >60 mL/min/[1.73_m2]    Calcium 8.8 8.5 - 10.1 mg/dL   CRP inflammation   Result Value Ref Range    CRP Inflammation 4.4 0.0 - 8.0 mg/L   Uric acid   Result Value Ref Range    Uric Acid 6.7 3.5 - 7.2 mg/dL   Magnesium   Result Value Ref Range    Magnesium 2.2 1.6 - 2.3 mg/dL       Medications - No data to display    Assessments & Plan (with Medical Decision Making)   Robert Richard is a 83 year old male who presents with complaints of a burning sensation of his left lower leg.  Symptoms began a day and half ago.  He had a hard time sleeping last night due to discomfort.  Does have chronic leg issues with previous DVT and chronic stasis changes.  He is a type II diabetic.  Denies fever or chills.  Does admit to scraping the medial part of the ankle a few days ago.  There is been no drainage from the area.  Has generalized weakness or lightheadedness.  Denies chest pain or shortness of breath.  No nausea or vomiting.  No diarrhea.  No dysuria or frequency.  No treatment for his pain prior to arrival.  Patient did have his INR checked  on Wednesday and it was 2.7.  That said is consistent numbering is not change the dose of his Coumadin.  History of restless legs.  Patient has had a previous laminectomy.  Denies radicular symptoms today.  On presentation patient was afebrile and vitally stable.  On leg exam he did have redness around the lesion noted above in the photo.  No fluctuance.  Also had some discomfort and paresthesias over the distal lateral calf area.  Negative Homans.  Blood work showed normal white count.  Does have renal insufficiency but this is chronic.  Glucose is elevated at 159.  Suspect he has worsening diabetes and this may be related with his paresthesias.  He is currently not on meds I have asked him to discuss this with his doctor.  Remainder of his blood work was unremarkable including a CRP, uric acid, and magnesium.  At this point we will cover him with some Keflex in case there is a secondary infection related to the leg lesion.  Of asked him to see his doctor next week for reassessment.  Reasons to return to the emergency room were discussed.  I have reviewed the nursing notes.    I have reviewed the findings, diagnosis, plan and need for follow up with the patient.       New Prescriptions    CEPHALEXIN (KEFLEX) 500 MG CAPSULE    Take 1 capsule (500 mg) by mouth 3 times daily for 7 days       Final diagnoses:   Pain of left lower leg   Skin lesion of left leg       8/16/2019   [facility] EMERGENCY DEPARTMENT     Miles Fernandez MD  08/16/19 [id number]

## 2019-08-19 ENCOUNTER — ANTICOAGULATION THERAPY VISIT (OUTPATIENT)
Dept: ANTICOAGULATION | Facility: CLINIC | Age: 84
End: 2019-08-19
Payer: MEDICARE

## 2019-08-19 VITALS — SYSTOLIC BLOOD PRESSURE: 136 MMHG | HEART RATE: 69 BPM | DIASTOLIC BLOOD PRESSURE: 89 MMHG

## 2019-08-19 DIAGNOSIS — D68.51 FACTOR V LEIDEN MUTATION (H): ICD-10-CM

## 2019-08-19 DIAGNOSIS — Z79.01 LONG TERM CURRENT USE OF ANTICOAGULANT THERAPY: ICD-10-CM

## 2019-08-19 DIAGNOSIS — D68.52 PROTHROMBIN MUTATION (H): ICD-10-CM

## 2019-08-19 DIAGNOSIS — I82.90 VENOUS THROMBOSIS: ICD-10-CM

## 2019-08-19 LAB — INR POINT OF CARE: 2.3 (ref 0.9–1.1)

## 2019-08-19 PROCEDURE — 36416 COLLJ CAPILLARY BLOOD SPEC: CPT

## 2019-08-19 PROCEDURE — 99207 ZZC NO CHARGE NURSE ONLY: CPT

## 2019-08-19 PROCEDURE — 85610 PROTHROMBIN TIME: CPT | Mod: QW

## 2019-08-19 NOTE — PROGRESS NOTES
ANTICOAGULATION FOLLOW-UP CLINIC VISIT    Patient Name:  Robert Richard  Date:  2019  Contact Type:  Face to Face    SUBJECTIVE:  Patient Findings     Positives:   Change in medications (done with keflex on )             OBJECTIVE    INR Protime   Date Value Ref Range Status   2019 2.3 (A) 0.9 - 1.1 Final       ASSESSMENT / PLAN  INR assessment THER    Recheck INR In: 4 WEEKS    INR Location Clinic      Anticoagulation Summary  As of 2019    INR goal:   2.0-3.0   TTR:   76.7 % (3.4 y)   INR used for dosin.3 (2019)   Warfarin maintenance plan:   2.5 mg (5 mg x 0.5) every Fri; 5 mg (5 mg x 1) all other days   Full warfarin instructions:   2.5 mg every Fri; 5 mg all other days   Weekly warfarin total:   32.5 mg   No change documented:   Raquel Pelletier RN   Plan last modified:   Raquel Pelletier RN (3/22/2016)   Next INR check:   2019   Target end date:       Indications    Factor V Leiden mutation (H) [D68.51]  Venous thrombosis [I82.90]  Prothrombin mutation (H) [D68.52]  Long term current use of anticoagulant therapy [Z79.01]             Anticoagulation Episode Summary     INR check location:       Preferred lab:       Send INR reminders to:   [facility] [place]    Comments:   5 mg tablets, appt card, BP, PM dose        Anticoagulation Care Providers     Provider Role Specialty Phone number    Nate CifuentesDO [facility] [phone]            See the Encounter Report to view Anticoagulation Flowsheet and Dosing Calendar (Go to Encounters tab in chart review, and find the Anticoagulation Therapy Visit)    Dosage adjustment made based on physician directed care plan.      Raquel Pelletier RN

## 2019-08-23 ENCOUNTER — OFFICE VISIT (OUTPATIENT)
Dept: FAMILY MEDICINE | Facility: OTHER | Age: 84
End: 2019-08-23
Payer: MEDICARE

## 2019-08-23 VITALS
TEMPERATURE: 97.8 F | OXYGEN SATURATION: 96 % | WEIGHT: 249 LBS | HEART RATE: 72 BPM | BODY MASS INDEX: 35.73 KG/M2 | RESPIRATION RATE: 20 BRPM | DIASTOLIC BLOOD PRESSURE: 88 MMHG | SYSTOLIC BLOOD PRESSURE: 132 MMHG

## 2019-08-23 DIAGNOSIS — Z86.718 PERSONAL HISTORY OF VENOUS THROMBOSIS AND EMBOLISM: ICD-10-CM

## 2019-08-23 DIAGNOSIS — E66.01 MORBID OBESITY DUE TO EXCESS CALORIES (H): ICD-10-CM

## 2019-08-23 DIAGNOSIS — I10 ESSENTIAL HYPERTENSION: ICD-10-CM

## 2019-08-23 DIAGNOSIS — G62.9 NEUROPATHY: Primary | ICD-10-CM

## 2019-08-23 DIAGNOSIS — Z79.01 LONG TERM CURRENT USE OF ANTICOAGULANT THERAPY: ICD-10-CM

## 2019-08-23 DIAGNOSIS — C61 PRIMARY MALIGNANT NEOPLASM OF PROSTATE (H): ICD-10-CM

## 2019-08-23 DIAGNOSIS — D68.51 FACTOR V LEIDEN MUTATION (H): ICD-10-CM

## 2019-08-23 PROCEDURE — 99214 OFFICE O/P EST MOD 30 MIN: CPT | Performed by: INTERNAL MEDICINE

## 2019-08-23 RX ORDER — PREGABALIN 25 MG/1
25 CAPSULE ORAL 2 TIMES DAILY
Qty: 14 CAPSULE | Refills: 0 | Status: SHIPPED | OUTPATIENT
Start: 2019-08-23 | End: 2019-09-09

## 2019-08-23 ASSESSMENT — PAIN SCALES - GENERAL: PAINLEVEL: SEVERE PAIN (7)

## 2019-08-23 NOTE — PROGRESS NOTES
Subjective     Robert Richard is a 83 year old male who presents to clinic today for the following health issues:    HPI   ED/UC Followup:    Facility:  [facility]  Date of visit: 8/19/19  Reason for visit: Leg pain  Current Status: Still has burning type pain in left leg                        Chief Complaint         The patient is a pleasant and well-known 83-year-old gentleman who presents today with pain in his lower left leg.  He does have a history of stasis dermatitis as a result of previous DVT.  He is on chronic anticoagulation.  He does have a lot of chronic pigmentation changes lower extremity as well as a small healing ulcer on the inner aspect of his lower left leg.  He notes that the leg has been episodically burning with a neuropathic type pain.  It feels as if there is pins-and-needles.  He gets a little better when he puts it up but not by much.  Laboratory work is reviewed.  Patient does have a history of some mild glucose intolerance and his hemoglobin A1c has been generally around 7.  He watches his diet closely but has recently become somewhat less vigilant.  We have discussed this in detail.  He is extremely hesitant to initiate medication if possible.  Additionally, he was previously on gabapentin and had some side effect with it where he became overly somnolent.  We have discussed lower dosing and slower elevation, he is not interested in the medication at all.                       PAST, FAMILY,SOCIAL HISTORY:     Medical  History:   has a past medical history of Basal cell carcinoma, Cancer (H), DVT (deep venous thrombosis) (H) (11/2003), DVT (deep venous thrombosis) (H) (12/08), DVT (deep venous thrombosis) (H) (10/2010), Gout, Other and unspecified hyperlipidemia, Restless leg syndrome, and Unspecified essential hypertension.     Surgical History:   has a past surgical history that includes Laminectomy lumbar one level (12/2008); joint replacement (4/2011); REVISE TOTAL HIP REPLACEMENT  (1/18/13); and Laparoscopic herniorrhaphy umbilical (N/A, 1/3/2019).     Social History:   reports that he has never smoked. He has never used smokeless tobacco. He reports that he drinks alcohol. He reports that he does not use drugs.     Family History:  family history includes Prostate Cancer in his paternal grandfather.            MEDICATIONS  Current Outpatient Medications   Medication Sig Dispense Refill     atenolol (TENORMIN) 50 MG tablet Take 1 tablet (50 mg) by mouth 2 times daily 180 tablet 1     losartan (COZAAR) 50 MG tablet Take 1 tablet (50 mg) by mouth daily 90 tablet 3     Multiple Vitamins-Minerals (MULTIVITAMIN ADULT PO)        ORDER FOR DME Wear mask at night 1 Units 0     pramipexole (MIRAPEX) 1 MG tablet Take 1 tablet (1 mg) by mouth 2 times daily 180 tablet 1     pregabalin (LYRICA) 25 MG capsule Take 1 capsule (25 mg) by mouth 2 times daily 14 capsule 0     sildenafil (VIAGRA) 25 MG tablet Take 1-3 tablets 30 min to 4 hrs before sex. Do not use with nitroglycerin, terazosin or doxazosin. 20 tablet 11     spironolactone (ALDACTONE) 25 MG tablet Take 1 tablet (25 mg) by mouth daily 90 tablet 3     warfarin (COUMADIN) 5 MG tablet TAKE ONE HALF TABLET BY MOUTH ON FRIDAY AND 1 TABLET BY MOUTH ON ALL OTHER DAYS OR AS DIRECTED BY THE COUMADIN CLINIC 80 tablet 1     amoxicillin (AMOXIL) 500 MG capsule TAKE FOUR CAPSULES BY MOUTH ONE HOUR BEFORE APPOINTMENT (Patient not taking: Reported on 8/23/2019) 20 capsule 0     colchicine (COLCRYS) 0.6 MG tablet Take 2 tablets at the first sign of flare, take 1 additional tablet one hour later. (Patient not taking: Reported on 12/27/2018) 30 tablet 0         --------------------------------------------------------------------------------------------------------------------                              Review of Systems     LUNGS: Pt denies: cough, excess sputum, hemoptysis, or shortness of breath.   HEART: Pt denies: chest pain, arrhythmia, syncope, tachy or  bradyarrhythmia.   GI: Pt denies: nausea, vomiting, diarrhea, constipation, melena, or hematochezia.   NEURO: Pt denies: seizures, strokes, diplopia, weakness, or paralysis.  Patient has the episodic burning superficially in his lower legs bilaterally.  Much more common on the left than the right.                                       Examination      /88 (BP Location: Right arm, Patient Position: Sitting, Cuff Size: Adult Regular)   Pulse 72   Temp 97.8  F (36.6  C) (Temporal)   Resp 20   Wt 112.9 kg (249 lb)   SpO2 96%   BMI 35.73 kg/m     Constitutional: The patient appears to be in no acute distress. The patient appears to be adequately hydrated. No acute respiratory or hemodynamic distress is noted at this time.   LUNGS: clear bilaterally, airflow is brisk, no intercostal retraction or stridor is noted. No coughing is noted during visit.   HEART:  regular without rubs, clicks, gallops, or murmurs. PMI is nondisplaced. Upstrokes are brisk. S1,S2 are heard.   GI: Abdomen is soft, without rebound, guarding or tenderness. Bowel sounds are appropriate. No renal bruits are heard.  Abdomen is obese   NEURO: Pt is alert and appropriate. No neurologic lateralization is noted. Cranial nerves 2-12 are intact. Peripheral motor function is normal.  Sensation is somewhat hyperesthetic in the lower legs   SKIN:  warm and dry. No erythema noted.  Chronic stasis changes of the lower extremities are present with the healing ulcer as described.                                           Decision Making    1. Neuropathy  Start low-dose Lyrica and elevate as tolerated  - pregabalin (LYRICA) 25 MG capsule; Take 1 capsule (25 mg) by mouth 2 times daily  Dispense: 14 capsule; Refill: 0    2. Personal history of venous thrombosis and embolism  Continue anticoagulation    3. Long term current use of anticoagulant therapy  Follow INR    4. Factor V Leiden mutation (H)  As above    5. Essential hypertension  Continue current  meds.  Limit salt intake,  Weight loss.    Patient has history of prostate cancer.  Follow serial PSA                        FOLLOW UP   I have asked the patient to make an appointment for followup with me in 2 weeks        I have carefully explained the diagnosis and treatment options to the patient.  The patient has displayed an understanding of the above, and all subsequent questions were answered.      DO ANANDA Guadarrama    Portions of this note were produced using [employer]  Although every attempt at real-time proof reading has been made, occasional grammar/syntax errors may have been missed.

## 2019-09-07 DIAGNOSIS — Z86.718 PERSONAL HISTORY OF VENOUS THROMBOSIS AND EMBOLISM: ICD-10-CM

## 2019-09-07 DIAGNOSIS — G25.81 RESTLESS LEG SYNDROME: ICD-10-CM

## 2019-09-09 ENCOUNTER — OFFICE VISIT (OUTPATIENT)
Dept: FAMILY MEDICINE | Facility: OTHER | Age: 84
End: 2019-09-09
Payer: MEDICARE

## 2019-09-09 VITALS
RESPIRATION RATE: 20 BRPM | DIASTOLIC BLOOD PRESSURE: 92 MMHG | OXYGEN SATURATION: 95 % | WEIGHT: 248 LBS | TEMPERATURE: 97.4 F | BODY MASS INDEX: 35.58 KG/M2 | HEART RATE: 64 BPM | SYSTOLIC BLOOD PRESSURE: 164 MMHG

## 2019-09-09 DIAGNOSIS — I80.3 THROMBOPHLEBITIS LEG: Primary | ICD-10-CM

## 2019-09-09 DIAGNOSIS — G62.9 NEUROPATHY: ICD-10-CM

## 2019-09-09 PROCEDURE — 99213 OFFICE O/P EST LOW 20 MIN: CPT | Performed by: INTERNAL MEDICINE

## 2019-09-09 RX ORDER — PREGABALIN 100 MG/1
100 CAPSULE ORAL 2 TIMES DAILY
Qty: 60 CAPSULE | Refills: 0 | Status: SHIPPED | OUTPATIENT
Start: 2019-09-09 | End: 2019-09-16

## 2019-09-09 RX ORDER — WARFARIN SODIUM 5 MG/1
TABLET ORAL
Qty: 80 TABLET | Refills: 1 | Status: SHIPPED | OUTPATIENT
Start: 2019-09-09 | End: 2020-02-10

## 2019-09-09 RX ORDER — PRAMIPEXOLE DIHYDROCHLORIDE 1 MG/1
TABLET ORAL
Qty: 60 TABLET | Refills: 0 | Status: SHIPPED | OUTPATIENT
Start: 2019-09-09 | End: 2019-11-14

## 2019-09-09 RX ORDER — PREDNISONE 50 MG/1
50 TABLET ORAL DAILY
Qty: 7 TABLET | Refills: 0 | Status: SHIPPED | OUTPATIENT
Start: 2019-09-09 | End: 2019-09-16

## 2019-09-09 RX ORDER — CEFUROXIME AXETIL 500 MG/1
500 TABLET ORAL 2 TIMES DAILY
Qty: 20 TABLET | Refills: 0 | Status: SHIPPED | OUTPATIENT
Start: 2019-09-09 | End: 2019-09-26

## 2019-09-09 ASSESSMENT — PAIN SCALES - GENERAL: PAINLEVEL: NO PAIN (0)

## 2019-09-09 NOTE — TELEPHONE ENCOUNTER
pramipexole (MIRAPEX) 1 MG tablet [Pharmacy Med Name: PRAMIPEXOLE 1MG TAB] 180 tablet 1    Sig: TAKE 1 TABLET BY MOUTH TWICE DAILY   Routing refill request to provider for review/approval because:  Labs not current:  ALT  T'd up 1 month for provider review.        warfarin (COUMADIN) 5 MG tablet [Pharmacy Med Name: WARFARIN 5MG        TAB] 80 tablet 1    Sig: TAKE 1/2 (ONE-HALF) TABLET BY MOUTH EVERY FRIDAY AND 1 ONCE DAILY THE  REST  OF  WEEK   Routing refill request to provider for review/approval because:  Labs out of range:  INR      Allyn Koch RN

## 2019-09-09 NOTE — PROGRESS NOTES
Subjective     Robert Richard is a 84 year old male who presents to clinic today for the following health issues:    HPI   Chief Complaint   Patient presents with     Follow Up     Still having throbbing pain in his lower left leg      '  Concern - Burning pain in left lower leg  Onset: 2 weeks    Description:   He describes the pain as a burning sensation that begins around his medial malleolus and radiates superiorly to the medial mid tibia    Intensity: moderate    Progression of Symptoms:  intermittent    Accompanying Signs & Symptoms:  None    Previous history of similar problem:      He has a history of restless leg syndrome, but notes that this feels different    Precipitating factors:   Worsened by: sitting on cushioned chairs    Alleviating factors:  Improved by: nothing    Therapies Tried and outcome: He's been prescribed Lyrica for his RLS, but notes that it hasn't helped. He hasn't tried anything for the burning sensation    Patient Active Problem List   Diagnosis     Venous thrombosis     Restless leg syndrome     Sleep apnea     Factor V Leiden mutation (H)     Prothrombin mutation (H)     Back pain     Hyperlipidemia LDL goal <100     Gout     Malignant basal cell neoplasm of skin     Advanced directives, counseling/discussion     Low back pain     Personal history of venous thrombosis and embolism     Hip joint replacement status - right     Abnormal gait     Obesity     Personal history of prostate cancer     Spinal stenosis     Long term current use of anticoagulant therapy     Essential hypertension     Morbid obesity due to excess calories (H)     Diabetes mellitus, type 2 (H)     CKD (chronic kidney disease) stage 3, GFR 30-59 ml/min (H)     Primary malignant neoplasm of prostate (H)     Past Surgical History:   Procedure Laterality Date     C REVISE TOTAL HIP REPLACEMENT  1/18/13    R hip     JOINT REPLACEMENT  4/2011    R hip     LAMINECTOMY LUMBAR ONE LEVEL  12/2008     LAPAROSCOPIC  "HERNIORRHAPHY UMBILICAL N/A 1/3/2019    Procedure: laparoscopic umbilical hernia repair with mesh;  Surgeon: Jamal Thompson DO;  Location: [facility]       Social History     Tobacco Use     Smoking status: Never Smoker     Smokeless tobacco: Never Used   Substance Use Topics     Alcohol use: Yes     Alcohol/week: 0.0 oz     Comment: 1 drink every 1-2 weeks.     Family History   Problem Relation Age of Onset     Prostate Cancer Paternal Grandfather          Current Outpatient Medications   Medication Sig Dispense Refill     atenolol (TENORMIN) 50 MG tablet Take 1 tablet (50 mg) by mouth 2 times daily 180 tablet 1     cefuroxime (CEFTIN) 500 MG tablet Take 1 tablet (500 mg) by mouth 2 times daily 20 tablet 0     losartan (COZAAR) 50 MG tablet Take 1 tablet (50 mg) by mouth daily 90 tablet 3     Multiple Vitamins-Minerals (MULTIVITAMIN ADULT PO)        ORDER FOR DME Wear mask at night 1 Units 0     pramipexole (MIRAPEX) 1 MG tablet Take 1 tablet (1 mg) by mouth 2 times daily 180 tablet 1     predniSONE (DELTASONE) 50 MG tablet Take 1 tablet (50 mg) by mouth daily 7 tablet 0     pregabalin (LYRICA) 100 MG capsule Take 1 capsule (100 mg) by mouth 2 times daily 60 capsule 0     sildenafil (VIAGRA) 25 MG tablet Take 1-3 tablets 30 min to 4 hrs before sex. Do not use with nitroglycerin, terazosin or doxazosin. 20 tablet 11     spironolactone (ALDACTONE) 25 MG tablet Take 1 tablet (25 mg) by mouth daily 90 tablet 3     warfarin (COUMADIN) 5 MG tablet TAKE ONE HALF TABLET BY MOUTH ON FRIDAY AND 1 TABLET BY MOUTH ON ALL OTHER DAYS OR AS DIRECTED BY THE COUMADIN CLINIC 80 tablet 1     colchicine (COLCRYS) 0.6 MG tablet Take 2 tablets at the first sign of flare, take 1 additional tablet one hour later. (Patient not taking: Reported on 12/27/2018) 30 tablet 0                     Chief Complaint           Carlos is a pleasant 84 year old male presenting with a chief complaint of a \"burning sensation\" in his left lower leg. He " notes that it began 2 weeks ago. He describes the pain occurring randomly throughout the day and is keeping him up at night. The pain radiates from his medial malleolus superiorly to the medial mid tibia. Sitting in cushioned chairs aggravates the pain and nothing helps.            Review of Systems   ROS COMP: CONSTITUTIONAL: NEGATIVE for fever, chills, change in weight  INTEGUMENTARY/SKIN: POSITIVE for pigmentation in the bilateral lower extremities and a healing wound on the medial aspect of the left lower leg   ENT/MOUTH: NEGATIVE for ear, mouth and throat problems  RESP: NEGATIVE for significant cough or SOB  CV: POSITIVE for peripheral edema in the lower extremities bilaterally, NEGATIVE chest pain or palpitations   NEURO: NEGATIVE for weakness, dizziness or paresthesias      Objective    BP (!) 164/92 (BP Location: Left arm, Patient Position: Sitting, Cuff Size: Adult Large)   Pulse 64   Temp 97.4  F (36.3  C) (Temporal)   Resp 20   Wt 112.5 kg (248 lb)   SpO2 95%   BMI 35.58 kg/m    Body mass index is 35.58 kg/m .  Physical Exam   GENERAL: healthy, alert and no distress  EYES: Eyes grossly normal to inspection, PERRL and conjunctivae and sclerae normal  NECK: no adenopathy, no asymmetry, masses, or scars and thyroid normal to palpation  RESP: lungs clear to auscultation - no rales, rhonchi or wheezes  CV: regular rates and rhythm, normal S1 S2, no S3 or S4, no murmur, click or rub, decreased dorsalis pedis and posterior tibialis pulses, and + bilateral lower extremity pitting edema 2+, palpable cord in the left lower extremity     ABDOMEN: soft, nontender, no hepatosplenomegaly, no masses and bowel sounds normal  MS: no gross musculoskeletal defects noted, no edema  SKIN: evidence of a healing wound on the medial aspect of his left lower leg   NEURO: Normal strength and tone, mentation intact and speech normal  PSYCH: mentation appears normal, affect normal/bright    Diagnostic Test Results:  none          Assessment & Plan       2. Thrombophlebitis leg    - cefuroxime (CEFTIN) 500 MG tablet; Take 1 tablet (500 mg) by mouth 2 times daily  Dispense: 20 tablet; Refill: 0  - predniSONE (DELTASONE) 50 MG tablet; Take 1 tablet (50 mg) by mouth daily  Dispense: 7 tablet; Refill: 0    3. Neuropathy    - pregabalin (LYRICA) 100 MG capsule; Take 1 capsule (100 mg) by mouth 2 times daily  Dispense: 60 capsule; Refill: 0       RETURN TO CLINIC 1 week      Nate Cifuentes [facility]

## 2019-09-09 NOTE — TELEPHONE ENCOUNTER
"Requested Prescriptions   Pending Prescriptions Disp Refills     pramipexole (MIRAPEX) 1 MG tablet [Pharmacy Med Name: PRAMIPEXOLE 1MG TAB] 180 tablet 1     Sig: TAKE 1 TABLET BY MOUTH TWICE DAILY   Last Written Prescription Date:  10/12/2018  Last Fill Quantity: 180,  # refills: 1   Last office visit: 8/23/2019 with prescribing provider:     Future Office Visit:   Next 5 appointments (look out 90 days)    Sep 09, 2019  8:20 AM CDT  Office Visit with Nate Cifuentes DO  [facility] [address] [facility] [id number] [phone]             Antiparkinson's Agents Protocol Failed - 9/7/2019 12:27 PM        Failed - ALT on record in past 12 months         Recent Labs   Lab Test 05/15/18  1054   ALT 30           Passed - Blood pressure under 140/90 in past 12 months     BP Readings from Last 3 Encounters:   08/23/19 132/88   08/19/19 136/89   08/16/19 (!) 163/99           Passed - CBC on record in past 12 months     Recent Labs   Lab Test 08/16/19  0756   WBC 4.7   RBC 4.22*   HGB 13.1*   HCT 39.5*              Passed - Serum Creatinine on file in past 12 months     Recent Labs   Lab Test 08/16/19  0756   CR 1.42*           Passed - Medication is active on med list        Passed - Patient is age 18 or older        Passed - Recent (6 mo) or future (30 days) visit within the authorizing provider's specialty     Patient had office visit in the last 6 months or has a visit in the next 30 days with authorizing provider or within the authorizing provider's specialty.  See \"Patient Info\" tab in inbasket, or \"Choose Columns\" in Meds & Orders section of the refill encounter.            warfarin (COUMADIN) 5 MG tablet [Pharmacy Med Name: WARFARIN 5MG        TAB] 80 tablet 1     Sig: TAKE 1/2 (ONE-HALF) TABLET BY MOUTH EVERY FRIDAY AND 1 ONCE DAILY THE  REST  OF  WEEK   Last Written Prescription Date:  2/19/2019  Last Fill Quantity: 80,  # refills: 1   Last office " "visit: 8/23/2019 with prescribing provider:     Future Office Visit:   Next 5 appointments (look out 90 days)    Sep 09, 2019  8:20 AM CDT  [facility] [address] [facility] [id number] [phone]             Vitamin K Antagonists Failed - 9/7/2019 12:27 PM        Failed - INR is within goal in the past 6 weeks     Confirm INR is within goal in the past 6 weeks.     Recent Labs   Lab Test 08/19/19   INR 2.3*           Passed - Recent (12 mo) or future (30 days) visit within the authorizing provider's specialty     Patient had office visit in the last 12 months or has a visit in the next 30 days with authorizing provider or within the authorizing provider's specialty.  See \"Patient Info\" tab in inbasket, or \"Choose Columns\" in Meds & Orders section of the refill encounter.          Passed - Medication is active on med list        Passed - Patient is 18 years of age or older          "

## 2019-09-16 ENCOUNTER — OFFICE VISIT (OUTPATIENT)
Dept: FAMILY MEDICINE | Facility: OTHER | Age: 84
End: 2019-09-16
Payer: MEDICARE

## 2019-09-16 VITALS
BODY MASS INDEX: 35.44 KG/M2 | RESPIRATION RATE: 18 BRPM | HEART RATE: 70 BPM | SYSTOLIC BLOOD PRESSURE: 130 MMHG | WEIGHT: 247 LBS | OXYGEN SATURATION: 95 % | TEMPERATURE: 97.3 F | DIASTOLIC BLOOD PRESSURE: 72 MMHG

## 2019-09-16 DIAGNOSIS — G62.9 NEUROPATHY: ICD-10-CM

## 2019-09-16 DIAGNOSIS — G25.81 RESTLESS LEG SYNDROME: Primary | ICD-10-CM

## 2019-09-16 DIAGNOSIS — Z86.72 PERSONAL HISTORY OF THROMBOPHLEBITIS: ICD-10-CM

## 2019-09-16 PROCEDURE — 99213 OFFICE O/P EST LOW 20 MIN: CPT | Performed by: INTERNAL MEDICINE

## 2019-09-16 RX ORDER — PREGABALIN 100 MG/1
100 CAPSULE ORAL 2 TIMES DAILY
Qty: 180 CAPSULE | Refills: 3 | COMMUNITY
Start: 2019-09-16 | End: 2019-11-04

## 2019-09-16 ASSESSMENT — PAIN SCALES - GENERAL: PAINLEVEL: NO PAIN (0)

## 2019-09-16 NOTE — PROGRESS NOTES
Subjective     Robert Richard is a 84 year old male who presents to clinic today for the following health issues:    HPI   Chief Complaint   Patient presents with     Follow Up     follow up legs, has improved some                         Chief Complaint         The patient is a pleasant and well-known 84-year-old gentleman who was recently seen for thrombophlebitis involving the posterior/dorsal aspect of the lower left leg.  He is chronically on anticoagulation and was started on a short course of prednisone in combination with antibiotic.  The discomfort has resolved, the firm ropey sensation of the vein has resolved, he has chronic residual stasis changes in the lower extremity's bilaterally which predated the previous event.  He is feeling well and heading out to play golf.  He is wearing supportive stockings and has no other concerns at this time.  He notes that the neuropathy in his lower extremities has improved significantly as has his restless leg syndrome with the addition of the Lyrica.  He would like to continue this medication as he finds it quite effective he has had no side effects from the medication.    His review of systems is unchanged with exception of resolution of the chief complaint  His examination is unchanged with the exception of resolution of the chief complaint  Vital signs are stable and noted in the EMR                                               Decision Making    1. Neuropathy  Improved, continue Lyrica  - pregabalin (LYRICA) 100 MG capsule; Take 1 capsule (100 mg) by mouth 2 times daily  Dispense: 180 capsule; Refill: 3    2. Restless leg syndrome  Improved, continue Lyrica    3. Personal history of thrombophlebitis  Resolved  Continue anticoagulation and supportive stockings                             FOLLOW UP   I have asked the patient to make an appointment for followup with me In 4 months            I have carefully explained the diagnosis and treatment options to the  patient.  The patient has displayed an understanding of the above, and all subsequent questions were answered.      DO ANANDA Guadarrama    Portions of this note were produced using [employer]  Although every attempt at real-time proof reading has been made, occasional grammar/syntax errors may have been missed.

## 2019-09-23 ENCOUNTER — TELEPHONE (OUTPATIENT)
Dept: FAMILY MEDICINE | Facility: OTHER | Age: 84
End: 2019-09-23

## 2019-09-23 NOTE — TELEPHONE ENCOUNTER
Patient called today with concerns about left ankle.  Patient did see PCP on 09/16/2019 and given prednisone and antibiotics.  Patient states that today the ankle has doubled in size due to swelling and has a small area on the medial side of the ankle that is open and bleeding.  He is unable to describe further.  Patient is currently on coumadin.  Ankle is painful and making ambulation semi-difficult.      Patient is currently in [place] and would like to see Dr. Cifuentes on Wednesday of this week.  He was encouraged to see care in [place], he declined.      Will route to PCP for work-in appointment this week.  Patient was informed that PCP may also recommend seeking care in [place].     Lakeisha Gomez RN

## 2019-09-23 NOTE — TELEPHONE ENCOUNTER
"Patient scheduled for appointment on Thursday with Dr Cifuentes.  He has agreed to go to the Emergency Room this evening in [place]. He will call and keep us \"in the loop\".  I gave him my direct number.    Natalia HAGANO/  "

## 2019-09-23 NOTE — TELEPHONE ENCOUNTER
Obviously, I recommend that he seeks care immediately in [place].  That said, please set him up an appointment for Thursday to see me or Wednesday with any available provider.      Thank you.    Vane

## 2019-09-23 NOTE — TELEPHONE ENCOUNTER
Reason for call:  Patient reporting a symptom    Symptom or request: patient is down in [facility].  He saw you last week for a left ankle injury.  In the last couple hours it has swollen to twice its normal size.  He is asking to talk to you about this.  If you want to see him, it is going to take a day for him to get back.  Please call.    Duration (how long have symptoms been present): ongoing    Have you been treated for this before? Yes    Additional comments: please call.    Phone Number patient can be reached at:  [phone] or [phone]  Best Time:  any    Can we leave a detailed message on this number:  YES    Call taken on 9/23/2019 at 3:07 PM by Ernesto Montalvo

## 2019-09-25 ENCOUNTER — ANTICOAGULATION THERAPY VISIT (OUTPATIENT)
Dept: ANTICOAGULATION | Facility: CLINIC | Age: 84
End: 2019-09-25
Payer: MEDICARE

## 2019-09-25 DIAGNOSIS — D68.51 FACTOR V LEIDEN MUTATION (H): ICD-10-CM

## 2019-09-25 DIAGNOSIS — I82.90 VENOUS THROMBOSIS: ICD-10-CM

## 2019-09-25 DIAGNOSIS — D68.52 PROTHROMBIN MUTATION (H): ICD-10-CM

## 2019-09-25 DIAGNOSIS — Z79.01 LONG TERM CURRENT USE OF ANTICOAGULANT THERAPY: ICD-10-CM

## 2019-09-25 LAB — INR POINT OF CARE: 3.1 (ref 0.9–1.1)

## 2019-09-25 PROCEDURE — 85610 PROTHROMBIN TIME: CPT | Mod: QW

## 2019-09-25 PROCEDURE — 99207 ZZC NO CHARGE NURSE ONLY: CPT

## 2019-09-25 PROCEDURE — 36416 COLLJ CAPILLARY BLOOD SPEC: CPT

## 2019-09-26 ENCOUNTER — OFFICE VISIT (OUTPATIENT)
Dept: FAMILY MEDICINE | Facility: OTHER | Age: 84
End: 2019-09-26
Payer: MEDICARE

## 2019-09-26 VITALS
RESPIRATION RATE: 22 BRPM | BODY MASS INDEX: 35.87 KG/M2 | OXYGEN SATURATION: 98 % | DIASTOLIC BLOOD PRESSURE: 80 MMHG | HEART RATE: 70 BPM | SYSTOLIC BLOOD PRESSURE: 132 MMHG | WEIGHT: 250 LBS | TEMPERATURE: 97.4 F

## 2019-09-26 DIAGNOSIS — Z86.718 PERSONAL HISTORY OF VENOUS THROMBOSIS AND EMBOLISM: ICD-10-CM

## 2019-09-26 DIAGNOSIS — L98.9 SKIN LESION: ICD-10-CM

## 2019-09-26 DIAGNOSIS — G25.81 RESTLESS LEG SYNDROME: Primary | ICD-10-CM

## 2019-09-26 PROCEDURE — 99214 OFFICE O/P EST MOD 30 MIN: CPT | Performed by: INTERNAL MEDICINE

## 2019-09-26 RX ORDER — MUPIROCIN 20 MG/G
OINTMENT TOPICAL 3 TIMES DAILY
Qty: 15 G | Refills: 0 | Status: SHIPPED | OUTPATIENT
Start: 2019-09-26 | End: 2019-11-04

## 2019-09-26 ASSESSMENT — PAIN SCALES - GENERAL: PAINLEVEL: MILD PAIN (3)

## 2019-09-26 NOTE — PROGRESS NOTES
Subjective     Robert Richard is a pleasant 84 year old male who presents to clinic today for an emergency department follow up. He was in [place] visiting his brother when his left lower leg became red and swollen.  Given his clotting history, he was concerned for a clot and promptly went in to seek care.  The emergency department in [place] performed an ultrasound on his leg and found no clots.  They applied antibiotic ointment to the healing lesion on his left lower leg, and told him to follow-up with his primary care provider.  Currently, he notes the swelling has decreased considerably. He is still experiencing intermittent neuropathy in his left leg. He started Lyrica on 9/16/2019, and does not report improvement with the medication.     [facility]   ED/UC Followup:    Facility:  [place], [place]   Date of visit: 9/23/19  Reason for visit: Edema of legs, wound, burning, throbbing in leg   Current Status: no change in symptoms        -------------------------------------    Patient Active Problem List   Diagnosis     Venous thrombosis     Restless leg syndrome     Sleep apnea     Factor V Leiden mutation (H)     Prothrombin mutation (H)     Back pain     Hyperlipidemia LDL goal <100     Gout     Malignant basal cell neoplasm of skin     Advanced directives, counseling/discussion     Low back pain     Personal history of venous thrombosis and embolism     Hip joint replacement status - right     Abnormal gait     Obesity     Personal history of prostate cancer     Spinal stenosis     Long term current use of anticoagulant therapy     Essential hypertension     Morbid obesity due to excess calories (H)     Diabetes mellitus, type 2 (H)     CKD (chronic kidney disease) stage 3, GFR 30-59 ml/min (H)     Primary malignant neoplasm of prostate (H)     Past Surgical History:   Procedure Laterality Date     C REVISE TOTAL HIP REPLACEMENT  1/18/13    R hip     JOINT REPLACEMENT  4/2011    R hip      LAMINECTOMY LUMBAR ONE LEVEL  12/2008     LAPAROSCOPIC HERNIORRHAPHY UMBILICAL N/A 1/3/2019    Procedure: laparoscopic umbilical hernia repair with mesh;  Surgeon: Jamal Thompson DO;  Location: [facility] OR       Social History     Tobacco Use     Smoking status: Never Smoker     Smokeless tobacco: Never Used   Substance Use Topics     Alcohol use: Yes     Alcohol/week: 0.0 standard drinks     Comment: 1 drink every 1-2 weeks.     Family History   Problem Relation Age of Onset     Prostate Cancer Paternal Grandfather          Current Outpatient Medications   Medication Sig Dispense Refill     atenolol (TENORMIN) 50 MG tablet Take 1 tablet (50 mg) by mouth 2 times daily 180 tablet 1     losartan (COZAAR) 50 MG tablet Take 1 tablet (50 mg) by mouth daily 90 tablet 3     Multiple Vitamins-Minerals (MULTIVITAMIN ADULT PO)        ORDER FOR DME Wear mask at night 1 Units 0     pramipexole (MIRAPEX) 1 MG tablet TAKE 1 TABLET BY MOUTH TWICE DAILY 60 tablet 0     pregabalin (LYRICA) 100 MG capsule Take 1 capsule (100 mg) by mouth 2 times daily 180 capsule 3     sildenafil (VIAGRA) 25 MG tablet Take 1-3 tablets 30 min to 4 hrs before sex. Do not use with nitroglycerin, terazosin or doxazosin. 20 tablet 11     spironolactone (ALDACTONE) 25 MG tablet Take 1 tablet (25 mg) by mouth daily 90 tablet 3     warfarin ANTICOAGULANT (COUMADIN) 5 MG tablet TAKE 1/2 (ONE-HALF) TABLET BY MOUTH EVERY FRIDAY AND 1 ONCE DAILY THE  REST  OF  WEEK 80 tablet 1     colchicine (COLCRYS) 0.6 MG tablet Take 2 tablets at the first sign of flare, take 1 additional tablet one hour later. (Patient not taking: Reported on 12/27/2018) 30 tablet 0     Allergies   Allergen Reactions     Atorvastatin Calcium      Muscle pain, weakness     Pravastatin Other (See Comments)     muscle aches     Simvastatin Other (See Comments)     muscle aches       -------------------------------------         Review of Systems     ROS COMP: CONSTITUTIONAL: NEGATIVE for  fever, chills, change in weight  INTEGUMENTARY/SKIN: Stasis dermatitis present on the bilateral lower extremities. Evidence of a healing lesion on the medial aspect of the left lower leg with mild erythema present  EYES: NEGATIVE for vision changes or irritation  ENT/MOUTH: NEGATIVE for ear, mouth and throat problems  RESP: NEGATIVE for significant cough or SOB  CV: NEGATIVE for chest pain, palpitations and POSITIVE for lower extremity edema, worse on the left  GI: NEGATIVE for nausea, abdominal pain, heartburn, or change in bowel habits  : NEGATIVE for frequency, dysuria, or hematuria  MUSCULOSKELETAL: NEGATIVE for significant arthralgias or myalgia  NEURO: NEGATIVE for weakness or dizziness  and POSITIVE for occasional burning sensation that radiates up to the middle lower leg; worse on the left  ENDOCRINE: NEGATIVE for temperature intolerance, skin/hair changes  HEME: NEGATIVE for bleeding problems  PSYCHIATRIC: NEGATIVE for changes in mood or affect      Objective    /80 (BP Location: Right arm, Patient Position: Sitting, Cuff Size: Adult Large)   Pulse 70   Temp 97.4  F (36.3  C) (Temporal)   Resp 22   Wt 113.4 kg (250 lb)   SpO2 98%   BMI 35.87 kg/m    Body mass index is 35.87 kg/m .     Physical Exam   GENERAL: healthy, alert and no distress  EYES: Eyes grossly normal to inspection, PERRL and conjunctivae and sclerae normal  RESP: lungs clear to auscultation - no rales, rhonchi or wheezes  CV: regular rates and rhythm, normal S1 S2, no S3 or S4, no murmur, click or rub and 2+ left lower extremity pitting edema    ABDOMEN: soft, nontender, no hepatosplenomegaly, no masses and bowel sounds normal  SKIN: stasis dermatitis to the middle lower leg bilaterally. Evidence of a healing lesion on the medial aspect of the left lower leg with mild erythema. No warmth or purulence present.  NEURO: Normal strength and tone, mentation intact and speech normal  PSYCH: mentation appears normal, affect  normal/bright    Diagnostic Test Results:  none         Assessment & Plan     1. Restless leg syndrome  Continue with trial of Lyrica.  It's unclear if he didn't see a benefit because of the road trip to [place] worsening his edema.     2. Personal history of venous thrombosis and embolism  Continue with anticoagulation. Monitor INR. A high density foam pad for use in the truck was discussed to help prevent against worsening edema.     3. Skin lesion  Bactroban to prevent infection.   - mupirocin (BACTROBAN) 2 % external ointment; Apply topically 3 times daily  Dispense: 15 g; Refill: 0       Follow up in 1 month to discuss Lyrica benefits      Nate Cifuentes, [facility]

## 2019-09-30 ENCOUNTER — MYC MEDICAL ADVICE (OUTPATIENT)
Dept: FAMILY MEDICINE | Facility: OTHER | Age: 84
End: 2019-09-30

## 2019-09-30 DIAGNOSIS — M25.579 PAIN IN JOINT, ANKLE AND FOOT, UNSPECIFIED LATERALITY: Primary | ICD-10-CM

## 2019-09-30 NOTE — TELEPHONE ENCOUNTER
Spoke with patient and informed of message below. Patient understood and was transferred to specialty clinic.     Jazlyn Bennett MA

## 2019-10-03 ENCOUNTER — ANCILLARY PROCEDURE (OUTPATIENT)
Dept: GENERAL RADIOLOGY | Facility: CLINIC | Age: 84
End: 2019-10-03
Attending: PODIATRIST
Payer: MEDICARE

## 2019-10-03 ENCOUNTER — OFFICE VISIT (OUTPATIENT)
Dept: PODIATRY | Facility: CLINIC | Age: 84
End: 2019-10-03
Payer: MEDICARE

## 2019-10-03 VITALS
SYSTOLIC BLOOD PRESSURE: 134 MMHG | HEIGHT: 70 IN | WEIGHT: 250 LBS | BODY MASS INDEX: 35.79 KG/M2 | DIASTOLIC BLOOD PRESSURE: 78 MMHG | TEMPERATURE: 96.8 F

## 2019-10-03 DIAGNOSIS — I87.2 VENOUS STASIS DERMATITIS OF BOTH LOWER EXTREMITIES: ICD-10-CM

## 2019-10-03 DIAGNOSIS — E11.59 TYPE 2 DIABETES MELLITUS WITH OTHER CIRCULATORY COMPLICATION, WITHOUT LONG-TERM CURRENT USE OF INSULIN (H): ICD-10-CM

## 2019-10-03 DIAGNOSIS — I83.022 VENOUS STASIS ULCER OF LEFT CALF LIMITED TO BREAKDOWN OF SKIN WITH VARICOSE VEINS (H): Primary | ICD-10-CM

## 2019-10-03 DIAGNOSIS — L97.221 VENOUS STASIS ULCER OF LEFT CALF LIMITED TO BREAKDOWN OF SKIN WITH VARICOSE VEINS (H): Primary | ICD-10-CM

## 2019-10-03 DIAGNOSIS — M72.2 PLANTAR FASCIITIS OF LEFT FOOT: ICD-10-CM

## 2019-10-03 PROCEDURE — 99203 OFFICE O/P NEW LOW 30 MIN: CPT | Mod: 25 | Performed by: PODIATRIST

## 2019-10-03 PROCEDURE — 29580 STRAPPING UNNA BOOT: CPT | Mod: LT | Performed by: PODIATRIST

## 2019-10-03 PROCEDURE — 73600 X-RAY EXAM OF ANKLE: CPT | Mod: TC

## 2019-10-03 PROCEDURE — 73630 X-RAY EXAM OF FOOT: CPT | Mod: TC

## 2019-10-03 ASSESSMENT — MIFFLIN-ST. JEOR: SCORE: 1835.24

## 2019-10-03 ASSESSMENT — PAIN SCALES - GENERAL: PAINLEVEL: WORST PAIN (10)

## 2019-10-03 NOTE — PROGRESS NOTES
HPI:  Robert Richard is a 84 year old male who is seen in consultation at the request of Nate Cifuentes DO.    Pt presents for eval of:   (Onset, Location, L/R, Character, Treatments, Injury if yes)    XR Left foot and ankle today 10/3/2019    DM Type 2     Onset 8/14/2019, bumped his medial Left ankle getting off his  and caused a minor abrasion. History of DVT. Did drive to [place], [place]. Patient presents today with medial Left ankle ulcer, that has increased in size since onset, and pain that radiates up lower leg, foot ankle lower leg swelling, sharp, stabbing, burning, throbbing, drainage, pain 10      Retired.    Weight management plan: Patient was referred to their PCP to discuss a diet and exercise plan.       Review of Systems:  Patient denies fever, chills, rash, wound, stiffness, limping, numbness, weakness, heart burn, blood in stool, chest pain with activity, calf pain when walking, shortness of breath with activity, chronic cough, easy bleeding/bruising, swelling of ankles, excessive thirst, fatigue, depression, anxiety.  Patient admits only to symptoms noted in history.     Patient Active Problem List   Diagnosis     Venous thrombosis     Restless leg syndrome     Sleep apnea     Factor V Leiden mutation (H)     Prothrombin mutation (H)     Back pain     Hyperlipidemia LDL goal <100     Gout     Malignant basal cell neoplasm of skin     Advanced directives, counseling/discussion     Low back pain     Personal history of venous thrombosis and embolism     Hip joint replacement status - right     Abnormal gait     Obesity     Personal history of prostate cancer     Spinal stenosis     Long term current use of anticoagulant therapy     Essential hypertension     Morbid obesity due to excess calories (H)     Diabetes mellitus, type 2 (H)     CKD (chronic kidney disease) stage 3, GFR 30-59 ml/min (H)     Primary malignant neoplasm of prostate (H)     PAST MEDICAL HISTORY:    Past Medical History:   Diagnosis Date     Basal cell carcinoma      Cancer (H)      DVT (deep venous thrombosis) (H) 11/2003     DVT (deep venous thrombosis) (H) 12/08     DVT (deep venous thrombosis) (H) 10/2010     Gout      Other and unspecified hyperlipidemia      Restless leg syndrome      Unspecified essential hypertension      PAST SURGICAL HISTORY:   Past Surgical History:   Procedure Laterality Date     C REVISE TOTAL HIP REPLACEMENT  1/18/13    R hip     JOINT REPLACEMENT  4/2011    R hip     LAMINECTOMY LUMBAR ONE LEVEL  12/2008     LAPAROSCOPIC HERNIORRHAPHY UMBILICAL N/A 1/3/2019    Procedure: laparoscopic umbilical hernia repair with mesh;  Surgeon: Jamal Thompson DO;  Location:  OR     MEDICATIONS:   Current Outpatient Medications:      atenolol (TENORMIN) 50 MG tablet, Take 1 tablet (50 mg) by mouth 2 times daily, Disp: 180 tablet, Rfl: 1     colchicine (COLCRYS) 0.6 MG tablet, Take 2 tablets at the first sign of flare, take 1 additional tablet one hour later., Disp: 30 tablet, Rfl: 0     losartan (COZAAR) 50 MG tablet, Take 1 tablet (50 mg) by mouth daily, Disp: 90 tablet, Rfl: 3     Multiple Vitamins-Minerals (MULTIVITAMIN ADULT PO), , Disp: , Rfl:      mupirocin (BACTROBAN) 2 % external ointment, Apply topically 3 times daily, Disp: 15 g, Rfl: 0     ORDER FOR DME, Wear mask at night, Disp: 1 Units, Rfl: 0     pramipexole (MIRAPEX) 1 MG tablet, TAKE 1 TABLET BY MOUTH TWICE DAILY, Disp: 60 tablet, Rfl: 0     pregabalin (LYRICA) 100 MG capsule, Take 1 capsule (100 mg) by mouth 2 times daily, Disp: 180 capsule, Rfl: 3     sildenafil (VIAGRA) 25 MG tablet, Take 1-3 tablets 30 min to 4 hrs before sex. Do not use with nitroglycerin, terazosin or doxazosin., Disp: 20 tablet, Rfl: 11     spironolactone (ALDACTONE) 25 MG tablet, Take 1 tablet (25 mg) by mouth daily, Disp: 90 tablet, Rfl: 3     warfarin ANTICOAGULANT (COUMADIN) 5 MG tablet, TAKE 1/2 (ONE-HALF) TABLET BY MOUTH EVERY FRIDAY AND  1 ONCE DAILY THE  REST  OF  WEEK, Disp: 80 tablet, Rfl: 1  ALLERGIES:    Allergies   Allergen Reactions     Atorvastatin Calcium      Muscle pain, weakness     Pravastatin Other (See Comments)     muscle aches     Simvastatin Other (See Comments)     muscle aches     SOCIAL HISTORY:   Social History     Socioeconomic History     Marital status:      Spouse name: Not on file     Number of children: Not on file     Years of education: Not on file     Highest education level: Not on file   Occupational History     Not on file   Social Needs     Financial resource strain: Not on file     Food insecurity:     Worry: Not on file     Inability: Not on file     Transportation needs:     Medical: Not on file     Non-medical: Not on file   Tobacco Use     Smoking status: Never Smoker     Smokeless tobacco: Never Used   Substance and Sexual Activity     Alcohol use: Yes     Alcohol/week: 0.0 standard drinks     Comment: 1 drink every 1-2 weeks.     Drug use: No     Sexual activity: Yes     Partners: Female   Lifestyle     Physical activity:     Days per week: Not on file     Minutes per session: Not on file     Stress: Not on file   Relationships     Social connections:     Talks on phone: Not on file     Gets together: Not on file     Attends Yarsani service: Not on file     Active member of club or organization: Not on file     Attends meetings of clubs or organizations: Not on file     Relationship status: Not on file     Intimate partner violence:     Fear of current or ex partner: Not on file     Emotionally abused: Not on file     Physically abused: Not on file     Forced sexual activity: Not on file   Other Topics Concern     Parent/sibling w/ CABG, MI or angioplasty before 65F 55M? Not Asked   Social History Narrative     Not on file     FAMILY HISTORY:   Family History   Problem Relation Age of Onset     Prostate Cancer Paternal Grandfather      EXAM:Vitals: /78 (BP Location: Left arm, Cuff Size: Adult  "Large)   Temp 96.8  F (36  C) (Temporal)   Ht 1.778 m (5' 10\")   Wt 113.4 kg (250 lb)   BMI 35.87 kg/m    BMI= Body mass index is 35.87 kg/m .    General appearance: Patient is alert and fully cooperative with history & exam.  No sign of distress is noted during the visit.     Psychiatric: Affect is pleasant & appropriate.  Patient appears motivated to improve health.     Respiratory: Breathing is regular & unlabored while sitting.     HEENT: Hearing is intact to spoken word.  Speech is clear.  No gross evidence of visual impairment that would impact ambulation.     Vascular: DP 1/4 & PT 1/4 left & right.  CFT delayed with dependent rubor noted about the digits.  Diminished hair growth distal to mid tibia and no hair about the foot and toes.  Temperature changes noted, warm to cool proximal to distal.  Hemosiderin pigmentation noted with multiple varicosities legs and feet bilateral. Generalized edema bilateral legs and feet.  Pt denies claudication history.     Neurologic: Normal plantar response bilateral.  Intact protective threshold plus 10/10 applications of a 5.07 monofilament.  Pt admits burning and paraesthesias about the feet and toes with palpation.     Dermatologic: Toenails are in reasonable repair.  Venous stasis ulceration measuring 5 x 6 cm noted about the distal medial left calf.     Musculoskeletal: Patient is ambulatory without assistive device or brace.  There is semi reducible contracture of the lesser digits.    Hemoglobin A1C (%)   Date Value   06/20/2019 7.1 (H)   10/16/2018 6.4 (H)   05/15/2018 6.5 (H)   10/18/2017 6.6 (H)   04/20/2016 6.5     Creatinine (mg/dL)   Date Value   08/16/2019 1.42 (H)   06/20/2019 1.39 (H)   12/27/2018 1.51 (H)   05/15/2018 1.46 (H)   10/18/2017 1.40 (H)   06/30/2017 1.70 (H)          ASSESSMENT:       ICD-10-CM    1. Venous stasis ulcer of left calf limited to breakdown of skin with varicose veins (H) I83.022 WOUND CARE REFERRAL    L97.221 UNNA BOOT " APPLICATION   2. Type 2 diabetes mellitus with other circulatory complication, without long-term current use of insulin (H) E11.59 WOUND CARE REFERRAL     UNNA BOOT APPLICATION   3. Venous stasis dermatitis of both lower extremities I87.2 WOUND CARE REFERRAL     UNNA BOOT APPLICATION       Nursing Note:   Left medial shin wound measured approximately 5 cm x 6 cm x < 0.1 cm. This was covered with silvercel and unna boot. Patient was scheduled for unna boot change with RN on Monday, WOCN consult on Thursday, follow with WOCN until 1 month follow up with Dr. García.    Regina OSEGUERA RN. . .  10/3/2019, 9:48 AM      PLAN:    10/3/2019     We discussed risk factors and preventive measures.    We discussed appropriate hygiene, shoe gear, daily foot exam, and reinforced management of weight, diet, activity goals and HA1C goal for diabetic patients.    Dispensed written foot care instructions.      Order was placed today for wound care referral possibly for Unna boot therapy and follow-up with me in about 1 month and hopefully the wound will be closed at that time and we can move forward with compression therapy chronically.  We discussed the etiology and alternative treatment options as well as potential risks and complications.  The Unna boot was applied today.  Please refer the patient back sooner if he would like to discontinue Unna boots faster.  He will follow-up for Unna boot change here with our RN specialty care RN as it likely will take a week to get the patient into wound care.      Khadar García DPM

## 2019-10-03 NOTE — PATIENT INSTRUCTIONS
"DIABETES AND YOUR FEET    What effect does diabetes have on the feet?  Diabetes can result in several problems in the feet including contractures of the tendons leading to deformities and reduced function of the bones, skin ulcers or open sores on pressure points or prominent deformities, reduced sensation, reduced blood flow and thus reduced oxygen and immune cells to the tiny vessels in our feet. This all leads to higher risk of hospitalization, infections, and amputations.     What is neuropathy?  Neuropathy is a term used to describe a loss of nerve function.  Patients with diabetes are at risk of developing neuropathy if their sugars continue to run high and are above the normal value of 140.  The elevated blood sugar in the body enters the nerves causing it to swell and impair nerve function.  The higher the blood sugar and the longer it is elevated, the more damage is done to nerves.  This damage is permanent and irreversible.  These damaged sensory nerves can then cause reduced feeling or cause pain.  Damaged motor nerves can reduce blood flow and white blood cells into into your foot, skin and bones reducing your ability to heal a small problem. And neuropathy can cause tendons to become unbalanced and contribute to the formation of deformity and contractures in our feet. Often times, neuropathy can be prevented by controlling your blood sugar.  Your risk of developing neuropathy goes up dramatically as your hemoglobin A1C raises above 7.5.      How do I know if I have neuropathy?  When a person develops neuropathy, they usually begin to feel numbness or tingling in their feet and sometimes in their legs.  Other symptoms may include painful burning or hot feet, tingling, electrical sensations or feeling like insects or ants are crawling on your feet or legs.  If blood sugar remains above 140  for long periods of time, neuropathy can also occur in the hands.  When a person loses their \"protective threshold\" " or ability to detect a 5.07 [place] monofilament is when they have elevated risk for developing foot deformity, contractures, foot infections, amputations, Charcot arthropathy, or other complications. Keep your hemoglobin A1C below 7.5 to reduce this risk.    What is vascular disease?  Peripheral vascular disease is a term used to describe a loss or decrease in circulation (blood flow).  There is a problem in getting blood, immune cells, and oxygen to areas that need it.  Similar to neuropathy, sugars can build up in the walls of the arteries (blood vessels) and cause them to become swollen, thickened and hardened.  This decreases the amount of blood that can go to an area that needs it.  Though this is common in the legs of diabetic patients, it can also affect other arteries (blood vessels) in the body such as in the heart, kidney, eyes, and the blood flow into bones.  It is often seen first in the small vessels of her body notably our feet and toes.    How do I know if I have vascular disease?  In the legs, vascular disease usually results in cramping.  Patients who develop leg cramps after walking the same distance every time (i.e. One block, half a mile, ect.) need to let their doctors know so that their circulation may be checked.  Cramps causing severe pain in the feet and/or legs while sleeping and the cramps go away when you stand or hang your legs off the side of the bed, may also be a sign of poor blood circulation.  Occasional cramping in cold weather or on rare occasions with activity may not be due to poor circulation, but you should inform your doctor.    How can these problems be prevented?  The key to prevention is good blood sugar control all day every day.  Inadequate blood sugar control is the most common way patients experience these problems. Reducing, controlling and measuring your daily consumption of sugar or carbohydrates is essential to understanding and managing diabetes.   Physical activity (exercise) is a very good way to help decrease your blood sugars.  Exercise can lower your blood sugar, blood pressure, and cholesterol.  It also reduces your risk for heart disease and stroke, relieves stress, and strengthens your heart, muscles and bones. Physical activity also increases your balance and reduces development of contractures and foot deformities over time. In addition, regular activity helps insulin work better, improves your blood circulation, and keeps your joints flexible.  If you're trying to lose weight, a combination of exercise and wise food choices can help you reach your target weight and maintain it.  Activity and exercise alone can not make up for poor diet choices, eating too much, or eating too many sugars or carbohydrates.  Ask your doctor for help when you are not meeting your blood sugar goals. Changes or increases in medication are powerful tools in reducing your blood sugar.    Know your blood sugar and hemoglobin A1C trend.  Upon first diagnosis or during acute illness, checking your blood sugar 4 times a day can help you understand how your diet, activity, and lifestyle affect your blood sugar.  Monitoring your hemoglobin A1C can help you understand how well you are managing blood sugar over the long run.  Your hemoglobin A1C tells you what your blood sugar averages all day, every day, over the past 90 days.       To experience the lowest risk of complications associated with diabetes such as neuropathy, loss of blood flow, bone or joint infection, charcot arthropathy, or amputation, the American Diabetes Association recommends a target hemoglobin A1C of less than 7.0%, while the American Association of Clinical Endocrinologists' recommendation is 6.5% or less.  Both organizations advise that the goals be individualized based on patient factors such as other health conditions, history of hypoglycemia, education, and life expectancy.  A patients risk of  experiencing complications associated with diabetes is only slightly elevated with a hemoglobin A1C above 6.0.  However, this risk goes up exponentially when the hemoglobin A1C is above 7.5.  The longer the hemoglobin A1C is elevated, the more risk that patient will experience in their lifetime. The damage that occurs to nerves, blood vessels, tendons, bones and body organs, while their hemoglobin A1C is elevated is mostly irreversible and worsens with each additional time period of elevated hemoglobin A1C.     You must understand and manage your disease.  Your health insurance or medical team cannot manage this disease for you.  When you take responsibility for understanding and managing your disease, you can expect to experience fewer problems associated with diabetes in your lifetime.  You will  Also experience a higher quality of life and health and reduced cost of health care.              Diabetic Foot Care Recommendations  The following are recommendations for avoiding serious foot problems or injury    DO'S  1. Be aware of your hemoglobin A1C and continue to follow up with your medical team for adjustments in your lifestyle and medication until your reach your A1C goal.  Keep this below 7.5 to reduce your risk of developing complications associated with diabetes.    2.  Wash your feet with lukewarm water and a mild soap and then dry them thoroughly, especially between the toes.  Gently floss your towel or washcloth between each toe at every bath.  Soaking your feet in water cannot clean dead skin, debris, and bacteria from your feet and is not necessary.   3. Examine your feet daily looking for cuts, corns, blisters, cracks, ect..., especially after wearing new shoes or increased or changed activities.  Make sure to look between your toes.  If you cannot see the bottom of your feet, set a mirror on the floor and hold your foot over it, or ask a family member to examine your feet for you daily.  Contact your  doctor immediately if new problems are noted or if sores are not healing.  4.  Immediately apply moisturizer cream such as Cetaphil to the tops and bottoms of your feet, avoiding areas between the toes.  Apply sunscreen or cover your feet if they will be exposed to extended sunlight.  5.Use clean comfortable shoes.  Socks should not have thick seams or cut off the circulation around the leg.  Break in new shoes slowly and rotate with older shoes until broken in.  Check the inside of your shoes with your hand to look for areas of irritation or objects that may have fallen into your shoes.    6. Keep slippers by the side of your bed for use during the night.  7. Shoes should be fitted by a professional and should not cause areas of irritation.  Check your feet regularly when wearing a new pair of shoes and replace them as needed.  8.  Talk to your doctor about proper exercise.  Exercise and stretching stimulate blood flow to your feet and maintain proper glucose levels.  Use it or lose it!  9.  Monitor your blood glucose level and your hemoglobin A1C.  Notify your doctor immediately if your blood sugar is abnormally high or low.  10.  Cut your nails straight across, but then gently round any sharp edges with a nail file.  If you have neuropathy, peripheral vascular disease or cannot see that well to trim your own toenails, see a medical professional for care.    DONT'S  1.  Do not soak your feet if you have an open sore or your provider has informed you that you have neuropathy or loss of protective threshold.  Use only lukewarm water and always check the temperature with your hand as hot water can easily burn your feet.    2.  Never use a hot water bottle or heating pad on your feet.  Also do not apply hot or cold compresses to your feet.  With decreased sensation, you could burn or freeze your feet.  Do not rest your feet near a heat source such as a heater or heat register.    3.  Do not apply any of these to your  feet:    - over the counter medicine for corns or warts    -  Harsh chemicals like boric acid    -  Do not self-treat corns, cuts, blisters or infections.  Always consult your doctor.   4.  Do not wear sandals, slippers or walk barefoot, especially on harsh surfaces.  5.  If you smoke, stop!!!

## 2019-10-03 NOTE — LETTER
10/3/2019         RE: Robert Richard  [address] [facility] [address]        Dear Colleague,    Thank you for referring your patient, Robert Richard, to the [facility]. Please see a copy of my visit note below.    HPI:  Robert Richard is a 84 year old male who is seen in consultation at the request of Nate Cifuentes DO.    Pt presents for eval of:   (Onset, Location, L/R, Character, Treatments, Injury if yes)    XR Left foot and ankle today 10/3/2019    DM Type 2     Onset 8/14/2019, bumped his medial Left ankle getting off his  and caused a minor abrasion. History of DVT. Did drive to [place], [place]. Patient presents today with medial Left ankle ulcer, that has increased in size since onset, and pain that radiates up lower leg, foot ankle lower leg swelling, sharp, stabbing, burning, throbbing, drainage, pain 10      Retired.    Weight management plan: Patient was referred to their PCP to discuss a diet and exercise plan.       Review of Systems:  Patient denies fever, chills, rash, wound, stiffness, limping, numbness, weakness, heart burn, blood in stool, chest pain with activity, calf pain when walking, shortness of breath with activity, chronic cough, easy bleeding/bruising, swelling of ankles, excessive thirst, fatigue, depression, anxiety.  Patient admits only to symptoms noted in history.     Patient Active Problem List   Diagnosis     Venous thrombosis     Restless leg syndrome     Sleep apnea     Factor V Leiden mutation (H)     Prothrombin mutation (H)     Back pain     Hyperlipidemia LDL goal <100     Gout     Malignant basal cell neoplasm of skin     Advanced directives, counseling/discussion     Low back pain     Personal history of venous thrombosis and embolism     Hip joint replacement status - right     Abnormal gait     Obesity     Personal history of prostate cancer     Spinal stenosis     Long term current use of anticoagulant  therapy     Essential hypertension     Morbid obesity due to excess calories (H)     Diabetes mellitus, type 2 (H)     CKD (chronic kidney disease) stage 3, GFR 30-59 ml/min (H)     Primary malignant neoplasm of prostate (H)     PAST MEDICAL HISTORY:   Past Medical History:   Diagnosis Date     Basal cell carcinoma      Cancer (H)      DVT (deep venous thrombosis) (H) 11/2003     DVT (deep venous thrombosis) (H) 12/08     DVT (deep venous thrombosis) (H) 10/2010     Gout      Other and unspecified hyperlipidemia      Restless leg syndrome      Unspecified essential hypertension      PAST SURGICAL HISTORY:   Past Surgical History:   Procedure Laterality Date     C REVISE TOTAL HIP REPLACEMENT  1/18/13    R hip     JOINT REPLACEMENT  4/2011    R hip     LAMINECTOMY LUMBAR ONE LEVEL  12/2008     LAPAROSCOPIC HERNIORRHAPHY UMBILICAL N/A 1/3/2019    Procedure: laparoscopic umbilical hernia repair with mesh;  Surgeon: Jamal Thompson DO;  Location:  OR     MEDICATIONS:   Current Outpatient Medications:      atenolol (TENORMIN) 50 MG tablet, Take 1 tablet (50 mg) by mouth 2 times daily, Disp: 180 tablet, Rfl: 1     colchicine (COLCRYS) 0.6 MG tablet, Take 2 tablets at the first sign of flare, take 1 additional tablet one hour later., Disp: 30 tablet, Rfl: 0     losartan (COZAAR) 50 MG tablet, Take 1 tablet (50 mg) by mouth daily, Disp: 90 tablet, Rfl: 3     Multiple Vitamins-Minerals (MULTIVITAMIN ADULT PO), , Disp: , Rfl:      mupirocin (BACTROBAN) 2 % external ointment, Apply topically 3 times daily, Disp: 15 g, Rfl: 0     ORDER FOR DME, Wear mask at night, Disp: 1 Units, Rfl: 0     pramipexole (MIRAPEX) 1 MG tablet, TAKE 1 TABLET BY MOUTH TWICE DAILY, Disp: 60 tablet, Rfl: 0     pregabalin (LYRICA) 100 MG capsule, Take 1 capsule (100 mg) by mouth 2 times daily, Disp: 180 capsule, Rfl: 3     sildenafil (VIAGRA) 25 MG tablet, Take 1-3 tablets 30 min to 4 hrs before sex. Do not use with nitroglycerin, terazosin  or doxazosin., Disp: 20 tablet, Rfl: 11     spironolactone (ALDACTONE) 25 MG tablet, Take 1 tablet (25 mg) by mouth daily, Disp: 90 tablet, Rfl: 3     warfarin ANTICOAGULANT (COUMADIN) 5 MG tablet, TAKE 1/2 (ONE-HALF) TABLET BY MOUTH EVERY FRIDAY AND 1 ONCE DAILY THE  REST  OF  WEEK, Disp: 80 tablet, Rfl: 1  ALLERGIES:    Allergies   Allergen Reactions     Atorvastatin Calcium      Muscle pain, weakness     Pravastatin Other (See Comments)     muscle aches     Simvastatin Other (See Comments)     muscle aches     SOCIAL HISTORY:   Social History     Socioeconomic History     Marital status:      Spouse name: Not on file     Number of children: Not on file     Years of education: Not on file     Highest education level: Not on file   Occupational History     Not on file   Social Needs     Financial resource strain: Not on file     Food insecurity:     Worry: Not on file     Inability: Not on file     Transportation needs:     Medical: Not on file     Non-medical: Not on file   Tobacco Use     Smoking status: Never Smoker     Smokeless tobacco: Never Used   Substance and Sexual Activity     Alcohol use: Yes     Alcohol/week: 0.0 standard drinks     Comment: 1 drink every 1-2 weeks.     Drug use: No     Sexual activity: Yes     Partners: Female   Lifestyle     Physical activity:     Days per week: Not on file     Minutes per session: Not on file     Stress: Not on file   Relationships     Social connections:     Talks on phone: Not on file     Gets together: Not on file     Attends [employer] service: Not on file     Active member of club or organization: Not on file     Attends meetings of clubs or organizations: Not on file     Relationship status: Not on file     Intimate partner violence:     Fear of current or ex partner: Not on file     Emotionally abused: Not on file     Physically abused: Not on file     Forced sexual activity: Not on file   Other Topics Concern     Parent/sibling w/ CABG, MI or  "angioplasty before 65F 55M? Not Asked   Social History Narrative     Not on file     FAMILY HISTORY:   Family History   Problem Relation Age of Onset     Prostate Cancer Paternal Grandfather      EXAM:Vitals: /78 (BP Location: Left arm, Cuff Size: Adult Large)   Temp 96.8  F (36  C) (Temporal)   Ht 1.778 m (5' 10\")   Wt 113.4 kg (250 lb)   BMI 35.87 kg/m     BMI= Body mass index is 35.87 kg/m .    General appearance: Patient is alert and fully cooperative with history & exam.  No sign of distress is noted during the visit.     Psychiatric: Affect is pleasant & appropriate.  Patient appears motivated to improve health.     Respiratory: Breathing is regular & unlabored while sitting.     HEENT: Hearing is intact to spoken word.  Speech is clear.  No gross evidence of visual impairment that would impact ambulation.     Vascular: DP 1/4 & PT 1/4 left & right.  CFT delayed with dependent rubor noted about the digits.  Diminished hair growth distal to mid tibia and no hair about the foot and toes.  Temperature changes noted, warm to cool proximal to distal.  Hemosiderin pigmentation noted with multiple varicosities legs and feet bilateral. Generalized edema bilateral legs and feet.  Pt denies claudication history.     Neurologic: Normal plantar response bilateral.  Intact protective threshold plus 10/10 applications of a 5.07 monofilament.  Pt admits burning and paraesthesias about the feet and toes with palpation.     Dermatologic: Toenails are in reasonable repair.  Venous stasis ulceration measuring 5 x 6 cm noted about the distal medial left calf.     Musculoskeletal: Patient is ambulatory without assistive device or brace.  There is semi reducible contracture of the lesser digits.    Hemoglobin A1C (%)   Date Value   06/20/2019 7.1 (H)   10/16/2018 6.4 (H)   05/15/2018 6.5 (H)   10/18/2017 6.6 (H)   04/20/2016 6.5     Creatinine (mg/dL)   Date Value   08/16/2019 1.42 (H)   06/20/2019 1.39 (H)   12/27/2018 " 1.51 (H)   05/15/2018 1.46 (H)   10/18/2017 1.40 (H)   06/30/2017 1.70 (H)          ASSESSMENT:       ICD-10-CM    1. Venous stasis ulcer of left calf limited to breakdown of skin with varicose veins (H) I83.022 WOUND CARE REFERRAL    L97.221 UNNA BOOT APPLICATION   2. Type 2 diabetes mellitus with other circulatory complication, without long-term current use of insulin (H) E11.59 WOUND CARE REFERRAL     UNNA BOOT APPLICATION   3. Venous stasis dermatitis of both lower extremities I87.2 WOUND CARE REFERRAL     UNNA BOOT APPLICATION       Nursing Note:   Left medial shin wound measured approximately 5 cm x 6 cm x < 0.1 cm. This was covered with silvercel and unna boot. Patient was scheduled for unna boot change with RN on Monday, WOCN consult on Thursday, follow with WOCN until 1 month follow up with Dr. García.    Regina OSEGUERA RN. . .  10/3/2019, 9:48 AM      PLAN:    10/3/2019     We discussed risk factors and preventive measures.    We discussed appropriate hygiene, shoe gear, daily foot exam, and reinforced management of weight, diet, activity goals and HA1C goal for diabetic patients.    Dispensed written foot care instructions.      Order was placed today for wound care referral possibly for Unna boot therapy and follow-up with me in about 1 month and hopefully the wound will be closed at that time and we can move forward with compression therapy chronically.  We discussed the etiology and alternative treatment options as well as potential risks and complications.  The Unna boot was applied today.  Please refer the patient back sooner if he would like to discontinue Unna boots faster.  He will follow-up for Unna boot change here with our RN specialty care RN as it likely will take a week to get the patient into wound care.      Khadar García DPM      Again, thank you for allowing me to participate in the care of your patient.        Sincerely,        Khadar García DPM

## 2019-10-07 ENCOUNTER — ALLIED HEALTH/NURSE VISIT (OUTPATIENT)
Dept: FAMILY MEDICINE | Facility: CLINIC | Age: 84
End: 2019-10-07
Payer: MEDICARE

## 2019-10-07 VITALS — TEMPERATURE: 98.1 F

## 2019-10-07 DIAGNOSIS — Z51.89 ENCOUNTER FOR WOUND CARE: Primary | ICD-10-CM

## 2019-10-07 PROCEDURE — 99211 OFF/OP EST MAY X REQ PHY/QHP: CPT

## 2019-10-07 NOTE — NURSING NOTE
Patient here today for left Unna boot change. He will see WOCN on Thursday.  The medial shin wound was cleansed with microklenz and covered with silvercel.  The unna boot, kerlix and guaze were put in place. Patient educated about the s/s that would warrant calling or seeking care.     Regina OSEGUERA RN. . .  10/7/2019, 4:06 PM    
yes

## 2019-10-10 ENCOUNTER — HOSPITAL ENCOUNTER (OUTPATIENT)
Dept: WOUND CARE | Facility: CLINIC | Age: 84
Discharge: HOME | End: 2019-10-10
Attending: PODIATRIST | Admitting: PODIATRIST
Payer: MEDICARE

## 2019-10-10 PROCEDURE — G0463 HOSPITAL OUTPT CLINIC VISIT: HCPCS | Mod: 25

## 2019-10-10 PROCEDURE — 29580 STRAPPING UNNA BOOT: CPT

## 2019-10-10 NOTE — DISCHARGE INSTRUCTIONS
Today it appears the wound on your inner left lower leg is improving well.  It is smaller in size than when Regina saw you this past Monday.    We reapplied the Unna's boot wrap with the use of the silver alginate which goes directly against the wound to provide absorbency and an antimicrobial element to fight any infection.    I will see you again next week on Monday to re assess the wound and re wrap you lower leg.    Keep the wrap dry and intact.  If it slides down or if the wrap gets wet it is best to remove it than let it sit and cause damage.    Any concerns call our scheduling department at [phone] and they will direct your call as needed.      Thanks for coming in today.      Reji Malhotra RN cwocn

## 2019-10-10 NOTE — PROGRESS NOTES
Reason For Visit: Robert Richard, 84 year old male, seen as outpatient to evaluate and treat a left medial lower leg venous stasis ulcer. Referred by Dr García. Patient presents by himself  today.      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  It appeared to be a minor abrasion but increased in size and volume of drainage and pain worsened.    Personal/social history: Pt lives with family independently in private home.    Objective:   Physical appearance: alert and oriented  Ambulation: with use of a single pointed cane  Current treatment plan: Silvercel to wound with Unna's boot and Ace for compression.  Last changed: 10/7/19    Wound #1 Left medial lower leg, initial cause was trauma but complicated by venous insuffiencey and edema.  Stage/tissue depth: partial thickness  3 cm L x 3.5 cm W x 0.1 cm D  Tunneling: none  Undermining: none  Wound bed type/amount: 90 % yellow nonviable tissue and 10 % red nongranular tissue; NA fluctuant  Wound Edges: open  Periwound: edema nonpitting with hemosiderin staining  Drainage: moderate amounts of serosanguinous drainage  Odor: no  Pain: yes at site itself and surrounding radiating pain from entire left lower leg.    Dorsalis Pedal Pulse: is palpable on the left DP: NA doppler: NA phasic  Hair growth: none noted below the knee  Capillary Refill: less than 3 seconds  Feet/toes color: normal skin tone with scattered hemosiderin staining  Nails: thick but wnl on left  R Leg: Edema Not assessed this visit. Ankle circumference NA cm. Calf circumference NA cm.  L Leg: Edema nopitting. Ankle circumference NA cm. Calf circumference 41 cm.    Mobility: limited by pain   Current offloading/footwear: bought larger Crock like shoes/sandals  Sensation: wnl  HgbA1C: 7.1 Date: 6/20/19  Checks Blood Glucose?:  Pt reports he does not check his blood sugars at home.  Other callousing/areas of  concern: none noted today    Diet: Regular  Smoking: no    Discussed: etiology of wound (trauma initially but chronic r/t venous insuffiency), pathophysiology and patient specific goals for wound healing.   Education: On role of woc nurse in the clinic and home care settings.  Wound origin and complicated/delayed healing due to venous insuffiency.  Role of compression/ Unna's boot in healing, role of silvercel in healing.  Rational for continued use and application of both.  F/u plans in the wound and ostomy clinic and then Dr García in a few weeks.      Assessment:  Today it appears the wound on the pt's medial left lower leg is improving well.  It is smaller in size than when Regina saw him this past Monday.  No outward signs of infection noted today.  No new concerns noted      Barriers to wound healing:   Poor nutrition: inadequate supply of protein, carbohydrates, fatty acids, and trace elements essential for all phases of wound healing, teaching on need for increased protein in diet for wound healing  Reduced Blood Supply: inadequate perfusion to heal wound  Medication: NA  Chemotherapy: suppresses the immune system and inflammatory response  Radiotherapy: increases production of free radical which damage cells  Psychological stress: NA  Obesity: decreases tissue perfusion  Infection: prolongs inflammatory phase, uses vital nutrients, impairs epithelialization and releases toxins, none noted at this time  Underlying Disease: diabetes mellitis present, did teaching on why checking blood sugars at least daily would be an early indicator of infection risk  Maceration: reduces wound tensile strength and inhibits epithelial migration  Patient compliance  Unrelieved pressure  Immobility  Substance abuse: NA    Plan: We reapplied the Unna's boot to the pt's left LE and foot with the use of the Silvercel over the open wound.  Pt will return in 4 days for re evaluation and wound cares    Topical care: Silvercel and  Unna's boot  Offloading: NA  Additional recommendations: Keep left LE elevated as much as able    Wound Care: Wound cleansed with Microklenz and gauze, patted dry. Periwound protected with Sween 24. Wound base filled with Silvercel. Covered with Unna's boot distal foot to just below the knee, followed by roll gauze. Secured with Ace wrap. To be changed twice weekly till drainage is more controlled and edema is near base line.    Discussed plan of care with patient. Teaching done with patient for dressing changes; he will have compression/Unna's boot cares done twice weekly in the wound and ostomy clinic or specialty clinic.    The following discharge instructions were reviewed with and sent home with the patient: See discharge instructions    The following supplies were sent home with the patient:  none  Will reassess care plan in 4 days and order patient supplies as needed    Return visit: 10/14/19    Verbal, written, & demonstrative education provided.  Face to face time (excluding procedure): approximately 40 minutes.  Procedure: approximately 20 minutes application of Unna's boot  Care plan was not changed.    [phone]

## 2019-10-14 ENCOUNTER — HOSPITAL ENCOUNTER (OUTPATIENT)
Dept: WOUND CARE | Facility: CLINIC | Age: 84
Discharge: HOME | End: 2019-10-14
Attending: PODIATRIST | Admitting: PODIATRIST
Payer: MEDICARE

## 2019-10-14 PROCEDURE — 29580 STRAPPING UNNA BOOT: CPT

## 2019-10-14 PROCEDURE — G0463 HOSPITAL OUTPT CLINIC VISIT: HCPCS | Mod: 25

## 2019-10-14 NOTE — PROGRESS NOTES
Reason For Visit: Robert Richard, 84 year old male, seen as outpatient to re evaluate and treat a left medial lower leg venous stasis ulcer. Referred by Dr García. Patient presents by himself  today.       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  It appeared to be a minor abrasion but increased in size and volume of drainage and pain worsened.     Personal/social history: Pt lives with family independently in private home.     Objective:   Physical appearance: alert and oriented  Ambulation: with use of a single pointed cane  Current treatment plan: Silvercel to wound with Unna's boot and Ace for compression.  Last changed: 10/10/19     Wound #1 Left medial lower leg, initial cause was trauma but complicated by venous insuffiencey and edema.  Stage/tissue depth: partial thickness  1.5 cm L x 1.5 cm W x 0.1 cm D  Tunneling: none  Undermining: none  Wound bed type/amount: approximately 70 % intact new epidermal tissue and 30 % red nongranular tissue; NA fluctuant  Wound Edges: open  Periwound: edema nonpitting with hemosiderin staining  Drainage: small amounts of serosanguinous drainage  Odor: no  Pain: yes at site itself and surrounding radiating pain from entire left lower leg.     Dorsalis Pedal Pulse: is palpable on the left DP: NA doppler: NA phasic  Hair growth: none noted below the knee  Capillary Refill: less than 3 seconds  Feet/toes color: normal skin tone with scattered hemosiderin staining  Nails: thick but wnl on left  R Leg: Edema Not assessed this visit. Ankle circumference NA cm. Calf circumference NA cm.  L Leg: Edema nopitting. Ankle circumference NA cm. Calf circumference 41 cm.  No change.     Mobility: limited by pain   Current offloading/footwear: bought larger Crock like shoes/sandals  Sensation: wnl  HgbA1C: 7.1 Date: 6/20/19  Checks Blood Glucose?:  Pt reports he does not check his blood sugars at  home.  Other callousing/areas of concern: none noted today     Diet: Regular  Smoking: no     Discussed: etiology of wound (trauma initially but chronic r/t venous insuffiency), pathophysiology and patient specific goals for wound healing.   Education: Wound status, rational to continue with the current cares but to decreased frequency of dressing changes as drainage is reduced, f/u visit for wound and ostomy clinic and contact info for clinics here at [facility].       Assessment:  The wound area continues to improve.  Area measured above has two small open spots, one is just a small 1 mm x 1 mm spot of red nongranular tissue and the other is approximately 1 cm L x 0.5 cm W x 0.1 cm D.  No signs of infection, drainage has decreased, pt is tolerating the Unna's boot compression well.       Barriers to wound healing:   Poor nutrition: inadequate supply of protein, carbohydrates, fatty acids, and trace elements essential for all phases of wound healing, teaching on need for increased protein in diet for wound healing  Reduced Blood Supply: inadequate perfusion to heal wound  Medication: NA  Chemotherapy: suppresses the immune system and inflammatory response  Radiotherapy: increases production of free radical which damage cells  Psychological stress: NA  Obesity: decreases tissue perfusion  Infection: prolongs inflammatory phase, uses vital nutrients, impairs epithelialization and releases toxins, none noted at this time  Underlying Disease: diabetes mellitis present, did teaching on why checking blood sugars at least daily would be an early indicator of infection risk  Maceration: reduces wound tensile strength and inhibits epithelial migration  Patient compliance  Unrelieved pressure  Immobility  Substance abuse: NA     Plan:     We reapplied the Unna's boot wrap with the Silvercel today.  As the drainage was well contained we change decreased the frequency of the dressing changes to once a week.  Pt instructed to  keep the wrap dry and intact.  If it slides down or if the wrap gets wet it is best to remove it than let it sit and cause damage.  He was instructed to contact the scheduling department if he needed to remove the wrap to schedule re wrapping soon than next Monday     Topical care: Silvercel and Unna's boot  Offloading: NA  Additional recommendations: Keep left LE elevated as much as able     Wound Care: Wound cleansed with Microklenz and gauze, patted dry. Periwound protected with Sween 24. Wound base filled with Silvercel. Covered with Unna's boot distal foot to just below the knee, followed by roll gauze. Secured with Ace wrap. To be changed once weekly.     Discussed plan of care with patient. Teaching done with patient for dressing changes; he will have compression/Unna's boot cares done weekly in the wound and ostomy clinic or specialty clinic.     The following discharge instructions were reviewed with and sent home with the patient: See discharge instructions     The following supplies were sent home with the patient:  none  Will reassess care plan in 7 days and order patient supplies as needed     Return visit: 10/21/19     Verbal, written, & demonstrative education provided.  Face to face time (excluding procedure): approximately 10 minutes.  Procedure: approximately 25 minutes application of Unna's boot  Care plan was not changed related to frequency only.     [phone]

## 2019-10-14 NOTE — DISCHARGE INSTRUCTIONS
Today the wound on your inner left lower leg is improving well again today.  It still covers approximately the same total area but within the fragile tissue more new skin has started to cover up the raw tissue.     We reapplied the Unna's boot wrap with the use of the silver alginate which goes directly against the wound to provide absorbency and an antimicrobial element to fight any infection.  As the drainage was well contained we change decreased the frequency of the dressing changes to once a week.    I will see you again next week on Monday to re assess the wound and re wrap you lower leg.    Keep the wrap dry and intact.  If it slides down or if the wrap gets wet it is best to remove it than let it sit and cause damage.    Any concerns call our scheduling department at [phone] and they will direct your call as needed.      Thanks for coming in today.      Reji Malhotra RN cwocn

## 2019-10-21 ENCOUNTER — HOSPITAL ENCOUNTER (OUTPATIENT)
Dept: WOUND CARE | Facility: CLINIC | Age: 84
Discharge: HOME | End: 2019-10-21
Admitting: PODIATRIST
Payer: MEDICARE

## 2019-10-21 PROCEDURE — G0463 HOSPITAL OUTPT CLINIC VISIT: HCPCS | Mod: 25

## 2019-10-21 PROCEDURE — 29580 STRAPPING UNNA BOOT: CPT

## 2019-10-21 NOTE — DISCHARGE INSTRUCTIONS
Today we re applied the Unna's boot to your your left lower leg with the silver alginate material over the wounds.  Both the small wounds appears smaller than last time and the tissue is all clean.    We will plan to re evaluate in one week on Monday the 28 th at 12 noon.    Any concerns or questions call our scheduling department at [phone] and they will direct your call and assist you.    Thanks for coming in today.    Reji Malhotra RN cwocn

## 2019-10-28 ENCOUNTER — HOSPITAL ENCOUNTER (OUTPATIENT)
Dept: WOUND CARE | Facility: CLINIC | Age: 84
End: 2019-10-28
Attending: PODIATRIST
Payer: MEDICARE

## 2019-10-28 NOTE — PROGRESS NOTES
Reason For Visit: Robert Richard, 84 year old male, seen as outpatient to re evaluate and treat a left medial lower leg venous stasis ulcer. Referred by Dr García. Patient presents by himself  today.       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  It appeared to be a minor abrasion but increased in size and volume of drainage and pain worsened.     Personal/social history: Pt lives with family independently in private home.     Objective:   Physical appearance: alert and oriented  Ambulation: with use of a single pointed cane  Current treatment plan: Silvercel to wound with Unna's boot and Ace for compression.  Last changed: 10/14/19     Wound #1 Left medial lower leg, initial cause was trauma but complicated by venous insuffiencey and edema.  Stage/tissue depth: partial thickness  Wound has improved into two separate wounds with healed tissue between.  - Proximal posterior of the two wounds measured 0.7 cm L x 0.7 cm w x 0 cm D  - Distal anterior of the two wounds measured 0.3 cm L x 0.3 cm W x 0 cm D  Tunneling: none  Undermining: none  Wound bed type/amount: both wounds are 100 % red nongranular clean tissue, new epidermal tissue between is intact; NA fluctuant  Wound Edges: flush with surrounding skin.    Periwound: edema nonpitting with hemosiderin staining  Drainage: small amounts of serosanguinous drainage  Odor: no  Pain: yes at site itself and surrounding radiating pain from entire left lower leg.     Dorsalis Pedal Pulse: is palpable on the left DP: NA doppler: NA phasic  Hair growth: none noted below the knee  Capillary Refill: less than 3 seconds  Feet/toes color: normal skin tone with scattered hemosiderin staining  Nails: thick but wnl on left  R Leg: Edema Not assessed this visit. Ankle circumference NA cm. Calf circumference NA cm.  L Leg: Edema nopitting. Ankle circumference NA cm. Calf circumference  41 cm.  No change.     Mobility: limited by pain   Current offloading/footwear: bought larger Crock like shoes/sandals  Sensation: wnl  HgbA1C: 7.1 Date: 6/20/19  Checks Blood Glucose?:  Pt reports he does not check his blood sugars at home.  Other callousing/areas of concern: none noted today     Diet: Regular  Smoking: no     Discussed: etiology of wound (trauma initially but chronic r/t venous insuffiency), pathophysiology and patient specific goals for wound healing.   Education: Wound status, rational to continue with the current cares with once weekly dressing changes as drainage remains well contained, f/u visit for wound and ostomy clinic and contact info for clinics here at [facility].       Assessment:  The site is fully  into two wounds within the original wound size.  The epidermal tissue between is intact and healthy in appearance.  Drainage remains low and well contained by dressings in use, edema appears to be at base line.  No signs of infection.  Pt states pain was well tolerable this past week, wrap stayed in place well.       Barriers to wound healing:   Poor nutrition: inadequate supply of protein, carbohydrates, fatty acids, and trace elements essential for all phases of wound healing, teaching on need for increased protein in diet for wound healing reviewed today.  Reduced Blood Supply: inadequate perfusion to heal wound  Medication: NA  Chemotherapy: suppresses the immune system and inflammatory response  Radiotherapy: increases production of free radical which damage cells  Psychological stress: NA  Obesity: decreases tissue perfusion  Infection: prolongs inflammatory phase, uses vital nutrients, impairs epithelialization and releases toxins, none noted at this time, antimicrobial dressing in use as primary dressing to two open areas.    Underlying Disease: diabetes mellitis present, did teaching on why checking blood sugars at least daily would be an early indicator of infection  risk  Maceration: reduces wound tensile strength and inhibits epithelial migration  Patient compliance  Unrelieved pressure  Immobility  Substance abuse: NA     Plan:     We reapplied the Unna's boot wrap with the Silvercel today and will continue with this plan of care till the pt is fully healed.   Pt instructed to keep the wrap dry and intact.  If it slides down or if the wrap gets wet it is best to remove it than let it sit and cause damage.  He was instructed to contact the scheduling department if he needed to remove the wrap to schedule re wrapping soon than next Monday     Topical care: Silvercel and Unna's boot  Offloading: NA  Additional recommendations: Keep left LE elevated as much as able     Wound Care: Wound cleansed with Microklenz and gauze, patted dry. Periwound protected with Sween 24. Wound base filled with Silvercel. Covered with Unna's boot distal foot to just below the knee, followed by roll gauze. Secured with Ace wrap. To be changed once weekly.     Discussed plan of care with patient. Teaching done with patient for dressing changes; he will have compression/Unna's boot cares done weekly in the wound and ostomy clinic or specialty clinic.     The following discharge instructions were reviewed with and sent home with the patient: See discharge instructions     The following supplies were sent home with the patient:  none  Will reassess care plan in 7 days and order patient supplies as needed     Return visit: 10/28/19     Verbal, written, & demonstrative education provided.  Face to face time (excluding procedure): approximately 15 minutes.  Procedure: approximately 25 minutes application of Unna's boot  Care plan was not changed.     [phone]

## 2019-10-28 NOTE — DISCHARGE INSTRUCTIONS
Today the two small wounds on your left inner lower leg have continued to improve well, smaller in size and less drainage.    I will see you again in one week to re evaluate the wounds and determine if you still need to have the Unna's boots attach.    Any concerns call our scheduling department at [phone] and they will direct your call.    Thanks for coming in today.    Reji Malhotra RN cwocn

## 2019-10-28 NOTE — PROGRESS NOTES
Reason For Visit: Robert Richard, 84 year old male, seen as outpatient to re evaluate and treat a left medial lower leg venous stasis ulcer. Referred by Dr García. Patient presents by himself  today.       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  It appeared to be a minor abrasion but increased in size and volume of drainage and pain worsened.     Personal/social history: Pt lives with family independently in private home.     Objective:   Physical appearance: alert and oriented  Ambulation: with use of a single pointed cane  Current treatment plan: Silvercel to wound with Unna's boot and Ace for compression.  Last changed: 10/14/19     Wound #1 Left medial lower leg, initial cause was trauma but complicated by venous insuffiencey and edema.  Stage/tissue depth: partial thickness  Wound has improved into two separate wounds with healed tissue between.  - Proximal posterior of the two wounds measured 0.1 cm L x 0.1 cm w x 0 cm D  - Distal anterior of the two wounds measured 0.1 cm L x 0.3 cm W x 0 cm D  Tunneling: none  Undermining: none  Wound bed type/amount: both wounds are 100 % red nongranular clean tissue, new epidermal tissue between is intact; NA fluctuant  Wound Edges: flush with surrounding skin.    Periwound: edema nonpitting with hemosiderin staining  Drainage: small amounts of serosanguinous drainage  Odor: no  Pain: Denies pain a the wound sites themselves today, occasional aches in the lower leg in general but no longer feel associated with the wounds.       Dorsalis Pedal Pulse: is palpable on the left DP: NA doppler: NA phasic  Hair growth: none noted below the knee  Capillary Refill: less than 3 seconds  Feet/toes color: normal skin tone with scattered hemosiderin staining  Nails: thick but wnl on left  R Leg: Edema Not assessed this visit. Ankle circumference NA cm. Calf circumference NA cm.  L Leg:  Edema nopitting. Ankle circumference NA cm. Calf circumference 40.5 cm.     Mobility: base line, not full but only limited due to generalized aches and stiffness.  Current offloading/footwear: bought larger Crock like shoes/sandals  Sensation: wnl  HgbA1C: 7.1 Date: 6/20/19  Checks Blood Glucose?:  Pt reports he does not check his blood sugars at home.  Other callousing/areas of concern: none noted today     Diet: Regular  Smoking: no     Discussed: etiology of wound (trauma initially but chronic r/t venous insuffiency), pathophysiology and patient specific goals for wound healing.   Education: Wound status, rational to continue with the current cares with once weekly dressing changes as drainage remains well contained, f/u visit for wound and ostomy clinic and contact info for clinics here at [facility].       Assessment:  The site continues to improve.  Two small open areas within the original total area.  These are both smaller in size since last visit but do remain open.  No other new areas of concerns noted.     Barriers to wound healing:   Poor nutrition: inadequate supply of protein, carbohydrates, fatty acids, and trace elements essential for all phases of wound healing, teaching on need for increased protein in diet for wound healing reviewed today.  Reduced Blood Supply: inadequate perfusion to heal wound  Medication: NA  Chemotherapy: suppresses the immune system and inflammatory response  Radiotherapy: increases production of free radical which damage cells  Psychological stress: NA  Obesity: decreases tissue perfusion  Infection: prolongs inflammatory phase, uses vital nutrients, impairs epithelialization and releases toxins, none noted at this time, antimicrobial dressing in use as primary dressing to two open areas.    Underlying Disease: diabetes mellitis present, did teaching on why checking blood sugars at least daily would be an early indicator of infection risk  Maceration: reduces wound tensile  strength and inhibits epithelial migration  Patient compliance  Unrelieved pressure  Immobility  Substance abuse: NA     Plan:     We reapplied the Unna's boot wrap with the Silvercel today and will continue with this plan of care till the pt is fully healed.   Pt instructed to keep the wrap dry and intact.  If it slides down or if the wrap gets wet it is best to remove it than let it sit and cause damage.  He was instructed to contact the scheduling department if he needed to remove the wrap to schedule re wrapping soon than next Monday     Topical care: Silvercel and Unna's boot  Offloading: NA  Additional recommendations: Keep left LE elevated as much as able     Wound Care: Wound cleansed with soap and water and gauze, patted dry. Periwound protected with Sween 24. Wound base filled with Silvercel. Covered with Unna's boot distal foot to just below the knee, followed by roll gauze. Secured with Ace wrap. To be changed once weekly.     Discussed plan of care with patient. Teaching done with patient for dressing changes; he will have compression/Unna's boot cares done weekly in the wound and ostomy clinic or specialty clinic.     The following discharge instructions were reviewed with and sent home with the patient: See discharge instructions     The following supplies were sent home with the patient:  none  Will reassess care plan in 7 days and order patient supplies as needed     Return visit: 11/4/19     Verbal, written, & demonstrative education provided.  Face to face time (excluding procedure): approximately 15 minutes.  Procedure: approximately 25 minutes application of Unna's boot  Care plan was not changed.     [phone]

## 2019-11-04 ENCOUNTER — ANTICOAGULATION THERAPY VISIT (OUTPATIENT)
Dept: ANTICOAGULATION | Facility: CLINIC | Age: 84
End: 2019-11-04
Payer: MEDICARE

## 2019-11-04 ENCOUNTER — HOSPITAL ENCOUNTER (OUTPATIENT)
Dept: WOUND CARE | Facility: CLINIC | Age: 84
Discharge: HOME | End: 2019-11-04
Attending: INTERNAL MEDICINE | Admitting: INTERNAL MEDICINE
Payer: MEDICARE

## 2019-11-04 ENCOUNTER — OFFICE VISIT (OUTPATIENT)
Dept: INTERNAL MEDICINE | Facility: CLINIC | Age: 84
End: 2019-11-04
Payer: MEDICARE

## 2019-11-04 VITALS
DIASTOLIC BLOOD PRESSURE: 76 MMHG | OXYGEN SATURATION: 94 % | RESPIRATION RATE: 16 BRPM | WEIGHT: 252 LBS | TEMPERATURE: 97.1 F | BODY MASS INDEX: 36.16 KG/M2 | SYSTOLIC BLOOD PRESSURE: 138 MMHG | HEART RATE: 74 BPM

## 2019-11-04 DIAGNOSIS — D68.51 FACTOR V LEIDEN MUTATION (H): ICD-10-CM

## 2019-11-04 DIAGNOSIS — D68.52 PROTHROMBIN MUTATION (H): ICD-10-CM

## 2019-11-04 DIAGNOSIS — E11.8 TYPE 2 DIABETES MELLITUS WITH COMPLICATION, WITHOUT LONG-TERM CURRENT USE OF INSULIN (H): Primary | ICD-10-CM

## 2019-11-04 DIAGNOSIS — Z79.01 LONG TERM CURRENT USE OF ANTICOAGULANT THERAPY: ICD-10-CM

## 2019-11-04 DIAGNOSIS — I10 HYPERTENSION GOAL BP (BLOOD PRESSURE) < 140/90: ICD-10-CM

## 2019-11-04 DIAGNOSIS — I82.90 VENOUS THROMBOSIS: ICD-10-CM

## 2019-11-04 DIAGNOSIS — N18.30 CKD (CHRONIC KIDNEY DISEASE) STAGE 3, GFR 30-59 ML/MIN (H): ICD-10-CM

## 2019-11-04 LAB — INR POINT OF CARE: 2.8 (ref 0.9–1.1)

## 2019-11-04 PROCEDURE — 85610 PROTHROMBIN TIME: CPT | Mod: QW

## 2019-11-04 PROCEDURE — 36416 COLLJ CAPILLARY BLOOD SPEC: CPT

## 2019-11-04 PROCEDURE — G0463 HOSPITAL OUTPT CLINIC VISIT: HCPCS

## 2019-11-04 PROCEDURE — 99207 ZZC NO CHARGE NURSE ONLY: CPT

## 2019-11-04 PROCEDURE — 99214 OFFICE O/P EST MOD 30 MIN: CPT | Performed by: INTERNAL MEDICINE

## 2019-11-04 ASSESSMENT — PAIN SCALES - GENERAL: PAINLEVEL: NO PAIN (0)

## 2019-11-04 NOTE — PROGRESS NOTES
ANTICOAGULATION FOLLOW-UP CLINIC VISIT    Patient Name:  Robert Richard  Date:  2019  Contact Type:  Face to Face    SUBJECTIVE:  Patient Findings     Comments:   The patient was assessed for diet, medication, and activity level changes, missed or extra doses, bruising or bleeding, with no problem findings.  Raquel Pelletier RN          Clinical Outcomes     Negatives:   Major bleeding event, Thromboembolic event, Anticoagulation-related hospital admission, Anticoagulation-related ED visit, Anticoagulation-related fatality    Comments:   The patient was assessed for diet, medication, and activity level changes, missed or extra doses, bruising or bleeding, with no problem findings.  Raquel Pelletier RN             OBJECTIVE    INR Protime   Date Value Ref Range Status   2019 2.8 (A) 0.9 - 1.1 Final       ASSESSMENT / PLAN  INR assessment THER    Recheck INR In: 6 WEEKS    INR Location Clinic      Anticoagulation Summary  As of 2019    INR goal:   2.0-3.0   TTR:   76.7 % (3.6 y)   INR used for dosin.8 (2019)   Warfarin maintenance plan:   2.5 mg (5 mg x 0.5) every Fri; 5 mg (5 mg x 1) all other days   Full warfarin instructions:   2.5 mg every Fri; 5 mg all other days   Weekly warfarin total:   32.5 mg   No change documented:   Raquel Pelletier RN   Plan last modified:   Raquel Pelletier RN (3/22/2016)   Next INR check:   2019   Target end date:       Indications    Factor V Leiden mutation (H) [D68.51]  Venous thrombosis [I82.90]  Prothrombin mutation (H) [D68.52]  Long term current use of anticoagulant therapy [Z79.01]             Anticoagulation Episode Summary     INR check location:       Preferred lab:       Send INR reminders to:   [facility] [place]    Comments:   5 mg tablets, appt card, BP, PM dose        Anticoagulation Care Providers     Provider Role Specialty Phone number    Nate Cifuentes DO [facility] [phone]            See  the Encounter Report to view Anticoagulation Flowsheet and Dosing Calendar (Go to Encounters tab in chart review, and find the Anticoagulation Therapy Visit)    Dosage adjustment made based on physician directed care plan.      Raquel Pelletier RN

## 2019-11-04 NOTE — PROGRESS NOTES
Reason For Visit: Robert Richard, 84 year old male, seen as outpatient to re evaluate and treat a left medial lower leg venous stasis ulcer. Referred by Dr García. Patient presents by himself  today.       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  It appeared to be a minor abrasion but increased in size and volume of drainage and pain worsened.     Personal/social history: Pt lives with family independently in private home.     Objective:   Physical appearance: alert and oriented  Ambulation: with use of a single pointed cane  Current treatment plan: Silvercel to wound with Unna's boot and Ace for compression.  Last changed: 10/28/19     Wound #1 Left medial lower leg, initial cause was trauma but complicated by venous insuffiencey and edema.  Stage/tissue depth: partial thickness  Wound has improved into two separate wounds with healed tissue between.  - Proximal posterior of the two wounds measured 0 cm L x 0 cm w x 0 cm D, no open aspect as of today 11/4/19  - Distal anterior of the two wounds measured 0.1 cm L x 0.1 cm W x 0 cm D  Tunneling: none  Undermining: none  Wound bed type/amount: only open wound is all red nongranular tissue; NA fluctuant  Wound Edges: flush with surrounding skin.    Periwound: edema nonpitting with hemosiderin staining  Drainage: scant to no drainage.    Odor: no  Pain: no pain in the leg or at the wound sites any longer     Dorsalis Pedal Pulse: is palpable on the left DP: NA doppler: NA phasic  Hair growth: none noted below the knee  Capillary Refill: less than 3 seconds  Feet/toes color: normal skin tone with scattered hemosiderin staining  Nails: thick but wnl on left  R Leg: Edema Not assessed this visit. Ankle circumference NA cm. Calf circumference NA cm.  L Leg: Edema nopitting. Ankle circumference NA cm. Calf circumference 40.5 cm at last visit 10/28/19.  Not measured today.        Mobility: base line, not full but only limited due to generalized aches and stiffness.  Current offloading/footwear: bought larger Crock like shoes/sandals  Sensation: wnl  HgbA1C: 7.1 Date: 6/20/19  Checks Blood Glucose?:  Pt reports he does not check his blood sugars at home.  Other callousing/areas of concern: none noted today     Diet: Regular  Smoking: no     Discussed: etiology of wound (trauma initially but chronic r/t venous insuffiency), pathophysiology and patient specific goals for wound healing.   Education: Wound status, rational to continue with the current cares with once weekly dressing changes as drainage remains well contained, f/u visit for wound and ostomy clinic and contact info for clinics here at [facility].       Assessment:  There is only one open wound remaining as of today, the distal more anterior of the two wounds.  Very small with none to scant amounts of drainage, no signs of infection.  Edema appears to be at its base line.     Barriers to wound healing:   Poor nutrition: inadequate supply of protein, carbohydrates, fatty acids, and trace elements essential for all phases of wound healing, teaching on need for increased protein in diet for wound healing reviewed today.  Reduced Blood Supply: inadequate perfusion to heal wound  Medication: NA  Chemotherapy: suppresses the immune system and inflammatory response  Radiotherapy: increases production of free radical which damage cells  Psychological stress: NA  Obesity: decreases tissue perfusion  Infection: prolongs inflammatory phase, uses vital nutrients, impairs epithelialization and releases toxins, none noted at this time, antimicrobial dressing in use as primary dressing.    Underlying Disease: diabetes mellitis present, did teaching on why checking blood sugars at least daily would be an early indicator of infection risk  Maceration: reduces wound tensile strength and inhibits epithelial migration, none noted today  Patient  compliance  Unrelieved pressure  Immobility  Substance abuse: NA     Plan:     We will stop the Unna's boot wrap at this time.  Pt will continue to change the dressing with Silvercel as primary dressing secured with a large band aid, three times weekly.       Topical care: Silvercel   Offloading: NA  Additional recommendations: Keep left LE elevated as much as able     Wound Care: Wound cleansed with soap and water and gauze, patted dry. Periwound protected with Sween 24. Wound base filled with Silvercel.  Covered with NA. Secured with large band aid. To be changed three times weekly.     Discussed plan of care with patient. Teaching done with patient for dressing changes; he will have compression/Unna's boot cares done weekly in the wound and ostomy clinic or specialty clinic.     The following discharge instructions were reviewed with and sent home with the patient: See discharge instructions     The following supplies were sent home with the patient:  Remains of Silvercel opened but not used up.    Will reassess care plan in 14 days and order patient supplies as needed     Return visit: 11/7/19 with Dr García, 11/18/19 again for follow up in the Wound and Ostomy clinic     Verbal, written, & demonstrative education provided.  Face to face time (excluding procedure): approximately 25 minutes.  Procedure: NA  Care plan was changed.     [phone]

## 2019-11-04 NOTE — DISCHARGE INSTRUCTIONS
Today only one small dot of open tissue remains on your left inner ankle.  We will not use the Unna's boot or Ace wrap today, we will just cover the wound area with some of the Silvercel material and a large band aid.    1 Remove old dressing and wash the wound with soap and water in the shower, rinse and dry well.  2 Apply a small square of the Silvercel silver felt like material, over the wound area.  3 Cover and secure the Silvercel with a large band aid.    Change the dressing every other day or Mondays Wednesdays and Fridays.    I will plan to see you again in two weeks in the Wound and Ostomy clinic.    Any concerns call our scheduling department at [phone] and they will direct your call.    Thanks for coming in today.    Reji Malhotra RN cwocn

## 2019-11-04 NOTE — PROGRESS NOTES
Subjective     Robert Richard is a 84 year old male who presents to clinic today for the following health issues:    HPI   Chief Complaint   Patient presents with     Establish Care     Diabetes  Htn  CKD  Lipids       Had a DVT In 2003, prostate cancer, Factor V leiden and prothrombin mutation had another blood clot when he went off coumadin.      Elevated glucose. Restless legs for years, worse with pressure on the back of the legs uses mirapex. Last hgba1c was 7.1 oral control.  Statin gave muscle aches.      LISSETTE and on cpap, uses it daily 8 cm H20.      He feels healthy as a horse, doing ok for his age.    Flu shot done already.      Does botox shots for left eye with [facility].     Past Medical History:   Diagnosis Date     Basal cell carcinoma      Cancer (H)      DVT (deep venous thrombosis) (H) 11/2003     DVT (deep venous thrombosis) (H) 12/08     DVT (deep venous thrombosis) (H) 10/2010     Factor V Leiden (H)      Gout      Other and unspecified hyperlipidemia      Prostate cancer (H) 2003    prostatectomy      Restless leg syndrome      Unspecified essential hypertension      Current Outpatient Medications   Medication     atenolol (TENORMIN) 50 MG tablet     losartan (COZAAR) 50 MG tablet     Multiple Vitamins-Minerals (MULTIVITAMIN ADULT PO)     ORDER FOR DME     pramipexole (MIRAPEX) 1 MG tablet     sildenafil (VIAGRA) 25 MG tablet     spironolactone (ALDACTONE) 25 MG tablet     STATIN NOT PRESCRIBED (INTENTIONAL)     warfarin ANTICOAGULANT (COUMADIN) 5 MG tablet     colchicine (COLCRYS) 0.6 MG tablet     No current facility-administered medications for this visit.      Social History     Tobacco Use     Smoking status: Never Smoker     Smokeless tobacco: Never Used   Substance Use Topics     Alcohol use: Yes     Alcohol/week: 0.0 standard drinks     Comment: 1 drink every 1-2 weeks.     Drug use: No     Review of Systems  Constitutional-No fevers, chills, or weight changes..  ENT-No earpain, sore  "throat, voice changes or rhinitis.  Cardiac-No chest pain or palpitations.  Respiratory-No cough, sob, or hemoptysis.  GI-No nausea, vomitting, diarrhea, constipation, or blood in the stool.  Musculoskeletal-No muscles aches or joint pains.  Skin-No rashes.    Physical Exam  /76   Pulse 74   Temp 97.1  F (36.2  C) (Temporal)   Resp 16   Wt 114.3 kg (252 lb)   SpO2 94%   BMI 36.16 kg/m    General Appearance-healthy, alert, no distress  Cardiac-regular rate and rhythm  with normal S1, S2 ; no murmur, rub or gallops  Lungs-clear to auscultation  GI-Soft, nontender.  Normal bowel sounds.  No hepatosplenomegaly or abnormal masses  Extremities-1+ pitting edema in his legs    ASSESSMENT:  This is a new patient here to establish care as his providers moved up to [place].  Overall he is doing well he feels as \"healthy as a horse\"    He has a long history of clots has had 4 of them has a history of factor V Leiden and prothrombin mutation he is on Coumadin for the rest of his life continues with the Coumadin clinic.    Diabetes is not on a medication but diet controlled his A1c was 7.1 we will recheck this in 6 months.  Does not want to be on a statin as he is had allergies and react.    Mild chronic kidney disease stage III stable  Restless legs is on Mirapex which does help him.    Blood pressure was a little bit high but on recheck was okay at 138/78.    Sleep apnea he does do CPAP and will continue this.    Follow-up with a Medicare wellness check in 6 months.      Electronically signed by Stiven Donahue MD      "

## 2019-11-07 ENCOUNTER — OFFICE VISIT (OUTPATIENT)
Dept: PODIATRY | Facility: CLINIC | Age: 84
End: 2019-11-07
Payer: MEDICARE

## 2019-11-07 VITALS
TEMPERATURE: 96.6 F | WEIGHT: 252 LBS | SYSTOLIC BLOOD PRESSURE: 140 MMHG | DIASTOLIC BLOOD PRESSURE: 84 MMHG | BODY MASS INDEX: 36.08 KG/M2 | HEIGHT: 70 IN

## 2019-11-07 DIAGNOSIS — E11.59 TYPE 2 DIABETES MELLITUS WITH OTHER CIRCULATORY COMPLICATION, WITHOUT LONG-TERM CURRENT USE OF INSULIN (H): ICD-10-CM

## 2019-11-07 DIAGNOSIS — L97.221 VENOUS STASIS ULCER OF LEFT CALF LIMITED TO BREAKDOWN OF SKIN WITH VARICOSE VEINS (H): Primary | ICD-10-CM

## 2019-11-07 DIAGNOSIS — I83.022 VENOUS STASIS ULCER OF LEFT CALF LIMITED TO BREAKDOWN OF SKIN WITH VARICOSE VEINS (H): Primary | ICD-10-CM

## 2019-11-07 PROCEDURE — 99213 OFFICE O/P EST LOW 20 MIN: CPT | Performed by: PODIATRIST

## 2019-11-07 ASSESSMENT — MIFFLIN-ST. JEOR: SCORE: 1844.31

## 2019-11-07 ASSESSMENT — PAIN SCALES - GENERAL: PAINLEVEL: NO PAIN (0)

## 2019-11-07 NOTE — PROGRESS NOTES
"Chief Complaint   Patient presents with     WOUND CARE     ulcer medial Left ankle, onset 8/14/19; LOV 10/3/2019     Diabetes     type 2       Weight management plan: Patient was referred to their PCP to discuss a diet and exercise plan.     Carlos to follow up with Primary Care provider regarding elevated blood pressure.    HPI:  Robert Richard is a 84 year old male who is seen in consultation at the request of Nate Cifuentes DO.    Pt presents for eval of:   (Onset, Location, L/R, Character, Treatments, Injury if yes)    XR Left foot and ankle today 10/3/2019    DM Type 2     Onset 8/14/2019, bumped his medial Left ankle getting off his  and caused a minor abrasion. History of DVT.  We will discontinue the Unna boots last week and notes the ulcer is still present.  He did not return to utilizing compression dressing.     EXAM:Vitals: BP (!) 140/84 (BP Location: Left arm, Cuff Size: Adult Large)   Temp 96.6  F (35.9  C) (Temporal)   Ht 1.778 m (5' 10\")   Wt 114.3 kg (252 lb)   BMI 36.16 kg/m    BMI= Body mass index is 36.16 kg/m .    General appearance: Patient is alert and fully cooperative with history & exam.  No sign of distress is noted during the visit.     Psychiatric: Affect is pleasant & appropriate.  Patient appears motivated to improve health.     Respiratory: Breathing is regular & unlabored while sitting.     HEENT: Hearing is intact to spoken word.  Speech is clear.  No gross evidence of visual impairment that would impact ambulation.     Vascular: DP 1/4 & PT 1/4 left & right.  CFT delayed with dependent rubor noted about the digits.  Diminished hair growth distal to mid tibia and no hair about the foot and toes.  Temperature changes noted, warm to cool proximal to distal.  Hemosiderin pigmentation noted with multiple varicosities legs and feet bilateral. Generalized edema bilateral legs and feet.  Pt denies claudication history.     Neurologic: Normal plantar response " bilateral.  Intact protective threshold plus 10/10 applications of a 5.07 monofilament.  Pt admits burning and paraesthesias about the feet and toes with palpation.     Dermatologic: Toenails are in reasonable repair.  Previous venous stasis ulceration noted about the distal medial left calf.  This appears to be dry today with local areas of irritation about 2 cm at most.  No drainage at this time.  No cellulitis.  Daughter at edema bilateral lower extremities.  Venous discoloration throughout the entire left leg.     Musculoskeletal: Patient is ambulatory without assistive device or brace.  There is semi reducible contracture of the lesser digits.    Hemoglobin A1C (%)   Date Value   06/20/2019 7.1 (H)   10/16/2018 6.4 (H)   05/15/2018 6.5 (H)   10/18/2017 6.6 (H)   04/20/2016 6.5     Creatinine (mg/dL)   Date Value   08/16/2019 1.42 (H)   06/20/2019 1.39 (H)   12/27/2018 1.51 (H)   05/15/2018 1.46 (H)   10/18/2017 1.40 (H)   06/30/2017 1.70 (H)          ASSESSMENT:       ICD-10-CM    1. Venous stasis ulcer of left calf limited to breakdown of skin with varicose veins (H) I83.022 order for DME    L97.221    2. Type 2 diabetes mellitus with other circulatory complication, without long-term current use of insulin (H) E11.59 order for DME       PLAN:    10/3/2019     We discussed risk factors and preventive measures.    We discussed appropriate hygiene, shoe gear, daily foot exam, and reinforced management of weight, diet, activity goals and HA1C goal for diabetic patients.    Dispensed written foot care instructions.      Order was placed today for wound care referral possibly for Unna boot therapy and follow-up with me in about 1 month and hopefully the wound will be closed at that time and we can move forward with compression therapy chronically.  We discussed the etiology and alternative treatment options as well as potential risks and complications.  The Unna boot was applied today.  Please refer the patient back  sooner if he would like to discontinue Unna boots faster.  He will follow-up for Unna boot change here with our RN specialty care RN as it likely will take a week to get the patient into wound care.    11/7/2019  Explained to the patient that it is of utmost importance to continue compression of the lower extremities upon completion of the Unna boots otherwise edema will return which is the cause of the ulceration.  Also describe the importance of managing edema from the toes proximal over the widest point of the calf not just wrapping the calf.  He has a compression dressing but has difficulty applying it.  Therefore order was placed for custom compression 20 to 30 mmHg as well as a donning agent such as a plastic sled or Tyvek sleeve.    Follow-up here in 2 or 3 weeks with any continued concerns and as needed.      Khadar García DPM

## 2019-11-14 DIAGNOSIS — G25.81 RESTLESS LEG SYNDROME: ICD-10-CM

## 2019-11-14 RX ORDER — PRAMIPEXOLE DIHYDROCHLORIDE 1 MG/1
TABLET ORAL
Qty: 60 TABLET | Refills: 3 | Status: SHIPPED | OUTPATIENT
Start: 2019-11-14 | End: 2020-05-19

## 2019-11-14 NOTE — TELEPHONE ENCOUNTER
"mirapex  Last Written Prescription Date:  09/09/2019  Last Fill Quantity: 60,  # refills: 0   Last office visit: 11/04/2019 with prescribing provider:  0   Future Office Visit:   Next 5 appointments (look out 90 days)    Nov 18, 2019 10:00 AM CST  Return Visit with PH WOUND EXAM ROOM  [facility] [address] [place] [address] [id number] [phone]         Requested Prescriptions   Pending Prescriptions Disp Refills     pramipexole (MIRAPEX) 1 MG tablet [Pharmacy Med Name: PRAMIPEXOLE 1MG TAB] 60 tablet 0     Sig: TAKE 1 TABLET BY MOUTH TWICE DAILY       Antiparkinson's Agents Protocol Failed - 11/14/2019 11:50 AM        Failed - Blood pressure under 140/90 in past 12 months     BP Readings from Last 3 Encounters:   11/07/19 (!) 140/84   11/04/19 138/76   10/03/19 134/78                 Failed - ALT on record in past 12 months         Recent Labs   Lab Test 05/15/18  1054   ALT 30             Passed - CBC on record in past 12 months     Recent Labs   Lab Test 08/16/19  0756   WBC 4.7   RBC 4.22*   HGB 13.1*   HCT 39.5*                    Passed - Serum Creatinine on file in past 12 months     Recent Labs   Lab Test 08/16/19  0756   CR 1.42*             Passed - Medication is active on med list        Passed - Patient is age 18 or older        Passed - Recent (6 mo) or future (30 days) visit within the authorizing provider's specialty     Patient had office visit in the last 6 months or has a visit in the next 30 days with authorizing provider or within the authorizing provider's specialty.  See \"Patient Info\" tab in inbasket, or \"Choose Columns\" in Meds & Orders section of the refill encounter.              "

## 2019-11-18 ENCOUNTER — HOSPITAL ENCOUNTER (OUTPATIENT)
Dept: WOUND CARE | Facility: CLINIC | Age: 84
Discharge: HOME | End: 2019-11-18
Attending: INTERNAL MEDICINE | Admitting: INTERNAL MEDICINE
Payer: MEDICARE

## 2019-11-18 PROCEDURE — G0463 HOSPITAL OUTPT CLINIC VISIT: HCPCS

## 2019-11-18 NOTE — DISCHARGE INSTRUCTIONS
Today you have no open wounds on your lower leg.  I would recommend you use a good daily moisturize like the Sween 24 I gave you.  Any low alcohol content moisturizer is best, Cetaphil is a product readily available that is also a good daily moisturizer.    I would try the compression garment recommended by the therapist tomorrow to help keep the swelling down and keep wounds from developing.    If any sores open up again in the same area in the next 3 to 6 months you can set up an appointment to see me again by calling our scheduling department number at [phone].    If sore open up greater than 6 months from now be sure to see your primary MD to be assessed first and they can make a new referral is needed.      Thanks for coming in and good luck in the future.    Reji Malhotra RN [email or web address]

## 2019-11-18 NOTE — PROGRESS NOTES
Reason For Visit: Robert Richard, 84 year old male, seen as outpatient to re evaluate and treat a left medial lower leg venous stasis ulcer. Referred by Dr García. Patient presents by himself  today.       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  It appeared to be a minor abrasion but increased in size and volume of drainage and pain worsened.     Personal/social history: Pt lives with family independently in private home.     Objective:   Physical appearance: alert and oriented  Ambulation: with use of a single pointed cane  Current treatment plan: Silvercel to wound with Unna's boot and Ace for compression.  Last changed: 11/15/19     Wound #1 Left medial lower leg, initial cause was trauma but complicated by venous insuffiencey and edema.  Stage/tissue depth: partial thickness  Wound has improved into two separate wounds with healed tissue between.  - Proximal posterior of the two wounds measured 0 cm L x 0 cm w x 0 cm D, no open aspect as today 11/4/19, remains fully closed as of 11/18/19.  - Distal anterior of the two wounds measured 0 cm L x 0 cm W x 0 cm D, no open aspect as of today 11/18/19  Tunneling: none  Undermining: none  Wound bed type/amount: fully epithelialized; NA fluctuant  Wound Edges: NA    Periwound: edema nonpitting with hemosiderin staining  Drainage: none   Odor: no  Pain: no      Dorsalis Pedal Pulse: is palpable on the left DP: NA doppler: NA phasic  Hair growth: none noted below the knee  Capillary Refill: less than 3 seconds  Feet/toes color: normal skin tone with scattered hemosiderin staining  Nails: thick but wnl on left  R Leg: Edema Not assessed this visit. Ankle circumference NA cm. Calf circumference NA cm.  L Leg: Edema nopitting. Ankle circumference NA cm. Calf circumference Not measured today.       Mobility: base line, not full but only limited due to generalized aches and  stiffness.  Current offloading/footwear: bought larger Crock like shoes/sandals  Sensation: wnl  HgbA1C: 7.1 Date: 6/20/19  Checks Blood Glucose?:  Pt reports he does not check his blood sugars at home.  Other callousing/areas of concern: none noted today     Diet: Regular  Smoking: no     Discussed: etiology of wound (trauma initially but chronic r/t venous insuffiency), pathophysiology and patient specific goals for wound healing.   Education: Wound status resolved, rational to start use of a general low alcohol content moisturizer to the lower legs and feet daily to keep skin from cracking.       Assessment:  No open wounds, no scabs, fully epithelialized, no open wounds or drainage or weeping.       Barriers to wound healing:   Poor nutrition: inadequate supply of protein, carbohydrates, fatty acids, and trace elements essential for all phases of wound healing, teaching on need for increased protein in diet for wound healing reviewed today.  Reduced Blood Supply: inadequate perfusion to heal wound  Medication: NA  Chemotherapy: suppresses the immune system and inflammatory response  Radiotherapy: increases production of free radical which damage cells  Psychological stress: NA  Obesity: decreases tissue perfusion  Infection: prolongs inflammatory phase, uses vital nutrients, impairs epithelialization and releases toxins, none noted at this time,   Underlying Disease: diabetes mellitis present, did teaching on why checking blood sugars at least daily would be an early indicator of infection risk  Maceration: reduces wound tensile strength and inhibits epithelial migration, none noted today  Patient compliance  Unrelieved pressure  Immobility  Substance abuse: NA     Plan:     Pt will use a general low alcohol content moisturizer on his lower legs and feet daily.  He has appointment tomorrow [facility] at [facility] to have compression sock/garment options discussed and reviewed.     Topical  care: NA  Offloading: NA  Additional recommendations: Keep left LE elevated as much as able     Wound Care: NA no open wounds at this time     Discussed plan of care with patient. Teaching done with patient for skin cares; he is able and willing to perform, will learn compression management plan tomorrow in therapy.     The following discharge instructions were reviewed with and sent home with the patient: See discharge instructions     The following supplies were sent home with the patient:  Remains of the Sween 24 opened but not used up today   Will reassess care plan in NA, no f/u planned at this time.    Return visit: None scheduled in Wound and Ostomy clinic.       Verbal, written, & demonstrative education provided.  Face to face time (excluding procedure): approximately 30 minutes.  Procedure: NA  Care plan was changed.     [phone]

## 2019-12-16 ENCOUNTER — ANTICOAGULATION THERAPY VISIT (OUTPATIENT)
Dept: ANTICOAGULATION | Facility: CLINIC | Age: 84
End: 2019-12-16
Payer: MEDICARE

## 2019-12-16 VITALS — SYSTOLIC BLOOD PRESSURE: 149 MMHG | HEART RATE: 64 BPM | DIASTOLIC BLOOD PRESSURE: 80 MMHG

## 2019-12-16 DIAGNOSIS — Z79.01 LONG TERM CURRENT USE OF ANTICOAGULANT THERAPY: ICD-10-CM

## 2019-12-16 DIAGNOSIS — I82.90 VENOUS THROMBOSIS: ICD-10-CM

## 2019-12-16 DIAGNOSIS — D68.51 FACTOR V LEIDEN MUTATION (H): ICD-10-CM

## 2019-12-16 DIAGNOSIS — D68.52 PROTHROMBIN MUTATION (H): ICD-10-CM

## 2019-12-16 LAB — INR POINT OF CARE: 4.2 (ref 0.9–1.1)

## 2019-12-16 PROCEDURE — 85610 PROTHROMBIN TIME: CPT | Mod: QW

## 2019-12-16 PROCEDURE — 36416 COLLJ CAPILLARY BLOOD SPEC: CPT

## 2019-12-16 PROCEDURE — 99207 ZZC NO CHARGE NURSE ONLY: CPT

## 2019-12-16 NOTE — PROGRESS NOTES
ANTICOAGULATION FOLLOW-UP CLINIC VISIT    Patient Name:  Robert Richard  Date:  2019  Contact Type:  Face to Face    SUBJECTIVE:  Patient Findings     Comments:   Patient denies any identifiable changes that caused the supratherapeutic INR. Raquel Pelletier RN            Clinical Outcomes     Comments:   Patient denies any identifiable changes that caused the supratherapeutic INR. Raquel Pelletier RN               OBJECTIVE    INR Protime   Date Value Ref Range Status   2019 4.2 (A) 0.9 - 1.1 Final       ASSESSMENT / PLAN  INR assessment SUPRA    Recheck INR In: 1 WEEK    INR Location Clinic      Anticoagulation Summary  As of 2019    INR goal:   2.0-3.0   TTR:   60.4 % (1 y)   INR used for dosin.2! (2019)   Warfarin maintenance plan:   2.5 mg (5 mg x 0.5) every Fri; 5 mg (5 mg x 1) all other days   Full warfarin instructions:   : Hold; Otherwise 2.5 mg every Fri; 5 mg all other days   Weekly warfarin total:   32.5 mg   Plan last modified:   Raquel Pelletier RN (3/22/2016)   Next INR check:   2019   Priority:   High   Target end date:       Indications    Factor V Leiden mutation (H) [D68.51]  Venous thrombosis [I82.90]  Prothrombin mutation (H) [D68.52]  Long term current use of anticoagulant therapy [Z79.01]             Anticoagulation Episode Summary     INR check location:       Preferred lab:       Send INR reminders to:   [facility] [facility]    Comments:   5 mg tablets, appt card, BP, PM dose        Anticoagulation Care Providers     Provider Role Specialty Phone number    Nate Cifuentes DO [facility] Internal Medicine [phone]            See the Encounter Report to view Anticoagulation Flowsheet and Dosing Calendar (Go to Encounters tab in chart review, and find the Anticoagulation Therapy Visit)    Dosage adjustment made based on physician directed care plan.      Raquel Pelletier RN

## 2019-12-23 ENCOUNTER — ANTICOAGULATION THERAPY VISIT (OUTPATIENT)
Dept: ANTICOAGULATION | Facility: CLINIC | Age: 84
End: 2019-12-23
Payer: MEDICARE

## 2019-12-23 ENCOUNTER — TELEPHONE (OUTPATIENT)
Dept: INTERNAL MEDICINE | Facility: CLINIC | Age: 84
End: 2019-12-23

## 2019-12-23 DIAGNOSIS — Z79.01 LONG TERM CURRENT USE OF ANTICOAGULANT THERAPY: Primary | ICD-10-CM

## 2019-12-23 DIAGNOSIS — D68.52 PROTHROMBIN MUTATION (H): ICD-10-CM

## 2019-12-23 DIAGNOSIS — I82.90 VENOUS THROMBOSIS: ICD-10-CM

## 2019-12-23 DIAGNOSIS — D68.51 FACTOR V LEIDEN MUTATION (H): ICD-10-CM

## 2019-12-23 DIAGNOSIS — Z79.01 LONG TERM CURRENT USE OF ANTICOAGULANT THERAPY: ICD-10-CM

## 2019-12-23 LAB — INR POINT OF CARE: 3.2 (ref 0.86–1.14)

## 2019-12-23 PROCEDURE — 36416 COLLJ CAPILLARY BLOOD SPEC: CPT

## 2019-12-23 PROCEDURE — 99207 ZZC NO CHARGE NURSE ONLY: CPT

## 2019-12-23 PROCEDURE — 85610 PROTHROMBIN TIME: CPT | Mod: QW

## 2019-12-23 NOTE — TELEPHONE ENCOUNTER
Patient is due for annual renewal of INR referral.  Please review referral and sign if appropriate.  Thanks,  DONG RossiN, RN

## 2019-12-23 NOTE — PROGRESS NOTES
ANTICOAGULATION FOLLOW-UP CLINIC VISIT    Patient Name:  Robert Richard  Date:  12/23/2019  Contact Type:  Face to Face    SUBJECTIVE:  Patient Findings     Positives:   Change in alcohol use    Comments:   Had a glass of wine last night.        Clinical Outcomes     Comments:   Had a glass of wine last night.           OBJECTIVE    INR Protime   Date Value Ref Range Status   12/23/2019 3.2 (A) 0.86 - 1.14 Final       ASSESSMENT / PLAN  INR assessment SUPRA    Recheck INR In: 2 WEEKS    INR Location Clinic      Anticoagulation Summary  As of 12/23/2019    INR goal:   2.0-3.0   TTR:   60.4 % (1 y)   INR used for dosing:   3.2! (12/23/2019)   Warfarin maintenance plan:   2.5 mg (5 mg x 0.5) every Fri; 5 mg (5 mg x 1) all other days   Full warfarin instructions:   2.5 mg every Fri; 5 mg all other days   Weekly warfarin total:   32.5 mg   Plan last modified:   Raquel Pelletier RN (3/22/2016)   Next INR check:   1/6/2020   Priority:   High   Target end date:       Indications    Factor V Leiden mutation (H) [D68.51]  Venous thrombosis [I82.90]  Prothrombin mutation (H) [D68.52]  Long term current use of anticoagulant therapy [Z79.01]             Anticoagulation Episode Summary     INR check location:       Preferred lab:       Send INR reminders to:   [facility]    Comments:   5 mg tablets, appt card, BP, PM dose        Anticoagulation Care Providers     Provider Role Specialty Phone number    Mikyin, Nate Oconnell DO [facility] Internal Medicine [phone]            See the Encounter Report to view Anticoagulation Flowsheet and Dosing Calendar (Go to Encounters tab in chart review, and find the Anticoagulation Therapy Visit)    Dosage adjustment made based on physician directed care plan.    Zaina Hughes, TITUS

## 2020-01-06 ENCOUNTER — ANTICOAGULATION THERAPY VISIT (OUTPATIENT)
Dept: ANTICOAGULATION | Facility: CLINIC | Age: 85
End: 2020-01-06
Payer: MEDICARE

## 2020-01-06 VITALS — HEART RATE: 66 BPM | DIASTOLIC BLOOD PRESSURE: 71 MMHG | SYSTOLIC BLOOD PRESSURE: 131 MMHG

## 2020-01-06 DIAGNOSIS — Z79.01 LONG TERM CURRENT USE OF ANTICOAGULANT THERAPY: ICD-10-CM

## 2020-01-06 DIAGNOSIS — D68.52 PROTHROMBIN MUTATION (H): ICD-10-CM

## 2020-01-06 DIAGNOSIS — I82.90 VENOUS THROMBOSIS: ICD-10-CM

## 2020-01-06 DIAGNOSIS — D68.51 FACTOR V LEIDEN MUTATION (H): ICD-10-CM

## 2020-01-06 LAB — INR POINT OF CARE: 2.7 (ref 0.9–1.1)

## 2020-01-06 PROCEDURE — 99207 ZZC NO CHARGE NURSE ONLY: CPT

## 2020-01-06 PROCEDURE — 36416 COLLJ CAPILLARY BLOOD SPEC: CPT

## 2020-01-06 PROCEDURE — 85610 PROTHROMBIN TIME: CPT | Mod: QW

## 2020-01-06 NOTE — PROGRESS NOTES
ANTICOAGULATION FOLLOW-UP CLINIC VISIT    Patient Name:  Robert Richard  Date:  2020  Contact Type:  Face to Face    SUBJECTIVE:  Patient Findings     Comments:   The patient was assessed for diet, medication, and activity level changes, missed or extra doses, bruising or bleeding, with no problem findings.  Raquel Pelletier RN          Clinical Outcomes     Negatives:   Major bleeding event, Thromboembolic event, Anticoagulation-related hospital admission, Anticoagulation-related ED visit, Anticoagulation-related fatality    Comments:   The patient was assessed for diet, medication, and activity level changes, missed or extra doses, bruising or bleeding, with no problem findings.  Raquel Pelletier RN             OBJECTIVE    INR Protime   Date Value Ref Range Status   2020 2.7 (A) 0.9 - 1.1 Final       ASSESSMENT / PLAN  INR assessment THER    Recheck INR In: 6 WEEKS    INR Location Clinic      Anticoagulation Summary  As of 2020    INR goal:   2.0-3.0   TTR:   62.7 % (1 y)   INR used for dosin.7 (2020)   Warfarin maintenance plan:   2.5 mg (5 mg x 0.5) every Fri; 5 mg (5 mg x 1) all other days   Full warfarin instructions:   2.5 mg every Fri; 5 mg all other days   Weekly warfarin total:   32.5 mg   No change documented:   Raquel Pelletier RN   Plan last modified:   Raquel Pelletier RN (3/22/2016)   Next INR check:   2020   Priority:   Maintenance   Target end date:   Indefinite    Indications    Factor V Leiden mutation (H) [D68.51]  Venous thrombosis [I82.90]  Prothrombin mutation (H) [D68.52]  Long term current use of anticoagulant therapy [Z79.01]             Anticoagulation Episode Summary     INR check location:       Preferred lab:       Send INR reminders to:   [facility] [facility]    Comments:   5 mg tablets, appt card, BP, PM dose        Anticoagulation Care Providers     Provider Role Specialty Phone number    Stiven Donahue MD Referring Internal Medicine  [phone]    Nate Cifuentes DO [facility] Internal Medicine [phone]            See the Encounter Report to view Anticoagulation Flowsheet and Dosing Calendar (Go to Encounters tab in chart review, and find the Anticoagulation Therapy Visit)    Dosage adjustment made based on physician directed care plan.    Raquel Pelletier RN

## 2020-01-21 ENCOUNTER — OFFICE VISIT (OUTPATIENT)
Dept: INTERNAL MEDICINE | Facility: CLINIC | Age: 85
End: 2020-01-21
Payer: MEDICARE

## 2020-01-21 ENCOUNTER — TELEPHONE (OUTPATIENT)
Dept: INTERNAL MEDICINE | Facility: CLINIC | Age: 85
End: 2020-01-21

## 2020-01-21 VITALS
RESPIRATION RATE: 16 BRPM | OXYGEN SATURATION: 96 % | HEART RATE: 80 BPM | DIASTOLIC BLOOD PRESSURE: 84 MMHG | HEIGHT: 70 IN | TEMPERATURE: 96.5 F | WEIGHT: 254 LBS | BODY MASS INDEX: 36.36 KG/M2 | SYSTOLIC BLOOD PRESSURE: 158 MMHG

## 2020-01-21 DIAGNOSIS — E11.8 TYPE 2 DIABETES MELLITUS WITH COMPLICATION, WITHOUT LONG-TERM CURRENT USE OF INSULIN (H): ICD-10-CM

## 2020-01-21 DIAGNOSIS — I10 HYPERTENSION GOAL BP (BLOOD PRESSURE) < 140/90: ICD-10-CM

## 2020-01-21 DIAGNOSIS — M20.42 HAMMER TOES OF BOTH FEET: ICD-10-CM

## 2020-01-21 DIAGNOSIS — Z79.01 LONG TERM CURRENT USE OF ANTICOAGULANT THERAPY: ICD-10-CM

## 2020-01-21 DIAGNOSIS — Z00.00 ENCOUNTER FOR MEDICARE ANNUAL WELLNESS EXAM: Primary | ICD-10-CM

## 2020-01-21 DIAGNOSIS — D68.51 FACTOR V LEIDEN MUTATION (H): ICD-10-CM

## 2020-01-21 DIAGNOSIS — E11.8 TYPE 2 DIABETES MELLITUS WITH COMPLICATION, WITHOUT LONG-TERM CURRENT USE OF INSULIN (H): Primary | ICD-10-CM

## 2020-01-21 DIAGNOSIS — M20.41 HAMMER TOES OF BOTH FEET: ICD-10-CM

## 2020-01-21 LAB
ALBUMIN SERPL-MCNC: 3.5 G/DL (ref 3.4–5)
ALP SERPL-CCNC: 133 U/L (ref 40–150)
ALT SERPL W P-5'-P-CCNC: 25 U/L (ref 0–70)
ANION GAP SERPL CALCULATED.3IONS-SCNC: 5 MMOL/L (ref 3–14)
AST SERPL W P-5'-P-CCNC: 18 U/L (ref 0–45)
BILIRUB SERPL-MCNC: 0.3 MG/DL (ref 0.2–1.3)
BUN SERPL-MCNC: 26 MG/DL (ref 7–30)
CALCIUM SERPL-MCNC: 8.7 MG/DL (ref 8.5–10.1)
CHLORIDE SERPL-SCNC: 110 MMOL/L (ref 94–109)
CHOLEST SERPL-MCNC: 241 MG/DL
CO2 SERPL-SCNC: 26 MMOL/L (ref 20–32)
CREAT SERPL-MCNC: 1.48 MG/DL (ref 0.66–1.25)
CREAT UR-MCNC: 159 MG/DL
GFR SERPL CREATININE-BSD FRML MDRD: 43 ML/MIN/{1.73_M2}
GLUCOSE SERPL-MCNC: 151 MG/DL (ref 70–99)
HBA1C MFR BLD: 7.9 % (ref 0–5.6)
HDLC SERPL-MCNC: 61 MG/DL
LDLC SERPL CALC-MCNC: 121 MG/DL
MICROALBUMIN UR-MCNC: 141 MG/L
MICROALBUMIN/CREAT UR: 88.68 MG/G CR (ref 0–17)
NONHDLC SERPL-MCNC: 180 MG/DL
POTASSIUM SERPL-SCNC: 4.1 MMOL/L (ref 3.4–5.3)
PROT SERPL-MCNC: 7 G/DL (ref 6.8–8.8)
SODIUM SERPL-SCNC: 141 MMOL/L (ref 133–144)
TRIGL SERPL-MCNC: 297 MG/DL

## 2020-01-21 PROCEDURE — 36415 COLL VENOUS BLD VENIPUNCTURE: CPT | Performed by: INTERNAL MEDICINE

## 2020-01-21 PROCEDURE — G0438 PPPS, INITIAL VISIT: HCPCS | Performed by: INTERNAL MEDICINE

## 2020-01-21 PROCEDURE — 80061 LIPID PANEL: CPT | Performed by: INTERNAL MEDICINE

## 2020-01-21 PROCEDURE — 82043 UR ALBUMIN QUANTITATIVE: CPT | Performed by: INTERNAL MEDICINE

## 2020-01-21 PROCEDURE — 83036 HEMOGLOBIN GLYCOSYLATED A1C: CPT | Performed by: INTERNAL MEDICINE

## 2020-01-21 PROCEDURE — 80053 COMPREHEN METABOLIC PANEL: CPT | Performed by: INTERNAL MEDICINE

## 2020-01-21 RX ORDER — ATENOLOL 50 MG/1
50 TABLET ORAL 2 TIMES DAILY
Qty: 180 TABLET | Refills: 1 | Status: SHIPPED | OUTPATIENT
Start: 2020-01-21 | End: 2021-02-01

## 2020-01-21 ASSESSMENT — ACTIVITIES OF DAILY LIVING (ADL): CURRENT_FUNCTION: NO ASSISTANCE NEEDED

## 2020-01-21 ASSESSMENT — PAIN SCALES - GENERAL: PAINLEVEL: NO PAIN (0)

## 2020-01-21 ASSESSMENT — MIFFLIN-ST. JEOR: SCORE: 1853.39

## 2020-01-21 NOTE — TELEPHONE ENCOUNTER
----- Message from Stiven Donahue MD sent at 1/21/2020 10:53 AM CST -----  Please call and let him know that his diabetes is getting worse and his sugar is quite high.  I would like to put him on medication glipizide extended release 5 mg once a day.  It may also be affecting his kidneys a little bit and he should be treated.  Repeat an A1c and a basic panel in 3 months then.

## 2020-01-21 NOTE — PATIENT INSTRUCTIONS
Patient Education   Personalized Prevention Plan  You are due for the preventive services outlined below.  Your care team is available to assist you in scheduling these services.  If you have already completed any of these items, please share that information with your care team to update in your medical record.  Health Maintenance Due   Topic Date Due     Diabetic Foot Exam  1935     Eye Exam  1935     Annual Wellness Visit  09/01/2001     Zoster (Shingles) Vaccine (2 of 3) 06/02/2008     FALL RISK ASSESSMENT  10/12/2019     Cholesterol Lab  10/16/2019     INR CLINIC REFERRAL - yearly  10/30/2019     A1C Lab  12/20/2019     Kidney Microalbumin Urine Test  12/27/2019     PHQ-2  01/01/2020       Exercise for a Healthier Heart     Exercise with a friend. When activity is fun, you're more likely to stick with it.   You may wonder how you can improve the health of your heart. If you re thinking about exercise, you re on the right track. You don t need to become an athlete, but you do need a certain amount of brisk exercise to help strengthen your heart. If you have been diagnosed with a heart condition, your doctor may recommend exercise to help stabilize your condition. To help make exercise a habit, choose safe, fun activities.  Be sure to check with your healthcare provider before starting an exercise program.  Why exercise?  Exercising regularly offers many healthy rewards. It can help you do all of the following:    Improve your blood cholesterol level to help prevent further heart trouble    Lower your blood pressure to help prevent a stroke or heart attack    Control diabetes, or reduce your risk of getting this disease    Improve your heart and lung function    Reach and maintain a healthy weight    Make your muscles stronger and more limber so you can stay active    Prevent falls and fractures by slowing the loss of bone mass (osteoporosis)    Manage stress better    Reduce your blood  pressure    Improve your sense of self and your body image  Exercise tips  Ease into your routine. Set small goals. Then build on them.  Exercise on most days. Aim for a total of 150 or more minutes of moderate to  vigorous intensity activity each week. Consider 40 minutes, 3 to 4 times a week. For best results, activity should last for 40 minutes on average. It is OK to work up to the 40 minute period over time. Examples of moderate-intensity activity is walking 1 mile in 15 minutes or 30 to 45 minutes of yard work.  Step up your daily activity level. Along with your exercise program, try being more active throughout the day. Walk instead of drive. Do more household tasks or yard work.  Choose one or more activities you enjoy. Walking is one of the easiest things you can do. You can also try swimming, riding a bike, dancing, or taking an exercise class.  Stop exercising and call your doctor if you:    Have chest pain or feel dizzy or lightheaded    Feel burning, tightness, pressure, or heaviness in your chest, neck, shoulders, back, or arms    Have unusual shortness of breath    Have increased joint or muscle pain    Have palpitations or an irregular heartbeat  Date Last Reviewed: 5/1/2016 2000-2019 [employer]. [address] [place] [address], [place] [address]. All rights reserved. This information is not intended as a substitute for professional medical care. Always follow your healthcare professional's instructions.

## 2020-01-21 NOTE — TELEPHONE ENCOUNTER
Tried to reach patient, left message for patient to call the clinic back.    Kelton Banegas, [facility]

## 2020-01-21 NOTE — TELEPHONE ENCOUNTER
Patient called back, relayed message above.        Mena Mulligan ~ Patient Representative  [address] [address]  [email or web address]  [email or web address]  Office:  [phone]  Fax:  [phone]

## 2020-01-21 NOTE — PROGRESS NOTES
"SUBJECTIVE:   Robert Richard is a 84 year old male who presents for Preventive Visit.  Are you in the first 12 months of your Medicare coverage?  No    Healthy Habits:     In general, how would you rate your overall health?  Very good    Frequency of exercise:  None (stays active)    Duration of exercise:  Less than 15 minutes    Do you usually eat at least 4 servings of fruit and vegetables a day, include whole grains    & fiber and avoid regularly eating high fat or \"junk\" foods?  Yes    Taking medications regularly:  No    Barriers to taking medications:  None    Medication side effects:  None    Ability to successfully perform activities of daily living:  No assistance needed    Home Safety:  No safety concerns identified    Hearing impairment symptoms: has hearing aids.    In the past 6 months, have you been bothered by leaking of urine?  No    In general, how would you rate your overall mental or emotional health?  Excellent      PHQ-2 Total Score: 0    Additional concerns today:  Yes    He is doing ok, no complaints.     Usually wears compression stockings, left leg is bigger then the right leg.  Coumadin has been ok, history of clots but already on coumadin   INR is over 2.    Not taking spironolactone everyday.    Tries to move his legs often.    Do you feel safe in your environment? Yes    Have you ever done Advance Care Planning? (For example, a Health Directive, POLST, or a discussion with a medical provider or your loved ones about your wishes): No, advance care planning information given to patient to review.  Advanced care planning was discussed at today's visit.    Fall risk  Fallen 2 or more times in the past year?: No  Any fall with injury in the past year?: No    Cognitive Screening   1) Repeat 3 items (Leader, Season, Table)    2) Clock draw: NORMAL  3) 3 item recall: Recalls 1 object   Results: NORMAL clock, 1-2 items recalled: COGNITIVE IMPAIRMENT LESS LIKELY    Mini-CogTM Copyright S Myra. " Licensed by the author for use in [facility]; reprinted with permission (sodora@[facility]). All rights reserved.      Do you have sleep apnea, excessive snoring or daytime drowsiness?: yes    Reviewed and updated as needed this visit by clinical staff  Tobacco  Allergies  Meds         Reviewed and updated as needed this visit by Provider        Social History     Tobacco Use     Smoking status: Never Smoker     Smokeless tobacco: Never Used   Substance Use Topics     Alcohol use: Yes     Alcohol/week: 0.0 standard drinks     Comment: 1 drink every 1-2 weeks.       No flowsheet data found.       Current providers sharing in care for this patient include:   Patient Care Team:  Stiven Donahue MD as PCP - General (Internal Medicine)  Nate Cifuentes DO as Assigned PCP    The following health maintenance items are reviewed in Epic and correct as of today:  Health Maintenance   Topic Date Due     DIABETIC FOOT EXAM  1935     EYE EXAM  1935     MEDICARE ANNUAL WELLNESS VISIT  09/01/2001     ZOSTER IMMUNIZATION (2 of 3) 06/02/2008     FALL RISK ASSESSMENT  10/12/2019     LIPID  10/16/2019     OP ANNUAL INR REFERRAL  10/30/2019     A1C  12/20/2019     MICROALBUMIN  12/27/2019     PHQ-2  01/01/2020     BMP  08/16/2020     TSH W/FREE T4 REFLEX  12/27/2020     DTAP/TDAP/TD IMMUNIZATION (3 - Td) 07/10/2022     ADVANCE CARE PLANNING  07/31/2022     INFLUENZA VACCINE  Completed     PNEUMOCOCCAL IMMUNIZATION 65+ LOW/MEDIUM RISK  Completed     IPV IMMUNIZATION  Aged Out     MENINGITIS IMMUNIZATION  Aged Out     Lab work is in process  Pneumonia Vaccine:up to date    Review of Systems  CONSTITUTIONAL: NEGATIVE for fever, chills, change in weight  INTEGUMENTARY/SKIN: swelling in his legs  EYES: NEGATIVE for vision changes or irritation  ENT/MOUTH: NEGATIVE for ear, mouth and throat problems  RESP: NEGATIVE for significant cough or SOB  BREAST: NEGATIVE for masses, tenderness or discharge  CV:  "NEGATIVE for chest pain, palpitations or peripheral edema  GI: NEGATIVE for nausea, abdominal pain, heartburn, or change in bowel habits  : NEGATIVE for frequency, dysuria, or hematuria  MUSCULOSKELETAL: NEGATIVE for significant arthralgias or myalgia  NEURO: NEGATIVE for weakness, dizziness or paresthesias  ENDOCRINE: NEGATIVE for temperature intolerance, skin/hair changes  HEME: NEGATIVE for bleeding problems  PSYCHIATRIC: NEGATIVE for changes in mood or affect    Having some hammertoes, affects his balance.      OBJECTIVE:   BP (!) 158/84   Pulse 80   Temp 96.5  F (35.8  C) (Temporal)   Resp 16   Ht 1.778 m (5' 10\")   Wt 115.2 kg (254 lb)   SpO2 96%   BMI 36.45 kg/m   Estimated body mass index is 36.45 kg/m  as calculated from the following:    Height as of this encounter: 1.778 m (5' 10\").    Weight as of this encounter: 115.2 kg (254 lb).  Physical Exam  GENERAL: healthy, alert and no distress  EYES: Eyes grossly normal to inspection, PERRL and conjunctivae and sclerae normal  HENT: ear canals and TM's normal, nose and mouth without ulcers or lesions  NECK: no adenopathy, no asymmetry, masses, or scars and thyroid normal to palpation  RESP: lungs clear to auscultation - no rales, rhonchi or wheezes  CV: regular rate and rhythm, normal S1 S2, no S3 or S4, no murmur, click or rub, no peripheral edema and peripheral pulses strong  ABDOMEN: soft, nontender, no hepatosplenomegaly, no masses and bowel sounds normal  MS: left leg 2+ pitting edema  SKIN: no suspicious lesions or rashes  NEURO: Normal strength and tone, mentation intact and speech normal  PSYCH: mentation appears normal, affect normal/bright    ASSESSMENT / PLAN:       ICD-10-CM    1. Encounter for Medicare annual wellness exam Z00.00    2. Hypertension goal BP (blood pressure) < 140/90 I10 atenolol (TENORMIN) 50 MG tablet     Comprehensive metabolic panel     Albumin Random Urine Quantitative with Creat Ratio   3. Hammer toes of both feet " "M20.41 PODIATRY/FOOT & ANKLE SURGERY REFERRAL    M20.42    4. Type 2 diabetes mellitus with complication, without long-term current use of insulin (H) E11.8 Hemoglobin A1c     Lipid Profile     Comprehensive metabolic panel   5. Factor V Leiden mutation (H) D68.51    6. Long term current use of anticoagulant therapy Z79.01      Patient here for Medicare wellness check.  He is a complex patient with a history of factor V Leiden mutation he is on anticoagulation.  He has had clots before.  His left leg is more swollen than the right but this may be from overuse as he favors it.  Recommended to get back on spironolactone to help his blood pressure and swelling.  He does not want to do an ultrasound as he is already on Coumadin and his INR is been above 2.    Diabetes we will check his A1c he is not on any sugars but his last A1c was at 7.1.    Hypertension is little bit high but we will restart his spironolactone.    Hammer toes of both toes will refer to podiatry for options or treatment.    Left I twitches or spasms will refer to neurology for Botox injection these had done at [place] before.      COUNSELING:  Reviewed preventive health counseling, as reflected in patient instructions       Regular exercise       Healthy diet/nutrition    Estimated body mass index is 36.45 kg/m  as calculated from the following:    Height as of this encounter: 1.778 m (5' 10\").    Weight as of this encounter: 115.2 kg (254 lb).    Weight management plan: Discussed healthy diet and exercise guidelines     reports that he has never smoked. He has never used smokeless tobacco.      Appropriate preventive services were discussed with this patient, including applicable screening as appropriate for cardiovascular disease, diabetes, osteopenia/osteoporosis, and glaucoma.  As appropriate for age/gender, discussed screening for colorectal cancer, prostate cancer, breast cancer, and cervical cancer. Checklist reviewing preventive services " available has been given to the patient.    Reviewed patients plan of care and provided an AVS. The Basic Care Plan (routine screening as documented in Health Maintenance) for Robert meets the Care Plan requirement. This Care Plan has been established and reviewed with the Patient.    Counseling Resources:  ATP IV Guidelines  Pooled Cohorts Equation Calculator  Breast Cancer Risk Calculator  FRAX Risk Assessment  ICSI Preventive Guidelines  Dietary Guidelines for Americans, 2010  Consumer Agent Portal (CAP)'s MyPlate  ASA Prophylaxis  Lung CA Screening    Stiven Donahue MD  [facility]    Identified Health Risks:    He is at risk for lack of exercise and has been provided with information to increase physical activity for the benefit of his well-being.

## 2020-01-22 RX ORDER — GLIPIZIDE 5 MG/1
5 TABLET, FILM COATED, EXTENDED RELEASE ORAL DAILY
Qty: 90 TABLET | Refills: 1 | Status: SHIPPED | OUTPATIENT
Start: 2020-01-22 | End: 2020-09-09

## 2020-01-22 NOTE — TELEPHONE ENCOUNTER
Lab orders and medication is teed up, please review and approve if appropriate.    Kelton Banegas, CMA

## 2020-01-23 ENCOUNTER — ANCILLARY PROCEDURE (OUTPATIENT)
Dept: GENERAL RADIOLOGY | Facility: CLINIC | Age: 85
End: 2020-01-23
Attending: PODIATRIST
Payer: MEDICARE

## 2020-01-23 ENCOUNTER — OFFICE VISIT (OUTPATIENT)
Dept: PODIATRY | Facility: CLINIC | Age: 85
End: 2020-01-23
Attending: INTERNAL MEDICINE
Payer: MEDICARE

## 2020-01-23 VITALS
DIASTOLIC BLOOD PRESSURE: 86 MMHG | BODY MASS INDEX: 36.36 KG/M2 | WEIGHT: 254 LBS | HEIGHT: 70 IN | SYSTOLIC BLOOD PRESSURE: 146 MMHG

## 2020-01-23 DIAGNOSIS — M48.061 SPINAL STENOSIS OF LUMBAR REGION, UNSPECIFIED WHETHER NEUROGENIC CLAUDICATION PRESENT: ICD-10-CM

## 2020-01-23 DIAGNOSIS — E11.59 TYPE 2 DIABETES MELLITUS WITH OTHER CIRCULATORY COMPLICATION, WITHOUT LONG-TERM CURRENT USE OF INSULIN (H): ICD-10-CM

## 2020-01-23 DIAGNOSIS — R26.89 FUNCTIONAL GAIT ABNORMALITY: ICD-10-CM

## 2020-01-23 DIAGNOSIS — M20.41 HAMMERTOES OF BOTH FEET: ICD-10-CM

## 2020-01-23 DIAGNOSIS — L97.221 VENOUS STASIS ULCER OF LEFT CALF LIMITED TO BREAKDOWN OF SKIN WITH VARICOSE VEINS (H): ICD-10-CM

## 2020-01-23 DIAGNOSIS — I83.022 VENOUS STASIS ULCER OF LEFT CALF LIMITED TO BREAKDOWN OF SKIN WITH VARICOSE VEINS (H): ICD-10-CM

## 2020-01-23 DIAGNOSIS — M20.42 HAMMERTOES OF BOTH FEET: ICD-10-CM

## 2020-01-23 DIAGNOSIS — M20.41 HAMMERTOE OF RIGHT FOOT: Primary | ICD-10-CM

## 2020-01-23 DIAGNOSIS — M20.41 HAMMERTOE OF RIGHT FOOT: ICD-10-CM

## 2020-01-23 PROCEDURE — 73630 X-RAY EXAM OF FOOT: CPT | Mod: TC

## 2020-01-23 PROCEDURE — 99213 OFFICE O/P EST LOW 20 MIN: CPT | Performed by: PODIATRIST

## 2020-01-23 ASSESSMENT — MIFFLIN-ST. JEOR: SCORE: 1853.39

## 2020-01-23 ASSESSMENT — PAIN SCALES - GENERAL: PAINLEVEL: NO PAIN (0)

## 2020-01-23 NOTE — LETTER
"    1/23/2020         RE: Robert Richard  [address] [facility] [id number]        Dear Colleague,    Thank you for referring your patient, Robert iRchard, to the [facility]. Please see a copy of my visit note below.    Chief Complaint   Patient presents with     Consult     B/L jimena; new issue     WOUND CARE     DM ulcer medial Left ankle, onset 8/14/19; LOV 11/7/2019     Diabetes     type 2     Weight management plan: Patient was referred to their PCP to discuss a diet and exercise plan.     Carlos to follow up with Primary Care provider regarding elevated blood pressure.    1/23/2020 - NEW ISSUE  Left and Right hammertoes    HPI:  Robert Richard is a 84 year old male who is seen in consultation at the request of Nate Cifuentes DO.    Pt presents for eval of:   (Onset, Location, L/R, Character, Treatments, Injury if yes)    XR Left foot and ankle today 10/3/2019    DM Type 2     He describes that he continues using compression stockings.  Mostly is here today to discuss options regarding hammertoes and all 10 toes are hammertoes.  He describes irritation on the dorsal toes sometimes bleeding and drainage.     EXAM:Vitals: BP (!) 146/86 (BP Location: Left arm, Cuff Size: Adult Large)   Ht 1.778 m (5' 10\")   Wt 115.2 kg (254 lb)   BMI 36.45 kg/m     BMI= Body mass index is 36.45 kg/m .    General appearance: Patient is alert and fully cooperative with history & exam.  No sign of distress is noted during the visit.     Psychiatric: Affect is pleasant & appropriate.  Patient appears motivated to improve health.     Respiratory: Breathing is regular & unlabored while sitting.     HEENT: Hearing is intact to spoken word.  Speech is clear.  No gross evidence of visual impairment that would impact ambulation.     Vascular: DP 1/4 & PT 1/4 left & right.  CFT delayed with dependent rubor noted about the digits.  Diminished hair growth distal to mid tibia and no hair about the " foot and toes.  Temperature changes noted, warm to cool proximal to distal.  Hemosiderin pigmentation noted with multiple varicosities legs and feet bilateral. Generalized edema bilateral legs and feet.  Pt denies claudication history.     Neurologic: Normal plantar response bilateral.  Intact protective threshold plus 10/10 applications of a 5.07 monofilament.  Pt admits burning and paraesthesias about the feet and toes with palpation.     Dermatologic: Toenails are in reasonable repair.  Previous venous stasis ulceration noted about the distal medial left calf is now closed with epithelium.  Subtle irritation noted over the dorsal aspect of several hammertoes PIPJ.     Musculoskeletal: Patient is ambulatory without assistive device or brace.  There is semi reducible contracture of the lesser digits.    Hemoglobin A1C (%)   Date Value   01/21/2020 7.9 (H)   06/20/2019 7.1 (H)   10/16/2018 6.4 (H)   05/15/2018 6.5 (H)   10/18/2017 6.6 (H)   04/20/2016 6.5     Creatinine (mg/dL)   Date Value   01/21/2020 1.48 (H)   08/16/2019 1.42 (H)   06/20/2019 1.39 (H)   12/27/2018 1.51 (H)   05/15/2018 1.46 (H)   10/18/2017 1.40 (H)     ASSESSMENT:       ICD-10-CM    1. Hammertoe of right foot M20.41 XR Foot Right G/E 3 Views     ORTHOTICS REFERRAL   2. Type 2 diabetes mellitus with other circulatory complication, without long-term current use of insulin (H) E11.59 ORTHOTICS REFERRAL   3. Venous stasis ulcer of left calf limited to breakdown of skin with varicose veins (H) I83.022 ORTHOTICS REFERRAL    L97.221    4. Hammertoes of both feet M20.41 ORTHOTICS REFERRAL    M20.42    5. Spinal stenosis of lumbar region, unspecified whether neurogenic claudication present M48.061 ORTHOTICS REFERRAL   6. Functional gait abnormality R26.89 ORTHOTICS REFERRAL     PLAN:    10/3/2019     We discussed risk factors and preventive measures.    We discussed appropriate hygiene, shoe gear, daily foot exam, and reinforced management of weight,  diet, activity goals and HA1C goal for diabetic patients.    Dispensed written foot care instructions.      Order was placed today for wound care referral possibly for Unna boot therapy and follow-up with me in about 1 month and hopefully the wound will be closed at that time and we can move forward with compression therapy chronically.  We discussed the etiology and alternative treatment options as well as potential risks and complications.  The Unna boot was applied today.  Please refer the patient back sooner if he would like to discontinue Unna boots faster.  He will follow-up for Unna boot change here with our RN specialty care RN as it likely will take a week to get the patient into wound care.    11/7/2019  Explained to the patient that it is of utmost importance to continue compression of the lower extremities upon completion of the Unna boots otherwise edema will return which is the cause of the ulceration.  Also describe the importance of managing edema from the toes proximal over the widest point of the calf not just wrapping the calf.  He has a compression dressing but has difficulty applying it.  Therefore order was placed for custom compression 20 to 30 mmHg as well as a donning agent such as a plastic sled or Tyvek sleeve.    Follow-up here in 2 or 3 weeks with any continued concerns and as needed.    1/23/2020  Order for diabetic shoes to accommodate the rigid hammertoes and still provide stability and poor balance  Follow up once yearly.   Continue compression stockings.  Discussed alternative treatment options.  All questions were answered.    Khadar García DPM    Again, thank you for allowing me to participate in the care of your patient.        Sincerely,        Khadar García DPM

## 2020-01-23 NOTE — PROGRESS NOTES
"Chief Complaint   Patient presents with     Consult     B/L jimena; new issue     WOUND CARE     DM ulcer medial Left ankle, onset 8/14/19; LOV 11/7/2019     Diabetes     type 2     Weight management plan: Patient was referred to their PCP to discuss a diet and exercise plan.     Carlos to follow up with Primary Care provider regarding elevated blood pressure.    1/23/2020 - NEW ISSUE  Left and Right hammertoes    HPI:  Robert Richard is a 84 year old male who is seen in consultation at the request of Nate Cifuentes DO.    Pt presents for eval of:   (Onset, Location, L/R, Character, Treatments, Injury if yes)    XR Left foot and ankle today 10/3/2019    DM Type 2     He describes that he continues using compression stockings.  Mostly is here today to discuss options regarding hammertoes and all 10 toes are hammertoes.  He describes irritation on the dorsal toes sometimes bleeding and drainage.     EXAM:Vitals: BP (!) 146/86 (BP Location: Left arm, Cuff Size: Adult Large)   Ht 1.778 m (5' 10\")   Wt 115.2 kg (254 lb)   BMI 36.45 kg/m    BMI= Body mass index is 36.45 kg/m .    General appearance: Patient is alert and fully cooperative with history & exam.  No sign of distress is noted during the visit.     Psychiatric: Affect is pleasant & appropriate.  Patient appears motivated to improve health.     Respiratory: Breathing is regular & unlabored while sitting.     HEENT: Hearing is intact to spoken word.  Speech is clear.  No gross evidence of visual impairment that would impact ambulation.     Vascular: DP 1/4 & PT 1/4 left & right.  CFT delayed with dependent rubor noted about the digits.  Diminished hair growth distal to mid tibia and no hair about the foot and toes.  Temperature changes noted, warm to cool proximal to distal.  Hemosiderin pigmentation noted with multiple varicosities legs and feet bilateral. Generalized edema bilateral legs and feet.  Pt denies claudication history.   "   Neurologic: Normal plantar response bilateral.  Intact protective threshold plus 10/10 applications of a 5.07 monofilament.  Pt admits burning and paraesthesias about the feet and toes with palpation.     Dermatologic: Toenails are in reasonable repair.  Previous venous stasis ulceration noted about the distal medial left calf is now closed with epithelium.  Subtle irritation noted over the dorsal aspect of several hammertoes PIPJ.     Musculoskeletal: Patient is ambulatory without assistive device or brace.  There is semi reducible contracture of the lesser digits.    Hemoglobin A1C (%)   Date Value   01/21/2020 7.9 (H)   06/20/2019 7.1 (H)   10/16/2018 6.4 (H)   05/15/2018 6.5 (H)   10/18/2017 6.6 (H)   04/20/2016 6.5     Creatinine (mg/dL)   Date Value   01/21/2020 1.48 (H)   08/16/2019 1.42 (H)   06/20/2019 1.39 (H)   12/27/2018 1.51 (H)   05/15/2018 1.46 (H)   10/18/2017 1.40 (H)     ASSESSMENT:       ICD-10-CM    1. Hammertoe of right foot M20.41 XR Foot Right G/E 3 Views     ORTHOTICS REFERRAL   2. Type 2 diabetes mellitus with other circulatory complication, without long-term current use of insulin (H) E11.59 ORTHOTICS REFERRAL   3. Venous stasis ulcer of left calf limited to breakdown of skin with varicose veins (H) I83.022 ORTHOTICS REFERRAL    L97.221    4. Hammertoes of both feet M20.41 ORTHOTICS REFERRAL    M20.42    5. Spinal stenosis of lumbar region, unspecified whether neurogenic claudication present M48.061 ORTHOTICS REFERRAL   6. Functional gait abnormality R26.89 ORTHOTICS REFERRAL     PLAN:    10/3/2019     We discussed risk factors and preventive measures.    We discussed appropriate hygiene, shoe gear, daily foot exam, and reinforced management of weight, diet, activity goals and HA1C goal for diabetic patients.    Dispensed written foot care instructions.      Order was placed today for wound care referral possibly for Unna boot therapy and follow-up with me in about 1 month and hopefully  the wound will be closed at that time and we can move forward with compression therapy chronically.  We discussed the etiology and alternative treatment options as well as potential risks and complications.  The Unna boot was applied today.  Please refer the patient back sooner if he would like to discontinue Unna boots faster.  He will follow-up for Unna boot change here with our RN specialty care RN as it likely will take a week to get the patient into wound care.    11/7/2019  Explained to the patient that it is of utmost importance to continue compression of the lower extremities upon completion of the Unna boots otherwise edema will return which is the cause of the ulceration.  Also describe the importance of managing edema from the toes proximal over the widest point of the calf not just wrapping the calf.  He has a compression dressing but has difficulty applying it.  Therefore order was placed for custom compression 20 to 30 mmHg as well as a donning agent such as a plastic sled or Tyvek sleeve.    Follow-up here in 2 or 3 weeks with any continued concerns and as needed.    1/23/2020  Order for diabetic shoes to accommodate the rigid hammertoes and still provide stability and poor balance  Follow up once yearly.   Continue compression stockings.  Discussed alternative treatment options.  All questions were answered.    Khadar García DPM

## 2020-02-06 NOTE — LETTER
10/12/2018         RE: Robert Richard  [id number] [facility] [id number]        Dear Colleague,    Thank you for referring your patient, Robert Richard, to the [facility]. Please see a copy of my visit note below.    Patient seen in consultation for umbilical hernia by Nate Cifuentes    HPI:  Patient is a 83 year old male with ~1 year history of umbilical hernia. He states that he first noticed it at that time and initially it wouldn't bother him that much, More recently he has noticed with pressure on the abdomen or lifting that he has pain in that area. There is always a bulge there but he denies ever having to press it back into place. He has had some hip and back issue but he is still active for his age and plays golf regularly. 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    Review Of Systems    Skin: negative  Ears/Nose/Throat: negative  Respiratory: No shortness of breath, dyspnea on exertion, cough, or hemoptysis  Cardiovascular: negative  Gastrointestinal: negative  Genitourinary: negative  Musculoskeletal: back pain, neck pain and hip pain  Neurologic: negative  Hematologic/Lymphatic/Immunologic: negative  Endocrine: negative      Past Medical History:   Diagnosis Date     Basal cell carcinoma      Cancer (H)      DVT (deep venous thrombosis) (H) 11/2003     DVT (deep venous thrombosis) (H) 12/08     DVT (deep venous thrombosis) (H) 10/2010     Gout      Other and unspecified hyperlipidemia      Restless leg syndrome      Unspecified essential hypertension        Past Surgical History:   Procedure Laterality Date     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     Social History Main Topics     Smoking status: Never Smoker     Smokeless tobacco: Never Used     Alcohol use 0.0 oz/week     0 Standard drinks or equivalent per week      Comment: 1 drink every 1-2 weeks.     Drug use: No     Sexual activity: Yes     Partners: Female     Other Topics Concern     Not on file     Social History Narrative       Current Outpatient Prescriptions   Medication Sig Dispense Refill     aspirin 81 MG tablet Take 81 mg by mouth daily       atenolol (TENORMIN) 50 MG tablet Take 1 tablet (50 mg) by mouth 2 times daily 180 tablet 1     colchicine (COLCRYS) 0.6 MG tablet Take 2 tablets at the first sign of flare, take 1 additional tablet one hour later. 30 tablet 0     doxazosin (CARDURA) 8 MG tablet TAKE ONE TABLET BY MOUTH ONCE DAILY 90 tablet 1     losartan (COZAAR) 50 MG tablet TAKE 1 TABLET BY MOUTH ONCE DAILY 90 tablet 0     Multiple Vitamins-Minerals (MULTIVITAMIN ADULT PO)        ORDER FOR DME Wear mask at night 1 Units 0     pramipexole (MIRAPEX) 1 MG tablet Take 1 tablet (1 mg) by mouth 2 times daily 180 tablet 1     spironolactone (ALDACTONE) 25 MG tablet TAKE ONE TABLET BY MOUTH ONCE DAILY 90 tablet 1     warfarin (COUMADIN) 5 MG tablet TAKE 1/2 TABLET BY MOUTH ON FRIDAY AND 1 TABLET ON ALL OTHER DAYS, OR AS DIRECTED BY THE COUMADIN CLINIC 80 tablet 1     [DISCONTINUED] atenolol (TENORMIN) 50 MG tablet TAKE ONE TABLET BY MOUTH TWICE DAILY 180 tablet 1     [DISCONTINUED] pramipexole (MIRAPEX) 1 MG tablet Take 1 tablet by mouth         Medications and history reviewed    Physical exam:  Vitals: /80  Temp 97  F (36.1  C) (Temporal)  Ht 1.778 m (5' 10\")  Wt 113.4 kg (250 lb)  BMI 35.87 kg/m2  BMI= Body mass index is 35.87 kg/(m^2).    Constitutional: Healthy, alert, non-distressed   Head: Normo-cephalic, atraumatic, no lesions, masses or tenderness   Cardiovascular: RRR, no new murmurs, +S1, +S2 heart sounds, no " clicks, rubs or gallops   Respiratory: CTAB, no rales, rhonchi or wheezing, equal chest rise, good respiratory effort   Gastrointestinal: Soft, obvious umbilical hernia with incarcerated contents, I was able to partially reduce the hernia, this caused him pain  : Deferred  Musculoskeletal: Moves all extremities, normal  strength, no deformities noted   Skin: No suspicious lesions or rashes   Psychiatric: Mentation appears normal, affect appropriate   Hematologic/Lymphatic/Immunologic: Normal cervical and supraclavicular lymph nodes   Patient able to get up on table with some difficulty, he uses a cane    Labs show:  No results found for this or any previous visit (from the past 24 hour(s)).      Assessment:     ICD-10-CM    1. Umbilical hernia without obstruction and without gangrene K42.9 Chitra-Operative Worksheet - Laparoscopic Ventral Hernia Repair     Plan: Carlos is still very active for his age and his hernia is becoming more symptomatic. For this reason, I recommended laparoscopic hernia repair with mesh. We discussed the procedure in detail. We also discussed the risks, benefits, alternatives and post-op care and restrictions. After our informed discussion we decided to proceed with the proposed surgery.    He is going to call into the clinic to schedule after he discusses things with his wife.      Jamal Thompson, DO      Again, thank you for allowing me to participate in the care of your patient.        Sincerely,        Jamal Thompson, DO

## 2020-02-10 DIAGNOSIS — Z86.718 PERSONAL HISTORY OF VENOUS THROMBOSIS AND EMBOLISM: ICD-10-CM

## 2020-02-10 RX ORDER — WARFARIN SODIUM 5 MG/1
TABLET ORAL
Qty: 80 TABLET | Refills: 0 | Status: SHIPPED | OUTPATIENT
Start: 2020-02-10 | End: 2020-04-29

## 2020-02-17 ENCOUNTER — ANTICOAGULATION THERAPY VISIT (OUTPATIENT)
Dept: ANTICOAGULATION | Facility: CLINIC | Age: 85
End: 2020-02-17
Payer: MEDICARE

## 2020-02-17 VITALS — DIASTOLIC BLOOD PRESSURE: 87 MMHG | HEART RATE: 66 BPM | SYSTOLIC BLOOD PRESSURE: 135 MMHG

## 2020-02-17 DIAGNOSIS — D68.51 FACTOR V LEIDEN MUTATION (H): ICD-10-CM

## 2020-02-17 DIAGNOSIS — I82.90 VENOUS THROMBOSIS: ICD-10-CM

## 2020-02-17 DIAGNOSIS — Z79.01 LONG TERM CURRENT USE OF ANTICOAGULANT THERAPY: ICD-10-CM

## 2020-02-17 DIAGNOSIS — D68.52 PROTHROMBIN MUTATION (H): ICD-10-CM

## 2020-02-17 LAB — INR POINT OF CARE: 2.4 (ref 0.9–1.1)

## 2020-02-17 PROCEDURE — 85610 PROTHROMBIN TIME: CPT | Mod: QW

## 2020-02-17 PROCEDURE — 99207 ZZC NO CHARGE NURSE ONLY: CPT

## 2020-02-17 PROCEDURE — 36416 COLLJ CAPILLARY BLOOD SPEC: CPT

## 2020-02-17 NOTE — PROGRESS NOTES
ANTICOAGULATION FOLLOW-UP CLINIC VISIT    Patient Name:  Robert Richard  Date:  2020  Contact Type:  Face to Face    SUBJECTIVE:  Patient Findings     Comments:   The patient was assessed for diet, medication, and activity level changes, missed or extra doses, bruising or bleeding, with no problem findings.  Raquel Pelletier RN          Clinical Outcomes     Negatives:   Major bleeding event, Thromboembolic event, Anticoagulation-related hospital admission, Anticoagulation-related ED visit, Anticoagulation-related fatality    Comments:   The patient was assessed for diet, medication, and activity level changes, missed or extra doses, bruising or bleeding, with no problem findings.  Raquel Pelletier RN             OBJECTIVE    INR Protime   Date Value Ref Range Status   2020 2.4 (A) 0.9 - 1.1 Final       ASSESSMENT / PLAN  INR assessment THER    Recheck INR In: 6 WEEKS    INR Location Clinic      Anticoagulation Summary  As of 2020    INR goal:   2.0-3.0   TTR:   65.3 % (1 y)   INR used for dosin.4 (2020)   Warfarin maintenance plan:   2.5 mg (5 mg x 0.5) every Fri; 5 mg (5 mg x 1) all other days   Full warfarin instructions:   2.5 mg every Fri; 5 mg all other days   Weekly warfarin total:   32.5 mg   No change documented:   Raquel Pelletier RN   Plan last modified:   Raquel Pelletier RN (3/22/2016)   Next INR check:   3/30/2020   Priority:   Maintenance   Target end date:   Indefinite    Indications    Factor V Leiden mutation (H) [D68.51]  Venous thrombosis [I82.90]  Prothrombin mutation (H) [D68.52]  Long term current use of anticoagulant therapy [Z79.01]             Anticoagulation Episode Summary     INR check location:       Preferred lab:       Send INR reminders to:   [facility]    Comments:   5 mg tablets, appt card, BP, PM dose        Anticoagulation Care Providers     Provider Role Specialty Phone number    Stiven Donahue MD Referring Internal Medicine  [phone]    Nate Cifuentes DO [facility] Internal Medicine [phone]            See the Encounter Report to view Anticoagulation Flowsheet and Dosing Calendar (Go to Encounters tab in chart review, and find the Anticoagulation Therapy Visit)    Dosage adjustment made based on physician directed care plan.      Raquel Pelletier RN

## 2020-02-26 ENCOUNTER — TELEPHONE (OUTPATIENT)
Dept: INTERNAL MEDICINE | Facility: CLINIC | Age: 85
End: 2020-02-26

## 2020-02-26 DIAGNOSIS — J31.0 CHRONIC RHINITIS: Primary | ICD-10-CM

## 2020-02-26 RX ORDER — IPRATROPIUM BROMIDE 42 UG/1
2 SPRAY, METERED NASAL 4 TIMES DAILY
Qty: 1 BOX | Refills: 1 | Status: SHIPPED | OUTPATIENT
Start: 2020-02-26 | End: 2020-03-09

## 2020-03-09 DIAGNOSIS — J31.0 CHRONIC RHINITIS: ICD-10-CM

## 2020-03-09 RX ORDER — IPRATROPIUM BROMIDE 42 UG/1
2 SPRAY, METERED NASAL 4 TIMES DAILY
Qty: 3 BOX | Refills: 3 | Status: SHIPPED | OUTPATIENT
Start: 2020-03-09 | End: 2021-05-03

## 2020-03-09 NOTE — TELEPHONE ENCOUNTER
Pt sent the following message: The nasal spray, Ipratropium Bromide nasal solution 0.06% works very well. Would you please issue another rx for 2 applicators for a 3 month supply.  Katharine Jerome CMA ([facility])

## 2020-03-25 DIAGNOSIS — Z79.01 LONG TERM CURRENT USE OF ANTICOAGULANT THERAPY: Primary | ICD-10-CM

## 2020-03-25 DIAGNOSIS — D68.51 FACTOR V LEIDEN MUTATION (H): ICD-10-CM

## 2020-03-25 DIAGNOSIS — I82.409 DVT (DEEP VENOUS THROMBOSIS) (H): ICD-10-CM

## 2020-04-28 DIAGNOSIS — Z86.718 PERSONAL HISTORY OF VENOUS THROMBOSIS AND EMBOLISM: ICD-10-CM

## 2020-04-29 RX ORDER — WARFARIN SODIUM 5 MG/1
TABLET ORAL
Qty: 90 TABLET | Refills: 0 | Status: SHIPPED | OUTPATIENT
Start: 2020-04-29 | End: 2020-08-05

## 2020-05-16 DIAGNOSIS — G25.81 RESTLESS LEG SYNDROME: ICD-10-CM

## 2020-05-19 RX ORDER — PRAMIPEXOLE DIHYDROCHLORIDE 1 MG/1
TABLET ORAL
Qty: 180 TABLET | Refills: 0 | Status: SHIPPED | OUTPATIENT
Start: 2020-05-19 | End: 2020-09-09

## 2020-05-19 NOTE — TELEPHONE ENCOUNTER
Routing refill request to provider for review/approval because:  Labs out of range:  CBC, Creatinine    DONG BillsN, RN  [facility]

## 2020-06-03 DIAGNOSIS — N52.03 COMBINED ARTERIAL INSUFFICIENCY AND CORPORO-VENOUS OCCLUSIVE ERECTILE DYSFUNCTION: ICD-10-CM

## 2020-06-05 RX ORDER — SILDENAFIL 25 MG/1
TABLET, FILM COATED ORAL
Qty: 14 TABLET | Refills: 0 | Status: SHIPPED | OUTPATIENT
Start: 2020-06-05 | End: 2020-11-13

## 2020-06-05 NOTE — TELEPHONE ENCOUNTER
Routing refill request to provider for review/approval because:  A break in medication, RX  over 6 months ago    DONG BillsN, RN  [facility]

## 2020-07-01 ENCOUNTER — ANTICOAGULATION THERAPY VISIT (OUTPATIENT)
Dept: ANTICOAGULATION | Facility: OTHER | Age: 85
End: 2020-07-01
Payer: MEDICARE

## 2020-07-01 ENCOUNTER — TELEPHONE (OUTPATIENT)
Dept: ANTICOAGULATION | Facility: CLINIC | Age: 85
End: 2020-07-01

## 2020-07-01 DIAGNOSIS — Z79.01 LONG TERM CURRENT USE OF ANTICOAGULANT THERAPY: ICD-10-CM

## 2020-07-01 DIAGNOSIS — D68.51 FACTOR V LEIDEN MUTATION (H): ICD-10-CM

## 2020-07-01 DIAGNOSIS — D68.52 PROTHROMBIN MUTATION (H): ICD-10-CM

## 2020-07-01 DIAGNOSIS — I82.90 VENOUS THROMBOSIS: ICD-10-CM

## 2020-07-01 DIAGNOSIS — I82.409 DVT (DEEP VENOUS THROMBOSIS) (H): ICD-10-CM

## 2020-07-01 LAB — INR BLD: 3.3 (ref 0.86–1.14)

## 2020-07-01 PROCEDURE — 99207 ZZC NO CHARGE NURSE ONLY: CPT | Performed by: INTERNAL MEDICINE

## 2020-07-01 PROCEDURE — 85610 PROTHROMBIN TIME: CPT | Mod: QW | Performed by: INTERNAL MEDICINE

## 2020-07-01 NOTE — TELEPHONE ENCOUNTER
Attempted to contact pt regarding INR of 3.3 from 7/4. VM left to call [facility] back to discuss     I have advised him to take 2.5 mg tonight      Raquel Pelletier RN

## 2020-07-02 NOTE — PROGRESS NOTES
ANTICOAGULATION FOLLOW-UP CLINIC VISIT    Patient Name:  Robert Richard  Date:  7/2/2020  Contact Type:  Telephone    SUBJECTIVE:  Patient Findings         Clinical Outcomes     Negatives:   Major bleeding event, Thromboembolic event, Anticoagulation-related hospital admission, Anticoagulation-related ED visit, Anticoagulation-related fatality           OBJECTIVE    Recent labs: (last 7 days)     07/01/20  0824   INR 3.3*       ASSESSMENT / PLAN  INR assessment SUPRA    Recheck INR In: 2 WEEKS    INR Location Outside lab      Anticoagulation Summary  As of 7/1/2020    INR goal:   2.0-3.0   TTR:   56.0 % (7.6 mo)   INR used for dosing:   3.3! (7/1/2020)   Warfarin maintenance plan:   2.5 mg (5 mg x 0.5) every Fri; 5 mg (5 mg x 1) all other days   Full warfarin instructions:   7/2: 2.5 mg; Otherwise 2.5 mg every Fri; 5 mg all other days   Weekly warfarin total:   32.5 mg   Plan last modified:   Raquel Pelletier RN (3/22/2016)   Next INR check:   7/15/2020   Priority:   Maintenance   Target end date:   Indefinite    Indications    Factor V Leiden mutation (H) [D68.51]  Venous thrombosis [I82.90]  Prothrombin mutation (H) [D68.52]  Long term current use of anticoagulant therapy [Z79.01]             Anticoagulation Episode Summary     INR check location:       Preferred lab:       Send INR reminders to:   [facility]    Comments:   5 mg tablets, appt card, BP, PM dose        Anticoagulation Care Providers     Provider Role Specialty Phone number    Stiven Donahue MD Referring Internal Medicine [phone]    Nate Cifuentes DO [facility] Internal Medicine [phone]            See the Encounter Report to view Anticoagulation Flowsheet and Dosing Calendar (Go to Encounters tab in chart review, and find the Anticoagulation Therapy Visit)    Dosage adjustment made based on physician directed care plan.    Chantell Boston RN

## 2020-07-15 ENCOUNTER — ANTICOAGULATION THERAPY VISIT (OUTPATIENT)
Dept: INTERNAL MEDICINE | Facility: CLINIC | Age: 85
End: 2020-07-15
Payer: MEDICARE

## 2020-07-15 DIAGNOSIS — I82.90 VENOUS THROMBOSIS: ICD-10-CM

## 2020-07-15 DIAGNOSIS — Z79.01 LONG TERM CURRENT USE OF ANTICOAGULANT THERAPY: ICD-10-CM

## 2020-07-15 DIAGNOSIS — D68.51 FACTOR V LEIDEN MUTATION (H): ICD-10-CM

## 2020-07-15 DIAGNOSIS — D68.52 PROTHROMBIN MUTATION (H): ICD-10-CM

## 2020-07-15 DIAGNOSIS — I82.409 DVT (DEEP VENOUS THROMBOSIS) (H): ICD-10-CM

## 2020-07-15 LAB
CAPILLARY BLOOD COLLECTION: NORMAL
INR BLD: 2.8 (ref 0.86–1.14)

## 2020-07-15 PROCEDURE — 85610 PROTHROMBIN TIME: CPT | Mod: QW | Performed by: INTERNAL MEDICINE

## 2020-07-15 PROCEDURE — 36416 COLLJ CAPILLARY BLOOD SPEC: CPT | Performed by: INTERNAL MEDICINE

## 2020-07-15 PROCEDURE — 99207 ZZC NO CHARGE NURSE ONLY: CPT | Performed by: INTERNAL MEDICINE

## 2020-07-15 NOTE — PROGRESS NOTES
ANTICOAGULATION FOLLOW-UP CLINIC VISIT    Patient Name:  Robert Richard  Date:  7/15/2020  Contact Type:  Telephone    SUBJECTIVE:         OBJECTIVE    Recent labs: (last 7 days)     07/15/20  1344   INR 2.8*       ASSESSMENT / PLAN  INR assessment THER    Recheck INR In: 2 WEEKS    INR Location Outside lab      Anticoagulation Summary  As of 7/15/2020    INR goal:   2.0-3.0   TTR:   58.2 % (7.7 mo)   INR used for dosin.8 (7/15/2020)   Warfarin maintenance plan:   2.5 mg (5 mg x 0.5) every Fri; 5 mg (5 mg x 1) all other days   Full warfarin instructions:   2.5 mg every Fri; 5 mg all other days   Weekly warfarin total:   32.5 mg   No change documented:   Zaina Hughes, RN   Plan last modified:   Raquel Pelletier RN (3/22/2016)   Next INR check:   2020   Priority:   Maintenance   Target end date:   Indefinite    Indications    Factor V Leiden mutation (H) [D68.51]  Venous thrombosis [I82.90]  Prothrombin mutation (H) [D68.52]  Long term current use of anticoagulant therapy [Z79.01]             Anticoagulation Episode Summary     INR check location:       Preferred lab:       Send INR reminders to:   [facility]    Comments:   5 mg tablets, appt card, BP, PM dose        Anticoagulation Care Providers     Provider Role Specialty Phone number    Stiven Donahue MD Referring Internal Medicine [phone]    Nate Cifuentes DO [facility] Internal Medicine [phone]            See the Encounter Report to view Anticoagulation Flowsheet and Dosing Calendar (Go to Encounters tab in chart review, and find the Anticoagulation Therapy Visit)    Left a detailed message on VM with this information, pt is advised to call back if pt has any questions, missed doses or concerns such as symptoms of bleeding, clotting, infection, change in diet or other.    Zaina Hughes, TITUS

## 2020-08-05 DIAGNOSIS — Z86.718 PERSONAL HISTORY OF VENOUS THROMBOSIS AND EMBOLISM: ICD-10-CM

## 2020-08-05 DIAGNOSIS — I10 HYPERTENSION GOAL BP (BLOOD PRESSURE) < 140/90: ICD-10-CM

## 2020-08-05 DIAGNOSIS — I10 ESSENTIAL HYPERTENSION WITH GOAL BLOOD PRESSURE LESS THAN 140/90: ICD-10-CM

## 2020-08-05 RX ORDER — SPIRONOLACTONE 25 MG/1
TABLET ORAL
Qty: 90 TABLET | Refills: 0 | Status: SHIPPED | OUTPATIENT
Start: 2020-08-05 | End: 2021-05-03

## 2020-08-05 RX ORDER — WARFARIN SODIUM 5 MG/1
TABLET ORAL
Qty: 90 TABLET | Refills: 0 | Status: SHIPPED | OUTPATIENT
Start: 2020-08-05 | End: 2020-11-13

## 2020-08-05 NOTE — TELEPHONE ENCOUNTER
Routing refill request to provider for review/approval because:  Labs out of range:  Creatinine    DONG BillsN, RN  [facility]

## 2020-09-08 DIAGNOSIS — G25.81 RESTLESS LEG SYNDROME: ICD-10-CM

## 2020-09-08 DIAGNOSIS — E11.8 TYPE 2 DIABETES MELLITUS WITH COMPLICATION, WITHOUT LONG-TERM CURRENT USE OF INSULIN (H): ICD-10-CM

## 2020-09-09 RX ORDER — GLIPIZIDE 5 MG/1
TABLET, FILM COATED, EXTENDED RELEASE ORAL
Qty: 30 TABLET | Refills: 0 | Status: SHIPPED | OUTPATIENT
Start: 2020-09-09 | End: 2020-11-09

## 2020-09-09 RX ORDER — PRAMIPEXOLE DIHYDROCHLORIDE 1 MG/1
TABLET ORAL
Qty: 60 TABLET | Refills: 0 | Status: SHIPPED | OUTPATIENT
Start: 2020-09-09 | End: 2020-10-30

## 2020-09-09 NOTE — TELEPHONE ENCOUNTER
"Requested Prescriptions   Pending Prescriptions Disp Refills     glipiZIDE (GLUCOTROL XL) 5 MG 24 hr tablet [Pharmacy Med Name: glipiZIDE ER 5 MG Oral Tablet Extended Release 24 Hour] 90 tablet 0     Sig: Take 1 tablet by mouth once daily   Last Written Prescription Date:  1/22/2020  Last Fill Quantity: 90,  # refills: 1   Last office visit: 1/21/2020 with prescribing provider:     Future Office Visit:        Sulfonylurea Agents Failed - 9/8/2020  9:39 AM        Failed - Patient has documented A1c within the specified period of time.     If HgbA1C is 8 or greater, it needs to be on file within the past 3 months.  If less than 8, must be on file within the past 6 months.     Recent Labs   Lab Test 01/21/20  0926   A1C 7.9*           Failed - Patient has a recent creatinine (normal) within the past 12 mos.     Recent Labs   Lab Test 01/21/20  0926   CR 1.48*     Ok to refill medication if creatinine is low          Failed - Recent (6 mo) or future (30 days) visit within the authorizing provider's specialty     Patient had office visit in the last 6 months or has a visit in the next 30 days with authorizing provider or within the authorizing provider's specialty.  See \"Patient Info\" tab in inbasket, or \"Choose Columns\" in Meds & Orders section of the refill encounter.            Passed - Medication is active on med list        Passed - Patient is age 18 or older           pramipexole (MIRAPEX) 1 MG tablet [Pharmacy Med Name: Pramipexole Dihydrochloride 1 MG Oral Tablet] 180 tablet 0     Sig: Take 1 tablet by mouth twice daily   Last Written Prescription Date:  5/19/2020  Last Fill Quantity: 180,  # refills: 0   Last office visit: 1/21/2020 with prescribing provider:     Future Office Visit:        Antiparkinson's Agents Protocol Failed - 9/8/2020  9:39 AM        Failed - CBC on record in past 12 months     Recent Labs   Lab Test 08/16/19  0756   WBC 4.7   RBC 4.22*   HGB 13.1*   HCT 39.5*              Failed - " "Recent (6 mo) or future (30 days) visit within the authorizing provider's specialty     Patient had office visit in the last 6 months or has a visit in the next 30 days with authorizing provider or within the authorizing provider's specialty.  See \"Patient Info\" tab in inbasket, or \"Choose Columns\" in Meds & Orders section of the refill encounter.            Passed - Blood pressure under 140/90 in past 12 months     BP Readings from Last 3 Encounters:   02/17/20 135/87   01/23/20 (!) 146/86   01/21/20 (!) 158/84           Passed - ALT on record in past 12 months         Recent Labs   Lab Test 01/21/20  0926   ALT 25           Passed - Serum Creatinine on file in past 12 months     Recent Labs   Lab Test 01/21/20  0926   CR 1.48*       Ok to refill medication if creatinine is low          Passed - Medication is active on med list        Passed - Patient is age 18 or older         Routing refill requests to provider for review/approval   Allyn Koch RN        "

## 2020-09-09 NOTE — TELEPHONE ENCOUNTER
glipiZIDE (GLUCOTROL XL) 5 MG 24 hr tablet [Pharmacy Med Name: glipiZIDE ER 5 MG Oral Tablet Extended Release 24 Hour] 90 tablet 0    Sig: Take 1 tablet by mouth once daily   Routing refill request to provider for review/approval because:  Labs out of range:  CR  Lab Test 01/21/20  0926   CR 1.48*   Labs not current:  A1C  T'd up 1 month for provider review.        pramipexole (MIRAPEX) 1 MG tablet [Pharmacy Med Name: Pramipexole Dihydrochloride 1 MG Oral Tablet] 180 tablet 0    Sig: Take 1 tablet by mouth twice daily   Routing refill request to provider for review/approval because:  Labs out of range:  CR  Lab Test 01/21/20  0926   CR 1.48*   Labs not current:  A1C  T'd up 1 month for provider review.      Allyn Koch RN

## 2020-10-30 DIAGNOSIS — G25.81 RESTLESS LEG SYNDROME: ICD-10-CM

## 2020-10-30 RX ORDER — PRAMIPEXOLE DIHYDROCHLORIDE 1 MG/1
TABLET ORAL
Qty: 180 TABLET | Refills: 0 | Status: SHIPPED | OUTPATIENT
Start: 2020-10-30 | End: 2021-03-16

## 2020-10-30 NOTE — TELEPHONE ENCOUNTER
"Routing refill request to provider for review/approval because:  Labs out of range:  CR  Labs not current:  CBC  T'dup for 3 months per provider request for review      Requested Prescriptions   Pending Prescriptions Disp Refills     pramipexole (MIRAPEX) 1 MG tablet [Pharmacy Med Name: Pramipexole Dihydrochloride 1 MG Oral Tablet] 60 tablet 0     Sig: Take 1 tablet by mouth twice daily   9/9/2020      Antiparkinson's Agents Protocol Failed - 10/30/2020 12:04 PM        Failed - CBC on record in past 12 months     Recent Labs   Lab Test 08/16/19  0756   WBC 4.7   RBC 4.22*   HGB 13.1*   HCT 39.5*              Failed - Recent (6 mo) or future (30 days) visit within the authorizing provider's specialty     Patient had office visit in the last 6 months or has a visit in the next 30 days with authorizing provider or within the authorizing provider's specialty.  See \"Patient Info\" tab in inbasket, or \"Choose Columns\" in Meds & Orders section of the refill encounter.            Passed - Blood pressure under 140/90 in past 12 months     BP Readings from Last 3 Encounters:   02/17/20 135/87   01/23/20 (!) 146/86   01/21/20 (!) 158/84           Passed - ALT on record in past 12 months         Recent Labs   Lab Test 01/21/20  0926   ALT 25           Passed - Serum Creatinine on file in past 12 months     Recent Labs   Lab Test 01/21/20  0926   CR 1.48*     Ok to refill medication if creatinine is low          Passed - Medication is active on med list        Passed - Patient is age 18 or older         Allyn Koch RN      "

## 2020-11-08 DIAGNOSIS — E11.8 TYPE 2 DIABETES MELLITUS WITH COMPLICATION, WITHOUT LONG-TERM CURRENT USE OF INSULIN (H): ICD-10-CM

## 2020-11-09 RX ORDER — GLIPIZIDE 5 MG/1
TABLET, FILM COATED, EXTENDED RELEASE ORAL
Qty: 90 TABLET | Refills: 0 | Status: SHIPPED | OUTPATIENT
Start: 2020-11-09 | End: 2021-07-22

## 2020-11-09 NOTE — TELEPHONE ENCOUNTER
"Routing refill request to provider for review/approval because:  Labs out of range:  CR  Labs not current:  A1C  T'd up A1C labs  T'd up Comprehensive metabolic panel    T'dup for 3 months per provider request for review      Requested Prescriptions   Pending Prescriptions Disp Refills     glipiZIDE (GLUCOTROL XL) 5 MG 24 hr tablet [Pharmacy Med Name: glipiZIDE ER 5 MG Oral Tablet Extended Release 24 Hour] 30 tablet 0     Sig: Take 1 tablet by mouth once daily   Last Written Prescription Date:  9/9/2020  Last Fill Quantity: 30,  # refills: 0   Last office visit: 1/21/2020 with prescribing provider:     Future Office Visit:          Sulfonylurea Agents Failed - 11/8/2020  3:44 PM        Failed - Patient has documented A1c within the specified period of time.     If HgbA1C is 8 or greater, it needs to be on file within the past 3 months.  If less than 8, must be on file within the past 6 months.     Recent Labs   Lab Test 01/21/20  0926   A1C 7.9*           Failed - Patient has a recent creatinine (normal) within the past 12 mos.     Recent Labs   Lab Test 01/21/20  0926   CR 1.48*     Ok to refill medication if creatinine is low          Failed - Recent (6 mo) or future (30 days) visit within the authorizing provider's specialty     Patient had office visit in the last 6 months or has a visit in the next 30 days with authorizing provider or within the authorizing provider's specialty.  See \"Patient Info\" tab in inbasket, or \"Choose Columns\" in Meds & Orders section of the refill encounter.            Passed - Medication is active on med list        Passed - Patient is age 18 or older         Allyn Koch RN      "

## 2020-11-13 DIAGNOSIS — Z86.718 PERSONAL HISTORY OF VENOUS THROMBOSIS AND EMBOLISM: ICD-10-CM

## 2020-11-13 DIAGNOSIS — N52.03 COMBINED ARTERIAL INSUFFICIENCY AND CORPORO-VENOUS OCCLUSIVE ERECTILE DYSFUNCTION: ICD-10-CM

## 2020-11-13 RX ORDER — WARFARIN SODIUM 5 MG/1
TABLET ORAL
Qty: 26 TABLET | Refills: 0 | Status: SHIPPED | OUTPATIENT
Start: 2020-11-13 | End: 2020-12-29

## 2020-11-13 RX ORDER — SILDENAFIL 25 MG/1
TABLET, FILM COATED ORAL
Qty: 6 TABLET | Refills: 0 | Status: SHIPPED | OUTPATIENT
Start: 2020-11-13 | End: 2021-02-05

## 2020-11-13 RX ORDER — SILDENAFIL 25 MG/1
TABLET, FILM COATED ORAL
Qty: 6 TABLET | Refills: 0 | OUTPATIENT
Start: 2020-11-13

## 2020-11-13 NOTE — TELEPHONE ENCOUNTER
Prescription approved per [facility] Refill Protocol.    Eli Damon RN on 11/13/2020 at 2:29 PM

## 2020-12-14 ENCOUNTER — HEALTH MAINTENANCE LETTER (OUTPATIENT)
Age: 85
End: 2020-12-14

## 2020-12-22 DIAGNOSIS — Z86.718 PERSONAL HISTORY OF VENOUS THROMBOSIS AND EMBOLISM: ICD-10-CM

## 2020-12-23 NOTE — TELEPHONE ENCOUNTER
VM left for pt to call clinic to make an appt for an INR. Last INR was done in July. When an appt is made a short term supply of Coumadin can be sent to the pharmacy.   Raquel Pelletier RN

## 2020-12-24 NOTE — TELEPHONE ENCOUNTER
I have attempted to contact this patient by phone with the following results: no answer. Chantell Boston, RN, BSN

## 2020-12-28 NOTE — TELEPHONE ENCOUNTER
I have attempted to contact this patient by phone with the following results: no answer. VM left asking pt to all and schedule a lab only appointment so we can refill his coumadin. Chantell Boston, RN, BSN

## 2020-12-29 DIAGNOSIS — Z86.718 PERSONAL HISTORY OF VENOUS THROMBOSIS AND EMBOLISM: ICD-10-CM

## 2020-12-29 RX ORDER — WARFARIN SODIUM 5 MG/1
TABLET ORAL
Qty: 7 TABLET | Refills: 0 | Status: SHIPPED | OUTPATIENT
Start: 2020-12-29 | End: 2020-12-31

## 2020-12-29 NOTE — TELEPHONE ENCOUNTER
Attempts have been made to contact pt as he is overdue for his INR. Last INR was in July. Pt has not schedule an appt or called us back. Will forward to PCP to review and approve a weeks worth until the pt gets in. Chantell Boston RN, BSN

## 2020-12-30 RX ORDER — WARFARIN SODIUM 5 MG/1
TABLET ORAL
Qty: 7 TABLET | Refills: 0 | OUTPATIENT
Start: 2020-12-30

## 2020-12-31 ENCOUNTER — TELEPHONE (OUTPATIENT)
Dept: ANTICOAGULATION | Facility: CLINIC | Age: 85
End: 2020-12-31

## 2020-12-31 ENCOUNTER — ANTICOAGULATION THERAPY VISIT (OUTPATIENT)
Dept: ANTICOAGULATION | Facility: CLINIC | Age: 85
End: 2020-12-31

## 2020-12-31 DIAGNOSIS — I82.409 DVT (DEEP VENOUS THROMBOSIS) (H): ICD-10-CM

## 2020-12-31 DIAGNOSIS — Z86.718 PERSONAL HISTORY OF VENOUS THROMBOSIS AND EMBOLISM: ICD-10-CM

## 2020-12-31 DIAGNOSIS — D68.51 FACTOR V LEIDEN MUTATION (H): ICD-10-CM

## 2020-12-31 DIAGNOSIS — I82.90 VENOUS THROMBOSIS: ICD-10-CM

## 2020-12-31 DIAGNOSIS — D68.51 FACTOR V LEIDEN MUTATION (H): Primary | ICD-10-CM

## 2020-12-31 DIAGNOSIS — Z79.01 LONG TERM CURRENT USE OF ANTICOAGULANT THERAPY: ICD-10-CM

## 2020-12-31 DIAGNOSIS — D68.52 PROTHROMBIN MUTATION (H): ICD-10-CM

## 2020-12-31 LAB
CAPILLARY BLOOD COLLECTION: NORMAL
INR BLD: 2.6 (ref 0.86–1.14)

## 2020-12-31 PROCEDURE — 85610 PROTHROMBIN TIME: CPT | Performed by: INTERNAL MEDICINE

## 2020-12-31 PROCEDURE — 36416 COLLJ CAPILLARY BLOOD SPEC: CPT | Performed by: INTERNAL MEDICINE

## 2020-12-31 RX ORDER — WARFARIN SODIUM 5 MG/1
TABLET ORAL
Qty: 90 TABLET | Refills: 0 | Status: SHIPPED | OUTPATIENT
Start: 2020-12-31 | End: 2021-01-04

## 2020-12-31 NOTE — TELEPHONE ENCOUNTER
ANTICOAGULATION MANAGEMENT      Robert NO MI [place] due for annual renewal of referral to anticoagulation monitoring. Order pended for your review and signature.      ANTICOAGULATION SUMMARY      Warfarin indication(s)     Factor V Leiden, Prothrombin mutation     Heart valve present?  NO       Current goal range   INR: 2.0-3.0     Goal appropriate for indication? Yes, INR 2-3 appropriate for hx of DVT, PE, hypercoagulable state, Afib, LVAD, or bileaflet AVR without risk factors     Current duration of therapy Indefinite/long term therapy   Time in Therapeutic Range (TTR)  (Goal > 60%) 86.1 %       Office visit with referring provider's group within last year yes on 1/21/20       Raquel Pelletier RN

## 2020-12-31 NOTE — PROGRESS NOTES
ANTICOAGULATION MANAGEMENT     Patient Name:  Robert Richard  Date:  2020    ASSESSMENT /SUBJECTIVE:    Today's INR result of 2.6 is therapeutic. Goal INR of 2.0-3.0      Warfarin dose taken: VM left    Diet: VM left    Medication changes/ interactions: VM left    Previous INR: Therapeutic     S/S of bleeding or thromboembolism: No    New injury or illness: No    Upcoming surgery, procedure or cardioversion: No    Additional findings: None      PLAN:    Detailed message left for Robert regarding INR result and instructed:     Warfarin Dosing Instructions: Continue your current warfarin dose 2.5 mg Fri and 5 mg ROW    Instructed patient to follow up no later than: 6 weeks  Left detailed message with recommended recheck date    Education provided: None required      I left a detailed voicemail with the orders reflected in flowsheet. I have also requested a call back if there have been any missed doses, concerns, illness, fever, or if there have been any changes in medications, activity level, or diet      Instructed to call the Anticoagulation Clinic for any changes, questions or concerns. (#[phone])        Rqauel Pelletier RN      OBJECTIVE:  Recent labs: (last 7 days)     20  1245   INR 2.6*         INR assessment THER    Recheck INR In: 6 WEEKS    INR Location Outside lab      Anticoagulation Summary  As of 2020    INR goal:  2.0-3.0   TTR:  86.1 % (2 mo)   INR used for dosin.6 (2020)   Warfarin maintenance plan:  2.5 mg (5 mg x 0.5) every Fri; 5 mg (5 mg x 1) all other days   Full warfarin instructions:  2.5 mg every Fri; 5 mg all other days   Weekly warfarin total:  32.5 mg   No change documented:  Raquel Pelletier RN   Plan last modified:  Raquel Pelletier RN (3/22/2016)   Next INR check:  2021   Priority:  Maintenance   Target end date:  Indefinite    Indications    Factor V Leiden mutation (H) [D68.51]  Venous thrombosis [I82.90]  Prothrombin mutation (H)  [D68.52]  Long term current use of anticoagulant therapy [Z79.01]             Anticoagulation Episode Summary     INR check location:      Preferred lab:      Send INR reminders to:  [facility]    Comments:  5 mg tablets, appt card, BP, PM dose        Anticoagulation Care Providers     Provider Role Specialty Phone number    Stiven Donahue MD Referring Internal Medicine [phone]    Nate Cifuentes DO Responsible Internal Medicine [phone]

## 2021-01-04 ENCOUNTER — MYC REFILL (OUTPATIENT)
Dept: ANTICOAGULATION | Facility: CLINIC | Age: 86
End: 2021-01-04

## 2021-01-04 DIAGNOSIS — Z86.718 PERSONAL HISTORY OF VENOUS THROMBOSIS AND EMBOLISM: ICD-10-CM

## 2021-01-05 RX ORDER — WARFARIN SODIUM 5 MG/1
TABLET ORAL
Qty: 90 TABLET | Refills: 0 | Status: SHIPPED | OUTPATIENT
Start: 2021-01-05 | End: 2021-07-05

## 2021-02-01 DIAGNOSIS — I10 HYPERTENSION GOAL BP (BLOOD PRESSURE) < 140/90: ICD-10-CM

## 2021-02-01 RX ORDER — ATENOLOL 50 MG/1
TABLET ORAL
Qty: 180 TABLET | Refills: 0 | Status: SHIPPED | OUTPATIENT
Start: 2021-02-01 | End: 2021-05-24

## 2021-02-01 NOTE — TELEPHONE ENCOUNTER
Prescription approved per [facility] Refill Protocol.    Lakeisha Gomez RN

## 2021-02-05 ENCOUNTER — ANTICOAGULATION THERAPY VISIT (OUTPATIENT)
Dept: ANTICOAGULATION | Facility: CLINIC | Age: 86
End: 2021-02-05

## 2021-02-05 ENCOUNTER — OFFICE VISIT (OUTPATIENT)
Dept: INTERNAL MEDICINE | Facility: CLINIC | Age: 86
End: 2021-02-05
Payer: COMMERCIAL

## 2021-02-05 VITALS
BODY MASS INDEX: 36.01 KG/M2 | RESPIRATION RATE: 20 BRPM | OXYGEN SATURATION: 99 % | DIASTOLIC BLOOD PRESSURE: 102 MMHG | TEMPERATURE: 97.8 F | SYSTOLIC BLOOD PRESSURE: 168 MMHG | HEART RATE: 68 BPM | WEIGHT: 251 LBS

## 2021-02-05 DIAGNOSIS — N52.9 ERECTILE DYSFUNCTION, UNSPECIFIED ERECTILE DYSFUNCTION TYPE: ICD-10-CM

## 2021-02-05 DIAGNOSIS — I10 HYPERTENSION GOAL BP (BLOOD PRESSURE) < 140/90: Primary | ICD-10-CM

## 2021-02-05 DIAGNOSIS — E11.8 TYPE 2 DIABETES MELLITUS WITH COMPLICATION, WITHOUT LONG-TERM CURRENT USE OF INSULIN (H): ICD-10-CM

## 2021-02-05 DIAGNOSIS — Z79.01 LONG TERM CURRENT USE OF ANTICOAGULANT THERAPY: ICD-10-CM

## 2021-02-05 DIAGNOSIS — D68.51 FACTOR V LEIDEN MUTATION (H): ICD-10-CM

## 2021-02-05 DIAGNOSIS — I82.90 VENOUS THROMBOSIS: ICD-10-CM

## 2021-02-05 DIAGNOSIS — D68.52 PROTHROMBIN MUTATION (H): ICD-10-CM

## 2021-02-05 LAB
ALBUMIN SERPL-MCNC: 3.3 G/DL (ref 3.4–5)
ALP SERPL-CCNC: 109 U/L (ref 40–150)
ALT SERPL W P-5'-P-CCNC: 21 U/L (ref 0–70)
ANION GAP SERPL CALCULATED.3IONS-SCNC: 3 MMOL/L (ref 3–14)
AST SERPL W P-5'-P-CCNC: 13 U/L (ref 0–45)
BILIRUB SERPL-MCNC: 0.5 MG/DL (ref 0.2–1.3)
BUN SERPL-MCNC: 26 MG/DL (ref 7–30)
CALCIUM SERPL-MCNC: 9.1 MG/DL (ref 8.5–10.1)
CHLORIDE SERPL-SCNC: 107 MMOL/L (ref 94–109)
CHOLEST SERPL-MCNC: 204 MG/DL
CO2 SERPL-SCNC: 29 MMOL/L (ref 20–32)
CREAT SERPL-MCNC: 1.44 MG/DL (ref 0.66–1.25)
ERYTHROCYTE [DISTWIDTH] IN BLOOD BY AUTOMATED COUNT: 12.8 % (ref 10–15)
GFR SERPL CREATININE-BSD FRML MDRD: 44 ML/MIN/{1.73_M2}
GLUCOSE SERPL-MCNC: 183 MG/DL (ref 70–99)
HBA1C MFR BLD: 8.3 % (ref 0–5.6)
HCT VFR BLD AUTO: 41.4 % (ref 40–53)
HDLC SERPL-MCNC: 57 MG/DL
HGB BLD-MCNC: 13.6 G/DL (ref 13.3–17.7)
INR PPP: 2 (ref 0.86–1.14)
LDLC SERPL CALC-MCNC: 126 MG/DL
MCH RBC QN AUTO: 30.6 PG (ref 26.5–33)
MCHC RBC AUTO-ENTMCNC: 32.9 G/DL (ref 31.5–36.5)
MCV RBC AUTO: 93 FL (ref 78–100)
NONHDLC SERPL-MCNC: 147 MG/DL
PLATELET # BLD AUTO: 165 10E9/L (ref 150–450)
POTASSIUM SERPL-SCNC: 4.1 MMOL/L (ref 3.4–5.3)
PROT SERPL-MCNC: 6.8 G/DL (ref 6.8–8.8)
RBC # BLD AUTO: 4.45 10E12/L (ref 4.4–5.9)
SODIUM SERPL-SCNC: 139 MMOL/L (ref 133–144)
TRIGL SERPL-MCNC: 106 MG/DL
WBC # BLD AUTO: 4.7 10E9/L (ref 4–11)

## 2021-02-05 PROCEDURE — 36415 COLL VENOUS BLD VENIPUNCTURE: CPT | Performed by: INTERNAL MEDICINE

## 2021-02-05 PROCEDURE — 80053 COMPREHEN METABOLIC PANEL: CPT | Performed by: INTERNAL MEDICINE

## 2021-02-05 PROCEDURE — 80061 LIPID PANEL: CPT | Performed by: INTERNAL MEDICINE

## 2021-02-05 PROCEDURE — 99214 OFFICE O/P EST MOD 30 MIN: CPT | Performed by: INTERNAL MEDICINE

## 2021-02-05 PROCEDURE — 83036 HEMOGLOBIN GLYCOSYLATED A1C: CPT | Performed by: INTERNAL MEDICINE

## 2021-02-05 PROCEDURE — 85610 PROTHROMBIN TIME: CPT | Performed by: INTERNAL MEDICINE

## 2021-02-05 PROCEDURE — 85027 COMPLETE CBC AUTOMATED: CPT | Performed by: INTERNAL MEDICINE

## 2021-02-05 RX ORDER — LOSARTAN POTASSIUM 100 MG/1
100 TABLET ORAL DAILY
Qty: 90 TABLET | Refills: 3 | COMMUNITY
Start: 2021-02-05 | End: 2022-01-31

## 2021-02-05 RX ORDER — SILDENAFIL 100 MG/1
50 TABLET, FILM COATED ORAL DAILY PRN
Qty: 20 TABLET | Refills: 3 | Status: SHIPPED | OUTPATIENT
Start: 2021-02-05 | End: 2022-11-07

## 2021-02-05 ASSESSMENT — PAIN SCALES - GENERAL: PAINLEVEL: NO PAIN (0)

## 2021-02-05 NOTE — PROGRESS NOTES
Assessment & Plan     Hypertension goal BP (blood pressure) < 140/90  Blood pressure is not at goal.  He says not taking all of his medications he will continue the losartan at 100 mg a day, he has to get back on atenolol 50 twice a day.  The spironolactone.  He will follow his blood pressure at home for the next month if not getting better down in the 1 4150 range we will then have to consider adding something maybe a different diuretic like hydrochlorothiazide or go back to amlodipine and watch for edema.  - Comprehensive metabolic panel  - CBC with platelets    Type 2 diabetes mellitus with complication, without long-term current use of insulin (H)  We will check an A1c is not really checking sugars at this time and make sure is not rising too high.  - Comprehensive metabolic panel  - CBC with platelets  - Lipid Profile  - Hemoglobin A1c    Factor V Leiden mutation (H)  Patient is on Coumadin doing well we will check his INR was 2.6 in December    - INR    Erectile dysfunction, unspecified erectile dysfunction type  Erectile dysfunction we will prescribe Viagra he can use half of 100 mg pill which should help him.  - sildenafil (VIAGRA) 100 MG tablet; Take 0.5 tablets (50 mg) by mouth daily as needed    Review of external notes as documented above  reviewed [facility] notes from this summer.     LISSETTE and on cpap.                     No follow-ups on file.    Stiven Donahue MD  [facility]    Ashley Gonzalez is a 85 year old who presents to clinic today for the following health issues     HPI       Hypertension Follow-up      Do you check your blood pressure regularly outside of the clinic? Yes     Are you following a low salt diet? Yes    Are your blood pressures ever more than 140 on the top number (systolic) OR more   than 90 on the bottom number (diastolic), for example 140/90? Yes      How many servings of fruits and vegetables do you eat daily?  2-3    On average, how many  sweetened beverages do you drink each day (Examples: soda, juice, sweet tea, etc.  Do NOT count diet or artificially sweetened beverages)?   0    How many days per week do you exercise enough to make your heart beat faster? 3 or less    How many minutes a day do you exercise enough to make your heart beat faster? 20 - 29    How many days per week do you miss taking your medication? Sometimes forgets morning meds    [facility]    Feels good, healthy. Golf daily, shovels and is active.  Lives on [facility], one level.     BP runs high, usually 140-150 range.      No chest pains, no sob.     Some balance issues with his hip.  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     Patient has cpap and uses it regularly, may need new supplies.      Past Medical History:   Diagnosis Date     Basal cell carcinoma      Cancer (H)      DVT (deep venous thrombosis) (H) 11/2003     DVT (deep venous thrombosis) (H) 12/08     DVT (deep venous thrombosis) (H) 10/2010     Factor V Leiden (H)      Gout      Other and unspecified hyperlipidemia      Prostate cancer (H) 2003    prostatectomy      Restless leg syndrome      Unspecified essential hypertension      Current Outpatient Medications   Medication     atenolol (TENORMIN) 50 MG tablet     colchicine (COLCRYS) 0.6 MG tablet     glipiZIDE (GLUCOTROL XL) 5 MG 24 hr tablet     ipratropium (ATROVENT) 0.06 % nasal spray     Multiple Vitamins-Minerals (MULTIVITAMIN ADULT PO)     order for DME     ORDER FOR DME     pramipexole (MIRAPEX) 1 MG tablet     sildenafil (VIAGRA) 25 MG tablet     spironolactone (ALDACTONE) 25 MG tablet     warfarin ANTICOAGULANT (COUMADIN) 5 MG tablet     STATIN NOT PRESCRIBED (INTENTIONAL)     No current facility-administered medications for this visit.      Social History     Tobacco Use     Smoking status: Never Smoker     Smokeless tobacco: Never Used   Substance Use Topics     Alcohol use: Yes      Alcohol/week: 0.0 standard drinks     Comment: 1 drink every 1-2 weeks.     Drug use: No         Review of Systems   Constitutional, HEENT, cardiovascular, pulmonary, gi and gu systems are negative, except as otherwise noted.      Objective    BP (!) 168/102 (BP Location: Left arm, Patient Position: Sitting, Cuff Size: Adult Large)   Pulse 68   Temp 97.8  F (36.6  C) (Temporal)   Resp 20   Wt 113.9 kg (251 lb)   SpO2 99%   BMI 36.01 kg/m    Body mass index is 36.01 kg/m .  Physical Exam   NAD  Heart is regular rate and rhythm  Lungs are clear  Extremities he has a little bit of swelling and chronic skin changes

## 2021-02-05 NOTE — PROGRESS NOTES
ANTICOAGULATION MANAGEMENT     Patient Name:  Robert Richard  Date:  2021    ASSESSMENT /SUBJECTIVE:    Today's INR result of 2.0 is therapeutic. Goal INR of 2.0-3.0      Warfarin dose taken: VM left    Diet: VM left    Medication changes/ interactions: VM left    Previous INR: Therapeutic     S/S of bleeding or thromboembolism: VM left    New injury or illness: VM left    Upcoming surgery, procedure or cardioversion: VM left    Additional findings:VM left      PLAN:    Detailed message left for Robert regarding INR result and instructed:     Warfarin Dosing Instructions: Continue your current warfarin dose 2.5 mg Fri and 5 mg ROW    Instructed patient to follow up no later than: 6 weeks  Left detailed message with recommended recheck date    Education provided: Please call back if any changes to your diet, medications or how you've been taking warfarin      I left a detailed voicemail with the orders reflected in flowsheet. I have also requested a call back if there have been any missed doses, concerns, illness, fever, or if there have been any changes in medications, activity level, or diet      Instructed to call the Anticoagulation Clinic for any changes, questions or concerns. (#[phone])        Raquel Pelletier, RN      OBJECTIVE:  Recent labs: (last 7 days)     21  0901   INR 2.00*         INR assessment THER    Recheck INR In: 6 WEEKS    INR Location Outside lab      Anticoagulation Summary  As of 2021    INR goal:  2.0-3.0   TTR:  86.0 % (2 mo)   Prior goal:  2.0-3.0   INR used for dosin.00 (2021)   Warfarin maintenance plan:  2.5 mg (5 mg x 0.5) every Fri; 5 mg (5 mg x 1) all other days   Full warfarin instructions:  2.5 mg every Fri; 5 mg all other days   Weekly warfarin total:  32.5 mg   No change documented:  Raquel Pelletier, RN   Plan last modified:  Raquel Pelletier, RN (3/22/2016)   Next INR check:  3/19/2021   Priority:  Maintenance   Target end date:  Indefinite     Indications    Factor V Leiden mutation (H) [D68.51]  Venous thrombosis [I82.90]  Prothrombin mutation (H) [D68.52]  Long term current use of anticoagulant therapy [Z79.01]             Anticoagulation Episode Summary     INR check location:      Preferred lab:      Send INR reminders to:  [facility]    Comments:  5 mg tablets, appt card, BP, PM dose        Anticoagulation Care Providers     Provider Role Specialty Phone number    Stiven Donahue MD Referring Internal Medicine [phone]    Nate Cifuentes DO [facility] Internal Medicine [phone]

## 2021-02-08 ENCOUNTER — TELEPHONE (OUTPATIENT)
Dept: INTERNAL MEDICINE | Facility: CLINIC | Age: 86
End: 2021-02-08

## 2021-02-08 NOTE — TELEPHONE ENCOUNTER
Called and LM for patient to call back. Please relay results below to patient from Dr. Donahue. Also help set up future lab appointment in 3 months. Orders have been signed by Dr. Donahue.   Jazlyn Bennett MA

## 2021-02-08 NOTE — TELEPHONE ENCOUNTER
----- Message from Stiven Donahue MD sent at 2/8/2021  1:35 PM CST -----  Please call him know his labs are okay except his sugars are too high his A1c is 8.3.  He is to work on getting those lower.  Recheck an A1c in 3 months.

## 2021-02-08 NOTE — LETTER
February 9, 2021      Robert Richard  [address] [facility] [address]        Dear Robert,      labs are okay except his sugars are too high his A1c is 8.3.  He is to work on getting those lower.  Recheck an A1c in 3 months      Sincerely,        Stiven Donahue MD

## 2021-02-16 ENCOUNTER — TELEPHONE (OUTPATIENT)
Dept: INTERNAL MEDICINE | Facility: CLINIC | Age: 86
End: 2021-02-16

## 2021-02-16 NOTE — TELEPHONE ENCOUNTER
Pt was seen on 2/5 but no talk about a c-pap are ok to addendum the office visit or do you want him to see our sleep center for it?  Zaina Hoover MA

## 2021-02-16 NOTE — TELEPHONE ENCOUNTER
Madhuri from [facility] - cpap  [phone] option 1    Looking for face to face notes for cpap - they need replacement for him as his is no longer working.     Fax: [phone]

## 2021-02-17 ENCOUNTER — TELEPHONE (OUTPATIENT)
Dept: INTERNAL MEDICINE | Facility: CLINIC | Age: 86
End: 2021-02-17

## 2021-02-17 NOTE — TELEPHONE ENCOUNTER
Madhuri calling to follow up on the form that was faxed today to the office, prescription for the new CPAP machine. She did receive the office notes but she needs the form/prescription returned for the CPAP and supplies. Krystle Garcia LPN

## 2021-02-18 NOTE — TELEPHONE ENCOUNTER
So they have a person that is in there area and wondering if there is someone that can sign this in his absence for today.    If a different Signs we need to have the NPI number changed.  And that will need to be initial and dated with the changes.     Fax number is [phone]

## 2021-02-18 NOTE — TELEPHONE ENCOUNTER
Steph from [facility] ph.[phone] option 1 stating they received the orders but it is missing the initials of changes that were made and also needs to be dated.  Please correct and re-send asap fax #[phone].

## 2021-02-27 ENCOUNTER — HEALTH MAINTENANCE LETTER (OUTPATIENT)
Age: 86
End: 2021-02-27

## 2021-03-12 DIAGNOSIS — G25.81 RESTLESS LEG SYNDROME: ICD-10-CM

## 2021-03-16 RX ORDER — PRAMIPEXOLE DIHYDROCHLORIDE 1 MG/1
TABLET ORAL
Qty: 180 TABLET | Refills: 0 | Status: SHIPPED | OUTPATIENT
Start: 2021-03-16 | End: 2021-07-22

## 2021-03-16 NOTE — TELEPHONE ENCOUNTER
Routing refill request to provider for review/approval because:  Labs out of range:  BP    DONG BillsN, RN  [facility]

## 2021-03-26 ENCOUNTER — MYC MEDICAL ADVICE (OUTPATIENT)
Dept: INTERNAL MEDICINE | Facility: CLINIC | Age: 86
End: 2021-03-26

## 2021-03-26 DIAGNOSIS — R25.3 EYELID TWITCH: Primary | ICD-10-CM

## 2021-03-29 NOTE — TELEPHONE ENCOUNTER
Appointment request form completed and faxed with recent clinic notes to [facility] , they will contact patient to set up appointment. Gita,

## 2021-04-05 ENCOUNTER — TELEPHONE (OUTPATIENT)
Dept: ANTICOAGULATION | Facility: CLINIC | Age: 86
End: 2021-04-05

## 2021-04-05 NOTE — TELEPHONE ENCOUNTER
ANTICOAGULATION     Outreach 1    Robert Richard is overdue for INR check.      Left message for patient to call and schedule lab appointment as soon as possible. If returning call, please schedule.      Order is up to date     Raquel Pelletier RN

## 2021-04-21 ENCOUNTER — TELEPHONE (OUTPATIENT)
Dept: ANTICOAGULATION | Facility: CLINIC | Age: 86
End: 2021-04-21

## 2021-04-27 ENCOUNTER — ANTICOAGULATION THERAPY VISIT (OUTPATIENT)
Dept: ANTICOAGULATION | Facility: CLINIC | Age: 86
End: 2021-04-27

## 2021-04-27 DIAGNOSIS — Z79.01 LONG TERM CURRENT USE OF ANTICOAGULANT THERAPY: ICD-10-CM

## 2021-04-27 DIAGNOSIS — I82.409 DVT (DEEP VENOUS THROMBOSIS) (H): ICD-10-CM

## 2021-04-27 DIAGNOSIS — I82.90 VENOUS THROMBOSIS: ICD-10-CM

## 2021-04-27 DIAGNOSIS — D68.51 FACTOR V LEIDEN MUTATION (H): ICD-10-CM

## 2021-04-27 DIAGNOSIS — D68.52 PROTHROMBIN MUTATION (H): ICD-10-CM

## 2021-04-27 LAB
CAPILLARY BLOOD COLLECTION: NORMAL
INR BLD: 2.1 (ref 0.86–1.14)

## 2021-04-27 PROCEDURE — 36416 COLLJ CAPILLARY BLOOD SPEC: CPT | Performed by: INTERNAL MEDICINE

## 2021-04-27 PROCEDURE — 85610 PROTHROMBIN TIME: CPT | Performed by: INTERNAL MEDICINE

## 2021-04-27 NOTE — PROGRESS NOTES
ANTICOAGULATION MANAGEMENT     Patient Name:  Robert Richard  Date:  2021    ASSESSMENT /SUBJECTIVE:    Today's INR result of 2.1 is therapeutic. Goal INR of 2.0-3.0      Warfarin dose taken: LM    Diet: LM    Medication changes/ interactions: LM    Previous INR: Therapeutic     S/S of bleeding or thromboembolism: No    New injury or illness: No    Upcoming surgery, procedure or cardioversion: No    Additional findings: None      PLAN:    Detailed message left for Robert regarding INR result and instructed:     Warfarin Dosing Instructions: Continue your current warfarin dose 2.5 mg Fri and 5 mg all other days    Instructed patient to follow up no later than: 6 weeks  Left detailed message with recommended recheck date    Education provided: Please call back if any changes to your diet, medications or how you've been taking warfarin        Instructed to call the Anticoagulation Clinic for any changes, questions or concerns. (#[phone])        Neelima Arreaga RN      OBJECTIVE:  Recent labs: (last 7 days)     21  1438   INR 2.1*         INR assessment THER    Recheck INR In: 6 WEEKS    INR Location Outside lab      Anticoagulation Summary  As of 2021    INR goal:  2.0-3.0   TTR:  93.5 % (4.4 mo)   INR used for dosin.1 (2021)   Warfarin maintenance plan:  2.5 mg (5 mg x 0.5) every Fri; 5 mg (5 mg x 1) all other days   Full warfarin instructions:  2.5 mg every Fri; 5 mg all other days   Weekly warfarin total:  32.5 mg   No change documented:  Neelima Arreaga RN   Plan last modified:  Raquel Pelletier RN (3/22/2016)   Next INR check:  2021   Priority:  Maintenance   Target end date:  Indefinite    Indications    Factor V Leiden mutation (H) [D68.51]  Venous thrombosis [I82.90]  Prothrombin mutation (H) [D68.52]  Long term current use of anticoagulant therapy [Z79.01]             Anticoagulation Episode Summary     INR check location:      Preferred lab:      Send INR reminders to:   [facility]    Comments:  5 mg tablets, appt card, BP, PM dose        Anticoagulation Care Providers     Provider Role Specialty Phone number    Stiven Donahue MD Referring Internal Medicine [phone]    Nate Cifuentes DO [facility] Internal Medicine [phone]

## 2021-05-02 DIAGNOSIS — I10 HYPERTENSION GOAL BP (BLOOD PRESSURE) < 140/90: ICD-10-CM

## 2021-05-02 DIAGNOSIS — J31.0 CHRONIC RHINITIS: ICD-10-CM

## 2021-05-02 DIAGNOSIS — I10 ESSENTIAL HYPERTENSION WITH GOAL BLOOD PRESSURE LESS THAN 140/90: ICD-10-CM

## 2021-05-03 RX ORDER — SPIRONOLACTONE 25 MG/1
TABLET ORAL
Qty: 90 TABLET | Refills: 0 | Status: SHIPPED | OUTPATIENT
Start: 2021-05-03 | End: 2021-09-08

## 2021-05-03 RX ORDER — IPRATROPIUM BROMIDE 42 UG/1
SPRAY, METERED NASAL
Qty: 45 ML | Refills: 0 | Status: SHIPPED | OUTPATIENT
Start: 2021-05-03 | End: 2021-10-28

## 2021-05-03 NOTE — TELEPHONE ENCOUNTER
Prescription approved per [facility] Refill Protocol.    DONG BillsN, RN  [facility]

## 2021-05-03 NOTE — TELEPHONE ENCOUNTER
Routing refill request to provider for review/approval because:  Labs out of range:  BP, Creatinine    JULIAN Bills, RN  [facility]

## 2021-05-06 ENCOUNTER — OFFICE VISIT (OUTPATIENT)
Dept: PODIATRY | Facility: CLINIC | Age: 86
End: 2021-05-06
Payer: COMMERCIAL

## 2021-05-06 VITALS
DIASTOLIC BLOOD PRESSURE: 76 MMHG | WEIGHT: 252 LBS | HEIGHT: 70 IN | BODY MASS INDEX: 36.08 KG/M2 | SYSTOLIC BLOOD PRESSURE: 154 MMHG

## 2021-05-06 DIAGNOSIS — L97.211 VENOUS STASIS ULCER OF RIGHT CALF LIMITED TO BREAKDOWN OF SKIN WITH VARICOSE VEINS (H): ICD-10-CM

## 2021-05-06 DIAGNOSIS — R60.0 EDEMA LEG: ICD-10-CM

## 2021-05-06 DIAGNOSIS — I83.022 VENOUS STASIS ULCER OF LEFT CALF LIMITED TO BREAKDOWN OF SKIN WITH VARICOSE VEINS (H): ICD-10-CM

## 2021-05-06 DIAGNOSIS — M48.061 SPINAL STENOSIS OF LUMBAR REGION, UNSPECIFIED WHETHER NEUROGENIC CLAUDICATION PRESENT: ICD-10-CM

## 2021-05-06 DIAGNOSIS — I83.012 VENOUS STASIS ULCER OF RIGHT CALF LIMITED TO BREAKDOWN OF SKIN WITH VARICOSE VEINS (H): ICD-10-CM

## 2021-05-06 DIAGNOSIS — L97.221 VENOUS STASIS ULCER OF LEFT CALF LIMITED TO BREAKDOWN OF SKIN WITH VARICOSE VEINS (H): ICD-10-CM

## 2021-05-06 DIAGNOSIS — R60.1 GENERALIZED EDEMA: ICD-10-CM

## 2021-05-06 DIAGNOSIS — E11.59 TYPE 2 DIABETES MELLITUS WITH OTHER CIRCULATORY COMPLICATION, WITHOUT LONG-TERM CURRENT USE OF INSULIN (H): Primary | ICD-10-CM

## 2021-05-06 DIAGNOSIS — I87.2 VENOUS STASIS DERMATITIS OF BOTH LOWER EXTREMITIES: ICD-10-CM

## 2021-05-06 PROCEDURE — 99213 OFFICE O/P EST LOW 20 MIN: CPT | Performed by: PODIATRIST

## 2021-05-06 ASSESSMENT — MIFFLIN-ST. JEOR: SCORE: 1839.31

## 2021-05-06 ASSESSMENT — PAIN SCALES - GENERAL: PAINLEVEL: NO PAIN (0)

## 2021-05-06 NOTE — PROGRESS NOTES
"Chief Complaint   Patient presents with     Diabetes     type 2 - DM shoes     Consult     medial Right ankle pain; XR R foot 1/23/2020; new issue     RECHECK     B/L hammertoes; XR R foot 1/23/2020 and L 10/3/2019; LOV 1/23/2019       Weight management plan: Patient was referred to their PCP to discuss a diet and exercise plan.     Carlos to follow up with Primary Care provider regarding elevated blood pressure.    Onset early 2020, medial Right ankle sensitivity w/pressure that radiates to mid lower leg with restless leg. Lesion medial Right ankle. Constant swelling, Intermittent throbbing discomfort. Applying topical pain ointment provides temporary relief. History of gout.    EXAM:Vitals: BP (!) 154/76 (BP Location: Left arm, Patient Position: Sitting, Cuff Size: Adult Regular)   Ht 1.778 m (5' 10\")   Wt 114.3 kg (252 lb)   BMI 36.16 kg/m    BMI= Body mass index is 36.16 kg/m .    General appearance: Patient is alert and fully cooperative with history & exam.  No sign of distress is noted during the visit.     Psychiatric: Affect is pleasant & appropriate.  Patient appears motivated to improve health.     Respiratory: Breathing is regular & unlabored while sitting.     HEENT: Hearing is intact to spoken word.  Speech is clear.  No gross evidence of visual impairment that would impact ambulation.     Vascular: DP 1/4 & PT 1/4 left & right.  CFT delayed with dependent rubor noted about the digits.  Diminished hair growth distal to mid tibia and no hair about the foot and toes.  Temperature changes noted, warm to cool proximal to distal.  Hemosiderin pigmentation noted with multiple varicosities legs and feet bilateral. Generalized edema bilateral legs and feet.  Pt denies claudication history.     Neurologic: Normal plantar response bilateral.  Intact protective threshold plus 10/10 applications of a 5.07 monofilament.  Pt admits burning and paraesthesias about the feet and toes with palpation.     Dermatologic: " Toenails are in reasonable repair.  Previous venous stasis ulceration noted about the distal medial left calf is now closed with epithelium.  Subtle irritation noted over the dorsal aspect of several hammertoes PIPJ.     Musculoskeletal: Patient is ambulatory without assistive device or brace.  There is semi reducible contracture of the lesser digits.    Hemoglobin A1C (%)   Date Value   02/05/2021 8.3 (H)   01/21/2020 7.9 (H)   06/20/2019 7.1 (H)   10/16/2018 6.4 (H)   05/15/2018 6.5 (H)   10/18/2017 6.6 (H)   04/20/2016 6.5     Creatinine (mg/dL)   Date Value   02/05/2021 1.44 (H)   01/21/2020 1.48 (H)   08/16/2019 1.42 (H)   06/20/2019 1.39 (H)   12/27/2018 1.51 (H)   05/15/2018 1.46 (H)     ASSESSMENT:       ICD-10-CM    1. Type 2 diabetes mellitus with other circulatory complication, without long-term current use of insulin (H)  E11.59 LYMPHEDEMA THERAPY REFERRAL   2. Spinal stenosis of lumbar region, unspecified whether neurogenic claudication present  M48.061 LYMPHEDEMA THERAPY REFERRAL   3. Venous stasis dermatitis of both lower extremities  I87.2 LYMPHEDEMA THERAPY REFERRAL   4. Edema leg  R60.0 LYMPHEDEMA THERAPY REFERRAL   5. Venous stasis ulcer of right calf limited to breakdown of skin with varicose veins (H)  I83.012 LYMPHEDEMA THERAPY REFERRAL    L97.211    6. Venous stasis ulcer of left calf limited to breakdown of skin with varicose veins (H)  I83.022     L97.221    7. Generalized edema  R60.1      PLAN:    10/3/2019     We discussed risk factors and preventive measures.    We discussed appropriate hygiene, shoe gear, daily foot exam, and reinforced management of weight, diet, activity goals and HA1C goal for diabetic patients.    Dispensed written foot care instructions.      Order was placed today for wound care referral possibly for Unna boot therapy and follow-up with me in about 1 month and hopefully the wound will be closed at that time and we can move forward with compression therapy  chronically.  We discussed the etiology and alternative treatment options as well as potential risks and complications.  The Unna boot was applied today.  Please refer the patient back sooner if he would like to discontinue Unna boots faster.  He will follow-up for Unna boot change here with our RN specialty care RN as it likely will take a week to get the patient into wound care.    11/7/2019  Explained to the patient that it is of utmost importance to continue compression of the lower extremities upon completion of the Unna boots otherwise edema will return which is the cause of the ulceration.  Also describe the importance of managing edema from the toes proximal over the widest point of the calf not just wrapping the calf.  He has a compression dressing but has difficulty applying it.  Therefore order was placed for custom compression 20 to 30 mmHg as well as a donning agent such as a plastic sled or Tyvek sleeve.    Follow-up here in 2 or 3 weeks with any continued concerns and as needed.    1/23/2020  Order for diabetic shoes to accommodate the rigid hammertoes and still provide stability and poor balance  Follow up once yearly.   Continue compression stockings.  Discussed alternative treatment options.  All questions were answered.    5/6/2021  Patient is having circumferential pain around the distal third of the right leg extending to the ankle even dorsal foot to some degree. Tight edema is noted and pain is worsened with any palpation of this tight edema circumferentially around the right ankle. Multiple varicosities noted. Edema is pitting and nonpitting. Discussed compression options and he is unable to put on most compression stockings due to spinal stenosis and functional capacities. Discussed Velcro wraps versus pneumatic devices. He is willing to proceed with this therefore he placed an order for physical therapy to be evaluated for Velcro wraps or what would be most functional for him. Also  recommended elevation of his feet above his heart. He may also discuss this with his internal medicine provider regarding changes in diuresis. However we did reviewed risks associated with this.  All questions were answered. Follow-up as needed.    Khadar García DPM

## 2021-05-06 NOTE — LETTER
"    5/6/2021         RE: Robert Richard  [address] [facility] [address]        Dear Colleague,    Thank you for referring your patient, Robert Richard, to the [facility]. Please see a copy of my visit note below.    Chief Complaint   Patient presents with     Diabetes     type 2 - DM shoes     Consult     medial Right ankle pain; XR R foot 1/23/2020; new issue     RECHECK     B/L hammertoes; XR R foot 1/23/2020 and L 10/3/2019; LOV 1/23/2019       Weight management plan: Patient was referred to their PCP to discuss a diet and exercise plan.     Carlos to follow up with Primary Care provider regarding elevated blood pressure.    Onset early 2020, medial Right ankle sensitivity w/pressure that radiates to mid lower leg with restless leg. Lesion medial Right ankle. Constant swelling, Intermittent throbbing discomfort. Applying topical pain ointment provides temporary relief. History of gout.    EXAM:Vitals: BP (!) 154/76 (BP Location: Left arm, Patient Position: Sitting, Cuff Size: Adult Regular)   Ht 1.778 m (5' 10\")   Wt 114.3 kg (252 lb)   BMI 36.16 kg/m    BMI= Body mass index is 36.16 kg/m .    General appearance: Patient is alert and fully cooperative with history & exam.  No sign of distress is noted during the visit.     Psychiatric: Affect is pleasant & appropriate.  Patient appears motivated to improve health.     Respiratory: Breathing is regular & unlabored while sitting.     HEENT: Hearing is intact to spoken word.  Speech is clear.  No gross evidence of visual impairment that would impact ambulation.     Vascular: DP 1/4 & PT 1/4 left & right.  CFT delayed with dependent rubor noted about the digits.  Diminished hair growth distal to mid tibia and no hair about the foot and toes.  Temperature changes noted, warm to cool proximal to distal.  Hemosiderin pigmentation noted with multiple varicosities legs and feet bilateral. Generalized edema bilateral legs and feet.  Pt " denies claudication history.     Neurologic: Normal plantar response bilateral.  Intact protective threshold plus 10/10 applications of a 5.07 monofilament.  Pt admits burning and paraesthesias about the feet and toes with palpation.     Dermatologic: Toenails are in reasonable repair.  Previous venous stasis ulceration noted about the distal medial left calf is now closed with epithelium.  Subtle irritation noted over the dorsal aspect of several hammertoes PIPJ.     Musculoskeletal: Patient is ambulatory without assistive device or brace.  There is semi reducible contracture of the lesser digits.    Hemoglobin A1C (%)   Date Value   02/05/2021 8.3 (H)   01/21/2020 7.9 (H)   06/20/2019 7.1 (H)   10/16/2018 6.4 (H)   05/15/2018 6.5 (H)   10/18/2017 6.6 (H)   04/20/2016 6.5     Creatinine (mg/dL)   Date Value   02/05/2021 1.44 (H)   01/21/2020 1.48 (H)   08/16/2019 1.42 (H)   06/20/2019 1.39 (H)   12/27/2018 1.51 (H)   05/15/2018 1.46 (H)     ASSESSMENT:       ICD-10-CM    1. Type 2 diabetes mellitus with other circulatory complication, without long-term current use of insulin (H)  E11.59 LYMPHEDEMA THERAPY REFERRAL   2. Spinal stenosis of lumbar region, unspecified whether neurogenic claudication present  M48.061 LYMPHEDEMA THERAPY REFERRAL   3. Venous stasis dermatitis of both lower extremities  I87.2 LYMPHEDEMA THERAPY REFERRAL   4. Edema leg  R60.0 LYMPHEDEMA THERAPY REFERRAL   5. Venous stasis ulcer of right calf limited to breakdown of skin with varicose veins (H)  I83.012 LYMPHEDEMA THERAPY REFERRAL    L97.211    6. Venous stasis ulcer of left calf limited to breakdown of skin with varicose veins (H)  I83.022     L97.221    7. Generalized edema  R60.1      PLAN:    10/3/2019     We discussed risk factors and preventive measures.    We discussed appropriate hygiene, shoe gear, daily foot exam, and reinforced management of weight, diet, activity goals and HA1C goal for diabetic patients.    Dispensed written  foot care instructions.      Order was placed today for wound care referral possibly for Unna boot therapy and follow-up with me in about 1 month and hopefully the wound will be closed at that time and we can move forward with compression therapy chronically.  We discussed the etiology and alternative treatment options as well as potential risks and complications.  The Unna boot was applied today.  Please refer the patient back sooner if he would like to discontinue Unna boots faster.  He will follow-up for Unna boot change here with our RN specialty care RN as it likely will take a week to get the patient into wound care.    11/7/2019  Explained to the patient that it is of utmost importance to continue compression of the lower extremities upon completion of the Unna boots otherwise edema will return which is the cause of the ulceration.  Also describe the importance of managing edema from the toes proximal over the widest point of the calf not just wrapping the calf.  He has a compression dressing but has difficulty applying it.  Therefore order was placed for custom compression 20 to 30 mmHg as well as a donning agent such as a plastic sled or Tyvek sleeve.    Follow-up here in 2 or 3 weeks with any continued concerns and as needed.    1/23/2020  Order for diabetic shoes to accommodate the rigid hammertoes and still provide stability and poor balance  Follow up once yearly.   Continue compression stockings.  Discussed alternative treatment options.  All questions were answered.    5/6/2021  Patient is having circumferential pain around the distal third of the right leg extending to the ankle even dorsal foot to some degree. Tight edema is noted and pain is worsened with any palpation of this tight edema circumferentially around the right ankle. Multiple varicosities noted. Edema is pitting and nonpitting. Discussed compression options and he is unable to put on most compression stockings due to spinal stenosis  and functional capacities. Discussed Velcro wraps versus pneumatic devices. He is willing to proceed with this therefore he placed an order for physical therapy to be evaluated for Velcro wraps or what would be most functional for him. Also recommended elevation of his feet above his heart. He may also discuss this with his internal medicine provider regarding changes in diuresis. However we did reviewed risks associated with this.  All questions were answered. Follow-up as needed.    Khadar García DPM      Again, thank you for allowing me to participate in the care of your patient.        Sincerely,        Khadar García DPM

## 2021-05-13 ENCOUNTER — HOSPITAL ENCOUNTER (OUTPATIENT)
Dept: PHYSICAL THERAPY | Facility: CLINIC | Age: 86
Setting detail: THERAPIES SERIES
End: 2021-05-13
Attending: PODIATRIST
Payer: COMMERCIAL

## 2021-05-13 DIAGNOSIS — R60.0 EDEMA LEG: ICD-10-CM

## 2021-05-13 DIAGNOSIS — I87.2 VENOUS STASIS DERMATITIS OF BOTH LOWER EXTREMITIES: ICD-10-CM

## 2021-05-13 DIAGNOSIS — L97.211 VENOUS STASIS ULCER OF RIGHT CALF LIMITED TO BREAKDOWN OF SKIN WITH VARICOSE VEINS (H): ICD-10-CM

## 2021-05-13 DIAGNOSIS — M48.061 SPINAL STENOSIS OF LUMBAR REGION, UNSPECIFIED WHETHER NEUROGENIC CLAUDICATION PRESENT: ICD-10-CM

## 2021-05-13 DIAGNOSIS — I83.012 VENOUS STASIS ULCER OF RIGHT CALF LIMITED TO BREAKDOWN OF SKIN WITH VARICOSE VEINS (H): ICD-10-CM

## 2021-05-13 DIAGNOSIS — E11.59 TYPE 2 DIABETES MELLITUS WITH OTHER CIRCULATORY COMPLICATION, WITHOUT LONG-TERM CURRENT USE OF INSULIN (H): ICD-10-CM

## 2021-05-13 PROCEDURE — 97140 MANUAL THERAPY 1/> REGIONS: CPT | Mod: GP | Performed by: PHYSICAL THERAPIST

## 2021-05-13 PROCEDURE — 97162 PT EVAL MOD COMPLEX 30 MIN: CPT | Mod: GP | Performed by: PHYSICAL THERAPIST

## 2021-05-13 NOTE — PROGRESS NOTES
05/13/21 1500   Signing Clinician's Name / Credentials   Signing clinician's name / credentials Zaina Boston, PT, DPT, CLT   Discipline   Discipline PT   Lower Extremity Girth Measurements   RT: Great Toe 12 cms   RT: MTP's 25.5 cms   RT: Arch of Foot 26.9 cms   RT: Malleolus 31.5 cms   RT: 10 cm above heel 29.5 cms   RT: 20 cm above heel 33.9 cms   RT: 30 cm above heel 48 cms   RT: 40 cm above heel 44 cms   RT: Lower Extremity Total Volume 3829.76   LT: Great Toe 10.5 cms   LT: MTP's 26 cms   LT: Arch of Foot 27 cms   LT:  Malleolus 30.5 cms   LT: 10 cm above heel 28.2 cms   LT: 20 cm above heel 33.4 cms   LT: 30 cm above heel 47 cms   LT: 40 cm above heel 44 cms   LT: Lower Extremity Comments Taken in sitting on Lymph bench   LT: Lower Extremity Total Volume 3699.41

## 2021-05-15 ENCOUNTER — MYC MEDICAL ADVICE (OUTPATIENT)
Dept: INTERNAL MEDICINE | Facility: CLINIC | Age: 86
End: 2021-05-15

## 2021-05-15 NOTE — PROGRESS NOTES
[facility]        OUTPATIENT PHYSICAL THERAPY EDEMA EVALUATION  PLAN OF TREATMENT FOR OUTPATIENT REHABILITATION  (COMPLETE FOR INITIAL CLAIMS ONLY)  Patient's Last Name, First Name, NALDOGEOVANI  LgRobert armenta                              Provider s Name:   [facility] Medical Record No.  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 Goal description: Patient will reduce by 100 mls per leg and soften fibrosis for reducing his parathesias and leg discomfort and to maintain skin integrity in 6 weeks       Target date: 06/25/21  2. Goal description: Patient will report 50% reduced parathesias and hyperesthesias in R>L LEs in 8 weeks       Target date: 07/09/21  3. Goal description: Patient will reduced edema related deficits on LLIS from 27 to 20 to better functionally manage his edema and leg conditions in 8 weeks       Target date: 07/09/21               Treatment Frequency: 1x/week   Treatment duration: 12 weeks (Reduced visit frequency as needed)    Zaina Boston, PT                                    I CERTIFY THE NEED FOR THESE SERVICES FURNISHED UNDER        THIS PLAN OF TREATMENT AND WHILE UNDER MY CARE     (Physician co-signature of this  document indicates review and certification of the therapy plan).                   Certification date from: 05/13/21       Certification date to: 08/06/21           Referring physician: Dr García   Initial Assessment  See Epic Evaluation- Start of care: 05/13/21

## 2021-05-15 NOTE — PROGRESS NOTES
05/13/21 1400   Quick Adds   Quick Adds Certification   Rehab Discipline   Discipline PT   Type of Visit   Type of visit Initial Edema Evaluation   General Information   Start of care 05/13/21   Referring physician Dr García   Orders Per therapist evaluation   Order date 05/06/21   Medical diagnosis Venous statis ulcerations of  B LEs, spinal stenosis   Onset of illness / date of surgery 05/06/21  (Date of referral)   Edema onset   (Several years ago)   Affected body parts RLE;LLE   Edema etiology RAMANA;Chronic Venous Insufficiency   Pertinent history of current problem (PT: include personal factors and/or comorbidities that impact the POC; OT: include additional occupational profile info) Reported symptoms: B LE edema, notes restless legs. Referred here by Dr García for compression management options. States does have jerking and jumping in his legs after sitting in his dinner chair. Movement tends to help, as does repositioning. It does not wake or affect his sleeping. Patient has medical history of 3 hip surgeries, (RAMANA, revision d/t MOM implant and recall, and then a debridement and mm releases/necrosis removal). This has led to a lot of therapy with continued deficits in strength and mobility; related to his balance and continued weakness in R hip. Type 2 diabetic managed with diet per his report. History of cervical surgery, lumbar laminectomy, appendectomy. No falls reported but reduced balance and states he has to be careful. 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 R leg has history of clots.  Alleviating factors include:  Elevation. Cannot wear compression stockings because its hard to bend over. He has venous ulcers in the legs , none of which are open wounds on his legs. Prior interventions include elevation. Prior level of function includes Walks with a cane and is cautious due to  his R hip status. Lower legs have discomfort due to sensitivity to touch in the leg R>>>L .  PT: Ideas for compression and restless legs management, sensitivity in the legs.  Uses lidocane and lotion for his sensitivity.    Surgical / medical history reviewed Yes   Prior level of functional mobility THAs R hip x 2(one revised d/t MOM implant with 2yrs of PT).    Prior treatment INEZ hose;Elevation   Patient role / employment history Retired   Living environment House / [facility]   Current assistive devices Standard cane   General observations MD notes from recent visit: MD notes- Patient is having circumferential pain around the distal third of the right leg extending to the ankle even dorsal foot to some degree. Tight edema is noted and pain is worsened with any palpation of this tight edema circumferentially around the right ankle. Multiple varicosities noted. Edema is pitting and nonpitting. Discussed compression options and he is unable to put on most compression stockings due to spinal stenosis and functional capacities. Discussed Velcro wraps versus pneumatic devices. He is willing to proceed with this therefore he placed an order for physical therapy to be evaluated for Velcro wraps or what would be most functional for him. Also recommended elevation of his feet above his heart. He may also discuss this with his internal medicine provider regarding changes in diuresis. However we did reviewed risks associated with this. All questions were answered. Follow-up as needed.   Fall Risk Screen   Fall screen completed by PT   Have you fallen 2 or more times in the past year? No   Have you fallen and had an injury in the past year? No   Timed Up and Go score (seconds) 16   Is patient a fall risk? Department fall risk interventions implemented   Fall screen comments Patient has R hip weakness s/p multiple hip surgeries and sensitivity   System Outcome Measures   Outcome Measures Lymphedema   Lymphedema Life Impact Scale  (score range 0-72). A higher score indicates greater impairment. 27   Subjective Report   Patient report of symptoms Sensitivity R>L lower legs   Precautions   Precautions Other   Precautions comments Factor 5 Leiden   Patient / Family Goals   Patient / family goals statement Get ideas for my swelling and leg sensitivitty   Pain   Patient currently in pain Other   Pain comments Sensitivty   Cognitive Status   Orientation Orientation to person, place and time   Edema Exam / Assessment   Skin condition Non-pitting;Pitting;Venous distention;Dryness;Hemosiderin deposits;Intact;Other   Skin condition comments Non pitting L and pitting R.  Considerable skin color change on the distal 2/3 of legs with a woody firm fibrotic appearance with skin stiffness. Varicosities, Chronic venous skin color changes, hemosiderin staining. Hammer toes B, mild redness dorsum IPs but has specialty fit shoes and inserts for his feet  Per MD notes: Vascular: DP 1/4 & PT 1/4 left & right.  CFT delayed with dependent rubor noted about the digits.  Diminished hair growth distal to mid tibia and no hair about the foot and toes.  Temperature changes noted, warm to cool proximal to distal.  Hemosiderin pigmentation noted with multiple varicosities legs and feet bilateral. Generalized edema bilateral legs and feet.  Pt denies claudication history. Neurologic: Normal plantar response bilateral.  Intact protective threshold plus 10/10 applications of a 5.07 monofilament.  Pt admits burning and paraesthesias about the feet and toes with palpation.   Pitting 2+   Pitting location Anterior R LE not foot.  Non pitting but fibrotic L LE   Stemmer sign Negative   Ulceration Yes   Ulceration comments Patient has one bandaid covering his medial R leg. and one scabbed over opening on R LE medial mid thigh.     Girth Measurements   Girth Measurements Refer to separate girth measurement flowsheet   Volume LE   Right LE (mL) 3829.76   Left LE (mL) 3699.41    Strength   Strength comments Weakness R>L hip and trunk d/t multiple hip surgeries. Can update any HEP as needed for him throughout care, bhanunt had PT in several years for this.    Palpation   Palpation See above   Bed Mobility   Bed mobility Independent   Gait / Locomotion   Gait / Locomotion SBA with L SEC, cautious in his gait pattern without obvious LOB   Sensory   Sensory perception comments Hyperesthesias noted in lower leg R>L, sensation appears intact distal to proximal LEs   Vascular Assessment   Vascular Assessment Rubor of Dependency;Vascular concerns   Coordination   Coordination Gross motor coordination appropriate   Muscle Tone   Muscle tone No deficits were identified   Planned Edema Interventions   Planned edema interventions Gradient compression bandaging;Manual therapy;Education;Exercises;Scar mobilization;Home management program development;Other  (Lumbar assessment to add if needed for hyperesthesias and RL)   Clinical Impression   Criteria for skilled therapeutic intervention met Yes   Therapy diagnosis Stage 2 edema, fibrosis, CVI with wounds   Influenced by the following impairments / conditions Stage 2;Edema   Functional limitations due to impairments / conditions Wounds, comfort with sitting   Clinical Presentation Evolving/Changing   Clinical Presentation Rationale PLOF vs current,    Clinical Decision Making (Complexity) Moderate complexity   Treatment Frequency 1x/week   Treatment duration 12 weeks (Reduced fx as needed)   Patient / family and/or staff in agreement with plan of care Yes   Risks and benefits of therapy have been explained Yes   Clinical impression comments Patient will start in velcro wraps to reduce and soften his legs, gave information to order. Hyperesthesias may have multiple components including lumbar origin, CDT and HEP to discern symptom origin   Goals   Edema Eval Goals 1;3;2   Goal 1   Goal identifier Edema   Goal description Patient will reduce by 100 mls per  leg and soften fibrosis for reducing his parathesias and leg discomfort and to maintain skin integrity in 6 weeks   Target date 06/25/21   Goal 2   Goal identifier Parathesias   Goal description Patient will report 50% reduced parathesias and hyperesthesias in R>L LEs in 8 weeks   Target date 07/09/21   Goal 3   Goal identifier LLIS   Goal description Patient will reduced edema related deficits on LLIS from 27 to 20 to better functionally manage his edema and leg conditions in 8 weeks   Target date 07/09/21   Total Evaluation Time   OT Eval, Moderate Complexity Minutes (35731) 35   Certification   Certification date from 05/13/21   Certification date to 08/06/21   Medical Diagnosis 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

## 2021-05-17 ENCOUNTER — HOSPITAL ENCOUNTER (EMERGENCY)
Facility: CLINIC | Age: 86
Discharge: HOME | End: 2021-05-17
Attending: EMERGENCY MEDICINE | Admitting: EMERGENCY MEDICINE
Payer: COMMERCIAL

## 2021-05-17 ENCOUNTER — APPOINTMENT (OUTPATIENT)
Dept: ULTRASOUND IMAGING | Facility: CLINIC | Age: 86
End: 2021-05-17
Attending: EMERGENCY MEDICINE
Payer: COMMERCIAL

## 2021-05-17 ENCOUNTER — APPOINTMENT (OUTPATIENT)
Dept: GENERAL RADIOLOGY | Facility: CLINIC | Age: 86
End: 2021-05-17
Attending: EMERGENCY MEDICINE
Payer: COMMERCIAL

## 2021-05-17 ENCOUNTER — ANTICOAGULATION THERAPY VISIT (OUTPATIENT)
Dept: ANTICOAGULATION | Facility: CLINIC | Age: 86
End: 2021-05-17

## 2021-05-17 ENCOUNTER — TRANSFERRED RECORDS (OUTPATIENT)
Dept: HEALTH INFORMATION MANAGEMENT | Facility: CLINIC | Age: 86
End: 2021-05-17

## 2021-05-17 VITALS
WEIGHT: 252 LBS | RESPIRATION RATE: 20 BRPM | OXYGEN SATURATION: 93 % | DIASTOLIC BLOOD PRESSURE: 109 MMHG | SYSTOLIC BLOOD PRESSURE: 142 MMHG | TEMPERATURE: 98.5 F | BODY MASS INDEX: 36.16 KG/M2 | HEART RATE: 75 BPM

## 2021-05-17 DIAGNOSIS — I82.90 VENOUS THROMBOSIS: ICD-10-CM

## 2021-05-17 DIAGNOSIS — D68.51 FACTOR V LEIDEN MUTATION (H): ICD-10-CM

## 2021-05-17 DIAGNOSIS — I83.013 VENOUS STASIS ULCER OF RIGHT ANKLE, UNSPECIFIED ULCER STAGE, UNSPECIFIED WHETHER VARICOSE VEINS PRESENT (H): ICD-10-CM

## 2021-05-17 DIAGNOSIS — D68.52 PROTHROMBIN MUTATION (H): ICD-10-CM

## 2021-05-17 DIAGNOSIS — L03.115 CELLULITIS OF RIGHT LOWER EXTREMITY: ICD-10-CM

## 2021-05-17 DIAGNOSIS — L97.319 VENOUS STASIS ULCER OF RIGHT ANKLE, UNSPECIFIED ULCER STAGE, UNSPECIFIED WHETHER VARICOSE VEINS PRESENT (H): ICD-10-CM

## 2021-05-17 DIAGNOSIS — Z79.01 LONG TERM CURRENT USE OF ANTICOAGULANT THERAPY: ICD-10-CM

## 2021-05-17 LAB
ANION GAP SERPL CALCULATED.3IONS-SCNC: 7 MMOL/L (ref 3–14)
BASOPHILS # BLD AUTO: 0 10E9/L (ref 0–0.2)
BASOPHILS NFR BLD AUTO: 0.6 %
BUN SERPL-MCNC: 28 MG/DL (ref 7–30)
CALCIUM SERPL-MCNC: 8.5 MG/DL (ref 8.5–10.1)
CHLORIDE SERPL-SCNC: 114 MMOL/L (ref 94–109)
CO2 SERPL-SCNC: 19 MMOL/L (ref 20–32)
CREAT SERPL-MCNC: 1.47 MG/DL (ref 0.66–1.25)
DIFFERENTIAL METHOD BLD: ABNORMAL
EOSINOPHIL # BLD AUTO: 0.3 10E9/L (ref 0–0.7)
EOSINOPHIL NFR BLD AUTO: 4.8 %
ERYTHROCYTE [DISTWIDTH] IN BLOOD BY AUTOMATED COUNT: 13.2 % (ref 10–15)
GFR SERPL CREATININE-BSD FRML MDRD: 43 ML/MIN/{1.73_M2}
GLUCOSE SERPL-MCNC: 121 MG/DL (ref 70–99)
HCT VFR BLD AUTO: 38.7 % (ref 40–53)
HGB BLD-MCNC: 12.7 G/DL (ref 13.3–17.7)
IMM GRANULOCYTES # BLD: 0 10E9/L (ref 0–0.4)
IMM GRANULOCYTES NFR BLD: 0.3 %
INR PPP: 1.76 (ref 0.86–1.14)
LYMPHOCYTES # BLD AUTO: 1.4 10E9/L (ref 0.8–5.3)
LYMPHOCYTES NFR BLD AUTO: 21.6 %
MCH RBC QN AUTO: 30.5 PG (ref 26.5–33)
MCHC RBC AUTO-ENTMCNC: 32.8 G/DL (ref 31.5–36.5)
MCV RBC AUTO: 93 FL (ref 78–100)
MONOCYTES # BLD AUTO: 0.6 10E9/L (ref 0–1.3)
MONOCYTES NFR BLD AUTO: 8.5 %
NEUTROPHILS # BLD AUTO: 4.2 10E9/L (ref 1.6–8.3)
NEUTROPHILS NFR BLD AUTO: 64.2 %
NRBC # BLD AUTO: 0 10*3/UL
NRBC BLD AUTO-RTO: 0 /100
PLATELET # BLD AUTO: 176 10E9/L (ref 150–450)
POTASSIUM SERPL-SCNC: 4.1 MMOL/L (ref 3.4–5.3)
RBC # BLD AUTO: 4.16 10E12/L (ref 4.4–5.9)
SODIUM SERPL-SCNC: 140 MMOL/L (ref 133–144)
WBC # BLD AUTO: 6.5 10E9/L (ref 4–11)

## 2021-05-17 PROCEDURE — 99285 EMERGENCY DEPT VISIT HI MDM: CPT | Mod: 25 | Performed by: EMERGENCY MEDICINE

## 2021-05-17 PROCEDURE — 99284 EMERGENCY DEPT VISIT MOD MDM: CPT | Performed by: EMERGENCY MEDICINE

## 2021-05-17 PROCEDURE — 85610 PROTHROMBIN TIME: CPT | Performed by: EMERGENCY MEDICINE

## 2021-05-17 PROCEDURE — 85025 COMPLETE CBC W/AUTO DIFF WBC: CPT | Performed by: EMERGENCY MEDICINE

## 2021-05-17 PROCEDURE — 80048 BASIC METABOLIC PNL TOTAL CA: CPT | Performed by: EMERGENCY MEDICINE

## 2021-05-17 PROCEDURE — 36415 COLL VENOUS BLD VENIPUNCTURE: CPT | Performed by: EMERGENCY MEDICINE

## 2021-05-17 PROCEDURE — 93971 EXTREMITY STUDY: CPT | Mod: RT

## 2021-05-17 PROCEDURE — 73590 X-RAY EXAM OF LOWER LEG: CPT | Mod: RT

## 2021-05-17 RX ORDER — CEPHALEXIN 500 MG/1
500 CAPSULE ORAL 3 TIMES DAILY
Qty: 40 CAPSULE | Refills: 0 | Status: SHIPPED | OUTPATIENT
Start: 2021-05-17 | End: 2021-05-27

## 2021-05-17 NOTE — ED PROVIDER NOTES
History     Chief Complaint   Patient presents with     Foot Pain     HPI  Robert Richrad is a 85 year old male who presents with right lower extremity pain.  This is been present over the last week.  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  He is currently on Coumadin.  He did scrape the inside of his ankle over the last week.  There is been some redness and skin sensitivity.  No fever.  No chills.  No other significant trauma.    Allergies:  Allergies   Allergen Reactions     Atorvastatin Calcium      Muscle pain, weakness     Pravastatin Other (See Comments)     muscle aches     Simvastatin Other (See Comments)     muscle aches       Problem List:    Patient Active Problem List    Diagnosis Date Noted     CKD (chronic kidney disease) stage 3, GFR 30-59 ml/min 06/20/2019     Priority: Medium     Diabetes mellitus, type 2 (H) 10/12/2018     Priority: Medium     Morbid obesity due to excess calories (H) 07/31/2017     Priority: Medium     Primary malignant neoplasm of prostate (H) 02/10/2017     Priority: Medium     Essential hypertension 09/06/2016     Priority: Medium     Long term current use of anticoagulant therapy 03/21/2016     Priority: Medium     Spinal stenosis 10/17/2014     Priority: Medium     Low back pain 01/21/2014     Priority: Medium     Diagnosis updated by automated process. Provider to review and confirm.       Personal history of venous thrombosis and embolism 01/21/2014     Priority: Medium     Hip joint replacement status - right 01/21/2014     Priority: Medium     Abnormal gait 01/21/2014     Priority: Medium     Obesity 01/21/2014     Priority: Medium     Personal history of prostate cancer 01/21/2014     Priority: Medium     Malignant basal cell neoplasm of skin 07/24/2012     Priority: Medium     Do you wish to do the replacement in the background? yes         Advanced directives, counseling/discussion 07/24/2012     Priority: Medium     Patient states has Advance Directive and will  bring in a copy to clinic. 7/24/2012          Gout 07/03/2012     Priority: Medium     Back pain 09/27/2011     Priority: Medium     Hyperlipidemia LDL goal <100 09/27/2011     Priority: Medium     Factor V Leiden mutation (H) 09/08/2011     Priority: Medium     Prothrombin mutation (H) 09/08/2011     Priority: Medium     [id number]       Sleep apnea 05/19/2011     Priority: Medium     Venous thrombosis 10/20/2010     Priority: Medium     Restless leg syndrome 10/20/2010     Priority: Medium        Past Medical History:    Past Medical History:   Diagnosis Date     Basal cell carcinoma      Cancer (H)      DVT (deep venous thrombosis) (H) 11/2003     DVT (deep venous thrombosis) (H) 12/08     DVT (deep venous thrombosis) (H) 10/2010     Factor V Leiden (H)      Gout      Other and unspecified hyperlipidemia      Prostate cancer (H) 2003     Restless leg syndrome      Unspecified essential hypertension        Past Surgical History:    Past Surgical History:   Procedure Laterality Date     APPENDECTOMY  1950     C REVISE TOTAL HIP REPLACEMENT  1/18/13    R hip     JOINT REPLACEMENT  4/2011    R hip     LAMINECTOMY LUMBAR ONE LEVEL  12/2008     LAPAROSCOPIC HERNIORRHAPHY UMBILICAL N/A 1/3/2019    Procedure: laparoscopic umbilical hernia repair with mesh;  Surgeon: Jamal Thompson DO;  Location: [facility] OR       Family History:    Family History   Problem Relation Age of Onset     Prostate Cancer Paternal Grandfather        Social History:  Marital Status:  Single [1]  Social History     Tobacco Use     Smoking status: Never Smoker     Smokeless tobacco: Never Used   Substance Use Topics     Alcohol use: Yes     Alcohol/week: 0.0 standard drinks     Comment: 1 drink every 1-2 weeks.     Drug use: No        Medications:    atenolol (TENORMIN) 50 MG tablet  cephALEXin (KEFLEX) 500 MG capsule  glipiZIDE (GLUCOTROL XL) 5 MG 24 hr tablet  incobotulinumtoxin A (XEOMIN) 50 units SOLR solution  losartan (COZAAR) 100 MG  tablet  Multiple Vitamins-Minerals (MULTIVITAMIN ADULT PO)  pramipexole (MIRAPEX) 1 MG tablet  spironolactone (ALDACTONE) 25 MG tablet  warfarin ANTICOAGULANT (COUMADIN) 5 MG tablet  colchicine (COLCRYS) 0.6 MG tablet  ipratropium (ATROVENT) 0.06 % nasal spray  order for DME  ORDER FOR DME  sildenafil (VIAGRA) 100 MG tablet  STATIN NOT PRESCRIBED (INTENTIONAL)          Review of Systems  All other systems are reviewed and are negative    Physical Exam   BP: (!) 142/109  Pulse: 75  Temp: 98.5  F (36.9  C)  Resp: 20  Weight: 114.3 kg (252 lb)  SpO2: 93 %      Physical Exam  Vitals signs reviewed.   Constitutional:       General: He is not in acute distress.     Appearance: He is not diaphoretic.   HENT:      Head: Normocephalic and atraumatic.   Eyes:      General: No scleral icterus.        Right eye: No discharge.         Left eye: No discharge.      Conjunctiva/sclera: Conjunctivae normal.   Neck:      Musculoskeletal: Normal range of motion.   Pulmonary:      Effort: Pulmonary effort is normal.      Breath sounds: No stridor.   Musculoskeletal: Normal range of motion.      Comments: Significant edema noted to both lower extremities.  Skin is tight around the right lower extremity.  There is some chronic brawny changes to the anterior shin.  The medial malleolar region reveals an area of breakdown with some surrounding erythema and warmth as well.   Neurological:      Mental Status: He is alert.      Comments: Normal speech and mentation   Psychiatric:         Judgment: Judgment normal.         ED Course        Procedures               Critical Care time:  none               Results for orders placed or performed during the hospital encounter of 05/17/21 (from the past 24 hour(s))   INR   Result Value Ref Range    INR 1.76 (H) 0.86 - 1.14   CBC with platelets differential   Result Value Ref Range    WBC 6.5 4.0 - 11.0 10e9/L    RBC Count 4.16 (L) 4.4 - 5.9 10e12/L    Hemoglobin 12.7 (L) 13.3 - 17.7 g/dL     Hematocrit 38.7 (L) 40.0 - 53.0 %    MCV 93 78 - 100 fl    MCH 30.5 26.5 - 33.0 pg    MCHC 32.8 31.5 - 36.5 g/dL    RDW 13.2 10.0 - 15.0 %    Platelet Count 176 150 - 450 10e9/L    Diff Method Automated Method     % Neutrophils 64.2 %    % Lymphocytes 21.6 %    % Monocytes 8.5 %    % Eosinophils 4.8 %    % Basophils 0.6 %    % Immature Granulocytes 0.3 %    Nucleated RBCs 0 0 /100    Absolute Neutrophil 4.2 1.6 - 8.3 10e9/L    Absolute Lymphocytes 1.4 0.8 - 5.3 10e9/L    Absolute Monocytes 0.6 0.0 - 1.3 10e9/L    Absolute Eosinophils 0.3 0.0 - 0.7 10e9/L    Absolute Basophils 0.0 0.0 - 0.2 10e9/L    Abs Immature Granulocytes 0.0 0 - 0.4 10e9/L    Absolute Nucleated RBC 0.0    Basic metabolic panel   Result Value Ref Range    Sodium 140 133 - 144 mmol/L    Potassium 4.1 3.4 - 5.3 mmol/L    Chloride 114 (H) 94 - 109 mmol/L    Carbon Dioxide 19 (L) 20 - 32 mmol/L    Anion Gap 7 3 - 14 mmol/L    Glucose 121 (H) 70 - 99 mg/dL    Urea Nitrogen 28 7 - 30 mg/dL    Creatinine 1.47 (H) 0.66 - 1.25 mg/dL    GFR Estimate 43 (L) >60 mL/min/[1.73_m2]    GFR Estimate If Black 50 (L) >60 mL/min/[1.73_m2]    Calcium 8.5 8.5 - 10.1 mg/dL   XR Tibia & Fibula Right 2 Views    Narrative    EXAM: XR TIBIA and FIBULA RT 2 VW  LOCATION: [facility]  DATE/TIME: 5/17/2021 5:18 PM    INDICATION: Right lower leg pain.  COMPARISON: None.      Impression    IMPRESSION:     Right knee negative for fracture or dislocation.    Prominent soft tissue swelling in the calf. No radiopaque foreign body or soft tissue gas.    Moderate degenerative arthritic changes in the right knee. Minimal degenerative changes in the right ankle.   US Lower Extremity Venous Duplex Right    Narrative    EXAM: US LOWER EXTREMITY VENOUS DUPLEX RIGHT  LOCATION: [facility]  DATE/TIME: 5/17/2021 5:40 PM    INDICATION: Right leg pain and swelling. History of deep vein thrombosis.  COMPARISON: None.  TECHNIQUE: Venous Duplex ultrasound of the right  lower extremity with and without compression, augmentation and duplex. Color flow and spectral Doppler with waveform analysis performed.    FINDINGS: Exam includes the common femoral, femoral, popliteal, and contralateral common femoral veins as well as segmentally visualized deep calf veins and greater saphenous vein.     RIGHT: No deep vein thrombosis. No superficial thrombophlebitis. No popliteal cyst.      Impression    IMPRESSION:  1.  No deep venous thrombosis in the right lower extremity.       Medications - No data to display    Assessments & Plan (with Medical Decision Making)  84 y/o male with venous stasis ulcer of right lower extremity with apparent mild surrounding cellulitis.  Will initiate on Keflex and set him up with close follow up in podiatry.       I have reviewed the nursing notes.    I have reviewed the findings, diagnosis, plan and need for follow up with the patient.       Discharge Medication List as of 5/17/2021  7:22 PM      START taking these medications    Details   cephALEXin (KEFLEX) 500 MG capsule Take 1 capsule (500 mg) by mouth 3 times daily for 10 days, Disp-40 capsule, R-0, E-Prescribe             Final diagnoses:   Venous stasis ulcer of right ankle, unspecified ulcer stage, unspecified whether varicose veins present (H)   Cellulitis of right lower extremity       5/17/2021   [facility] EMERGENCY DEPT     Daljit Medina MD  05/17/21 2055

## 2021-05-17 NOTE — TELEPHONE ENCOUNTER
RN sent My Chart message recommending pt to go to the ER with his DVT Hx ans his extreme pain in his leg.   does NOT have an openings today.   TITUS [place]

## 2021-05-18 NOTE — PROGRESS NOTES
Anticoagulation Management    Unable to reach pt today.    5/17 ER INR result of 1.76 is subtherapeutic (goal INR of 2.0-3.0).  Result received from: ER visit    Follow up required to confirm warfarin dose taken and assess for changes    LM I have attempted to contact this patient by phone, left message to return call to the coumadin clinic.  Will try again later.  Advised taking 7.5 mg today.      Anticoagulation clinic to follow up    Neelima Arreaga RN

## 2021-05-19 NOTE — PROGRESS NOTES
ANTICOAGULATION MANAGEMENT     Patient Name:  Robert Richard  Date:  2021    ASSESSMENT /SUBJECTIVE:    INR of  was 1.76 - subtherapeutic. Goal INR of 2.0-3.0      Warfarin dose taken: VM left    Diet: VM Left    Medication changes/ interactions: Keflex prescribed 5/17 x10 days.     Previous INR: Therapeutic     S/S of bleeding or thromboembolism: No    New injury or illness: No    Upcoming surgery, procedure or cardioversion: No    Additional findings: None      PLAN:    Detailed message left for Robert regarding INR result and instructed:     Warfarin Dosing Instructions: 7.5 mg on  then continue your current warfarin dose of 2.5 mg Fri and 5 mg ROW    Instructed patient to follow up no later than: 3 days  Check at provider office visit    Education provided: Potential interaction between warfarin and kelfex  I left a detailed voicemail with the orders reflected in flowsheet. I have also requested a call back if there have been any missed doses, concerns, illness, fever, or if there have been any changes in medications, activity level, or diet      Instructed to call the Anticoagulation Clinic for any changes, questions or concerns. (#[phone])        Raquel Pelletier, RN      OBJECTIVE:  Recent labs: (last 7 days)     21  1709   INR 1.76*         INR assessment SUB    Recheck INR In: 4 DAYS    INR Location Outside lab      Anticoagulation Summary  As of 2021    INR goal:  2.0-3.0   TTR:  85.0 % (5 mo)   INR used for dosin.76 (2021)   Warfarin maintenance plan:  2.5 mg (5 mg x 0.5) every Fri; 5 mg (5 mg x 1) all other days   Full warfarin instructions:  : 7.5 mg; Otherwise 2.5 mg every Fri; 5 mg all other days   Weekly warfarin total:  32.5 mg   Plan last modified:  Raquel Pelletier RN (3/22/2016)   Next INR check:  2021   Priority:  Maintenance   Target end date:  Indefinite    Indications    Factor V Leiden mutation (H) [D68.51]  Venous thrombosis  [I82.90]  Prothrombin mutation (H) [D68.52]  Long term current use of anticoagulant therapy [Z79.01]             Anticoagulation Episode Summary     INR check location:      Preferred lab:      Send INR reminders to:  [facility]    Comments:  5 mg tablets, appt card, BP, PM dose        Anticoagulation Care Providers     Provider Role Specialty Phone number    Stiven Donahue MD Referring Internal Medicine [phone]    Nate Cifuentes DO Responsible Internal Medicine [phone]

## 2021-05-20 ENCOUNTER — OFFICE VISIT (OUTPATIENT)
Dept: PODIATRY | Facility: CLINIC | Age: 86
End: 2021-05-20
Payer: COMMERCIAL

## 2021-05-20 VITALS
SYSTOLIC BLOOD PRESSURE: 142 MMHG | HEIGHT: 70 IN | DIASTOLIC BLOOD PRESSURE: 70 MMHG | TEMPERATURE: 97 F | BODY MASS INDEX: 35.79 KG/M2 | WEIGHT: 250 LBS

## 2021-05-20 DIAGNOSIS — I83.012 VENOUS STASIS ULCER OF RIGHT CALF LIMITED TO BREAKDOWN OF SKIN WITH VARICOSE VEINS (H): Primary | ICD-10-CM

## 2021-05-20 DIAGNOSIS — L97.211 VENOUS STASIS ULCER OF RIGHT CALF LIMITED TO BREAKDOWN OF SKIN WITH VARICOSE VEINS (H): Primary | ICD-10-CM

## 2021-05-20 DIAGNOSIS — M48.061 SPINAL STENOSIS OF LUMBAR REGION, UNSPECIFIED WHETHER NEUROGENIC CLAUDICATION PRESENT: ICD-10-CM

## 2021-05-20 DIAGNOSIS — I87.2 VENOUS STASIS DERMATITIS OF BOTH LOWER EXTREMITIES: ICD-10-CM

## 2021-05-20 DIAGNOSIS — R60.1 GENERALIZED EDEMA: ICD-10-CM

## 2021-05-20 PROCEDURE — 99213 OFFICE O/P EST LOW 20 MIN: CPT | Performed by: PODIATRIST

## 2021-05-20 ASSESSMENT — MIFFLIN-ST. JEOR: SCORE: 1830.24

## 2021-05-20 ASSESSMENT — PAIN SCALES - GENERAL: PAINLEVEL: WORST PAIN (10)

## 2021-05-20 NOTE — LETTER
"    5/20/2021         RE: Robert Richard  [id number] [facility] [address]        Dear Colleague,    Thank you for referring your patient, Robert Richard, to the [facility]. Please see a copy of my visit note below.    Chief Complaint   Patient presents with     WOUND CARE     medial Right ankle pain; XR R foot 1/23/2020; LOV 5/6/2021     Diabetes     type 2     RECHECK     ED Dept 5/17/2021 - US Lower Exremity Venous Duples Right     RECHECK     B/L hammertoes; XR R foot 1/23/2020 and L 10/3/2019; LOV 5/6/2021       Weight management plan: Patient was referred to their PCP to discuss a diet and exercise plan.     Carlos to follow up with Primary Care provider regarding elevated blood pressure.      Onset early 2020, medial Right ankle sensitivity w/pressure that radiates to mid lower leg with restless leg. Lesion medial Right ankle. Constant swelling, Intermittent throbbing discomfort. Applying topical pain ointment provides temporary relief. History of gout.    EXAM:Vitals: BP (!) 142/70 (BP Location: Left arm, Patient Position: Sitting, Cuff Size: Adult Regular)   Temp 97  F (36.1  C) (Temporal)   Ht 1.778 m (5' 10\")   Wt 113.4 kg (250 lb)   BMI 35.87 kg/m    BMI= Body mass index is 35.87 kg/m .    General appearance: Patient is alert and fully cooperative with history & exam.  No sign of distress is noted during the visit.     Psychiatric: Affect is pleasant & appropriate.  Patient appears motivated to improve health.     Respiratory: Breathing is regular & unlabored while sitting.     HEENT: Hearing is intact to spoken word.  Speech is clear.  No gross evidence of visual impairment that would impact ambulation.     Vascular: DP 1/4 & PT 1/4 left & right.  CFT delayed with dependent rubor noted about the digits.  Diminished hair growth distal to mid tibia and no hair about the foot and toes.  Temperature changes noted, warm to cool proximal to distal.  Hemosiderin pigmentation " noted with multiple varicosities legs and feet bilateral. Generalized edema bilateral legs and feet.  Pt denies claudication history.     Neurologic: Normal plantar response bilateral.  Intact protective threshold plus 10/10 applications of a 5.07 monofilament.  Pt admits burning and paraesthesias about the feet and toes with palpation.     Dermatologic: Toenails are in reasonable repair.  Previous venous stasis ulceration noted about the distal medial left calf is now closed with epithelium.  Subtle irritation noted over the dorsal aspect of several hammertoes PIPJ.     Musculoskeletal: Patient is ambulatory without assistive device or brace.  There is semi reducible contracture of the lesser digits.    Hemoglobin A1C (%)   Date Value   02/05/2021 8.3 (H)   01/21/2020 7.9 (H)   06/20/2019 7.1 (H)   10/16/2018 6.4 (H)   05/15/2018 6.5 (H)   10/18/2017 6.6 (H)   04/20/2016 6.5     Creatinine (mg/dL)   Date Value   05/17/2021 1.47 (H)   02/05/2021 1.44 (H)   01/21/2020 1.48 (H)   08/16/2019 1.42 (H)   06/20/2019 1.39 (H)   12/27/2018 1.51 (H)     ASSESSMENT:       ICD-10-CM    1. Venous stasis ulcer of right calf limited to breakdown of skin with varicose veins (H)  I83.012     L97.211    2. Venous stasis dermatitis of both lower extremities  I87.2    3. Generalized edema  R60.1    4. Spinal stenosis of lumbar region, unspecified whether neurogenic claudication present  M48.061      PLAN:    10/3/2019     We discussed risk factors and preventive measures.    We discussed appropriate hygiene, shoe gear, daily foot exam, and reinforced management of weight, diet, activity goals and HA1C goal for diabetic patients.    Dispensed written foot care instructions.      Order was placed today for wound care referral possibly for Unna boot therapy and follow-up with me in about 1 month and hopefully the wound will be closed at that time and we can move forward with compression therapy chronically.  We discussed the etiology and  alternative treatment options as well as potential risks and complications.  The Unna boot was applied today.  Please refer the patient back sooner if he would like to discontinue Unna boots faster.  He will follow-up for Unna boot change here with our RN specialty care RN as it likely will take a week to get the patient into wound care.    11/7/2019  Explained to the patient that it is of utmost importance to continue compression of the lower extremities upon completion of the Unna boots otherwise edema will return which is the cause of the ulceration.  Also describe the importance of managing edema from the toes proximal over the widest point of the calf not just wrapping the calf.  He has a compression dressing but has difficulty applying it.  Therefore order was placed for custom compression 20 to 30 mmHg as well as a donning agent such as a plastic sled or Tyvek sleeve.    Follow-up here in 2 or 3 weeks with any continued concerns and as needed.    1/23/2020  Order for diabetic shoes to accommodate the rigid hammertoes and still provide stability and poor balance  Follow up once yearly.   Continue compression stockings.  Discussed alternative treatment options.  All questions were answered.    5/6/2021  Patient is having circumferential pain around the distal third of the right leg extending to the ankle even dorsal foot to some degree. Tight edema is noted and pain is worsened with any palpation of this tight edema circumferentially around the right ankle. Multiple varicosities noted. Edema is pitting and nonpitting. Discussed compression options and he is unable to put on most compression stockings due to spinal stenosis and functional capacities. Discussed Velcro wraps versus pneumatic devices. He is willing to proceed with this therefore he placed an order for physical therapy to be evaluated for Velcro wraps or what would be most functional for him. Also recommended elevation of his feet above his heart.  He may also discuss this with his internal medicine provider regarding changes in diuresis. However we did reviewed risks associated with this.  All questions were answered. Follow-up as needed.    5/24/2021  Patient has not been utilizing compression or compression stockings or wrap and he has increased pain and is now starting to weep on the medial aspect of his right distal calf.  We reviewed the importance of the developing process of compression or elevation versus diuretics.  Patient does not want to wear compression stockings we discussed utilizing a Velcro wrap.  He would like to increase his compression at home immobilization and elevation and try this for 10 days.  If this does not resolve this we may consider Unna boots and then referral to physical therapy lymphedema clinic.  We discussed alternative treatment options.  All questions were answered.    Khadar García DPM      Again, thank you for allowing me to participate in the care of your patient.        Sincerely,        Khadar García DPM

## 2021-05-20 NOTE — PROGRESS NOTES
"Chief Complaint   Patient presents with     WOUND CARE     medial Right ankle pain; XR R foot 1/23/2020; LOV 5/6/2021     Diabetes     type 2     RECHECK     ED Dept 5/17/2021 - US Lower Exremity Venous Duples Right     RECHECK     B/L jane; XR R foot 1/23/2020 and L 10/3/2019; LOV 5/6/2021       Weight management plan: Patient was referred to their PCP to discuss a diet and exercise plan.     Carlos to follow up with Primary Care provider regarding elevated blood pressure.      Onset early 2020, medial Right ankle sensitivity w/pressure that radiates to mid lower leg with restless leg. Lesion medial Right ankle. Constant swelling, Intermittent throbbing discomfort. Applying topical pain ointment provides temporary relief. History of gout.    EXAM:Vitals: BP (!) 142/70 (BP Location: Left arm, Patient Position: Sitting, Cuff Size: Adult Regular)   Temp 97  F (36.1  C) (Temporal)   Ht 1.778 m (5' 10\")   Wt 113.4 kg (250 lb)   BMI 35.87 kg/m    BMI= Body mass index is 35.87 kg/m .    General appearance: Patient is alert and fully cooperative with history & exam.  No sign of distress is noted during the visit.     Psychiatric: Affect is pleasant & appropriate.  Patient appears motivated to improve health.     Respiratory: Breathing is regular & unlabored while sitting.     HEENT: Hearing is intact to spoken word.  Speech is clear.  No gross evidence of visual impairment that would impact ambulation.     Vascular: DP 1/4 & PT 1/4 left & right.  CFT delayed with dependent rubor noted about the digits.  Diminished hair growth distal to mid tibia and no hair about the foot and toes.  Temperature changes noted, warm to cool proximal to distal.  Hemosiderin pigmentation noted with multiple varicosities legs and feet bilateral. Generalized edema bilateral legs and feet.  Pt denies claudication history.     Neurologic: Normal plantar response bilateral.  Intact protective threshold plus 10/10 applications of a 5.07 " monofilament.  Pt admits burning and paraesthesias about the feet and toes with palpation.     Dermatologic: Toenails are in reasonable repair.  Previous venous stasis ulceration noted about the distal medial left calf is now closed with epithelium.  Subtle irritation noted over the dorsal aspect of several hammertoes PIPJ.     Musculoskeletal: Patient is ambulatory without assistive device or brace.  There is semi reducible contracture of the lesser digits.    Hemoglobin A1C (%)   Date Value   02/05/2021 8.3 (H)   01/21/2020 7.9 (H)   06/20/2019 7.1 (H)   10/16/2018 6.4 (H)   05/15/2018 6.5 (H)   10/18/2017 6.6 (H)   04/20/2016 6.5     Creatinine (mg/dL)   Date Value   05/17/2021 1.47 (H)   02/05/2021 1.44 (H)   01/21/2020 1.48 (H)   08/16/2019 1.42 (H)   06/20/2019 1.39 (H)   12/27/2018 1.51 (H)     ASSESSMENT:       ICD-10-CM    1. Venous stasis ulcer of right calf limited to breakdown of skin with varicose veins (H)  I83.012     L97.211    2. Venous stasis dermatitis of both lower extremities  I87.2    3. Generalized edema  R60.1    4. Spinal stenosis of lumbar region, unspecified whether neurogenic claudication present  M48.061      PLAN:    10/3/2019     We discussed risk factors and preventive measures.    We discussed appropriate hygiene, shoe gear, daily foot exam, and reinforced management of weight, diet, activity goals and HA1C goal for diabetic patients.    Dispensed written foot care instructions.      Order was placed today for wound care referral possibly for Unna boot therapy and follow-up with me in about 1 month and hopefully the wound will be closed at that time and we can move forward with compression therapy chronically.  We discussed the etiology and alternative treatment options as well as potential risks and complications.  The Unna boot was applied today.  Please refer the patient back sooner if he would like to discontinue Unna boots faster.  He will follow-up for Unna boot change here  with our RN specialty care RN as it likely will take a week to get the patient into wound care.    11/7/2019  Explained to the patient that it is of utmost importance to continue compression of the lower extremities upon completion of the Unna boots otherwise edema will return which is the cause of the ulceration.  Also describe the importance of managing edema from the toes proximal over the widest point of the calf not just wrapping the calf.  He has a compression dressing but has difficulty applying it.  Therefore order was placed for custom compression 20 to 30 mmHg as well as a donning agent such as a plastic sled or Tyvek sleeve.    Follow-up here in 2 or 3 weeks with any continued concerns and as needed.    1/23/2020  Order for diabetic shoes to accommodate the rigid hammertoes and still provide stability and poor balance  Follow up once yearly.   Continue compression stockings.  Discussed alternative treatment options.  All questions were answered.    5/6/2021  Patient is having circumferential pain around the distal third of the right leg extending to the ankle even dorsal foot to some degree. Tight edema is noted and pain is worsened with any palpation of this tight edema circumferentially around the right ankle. Multiple varicosities noted. Edema is pitting and nonpitting. Discussed compression options and he is unable to put on most compression stockings due to spinal stenosis and functional capacities. Discussed Velcro wraps versus pneumatic devices. He is willing to proceed with this therefore he placed an order for physical therapy to be evaluated for Velcro wraps or what would be most functional for him. Also recommended elevation of his feet above his heart. He may also discuss this with his internal medicine provider regarding changes in diuresis. However we did reviewed risks associated with this.  All questions were answered. Follow-up as needed.    5/24/2021  Patient has not been utilizing  compression or compression stockings or wrap and he has increased pain and is now starting to weep on the medial aspect of his right distal calf.  We reviewed the importance of the developing process of compression or elevation versus diuretics.  Patient does not want to wear compression stockings we discussed utilizing a Velcro wrap.  He would like to increase his compression at home immobilization and elevation and try this for 10 days.  If this does not resolve this we may consider Unna boots and then referral to physical therapy lymphedema clinic.  We discussed alternative treatment options.  All questions were answered.    Khadar García DPM

## 2021-05-21 DIAGNOSIS — I10 HYPERTENSION GOAL BP (BLOOD PRESSURE) < 140/90: ICD-10-CM

## 2021-05-24 RX ORDER — ATENOLOL 50 MG/1
TABLET ORAL
Qty: 180 TABLET | Refills: 0 | Status: SHIPPED | OUTPATIENT
Start: 2021-05-24 | End: 2021-09-08

## 2021-05-24 NOTE — TELEPHONE ENCOUNTER
Routing refill request to provider for review/approval because:  Labs out of range:  BP    DONG BillsN, RN  [facility]

## 2021-05-27 ENCOUNTER — TELEPHONE (OUTPATIENT)
Dept: ANTICOAGULATION | Facility: CLINIC | Age: 86
End: 2021-05-27

## 2021-05-27 NOTE — TELEPHONE ENCOUNTER
ANTICOAGULATION     Robertdagoberto Richard is overdue for INR check.      Left message for patient to call and schedule lab appointment as soon as possible. If returning call, please schedule.     Chantell Boston RN

## 2021-05-28 ENCOUNTER — MYC MEDICAL ADVICE (OUTPATIENT)
Dept: INTERNAL MEDICINE | Facility: CLINIC | Age: 86
End: 2021-05-28

## 2021-06-01 ENCOUNTER — TELEPHONE (OUTPATIENT)
Dept: OTHER | Facility: CLINIC | Age: 86
End: 2021-06-01

## 2021-06-01 ENCOUNTER — ANTICOAGULATION THERAPY VISIT (OUTPATIENT)
Dept: ANTICOAGULATION | Facility: CLINIC | Age: 86
End: 2021-06-01

## 2021-06-01 ENCOUNTER — OFFICE VISIT (OUTPATIENT)
Dept: PODIATRY | Facility: CLINIC | Age: 86
End: 2021-06-01
Payer: COMMERCIAL

## 2021-06-01 VITALS
DIASTOLIC BLOOD PRESSURE: 80 MMHG | WEIGHT: 250 LBS | SYSTOLIC BLOOD PRESSURE: 142 MMHG | TEMPERATURE: 97.2 F | HEIGHT: 70 IN | BODY MASS INDEX: 35.79 KG/M2

## 2021-06-01 DIAGNOSIS — L97.909 ULCER OF LOWER LIMB (H): Primary | ICD-10-CM

## 2021-06-01 DIAGNOSIS — Z79.01 LONG TERM CURRENT USE OF ANTICOAGULANT THERAPY: ICD-10-CM

## 2021-06-01 DIAGNOSIS — D68.51 FACTOR V LEIDEN MUTATION (H): ICD-10-CM

## 2021-06-01 DIAGNOSIS — I87.2 VENOUS STASIS DERMATITIS OF BOTH LOWER EXTREMITIES: ICD-10-CM

## 2021-06-01 DIAGNOSIS — M48.061 SPINAL STENOSIS OF LUMBAR REGION, UNSPECIFIED WHETHER NEUROGENIC CLAUDICATION PRESENT: ICD-10-CM

## 2021-06-01 DIAGNOSIS — E11.59 TYPE 2 DIABETES MELLITUS WITH OTHER CIRCULATORY COMPLICATION, WITHOUT LONG-TERM CURRENT USE OF INSULIN (H): ICD-10-CM

## 2021-06-01 DIAGNOSIS — L97.211 VENOUS STASIS ULCER OF RIGHT CALF LIMITED TO BREAKDOWN OF SKIN WITH VARICOSE VEINS (H): Primary | ICD-10-CM

## 2021-06-01 DIAGNOSIS — R60.1 GENERALIZED EDEMA: ICD-10-CM

## 2021-06-01 DIAGNOSIS — I82.90 VENOUS THROMBOSIS: ICD-10-CM

## 2021-06-01 DIAGNOSIS — D68.52 PROTHROMBIN MUTATION (H): ICD-10-CM

## 2021-06-01 DIAGNOSIS — I83.012 VENOUS STASIS ULCER OF RIGHT CALF LIMITED TO BREAKDOWN OF SKIN WITH VARICOSE VEINS (H): Primary | ICD-10-CM

## 2021-06-01 DIAGNOSIS — R09.89 DIMINISHED PULSES IN LOWER EXTREMITY: ICD-10-CM

## 2021-06-01 LAB
CAPILLARY BLOOD COLLECTION: NORMAL
INR BLD: 3 (ref 0.86–1.14)

## 2021-06-01 PROCEDURE — 85610 PROTHROMBIN TIME: CPT | Performed by: INTERNAL MEDICINE

## 2021-06-01 PROCEDURE — 36416 COLLJ CAPILLARY BLOOD SPEC: CPT | Performed by: INTERNAL MEDICINE

## 2021-06-01 PROCEDURE — 99213 OFFICE O/P EST LOW 20 MIN: CPT | Performed by: PODIATRIST

## 2021-06-01 ASSESSMENT — PAIN SCALES - GENERAL: PAINLEVEL: EXTREME PAIN (8)

## 2021-06-01 ASSESSMENT — MIFFLIN-ST. JEOR: SCORE: 1830.24

## 2021-06-01 NOTE — TELEPHONE ENCOUNTER
[facility]     Who is the name of the provider? New Consult     What is the location you see this provider at?  [place]     Reason for call:  Referring himself to Dr Villalpando for excruciating pain in his Right Leg    :  Carlos     Phone number to call:  [phone]    Additional Notes:  2 weeks ago went to ER for pain - had an US & XR done - light infection treated with antibiotics (last dose today) - hasnt really helped - his skin is so sensitive that it hurts to even put on a sock - Pt did have a couple blood clots in his R Leg about 10 yrs ago and also 3 hip surgeries - PCP is Dr Stiven Donahue ([facility]) - Pt also has been seeing Dr García (Podiatry) - Pt has records at the [place] as well.

## 2021-06-01 NOTE — PROGRESS NOTES
ANTICOAGULATION MANAGEMENT     Patient Name:  Robert Richard  Date:  6/1/2021    ASSESSMENT /SUBJECTIVE:    Today's INR result of 3.0 is therapeutic. Goal INR of 2.0-3.0      Warfarin dose taken: VM left    Diet: VM left    Medication changes/ interactions: VM left    Previous INR: Subtherapeutic     S/S of bleeding or thromboembolism: VM left    New injury or illness: VM left    Upcoming surgery, procedure or cardioversion: VM left    Additional findings: VM left      PLAN:    Warfarin Dosing Instructions: Continue your current warfarin dose 2.5 mg Fri and 5 mG ORW    Instructed patient to follow up no later than: 5 weeks  Left detailed message with recommended recheck date    Education provided: Please call back if any changes to your diet, medications or how you've been taking warfarin    Detailed voice message left for Robert with dosing instructions and follow up date.     Instructed to call the Anticoagulation Clinic for any changes, questions or concerns. (#[phone])        Raquel Pelletier RN      OBJECTIVE:  Recent labs: (last 7 days)     06/01/21  1204   INR 3.0*         INR assessment THER    Recheck INR In: 5 WEEKS    INR Location Outside lab      Anticoagulation Summary  As of 6/1/2021    INR goal:  2.0-3.0   TTR:  84.6 % (5.5 mo)   INR used for dosing:  3.0 (6/1/2021)   Warfarin maintenance plan:  2.5 mg (5 mg x 0.5) every Fri; 5 mg (5 mg x 1) all other days   Full warfarin instructions:  2.5 mg every Fri; 5 mg all other days   Weekly warfarin total:  32.5 mg   No change documented:  Raquel Pelletier RN   Plan last modified:  Raquel Pelletier RN (3/22/2016)   Next INR check:  7/6/2021   Priority:  Maintenance   Target end date:  Indefinite    Indications    Factor V Leiden mutation (H) [D68.51]  Venous thrombosis [I82.90]  Prothrombin mutation (H) [D68.52]  Long term current use of anticoagulant therapy [Z79.01]             Anticoagulation Episode Summary     INR check location:       Preferred lab:      Send INR reminders to:  [facility] [place]    Comments:  5 mg tablets, appt card, BP, PM dose        Anticoagulation Care Providers     Provider Role Specialty Phone number    Stiven Donahue MD Referring Internal Medicine [phone]    Nate Cifuentes DO [facility] Internal Medicine [phone]

## 2021-06-01 NOTE — LETTER
"    6/1/2021         RE: Robert Richard  [address] [facility] [address]        Dear Colleague,    Thank you for referring your patient, Robert Richard, to the [facility]. Please see a copy of my visit note below.    Chief Complaint   Patient presents with     WOUND CARE     wearng Lower leg compression wraps starting 5/22/21, venous statis ulcer medial Right ankle pain; XR R foot 1/23/2020; LOV 5/20/2021     RECHECK     B/L hammertoes; XR R foot 1/23/2020 and L 10/3/2019; LOV 5/20/2021     Diabetes     type 2       Weight management plan: Patient was referred to their PCP to discuss a diet and exercise plan.     Carlos to follow up with Primary Care provider regarding elevated blood pressure.    Onset early 2020, medial Right ankle sensitivity w/pressure that radiates to mid lower leg with restless leg. Lesion medial Right ankle. Constant swelling, Intermittent throbbing discomfort. Applying topical pain ointment provides temporary relief. History of gout.      Retired from regional sales    EXAM:Vitals: BP (!) 142/80 (BP Location: Left arm, Patient Position: Sitting, Cuff Size: Adult Regular)   Temp 97.2  F (36.2  C) (Temporal)   Ht 1.778 m (5' 10\")   Wt 113.4 kg (250 lb)   BMI 35.87 kg/m    BMI= Body mass index is 35.87 kg/m .    General appearance: Patient is alert and fully cooperative with history & exam.  No sign of distress is noted during the visit.     Psychiatric: Affect is pleasant & appropriate.  Patient appears motivated to improve health.     Respiratory: Breathing is regular & unlabored while sitting.     HEENT: Hearing is intact to spoken word.  Speech is clear.  No gross evidence of visual impairment that would impact ambulation.     Vascular: DP 1/4 & PT 1/4 left & right.  CFT delayed with dependent rubor noted about the digits.  Diminished hair growth distal to mid tibia and no hair about the foot and toes.  Temperature changes noted, warm to cool proximal to " distal.  Hemosiderin pigmentation noted with multiple varicosities legs and feet bilateral. Generalized pitting and nonpitting edema bilateral legs and feet.  Pt denies claudication history.     Neurologic: Normal plantar response bilateral.  Intact protective threshold plus 10/10 applications of a 5.07 monofilament.  Pt admits burning and paraesthesias about the feet and toes with palpation.     Dermatologic: Toenails are in reasonable repair.  venous stasis ulceration noted about the distal medial right calf proximal to the medial malleolus.    Multiple varicosities are noted and considerable staining with hemosiderin pigmentation about the distal 50% of both legs.     Musculoskeletal: Patient is ambulatory without assistive device or brace.  There is semi reducible contracture of the lesser digits.       Hemoglobin A1C (%)   Date Value   02/05/2021 8.3 (H)   01/21/2020 7.9 (H)   06/20/2019 7.1 (H)   10/16/2018 6.4 (H)   05/15/2018 6.5 (H)   10/18/2017 6.6 (H)   04/20/2016 6.5     Creatinine (mg/dL)   Date Value   05/17/2021 1.47 (H)   02/05/2021 1.44 (H)   01/21/2020 1.48 (H)   08/16/2019 1.42 (H)   06/20/2019 1.39 (H)   12/27/2018 1.51 (H)     ASSESSMENT:       ICD-10-CM    1. Venous stasis ulcer of right calf limited to breakdown of skin with varicose veins (H)  I83.012     L97.211    2. Venous stasis dermatitis of both lower extremities  I87.2    3. Spinal stenosis of lumbar region, unspecified whether neurogenic claudication present  M48.061    4. Type 2 diabetes mellitus with other circulatory complication, without long-term current use of insulin (H)  E11.59    5. Generalized edema  R60.1         PLAN:    10/3/2019     We discussed risk factors and preventive measures.    We discussed appropriate hygiene, shoe gear, daily foot exam, and reinforced management of weight, diet, activity goals and HA1C goal for diabetic patients.    Dispensed written foot care instructions.      Order was placed today for wound  care referral possibly for Unna boot therapy and follow-up with me in about 1 month and hopefully the wound will be closed at that time and we can move forward with compression therapy chronically.  We discussed the etiology and alternative treatment options as well as potential risks and complications.  The Unna boot was applied today.  Please refer the patient back sooner if he would like to discontinue Unna boots faster.  He will follow-up for Unna boot change here with our RN specialty care RN as it likely will take a week to get the patient into wound care.    11/7/2019  Explained to the patient that it is of utmost importance to continue compression of the lower extremities upon completion of the Unna boots otherwise edema will return which is the cause of the ulceration.  Also describe the importance of managing edema from the toes proximal over the widest point of the calf not just wrapping the calf.  He has a compression dressing but has difficulty applying it.  Therefore order was placed for custom compression 20 to 30 mmHg as well as a donning agent such as a plastic sled or Tyvek sleeve.    Follow-up here in 2 or 3 weeks with any continued concerns and as needed.    1/23/2020  Order for diabetic shoes to accommodate the rigid hammertoes and still provide stability and poor balance  Follow up once yearly.   Continue compression stockings.  Discussed alternative treatment options.  All questions were answered.    5/6/2021  Patient is having circumferential pain around the distal third of the right leg extending to the ankle even dorsal foot to some degree. Tight edema is noted and pain is worsened with any palpation of this tight edema circumferentially around the right ankle. Multiple varicosities noted. Edema is pitting and nonpitting. Discussed compression options and he is unable to put on most compression stockings due to spinal stenosis and functional capacities. Discussed Velcro wraps versus  pneumatic devices. He is willing to proceed with this therefore he placed an order for physical therapy to be evaluated for Velcro wraps or what would be most functional for him. Also recommended elevation of his feet above his heart. He may also discuss this with his internal medicine provider regarding changes in diuresis. However we did reviewed risks associated with this.  All questions were answered. Follow-up as needed.    5/24/2021  Patient has not been utilizing compression or compression stockings or wrap and he has increased pain and is now starting to weep on the medial aspect of his right distal calf.  We reviewed the importance of the developing process of compression or elevation versus diuretics.  Patient does not want to wear compression stockings we discussed utilizing a Velcro wrap.  He would like to increase his compression at home immobilization and elevation and try this for 10 days.  If this does not resolve this we may consider Unna boots and then referral to physical therapy lymphedema clinic.  We discussed alternative treatment options.  All questions were answered.    6/1/2021  patient refuses unna compression today.  He wants to keep going with the new velcro wrap.  Follow up in two weeks.  Next step if no improvement is starting UNNA boots and change 3 times first week and then maybe twice weekly.       He started compression Velcro wrap approximately mid May.  Recommended elevation of his leg whenever he is not ambulatory.  Would like to begin Unna boot whenever he is willing to attempt this.      Khadar García DPM      Again, thank you for allowing me to participate in the care of your patient.        Sincerely,        Khadar García DPM

## 2021-06-01 NOTE — TELEPHONE ENCOUNTER
Self referred to [facility]  for leg pain.  Patient requests Dr. Villalpando.  Please offer next available surgery appointment.    Diminished pulses noted in 5/20/21 Podiatry note.  Hx Venous stasis ulcer of right calf with varicose veins.   No GIAN on record.    Patient requests Dr. Villalpando.  Please offer next available surgery appointment.    Pt needs to be scheduled for GIAN w/exercise and consult with Vascular Surgery.  Will route to scheduling to coordinate an appointment ASAP.    Note: Self referral. Diminished LE pulses, venous stasis ulcers;  GIAN TBD.    Nuha Lo, DONGN, RN  [facility] [place]

## 2021-06-01 NOTE — PROGRESS NOTES
"Chief Complaint   Patient presents with     WOUND CARE     wearng Lower leg compression wraps starting 5/22/21, venous statis ulcer medial Right ankle pain; XR R foot 1/23/2020; LOV 5/20/2021     RECHECK     B/L jane; XR R foot 1/23/2020 and L 10/3/2019; LOV 5/20/2021     Diabetes     type 2       Weight management plan: Patient was referred to their PCP to discuss a diet and exercise plan.     Carlos to follow up with Primary Care provider regarding elevated blood pressure.    Onset early 2020, medial Right ankle sensitivity w/pressure that radiates to mid lower leg with restless leg. Lesion medial Right ankle. Constant swelling, Intermittent throbbing discomfort. Applying topical pain ointment provides temporary relief. History of gout.      Retired from regional sales    EXAM:Vitals: BP (!) 142/80 (BP Location: Left arm, Patient Position: Sitting, Cuff Size: Adult Regular)   Temp 97.2  F (36.2  C) (Temporal)   Ht 1.778 m (5' 10\")   Wt 113.4 kg (250 lb)   BMI 35.87 kg/m    BMI= Body mass index is 35.87 kg/m .    General appearance: Patient is alert and fully cooperative with history & exam.  No sign of distress is noted during the visit.     Psychiatric: Affect is pleasant & appropriate.  Patient appears motivated to improve health.     Respiratory: Breathing is regular & unlabored while sitting.     HEENT: Hearing is intact to spoken word.  Speech is clear.  No gross evidence of visual impairment that would impact ambulation.     Vascular: DP 1/4 & PT 1/4 left & right.  CFT delayed with dependent rubor noted about the digits.  Diminished hair growth distal to mid tibia and no hair about the foot and toes.  Temperature changes noted, warm to cool proximal to distal.  Hemosiderin pigmentation noted with multiple varicosities legs and feet bilateral. Generalized pitting and nonpitting edema bilateral legs and feet.  Pt denies claudication history.     Neurologic: Normal plantar response bilateral.  Intact " protective threshold plus 10/10 applications of a 5.07 monofilament.  Pt admits burning and paraesthesias about the feet and toes with palpation.     Dermatologic: Toenails are in reasonable repair.  venous stasis ulceration noted about the distal medial right calf proximal to the medial malleolus.    Multiple varicosities are noted and considerable staining with hemosiderin pigmentation about the distal 50% of both legs.     Musculoskeletal: Patient is ambulatory without assistive device or brace.  There is semi reducible contracture of the lesser digits.       Hemoglobin A1C (%)   Date Value   02/05/2021 8.3 (H)   01/21/2020 7.9 (H)   06/20/2019 7.1 (H)   10/16/2018 6.4 (H)   05/15/2018 6.5 (H)   10/18/2017 6.6 (H)   04/20/2016 6.5     Creatinine (mg/dL)   Date Value   05/17/2021 1.47 (H)   02/05/2021 1.44 (H)   01/21/2020 1.48 (H)   08/16/2019 1.42 (H)   06/20/2019 1.39 (H)   12/27/2018 1.51 (H)     ASSESSMENT:       ICD-10-CM    1. Venous stasis ulcer of right calf limited to breakdown of skin with varicose veins (H)  I83.012     L97.211    2. Venous stasis dermatitis of both lower extremities  I87.2    3. Spinal stenosis of lumbar region, unspecified whether neurogenic claudication present  M48.061    4. Type 2 diabetes mellitus with other circulatory complication, without long-term current use of insulin (H)  E11.59    5. Generalized edema  R60.1         PLAN:    10/3/2019     We discussed risk factors and preventive measures.    We discussed appropriate hygiene, shoe gear, daily foot exam, and reinforced management of weight, diet, activity goals and HA1C goal for diabetic patients.    Dispensed written foot care instructions.      Order was placed today for wound care referral possibly for Unna boot therapy and follow-up with me in about 1 month and hopefully the wound will be closed at that time and we can move forward with compression therapy chronically.  We discussed the etiology and alternative  treatment options as well as potential risks and complications.  The Unna boot was applied today.  Please refer the patient back sooner if he would like to discontinue Unna boots faster.  He will follow-up for Unna boot change here with our RN specialty care RN as it likely will take a week to get the patient into wound care.    11/7/2019  Explained to the patient that it is of utmost importance to continue compression of the lower extremities upon completion of the Unna boots otherwise edema will return which is the cause of the ulceration.  Also describe the importance of managing edema from the toes proximal over the widest point of the calf not just wrapping the calf.  He has a compression dressing but has difficulty applying it.  Therefore order was placed for custom compression 20 to 30 mmHg as well as a donning agent such as a plastic sled or Tyvek sleeve.    Follow-up here in 2 or 3 weeks with any continued concerns and as needed.    1/23/2020  Order for diabetic shoes to accommodate the rigid hammertoes and still provide stability and poor balance  Follow up once yearly.   Continue compression stockings.  Discussed alternative treatment options.  All questions were answered.    5/6/2021  Patient is having circumferential pain around the distal third of the right leg extending to the ankle even dorsal foot to some degree. Tight edema is noted and pain is worsened with any palpation of this tight edema circumferentially around the right ankle. Multiple varicosities noted. Edema is pitting and nonpitting. Discussed compression options and he is unable to put on most compression stockings due to spinal stenosis and functional capacities. Discussed Velcro wraps versus pneumatic devices. He is willing to proceed with this therefore he placed an order for physical therapy to be evaluated for Velcro wraps or what would be most functional for him. Also recommended elevation of his feet above his heart. He may also  discuss this with his internal medicine provider regarding changes in diuresis. However we did reviewed risks associated with this.  All questions were answered. Follow-up as needed.    5/24/2021  Patient has not been utilizing compression or compression stockings or wrap and he has increased pain and is now starting to weep on the medial aspect of his right distal calf.  We reviewed the importance of the developing process of compression or elevation versus diuretics.  Patient does not want to wear compression stockings we discussed utilizing a Velcro wrap.  He would like to increase his compression at home immobilization and elevation and try this for 10 days.  If this does not resolve this we may consider Unna boots and then referral to physical therapy lymphedema clinic.  We discussed alternative treatment options.  All questions were answered.    6/1/2021  patient refuses unna compression today.  He wants to keep going with the new velcro wrap.  Follow up in two weeks.  Next step if no improvement is starting UNNA boots and change 3 times first week and then maybe twice weekly.       He started compression Velcro wrap approximately mid May.  Recommended elevation of his leg whenever he is not ambulatory.  Would like to begin Unna boot whenever he is willing to attempt this.      Khadar García DPM

## 2021-06-01 NOTE — TELEPHONE ENCOUNTER
Patient is scheduled for his Ultrasound on 06/03/21 and Consult Appointment with Dr Powers on 06/09/21. (Requesting morning appt)

## 2021-06-08 ENCOUNTER — MYC MEDICAL ADVICE (OUTPATIENT)
Dept: INTERNAL MEDICINE | Facility: CLINIC | Age: 86
End: 2021-06-08

## 2021-06-08 ENCOUNTER — OFFICE VISIT (OUTPATIENT)
Dept: INTERNAL MEDICINE | Facility: CLINIC | Age: 86
End: 2021-06-08
Payer: COMMERCIAL

## 2021-06-08 VITALS
DIASTOLIC BLOOD PRESSURE: 76 MMHG | TEMPERATURE: 97.4 F | WEIGHT: 243 LBS | RESPIRATION RATE: 16 BRPM | BODY MASS INDEX: 34.87 KG/M2 | HEART RATE: 82 BPM | OXYGEN SATURATION: 97 % | SYSTOLIC BLOOD PRESSURE: 142 MMHG

## 2021-06-08 DIAGNOSIS — I83.013 VENOUS ULCER OF ANKLE, RIGHT (H): Primary | ICD-10-CM

## 2021-06-08 DIAGNOSIS — E11.8 TYPE 2 DIABETES MELLITUS WITH COMPLICATION, WITHOUT LONG-TERM CURRENT USE OF INSULIN (H): ICD-10-CM

## 2021-06-08 DIAGNOSIS — L97.319 VENOUS ULCER OF ANKLE, RIGHT (H): Primary | ICD-10-CM

## 2021-06-08 DIAGNOSIS — M79.661 PAIN OF RIGHT LOWER LEG: ICD-10-CM

## 2021-06-08 PROCEDURE — 99213 OFFICE O/P EST LOW 20 MIN: CPT | Performed by: INTERNAL MEDICINE

## 2021-06-08 RX ORDER — HYDROCODONE BITARTRATE AND ACETAMINOPHEN 5; 325 MG/1; MG/1
1 TABLET ORAL EVERY 6 HOURS PRN
Qty: 18 TABLET | Refills: 0 | Status: SHIPPED | OUTPATIENT
Start: 2021-06-08 | End: 2021-06-15

## 2021-06-08 ASSESSMENT — PAIN SCALES - GENERAL: PAINLEVEL: WORST PAIN (10)

## 2021-06-08 NOTE — PROGRESS NOTES
Assessment & Plan   Problem List Items Addressed This Visit     None      Visit Diagnoses     Venous ulcer of ankle, right (H)    -  Primary    Relevant Medications    HYDROcodone-acetaminophen (NORCO) 5-325 MG tablet    Other Relevant Orders    GENERAL SURG ADULT REFERRAL    Pain of right lower leg        Relevant Medications    HYDROcodone-acetaminophen (NORCO) 5-325 MG tablet    Type 2 diabetes mellitus with complication, without long-term current use of insulin (H)        Relevant Orders    AMBULATORY ADULT DIABETES EDUCATOR REFERRAL         Patient who has type 2 diabetes which is uncontrolled, he has chronic swelling of his leg.  He has had a right venous ulceration for some time.  He was treated with antibiotics and did not get better.  I do not think it is acutely infected he probably has decreased blood flow there and is seeing vascular tomorrow.  Further chronic pain other worsening pain of this I will give him some Vicodin to help him.  I do agree with podiatry that he needs an Unna boot.  But I will refer him to general surgery and then our wound clinic as needed for the proper wound care.  Patient has seen podiatry before but would like to change to general surgery for wound care.  I told the patient this does take an extended period of time to get better.    We will refer him to diabetic education he has not been treating his diabetes or wanting to know about his diabetes and this is certainly affecting his wound healing.  A1c was 8.3 last time.  He needs to get a meter, check his sugars and come back and see me for better diabetic care.               No follow-ups on file.    Stiven Donahue MD  [facility] RODRIGO Gonzalez is a 85 year old who presents for the following health issues     HPI     Chief Complaint   Patient presents with     Ulcer     f/u on venous stasis ulcer - seen in ed on 5/17/21     Non healing wound, pain in the heel to the knee, not better with  antibiotics.      Seen podiatry, has been in the ER, sees vascular tomorrow.  He tries to wear a Velcro wrap on it from physical therapy but continues to have a lot of pain.      Review of Systems         Objective    BP (!) 142/76   Pulse 82   Temp 97.4  F (36.3  C) (Temporal)   Resp 16   Wt 110.2 kg (243 lb)   SpO2 97%   BMI 34.87 kg/m    Body mass index is 34.87 kg/m .  Physical Exam   No acute distress  Right leg is swollen, chronic skin changes, scaling wound which is weeping, warm to touch but not overly erythematous

## 2021-06-08 NOTE — TELEPHONE ENCOUNTER
Patient returned call.  Scheduled patient per Dr. Godfrey MD approval at 10:45 today.  Patient stated understanding.    Allyn Koch RN

## 2021-06-09 ENCOUNTER — HOSPITAL ENCOUNTER (OUTPATIENT)
Dept: ULTRASOUND IMAGING | Facility: CLINIC | Age: 86
End: 2021-06-09
Attending: SURGERY
Payer: COMMERCIAL

## 2021-06-09 ENCOUNTER — HOSPITAL ENCOUNTER (OUTPATIENT)
Dept: WOUND CARE | Facility: CLINIC | Age: 86
End: 2021-06-09
Attending: SURGERY
Payer: COMMERCIAL

## 2021-06-09 ENCOUNTER — OFFICE VISIT (OUTPATIENT)
Dept: VASCULAR SURGERY | Facility: CLINIC | Age: 86
End: 2021-06-09
Payer: COMMERCIAL

## 2021-06-09 ENCOUNTER — OFFICE VISIT (OUTPATIENT)
Dept: OTHER | Facility: CLINIC | Age: 86
End: 2021-06-09
Payer: COMMERCIAL

## 2021-06-09 ENCOUNTER — TELEPHONE (OUTPATIENT)
Dept: INTERNAL MEDICINE | Facility: CLINIC | Age: 86
End: 2021-06-09

## 2021-06-09 VITALS — DIASTOLIC BLOOD PRESSURE: 80 MMHG | HEART RATE: 61 BPM | SYSTOLIC BLOOD PRESSURE: 153 MMHG

## 2021-06-09 DIAGNOSIS — R09.89 DIMINISHED PULSES IN LOWER EXTREMITY: Primary | ICD-10-CM

## 2021-06-09 DIAGNOSIS — E66.01 CLASS 2 SEVERE OBESITY DUE TO EXCESS CALORIES WITH SERIOUS COMORBIDITY AND BODY MASS INDEX (BMI) OF 38.0 TO 38.9 IN ADULT (H): ICD-10-CM

## 2021-06-09 DIAGNOSIS — L97.909 ULCER OF LOWER LIMB (H): ICD-10-CM

## 2021-06-09 DIAGNOSIS — I87.2 VENOUS STASIS DERMATITIS OF RIGHT LOWER EXTREMITY: Primary | ICD-10-CM

## 2021-06-09 DIAGNOSIS — I87.8 VENOUS STASIS: ICD-10-CM

## 2021-06-09 DIAGNOSIS — I83.811 VARICOSE VEINS OF RIGHT LOWER EXTREMITY WITH PAIN: ICD-10-CM

## 2021-06-09 DIAGNOSIS — Z86.718 PERSONAL HISTORY OF DVT (DEEP VEIN THROMBOSIS): ICD-10-CM

## 2021-06-09 DIAGNOSIS — I82.90 VENOUS THROMBOSIS: ICD-10-CM

## 2021-06-09 DIAGNOSIS — L97.311 VENOUS STASIS ULCER OF RIGHT ANKLE LIMITED TO BREAKDOWN OF SKIN, UNSPECIFIED WHETHER VARICOSE VEINS PRESENT (H): Primary | ICD-10-CM

## 2021-06-09 DIAGNOSIS — I83.013 VENOUS STASIS ULCER OF RIGHT ANKLE LIMITED TO BREAKDOWN OF SKIN, UNSPECIFIED WHETHER VARICOSE VEINS PRESENT (H): Primary | ICD-10-CM

## 2021-06-09 DIAGNOSIS — E66.812 CLASS 2 SEVERE OBESITY DUE TO EXCESS CALORIES WITH SERIOUS COMORBIDITY AND BODY MASS INDEX (BMI) OF 38.0 TO 38.9 IN ADULT (H): ICD-10-CM

## 2021-06-09 DIAGNOSIS — D68.51 FACTOR V LEIDEN MUTATION (H): ICD-10-CM

## 2021-06-09 DIAGNOSIS — R09.89 DIMINISHED PULSES IN LOWER EXTREMITY: ICD-10-CM

## 2021-06-09 PROBLEM — H93.19 SUBJECTIVE TINNITUS: Status: ACTIVE | Noted: 2021-06-09

## 2021-06-09 PROBLEM — H90.3 ASYMMETRICAL SENSORINEURAL HEARING LOSS: Status: ACTIVE | Noted: 2021-06-09

## 2021-06-09 PROCEDURE — 93922 UPR/L XTREMITY ART 2 LEVELS: CPT

## 2021-06-09 PROCEDURE — 99213 OFFICE O/P EST LOW 20 MIN: CPT | Mod: 95 | Performed by: SURGERY

## 2021-06-09 PROCEDURE — 93971 EXTREMITY STUDY: CPT | Mod: RT | Performed by: SURGERY

## 2021-06-09 PROCEDURE — 99207 PR NO CHARGE NURSE ONLY: CPT

## 2021-06-09 PROCEDURE — G0463 HOSPITAL OUTPT CLINIC VISIT: HCPCS

## 2021-06-09 PROCEDURE — 99205 OFFICE O/P NEW HI 60 MIN: CPT | Performed by: SURGERY

## 2021-06-09 RX ORDER — METHYLDOPA 250 MG
1000 TABLET ORAL 2 TIMES DAILY
Qty: 90 TABLET | Refills: 3 | Status: SHIPPED | OUTPATIENT
Start: 2021-06-09 | End: 2024-01-26

## 2021-06-09 RX ORDER — BETAMETHASONE DIPROPIONATE 0.05 %
OINTMENT (GRAM) TOPICAL DAILY
Qty: 150 G | Refills: 3 | Status: SHIPPED | OUTPATIENT
Start: 2021-06-09 | End: 2024-01-26

## 2021-06-09 RX ORDER — LANOLIN ALCOHOL/MO/W.PET/CERES
1000 CREAM (GRAM) TOPICAL DAILY
Qty: 90 TABLET | Refills: 3 | Status: SHIPPED | OUTPATIENT
Start: 2021-06-09 | End: 2024-04-30

## 2021-06-09 ASSESSMENT — ENCOUNTER SYMPTOMS
COLOR CHANGE: 1
RESPIRATORY NEGATIVE: 1
BACK PAIN: 1
PSYCHIATRIC NEGATIVE: 1
CONSTITUTIONAL NEGATIVE: 1
ENDOCRINE NEGATIVE: 1
GASTROINTESTINAL NEGATIVE: 1
ALLERGIC/IMMUNOLOGIC NEGATIVE: 1
POOR WOUND HEALING: 1
EYES NEGATIVE: 1
BRUISES/BLEEDS EASILY: 1

## 2021-06-09 NOTE — PROGRESS NOTES
Patient arrived for wound care telephone visit. Certified Wound Care Nurse time spent evaluating patient record, completed a full evaluation and documented wound(s) & jose-wound skin; provided recommendation based on treatment plan. Applied dressing, reviewed discharge instructions, patient education, and discussed plan of care with appropriate medical team staff members and patient and/or family members.

## 2021-06-09 NOTE — PROGRESS NOTES
[facility]      Patient is in clinic today to see Dr. Powers.      Patient is here for a consult to discuss Wound(s) on Right Lower Leg.    Pt is currently taking Statin and Warfarin.    Patient's condition is much worse.    BP (!) 153/80 (BP Location: Left arm, Patient Position: Chair, Cuff Size: Adult Large)   Pulse 61     The provider has been notified that the patient has concerns of Right leg pain increase burning sensation, thru knee to heel, with touch feels like a 10, .     Questions patient would like addressed today are: N/A.    Refills are needed: No    At the end of the visit the patient was provided with education both verbally and on their AVS regarding follow up appointment(s).        Zara Bauer MA

## 2021-06-09 NOTE — PROGRESS NOTES
While pt was in the office for a venous competency study, measured for compression stockings and took photos of both of his legs.    DONG HernandezN, RN  [facility]  Vein Clinic

## 2021-06-09 NOTE — PROGRESS NOTES
[facility] [place] Progress Note  Telehealth visit; 11:22 start, end 11:33    Subject: Robert Richard consultation from Dr. Powers for evaluation of lymphedema associated with chronic venous insufficiency, history of factor V Leiden, history of deep venous thrombosis, on chronic warfarin, this is a telehealth visit, photo images reviewed, discussed with patient, his spouse and his daughter.  They are awaiting performance of duplex ultrasound for venous insufficiency today.  Noninvasive arterial study has been reviewed, no evidence of significant peripheral arterial disease.  He is a diabetic, does not utilize tobacco.  As lymphorrhea present at the right medial calf with chronic inflammatory changes.  He has a pending appointment with certified lymphedema therapy for evaluation and management.  Sleeps in a bed, elevate legs when sitting.  Can walk a city block, did so today during medical evaluations.  Denies rest pain night pain.  Not on Norvasc or amlodipine which can contribute to lower extremity edema    PMH:   Past Medical History:   Diagnosis Date     Basal cell carcinoma      Cancer (H)      DVT (deep venous thrombosis) (H) 11/2003     DVT (deep venous thrombosis) (H) 12/08     DVT (deep venous thrombosis) (H) 10/2010     Factor V Leiden (H)      Gout      Other and unspecified hyperlipidemia      Prostate cancer (H) 2003    prostatectomy      Restless leg syndrome      Unspecified essential hypertension      Patient Active Problem List   Diagnosis     Venous thrombosis     Restless leg syndrome     Sleep apnea     Factor V Leiden mutation (H)     Prothrombin mutation (H)     Back pain     Hyperlipidemia LDL goal <100     Gout     Malignant basal cell neoplasm of skin     Advanced directives, counseling/discussion     Low back pain     Personal history of venous thrombosis and embolism     Hip joint replacement status - right     Abnormal gait     Obesity     Personal history of prostate  cancer     Spinal stenosis     Long term current use of anticoagulant therapy     Essential hypertension     Morbid obesity due to excess calories (H)     Diabetes mellitus, type 2 (H)     CKD (chronic kidney disease) stage 3, GFR 30-59 ml/min     Primary malignant neoplasm of prostate (H)     Asymmetrical sensorineural hearing loss     Dyslipidemia     Hemifacial spasm     Osteoarthritis of hip     Subjective tinnitus     Venous stasis dermatitis of right lower extremity     Social Hx:   Social History     Socioeconomic History     Marital status: Single     Spouse name: Not on file     Number of children: Not on file     Years of education: Not on file     Highest education level: Not on file   Occupational History     Not on file   Social Needs     Financial resource strain: Not on file     Food insecurity     Worry: Not on file     Inability: Not on file     Transportation needs     Medical: Not on file     Non-medical: Not on file   Tobacco Use     Smoking status: Never Smoker     Smokeless tobacco: Never Used   Substance and Sexual Activity     Alcohol use: Yes     Alcohol/week: 0.0 standard drinks     Comment: 1 drink every 1-2 weeks.     Drug use: No     Sexual activity: Yes     Partners: Female   Lifestyle     Physical activity     Days per week: Not on file     Minutes per session: Not on file     Stress: Not on file   Relationships     Social connections     Talks on phone: Not on file     Gets together: Not on file     Attends Synagogue service: Not on file     Active member of club or organization: Not on file     Attends meetings of clubs or organizations: Not on file     Relationship status: Not on file     Intimate partner violence     Fear of current or ex partner: Not on file     Emotionally abused: Not on file     Physically abused: Not on file     Forced sexual activity: Not on file   Other Topics Concern     Parent/sibling w/ CABG, MI or angioplasty before 65F 55M? Not Asked   Social History  Narrative     Not on file       Surgical Hx:   Past Surgical History:   Procedure Laterality Date     APPENDECTOMY  1950     C REVISE TOTAL HIP REPLACEMENT  1/18/13    R hip     JOINT REPLACEMENT  4/2011    R hip     LAMINECTOMY LUMBAR ONE LEVEL  12/2008     LAPAROSCOPIC HERNIORRHAPHY UMBILICAL N/A 1/3/2019    Procedure: laparoscopic umbilical hernia repair with mesh;  Surgeon: Jamal Thompson DO;  Location: [facility] OR       Allergies:    Allergies   Allergen Reactions     Lipitor [Atorvastatin Calcium]      Muscle pain, weakness     Pravastatin Other (See Comments)     muscle aches     Simvastatin Other (See Comments)     muscle aches       Medications:   Current Outpatient Medications   Medication     betamethasone dipropionate (DIPROSONE) 0.05 % external ointment     Bioflavonoid Products (DOMINIK-C) TABS     Cholecalciferol (VITAMIN D-3) 125 MCG (5000 UT) TABS     cyanocobalamin (VITAMIN B-12) 1000 MCG tablet     vitamin B-Complex     atenolol (TENORMIN) 50 MG tablet     colchicine (COLCRYS) 0.6 MG tablet     glipiZIDE (GLUCOTROL XL) 5 MG 24 hr tablet     HYDROcodone-acetaminophen (NORCO) 5-325 MG tablet     incobotulinumtoxin A (XEOMIN) 50 units SOLR solution     ipratropium (ATROVENT) 0.06 % nasal spray     losartan (COZAAR) 100 MG tablet     Multiple Vitamins-Minerals (MULTIVITAMIN ADULT PO)     order for DME     ORDER FOR DME     pramipexole (MIRAPEX) 1 MG tablet     sildenafil (VIAGRA) 100 MG tablet     spironolactone (ALDACTONE) 25 MG tablet     STATIN NOT PRESCRIBED (INTENTIONAL)     warfarin ANTICOAGULANT (COUMADIN) 5 MG tablet     No current facility-administered medications for this encounter.        Labs:   Recent Labs   Lab Test 06/01/21  1204 05/17/21  1709 02/05/21  0901 02/05/21  0901 08/16/19  0756 08/16/19  0756   ALBUMIN  --   --   --  3.3*   < >  --    HGB  --  12.7*  --  13.6  --  13.1*   INR 3.0* 1.76*   < > 2.00*   < >  --    WBC  --  6.5  --  4.7  --  4.7   A1C  --   --   --  8.3*   < >   --    CRP  --   --   --   --   --  4.4    < > = values in this interval not displayed.     Creatinine   Date Value Ref Range Status   05/17/2021 1.47 (H) 0.66 - 1.25 mg/dL Final     GFR Estimate   Date Value Ref Range Status   05/17/2021 43 (L) >60 mL/min/[1.73_m2] Final     Comment:     Non  GFR Calc  Starting 12/18/2018, serum creatinine based estimated GFR (eGFR) will be   calculated using the Chronic Kidney Disease Epidemiology Collaboration   (CKD-EPI) equation.       GFR Estimate If Black   Date Value Ref Range Status   05/17/2021 50 (L) >60 mL/min/[1.73_m2] Final     Comment:      GFR Calc  Starting 12/18/2018, serum creatinine based estimated GFR (eGFR) will be   calculated using the Chronic Kidney Disease Epidemiology Collaboration   (CKD-EPI) equation.       Lab Results   Component Value Date    WBC 6.5 05/17/2021     Lab Results   Component Value Date    RBC 4.16 05/17/2021     Lab Results   Component Value Date    HGB 12.7 05/17/2021     Lab Results   Component Value Date    HCT 38.7 05/17/2021     No components found for: MCT  Lab Results   Component Value Date    MCV 93 05/17/2021     Lab Results   Component Value Date    MCH 30.5 05/17/2021     Lab Results   Component Value Date    MCHC 32.8 05/17/2021     Lab Results   Component Value Date    RDW 13.2 05/17/2021     Lab Results   Component Value Date     05/17/2021          Nutrition requirements were discussed with patient today.  Televisit, physical examination, photograph reviewed.    Impression: Lymphedema associate with chronic venous insufficiency, history of venous thrombosis, factor V Leiden, obesity, diabetic, no tobacco use, no evidence of peripheral arterial disease based on arterial testing.      Plan: Application of steroid cream on skin under Saran wrap for an hour on a daily basis given inflammatory changes.  Then apply Navy stripe edema wear, base of toes to knees, wear daily and at night.   Utilization initially of Spandage over edema wear, ultimately would need to be fitted for higher compression static inelastic compression over dynamic dermal edema wear.  After application of edema wear, can put absorbent pad on top of edema wear, not under, direct interaction of edema wear to dermis is important for reduction of interstitial edema and maximizing lymphatic function.  Micronutrients discussed, high-dose vitamin C, B12, B6, folic acid, vitamin D, micronized purified flavonoid fraction, vein formula.  Patient will return to the clinic in 6 weeks time in clinic for measurements for inelastic compression and consideration of lymphedema pump if indicated.  Certified lymphedema therapy consult pending.  They will teach manual lymphatic drainage and complete decongestive physical therapy.    Hardy Carcamo MD on 6/9/2021 at 11:35 AM

## 2021-06-09 NOTE — PROGRESS NOTES
[facility] VASCULAR [facility] CENTER INITIAL VASCULAR SURGERY CONSULT    Impression:   1.  Venous stasis ulceration (8 x 11 cm) of the right medial gaiter region.  See accompanying photo in the physical exam portion of this note.    2.  Factor V Leiden and prothrombin mutation.  On Coumadin since .    3.  History of multiple right leg DVTs and reportedly at least 1 left leg DVT.    4.  Diabetes mellitus type 2.    5.  Obesity    6.  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    Plan:   I had a lengthy discussion with therapeutic , his significant other, and his daughter regarding all the above.  He has palpable pedal pulses and triphasic pedal Doppler signals.  There is no arterial component to this wound.  I suspect that we are dealing with venous hypertension and venous stasis ulceration in a gentleman who has previously been somewhat intolerant to compression therapy.    I will arrange for a right leg venous competency exam this afternoon at our vein office.  I am also trying to arrange for an expedited wound healing consultation with Dr. Carcamo at the [facility].  Ultimate treatment recommendations will be pending that exam.  No further arterial work-up is required.  If he is a candidate for some type of superficial venous intervention I would be happy to assist him or he could be referred to our [place] vein office as well.    HPI:   Robert Richard is an 85-year-old gentleman with factor V Leiden and prothrombin mutations and history of multiple right leg DVTs.  He has been on chronic Coumadin since around .  A few months ago he developed a small, recurrent wound at the right medial gaiter region.  This has significantly worsened here in the last few weeks.  He has previously been treated with Unna boots, Velcro wraps, and a variety of other compression therapy.  He has difficulty applying compression due to longstanding problems with his  right hip, obesity, and spinal stenosis.    At a recent office visit decreased pedal pulses were noted.  He is subsequently referred for vascular surgical consultation to determine if there could be an arterial component to his poor wound healing.  He is accompanied today by his significant other and his daughter who help with the history.    Carlos ambulates on a limited basis typically utilizing a cane.  He is status post a right hip replacement with subsequent revision.  He has significant issues with spinal stenosis and an inability to apply lower extremity compression on his own.  He is a non-smoker and a type II diabetic.      CURRENT MEDICATIONS  atenolol (TENORMIN) 50 MG tablet, Take 1 tablet by mouth twice daily  colchicine (COLCRYS) 0.6 MG tablet, Take 2 tablets at the first sign of flare, take 1 additional tablet one hour later.  glipiZIDE (GLUCOTROL XL) 5 MG 24 hr tablet, Take 1 tablet by mouth once daily  HYDROcodone-acetaminophen (NORCO) 5-325 MG tablet, Take 1 tablet by mouth every 6 hours as needed for pain  incobotulinumtoxin A (XEOMIN) 50 units SOLR solution, Inject 50 Units into the muscle once Every 6 months or so  ipratropium (ATROVENT) 0.06 % nasal spray, USE 2 SPRAY(S) IN EACH NOSTRIL 4 TIMES DAILY  losartan (COZAAR) 100 MG tablet, Take 1 tablet (100 mg) by mouth daily  Multiple Vitamins-Minerals (MULTIVITAMIN ADULT PO),   order for DME, One pair compression garment 20-30 mmHg may substitute per fitter.  Please call [facility] at [phone] for appt.    Will also need education regarding donning agents tyvek sleeve, plastic sled or similar.  ORDER FOR DME, Wear mask at night  pramipexole (MIRAPEX) 1 MG tablet, Take 1 tablet by mouth twice daily (Patient taking differently: Take 1 mg by mouth 2 times daily 4pm and 9pm)  sildenafil (VIAGRA) 100 MG tablet, Take 0.5 tablets (50 mg) by mouth daily as needed  spironolactone (ALDACTONE) 25 MG tablet, Take 1 tablet by mouth once daily  STATIN NOT  PRESCRIBED (INTENTIONAL), Please choose reason not prescribed, below  warfarin ANTICOAGULANT (COUMADIN) 5 MG tablet, TAKE 0.5 TABLET ON FRIDAY AND 1 TABLET ON ALL OTHER DAYS OR AS DIRECTED BY THE COUMADIN CLINIC, INR NEEDED FOR FURTHER REFILLS    No current facility-administered medications on file prior to visit.         PAST MEDICAL HISTORY  Past Medical History:   Diagnosis Date     Basal cell carcinoma      Cancer (H)      DVT (deep venous thrombosis) (H) 11/2003     DVT (deep venous thrombosis) (H) 12/08     DVT (deep venous thrombosis) (H) 10/2010     Factor V Leiden (H)      Gout      Other and unspecified hyperlipidemia      Prostate cancer (H) 2003    prostatectomy      Restless leg syndrome      Unspecified essential hypertension          PAST SURGICAL HISTORY:  Past Surgical History:   Procedure Laterality Date     APPENDECTOMY  1950     C REVISE TOTAL HIP REPLACEMENT  1/18/13    R hip     JOINT REPLACEMENT  4/2011    R hip     LAMINECTOMY LUMBAR ONE LEVEL  12/2008     LAPAROSCOPIC HERNIORRHAPHY UMBILICAL N/A 1/3/2019    Procedure: laparoscopic umbilical hernia repair with mesh;  Surgeon: Jamal Thompson, ;  Location: [facility] OR       ALLERGIES     Allergies   Allergen Reactions     Lipitor [Atorvastatin Calcium]      Muscle pain, weakness     Pravastatin Other (See Comments)     muscle aches     Simvastatin Other (See Comments)     muscle aches       FAMILY HISTORY  Family History   Problem Relation Age of Onset     Prostate Cancer Paternal Grandfather        SOCIAL HISTORY  Social History     Tobacco Use     Smoking status: Never Smoker     Smokeless tobacco: Never Used   Substance Use Topics     Alcohol use: Yes     Alcohol/week: 0.0 standard drinks     Comment: 1 drink every 1-2 weeks.     Drug use: No       ROS:   Review of Systems   Constitution: Negative.   HENT: Negative.    Eyes: Negative.    Cardiovascular: Positive for leg swelling.   Respiratory: Negative.    Endocrine: Negative.     Hematologic/Lymphatic: Bruises/bleeds easily.   Skin: Positive for color change and poor wound healing.   Musculoskeletal: Positive for back pain, joint pain and muscle weakness.   Gastrointestinal: Negative.    Genitourinary: Negative.    Neurological:        Restless legs.   Psychiatric/Behavioral: Negative.    Allergic/Immunologic: Negative.          EXAM:  BP (!) 153/80 (BP Location: Left arm, Patient Position: Chair, Cuff Size: Adult Large)   Pulse 61   Physical Exam  Vitals signs reviewed.   Constitutional:       Appearance: He is obese.   HENT:      Head: Normocephalic and atraumatic.   Eyes:      General: No scleral icterus.     Extraocular Movements: Extraocular movements intact.      Pupils: Pupils are equal, round, and reactive to light.   Cardiovascular:      Rate and Rhythm: Normal rate and regular rhythm.      Pulses:           Dorsalis pedis pulses are 1+ on the right side and 2+ on the left side.        Posterior tibial pulses are 2+ on the right side and 2+ on the left side.      Comments: Right pedal pulses are a bit more difficult to appreciate due to pedal edema.  After localizing with a Doppler he has triphasic Doppler signals on the right which are in fact palpable.    Severe hyperpigmentation of the bilateral lower extremities in a stocking distribution.  No significant varicosities.    Venous stasis ulceration of the right medial gaiter region (11 x 8 cm).  Some serous exudate with crusting but no significant surrounding erythema or signs of active infection.  Musculoskeletal:         General: Swelling present.      Right lower leg: Edema present.      Left lower leg: Edema present.   Skin:     Comments: 11 x 8 cm venous stasis ulceration of the right medial gaiter region.   Neurological:      General: No focal deficit present.      Mental Status: He is alert and oriented to person, place, and time. Mental status is at baseline.   Psychiatric:         Mood and Affect: Mood normal.          Behavior: Behavior normal.         Thought Content: Thought content normal.         Judgment: Judgment normal.           Right medial gaiter region with venous stasis ulceration (11 x 8 cm).    Labs:  LIPID RESULTS:  Lab Results   Component Value Date    CHOL 204 (H) 02/05/2021    HDL 57 02/05/2021     (H) 02/05/2021    TRIG 106 02/05/2021    CHOLHDLRATIO 3.3 07/29/2015       CBC RESULTS:  Lab Results   Component Value Date    WBC 6.5 05/17/2021    RBC 4.16 (L) 05/17/2021    HGB 12.7 (L) 05/17/2021    HCT 38.7 (L) 05/17/2021    MCV 93 05/17/2021    MCH 30.5 05/17/2021    MCHC 32.8 05/17/2021    RDW 13.2 05/17/2021     05/17/2021       BMP RESULTS:  Lab Results   Component Value Date     05/17/2021    POTASSIUM 4.1 05/17/2021    CHLORIDE 114 (H) 05/17/2021    CO2 19 (L) 05/17/2021    ANIONGAP 7 05/17/2021     (H) 05/17/2021    BUN 28 05/17/2021    CR 1.47 (H) 05/17/2021    GFRESTIMATED 43 (L) 05/17/2021    GFRESTBLACK 50 (L) 05/17/2021    FREDI 8.5 05/17/2021        A1C RESULTS:  Lab Results   Component Value Date    A1C 8.3 (H) 02/05/2021         Imaging:  Recent Results (from the past 24 hour(s))   US GIAN Doppler No Exercise    Narrative    IR GIAN US GIAN DOPPLER NO EXERCISE, 1-2 LEVELS, BILAT   6/9/2021 9:06  AM     HISTORY: with TBI if unable to exercise; Ulcer of lower limb (H);  Diminished pulses in lower extremity    COMPARISON: None.    FINDINGS:  Right GIAN:   DP: Unable to obtain as patient did not want right ankle pressure cuff  PT: Unable to obtain as patient did not want right ankle pressure  cuff.    Left GIAN:   DP: 1.24   PT: 1.25    Right Digital brachial index: 0.83.  Left Digital Brachial index: Noncompressible    Waveforms: Multiphasic in the distal tibial arteries. Digital  waveforms are grossly unremarkable.      Impression    IMPRESSION: Nondiagnostic GIAN in the right lower extremity for reasons  as above. Right digital brachial index and waveform analysis in the  right  distal tibial region and right digit would not indicate  significant right lower extremity arterial insufficiency.    Left ankle brachial indices do not indicate significant left lower  extremity arterial insufficiency.    GIAN CRITERIA:  >0.95 Normal  0.90 - 0.94 Mild  0.5 - 0.89 Moderate  0.2 - 0.49 Severe  <0.2 Critical    DIGITAL BRACHIAL DIAGNOSTIC CRITERIA    > 0.7                                                     Normal  0.5-0.7                                                 Mild PAD  0.35-0.5                                               Moderate PAD  <0.35 & Toe pressure 40mmHG      Moderate to Severe PAD  <0.35 & Toe pressure <30mmHG    Severe PAD    NUZHAT BUSTAMANTE MD       EXAM: US LOWER EXTREMITY VENOUS DUPLEX RIGHT  LOCATION: [facility]  DATE/TIME: 5/17/2021 5:40 PM     INDICATION: Right leg pain and swelling. History of deep vein thrombosis.  COMPARISON: None.  TECHNIQUE: Venous Duplex ultrasound of the right lower extremity with and without compression, augmentation and duplex. Color flow and spectral Doppler with waveform analysis performed.     FINDINGS: Exam includes the common femoral, femoral, popliteal, and contralateral common femoral veins as well as segmentally visualized deep calf veins and greater saphenous vein.      RIGHT: No deep vein thrombosis. No superficial thrombophlebitis. No popliteal cyst.                                                                      IMPRESSION:  1.  No deep venous thrombosis in the right lower extremity.       Total length of this encounter was 60 minutes.        Samuel Powers MD

## 2021-06-09 NOTE — TELEPHONE ENCOUNTER
Diabetes Education Scheduling Outreach #1:    Call to patient to schedule. Left message with phone number to call to schedule.    Plan for 2nd outreach attempt within 1 week.    [facility] [employer] [facility]  Diabetes and Nutrition Scheduling

## 2021-06-09 NOTE — PATIENT INSTRUCTIONS
The [facility] [place] will call you at approximately 11am for a video/phone visit with Dr. Carcamo to discuss wound care.    You will then need to proceed to [facility] for a right leg venous competency exam.  Dr. Powers will watch for these results and then we will call you with the plan.  [address] [facility] [address]

## 2021-06-09 NOTE — DISCHARGE INSTRUCTIONS
[facility]  [address] [facility] [address] [address]    Call us at [phone] if you have any questions about your wounds, have redness or swelling around your wound, have a fever of 101 or greater or if you have any other problems or concerns. We answer the phone Monday through Friday 8 am to 4 pm, please leave a message as we check the voicemail frequently throughout the day.     Robert Hilary      1935    Medications/supplements to aid in healin. Vitamin D3 10,000 iu per day  2. Linda C 1,000 mg take twice daily  3. Vitamin B Complex with zinc take 1 tablet daily   4. Vitamin B 12 1000 mcg daily  5. ----->Order from [facility] Hesperidin Diosmin 1,000 mg tablet twice daily -[facility] or call [phone]    Wound care recommendations to Right Lower leg.  Cleanse Lower Legs with mild soap (Cetaphil) and water. Pat Dry.   Apply a thin layer of Betamethasone Ointment to intact skin on legs. Then wrap with saran wrap for one hour. Remove saran wrap and do not rinse ointment. Then apply moisturizing cream (Cera-Ve, Aquaphor, Eucerin, ect.)  Apply Navy Stripe Edemawear from base of toes to knees    Compression:  Apply clean compression Spandigrip  first thing in the morning and remove at night before going to bed.   Remove compression dressing if toes turn blue and/or start to feel numb and is not relieved by elevating the leg for one hour.   Walk as much as you can. When you sit raise your ankle above your hips to promote wound healing.     EDEMAWEAR stockings- apply fresh pair with dressing changes  ? Carefully remove old pair of EdemaWear- lifting over any wounds as you remove  DO NOT THROW AWAY- WASH SOILED EDEMAWEAR- use baby shampoo or laundry soap  ? Clean feet and legs with gentle wash  ? Apply Edemawear from toes to knees with a cuff just below the knee-do not cut it  ? DO NOT THROW AWAY THE OLD PAIR OF EDEMWEAR, WASH IT.   o Wash stocking(s) in  really hot water with laundry soap or baby shampoo, may need to do this several times  o Rinse well   o Hang dry              BRETT Carcamo M.D.. June 9, 2021    Wound Clinic follow up Dr. Powers and Dr. Carcamo      If you had a positive experience please indicate that on your patient satisfaction survey form that [facility] will be sending you.    It was a pleasure meeting with you today.  Thank you for allowing me and my team the privilege of caring for you today.  YOU are the reason we are here, and I truly hope we provided you with the excellent service you deserve.  Please let us know if there is anything else we can do for you so that we can be sure you are leaving completely satisfied with your care experience.      If you have any billing related questions please call the [facility] Business office at [phone]. The clinic staff does not handle billing related matters.

## 2021-06-10 ENCOUNTER — TELEPHONE (OUTPATIENT)
Dept: SURGERY | Facility: CLINIC | Age: 86
End: 2021-06-10

## 2021-06-10 NOTE — TELEPHONE ENCOUNTER
left for patient to call,     Patient has scheduled wound consult for Monday June 14, 2021 with Dr. Salvador. Patient has been seen Dr. Powers , and the [facility] [place].  Call to Elba at [facility] for clarification on appointment, she is unsure of why patient has wound consult appointment scheduled with , she thinks patient was told Dr. Salvador or Dr. Powers could read US report and discuss care plan.    When patient calls back_  Please clarify with patient that he wants to keep wound consult appointment with Dr. Salvador and to start new wound care here or if this appointment needs to be cancelled.        Call back from wife, they are very confused. Not sure what they need to do. Would like to see Dr. Salvador as [place] is much closer for them.  Informed patient we will keep scheduled appointment with Dr. Salvador and he will go over US and discuss wound care plan but if he feels patient should continue care with Dr. Powers, patient states they are ok with traveling if need be.      .........JENNYFER Castaneda CMA  ([facility])

## 2021-06-14 ENCOUNTER — OFFICE VISIT (OUTPATIENT)
Dept: SURGERY | Facility: CLINIC | Age: 86
End: 2021-06-14
Payer: COMMERCIAL

## 2021-06-14 VITALS — BODY MASS INDEX: 35.43 KG/M2 | WEIGHT: 246.9 LBS | DIASTOLIC BLOOD PRESSURE: 70 MMHG | SYSTOLIC BLOOD PRESSURE: 152 MMHG

## 2021-06-14 DIAGNOSIS — L97.211 VARICOSE VEINS OF RIGHT LOWER EXTREMITY WITH ULCER OF CALF LIMITED TO BREAKDOWN OF SKIN (H): Primary | ICD-10-CM

## 2021-06-14 DIAGNOSIS — I83.012 VARICOSE VEINS OF RIGHT LOWER EXTREMITY WITH ULCER OF CALF LIMITED TO BREAKDOWN OF SKIN (H): Primary | ICD-10-CM

## 2021-06-14 DIAGNOSIS — Z86.718 HX OF DEEP VENOUS THROMBOSIS: ICD-10-CM

## 2021-06-14 PROCEDURE — 99214 OFFICE O/P EST MOD 30 MIN: CPT | Performed by: SPECIALIST

## 2021-06-14 NOTE — LETTER
6/14/2021         RE: Robert Richard  [address] [facility] [address]        Dear Colleague,    Thank you for referring your patient, Rboert Richard, to the [facility]. Please see a copy of my visit note below.    Consult requested by Dr. 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  He developed a right leg wound about 6 weeks ago.  He was seen down in [place] for wound care last week.  He was started on some type of cream and a compression wrap.  He has wraps already.  He did not want to drive to a diet again and now presents to me for follow-up.  He does have a history of a wound treated with what sounds like silver cell about 6 years ago.  He also had an ultrasound for which she now follows up.  He now presents to me for evaluation of his right leg wound.    Past Medical History:   Diagnosis Date     Basal cell carcinoma      Cancer (H)      DVT (deep venous thrombosis) (H) 11/2003     DVT (deep venous thrombosis) (H) 12/08     DVT (deep venous thrombosis) (H) 10/2010     Factor V Leiden (H)      Gout      Other and unspecified hyperlipidemia      Prostate cancer (H) 2003    prostatectomy      Restless leg syndrome      Unspecified essential hypertension      Past Surgical History:   Procedure Laterality Date     APPENDECTOMY  1950     C REVISE TOTAL HIP REPLACEMENT  1/18/13    R hip     JOINT REPLACEMENT  4/2011    R hip     LAMINECTOMY LUMBAR ONE LEVEL  12/2008     LAPAROSCOPIC HERNIORRHAPHY UMBILICAL N/A 1/3/2019    Procedure: laparoscopic umbilical hernia repair with mesh;  Surgeon: Jamal Thompson DO;  Location: [facility] OR     Current Outpatient Medications   Medication     atenolol (TENORMIN) 50 MG tablet     betamethasone dipropionate (DIPROSONE) 0.05 % external ointment     Bioflavonoid Products (DOMINIK-C) TABS     Cholecalciferol  (VITAMIN D-3) 125 MCG (5000 UT) TABS     colchicine (COLCRYS) 0.6 MG tablet     cyanocobalamin (VITAMIN B-12) 1000 MCG tablet     glipiZIDE (GLUCOTROL XL) 5 MG 24 hr tablet     incobotulinumtoxin A (XEOMIN) 50 units SOLR solution     ipratropium (ATROVENT) 0.06 % nasal spray     losartan (COZAAR) 100 MG tablet     Multiple Vitamins-Minerals (MULTIVITAMIN ADULT PO)     order for DME     ORDER FOR DME     pramipexole (MIRAPEX) 1 MG tablet     sildenafil (VIAGRA) 100 MG tablet     spironolactone (ALDACTONE) 25 MG tablet     STATIN NOT PRESCRIBED (INTENTIONAL)     vitamin B-Complex     warfarin ANTICOAGULANT (COUMADIN) 5 MG tablet     No current facility-administered medications for this visit.         Allergies   Allergen Reactions     Lipitor [Atorvastatin Calcium]      Muscle pain, weakness     Pravastatin Other (See Comments)     muscle aches     Simvastatin Other (See Comments)     muscle aches     Social History     Socioeconomic History     Marital status: Single     Spouse name: Not on file     Number of children: Not on file     Years of education: Not on file     Highest education level: Not on file   Occupational History     Not on file   Social Needs     Financial resource strain: Not on file     Food insecurity     Worry: Not on file     Inability: Not on file     Transportation needs     Medical: Not on file     Non-medical: Not on file   Tobacco Use     Smoking status: Never Smoker     Smokeless tobacco: Never Used   Substance and Sexual Activity     Alcohol use: Yes     Alcohol/week: 0.0 standard drinks     Comment: 1 drink every 1-2 weeks.     Drug use: No     Sexual activity: Yes     Partners: Female   Lifestyle     Physical activity     Days per week: Not on file     Minutes per session: Not on file     Stress: Not on file   Relationships     Social connections     Talks on phone: Not on file     Gets together: Not on file     Attends Jewish service: Not on file     Active member of club or  organization: Not on file     Attends meetings of clubs or organizations: Not on file     Relationship status: Not on file     Intimate partner violence     Fear of current or ex partner: Not on file     Emotionally abused: Not on file     Physically abused: Not on file     Forced sexual activity: Not on file   Other Topics Concern     Parent/sibling w/ CABG, MI or angioplasty before 65F 55M? Not Asked   Social History Narrative     Not on file     Family History   Problem Relation Age of Onset     Prostate Cancer Paternal Grandfather       ROS: 10 point ROS neg other than the symptoms noted above in the HPI.    PE:  B/P: 152/70, T: Data Unavailable, P: Data Unavailable, R: Data Unavailable  General: well developed, well nourished WM who appears their stated age  HEENT: NC/AT, EOMI, (-)icterus, (-)injection  Neck: Supple, No JVD  Chest: CTA  Heart: S1, S2, (-)m/r/g  Abd: Soft, non tender, non distended, non tender, no masses  Rectal: No masses  Ext; Warm, no edema  Psych: AAOx3  Neuro: No focal deficits    US reviewed-GSV SSV and  reflux.    Impression/plan:  This is an 85-year-old gentleman with a venous ulcer of his right lower extremity.  He is a CEAP 6.  His ultrasound did reveal extensive GSV reflux and  reflux in that leg.  He has not had a trial of compression yet.  I discussed the options of Unna boot versus continuing his wrap and giving him a trial of silver cell as it as he has healed that in the past.  He wishes to continue his wraps with silver cell.  He will wash the wound daily soap and water apply silver cell on his wraps and follow-up with me in 1 week.  He knows to be seen sooner should the wound worsen.  He also understands if it does not improve he may require an Unna boot and possibly treatment of his greater saphenous vein and .    Carlos to follow up with Primary Care provider regarding elevated blood pressure.      Jay Salvador MD, FACS    Carlos to follow up with  Primary Care provider regarding elevated blood pressure.      Again, thank you for allowing me to participate in the care of your patient.        Sincerely,        Jay Salvador MD

## 2021-06-14 NOTE — PROGRESS NOTES
Consult requested by Dr. 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  He developed a right leg wound about 6 weeks ago.  He was seen down in [place] for wound care last week.  He was started on some type of cream and a compression wrap.  He has wraps already.  He did not want to drive to a diet again and now presents to me for follow-up.  He does have a history of a wound treated with what sounds like silver cell about 6 years ago.  He also had an ultrasound for which she now follows up.  He now presents to me for evaluation of his right leg wound.    Past Medical History:   Diagnosis Date     Basal cell carcinoma      Cancer (H)      DVT (deep venous thrombosis) (H) 11/2003     DVT (deep venous thrombosis) (H) 12/08     DVT (deep venous thrombosis) (H) 10/2010     Factor V Leiden (H)      Gout      Other and unspecified hyperlipidemia      Prostate cancer (H) 2003    prostatectomy      Restless leg syndrome      Unspecified essential hypertension      Past Surgical History:   Procedure Laterality Date     APPENDECTOMY  1950     C REVISE TOTAL HIP REPLACEMENT  1/18/13    R hip     JOINT REPLACEMENT  4/2011    R hip     LAMINECTOMY LUMBAR ONE LEVEL  12/2008     LAPAROSCOPIC HERNIORRHAPHY UMBILICAL N/A 1/3/2019    Procedure: laparoscopic umbilical hernia repair with mesh;  Surgeon: Jamal Thompson DO;  Location:  OR     Current Outpatient Medications   Medication     atenolol (TENORMIN) 50 MG tablet     betamethasone dipropionate (DIPROSONE) 0.05 % external ointment     Bioflavonoid Products (DOMINIK-C) TABS     Cholecalciferol (VITAMIN D-3) 125 MCG (5000 UT) TABS     colchicine (COLCRYS) 0.6 MG tablet     cyanocobalamin (VITAMIN B-12) 1000 MCG tablet     glipiZIDE (GLUCOTROL XL) 5 MG 24 hr tablet     incobotulinumtoxin A (XEOMIN) 50 units SOLR solution     ipratropium (ATROVENT)  0.06 % nasal spray     losartan (COZAAR) 100 MG tablet     Multiple Vitamins-Minerals (MULTIVITAMIN ADULT PO)     order for DME     ORDER FOR DME     pramipexole (MIRAPEX) 1 MG tablet     sildenafil (VIAGRA) 100 MG tablet     spironolactone (ALDACTONE) 25 MG tablet     STATIN NOT PRESCRIBED (INTENTIONAL)     vitamin B-Complex     warfarin ANTICOAGULANT (COUMADIN) 5 MG tablet     No current facility-administered medications for this visit.         Allergies   Allergen Reactions     Lipitor [Atorvastatin Calcium]      Muscle pain, weakness     Pravastatin Other (See Comments)     muscle aches     Simvastatin Other (See Comments)     muscle aches     Social History     Socioeconomic History     Marital status: Single     Spouse name: Not on file     Number of children: Not on file     Years of education: Not on file     Highest education level: Not on file   Occupational History     Not on file   Social Needs     Financial resource strain: Not on file     Food insecurity     Worry: Not on file     Inability: Not on file     Transportation needs     Medical: Not on file     Non-medical: Not on file   Tobacco Use     Smoking status: Never Smoker     Smokeless tobacco: Never Used   Substance and Sexual Activity     Alcohol use: Yes     Alcohol/week: 0.0 standard drinks     Comment: 1 drink every 1-2 weeks.     Drug use: No     Sexual activity: Yes     Partners: Female   Lifestyle     Physical activity     Days per week: Not on file     Minutes per session: Not on file     Stress: Not on file   Relationships     Social connections     Talks on phone: Not on file     Gets together: Not on file     Attends Jain service: Not on file     Active member of club or organization: Not on file     Attends meetings of clubs or organizations: Not on file     Relationship status: Not on file     Intimate partner violence     Fear of current or ex partner: Not on file     Emotionally abused: Not on file     Physically abused: Not  on file     Forced sexual activity: Not on file   Other Topics Concern     Parent/sibling w/ CABG, MI or angioplasty before 65F 55M? Not Asked   Social History Narrative     Not on file     Family History   Problem Relation Age of Onset     Prostate Cancer Paternal Grandfather       ROS: 10 point ROS neg other than the symptoms noted above in the HPI.    PE:  B/P: 152/70, T: Data Unavailable, P: Data Unavailable, R: Data Unavailable  General: well developed, well nourished WM who appears their stated age  HEENT: NC/AT, EOMI, (-)icterus, (-)injection  Neck: Supple, No JVD  Chest: CTA  Heart: S1, S2, (-)m/r/g  Abd: Soft, non tender, non distended, non tender, no masses  Rectal: No masses  Ext; Warm, no edema  Psych: AAOx3  Neuro: No focal deficits    US reviewed-GSV SSV and  reflux.    Impression/plan:  This is an 85-year-old gentleman with a venous ulcer of his right lower extremity.  He is a CEAP 6.  His ultrasound did reveal extensive GSV reflux and  reflux in that leg.  He has not had a trial of compression yet.  I discussed the options of Unna boot versus continuing his wrap and giving him a trial of silver cell as it as he has healed that in the past.  He wishes to continue his wraps with silver cell.  He will wash the wound daily soap and water apply silver cell on his wraps and follow-up with me in 1 week.  He knows to be seen sooner should the wound worsen.  He also understands if it does not improve he may require an Unna boot and possibly treatment of his greater saphenous vein and .    Carlos to follow up with Primary Care provider regarding elevated blood pressure.      Jay Salvador MD, FACS

## 2021-06-15 ENCOUNTER — OFFICE VISIT (OUTPATIENT)
Dept: INTERNAL MEDICINE | Facility: CLINIC | Age: 86
End: 2021-06-15
Payer: COMMERCIAL

## 2021-06-15 VITALS
OXYGEN SATURATION: 96 % | WEIGHT: 243 LBS | TEMPERATURE: 97.7 F | BODY MASS INDEX: 34.87 KG/M2 | DIASTOLIC BLOOD PRESSURE: 80 MMHG | HEART RATE: 74 BPM | SYSTOLIC BLOOD PRESSURE: 128 MMHG | RESPIRATION RATE: 16 BRPM

## 2021-06-15 DIAGNOSIS — N18.31 STAGE 3A CHRONIC KIDNEY DISEASE (H): ICD-10-CM

## 2021-06-15 DIAGNOSIS — D68.51 FACTOR V LEIDEN MUTATION (H): ICD-10-CM

## 2021-06-15 DIAGNOSIS — E11.8 TYPE 2 DIABETES MELLITUS WITH COMPLICATION, WITHOUT LONG-TERM CURRENT USE OF INSULIN (H): Primary | ICD-10-CM

## 2021-06-15 DIAGNOSIS — Z79.01 LONG TERM CURRENT USE OF ANTICOAGULANT THERAPY: ICD-10-CM

## 2021-06-15 DIAGNOSIS — D68.52 PROTHROMBIN MUTATION (H): ICD-10-CM

## 2021-06-15 DIAGNOSIS — L97.319 VENOUS ULCER OF ANKLE, RIGHT (H): ICD-10-CM

## 2021-06-15 DIAGNOSIS — I83.013 VENOUS ULCER OF ANKLE, RIGHT (H): ICD-10-CM

## 2021-06-15 DIAGNOSIS — M79.661 PAIN OF RIGHT LOWER LEG: ICD-10-CM

## 2021-06-15 DIAGNOSIS — I82.90 VENOUS THROMBOSIS: ICD-10-CM

## 2021-06-15 LAB — HBA1C MFR BLD: 7.2 % (ref 0–5.6)

## 2021-06-15 PROCEDURE — 83036 HEMOGLOBIN GLYCOSYLATED A1C: CPT | Performed by: INTERNAL MEDICINE

## 2021-06-15 PROCEDURE — 99214 OFFICE O/P EST MOD 30 MIN: CPT | Performed by: INTERNAL MEDICINE

## 2021-06-15 PROCEDURE — 36415 COLL VENOUS BLD VENIPUNCTURE: CPT | Performed by: INTERNAL MEDICINE

## 2021-06-15 RX ORDER — HYDROCODONE BITARTRATE AND ACETAMINOPHEN 5; 325 MG/1; MG/1
1 TABLET ORAL EVERY 6 HOURS PRN
Qty: 18 TABLET | Refills: 0 | Status: SHIPPED | OUTPATIENT
Start: 2021-06-15 | End: 2021-06-29

## 2021-06-15 ASSESSMENT — PAIN SCALES - GENERAL: PAINLEVEL: MODERATE PAIN (4)

## 2021-06-15 NOTE — PROGRESS NOTES
Assessment & Plan     Type 2 diabetes mellitus with complication, without long-term current use of insulin (H)  Patient with type 2 diabetes, he is on glipizide.  With his chronic kidney disease is difficult to get on Metformin.  We will check his A1c again it had been 8.3.  We did do education on diet.  Recommending some exercise.  I will refer him to the diabetic educator.  We will get him a glucometer, strips, lancets.  Follow-up in 3 more months.  - AMBULATORY ADULT DIABETES EDUCATOR REFERRAL; Future  - blood glucose monitoring (NO BRAND SPECIFIED) meter device kit; Use to test blood sugar 1 times daily or as directed.  - blood glucose (NO BRAND SPECIFIED) test strip; Use to test blood sugar 1 times daily or as directed.  - blood glucose (NO BRAND SPECIFIED) lancets standard; Use to test blood sugar 1 times daily or as directed.  - ASPIRIN NOT PRESCRIBED (INTENTIONAL); Please choose reason not prescribed from choices below.  - Hemoglobin A1c    Venous ulcer of ankle, right (H)  This ulcer on the right ankle getting better is as compression wraps.  He has gone through ultrasounds.  Not arterial disease but venous ulcerative disease.  Getting better he says.  - HYDROcodone-acetaminophen (NORCO) 5-325 MG tablet; Take 1 tablet by mouth every 6 hours as needed for pain    Pain of right lower leg  Pain in the right leg when he does the dressing changes I will refill the Norco for him.  - HYDROcodone-acetaminophen (NORCO) 5-325 MG tablet; Take 1 tablet by mouth every 6 hours as needed for pain    Stage 3a chronic kidney disease  Chronic kidney disease stable creatinine of 1.41.47.  Will try to avoid NSAIDs and other nephrotoxic effects.      Review of prior external note(s) from - vascular surgery  30 minutes spent on the date of the encounter doing chart review, history and exam, documentation and further activities per the note, part done earlier today            Return in about 3 months (around 9/15/2021) for  Routine Visit.    Stiven Donahue MD  [facility] RODRIGO Gonzalez is a 85 year old who presents for the following health issues     HPI     Chief Complaint   Patient presents with     Diabetes     discuss diabetes and questions/concerns     Last hgba1c was 8.3 in the winter.  Needs to get his sugars better. Not checking sugars need monitor and diabetic education.    Diet is well rounded.      He has seen vascular and felt not to be arterial leg wounds.  Then saw surgery here and has silver cell pads on there now.      Past Medical History:   Diagnosis Date     Basal cell carcinoma      Cancer (H)      DVT (deep venous thrombosis) (H) 11/2003     DVT (deep venous thrombosis) (H) 12/08     DVT (deep venous thrombosis) (H) 10/2010     Factor V Leiden (H)      Gout      Other and unspecified hyperlipidemia      Prostate cancer (H) 2003    prostatectomy      Restless leg syndrome      Unspecified essential hypertension      Current Outpatient Medications   Medication     atenolol (TENORMIN) 50 MG tablet     betamethasone dipropionate (DIPROSONE) 0.05 % external ointment     Bioflavonoid Products (DOMINIK-C) TABS     Cholecalciferol (VITAMIN D-3) 125 MCG (5000 UT) TABS     cyanocobalamin (VITAMIN B-12) 1000 MCG tablet     glipiZIDE (GLUCOTROL XL) 5 MG 24 hr tablet     ipratropium (ATROVENT) 0.06 % nasal spray     losartan (COZAAR) 100 MG tablet     Multiple Vitamins-Minerals (MULTIVITAMIN ADULT PO)     order for DME     ORDER FOR DME     pramipexole (MIRAPEX) 1 MG tablet     spironolactone (ALDACTONE) 25 MG tablet     STATIN NOT PRESCRIBED (INTENTIONAL)     vitamin B-Complex     warfarin ANTICOAGULANT (COUMADIN) 5 MG tablet     colchicine (COLCRYS) 0.6 MG tablet     incobotulinumtoxin A (XEOMIN) 50 units SOLR solution     sildenafil (VIAGRA) 100 MG tablet     No current facility-administered medications for this visit.      Social History     Tobacco Use     Smoking status: Never Smoker      Smokeless tobacco: Never Used   Substance Use Topics     Alcohol use: Yes     Alcohol/week: 0.0 standard drinks     Comment: 1 drink every 1-2 weeks.     Drug use: No           Review of Systems         Objective    /80   Pulse 74   Temp 97.7  F (36.5  C) (Temporal)   Resp 16   Wt 110.2 kg (243 lb)   SpO2 96%   BMI 34.87 kg/m    Body mass index is 34.87 kg/m .  Physical Exam   NAD  Right leg wrapped in velco wraps.

## 2021-06-16 ENCOUNTER — TELEPHONE (OUTPATIENT)
Dept: VASCULAR SURGERY | Facility: CLINIC | Age: 86
End: 2021-06-16

## 2021-06-16 ENCOUNTER — TELEPHONE (OUTPATIENT)
Dept: INTERNAL MEDICINE | Facility: CLINIC | Age: 86
End: 2021-06-16

## 2021-06-16 ENCOUNTER — VIRTUAL VISIT (OUTPATIENT)
Dept: OTHER | Facility: CLINIC | Age: 86
End: 2021-06-16
Attending: SURGERY
Payer: COMMERCIAL

## 2021-06-16 DIAGNOSIS — I87.2 VENOUS (PERIPHERAL) INSUFFICIENCY: ICD-10-CM

## 2021-06-16 DIAGNOSIS — Z86.718 PERSONAL HISTORY OF DVT (DEEP VEIN THROMBOSIS): ICD-10-CM

## 2021-06-16 DIAGNOSIS — D68.51 FACTOR V LEIDEN MUTATION (H): ICD-10-CM

## 2021-06-16 DIAGNOSIS — E66.01 CLASS 2 SEVERE OBESITY DUE TO EXCESS CALORIES WITH SERIOUS COMORBIDITY AND BODY MASS INDEX (BMI) OF 38.0 TO 38.9 IN ADULT (H): ICD-10-CM

## 2021-06-16 DIAGNOSIS — E66.812 CLASS 2 SEVERE OBESITY DUE TO EXCESS CALORIES WITH SERIOUS COMORBIDITY AND BODY MASS INDEX (BMI) OF 38.0 TO 38.9 IN ADULT (H): ICD-10-CM

## 2021-06-16 DIAGNOSIS — I83.013 VENOUS STASIS ULCER OF RIGHT ANKLE LIMITED TO BREAKDOWN OF SKIN, UNSPECIFIED WHETHER VARICOSE VEINS PRESENT (H): Primary | ICD-10-CM

## 2021-06-16 DIAGNOSIS — L97.311 VENOUS STASIS ULCER OF RIGHT ANKLE LIMITED TO BREAKDOWN OF SKIN, UNSPECIFIED WHETHER VARICOSE VEINS PRESENT (H): Primary | ICD-10-CM

## 2021-06-16 PROCEDURE — 99213 OFFICE O/P EST LOW 20 MIN: CPT | Performed by: SURGERY

## 2021-06-16 NOTE — PROGRESS NOTES
Carlos is a 85 year old who is being evaluated via a billable telephone visit.      What phone number would you like to be contacted at? Home phone number [phone]  How would you like to obtain your AVS? [employer]          Phone call duration:

## 2021-06-16 NOTE — PROGRESS NOTES
Carlos Richard is an 85-year-old gentleman with multiple medical issues and a large venous stasis ulcer of the right medial gaiter region evaluated by me at the [facility] on 2021. Please see my detailed dictation from that office visit. He has a history of right leg DVT in the remote past with a documented factor V Leiden deficiency for which he is on chronic Coumadin. I sent him for a right leg venous competency study that same day. He lives in [place], [place] and I am calling him for a telephone visit today to discuss the results of the right leg ultrasound and my treatment recommendations.    Carlos participated in this morning's telephone conversation along with his wife, Opal and his daughter. He is in good spirits. He is continuing to utilize local wound care and compression for the venous stasis ulcer. He was seen by Dr. Salvador for this wound just 2 days ago. Per their report he has utilized some form of compression therapy for his right calf now for many months and still has the persistent right medial ankle venous stasis ulcer which last week measured 8 x 11 cm.    Imaging:    Lottie Guadarrama on 6/10/2021 12:32 PM    Name:  Robert Richard                                                        Patient ID: [id number]  Date: Karly 10, 2021                                                               : 1935  Sex: male                                                                                Examined by: OUMAR Guadarrama RVT  Age:  85 year old                                                                     Reading MD: LIZ     INDICATION:  Varicose veins of right lower extremity with pain.  Ulcer of right lower extremity. History of multiple right lower extremity                         DVT.       EXAM TYPE  RIGHT LOWER EXTREMITY VENOUS DUPLEX FOR VENOUS INSUFFICIENCY  TECHNICAL SUMMARY     A duplex ultrasound study using color flow was performed to evaluate the right lower extremity  veins for valvular incompetence with the patient in a steep reversed trendelenberg.      RIGHT:     The deep veins demonstrate phasic flow, compress, and respond to augmentations.  There is no evidence of DVT.  The common femoral, distal femoral, and popliteal veins are incompetent and free of thrombus. The remaining deep vein segments are competent and free of thrombus.      The GSV demonstrates phasic flow, compresses, and responds to augmentations from the saphenofemoral junction to the ankle with no evidence of thrombus. The greater saphenous vein measures 7.3 mm at the saphenofemoral junction, 8.9 mm in the proximal thigh, and 6.0 mm at the knee.  The GSV is incompetent from the SFJ to the distal calf with the greatest reflux time of 2739 milliseconds.   The GSV gives rise to multiple incompetent varicose veins, the largest measures 5.8 mm off the proxima calf that courses medially with a reflux time of 1237 milliseconds.     The AASV is not visualized.      The Giacomini vein is absent.     The SSV demonstrates phasic flow, compresses and responds to augmentations from the mid calf to the ankle and is not visualized in the proximal calf. No thrombus is seen. The saphenopopliteal junction is absent. The SSV is incompetent in the mid calf with a reflux time of 1023 milliseconds.   The SSV gives rise to multiple incompetent varicose veins, the largest measuring 4.7 mm off the mid calf that courses posteromedially and has a reflux time of 1204 ms.     Perforators: There is an incompetent  vein ( 4.5  mm) at 12 cm from medial malleolus that communicates with the GSV and incompetent tributaries.     LEFT:     The CFV demonstrates phasic flow, compresses, responds to augmentations, and is incompetent.  There is no DVT.       FINAL SUMMARY:  1.         No evidence of right lower extremity deep vein thrombus.  2.         Right common femoral, distal femoral, and popliteal vein incompetence.  Left common  femoral vein incompetence.  3.         Right greater saphenous vein incompetence.  4.         Right mid small saphenous vein incompetence.  5.         Right incompetent  vein.  6.         Right varicose vein incompetence.  8.         Superficial and perforating veins time of incompetence is >500 ms and >1000 ms for deep vein incompetence.           ASSESSMENT:  1. 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 The vein measures 7.3 mm at the junction, 8.9 mm in the proximal thigh, and 6 mm at the knee. It gives rise to multiple incompetent varicose branches. Greatest reflux time is 2739 ms. His venous stasis ulcer has failed to heal despite many months of compression therapy.    2. History of multiple right leg DVTs.    3. Factor V Leiden and prothrombin mutation. On Coumadin since 2003.    4. 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    5. Diabetes mellitus type 2.    6. Obesity.    RECOMMENDATION:  I had an extended conversation with Carlos and his family reviewing all the above. The wound has recurred and persisted despite very good local wound care and compression. He has documented incompetence of the entire right greater saphenous vein. I feel that radiofrequency ablation of the right greater saphenous vein is medically indicated. I have thoroughly discussed the specifics of this procedure including potential complications and the anticipated postprocedural course. I will ask him to hold his Coumadin for 4 days pre-procedurally and he will need bridging Lovenox. I have asked him to coordinate the Lovenox through his primary MD,  Dr. Donahue. He also needs prophylactic amoxicillin 2 g orally 1 hour prior to the procedure.    In the meantime, he will continue with his local wound care and his  compression. Hopefully, we can gain authorization for right greater saphenous vein radiofrequency ablation in a timely manner and schedule him for this procedure in the very near future.    All of their questions were answered and they verbalized full understanding to the above and complete agreement with this management plan.    Total length of this telephone conversation was 20 minutes.    Gt Powers MD

## 2021-06-16 NOTE — TELEPHONE ENCOUNTER
Diabetes Education Scheduling Outreach #1:    Call to patient to schedule. Left message with phone number to call to schedule.    Plan for 2nd outreach attempt within 1 week.    Raquel Hart  [facility]  Diabetes and Nutrition Scheduling

## 2021-06-16 NOTE — TELEPHONE ENCOUNTER
With seeing both Dr. Salvador and Gio, verified patients desire to follow Dr. Powers for his vein procedure and Dr. Salvador for his wound.  He verbalized understanding the would be traveling to [place] for post procedure care.

## 2021-06-16 NOTE — TELEPHONE ENCOUNTER
Diabetes Education Scheduling Outreach #2:    Call to patient to schedule. Left message with phone number to call to schedule.      Raquel Hart  [facility]  Diabetes and Nutrition Scheduling

## 2021-06-16 NOTE — TELEPHONE ENCOUNTER
Called and spoke to Slim regarding process of insurance submission. Informed patient this process could take up to 14 business days, but once approved, you will be contacted to schedule your procedure.    Further documentation of this process will be documented in the referral.    Elba Camejo, Surgery Scheduler  [facility]

## 2021-06-23 NOTE — TELEPHONE ENCOUNTER
Diabetes Education Scheduling Outreach #2:    Call to patient to schedule. Left message with phone number to call to schedule.    Raquel Hart  [facility]  Diabetes and Nutrition Scheduling

## 2021-06-29 ENCOUNTER — OFFICE VISIT (OUTPATIENT)
Dept: INTERNAL MEDICINE | Facility: CLINIC | Age: 86
End: 2021-06-29
Payer: COMMERCIAL

## 2021-06-29 VITALS
BODY MASS INDEX: 34.44 KG/M2 | RESPIRATION RATE: 16 BRPM | WEIGHT: 240 LBS | DIASTOLIC BLOOD PRESSURE: 84 MMHG | HEART RATE: 68 BPM | OXYGEN SATURATION: 95 % | TEMPERATURE: 97.3 F | SYSTOLIC BLOOD PRESSURE: 128 MMHG

## 2021-06-29 DIAGNOSIS — I83.013 VENOUS ULCER OF ANKLE, RIGHT (H): ICD-10-CM

## 2021-06-29 DIAGNOSIS — E11.8 TYPE 2 DIABETES MELLITUS WITH COMPLICATION, WITHOUT LONG-TERM CURRENT USE OF INSULIN (H): Primary | ICD-10-CM

## 2021-06-29 DIAGNOSIS — L97.319 VENOUS ULCER OF ANKLE, RIGHT (H): ICD-10-CM

## 2021-06-29 PROCEDURE — 99213 OFFICE O/P EST LOW 20 MIN: CPT | Performed by: INTERNAL MEDICINE

## 2021-06-29 ASSESSMENT — PAIN SCALES - GENERAL: PAINLEVEL: NO PAIN (0)

## 2021-06-29 NOTE — PROGRESS NOTES
Assessment & Plan   Problem List Items Addressed This Visit     None      Visit Diagnoses     Type 2 diabetes mellitus with complication, without long-term current use of insulin (H)    -  Primary    Venous ulcer of ankle, right (H)             Patient here to review his type 2 diabetes.  His A1c is better, he will see diabetic Ed next.  He is working on his diet.  He is eating less hydrates he does have some throat still discussed that is fine fructose.  He is having more meats and vegetables.  He is congratulated on improved A1c.    Patient has venous ulcer disease which is improving he is wearing some compressive wraps and these are working well for him.                     No follow-ups on file.    Stiven Donahue MD  [facility]    Ashley Gonzalez is a 85 year old who presents for the following health issues     HPI     Chief Complaint   Patient presents with     Diabetes     f/u     RECHECK     venous stasis with ulcer and discuss upcoming surgery     Has diabetic education on July 14th for a phone consult.    Hgba1c was 7.2, cut out bread, sweets, and doing better. Some meat.     Leg is better, healed over and less burning pain, has wrap around compression that he wears most days.      Past Medical History:   Diagnosis Date     Basal cell carcinoma      Cancer (H)      DVT (deep venous thrombosis) (H) 11/2003     DVT (deep venous thrombosis) (H) 12/08     DVT (deep venous thrombosis) (H) 10/2010     Factor V Leiden (H)      Gout      Other and unspecified hyperlipidemia      Prostate cancer (H) 2003    prostatectomy      Restless leg syndrome      Unspecified essential hypertension      Current Outpatient Medications   Medication     ASPIRIN NOT PRESCRIBED (INTENTIONAL)     atenolol (TENORMIN) 50 MG tablet     betamethasone dipropionate (DIPROSONE) 0.05 % external ointment     Bioflavonoid Products (DOMINIK-C) TABS     blood glucose (NO BRAND SPECIFIED) lancets standard     blood  glucose (NO BRAND SPECIFIED) test strip     blood glucose monitoring (NO BRAND SPECIFIED) meter device kit     Cholecalciferol (VITAMIN D-3) 125 MCG (5000 UT) TABS     cyanocobalamin (VITAMIN B-12) 1000 MCG tablet     glipiZIDE (GLUCOTROL XL) 5 MG 24 hr tablet     incobotulinumtoxin A (XEOMIN) 50 units SOLR solution     ipratropium (ATROVENT) 0.06 % nasal spray     losartan (COZAAR) 100 MG tablet     Multiple Vitamins-Minerals (MULTIVITAMIN ADULT PO)     order for DME     ORDER FOR DME     pramipexole (MIRAPEX) 1 MG tablet     sildenafil (VIAGRA) 100 MG tablet     spironolactone (ALDACTONE) 25 MG tablet     STATIN NOT PRESCRIBED (INTENTIONAL)     vitamin B-Complex     warfarin ANTICOAGULANT (COUMADIN) 5 MG tablet     No current facility-administered medications for this visit.      Social History     Tobacco Use     Smoking status: Never Smoker     Smokeless tobacco: Never Used   Substance Use Topics     Alcohol use: Yes     Alcohol/week: 0.0 standard drinks     Comment: 1 drink every 1-2 weeks.     Drug use: No         Review of Systems         Objective    /84   Pulse 68   Temp 97.3  F (36.3  C) (Temporal)   Resp 16   Wt 108.9 kg (240 lb)   SpO2 95%   BMI 34.44 kg/m    Body mass index is 34.44 kg/m .  Physical Exam   NAD  Heart is regular  Ext trace edema, skin is improved.

## 2021-06-30 DIAGNOSIS — I83.811 VARICOSE VEINS OF RIGHT LOWER EXTREMITY WITH PAIN: Primary | ICD-10-CM

## 2021-06-30 DIAGNOSIS — I87.2 VENOUS STASIS DERMATITIS OF RIGHT LOWER EXTREMITY: ICD-10-CM

## 2021-07-04 DIAGNOSIS — Z86.718 PERSONAL HISTORY OF VENOUS THROMBOSIS AND EMBOLISM: ICD-10-CM

## 2021-07-05 ENCOUNTER — MYC MEDICAL ADVICE (OUTPATIENT)
Dept: INTERNAL MEDICINE | Facility: CLINIC | Age: 86
End: 2021-07-05

## 2021-07-05 DIAGNOSIS — L97.319 VENOUS ULCER OF ANKLE, RIGHT (H): Primary | ICD-10-CM

## 2021-07-05 DIAGNOSIS — I83.013 VENOUS ULCER OF ANKLE, RIGHT (H): Primary | ICD-10-CM

## 2021-07-05 RX ORDER — WARFARIN SODIUM 5 MG/1
TABLET ORAL
Qty: 90 TABLET | Refills: 0 | Status: SHIPPED | OUTPATIENT
Start: 2021-07-05 | End: 2021-10-18

## 2021-07-05 NOTE — PROGRESS NOTES
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 Patient discontinued therapy after the initial session and appears to be wearing his compression as discussed and is being followed by medical care team involving situation. Return to PT with new orders if indicated and found to have further needs.       Client Self Report: Tolerated examination    Objective Measurements:See eval dated 2021    Goals:  Goal Identifier Edema   Goal Description Patient will reduce by 100 mls per leg and soften fibrosis for reducing his parathesias and leg discomfort and to maintain skin integrity in 6 weeks   Target Date 21   Date Met      Progress: Unable to assess     Goal Identifier Parathesias   Goal Description Patient will report 50% reduced parathesias and hyperesthesias in R>L LEs in 8 weeks   Target Date 21   Date Met      Progress:Unable to assess     Goal Identifier LLIS   Goal Description Patient will reduced edema related deficits on LLIS from 27 to 20 to better functionally manage his edema and leg conditions in 8 weeks   Target Date 21   Date Met      Progress: Unable to assess     Progress Toward Goals:   Progress this reporting period: Didn't return, chart review appears is wearing his compression products .       Plan:  Discharge from therapy.    Discharge:    Reason for Discharge: Patient has failed to schedule further appointments.  Cancelled future appointments after evaluation    Equipment Issued: Compression garment recommendations to order online (no insurance coverage)    Discharge Plan: Patient to continue home program.    Thank you  for the referral.  Zaina Boston................... PT, DPT, CLT   7/5/2021, 3:31 PM  [phone]

## 2021-07-08 ENCOUNTER — MYC MEDICAL ADVICE (OUTPATIENT)
Dept: INTERNAL MEDICINE | Facility: CLINIC | Age: 86
End: 2021-07-08

## 2021-07-09 ENCOUNTER — HOSPITAL ENCOUNTER (OUTPATIENT)
Dept: WOUND CARE | Facility: CLINIC | Age: 86
Discharge: HOME | End: 2021-07-09
Attending: INTERNAL MEDICINE | Admitting: INTERNAL MEDICINE
Payer: COMMERCIAL

## 2021-07-09 PROCEDURE — G0463 HOSPITAL OUTPT CLINIC VISIT: HCPCS | Mod: 25

## 2021-07-09 PROCEDURE — 29580 STRAPPING UNNA BOOT: CPT

## 2021-07-09 NOTE — DISCHARGE INSTRUCTIONS
Today the venous ulcers on your right inner lower leg appear to be infection free but need more compression and absorbency to heal.    I applied the Silvercel to the wound sites, an Unna's boot compression to the right lower leg and foot, for absorbency a portion of a baby diaper, secured with gauze and an ace wrap.    Leave this wrap in place till you return on Monday of next week at 8 am.    Over the weekend if your wrap slides down, causes you more pain, or gets wet.    - Take it off, wash your leg with soap and water, rinse well.    - Apply a pice of the Silvercel to the wound area.   - Cover with an Abdominal pad and secure with the Stretch gauze and tape to the gauze.    - Then apply your Ready Wrap compression garment.    I will see you Monday at 8 to re evaluate your leg.    Reji Malhotra RN cwocn

## 2021-07-09 NOTE — PROGRESS NOTES
Reason For Visit: Robert Richard, 85 year old male, seen as outpatient to evaluate and treat right leg venous ulcers. Referred by Dr BLANQUITA Donahue MD. Patient presents by himself today.      History: Pt known to me from past visits to the Wound and Ostomy clinic for left leg venous ulcers, last being in Nov of 2019, when his leg was healed.  After seeing me he was fitted for compression socks, these he could not place.  He was recently fitted for Ready Wrap style compression garments.  He states he consistently wore the garment leg pieces but did not use the foot pieces.  But had to stop their use due to pain.  On 5/17/21 he presented to the ED here at [facility] with c/o right inner ankle pain.  He had scrapped his inner right ankle a few days prior, the site was sensitive and red.  US performed and no DVT found.  He was given oral antibiotics and sent home.  Since then he developed worsening ulcers of the medial right ankle.  He has been seen by Dr García and Dr Salvador, both recommended Unna's boot compression.  Pt was fitted for Ready Wraps and was wearing the leg portion loosely due to pain, and no using the foot piece.  He has been reluctant to try an Unna's boot again due to concerns of limited mobility and effects on his ability to continue weekly golfing with friends.  Past medical history includes DVT's, Venous insuffiencey, Factor V Leiden and prothrombin mutation on coumadin life long.  DM type 2, obesity    Personal/social history: Pt lives independently in the [facility].    Objective:   Physical appearance: alert and oriented, ambulates with single pointed cane  Current treatment plan: Silvercel was most recent plan of care, pt ran out and has been just covering with dry gauze and applying a topical steroid cream  Last changed: this am new gauze applied after cleansing, no cream applied.    Wound #1 Right anterior and medial lower leg.  Venous stasis ulcers.  Stage/tissue depth: full thickness  18 cm L  x 13 cm W x 0.2 cm D  Tunneling: no  Undermining: no  Wound bed type/amount: of the area measured approximately 70 % intact skin, edema, aidan in appearance and hypopigmented in areas and 30 % red nongranular tissue; Not fluctuant  Wound Edges: open where there is depth  Periwound: edema, hemosiderin staining  Drainage: moderate to large amounts serous  Odor: no  Pain: yes at time, burning sharp pain short lived but intense.  Tolerable with wound cares today.      Photo's from today's initial Wound and Ostomy clinic visit 7/9/21.    Dorsalis Pedal Pulse: weak but palpable at the DP: NA doppler: NA phasic  Posterior Tibial Pulse: unable to palpate: NA doppler: NA phasic  Hair growth: none noted below the knee  Capillary Refill: 3 seconds on all five toes of the right foot.  Feet/toes color: pink, some hemosiderin staining present  Nails: thick and yellowed  R Leg: Edema nonpitting to plus 1. Ankle circumference NA cm. Calf circumference 43 cm.  L Leg: Edema NA. Ankle circumference NA cm. Calf circumference NA cm.    Mobility: limited due to leg and hip pain  Current offloading/footwear: supportive shoes  Sensation: occasional burning pain in the plantar feet with ambulation  HgbA1C: 7.2 Date: 6/15/21  Checks Blood Glucose?:  Does not currently check blood sugars in the home.  Average Readings: NA  Other callousing/areas of concern: none noted    Diet: Regular  Smoking: no    Discussed: etiology of wound (venous stasis ulcers), pathophysiology and patient specific goals for wound healing.   Education: role of woc nurse in the clinic setting.  Wound status, rational for use of absorptive products on wounds with an antimicrobial element.  Role of compression in healing, concern that his Ready Wrap garments will not provide therapeutic compression level due to pain and pt's difficulty in applying.  Options for wound and compression cares, rational to apply Silvercel to the wound sites today and an Unna's boot for  therapeutic compression.  Rational for follow up in three days for evaluation and cares.  Reasons to remove the Unna's boot wrap if pain increases, the boot slides down or it becomes wet, and actions to take if this happens.       Assessment:  Today the pt has a large area of scattered distinct venous stasis ulcers on the right anterior/medial lower leg and ankle.  The pt had recent GIAN's, they were unable to complete on the right due to pt requesting no BP cuff application due to pain.  The left LE GIAN was noncompressable.  Vascular MD feels there is no significant arterial component to the wounds.  The wounds are clean but wide spread and draining moderate to large amounts of serous drainage.  No increased warmth to touch noted.     Factors impacting wound healing:   Poor nutrition: inadequate supply of protein, carbohydrates, fatty acids, and trace elements essential for all phases of wound healing, reviewed need for increased protein in diet for healing.  Reduced Blood Supply: inadequate perfusion to heal wound, appear adequate, see Vascular MD visits for greater details  Medication: NA  Chemotherapy: suppresses the immune system and inflammatory response, NA  Radiotherapy: increases production of free radical which damage cells, NA  Psychological stress: related to pain and nonhealing wound  Obesity: decreases tissue perfusion, present  Infection: prolongs inflammatory phase, uses vital nutrients, impairs epithelialization and releases toxins, none noted at this time, antimicrobial dressing resumed today.  Underlying Disease: diabetes mellitis and venous insuffiencey.  Concerning his diabetes, the pt is not taking any oral hyperglycemic medications no insulin, he does not check his blood sugars in the home.  He states today he was told in the past he had pre diabetes but recently was told he has worsened.  He has an appointment with the diabetic education to get set up with glucometer testing and will follow up  with his MD on medications.  His past Hbg A1C levels have been high, last two years between 7.1 and 8.3.  Maceration: reduces wound tensile strength and inhibits epithelial migration, none noted today, addressed with Silvercel and Unna's boot to promote drying of the periwound skin  Patient compliance, TBD was compliant in the past with Unna's boot cares.  Unrelieved pressure, NA  Immobility, limited but no immobile  Substance abuse: NA    Plan:  After assessing the wounds and entire right lower leg and foot the pt and I discussed options.  He feels the wounds are not improving but has still been reluctant to try an Unna's boot compression wrap again.  We discussed care options, and I voiced my opinion that conventional wound cares with mild compression provided by his compression garments, would likely not heal the site or would only slow improve it's status.  The pt agreed to try an Unna's boot wrap with Silvercel to the wound sites, with Kerlix and an ace wrap for compression.  This was applied and per the pt was comfortable.  I will have him return in 3 days for removal and assessment.  Pt instructed that is the Unna's boot causes increased pain, slides down his leg, or gets wet, he needs to remove all the layers of the wraps, cleanse the skin with soap and water, dry well, apply Silvercel over the wound area, cover with an Abdominal pad and secure with stretch gauze and tape to the gauze, and then to either apply an ace wrap or his compression garments.  Note pt is scheduled to have ablation surgery in the right lower leg on August 19 th.      Topical care: Silvercel and Unna's boot  Offloading: NA  Additional recommendations: none at this time    Wound Care: Wound cleansed with saline and gauze, patted dry. Periwound protected with NA. Wound base filled with Silvercel. Covered with Unna's boot visco paste wrap from distal foot to just below the knee, followed by and ABD pad and Kerlix. Secured with ace wrap.  To be changed in three days.    Discussed plan of care with patient. Teaching done with patient for dressing changes; pt is able and willing to perform cares if Unna's boot needs to be removed, he is unable to apply the Unna's boot and will be seen in the Wound and Ostomy clinic two times weekly.    The following discharge instructions were reviewed with and sent home with the patient: See AVS    The following supplies were sent home with the patient:  Extra sheet of Silvercel, ABD pad, Stretch gauze roll and ace wrap.  Will reassess care plan in 3 days and order patient supplies as needed    Return visit: Monday 7/12/21    Verbal, written, & demonstrative education provided.  Face to face time: approximately 50 minutes.  Procedure: 5 minute application of an Unna's boot compression wrap to the right lower leg and foot.    [phone]

## 2021-07-12 ENCOUNTER — HOSPITAL ENCOUNTER (OUTPATIENT)
Dept: WOUND CARE | Facility: CLINIC | Age: 86
Discharge: HOME | End: 2021-07-12
Attending: PHYSICAL MEDICINE & REHABILITATION | Admitting: PHYSICAL MEDICINE & REHABILITATION
Payer: COMMERCIAL

## 2021-07-12 DIAGNOSIS — I83.013 VENOUS ULCER OF ANKLE, RIGHT (H): ICD-10-CM

## 2021-07-12 DIAGNOSIS — L97.319 VENOUS ULCER OF ANKLE, RIGHT (H): ICD-10-CM

## 2021-07-12 PROCEDURE — G0463 HOSPITAL OUTPT CLINIC VISIT: HCPCS | Mod: 25

## 2021-07-12 PROCEDURE — 29580 STRAPPING UNNA BOOT: CPT

## 2021-07-12 NOTE — DISCHARGE INSTRUCTIONS
Today it appears your right leg wounds are improving slowly with the Unna's boot and Silvercel material.  We re applied the Unna's boot wrap today with Silvercel and will plan to change the dressing this Thursday.    If the wrap gets wet, slides down or becomes painful in any area of the leg or foot, remove it.  Wash you leg after and apply the Silvercel and gauze that I am sending home with you.    I will see again this Thursday 7/15/21 at 8 am.    Any concerns call our scheduling line at [phone].    Thanks for coming in today.    Reji Malhotra RN cwocn

## 2021-07-12 NOTE — PROGRESS NOTES
Reason For Visit: Robert Richard, 85 year old male, seen as outpatient to re evaluate and treat right leg venous ulcers. Referred by Dr BLANQUITA Donahue MD. Patient presents by himself today.       History: Pt known to me from past visits to the Wound and Ostomy clinic for left leg venous ulcers, last being in Nov of 2019, when his leg was healed.  After seeing me he was fitted for compression socks, these he could not place.  He was recently fitted for Ready Wrap style compression garments.  He states he consistently wore the garment leg pieces but did not use the foot pieces.  But had to stop their use due to pain.  On 5/17/21 he presented to the ED here at [facility] with c/o right inner ankle pain.  He had scrapped his inner right ankle a few days prior, the site was sensitive and red.  US performed and no DVT found.  He was given oral antibiotics and sent home.  Since then he developed worsening ulcers of the medial right ankle.  He has been seen by Dr García and Dr Salvador, both recommended Unna's boot compression.  Pt was fitted for Ready Wraps and was wearing the leg portion loosely due to pain, and no using the foot piece.  He has been reluctant to try an Unna's boot again due to concerns of limited mobility and effects on his ability to continue weekly golfing with friends.  Initial revisit to the Wound and Ostomy clinic was 7/9/21  Past medical history includes DVT's, Venous insuffiencey, Factor V Leiden and prothrombin mutation on coumadin life long.  DM type 2, obesity     Personal/social history: Pt lives independently in the [facility].     Objective:   Physical appearance: alert and oriented, ambulates with single pointed cane  Current treatment plan: Silvercel to the open wounds, Unna's boot compression wrap to lower leg and foot.  Last changed: Friday 7/9/21     Wound #1 Right anterior and medial lower leg.  Venous stasis ulcers.  Stage/tissue depth: full thickness  17 cm L x 11 cm W x 0.1 cm  D  Tunneling: no  Undermining: no  Wound bed type/amount: of the area measured approximately 70 % intact skin, edema, aidan in appearance and hypopigmented in areas, 20 % red nongranular tissue and 10 % yellow slough; Not fluctuant  Wound Edges: open where there is depth  Periwound: edema, hemosiderin staining  Drainage: moderate amounts serous  Odor: no  Pain: yes at time, burning sharp pain short lived but intense.  Tolerable with wound cares today 2/10.    Photo from today's visit 7/12/21.        Photo's from initial Wound and Ostomy clinic return visit 7/9/21.     Dorsalis Pedal Pulse: weak but palpable at the DP: NA doppler: NA phasic  Posterior Tibial Pulse: unable to palpate: NA doppler: NA phasic  Hair growth: none noted below the knee  Capillary Refill: 3 seconds on all five toes of the right foot.  Feet/toes color: pink, some hemosiderin staining present  Nails: thick and yellowed  R Leg: Edema nonpitting. Ankle circumference NA cm. Calf circumference 39.5 cm.  L Leg: Edema NA. Ankle circumference NA cm. Calf circumference NA cm.     Mobility: limited due to leg and hip pain  Current offloading/footwear: supportive shoes  Sensation: occasional burning pain in the plantar feet with ambulation  HgbA1C: 7.2 Date: 6/15/21  Checks Blood Glucose?:  Does not currently check blood sugars in the home.  Average Readings: NA  Other callousing/areas of concern: none noted     Diet: Regular  Smoking: no     Discussed: etiology of wound (venous stasis ulcers), pathophysiology and patient specific goals for wound healing.   Education: Wound status, rational for use of absorptive products on wounds with an antimicrobial element.  Rational to apply Silvercel to the wound sites today and an Unna's boot for therapeutic compression. Rational for follow up in three days for evaluation and cares.  Reasons to remove the Unna's boot wrap if pain increases, the boot slides down or it becomes wet, and actions to take if this  happens.        Assessment:  Pt arrived today with his Unna's boot compression wrap in place, dry and clean.  He reports it was overall comfortable, he was able to shower with use of a large plastic bag to keep it dry.  Today the pt's right medial and anterior leg wound has improved.  The scattered yet distinct areas about the anterior and medial lower leg are all improved.  The are along the medial distal lower leg and ankle is now more scattered but has developed some slough tissue.  No signs of infection, drainage appears to have decreased some, edema in the leg is no longer pitting and measurements are less this visit.      Factors impacting wound healing:   Poor nutrition: inadequate supply of protein, carbohydrates, fatty acids, and trace elements essential for all phases of wound healing,.  Reduced Blood Supply: inadequate perfusion to heal wound, appear adequate, see Vascular MD visits for greater details  Medication: NA  Chemotherapy: suppresses the immune system and inflammatory response, NA  Radiotherapy: increases production of free radical which damage cells, NA  Psychological stress: related to pain and nonhealing wound  Obesity: decreases tissue perfusion, present  Infection: prolongs inflammatory phase, uses vital nutrients, impairs epithelialization and releases toxins, none noted at this time, antimicrobial dressing resumed today.  Underlying Disease: diabetes mellitis and venous insuffiencey.  Concerning his diabetes, the pt is not taking any oral hyperglycemic medications no insulin, he does not check his blood sugars in the home.  He states today he was told in the past he had pre diabetes but recently was told he has worsened.  He has an appointment with the diabetic education to get set up with glucometer testing and will follow up with his MD on medications.  His past Hbg A1C levels have been high, last two years between 7.1 and 8.3.  Maceration: reduces wound tensile strength and inhibits  epithelial migration, none noted today, addressed with Silvercel and Unna's boot to promote drying of the periwound skin, also added in Sween 24 today on intact skin about the medial lower leg and ankle where drainage is the greatest.  Patient compliance, has been compliant with cares  Unrelieved pressure, NA  Immobility, limited but no immobile  Substance abuse: NA     Plan:  I re applied an Unna's boot with some modifications.  I applied Sween 24 to the intact skin around the medial lower leg and ankle wound area, I used small pieces of Silvercel to the areas with slough and just applied the Unna's boot wrap directly to the now scattered and smaller shallow or no depth wounds.  I added padding to the achilles area for protection and an ABD pad over the Unna's on the Medial aspect.  Two Kerlix rolls and one ace wrap applied.  Pt will leave in place till he returns in 3 days.  Instructed pt that if the wrap slides down, gets wet or is causing new pain any where on the leg, to remove it, wash and dry the leg, apply Silvercel over any open areas, cover with an ABD pad, secured with stretch gauze and tape to the gauze and to use his Ready Wrap garments, both leg and foot pieces, till he returns in 3 days.       Topical care: Silvercel and Unna's boot  Offloading: NA  Additional recommendations: none at this time     Wound Care: Wound cleansed with saline and gauze, patted dry. Periwound protected with Sween 24. Wound base filled with Silvercel. Covered with Unna's boot visco paste wrap from distal foot to just below the knee, followed by and ABD pad and Kerlix. Secured with ace wrap. To be changed in three days.     Discussed plan of care with patient. Teaching done with patient for dressing changes; pt is able and willing to perform cares if Unna's boot needs to be removed, he is unable to apply the Unna's boot and will be seen in the Wound and Ostomy clinic two times weekly.     The following discharge instructions  were reviewed with and sent home with the patient: See AVS     The following supplies were sent home with the patient:  none this visit  Will reassess care plan in 3 days and order patient supplies as needed     Return visit: Thursday 7/15/21     Verbal, written, & demonstrative education provided.  Face to face time: approximately 40 minutes.  Procedure: 5 minute application of an Unna's boot compression wrap to the right lower leg and foot.     [phone]

## 2021-07-14 ENCOUNTER — PATIENT OUTREACH (OUTPATIENT)
Dept: EDUCATION SERVICES | Facility: CLINIC | Age: 86
End: 2021-07-14
Payer: COMMERCIAL

## 2021-07-14 DIAGNOSIS — E11.8 TYPE 2 DIABETES MELLITUS WITH COMPLICATION, WITHOUT LONG-TERM CURRENT USE OF INSULIN (H): ICD-10-CM

## 2021-07-14 PROCEDURE — G0108 DIAB MANAGE TRN  PER INDIV: HCPCS

## 2021-07-14 NOTE — PROGRESS NOTES
"Diabetes Self-Management Education & Support  Type of Service: Telephone Visit    Audio only visit done, as patient does not have access to audio-visual technology.    Individual visit provided, given no group classes are available for 2 months.     How would patient like to obtain AVS? Reginaldo        Presents for: Initial Assessment for new diagnosis    SUBJECTIVE/OBJECTIVE:  Presents for: Initial Assessment for new diagnosis  Accompanied by: Self  Diabetes education in the past 24mo: No  Focus of Visit: Patient Unsure  Diabetes type: Type 2  Date of diagnosis: June 2021  Diabetes management related comments/concerns: has meter and feels comfortable reading instructions  Cultural Influences/Ethnic Background:  Romanian      Diabetes Symptoms & Complications:  Fatigue: No  Slow healing wounds: Yes  Complications assessed today?: No    Patient Problem List and Family Medical History reviewed for relevant medical history, current medical status, and diabetes risk factors.    Vitals:  There were no vitals taken for this visit.  Estimated body mass index is 34.44 kg/m  as calculated from the following:    Height as of 6/1/21: 1.778 m (5' 10\").    Weight as of 6/29/21: 108.9 kg (240 lb).   Last 3 BP:   BP Readings from Last 3 Encounters:   06/29/21 128/84   06/21/21 124/62   06/15/21 128/80       History   Smoking Status     Never Smoker   Smokeless Tobacco     Never Used       Labs:  Lab Results   Component Value Date    A1C 7.2 06/15/2021     Lab Results   Component Value Date     05/17/2021     Lab Results   Component Value Date     02/05/2021     HDL Cholesterol   Date Value Ref Range Status   02/05/2021 57 >39 mg/dL Final   ]  GFR Estimate   Date Value Ref Range Status   05/17/2021 43 (L) >60 mL/min/[1.73_m2] Final     Comment:     Non  GFR Calc  Starting 12/18/2018, serum creatinine based estimated GFR (eGFR) will be   calculated using the Chronic Kidney Disease Epidemiology " Collaboration   (CKD-EPI) equation.       GFR Estimate If Black   Date Value Ref Range Status   05/17/2021 50 (L) >60 mL/min/[1.73_m2] Final     Comment:      GFR Calc  Starting 12/18/2018, serum creatinine based estimated GFR (eGFR) will be   calculated using the Chronic Kidney Disease Epidemiology Collaboration   (CKD-EPI) equation.       Lab Results   Component Value Date    CR 1.47 05/17/2021     No results found for: MICROALBUMIN    Healthy Eating:  Healthy Eating Assessed Today: Yes  Meal planning/habits: Smaller portions  Meals include: Breakfast, Dinner  Breakfast: mid morning. avoid cereals now. might have coffee and muffin or buttered soda crackers  Lunch: snack. leftover meat  Dinner: 3-4 pm meal. grilled burger or chicken or out to eat.  Other: avoid heavy starch meals. vegetables might be corn, peas, baby carrots with ranch or salad. lactose intolerant. a drink or 2 - in moderation  Beverages: Water, Coffee, Alcohol    Being Active:  Being Active Assessed Today: Yes  Exercise:: Currently not exercising  Barrier to exercise: Physical limitation    Monitoring:  Monitoring Assessed Today: No  Blood Glucose Meter: [employer] Solution[employer] One  Times checking blood sugar at home (number): Never    Has not used yet    Taking Medications:  Diabetes Medication(s)     Sulfonylureas       glipiZIDE (GLUCOTROL XL) 5 MG 24 hr tablet    Take 1 tablet by mouth once daily          Taking Medication Assessed Today: Yes  Current Treatments: Oral Medication (taken by mouth)  Problems taking diabetes medications regularly?: No  Diabetes medication side effects?: No    Problem Solving:  Problem Solving Assessed Today: Yes  Is the patient at risk for hypoglycemia?: Yes  Hypoglycemia Frequency: Never  Hypoglycemia Treatment: Other food              Reducing Risks:  Reducing Risks Assessed Today: No  Diabetes Risks: Age over 45 years  CAD Risks: Diabetes Mellitus, Obesity, Male sex    Healthy Coping:  Healthy Coping  Assessed Today: Yes  Emotional response to diabetes: Ready to learn  Informal Support system:: Spouse  Stage of change: ACTION (Actively working towards change)  Support resources: Offerings in Clinic Communities  Patient Activation Measure Survey Score:  DAREN Score (Last Two) 4/1/2011 5/19/2011   DAREN Raw Score 39 47   Activation Score 56.4 77.5   DAREN Level 3 4       Diabetes knowledge and skills assessment:   Patient is knowledgeable in diabetes management concepts related to:     Patient needs further education on the following diabetes management concepts: Healthy Eating, Being Active, Monitoring, Taking Medication, Problem Solving, Reducing Risks and Healthy Coping    Based on learning assessment above, most appropriate setting for further diabetes education would be: Individual setting.      INTERVENTIONS:    Education provided today on:  AADE Self-Care Behaviors:  Healthy Eating: portion control  Taking Medication: action of prescribed medication and side effects of prescribed medications  Problem Solving: low blood glucose - causes, signs/symptoms, treatment and prevention and carrying a carbohydrate source at all times    Opportunities for ongoing education and support in diabetes-self management were discussed.    Pt verbalized understanding of concepts discussed and recommendations provided today.       Education Materials Provided:  No new materials provided today      ASSESSMENT:  Needs video link for meter in case has trouble getting started. Has not felt any symptoms of high or low blood sugars.   Stays active golfing and working in his garage. On Coumadin so asks for guidance on vegetable recommendations for that and diabetes.         Patient's most recent   Lab Results   Component Value Date    A1C 7.2 06/15/2021    is meeting goal of <8.0    PLAN  See Patient Instructions for co-developed, patient-stated behavior change goals.    Fallon Galdamez RDN, ELIJAH, CDCES    Time Spent: 30 minutes  Encounter  Type: Individual    Any diabetes medication dose changes were made via the CDE Protocol and Collaborative Practice Agreement with the patient's primary care provider. A copy of this encounter was shared with the provider.

## 2021-07-15 ENCOUNTER — HOSPITAL ENCOUNTER (OUTPATIENT)
Dept: WOUND CARE | Facility: CLINIC | Age: 86
Discharge: HOME | End: 2021-07-15
Admitting: INTERNAL MEDICINE
Payer: COMMERCIAL

## 2021-07-15 PROCEDURE — 29580 STRAPPING UNNA BOOT: CPT | Mod: RT

## 2021-07-15 PROCEDURE — G0463 HOSPITAL OUTPT CLINIC VISIT: HCPCS | Mod: 25

## 2021-07-15 NOTE — PROGRESS NOTES
Reason For Visit: Robert Richard, 85 year old male, seen as outpatient to re evaluate and treat right leg venous ulcers. Referred by Dr BLANQUITA Donahue MD. Patient presents by himself today.       History: Pt known to me from past visits to the Wound and Ostomy clinic for left leg venous ulcers, last being in Nov of 2019, when his leg was healed.  After seeing me he was fitted for compression socks, these he could not place.  He was recently fitted for Ready Wrap style compression garments.  He states he consistently wore the garment leg pieces but did not use the foot pieces.  But had to stop their use due to pain.  On 5/17/21 he presented to the ED here at [facility] with c/o right inner ankle pain.  He had scrapped his inner right ankle a few days prior, the site was sensitive and red.  US performed and no DVT found.  He was given oral antibiotics and sent home.  Since then he developed worsening ulcers of the medial right ankle.  He has been seen by Dr García and Dr Salvador, both recommended Unna's boot compression.  Pt was fitted for Ready Wraps and was wearing the leg portion loosely due to pain, and no using the foot piece.  He has been reluctant to try an Unna's boot again due to concerns of limited mobility and effects on his ability to continue weekly golfing with friends.  Initial revisit to the Wound and Ostomy clinic was 7/9/21  Past medical history includes DVT's, Venous insuffiencey, Factor V Leiden and prothrombin mutation on coumadin life long.  DM type 2, obesity     Personal/social history: Pt lives independently in the [facility].     Objective:   Physical appearance: alert and oriented, ambulates with single pointed cane  Current treatment plan: Silvercel to the open wounds, Unna's boot compression wrap to lower leg and foot.  Last changed: Friday 7/9/21     Wound #1 Right anterior and medial lower leg.  Venous stasis ulcers.  Stage/tissue depth: full thickness  12 cm L x 10 cm W x 0.1 cm  D  Tunneling: no  Undermining: no  Wound bed type/amount: of the area measured approximately 80 % intact skin, edema, aidan in appearance and hypopigmented in areas, 10 % red nongranular tissue and 10 % yellow slough; Not fluctuant  Wound Edges: open where there is depth  Periwound: edema, hemosiderin staining  Drainage: moderate amounts serous  Odor: no  Pain: yes at time, burning sharp pain short lived but intense.    Photo from today's visit 7/15/21.        Photo's from initial Wound and Ostomy clinic return visit 7/9/21.     Dorsalis Pedal Pulse: weak but palpable at the DP: NA doppler: NA phasic  Posterior Tibial Pulse: unable to palpate: NA doppler: NA phasic  Hair growth: none noted below the knee  Capillary Refill: 3 seconds on all five toes of the right foot.  Feet/toes color: pink, some hemosiderin staining present  Nails: thick and yellowed  R Leg: Edema nonpitting. Ankle circumference NA cm. Calf circumference 39 cm.  L Leg: Edema NA. Ankle circumference NA cm. Calf circumference NA cm.     Mobility: limited due to leg and hip pain  Current offloading/footwear: supportive shoes  Sensation: occasional burning pain in the plantar feet with ambulation  HgbA1C: 7.2 Date: 6/15/21  Checks Blood Glucose?:  Does not currently check blood sugars in the home.  Average Readings: NA  Other callousing/areas of concern: none noted     Diet: Regular  Smoking: no     Discussed: etiology of wound (venous stasis ulcers), pathophysiology and patient specific goals for wound healing.   Education: Wound status.  Rational to apply Silvercel to the wound sites with slough today and an Unna's boot for therapeutic compression. Rational for follow up in four days for evaluation and cares.  Reasons to remove the Unna's boot wrap if pain increases, the boot slides down or it becomes wet, and actions to take if this happens.        Assessment:  Pt arrived today with his Unna's boot compression wrap in place, dry and clean.  He  reports it was overall comfortable with no new concerns.  Today the pt's right medial and anterior leg wound has improved.  The scattered yet distinct areas about the anterior and medial lower leg are all improved with many closed.    The main area of concern on the medial lower leg just proximal to the ankle is improved.  Smaller areas of slough present, more intact tissue though fragile.  Application of the Unna's boot to the medial ankle areas of denudement has made that area mostly dry and intact with only a small open moist wound present.   Edema is stable, no signs of infection noted.  Added padding to the posterior heel was more comfortable for the pt and did not cause any new concerns.        Factors impacting wound healing:   Poor nutrition: inadequate supply of protein, carbohydrates, fatty acids, and trace elements essential for all phases of wound healing,.  Reduced Blood Supply: inadequate perfusion to heal wound, appear adequate, see Vascular MD visits for greater details  Medication: NA  Chemotherapy: suppresses the immune system and inflammatory response, NA  Radiotherapy: increases production of free radical which damage cells, NA  Psychological stress: related to pain and nonhealing wound  Obesity: decreases tissue perfusion, present  Infection: prolongs inflammatory phase, uses vital nutrients, impairs epithelialization and releases toxins, none noted at this time, antimicrobial dressing resumed today.  Underlying Disease: diabetes mellitis and venous insuffiencey.  Pt had his first diabetic education via phone.  Maceration: reduces wound tensile strength and inhibits epithelial migration, none noted today, addressed with Silvercel and Unna's boot to promote drying of the periwound skin, also Sween 24 applied on intact skin about the medial lower leg and ankle where drainage is the greatest.  Patient compliance, has been compliant with cares  Unrelieved pressure, NA  Immobility, limited but no  immobile  Substance abuse: NA     Plan:  I re applied an Unna's boot with small pieces of Silvercel to the areas of the distal medial lower right leg where slough is present and protected the periwound skin with Sween 24.  I added padding to the achilles area for protection and an ABD pad over the Unna's on the medial aspect.  Two Kerlix rolls and one ace wrap applied.  Pt will leave in place till he returns in 4 days.  Instructed pt that if the wrap slides down, gets wet or is causing new pain any where on the leg, to remove it, wash and dry the leg, apply Silvercel over any open areas, cover with an ABD pad, secured with stretch gauze and tape to the gauze and to use his Ready Wrap garments, both leg and foot pieces, till he returns in 4 days.       Topical care: Silvercel and Unna's boot  Offloading: NA  Additional recommendations: none at this time     Wound Care: Wound cleansed with saline and gauze, patted dry. Periwound protected with Sween 24. Wound base filled with Silvercel. Covered with Unna's boot visco paste wrap from distal foot to just below the knee, followed by and ABD pad and Kerlix. Secured with ace wrap. To be changed twice weekly.     Discussed plan of care with patient. Teaching done with patient for dressing changes; pt is able and willing to perform cares if Unna's boot needs to be removed, he is unable to apply the Unna's boot and will be seen in the Wound and Ostomy clinic two times weekly.     The following discharge instructions were reviewed with and sent home with the patient: See AVS     The following supplies were sent home with the patient:  none this visit  Will reassess care plan in 4 days and order patient supplies as needed     Return visit: Monday 7/19/21     Verbal, written, & demonstrative education provided.  Face to face time: approximately 30 minutes.  Procedure: 5 minute application of an Unna's boot compression wrap to the right lower leg and foot.     [phone]

## 2021-07-16 ENCOUNTER — TELEPHONE (OUTPATIENT)
Dept: VASCULAR SURGERY | Facility: CLINIC | Age: 86
End: 2021-07-16

## 2021-07-16 NOTE — TELEPHONE ENCOUNTER
Called and LD for pt to give me a call back. Pt is able to move his procedure up to 8/5/21 at 1045, (1878 check in) if this works for the pt. Also, wanted to review some preop instructions with pt.    Gave pt our call back number.    Raquel Veras, DONGN, RN  [facility]  Vein Clinic

## 2021-07-19 ENCOUNTER — HOSPITAL ENCOUNTER (OUTPATIENT)
Dept: WOUND CARE | Facility: CLINIC | Age: 86
Discharge: HOME | End: 2021-07-19
Attending: INTERNAL MEDICINE | Admitting: INTERNAL MEDICINE
Payer: COMMERCIAL

## 2021-07-19 DIAGNOSIS — E11.8 TYPE 2 DIABETES MELLITUS WITH COMPLICATION, WITHOUT LONG-TERM CURRENT USE OF INSULIN (H): ICD-10-CM

## 2021-07-19 DIAGNOSIS — G25.81 RESTLESS LEG SYNDROME: ICD-10-CM

## 2021-07-19 PROCEDURE — 29580 STRAPPING UNNA BOOT: CPT | Mod: RT

## 2021-07-19 PROCEDURE — G0463 HOSPITAL OUTPT CLINIC VISIT: HCPCS | Mod: 25

## 2021-07-19 NOTE — TELEPHONE ENCOUNTER
Pt called back. Spoke to him regarding preop information.        Vein Clinic Preoperative Nurse Call    Procedure: Right leg VNUS closure GSV(med nec)  Date: Thursday 8/5  Surgeon: Dr. Powers  Time: 1030  Check in time: 0930    Informed patient: when to check in (0930) to sign consent, to bring their preop medications in their original bottle with them (1mg ativan, 0.1mg clonidine, 2g Amoxicillin). Patient will take the medications after signing the consent to the procedure. Instructed patient to wear a mask and wear loose-fitting comfortable clothing. Ensured patient has a /someone that will be responsible for them the rest of the day. Visitors are allowed in the clinic, but they would need to stay in an exam room or wait in the parking lot or leave. If  does leave, IF POSSIBLE, we ask that they do not go more than 15-20 mins from our clinic. Once procedure is completed, we will keep patient in recovery for 30-45 mins, and call  with aftercare instructions.     Pt needs thigh-high compression hose for procedure. Status of the hose: patient cannot tolerate compression hose. Pt has a coolflex wrap and then we'll use an ACE wrap as needed, most likely for his thigh/groin area following the ablation.    Special instructions: Instructed pt he is okay to continue taking his coumadin.     Special COVID-19 instructions: Patient aware they need to wear a mask to our clinic and during their procedure. Patient aware that their  can come in with them, but would need to stay in an exam room during the procedure or wait in the parking lot or leave. Nurse will call pt's  when procedure is over.    Patient understands that if they have any of the following symptoms (fever, cough, shortness of breath, rash), they need to notify us immediately to cancel their procedure and will have to reschedule for a later date.    Informed pt we will be calling him next week when his preop medication will be sent over  to his preferred pharmacy.    Patient is in agreement with preoperative information and has no further questions.    Raquel Veras, RN  7/19/2021

## 2021-07-19 NOTE — PROGRESS NOTES
Reason For Visit: Robert Richard, 85 year old male, seen as outpatient to re evaluate and treat right leg venous ulcers. Referred by Dr BLANQUITA Donahue MD. Patient presents by himself today.       History: Pt known to me from past visits to the Wound and Ostomy clinic for left leg venous ulcers, last being in Nov of 2019, when his leg was healed.  After seeing me he was fitted for compression socks, these he could not place.  He was recently fitted for Ready Wrap style compression garments.  He states he consistently wore the garment leg pieces but did not use the foot pieces.  But had to stop their use due to pain.  On 5/17/21 he presented to the ED here at [facility] with c/o right inner ankle pain.  He had scrapped his inner right ankle a few days prior, the site was sensitive and red.  US performed and no DVT found.  He was given oral antibiotics and sent home.  Since then he developed worsening ulcers of the medial right ankle.  He has been seen by Dr García and Dr Salvador, both recommended Unna's boot compression.  Pt was fitted for Ready Wraps and was wearing the leg portion loosely due to pain, and no using the foot piece.  He has been reluctant to try an Unna's boot again due to concerns of limited mobility and effects on his ability to continue weekly golfing with friends.  Initial revisit to the Wound and Ostomy clinic was 7/9/21  Past medical history includes DVT's, Venous insuffiencey, Factor V Leiden and prothrombin mutation on coumadin life long.  DM type 2, obesity     Personal/social history: Pt lives independently in the [facility].     Objective:   Physical appearance: alert and oriented, ambulates with single pointed cane  Current treatment plan: Silvercel to the open wounds, Unna's boot compression wrap to lower leg and foot.  Last changed: Friday 7/15/21     Wound #1 Right anterior and medial lower leg.  Venous stasis ulcers.  Stage/tissue depth: full thickness  11.5 cm L x 9 cm W x 0.1 cm  D  Tunneling: no  Undermining: no  Wound bed type/amount: of the area measured approximately 80 % intact skin, edema, aidan in appearance and hypopigmented in areas, 10 % red nongranular tissue and 10 % yellow slough; Not fluctuant  Wound Edges: open where there is depth  Periwound: edema, hemosiderin staining  Drainage: moderate amounts serous  Odor: no  Pain: yes at time, burning sharp pain short lived but intense.  No Photo taken this visit 7/19/21.      Photo from 7/15/21.         Photo's from initial Wound and Ostomy clinic return visit 7/9/21.     Dorsalis Pedal Pulse: weak but palpable at the DP: NA doppler: NA phasic  Posterior Tibial Pulse: unable to palpate: NA doppler: NA phasic  Hair growth: none noted below the knee  Capillary Refill: 3 seconds on all five toes of the right foot.  Feet/toes color: pink, some hemosiderin staining present  Nails: thick and yellowed  R Leg: Edema nonpitting. Ankle circumference NA cm. Calf circumference 37.5 cm.  L Leg: Edema NA. Ankle circumference NA cm. Calf circumference NA cm.     Mobility: limited due to leg and hip pain  Current offloading/footwear: supportive shoes  Sensation: occasional burning pain in the plantar feet with ambulation  HgbA1C: 7.2 Date: 6/15/21  Checks Blood Glucose?:  Does not currently check blood sugars in the home.  Average Readings: NA  Other callousing/areas of concern: none noted     Diet: Regular  Smoking: no     Discussed: etiology of wound (venous stasis ulcers), pathophysiology and patient specific goals for wound healing.   Education: Wound status.  Rational to not use the Silvercel, trial of the use of Triad paste to fully debride the wound while still applying the Unna's boot. Rational for follow up in three days for evaluation and cares.  Reasons to remove the Unna's boot wrap if pain increases, the boot slides down or it becomes wet, and actions to take if this happens.        Assessment:  Pt arrived today with his Unna's boot  compression wrap in place, dry and clean.  He reports it was overall comfortable with no new concerns.  Today the pt's right medial and anterior leg wound has improved.  The now more well demarcated area about the medial lower leg however remains with slough present.  Was able to remove some with forcefull irritation today but appears the Silvercel is not fully promoting debriding.  The areas where the Unna's boot were applied directly to the skin and open wound with no slough is all improved.  Edema in the lower leg is reduced this visit also.       Factors impacting wound healing:   Poor nutrition: inadequate supply of protein, carbohydrates, fatty acids, and trace elements essential for all phases of wound healing,.  Reduced Blood Supply: inadequate perfusion to heal wound, appear adequate, see Vascular MD visits for greater details  Medication: NA  Chemotherapy: suppresses the immune system and inflammatory response, NA  Radiotherapy: increases production of free radical which damage cells, NA  Psychological stress: related to pain and nonhealing wound  Obesity: decreases tissue perfusion, present  Infection: prolongs inflammatory phase, uses vital nutrients, impairs epithelialization and releases toxins, none noted at this time.  Underlying Disease: diabetes mellitis and venous insuffiencey.  Pt had his first diabetic education via phone.  Maceration: reduces wound tensile strength and inhibits epithelial migration, none noted today, addressed with Silvercel and Unna's boot to promote drying of the periwound skin, also Sween 24 applied on intact skin about the medial lower leg and ankle where drainage is the greatest.  Patient compliance, has been compliant with cares  Unrelieved pressure, NA  Immobility, limited but no immobile  Substance abuse: NA     Plan:  I re applied an Unna's boot without the use of the Silvercel.  Instead I applied Triad paste to all the areas of slough, after forceful irrigation with  saline.  I applied an ABD pad over the area of wounds and extra gauze for padding on top of the Unna's boot over the achilles tendon area.  Pt will return in three days for cares and evaluation.       Topical care: Silvercel and Unna's boot  Offloading: NA  Additional recommendations: none at this time     Wound Care: Wound cleansed with saline and gauze, patted dry. Periwound protected with Triad paste. Wound base filled with Triad paste. Covered with Unna's boot visco paste wrap from distal foot to just below the knee, followed by and ABD pad and Kerlix. Secured with ace wrap. To be changed twice weekly.     Discussed plan of care with patient. Teaching done with patient for dressing changes; pt is able and willing to perform cares if Unna's boot needs to be removed, he is unable to apply the Unna's boot and will be seen in the Wound and Ostomy clinic two times weekly.     The following discharge instructions were reviewed with and sent home with the patient: See AVS     The following supplies were sent home with the patient:  none this visit  Will reassess care plan in 3 days and order patient supplies as needed     Return visit: Monday 7/22/21     Verbal, written, & demonstrative education provided.  Face to face time: approximately 30 minutes.  Procedure: 5 minute application of an Unna's boot compression wrap to the right lower leg and foot.     [phone]

## 2021-07-19 NOTE — DISCHARGE INSTRUCTIONS
Today we reapplied the Unna's boot.  We applied Triad paste on the slough areas of the wound and the Unna's directly to the skin with no Silvercel.    I will see you again this week Thursday July the 22nd at 8 am and we will re evaluate the need for the Unna's boot at that time.    Any concerns or questions call our scheduling line at [phone] and they will assist you.    Reji Malhotra RN cwocn

## 2021-07-20 ENCOUNTER — TELEPHONE (OUTPATIENT)
Dept: ANTICOAGULATION | Facility: CLINIC | Age: 86
End: 2021-07-20

## 2021-07-20 NOTE — TELEPHONE ENCOUNTER
ANTICOAGULATION     Robert Richard is overdue for INR check.      Left message for patient to call and schedule lab appointment as soon as possible. If returning call, please schedule.    Advised that he check his INR on 7/22 while in the clinic for wound care. He is scheduled for a procedure on 8/5 and they have advised him to stay on his warfarin.     Neelima Arreaga RN

## 2021-07-22 ENCOUNTER — HOSPITAL ENCOUNTER (OUTPATIENT)
Dept: WOUND CARE | Facility: CLINIC | Age: 86
End: 2021-07-22
Attending: INTERNAL MEDICINE
Payer: COMMERCIAL

## 2021-07-22 RX ORDER — PRAMIPEXOLE DIHYDROCHLORIDE 1 MG/1
1 TABLET ORAL 2 TIMES DAILY
Qty: 180 TABLET | Refills: 1 | Status: SHIPPED | OUTPATIENT
Start: 2021-07-22 | End: 2022-02-25

## 2021-07-22 RX ORDER — GLIPIZIDE 5 MG/1
TABLET, FILM COATED, EXTENDED RELEASE ORAL
Qty: 90 TABLET | Refills: 0 | Status: SHIPPED | OUTPATIENT
Start: 2021-07-22 | End: 2021-12-23

## 2021-07-22 NOTE — DISCHARGE INSTRUCTIONS
Today the wound son your right inner ankle appears to be improving well.  Total area of damage is smaller and less severe damage within.  We re applied the Unna's boot to your right leg and foot and we will plan to change it again in four days on Monday July the 26 th at 8 am.    We will keep an eye on your right 3rd toe where you scrapped it.  I took a picture today and will keep that on file for reference.    I will see you again next week Monday July the 26th at 8 am.  Any concerns between now and then call our scheduling line at [phone] and they will assist you.    Reji Malhotra RN cwocn

## 2021-07-22 NOTE — PROGRESS NOTES
Reason For Visit: Robert Richard, 85 year old male, seen as outpatient to re evaluate and treat right leg venous ulcers. Referred by Dr BLANQUITA Donahue MD. Patient presents by himself today.       History: Pt known to me from past visits to the Wound and Ostomy clinic for left leg venous ulcers, last being in Nov of 2019, when his leg was healed.  After seeing me he was fitted for compression socks, these he could not place.  He was recently fitted for Ready Wrap style compression garments.  He states he consistently wore the garment leg pieces but did not use the foot pieces.  But had to stop their use due to pain.  On 5/17/21 he presented to the ED here at [facility] with c/o right inner ankle pain.  He had scrapped his inner right ankle a few days prior, the site was sensitive and red.  US performed and no DVT found.  He was given oral antibiotics and sent home.  Since then he developed worsening ulcers of the medial right ankle.  He has been seen by Dr García and Dr Salvador, both recommended Unna's boot compression.  Pt was fitted for Ready Wraps and was wearing the leg portion loosely due to pain, and no using the foot piece.  He has been reluctant to try an Unna's boot again due to concerns of limited mobility and effects on his ability to continue weekly golfing with friends.  Initial revisit to the Wound and Ostomy clinic was 7/9/21  Past medical history includes DVT's, Venous insuffiencey, Factor V Leiden and prothrombin mutation on coumadin life long.  DM type 2, obesity     Personal/social history: Pt lives independently in the [facility].     Objective:   Physical appearance: alert and oriented, ambulates with single pointed cane  Current treatment plan: Unna's boot compression wrap to lower leg and foot without the use of Silvercel, with the use of a small amount of Triad paste.  Last changed: 7/19/21     Wound #1 Right anterior and medial lower leg.  Venous stasis ulcers.  Stage/tissue depth: full  thickness  5.5 cm L x 5 cm W x 0.1 cm D  Tunneling: no  Undermining: no  Wound bed type/amount: of the area measured approximately 80 % intact skin, edema, aidan in appearance and hypopigmented in areas, 20% red nongranular tissue and scant amounts of yellow slough; Not fluctuant  Wound Edges: open where there is depth  Periwound: edema, hemosiderin staining  Drainage: moderate amounts serous  Odor: no  Pain: yes at time, burning sharp pain short lived but intense.    Photo taken today 7/22/21.        Photo's from initial Wound and Ostomy clinic return visit 7/9/21.     Dorsalis Pedal Pulse: weak but palpable at the DP: NA doppler: NA phasic  Posterior Tibial Pulse: unable to palpate: NA doppler: NA phasic  Hair growth: none noted below the knee  Capillary Refill: 3 seconds on all five toes of the right foot.  Feet/toes color: pink, some hemosiderin staining present  Nails: thick and yellowed  R Leg: Edema nonpitting. Ankle circumference NA cm. Calf circumference 37.5 cm, no change.  L Leg: Edema NA. Ankle circumference NA cm. Calf circumference NA cm.     Mobility: limited due to leg and hip pain  Current offloading/footwear: supportive shoes  Sensation: occasional burning pain in the plantar feet with ambulation  HgbA1C: 7.2 Date: 6/15/21  Checks Blood Glucose?:  Does not currently check blood sugars in the home.  Average Readings: NA  Other callousing/areas of concern: Right foot 3rd toe, abrasion per pt related to trying to file his toe nails himself and missed.  Today applied bacitracin to the site and covered with a band aid, will re eval in 4 days.        Diet: Regular  Smoking: no     Discussed: etiology of wound (venous stasis ulcers), pathophysiology and patient specific goals for wound healing.   Education: Wound status.  Rational to continue with the use of the Unna's boot with no other products as primary dressings. Rational for follow up in four days for evaluation and cares.  Reasons to remove the  Unna's boot wrap if pain increases, the boot slides down or it becomes wet, and actions to take if this happens.        Assessment:  Pt arrived today with his Unna's boot compression wrap in place, dry and clean.  He reports it was overall comfortable with no new concerns.  Today the pt's right medial and anterior leg wound has improved.  The medial ankle area distally is improving and nearly resolved with the distal lower leg still having a cluster of denudement partial and full thickness.  No signs of infection noted today.  New concern of the right foot 3rd toe abrasion. Will monitor and provide cares as needed.       Factors impacting wound healing:   Poor nutrition: inadequate supply of protein, carbohydrates, fatty acids, and trace elements essential for all phases of wound healing.  Reduced Blood Supply: inadequate perfusion to heal wound, appear adequate, see Vascular MD visits for greater details  Medication: NA  Chemotherapy: suppresses the immune system and inflammatory response, NA  Radiotherapy: increases production of free radical which damage cells, NA  Psychological stress: related to pain and nonhealing wound  Obesity: decreases tissue perfusion, present  Infection: prolongs inflammatory phase, uses vital nutrients, impairs epithelialization and releases toxins, none noted at this time.  Underlying Disease: diabetes mellitis and venous insuffiencey.  Pt had his first diabetic education via phone.  Maceration: reduces wound tensile strength and inhibits epithelial migration, none noted today, addressed with Silvercel and Unna's boot to promote drying of the periwound skin, also Sween 24 applied on intact skin about the medial lower leg and ankle where drainage is the greatest.  Patient compliance, has been compliant with cares  Unrelieved pressure, NA  Immobility, limited but no immobile  Substance abuse: NA     Plan:  I re applied an Unna's boot without the use of the Silvercel nor Triad paste.   Appears wound area is now most appropriate for just the visco paste wrap of the Unna's boot as primary dressing.       Topical care: Silvercel and Unna's boot  Offloading: NA  Additional recommendations: none at this time     Wound Care: Wound cleansed with saline and gauze, patted dry. Periwound protected with NA. Wound base filled with NA. Covered with Unna's boot visco paste wrap from distal foot to just below the knee, followed by and ABD pad and Kerlix. Secured with ace wrap. To be changed twice weekly.     Discussed plan of care with patient. Teaching done with patient for dressing changes; pt is able and willing to perform cares if Unna's boot needs to be removed, he is unable to apply the Unna's boot and will be seen in the Wound and Ostomy clinic two times weekly.     The following discharge instructions were reviewed with and sent home with the patient: See AVS     The following supplies were sent home with the patient:  none this visit  Will reassess care plan in 4 days and order patient supplies as needed     Return visit: 7/26/21     Verbal, written, & demonstrative education provided.  Face to face time: approximately 30 minutes.  Procedure: 5 minute application of an Unna's boot compression wrap to the right lower leg and foot.     [phone]

## 2021-07-22 NOTE — TELEPHONE ENCOUNTER
Glipizide  Routing refill request to provider for review/approval because:  Labs out of range:  Creatinine    Lakeisha Gomez RN

## 2021-07-26 ENCOUNTER — HOSPITAL ENCOUNTER (OUTPATIENT)
Dept: WOUND CARE | Facility: CLINIC | Age: 86
Discharge: HOME | End: 2021-07-26
Attending: INTERNAL MEDICINE | Admitting: INTERNAL MEDICINE
Payer: COMMERCIAL

## 2021-07-26 PROCEDURE — 29580 STRAPPING UNNA BOOT: CPT | Mod: RT

## 2021-07-26 PROCEDURE — G0463 HOSPITAL OUTPT CLINIC VISIT: HCPCS | Mod: 25

## 2021-07-26 NOTE — PROGRESS NOTES
Reason For Visit: Robert Richard, 85 year old male, seen as outpatient to re evaluate and treat right leg venous ulcers. Referred by Dr BLANQUITA Donahue MD. Patient presents by himself today.       History: Pt known to me from past visits to the Wound and Ostomy clinic for left leg venous ulcers, last being in Nov of 2019, when his leg was healed.  After seeing me he was fitted for compression socks, these he could not place.  He was recently fitted for Ready Wrap style compression garments.  He states he consistently wore the garment leg pieces but did not use the foot pieces.  But had to stop their use due to pain.  On 5/17/21 he presented to the ED here at [facility] with c/o right inner ankle pain.  He had scrapped his inner right ankle a few days prior, the site was sensitive and red.  US performed and no DVT found.  He was given oral antibiotics and sent home.  Since then he developed worsening ulcers of the medial right ankle.  He has been seen by Dr García and Dr Salvador, both recommended Unna's boot compression.  Pt was fitted for Ready Wraps and was wearing the leg portion loosely due to pain, and no using the foot piece.  He has been reluctant to try an Unna's boot again due to concerns of limited mobility and effects on his ability to continue weekly golfing with friends.  Initial revisit to the Wound and Ostomy clinic was 7/9/21  Past medical history includes DVT's, Venous insuffiencey, Factor V Leiden and prothrombin mutation on coumadin life long.  DM type 2, obesity     Personal/social history: Pt lives independently in the [facility].     Objective:   Physical appearance: alert and oriented, ambulates with single pointed cane  Current treatment plan: Unna's boot compression wrap to lower leg and foot without the use of Silvercel or Triad paste.  Last changed: 7/22/21     Wound #1 Right anterior and medial lower leg.  Venous stasis ulcers.  Stage/tissue depth: full thickness  5 cm L x 4 cm W x 0.1 cm  D  Tunneling: no  Undermining: no  Wound bed type/amount: of the area measured approximately 80 % intact skin, edema, aidan in appearance and hypopigmented in areas with some hyperkeratotic tissue and scabs of dried drainage, 20% red nongranular tissue, See Assessment for details; Not fluctuant  Wound Edges: open where there is depth  Periwound: edema, hemosiderin staining  Drainage: small amounts serous  Odor: no  Pain: yes at time, burning sharp pain short lived but intense.    Photo from today's visit 7/26/21.         Photo's from initial Wound and Ostomy clinic return visit 7/9/21.     Dorsalis Pedal Pulse: weak but palpable at the DP: NA doppler: NA phasic  Posterior Tibial Pulse: unable to palpate: NA doppler: NA phasic  Hair growth: none noted below the knee  Capillary Refill: 3 seconds on all five toes of the right foot.  Feet/toes color: pink, some hemosiderin staining present  Nails: thick and yellowed  R Leg: Edema nonpitting. Ankle circumference NA cm. Calf circumference 37 cm.  L Leg: Edema NA. Ankle circumference NA cm. Calf circumference NA cm.     Mobility: limited due to leg and hip pain  Current offloading/footwear: supportive shoes  Sensation: occasional burning pain in the plantar feet with ambulation  HgbA1C: 7.2 Date: 6/15/21  Checks Blood Glucose?:  Does not currently check blood sugars in the home.  Average Readings: NA  Other callousing/areas of concern: Right foot 3rd toe, abrasion noted at last visit has resolved, no longer any wound, no scabs and no new concerns.       Diet: Regular  Smoking: no     Discussed: etiology of wound (venous stasis ulcers), pathophysiology and patient specific goals for wound healing.   Education: Wound status.  Rational to continue with the use of the Unna's boot with no other products as primary dressings. Rational for follow up in three days for evaluation and cares.  Reasons to remove the Unna's boot wrap if pain increases, the boot slides down or it  becomes wet, and actions to take if this happens. Closed healed status of irritated abraded right foot 3rd toe, to not try to do self cares to nails as causes damage to intact skin.         Assessment:  Pt arrived today with his Unna's boot compression wrap in place, dry and clean.  He reports it was overall comfortable with no new concerns.  Today the pt's right medial and anterior leg wound has improved.  The distal area is mostly resolve with only a few scabs remaining.  The proximal area is smaller in total size and only has two small on aspects in same vertical plane, proximal measures 0.3 cm L x 0.2 cm W x 0.1 cm D and is all clean red nongranular tissue, the distal measures 0.1 cm L x 0.1 cm W x 0.1 cm D, wound bed is all clean red nongranular tissue.   New concern of the right foot 3rd toe abrasion noted at last visit has resolved, no open wound, no scabs and no longer erythema at the site, edema in the toes in general is unchanged.      Factors impacting wound healing:   Poor nutrition: inadequate supply of protein, carbohydrates, fatty acids, and trace elements essential for all phases of wound healing.  Reduced Blood Supply: inadequate perfusion to heal wound, appear adequate, see Vascular MD visits for greater details  Medication: NA  Chemotherapy: suppresses the immune system and inflammatory response, NA  Radiotherapy: increases production of free radical which damage cells, NA  Psychological stress: related to pain and nonhealing wound  Obesity: decreases tissue perfusion, present  Infection: prolongs inflammatory phase, uses vital nutrients, impairs epithelialization and releases toxins, none noted at this time.  Underlying Disease: diabetes mellitis and venous insuffiencey.  Pt had his first diabetic education via phone.  Maceration: reduces wound tensile strength and inhibits epithelial migration, none noted today.  Patient compliance, has been compliant with cares  Unrelieved pressure,  NA  Immobility, limited but no immobile  Substance abuse: NA     Plan:  I re applied an Unna's boot without the use of the Silvercel nor Triad paste.  I applied Kerlix and ace wrap for compression, pt will return later this week for assessment and cares.      Topical care: Unna's boot  Offloading: NA  Additional recommendations: none at this time     Wound Care: Wound cleansed with saline and gauze, patted dry. Periwound protected with NA. Wound base filled with NA. Covered with Unna's boot visco paste wrap from distal foot to just below the knee, followed by Kerlix. Secured with ace wrap. To be changed twice weekly.     Discussed plan of care with patient. Teaching done with patient for dressing changes; pt is able and willing to perform cares if Unna's boot needs to be removed, he is unable to apply the Unna's boot and will be seen in the Wound and Ostomy clinic two times weekly.     The following discharge instructions were reviewed with and sent home with the patient: See AVS     The following supplies were sent home with the patient:  none this visit  Will reassess care plan in 3 days and order patient supplies as needed     Return visit: 7/29/21     Verbal, written, & demonstrative education provided.  Face to face time: approximately 30 minutes.  Procedure: 5 minute application of an Unna's boot compression wrap to the right lower leg and foot.     [phone]

## 2021-07-26 NOTE — DISCHARGE INSTRUCTIONS
Today the wound on your right inner ankle is improved.  We re applied the Unna's boot and added some extra padding to the achilles area for comfort.    Any concerns between now and your next appointment this Thursday July the 29 th at 8 am, call our scheduling line at [phone].    Thanks for coming in today and see you later this week.    Reji Malhotra RN cwocn

## 2021-07-27 ENCOUNTER — MYC MEDICAL ADVICE (OUTPATIENT)
Dept: ANTICOAGULATION | Facility: CLINIC | Age: 86
End: 2021-07-27

## 2021-07-27 DIAGNOSIS — I83.811 VARICOSE VEINS OF RIGHT LOWER EXTREMITY WITH PAIN: Primary | ICD-10-CM

## 2021-07-27 DIAGNOSIS — I87.2 VENOUS STASIS DERMATITIS OF RIGHT LOWER EXTREMITY: ICD-10-CM

## 2021-07-27 RX ORDER — CLONIDINE HYDROCHLORIDE 0.1 MG/1
TABLET ORAL
Qty: 1 TABLET | Refills: 0 | Status: SHIPPED | OUTPATIENT
Start: 2021-07-27 | End: 2021-08-09

## 2021-07-27 RX ORDER — LORAZEPAM 1 MG/1
TABLET ORAL
Qty: 1 TABLET | Refills: 0 | Status: SHIPPED | OUTPATIENT
Start: 2021-07-27 | End: 2021-08-09

## 2021-07-27 RX ORDER — AMOXICILLIN 500 MG/1
CAPSULE ORAL
Qty: 4 CAPSULE | Refills: 0 | Status: SHIPPED | OUTPATIENT
Start: 2021-07-27 | End: 2021-08-09

## 2021-07-27 NOTE — TELEPHONE ENCOUNTER
ANTICOAGULATION     Robertdolly Richard is overdue for INR check.      My chart reminder sent.    Camryn Jara RN

## 2021-07-27 NOTE — TELEPHONE ENCOUNTER
"Called and spoke to pt's wife Opal as pt was not home. Asked her about pt taking sedation medication and she stated he has taken something similar in the past where he was \"a little goofy from it\". With the pt's advanced age and because Dr. Powers is only doing an hour long procedure (GSV ablation), we will plan to do 1mg ativan versus 2mg with the 0.1mg clonidine. Pt's wife in agreement with plan. Pt will also be prescribed Amoxicillin 2g as well. Informed pt's wife Dr. Moody will sign off on pt's medication this week as Dr. Powers is out of town.    Also, changed pt's 72 hour follow up appt to [facility] instead of [facility] [place] on Monday 8/9 due to no nurse in [place] that Monday.    Pt's wife in agreement with plan, will relay information to pt and pt will call back if further questions.    Raquel Veras, DONGN, RN  [facility]    "

## 2021-07-29 ENCOUNTER — HOSPITAL ENCOUNTER (OUTPATIENT)
Dept: WOUND CARE | Facility: CLINIC | Age: 86
Discharge: HOME | End: 2021-07-29
Attending: INTERNAL MEDICINE | Admitting: INTERNAL MEDICINE
Payer: COMMERCIAL

## 2021-07-29 PROCEDURE — 29580 STRAPPING UNNA BOOT: CPT | Mod: RT

## 2021-07-29 PROCEDURE — G0463 HOSPITAL OUTPT CLINIC VISIT: HCPCS | Mod: 25

## 2021-07-29 NOTE — PROGRESS NOTES
Reason For Visit: Robert Richard, 85 year old male, seen as outpatient to re evaluate and treat right leg venous ulcers. Referred by Dr BLANQUITA Donahue MD. Patient presents by himself today.       History: Pt known to me from past visits to the Wound and Ostomy clinic for left leg venous ulcers, last being in Nov of 2019, when his leg was healed.  After seeing me he was fitted for compression socks, these he could not place.  He was recently fitted for Ready Wrap style compression garments.  He states he consistently wore the garment leg pieces but did not use the foot pieces.  But had to stop their use due to pain.  On 5/17/21 he presented to the ED here at [facility] with c/o right inner ankle pain.  He had scrapped his inner right ankle a few days prior, the site was sensitive and red.  US performed and no DVT found.  He was given oral antibiotics and sent home.  Since then he developed worsening ulcers of the medial right ankle.  He has been seen by Dr García and Dr Salvador, both recommended Unna's boot compression.  Pt was fitted for Ready Wraps and was wearing the leg portion loosely due to pain, and no using the foot piece.  He has been reluctant to try an Unna's boot again due to concerns of limited mobility and effects on his ability to continue weekly golfing with friends.  Initial revisit to the Wound and Ostomy clinic was 7/9/21  Past medical history includes DVT's, Venous insuffiencey, Factor V Leiden and prothrombin mutation on coumadin life long.  DM type 2, obesity     Personal/social history: Pt lives independently in the [facility].     Objective:   Physical appearance: alert and oriented, ambulates with single pointed cane  Current treatment plan: Unna's boot compression wrap to lower leg and foot without the use of Silvercel or Triad paste.  Last changed: 7/22/21     Wound #1 Right anterior and medial lower leg.  Venous stasis ulcers.  Stage/tissue depth: full thickness  3.5 cm L x 4 cm W x 0 cm  D  Tunneling: no  Undermining: no  Wound bed type/amount: of the area measured approximately 80 % intact skin, edema, aidan in appearance and hypopigmented in areas with some hyperkeratotic tissue and scabs of dried drainage, 20% red nongranular tissue, See Assessment for details; Not fluctuant  Wound Edges: flush with wound beds and surrounding skin  Periwound: edema, hemosiderin staining  Drainage: scant amounts serous  Odor: no  Pain: yes at time, burning sharp pain short lived but intense.    Photo from today's visit 7/29/21.         Photo's from initial Wound and Ostomy clinic return visit 7/9/21.     Dorsalis Pedal Pulse: weak but palpable at the DP: NA doppler: NA phasic  Posterior Tibial Pulse: unable to palpate: NA doppler: NA phasic  Hair growth: none noted below the knee  Capillary Refill: 3 seconds on all five toes of the right foot.  Feet/toes color: pink, some hemosiderin staining present  Nails: thick and yellowed  R Leg: Edema nonpitting. Ankle circumference NA cm. Calf circumference 36 cm.  L Leg: Edema NA. Ankle circumference NA cm. Calf circumference NA cm.     Mobility: limited due to leg and hip pain  Current offloading/footwear: supportive shoes  Sensation: occasional burning pain in the plantar feet with ambulation  HgbA1C: 7.2 Date: 6/15/21  Checks Blood Glucose?:  Does not currently check blood sugars in the home.  Average Readings: NA  Other callousing/areas of concern: Right foot 3rd toe, abrasion noted at last visit has resolved, no longer any wound, no scabs and no new concerns.       Diet: Regular  Smoking: no     Discussed: etiology of wound (venous stasis ulcers), pathophysiology and patient specific goals for wound healing.   Education: Wound status.  Rational to continue with the use of the Unna's boot with no other products as primary dressings. Rational for follow up in four days for evaluation and cares.  Reasons to remove the Unna's boot wrap if pain increases, the boot slides  down or it becomes wet, and actions to take if this happens.      Assessment:  Pt arrived today with his Unna's boot compression wrap in place, dry and clean.  He reports it was overall comfortable with no new concerns.  Today the pt's right medial and anterior leg wound has improved.  The distal area is now fully resolved with no scabs and no hyperkeratotic tissue of significance.  The proximal area is the only area where some open scattered tissue remains with no depth.  Initially the proximal portion was covered with moderately thick and dry scabs and hyperkeratotic tissue that was thickening.  I used dry gauze and saline damp gauze to bluntly remove the majority of the tissue, leaving the very well adhered aspect.  Rational for this was to allow the Unna's boot visco paste wrap make contact with any newly rawed skin after removing the covering nonviable tissue.  I feel this will promote faster re epithelialization of the site and stop the need for the Unna's bootooner.     Factors impacting wound healing:   Poor nutrition: inadequate supply of protein, carbohydrates, fatty acids, and trace elements essential for all phases of wound healing.  Reduced Blood Supply: inadequate perfusion to heal wound, appear adequate, see Vascular MD visits for greater details  Medication: NA  Chemotherapy: suppresses the immune system and inflammatory response, NA  Radiotherapy: increases production of free radical which damage cells, NA  Psychological stress: related to pain and nonhealing wound  Obesity: decreases tissue perfusion, present  Infection: prolongs inflammatory phase, uses vital nutrients, impairs epithelialization and releases toxins, none noted at this time.  Underlying Disease: diabetes mellitis and venous insuffiencey.  Pt had his first diabetic education via phone.  Maceration: reduces wound tensile strength and inhibits epithelial migration, none noted today.  Patient compliance, has been compliant with  cares  Unrelieved pressure, NA  Immobility, limited but no immobile  Substance abuse: NA     Plan:  I re applied an Unna's boot without the use of the Silvercel nor Triad paste.  I applied Kerlix and ace wrap for compression, pt will return Monday of next week for cares. He is scheduled to be seen by vascular surgery next week for ablation procedure on Thursday.       Topical care: Unna's boot  Offloading: NA  Additional recommendations: none at this time     Wound Care: Wound cleansed with saline and gauze, patted dry. Periwound protected with NA. Wound base filled with NA. Covered with Unna's boot visco paste wrap from distal foot to just below the knee, followed by Kerlix. Secured with ace wrap. To be changed twice weekly.     Discussed plan of care with patient. Teaching done with patient for dressing changes; pt is able and willing to perform cares if Unna's boot needs to be removed, he is unable to apply the Unna's boot and will be seen in the Wound and Ostomy clinic two times weekly.     The following discharge instructions were reviewed with and sent home with the patient: See AVS     The following supplies were sent home with the patient:  none this visit  Will reassess care plan in 4 days and order patient supplies as needed     Return visit: 8/2/21     Verbal, written, & demonstrative education provided.  Face to face time: approximately 35 minutes.  Procedure: 5 minute application of an Unna's boot compression wrap to the right lower leg and foot.     [phone]

## 2021-07-29 NOTE — DISCHARGE INSTRUCTIONS
Today the venous ulcers on your right inner lower leg are well improved again.  Smaller in total area, very little open tissue at this time, some remaining well adhered scabs of drainage and thickened skin.  We re applied the Unna's boot today to the right lower leg and foot and we will plan to remove this on Monday of next week August the 2nd.  I will also see you on Wednesday of next week to check on the wound and dressing and get you set up for your vein procedure on the Thursday.    Any concerns call our scheduling line at [phone] and they will assist you.    Thanks for coming in today.    Reji Malhotra RN cwocn

## 2021-08-02 ENCOUNTER — HOSPITAL ENCOUNTER (OUTPATIENT)
Dept: WOUND CARE | Facility: CLINIC | Age: 86
Discharge: HOME | End: 2021-08-02
Attending: INTERNAL MEDICINE | Admitting: INTERNAL MEDICINE
Payer: COMMERCIAL

## 2021-08-02 PROCEDURE — 29580 STRAPPING UNNA BOOT: CPT

## 2021-08-02 PROCEDURE — G0463 HOSPITAL OUTPT CLINIC VISIT: HCPCS | Mod: 25

## 2021-08-02 NOTE — PROGRESS NOTES
Reason For Visit: Robert Richard, 85 year old male, seen as outpatient to re evaluate and treat right leg venous ulcers. Referred by Dr BLANQUITA Donahue MD. Patient presents by himself today.       History: Pt known to me from past visits to the Wound and Ostomy clinic for left leg venous ulcers, last being in Nov of 2019, when his leg was healed.  After seeing me he was fitted for compression socks, these he could not place.  He was recently fitted for Ready Wrap style compression garments.  He states he consistently wore the garment leg pieces but did not use the foot pieces.  But had to stop their use due to pain.  On 5/17/21 he presented to the ED here at [facility] with c/o right inner ankle pain.  He had scrapped his inner right ankle a few days prior, the site was sensitive and red.  US performed and no DVT found.  He was given oral antibiotics and sent home.  Since then he developed worsening ulcers of the medial right ankle.  He has been seen by Dr García and Dr Salvador, both recommended Unna's boot compression.  Pt was fitted for Ready Wraps and was wearing the leg portion loosely due to pain, and no using the foot piece.  He has been reluctant to try an Unna's boot again due to concerns of limited mobility and effects on his ability to continue weekly golfing with friends.  Initial revisit to the Wound and Ostomy clinic was 7/9/21  Past medical history includes DVT's, Venous insuffiencey, Factor V Leiden and prothrombin mutation on coumadin life long.  DM type 2, obesity     Personal/social history: Pt lives independently in the [facility].     Objective:   Physical appearance: alert and oriented, ambulates with single pointed cane  Current treatment plan: Unna's boot compression wrap to lower leg and foot without the use of Silvercel or Triad paste.  Last changed: 7/29/21     Wound #1 Right anterior and medial lower leg.  Venous stasis ulcers.  Stage/tissue depth: full thickness  2 cm L x 3 cm W x 0 cm  D  Tunneling: no  Undermining: no  Wound bed type/amount: of the area measured approximately 90 % intact skin, edema, aidan in appearance and hypopigmented in areas, 5 % punctate dot of red nongranular tissue and 5 % moist hyperkeratotic tissue and scabs; Not fluctuant  Wound Edges: flush with wound beds and surrounding skin  Periwound: edema, hemosiderin staining  Drainage: scant amounts serous on dressing removed, small amount sanguinous with cares  Odor: no  Pain: yes at time, burning sharp pain short lived but intense.    Photo from today's visit 8/2/21.        Photo's from initial Wound and Ostomy clinic return visit 7/9/21.     Dorsalis Pedal Pulse: weak but palpable at the DP: NA doppler: NA phasic  Posterior Tibial Pulse: unable to palpate: NA doppler: NA phasic  Hair growth: none noted below the knee  Capillary Refill: 3 seconds on all five toes of the right foot.  Feet/toes color: pink, some hemosiderin staining present  Nails: thick and yellowed  R Leg: Edema nonpitting. Ankle circumference NA cm. Calf circumference 37 cm.  L Leg: Edema NA. Ankle circumference NA cm. Calf circumference NA cm.     Mobility: limited due to leg and hip pain  Current offloading/footwear: supportive shoes  Sensation: occasional burning pain in the plantar feet with ambulation  HgbA1C: 7.2 Date: 6/15/21  Checks Blood Glucose?:  Does not currently check blood sugars in the home.  Average Readings: NA  Other callousing/areas of concern: Right foot 3rd toe, abrasion noted at last visit has resolved, no longer any wound, no scabs and no new concerns.       Diet: Regular  Smoking: no     Discussed: etiology of wound (venous stasis ulcers), pathophysiology and patient specific goals for wound healing.   Education: Wound status.  Rational to continue with the use of the Unna's boot with no other products as primary dressings. Rational for follow up in two days for evaluation and removal of the Unna's boot prior to his scheduled venin  procedure this Thursday.  Reasons to remove the Unna's boot wrap if pain increases, the boot slides down or it becomes wet, and actions to take if this happens.      Assessment:  Pt arrived today with his Unna's boot compression wrap in place, dry and clean.  He reports it was overall comfortable with no new concerns.  Today the venous ulcers have improved.  Only small area of remaining semi moist hyperkeratotic tissue remaining and one punctate dot of raw tissue where large piece of semi moist hyperkeratotic tissue was removed with cleansing.  Edema is well controlled, no new concerns, no signs of infection.     Factors impacting wound healing:   Poor nutrition: inadequate supply of protein, carbohydrates, fatty acids, and trace elements essential for all phases of wound healing.  Reduced Blood Supply: inadequate perfusion to heal wound, appear adequate, see Vascular MD visits for greater details  Medication: NA  Chemotherapy: suppresses the immune system and inflammatory response, NA  Radiotherapy: increases production of free radical which damage cells, NA  Psychological stress: related to pain and nonhealing wound  Obesity: decreases tissue perfusion, present  Infection: prolongs inflammatory phase, uses vital nutrients, impairs epithelialization and releases toxins, none noted at this time.  Underlying Disease: diabetes mellitis and venous insuffiencey.  Pt had his first diabetic education via phone.  Maceration: reduces wound tensile strength and inhibits epithelial migration, none noted today.  Patient compliance, has been compliant with cares  Unrelieved pressure, NA  Immobility, limited but no immobile  Substance abuse: NA     Plan:  I re applied an Unna's boot visco paste wrap.  I applied Kerlix and ace wrap for compression, pt will return Wednesday this week for removal and cleansing of the site, likely no need for re application at that visit.  He is scheduled to be seen by vascular surgery for ablation  procedure on Thursday this week.       Topical care: Unna's boot  Offloading: NA  Additional recommendations: none at this time     Wound Care: Wound cleansed with saline and gauze, patted dry. Periwound protected with NA. Wound base filled with NA. Covered with Unna's boot visco paste wrap from distal foot to just below the knee, followed by Kerlix. Secured with ace wrap. To be changed twice weekly.     Discussed plan of care with patient. Teaching done with patient for dressing changes; pt is able and willing to perform cares if Unna's boot needs to be removed, he is unable to apply the Unna's boot and will be seen in the Wound and Ostomy clinic two times weekly.     The following discharge instructions were reviewed with and sent home with the patient: See AVS     The following supplies were sent home with the patient:  none this visit  Will reassess care plan in 2 days and order patient supplies as needed     Return visit: 8/4/21     Verbal, written, & demonstrative education provided.  Face to face time: approximately 35 minutes.  Procedure: 5 minute application of an Unna's boot compression wrap to the right lower leg and foot.     [phone]

## 2021-08-02 NOTE — DISCHARGE INSTRUCTIONS
Today the wounds on your right inner ankle are well improved.  Mostly healed.  We re applied the Unna's boot today and will plan to remove and re assess on Wednesday this week August the 4 th at 3 pm.  Any concerns or questions call our scheduling line at [phone].    Reji Malhotra RN cwocn.

## 2021-08-04 ENCOUNTER — HOSPITAL ENCOUNTER (OUTPATIENT)
Dept: WOUND CARE | Facility: CLINIC | Age: 86
Discharge: HOME | End: 2021-08-04
Attending: INTERNAL MEDICINE | Admitting: INTERNAL MEDICINE
Payer: COMMERCIAL

## 2021-08-04 PROCEDURE — G0463 HOSPITAL OUTPT CLINIC VISIT: HCPCS

## 2021-08-04 NOTE — PROGRESS NOTES
Reason For Visit: Robert Richard, 85 year old male, seen as outpatient to re evaluate and treat right leg venous ulcers. Referred by Dr BLANQUITA Donahue MD. Patient presents by himself today.       History: Pt known to me from past visits to the Wound and Ostomy clinic for left leg venous ulcers, last being in Nov of 2019, when his leg was healed.  After seeing me he was fitted for compression socks, these he could not place.  He was recently fitted for Ready Wrap style compression garments.  He states he consistently wore the garment leg pieces but did not use the foot pieces.  But had to stop their use due to pain.  On 5/17/21 he presented to the ED here at [facility] with c/o right inner ankle pain.  He had scrapped his inner right ankle a few days prior, the site was sensitive and red.  US performed and no DVT found.  He was given oral antibiotics and sent home.  Since then he developed worsening ulcers of the medial right ankle.  He has been seen by Dr García and Dr Salvador, both recommended Unna's boot compression.  Pt was fitted for Ready Wraps and was wearing the leg portion loosely due to pain, and no using the foot piece.  He has been reluctant to try an Unna's boot again due to concerns of limited mobility and effects on his ability to continue weekly golfing with friends.  Initial revisit to the Wound and Ostomy clinic was 7/9/21  Past medical history includes DVT's, Venous insuffiencey, Factor V Leiden and prothrombin mutation on coumadin life long.  DM type 2, obesity     Personal/social history: Pt lives independently in the [facility].     Objective:   Physical appearance: alert and oriented, ambulates with single pointed cane  Current treatment plan: Unna's boot compression wrap to lower leg and foot without the use of Silvercel or Triad paste.  Last changed: 8/2/21     Wound #1 Right anterior and medial lower leg.  Venous stasis ulcers.  Stage/tissue depth: full thickness  2 cm L x 3 cm W x 0 cm  D  Tunneling: no  Undermining: no  Wound bed type/amount: of the area measured approximately 95 % intact skin and 5 % dry hyperkeratotic tissue and scabs; Not fluctuant  Wound Edges: flush with wound beds and surrounding skin  Periwound: edema, hemosiderin staining  Drainage: scant serosanguinous staining on dressing removed, none noted after cleansing and drying the site today.  Odor: no  Pain: yes at time, none with cares today.    Photo from today's visit 8/4/21.      Photo from 8/2/21.         Photo's from initial Wound and Ostomy clinic return visit 7/9/21.     Dorsalis Pedal Pulse: weak but palpable at the DP: NA doppler: NA phasic  Posterior Tibial Pulse: unable to palpate: NA doppler: NA phasic  Hair growth: none noted below the knee  Capillary Refill: 3 seconds on all five toes of the right foot.  Feet/toes color: pink, some hemosiderin staining present  Nails: thick and yellowed  R Leg: Edema nonpitting. Ankle circumference NA cm. Calf circumference 37 cm.  No change  L Leg: Edema NA. Ankle circumference NA cm. Calf circumference NA cm.     Mobility: limited due to leg and hip pain  Current offloading/footwear: supportive shoes  Sensation: occasional burning pain in the plantar feet with ambulation  HgbA1C: 7.2 Date: 6/15/21  Checks Blood Glucose?:  Does not currently check blood sugars in the home.  Average Readings: NA  Other callousing/areas of concern: Right foot 3rd toe, abrasion noted at last visit has resolved, no longer any wound, no scabs and no new concerns.       Diet: Regular  Smoking: no     Discussed: etiology of wound (venous stasis ulcers), pathophysiology and patient specific goals for wound healing.   Education: Wound status.  Rational to not apply any dressing nor compression to the right lower leg today.  Contact information for wound and ostomy clinic.       Assessment:  Pt arrived today with his Unna's boot compression wrap in place, dry and clean.  He reports it was overall  comfortable with no new concerns.  After removal and cleansing the lower leg with soap and water, rinsing with water and drying well, no open wounds noted.  Scattered area remains of now dry hyperkeratotic tissue and a few scabs of dried bloody drainage.  Edema in the leg is stable.  No new concerns noted.       Factors impacting wound healing:   Poor nutrition: inadequate supply of protein, carbohydrates, fatty acids, and trace elements essential for all phases of wound healing.  Reduced Blood Supply: inadequate perfusion to heal wound, appear adequate, see Vascular MD visits for greater details  Medication: NA  Chemotherapy: suppresses the immune system and inflammatory response, NA  Radiotherapy: increases production of free radical which damage cells, NA  Psychological stress: related to pain and nonhealing wound  Obesity: decreases tissue perfusion, present  Infection: prolongs inflammatory phase, uses vital nutrients, impairs epithelialization and releases toxins, none noted at this time.  Underlying Disease: diabetes mellitis and venous insuffiencey.  Pt had his first diabetic education via phone.  Maceration: reduces wound tensile strength and inhibits epithelial migration, none noted today.  Patient compliance, has been compliant with cares  Unrelieved pressure, NA  Immobility, limited but no immobile  Substance abuse: NA     Plan:  I removed the old wrap, cleansed the leg and foot, rinsed and dried well.  No dressings needed at this time.  Pt is scheduled with [facility] for procedure tomorrow and will follow the pre procedure instructions on cleansing the site this evening and tomorrow morning.  Instructed pt that after the procedure if new wounds arise he should first alert vascular MD and request instructions for assessment and cares.  This may include a referral back to the Wound and Ostomy clinic but since the wound is currently closed and he will have new ablation performed, new wounds should  first be brought to MD's attention.     Topical care: HALLE  Offloading: NA  Additional recommendations: none at this time     Wound Care: Wound cleansed with soap and water, rinsed with water and gauze, patted dry. Periwound protected with NA. Wound base filled with NA. Covered with NA, followed by NA. Secured with NA. To be changed NA.  Now leaving open to air.       Discussed plan of care with patient. Teaching done with patient for ongoing assessments and follow up visits; pt is able and willing to perform.     The following discharge instructions were reviewed with and sent home with the patient: declined AVS     The following supplies were sent home with the patient:  none this visit  Will reassess care plan in; TBD     Return visit: no follow up visit planned, see Plan for details.       Verbal, written, & demonstrative education provided.  Face to face time: approximately 25 minutes.  Procedure: HERNÁN     [phone]

## 2021-08-05 ENCOUNTER — OFFICE VISIT (OUTPATIENT)
Dept: VASCULAR SURGERY | Facility: CLINIC | Age: 86
End: 2021-08-05
Payer: COMMERCIAL

## 2021-08-05 VITALS — DIASTOLIC BLOOD PRESSURE: 106 MMHG | OXYGEN SATURATION: 98 % | HEART RATE: 73 BPM | SYSTOLIC BLOOD PRESSURE: 153 MMHG

## 2021-08-05 DIAGNOSIS — I83.811 VARICOSE VEINS OF RIGHT LOWER EXTREMITY WITH PAIN: ICD-10-CM

## 2021-08-05 DIAGNOSIS — I83.891 VENOUS STASIS ULCER OF RIGHT LOWER LEG WITH EDEMA OF RIGHT LOWER LEG (H): Primary | ICD-10-CM

## 2021-08-05 DIAGNOSIS — I83.019 VENOUS STASIS ULCER OF RIGHT LOWER LEG WITH EDEMA OF RIGHT LOWER LEG (H): Primary | ICD-10-CM

## 2021-08-05 DIAGNOSIS — R60.0 VENOUS STASIS ULCER OF RIGHT LOWER LEG WITH EDEMA OF RIGHT LOWER LEG (H): Primary | ICD-10-CM

## 2021-08-05 DIAGNOSIS — L97.919 VENOUS STASIS ULCER OF RIGHT LOWER LEG WITH EDEMA OF RIGHT LOWER LEG (H): Primary | ICD-10-CM

## 2021-08-05 PROCEDURE — 36475 ENDOVENOUS RF 1ST VEIN: CPT | Mod: RT | Performed by: SURGERY

## 2021-08-05 NOTE — PROGRESS NOTES
Pre-procedure Nursing Note    Robert Richard presents to clinic for No chief complaint on file.  .   /Person Responsible for Patient: Opal Richard  Phone Number: [phone]    Prophylactic Medication:Antibiotics, Amoxicillin 500MG 4 Tab,   Time Taken: 9:20am   Sedation Medication: Ativan, 1MG 1 Tab ,   Time Taken: 9:20am and Clonidine, 0.1MG 1 Tab,   Time Taken: 9:20am  Compression Stockings: Patient brought  The procedure is being performed on RLE.  Patient understanding of procedure matches consent? YES    Patient's pre-procedure medications verified by Elba Camejo on 8/5/2021 at 9:17 AM

## 2021-08-05 NOTE — LETTER
8/5/2021         RE: Robert Richard  [address] [facility] [address]        Dear Colleague,    Thank you for referring your patient, Robert Richard, to the [facility] [employer]. Please see a copy of my visit note below.    Pre-procedure Nursing Note    Robert Richard presents to clinic for No chief complaint on file.  .   /Person Responsible for Patient: Opal Richard  Phone Number: [phone]    Prophylactic Medication:Antibiotics, Amoxicillin 500MG 4 Tab,   Time Taken: 9:20am   Sedation Medication: Ativan, 1MG 1 Tab ,   Time Taken: 9:20am and Clonidine, 0.1MG 1 Tab,   Time Taken: 9:20am  Compression Stockings: Patient brought  The procedure is being performed on RLE.  Patient understanding of procedure matches consent? YES    Patient's pre-procedure medications verified by Elba Camejo on 8/5/2021 at 9:17 AM          [facility] Procedure Note    Robert Richard  August 6, 2021    Robert Richard is a 85 year old year old male. He presents for Vein Procedure  .    BP (!) 153/106   Pulse 73   SpO2 98%     Flowsheet Data 8/5/2021   Procedure Start Time: 11:21 AM   Prep: Chloraprep   Side: Right   Tx Length (cm): RIGHT PROXIMAL GSV: 29.5   Junction (cm): RIGHT PROXIMAL GSV: 2.34   RF Cycles: RIGHT PROXIMAL GSV: 9   RF TX Time (Minutes): RIGHT PROXIMAL GSV: 3:00   How many additional vein treatments are being performed? 1   Tx Length (cm) - 2: RIGHT DISTAL GSV (RETROGRADE): 30    Junction 2 (cm): RIGHT DISTAL GSV (RETROGRADE): 0   RF Cycles 2 : RIGHT DISTAL GSV (RETROGRADE): 5   RF TX Time (Minutes) 2: RIGHT DISTAL GSV (RETROGRADE): 1:40   Sedation taken: Yes   Pre Pt. Physical / Cognitive Limitations: WNL   TOTAL Local anesthesia Injected (ml): 9   Max Volume Local Anesthesia (ml): 11   TOTAL Tumescent Injected volume (ml): 472   Max Volume Tumescent (ml): 572   Post Pt. Physical / Cognitive Limitations: WNL   Procedure End Time: 12:41 PM   D/C Instructions  given, states readiness to leave and escorted to car: Yes       Venus Closure    Date/Time: 8/5/2021 12:50 PM  Performed by: Samuel Powers MD  Authorized by: Samuel Powers MD     Time out: Immediately prior to the procedure a time out was called    Preparation: Patient was prepped and draped in usual sterile fashion    1st Assist:  Yadira Go CST/CSFA  Circulator:  Raquel Veras RN  Procedure:  VNUS  Procedure side:  Right  Vein Treated:  GSV  Patient tolerance:  Patient tolerated the procedure well with no immediate complications  Wrap/Hose:  Hose      OPERATIVE DESCRIPTION:  Details of the procedure including risks of bleeding, infection, nerve injury, deep vein thrombosis, and a 1% chance of recanalization of the close vein was discussed.  The patient verbalized understanding and proceeded to the treatment room under informed consent.  Blood pressure, pulse, and pulse oximetry were monitored continuously by Raquel Chowdhury RN.    The patient was placed supine on the operating table and his right groin and entire right lower extremity were prepped and draped sterilely.  Timeout was called and we verified the patient's identity, the operative site, and the proposed procedure.  The ultrasound probe was used to identify the right greater saphenous vein which was found to be continuous from the ankle to the saphenofemoral junction.  This entire procedure was complicated by this gentlemen's profound restless leg syndrome and his inability to hold his legs still.  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  I chose to attempt ablation of the right thigh greater saphenous vein where his skin was of a more normal consistency.  I then hoped that I could reaccess the vein just distal to that point and perform a retrograde ablation of the remaining right calf GSV.    The skin overlying the right greater saphenous vein at the level of the  knee joint was infiltrated with 1% lidocaine and with moderate difficulty the right GSV was accessed using a micropuncture needle.  A guidewire and 7 Mauritanian sheath were placed.  I then attempted my access of the right GSV in the proximal calf in a retrograde manner.  Again this was technically challenging due to the patient's profound restless leg syndrome.  Ultimately I was able to reaccess the right greater saphenous vein in the proximal calf using a micropuncture needle.  A guidewire and 7 Mauritanian sheath were passed retrograde down the GSV towards the ankle.  With both sheaths secure a ClosureFast catheter was flushed and then was inserted into the sheath at the knee and passed antegrade up the greater saphenous vein towards the saphenofemoral junction.  The tip of the catheter was positioned 2.34 cm from the saphenofemoral junction under ultrasound guidance.  The right greater saphenous vein was then anesthetized with tumescent anesthetic composed of lidocaine with epinephrine and bicarbonate combined with saline solution.  This was performed under ultrasound guidance.  Prior to beginning treatment I confirmed with ultrasound that the tip of the catheter was about 2.5 cm from the saphenofemoral junction.  I applied compression over the tip of the catheter using the ultrasound probe and the additional width of 2 fingerbreadths.  I treated the initial 3 separate 7 cm segments with two 20-second sessions apiece.  I continued withdrawal of the catheter at 7 cm increments treating the remainder for single 20 second sessions.  In each case I ensured adequate impedance.  After I completed the pullback I reimage the right GSV of the thigh and found to be thick walled and minimally compressible while the right common femoral vein remained freely compressible.    The ClosureFast catheter was flushed and then was inserted into the sheath of the proximal right calf.  It was advanced retrograde down the GSV to the level of  the ankle joint.  Again the saphenous vein of the right calf was anesthetized with our same tumescent.  I applied compression over the tip of the catheter using the ultrasound probe and the additional width of 2 fingerbreadths.  I continued withdrawal of the catheter at 7 cm increments treating the remainder for single 20 second sessions.  I ensured adequate impedance throughout this procedure.  The sheath and catheter were then removed and hemostasis was secured with pressure.  The patient tolerated the procedure without incident.  His leg was cleansed and dried.  Compression was held over the 2 access sites for a minute or so and following this we had satisfactory hemostasis.  He was helped into his thigh-high compression stocking.  He was kept on the table for 30 minutes to ensure excellent hemostasis and then was taken to his vehicle in a wheelchair.  Post procedure instructions were reviewed with the patient and his wife and daughter.    MD Samuel Perez MD      Again, thank you for allowing me to participate in the care of your patient.        Sincerely,        Samuel Powers MD

## 2021-08-06 NOTE — PROGRESS NOTES
[facility] Procedure Note    Robert Richard  August 6, 2021    Robert Richard is a 85 year old year old male. He presents for Vein Procedure  .    BP (!) 153/106   Pulse 73   SpO2 98%     Flowsheet Data 8/5/2021   Procedure Start Time: 11:21 AM   Prep: Chloraprep   Side: Right   Tx Length (cm): RIGHT PROXIMAL GSV: 29.5   Junction (cm): RIGHT PROXIMAL GSV: 2.34   RF Cycles: RIGHT PROXIMAL GSV: 9   RF TX Time (Minutes): RIGHT PROXIMAL GSV: 3:00   How many additional vein treatments are being performed? 1   Tx Length (cm) - 2: RIGHT DISTAL GSV (RETROGRADE): 30    Junction 2 (cm): RIGHT DISTAL GSV (RETROGRADE): 0   RF Cycles 2 : RIGHT DISTAL GSV (RETROGRADE): 5   RF TX Time (Minutes) 2: RIGHT DISTAL GSV (RETROGRADE): 1:40   Sedation taken: Yes   Pre Pt. Physical / Cognitive Limitations: WNL   TOTAL Local anesthesia Injected (ml): 9   Max Volume Local Anesthesia (ml): 11   TOTAL Tumescent Injected volume (ml): 472   Max Volume Tumescent (ml): 572   Post Pt. Physical / Cognitive Limitations: WNL   Procedure End Time: 12:41 PM   D/C Instructions given, states readiness to leave and escorted to car: Yes       Venus Closure    Date/Time: 8/5/2021 12:50 PM  Performed by: Samuel Powers MD  Authorized by: Samuel Powers MD     Time out: Immediately prior to the procedure a time out was called    Preparation: Patient was prepped and draped in usual sterile fashion    1st Assist:  Yadira Go CST/CSFDORIAN  Circulator:  Raquel Veras RN  Procedure:  VNUS  Procedure side:  Right  Vein Treated:  GSV  Patient tolerance:  Patient tolerated the procedure well with no immediate complications  Wrap/Hose:  Hose      OPERATIVE DESCRIPTION:  Details of the procedure including risks of bleeding, infection, nerve injury, deep vein thrombosis, and a 1% chance of recanalization of the close vein was discussed.  The patient verbalized understanding and proceeded to the treatment room under informed consent.  Blood  pressure, pulse, and pulse oximetry were monitored continuously by Raquel Chowdhury RN.    The patient was placed supine on the operating table and his right groin and entire right lower extremity were prepped and draped sterilely.  Timeout was called and we verified the patient's identity, the operative site, and the proposed procedure.  The ultrasound probe was used to identify the right greater saphenous vein which was found to be continuous from the ankle to the saphenofemoral junction.  This entire procedure was complicated by this gentlemen's profound restless leg syndrome and his inability to hold his legs still.  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  I chose to attempt ablation of the right thigh greater saphenous vein where his skin was of a more normal consistency.  I then hoped that I could reaccess the vein just distal to that point and perform a retrograde ablation of the remaining right calf GSV.    The skin overlying the right greater saphenous vein at the level of the knee joint was infiltrated with 1% lidocaine and with moderate difficulty the right GSV was accessed using a micropuncture needle.  A guidewire and 7 Khmer sheath were placed.  I then attempted my access of the right GSV in the proximal calf in a retrograde manner.  Again this was technically challenging due to the patient's profound restless leg syndrome.  Ultimately I was able to reaccess the right greater saphenous vein in the proximal calf using a micropuncture needle.  A guidewire and 7 Khmer sheath were passed retrograde down the GSV towards the ankle.  With both sheaths secure a ClosureFast catheter was flushed and then was inserted into the sheath at the knee and passed antegrade up the greater saphenous vein towards the saphenofemoral junction.  The tip of the catheter was positioned 2.34 cm from the saphenofemoral junction under ultrasound guidance.  The  right greater saphenous vein was then anesthetized with tumescent anesthetic composed of lidocaine with epinephrine and bicarbonate combined with saline solution.  This was performed under ultrasound guidance.  Prior to beginning treatment I confirmed with ultrasound that the tip of the catheter was about 2.5 cm from the saphenofemoral junction.  I applied compression over the tip of the catheter using the ultrasound probe and the additional width of 2 fingerbreadths.  I treated the initial 3 separate 7 cm segments with two 20-second sessions apiece.  I continued withdrawal of the catheter at 7 cm increments treating the remainder for single 20 second sessions.  In each case I ensured adequate impedance.  After I completed the pullback I reimage the right GSV of the thigh and found to be thick walled and minimally compressible while the right common femoral vein remained freely compressible.    The ClosureFast catheter was flushed and then was inserted into the sheath of the proximal right calf.  It was advanced retrograde down the GSV to the level of the ankle joint.  Again the saphenous vein of the right calf was anesthetized with our same tumescent.  I applied compression over the tip of the catheter using the ultrasound probe and the additional width of 2 fingerbreadths.  I continued withdrawal of the catheter at 7 cm increments treating the remainder for single 20 second sessions.  I ensured adequate impedance throughout this procedure.  The sheath and catheter were then removed and hemostasis was secured with pressure.  The patient tolerated the procedure without incident.  His leg was cleansed and dried.  Compression was held over the 2 access sites for a minute or so and following this we had satisfactory hemostasis.  He was helped into his thigh-high compression stocking.  He was kept on the table for 30 minutes to ensure excellent hemostasis and then was taken to his vehicle in a wheelchair.  Post  procedure instructions were reviewed with the patient and his wife and daughter.    MD Samuel Perez MD

## 2021-08-09 ENCOUNTER — ANCILLARY PROCEDURE (OUTPATIENT)
Dept: ULTRASOUND IMAGING | Facility: CLINIC | Age: 86
End: 2021-08-09
Attending: SURGERY
Payer: COMMERCIAL

## 2021-08-09 ENCOUNTER — OFFICE VISIT (OUTPATIENT)
Dept: VASCULAR SURGERY | Facility: CLINIC | Age: 86
End: 2021-08-09
Attending: SURGERY
Payer: COMMERCIAL

## 2021-08-09 DIAGNOSIS — I83.811 VARICOSE VEINS OF RIGHT LOWER EXTREMITY WITH PAIN: ICD-10-CM

## 2021-08-09 DIAGNOSIS — I87.2 VENOUS STASIS DERMATITIS OF RIGHT LOWER EXTREMITY: ICD-10-CM

## 2021-08-09 DIAGNOSIS — Z09 POSTOP CHECK: Primary | ICD-10-CM

## 2021-08-09 PROCEDURE — 99207 PR NO CHARGE NURSE ONLY: CPT

## 2021-08-09 PROCEDURE — 93971 EXTREMITY STUDY: CPT | Mod: RT | Performed by: SURGERY

## 2021-08-09 NOTE — PROGRESS NOTES
Vein Clinic Postoperative Nurse Note    Patient is here with his spouse for his 72 hour postoperative visit.    Procedure: Right leg VNUS closure GSV(med nec)  Procedure Date: 8/5/21  Surgeon: Dr. Powers    Ultrasound Result: The right GSV is closed 28.7mm from the SFJ to the mid calf with evidence of thrombus throughout. No evidence of RLE DVT.    Physical Exam: Incisions are approximated without signs of infection.  Ecchymosis: minimal  Swelling: minimal  Paresthesia: pt denies numbness    Patient Questions or Concerns: Pt is doing well and has no concerns. Denies pain.    Reapplied spandigrip to pt's right lower leg (covering his foot as well) and then applied his coolflex wrap to his lower leg. Then, used a 10 yard ACE (cast padding underneath) and wrapped from below pt's knee up to his groin. Instructed pt to try and wear this until Wednesday to ensure compression on his thigh. Pt and pt's spouse in agreement with plan.    Reviewed postoperative instructions with patient and provided them with written material of common things to expect from their procedure.     Patient's Next Vein Clinic Appointment: 6 week post op with Dr. Powers (9/21/21)    Raquel Veras RN

## 2021-08-09 NOTE — LETTER
8/9/2021         RE: Robert Richard  [address] [facility] [address]        Dear Colleague,    Thank you for referring your patient, Robert Richard, to the [facility] [place]. Please see a copy of my visit note below.        Vein Clinic Postoperative Nurse Note    Patient is here with his spouse for his 72 hour postoperative visit.    Procedure: Right leg VNUS closure GSV(med nec)  Procedure Date: 8/5/21  Surgeon: Dr. Powers    Ultrasound Result: The right GSV is closed 28.7mm from the SFJ to the mid calf with evidence of thrombus throughout. No evidence of RLE DVT.    Physical Exam: Incisions are approximated without signs of infection.  Ecchymosis: minimal  Swelling: minimal  Paresthesia: pt denies numbness    Patient Questions or Concerns: Pt is doing well and has no concerns. Denies pain.    Reapplied spandigrip to pt's right lower leg (covering his foot as well) and then applied his coolflex wrap to his lower leg. Then, used a 10 yard ACE (cast padding underneath) and wrapped from below pt's knee up to his groin. Instructed pt to try and wear this until Wednesday to ensure compression on his thigh. Pt and pt's spouse in agreement with plan.    Reviewed postoperative instructions with patient and provided them with written material of common things to expect from their procedure.     Patient's Next Vein Clinic Appointment: 6 week post op with Dr. Powers (9/21/21)    Raquel Veras RN      Again, thank you for allowing me to participate in the care of your patient.        Sincerely,         Vein Nurse

## 2021-08-09 NOTE — PATIENT INSTRUCTIONS
Vein Closure (Ablation)  Common Things to Expect    - A small lump may develop at the site(s) where the vein closure device was inserted. This is a normal step in healing. These should not be painful, but may be tender to the touch. It can take 6 weeks to 3 months for the lumps/firmness to resolve.   - Bruising will look worse before it looks better and can last for 4-6 weeks.  - After about 10 days, you may notice tightness/pulling on the inside thigh and knee. As your ablated vein is healing, it contracts, causing a tightness or pulling sensation. This may last for several weeks, but will resolve. Treat it with Ibuprofen or Advil.  - Numbness will get better with time, but may take 3 months to a year to resolve.  - You may notice that the skin on your legs has become ultra-sensitive to touch. For example, the weight of your sheets may feel painful. This usually resolves in 6 weeks.  - Ankle swelling is not uncommon and may last 4-6 weeks.   - To get optimal results from your procedure, wearing your compression hose is key for the first 7 days. This is necessary to ensure proper closure of the ablated vein.    - For 2 weeks, no weight lifting over 25lbs, no running, and no vigorous aerobic exercise. After this time, ease back into your normal activities. If you do too much too soon, you will have more pain and bruising and possibly re-open the vein that was closed. It takes about 2 weeks for the ablated vein to permanently close. Keep in mind your body is still healing.  - For 2 weeks, do not shave your legs or use lotions, powders, creams to allow proper healing of vein access sites.      If you are experiencing any of the following symptoms, please seek immediate medical attention at your local emergency department.  - Significant pain in the back of the calf possibly with difficulty walking  - Significant swelling and/or tenderness in the back of the calf  - Redness that continues to spread  - Chest pain and/or  shortness of breath

## 2021-08-25 ENCOUNTER — VIRTUAL VISIT (OUTPATIENT)
Dept: EDUCATION SERVICES | Facility: CLINIC | Age: 86
End: 2021-08-25
Payer: COMMERCIAL

## 2021-08-25 ENCOUNTER — TELEPHONE (OUTPATIENT)
Dept: INTERNAL MEDICINE | Facility: CLINIC | Age: 86
End: 2021-08-25

## 2021-08-25 DIAGNOSIS — E11.8 TYPE 2 DIABETES MELLITUS WITH COMPLICATION, WITHOUT LONG-TERM CURRENT USE OF INSULIN (H): ICD-10-CM

## 2021-08-25 DIAGNOSIS — Z53.9 NO SHOW: Primary | ICD-10-CM

## 2021-08-25 PROCEDURE — 98967 PH1 ASSMT&MGMT NQHP 11-20: CPT

## 2021-08-25 NOTE — PROGRESS NOTES
Patient did call back after receiving my message and we completed visit.       Had procedure on leg and is all healed and feels great.   Glipizide.   Diabetes Self-Management Education & Support    Presents for: Follow-up    Type of Visit: Telephone Visit    How would patient like to obtain AVS? [place]    ASSESSMENT:    Blood sugars are almost all in target range according to patient report. A1c is in target. He is feeling well.     Patient's most recent   Lab Results   Component Value Date    A1C 7.2 06/15/2021    is meeting goal of <8.0    Diabetes knowledge and skills assessment:   Patient is knowledgeable in diabetes management concepts related to: Healthy Eating, Being Active, Monitoring, Taking Medication and Healthy Coping    Continue education with the following diabetes management concepts: Problem Solving    Based on learning assessment above, most appropriate setting for further diabetes education would be: Individual setting.    INTERVENTIONS:    Education provided today on:  AADE Self-Care Behaviors:  Problem Solving: low blood glucose - causes, signs/symptoms, treatment and prevention and when to call health care provider    Opportunities for ongoing education and support in diabetes-self management were discussed. Pt verbalized understanding of concepts discussed and recommendations provided today.       Education Materials Provided:  No new materials provided today          PLAN    Follow up as needed.       SUBJECTIVE / OBJECTIVE:  Presents for: Follow-up  Accompanied by: Self  Diabetes education in the past 24mo: Yes  Focus of Visit: Monitoring  Diabetes type: Type 2  Date of diagnosis: June 2021  Disease course: Improving  Diabetes management related comments/concerns: Figured out meter and reports numbers are in the target range, had one at 76 but that is not common.  Cultural Influences/Ethnic Background:  [place]      Diabetes Symptoms & Complications:  Fatigue: No  Slow healing wounds:  "No  Symptom course: Improving  Complications assessed today?: No    Patient Problem List and Family Medical History reviewed for relevant medical history, current medical status, and diabetes risk factors.    Vitals:  There were no vitals taken for this visit.  Estimated body mass index is 34.44 kg/m  as calculated from the following:    Height as of 6/1/21: 1.778 m (5' 10\").    Weight as of 6/29/21: 108.9 kg (240 lb).   Last 3 BP:   BP Readings from Last 3 Encounters:   08/05/21 (!) 153/106   06/29/21 128/84   06/21/21 124/62       History   Smoking Status     Never Smoker   Smokeless Tobacco     Never Used       Labs:  Lab Results   Component Value Date    A1C 7.2 06/15/2021     Lab Results   Component Value Date     05/17/2021     Lab Results   Component Value Date     02/05/2021     HDL Cholesterol   Date Value Ref Range Status   02/05/2021 57 >39 mg/dL Final   ]  GFR Estimate   Date Value Ref Range Status   05/17/2021 43 (L) >60 mL/min/[1.73_m2] Final     Comment:     Non  GFR Calc  Starting 12/18/2018, serum creatinine based estimated GFR (eGFR) will be   calculated using the Chronic Kidney Disease Epidemiology Collaboration   (CKD-EPI) equation.       GFR Estimate If Black   Date Value Ref Range Status   05/17/2021 50 (L) >60 mL/min/[1.73_m2] Final     Comment:      GFR Calc  Starting 12/18/2018, serum creatinine based estimated GFR (eGFR) will be   calculated using the Chronic Kidney Disease Epidemiology Collaboration   (CKD-EPI) equation.       Lab Results   Component Value Date    CR 1.47 05/17/2021     No results found for: MICROALBUMIN    Healthy Eating:  Healthy Eating Assessed Today: Yes  Meal planning/habits: Smaller portions  Meals include: Breakfast, Dinner  Breakfast: mid morning. avoid cereals now. might have coffee and muffin or buttered soda crackers  Lunch: snack. leftover meat  Dinner: 3-4 pm meal. grilled burger or chicken or out to eat.  Other: " avoid heavy starch meals. vegetables might be corn, peas, baby carrots with ranch or salad. lactose intolerant. a drink or 2 - in moderation  Beverages: Water, Coffee, Alcohol    Being Active:  Being Active Assessed Today: Yes  Exercise:: Currently not exercising  Barrier to exercise: Physical limitation    Monitoring:  Monitoring Assessed Today: No  Blood Glucose Meter: ContourNext  Times checking blood sugar at home (number): Never      Taking Medications:  Diabetes Medication(s)     Sulfonylureas       glipiZIDE (GLUCOTROL XL) 5 MG 24 hr tablet    Take 1 tablet by mouth once daily          Taking Medication Assessed Today: Yes  Current Treatments: Oral Medication (taken by mouth)  Problems taking diabetes medications regularly?: No  Diabetes medication side effects?: No    Problem Solving:  Problem Solving Assessed Today: Yes  Is the patient at risk for hypoglycemia?: Yes  Hypoglycemia Frequency: Never  Hypoglycemia Treatment: Other food      Reducing Risks:  Reducing Risks Assessed Today: No  Diabetes Risks: Age over 45 years  CAD Risks: Diabetes Mellitus, Obesity, Male sex    Healthy Coping:  Healthy Coping Assessed Today: Yes  Emotional response to diabetes: Acceptance  Informal Support system:: Spouse  Stage of change: MAINTENANCE (Working to maintain change, with risk of relapse)  Support resources: Offerings in Clinic Communities  Patient Activation Measure Survey Score:  DAREN Score (Last Two) 4/1/2011 5/19/2011   DAREN Raw Score 39 47   Activation Score 56.4 77.5   DAREN Level 3 4       Fallon Galdamez RDN, ELIJAH, [facility]    Time Spent: 14 minutes  Encounter Type: Individual        Any diabetes medication dose changes were made via the Certified Diabetes Care & Education Protocol in collaboration with the patient's primary care provider. A copy of this encounter was shared with the provider.

## 2021-08-25 NOTE — TELEPHONE ENCOUNTER
ANTICOAGULATION     Robert Richard is overdue for INR check.      Left message for patient to call and schedule lab appointment as soon as possible. If returning call, please schedule.     Karlene Monsivais RN

## 2021-08-26 ENCOUNTER — LAB (OUTPATIENT)
Dept: LAB | Facility: CLINIC | Age: 86
End: 2021-08-26
Payer: COMMERCIAL

## 2021-08-26 ENCOUNTER — ANTICOAGULATION THERAPY VISIT (OUTPATIENT)
Dept: ANTICOAGULATION | Facility: CLINIC | Age: 86
End: 2021-08-26

## 2021-08-26 DIAGNOSIS — D68.52 PROTHROMBIN MUTATION (H): ICD-10-CM

## 2021-08-26 DIAGNOSIS — Z79.01 LONG TERM CURRENT USE OF ANTICOAGULANT THERAPY: ICD-10-CM

## 2021-08-26 DIAGNOSIS — I82.90 VENOUS THROMBOSIS: ICD-10-CM

## 2021-08-26 DIAGNOSIS — D68.51 FACTOR V LEIDEN MUTATION (H): Primary | ICD-10-CM

## 2021-08-26 DIAGNOSIS — D68.51 FACTOR V LEIDEN MUTATION (H): ICD-10-CM

## 2021-08-26 LAB — INR BLD: 2.1 (ref 0.9–1.1)

## 2021-08-26 PROCEDURE — 36416 COLLJ CAPILLARY BLOOD SPEC: CPT

## 2021-08-26 PROCEDURE — 85610 PROTHROMBIN TIME: CPT

## 2021-08-26 NOTE — PROGRESS NOTES
ANTICOAGULATION MANAGEMENT     Robert Richard 85 year old male is on warfarin with therapeutic INR result. (Goal INR 2.0-3.0)    Recent labs: (last 7 days)     08/26/21  0929   INR 2.1*       ASSESSMENT     Source(s): Chart Review I left a detailed voicemail with the orders below. I have also requested a call back if there have been any missed doses, concerns, illness, fever, or if there have been any changes in medications, activity level, or diet        Warfarin doses taken: Warfarin taken as instructed    Diet: No new diet changes identified    New illness, injury, or hospitalization: No    Medication/supplement changes: None noted    Signs or symptoms of bleeding or clotting: No    Previous INR: Therapeutic last 2(+) visits    Additional findings: None     PLAN     Recommended plan for no diet, medication or health factor changes affecting INR     Dosing Instructions: Continue your current warfarin dose with next INR in 5 weeks       Summary  As of 8/26/2021    Full warfarin instructions:  2.5 mg every Fri; 5 mg all other days   Next INR check:  9/30/2021             Detailed voice message left for Robert with dosing instructions and follow up date.     Contact [phone]  to schedule and with any changes, questions or concerns.     Education provided: Please call back if any changes to your diet, medications or how you've been taking warfarin, Goal range and significance of current result, Importance of therapeutic range, Importance of following up at instructed interval and Importance of taking warfarin as instructed    Plan made per [facility] anticoagulation protocol    Chantell Boston RN  Anticoagulation Clinic  8/26/2021    _______________________________________________________________________     Anticoagulation Episode Summary     Current INR goal:  2.0-3.0   TTR:  92.8 % (7.9 mo)   Target end date:  Indefinite   [facility]    Indications    Factor V Leiden mutation (H)  [D68.51]  Venous thrombosis [I82.90]  Prothrombin mutation (H) [D68.52]  Long term current use of anticoagulant therapy [Z79.01]           Comments:  5 mg tablets, appt card, BP, PM dose           Anticoagulation Care Providers     Provider Role Specialty Phone number    Stiven Donahue MD Referring Internal Medicine [phone]    Nate Cifuentes DO [facility] Internal Medicine [phone]

## 2021-09-03 DIAGNOSIS — I10 ESSENTIAL HYPERTENSION WITH GOAL BLOOD PRESSURE LESS THAN 140/90: ICD-10-CM

## 2021-09-03 DIAGNOSIS — I10 HYPERTENSION GOAL BP (BLOOD PRESSURE) < 140/90: ICD-10-CM

## 2021-09-08 RX ORDER — SPIRONOLACTONE 25 MG/1
TABLET ORAL
Qty: 90 TABLET | Refills: 0 | Status: SHIPPED | OUTPATIENT
Start: 2021-09-08 | End: 2021-12-31

## 2021-09-08 RX ORDER — ATENOLOL 50 MG/1
TABLET ORAL
Qty: 180 TABLET | Refills: 0 | Status: SHIPPED | OUTPATIENT
Start: 2021-09-08 | End: 2021-12-23

## 2021-09-08 NOTE — TELEPHONE ENCOUNTER
Pending Prescriptions:                       Disp   Refills    spironolactone (ALDACTONE) 25 MG tablet [P*90 tab*0        Sig: Take 1 tablet by mouth once daily    atenolol (TENORMIN) 50 MG tablet [Pharmacy*180 ta*0        Sig: Take 1 tablet by mouth twice daily    Routing refill request to provider for review/approval because:  Labs out of range:    Creatinine   Date Value Ref Range Status   05/17/2021 1.47 (H) 0.66 - 1.25 mg/dL Final     BP Readings from Last 3 Encounters:   08/05/21 (!) 153/106   06/29/21 128/84   06/21/21 124/62

## 2021-09-21 ENCOUNTER — OFFICE VISIT (OUTPATIENT)
Dept: VASCULAR SURGERY | Facility: CLINIC | Age: 86
End: 2021-09-21
Payer: COMMERCIAL

## 2021-09-21 DIAGNOSIS — I83.019 VENOUS STASIS ULCER OF RIGHT LOWER LEG WITH EDEMA OF RIGHT LOWER LEG (H): Primary | ICD-10-CM

## 2021-09-21 DIAGNOSIS — Z09 SURGICAL FOLLOW-UP CARE: ICD-10-CM

## 2021-09-21 DIAGNOSIS — I83.891 VENOUS STASIS ULCER OF RIGHT LOWER LEG WITH EDEMA OF RIGHT LOWER LEG (H): Primary | ICD-10-CM

## 2021-09-21 DIAGNOSIS — L97.919 VENOUS STASIS ULCER OF RIGHT LOWER LEG WITH EDEMA OF RIGHT LOWER LEG (H): Primary | ICD-10-CM

## 2021-09-21 DIAGNOSIS — R60.0 VENOUS STASIS ULCER OF RIGHT LOWER LEG WITH EDEMA OF RIGHT LOWER LEG (H): Primary | ICD-10-CM

## 2021-09-21 PROCEDURE — 99207 PR VEINSOLUTIONS POST OPERATIVE VISIT: CPT | Performed by: SURGERY

## 2021-09-21 NOTE — LETTER
9/21/2021         RE: Robert Richard  [address] [facility] [address]        Dear Colleague,    Thank you for referring your patient, Robert Richard, to the [facility] [place]. Please see a copy of my visit note below.    Robert Richard returns 6 weeks status post a technically challenging radiofrequency ablation of the right greater saphenous vein performed for an incompetent right GSV with a right medial ankle venous stasis ulcer.  72-hour post procedural ultrasound demonstrated an appropriately ablated vein.  Carlos returns today for routine follow-up.  He is very pleased with his results.  His right medial ankle venous stasis ulcer has completely healed.  He continues to wear bilateral knee-high compression stockings and a right leg wrap for edema control.  He has resumed golfing.  At today's visit he was without complaints.    Exam:  Well-developed male in no acute distress.  His right leg looks excellent with no significant edema.  The medial ulceration has completely healed.  No residual varicosities.  Palpable pedal pulses bilaterally.    ASSESSMENT:  6 weeks status post right GSV radiofrequency ablation clinically doing well with a healed right medial ankle venous stasis ulcer.    RECOMMENDATION:  I reviewed all the above with Carlos, his wife and daughter.  He does have a component of right leg deep venous insufficiency.  He should continue to utilize moisturizers on his right lower extremity on a daily basis.  He should continue with his compression stockings and his right leg wraps for edema control.  I have no venous concerns.  Follow-up will be with me in 6 months for repeat right leg venous ultrasound as part of our surveillance protocol.    Gt Powers MD         Again, thank you for allowing me to participate in the care of your patient.        Sincerely,        Samuel Powers MD

## 2021-09-21 NOTE — PROGRESS NOTES
Robert Richard returns 6 weeks status post a technically challenging radiofrequency ablation of the right greater saphenous vein performed for an incompetent right GSV with a right medial ankle venous stasis ulcer.  72-hour post procedural ultrasound demonstrated an appropriately ablated vein.  Carlos returns today for routine follow-up.  He is very pleased with his results.  His right medial ankle venous stasis ulcer has completely healed.  He continues to wear bilateral knee-high compression stockings and a right leg wrap for edema control.  He has resumed golfing.  At today's visit he was without complaints.    Exam:  Well-developed male in no acute distress.  His right leg looks excellent with no significant edema.  The medial ulceration has completely healed.  No residual varicosities.  Palpable pedal pulses bilaterally.    ASSESSMENT:  6 weeks status post right GSV radiofrequency ablation clinically doing well with a healed right medial ankle venous stasis ulcer.    RECOMMENDATION:  I reviewed all the above with Carlos, his wife and daughter.  He does have a component of right leg deep venous insufficiency.  He should continue to utilize moisturizers on his right lower extremity on a daily basis.  He should continue with his compression stockings and his right leg wraps for edema control.  I have no venous concerns.  Follow-up will be with me in 6 months for repeat right leg venous ultrasound as part of our surveillance protocol.    Gt Powers MD

## 2021-10-02 ENCOUNTER — HEALTH MAINTENANCE LETTER (OUTPATIENT)
Age: 86
End: 2021-10-02

## 2021-10-07 ENCOUNTER — LAB (OUTPATIENT)
Dept: LAB | Facility: CLINIC | Age: 86
End: 2021-10-07
Payer: COMMERCIAL

## 2021-10-07 ENCOUNTER — TELEPHONE (OUTPATIENT)
Dept: ANTICOAGULATION | Facility: CLINIC | Age: 86
End: 2021-10-07

## 2021-10-07 ENCOUNTER — ANTICOAGULATION THERAPY VISIT (OUTPATIENT)
Dept: ANTICOAGULATION | Facility: CLINIC | Age: 86
End: 2021-10-07

## 2021-10-07 DIAGNOSIS — I82.90 VENOUS THROMBOSIS: ICD-10-CM

## 2021-10-07 DIAGNOSIS — Z79.01 LONG TERM CURRENT USE OF ANTICOAGULANT THERAPY: ICD-10-CM

## 2021-10-07 DIAGNOSIS — D68.51 FACTOR V LEIDEN MUTATION (H): ICD-10-CM

## 2021-10-07 DIAGNOSIS — D68.52 PROTHROMBIN MUTATION (H): ICD-10-CM

## 2021-10-07 DIAGNOSIS — D68.51 FACTOR V LEIDEN MUTATION (H): Primary | ICD-10-CM

## 2021-10-07 LAB — INR BLD: 2.5 (ref 0.9–1.1)

## 2021-10-07 PROCEDURE — 36416 COLLJ CAPILLARY BLOOD SPEC: CPT

## 2021-10-07 PROCEDURE — 85610 PROTHROMBIN TIME: CPT

## 2021-10-07 NOTE — PROGRESS NOTES
ANTICOAGULATION MANAGEMENT     Robert Richard 86 year old male is on warfarin with therapeutic INR result. (Goal INR 2.0-3.0)    Recent labs: (last 7 days)     10/07/21  1336   INR 2.5*       ASSESSMENT     Source(s): Chart Review I left a detailed voicemail with the orders below. I have also requested a call back if there have been any missed doses, concerns, illness, fever, or if there have been any changes in medications, activity level, or diet        Warfarin doses taken: Warfarin taken as instructed    Diet: No new diet changes identified    New illness, injury, or hospitalization: No    Medication/supplement changes: None noted    Signs or symptoms of bleeding or clotting: No    Previous INR: Therapeutic last 2(+) visits    Additional findings: None     PLAN     Recommended plan for no diet, medication or health factor changes affecting INR     Dosing Instructions: Continue your current warfarin dose with next INR in 6 weeks       Summary  As of 10/7/2021    Full warfarin instructions:  2.5 mg every Fri; 5 mg all other days   Next INR check:  11/18/2021             Detailed voice message left for Robert with dosing instructions and follow up date.     Contact [phone]  to schedule and with any changes, questions or concerns.     Education provided: Please call back if any changes to your diet, medications or how you've been taking warfarin    Plan made per [facility] anticoagulation protocol    Chantell Boston RN  Anticoagulation Clinic  10/7/2021    _______________________________________________________________________     Anticoagulation Episode Summary     Current INR goal:  2.0-3.0   TTR:  93.9 % (9.3 mo)   Target end date:  Indefinite   [facility] IN[facility]ers to:  [facility]    Indications    Factor V Leiden mutation (H) [D68.51]  Venous thrombosis [I82.90]  Prothrombin mutation (H) [D68.52]  Long term current use of anticoagulant therapy [Z79.01]           Comments:  5 mg tablets, appt card, BP,  PM dose           Anticoagulation Care Providers     Provider Role Specialty Phone number    Stiven Donahue MD Referring Internal Medicine [phone]    Nate Cifuentes DO [facility] Internal Medicine [phone]

## 2021-10-18 DIAGNOSIS — Z86.718 PERSONAL HISTORY OF VENOUS THROMBOSIS AND EMBOLISM: ICD-10-CM

## 2021-10-18 RX ORDER — WARFARIN SODIUM 5 MG/1
TABLET ORAL
Qty: 90 TABLET | Refills: 0 | Status: SHIPPED | OUTPATIENT
Start: 2021-10-18 | End: 2021-12-23

## 2021-10-27 DIAGNOSIS — J31.0 CHRONIC RHINITIS: ICD-10-CM

## 2021-10-28 RX ORDER — IPRATROPIUM BROMIDE 42 UG/1
SPRAY, METERED NASAL
Qty: 45 ML | Refills: 1 | Status: SHIPPED | OUTPATIENT
Start: 2021-10-28 | End: 2022-09-13

## 2021-11-29 ENCOUNTER — TELEPHONE (OUTPATIENT)
Dept: ANTICOAGULATION | Facility: CLINIC | Age: 86
End: 2021-11-29
Payer: COMMERCIAL

## 2021-12-06 ENCOUNTER — ANTICOAGULATION THERAPY VISIT (OUTPATIENT)
Dept: ANTICOAGULATION | Facility: CLINIC | Age: 86
End: 2021-12-06

## 2021-12-06 ENCOUNTER — LAB (OUTPATIENT)
Dept: LAB | Facility: CLINIC | Age: 86
End: 2021-12-06
Payer: COMMERCIAL

## 2021-12-06 DIAGNOSIS — D68.51 FACTOR V LEIDEN MUTATION (H): ICD-10-CM

## 2021-12-06 DIAGNOSIS — D68.52 PROTHROMBIN MUTATION (H): ICD-10-CM

## 2021-12-06 DIAGNOSIS — D68.51 FACTOR V LEIDEN MUTATION (H): Primary | ICD-10-CM

## 2021-12-06 DIAGNOSIS — Z79.01 LONG TERM CURRENT USE OF ANTICOAGULANT THERAPY: ICD-10-CM

## 2021-12-06 DIAGNOSIS — I82.90 VENOUS THROMBOSIS: ICD-10-CM

## 2021-12-06 LAB — INR BLD: 1.8 (ref 0.9–1.1)

## 2021-12-06 PROCEDURE — 85610 PROTHROMBIN TIME: CPT

## 2021-12-06 PROCEDURE — 36416 COLLJ CAPILLARY BLOOD SPEC: CPT

## 2021-12-06 NOTE — PROGRESS NOTES
ANTICOAGULATION MANAGEMENT     Robert Richard 86 year old male is on warfarin with subtherapeutic INR result. (Goal INR 2.0-3.0)    Recent labs: (last 7 days)     12/06/21  1519   INR 1.8*       ASSESSMENT     Source(s): Chart Review and Patient/Caregiver Call       Warfarin doses taken: Warfarin taken as instructed    Diet: No new diet changes identified    New illness, injury, or hospitalization: No    Medication/supplement changes: None noted    Signs or symptoms of bleeding or clotting: No    Previous INR: Therapeutic last 2(+) visits    Additional findings: None     PLAN     Recommended plan for no diet, medication or health factor changes affecting INR     Dosing Instructions: Booster dose then continue your current warfarin dose with next INR in 2 weeks       Summary  As of 12/6/2021    Full warfarin instructions:  12/6: 7.5 mg; Otherwise 2.5 mg every Fri; 5 mg all other days   Next INR check:  12/20/2021             Telephone call with  Spouse who verbalizes understanding and agrees to plan and who agrees to plan and repeated back plan correctly    Lab visit scheduled    Education provided: Please call back if any changes to your diet, medications or how you've been taking warfarin    Plan made per [facility] anticoagulation protocol    Chantell Boston, RN  Anticoagulation Clinic  12/6/2021    _______________________________________________________________________     Anticoagulation Episode Summary     Current INR goal:  2.0-3.0   TTR:  89.9 % (11.3 mo)   Target end date:  Indefinite   Send INR reminders to:  [facility]    Indications    Factor V Leiden mutation (H) [D68.51]  Venous thrombosis [I82.90]  Prothrombin mutation (H) [D68.52]  Long term current use of anticoagulant therapy [Z79.01]           Comments:  5 mg tablets, appt card, BP, PM dose           Anticoagulation Care Providers     Provider Role Specialty Phone number    Stiven Donahue MD Referring Internal Medicine [phone]    [place]  Nate Oconnell DO [facility] Internal Medicine [phone]

## 2021-12-20 ENCOUNTER — ANTICOAGULATION THERAPY VISIT (OUTPATIENT)
Dept: ANTICOAGULATION | Facility: CLINIC | Age: 86
End: 2021-12-20

## 2021-12-20 ENCOUNTER — LAB (OUTPATIENT)
Dept: LAB | Facility: CLINIC | Age: 86
End: 2021-12-20
Payer: COMMERCIAL

## 2021-12-20 DIAGNOSIS — I82.90 VENOUS THROMBOSIS: ICD-10-CM

## 2021-12-20 DIAGNOSIS — D68.51 FACTOR V LEIDEN MUTATION (H): Primary | ICD-10-CM

## 2021-12-20 DIAGNOSIS — Z79.01 LONG TERM CURRENT USE OF ANTICOAGULANT THERAPY: ICD-10-CM

## 2021-12-20 DIAGNOSIS — D68.52 PROTHROMBIN MUTATION (H): ICD-10-CM

## 2021-12-20 DIAGNOSIS — D68.51 FACTOR V LEIDEN MUTATION (H): ICD-10-CM

## 2021-12-20 LAB — INR BLD: 2.3 (ref 0.9–1.1)

## 2021-12-20 PROCEDURE — 85610 PROTHROMBIN TIME: CPT

## 2021-12-20 PROCEDURE — 36416 COLLJ CAPILLARY BLOOD SPEC: CPT

## 2021-12-20 NOTE — PROGRESS NOTES
ANTICOAGULATION MANAGEMENT     Robert Richard 86 year old male is on warfarin with therapeutic INR result. (Goal INR 2.0-3.0)    Recent labs: (last 7 days)     12/20/21  1413   INR 2.3*       ASSESSMENT     Source(s): Chart Review and Patient/Caregiver Call       Warfarin doses taken: Warfarin taken as instructed    Diet: No new diet changes identified    New illness, injury, or hospitalization: No    Medication/supplement changes: None noted    Signs or symptoms of bleeding or clotting: No    Previous INR: Subtherapeutic    Additional findings: None     PLAN     Recommended plan for no diet, medication or health factor changes affecting INR     Dosing Instructions: Continue your current warfarin dose with next INR in 4 weeks       Summary  As of 12/20/2021    Full warfarin instructions:  2.5 mg every Fri; 5 mg all other days   Next INR check:               Telephone call with Robert who verbalizes understanding and agrees to plan    Lab visit scheduled    Education provided: None required    Plan made per [facility] anticoagulation protocol    Neelima Arreaga RN  Anticoagulation Clinic  12/20/2021    _______________________________________________________________________     Anticoagulation Episode Summary     Current INR goal:  2.0-3.0   TTR:  88.8 % (11.8 mo)   Target end date:  Indefinite   Send INR reminders to:  [facility]    Indications    Factor V Leiden mutation (H) [D68.51]  Venous thrombosis [I82.90]  Prothrombin mutation (H) [D68.52]  Long term current use of anticoagulant therapy [Z79.01]           Comments:  5 mg tablets, PM dose         Anticoagulation Care Providers     Provider Role Specialty Phone number    Stiven Donahue MD Referring Internal Medicine [phone]    Nate Cifuentes DO Responsible Internal Medicine [phone]             negative - no chest pain

## 2021-12-22 DIAGNOSIS — I10 HYPERTENSION GOAL BP (BLOOD PRESSURE) < 140/90: ICD-10-CM

## 2021-12-22 DIAGNOSIS — Z86.718 PERSONAL HISTORY OF VENOUS THROMBOSIS AND EMBOLISM: ICD-10-CM

## 2021-12-22 DIAGNOSIS — E11.8 TYPE 2 DIABETES MELLITUS WITH COMPLICATION, WITHOUT LONG-TERM CURRENT USE OF INSULIN (H): ICD-10-CM

## 2021-12-23 RX ORDER — ATENOLOL 50 MG/1
TABLET ORAL
Qty: 180 TABLET | Refills: 0 | Status: SHIPPED | OUTPATIENT
Start: 2021-12-23 | End: 2022-02-25

## 2021-12-23 RX ORDER — GLIPIZIDE 5 MG/1
TABLET, FILM COATED, EXTENDED RELEASE ORAL
Qty: 90 TABLET | Refills: 0 | Status: SHIPPED | OUTPATIENT
Start: 2021-12-23 | End: 2022-02-25

## 2021-12-23 RX ORDER — WARFARIN SODIUM 5 MG/1
TABLET ORAL
Qty: 90 TABLET | Refills: 0 | Status: SHIPPED | OUTPATIENT
Start: 2021-12-23 | End: 2022-02-25

## 2021-12-23 NOTE — TELEPHONE ENCOUNTER
Routing refill request to provider for review/approval because:  Labs out of range:  Creatinine, BP  Labs not current:  A1C    DONG BillsN, RN  [facility]

## 2021-12-30 DIAGNOSIS — I10 ESSENTIAL HYPERTENSION WITH GOAL BLOOD PRESSURE LESS THAN 140/90: ICD-10-CM

## 2021-12-30 DIAGNOSIS — I10 HYPERTENSION GOAL BP (BLOOD PRESSURE) < 140/90: ICD-10-CM

## 2021-12-30 NOTE — TELEPHONE ENCOUNTER
Aldactone  Routing refill request to provider for review/approval because:  Labs out of range:  CR  BP failed RN refill protocol    Allyn Koch RN

## 2021-12-30 NOTE — TELEPHONE ENCOUNTER
Routing refill request to provider for review/approval because:  Labs out of range:  CRE  BP elevated      Brenna ShawRN

## 2021-12-31 RX ORDER — SPIRONOLACTONE 25 MG/1
TABLET ORAL
Qty: 90 TABLET | Refills: 0 | Status: SHIPPED | OUTPATIENT
Start: 2021-12-31 | End: 2022-02-25

## 2022-01-05 ENCOUNTER — TELEPHONE (OUTPATIENT)
Dept: ANTICOAGULATION | Facility: CLINIC | Age: 87
End: 2022-01-05
Payer: COMMERCIAL

## 2022-01-05 DIAGNOSIS — D68.51 FACTOR V LEIDEN MUTATION (H): Primary | ICD-10-CM

## 2022-01-05 NOTE — TELEPHONE ENCOUNTER
ANTICOAGULATION MANAGEMENT      Robert Richard due for annual renewal of referral to anticoagulation monitoring. Order pended for your review and signature.      ANTICOAGULATION SUMMARY      Warfarin indication(s)     Factor V Leiden mutation, Venous thrombus, Prothrombin mutation     Heart valve present?  NO       Current goal range   INR: 2.0-3.0     Goal appropriate for indication? Yes, INR 2-3 appropriate for hx of DVT, PE, hypercoagulable state, Afib, LVAD, or bileaflet AVR without risk factors     Current duration of therapy Indefinite/long term therapy   Time in Therapeutic Range (TTR)  (Goal > 60%) 88.8%       Office visit with referring provider's group within last year yes on 6/29/21       Raquel Pelletier RN

## 2022-01-18 ENCOUNTER — ANTICOAGULATION THERAPY VISIT (OUTPATIENT)
Dept: ANTICOAGULATION | Facility: CLINIC | Age: 87
End: 2022-01-18

## 2022-01-18 ENCOUNTER — LAB (OUTPATIENT)
Dept: LAB | Facility: CLINIC | Age: 87
End: 2022-01-18
Payer: COMMERCIAL

## 2022-01-18 DIAGNOSIS — I82.90 VENOUS THROMBOSIS: ICD-10-CM

## 2022-01-18 DIAGNOSIS — D68.51 FACTOR V LEIDEN MUTATION (H): Primary | ICD-10-CM

## 2022-01-18 DIAGNOSIS — Z79.01 LONG TERM CURRENT USE OF ANTICOAGULANT THERAPY: ICD-10-CM

## 2022-01-18 DIAGNOSIS — D68.51 FACTOR V LEIDEN MUTATION (H): ICD-10-CM

## 2022-01-18 DIAGNOSIS — D68.52 PROTHROMBIN MUTATION (H): ICD-10-CM

## 2022-01-18 LAB — INR BLD: 2.8 (ref 0.9–1.1)

## 2022-01-18 PROCEDURE — 85610 PROTHROMBIN TIME: CPT

## 2022-01-18 PROCEDURE — 36416 COLLJ CAPILLARY BLOOD SPEC: CPT

## 2022-01-18 NOTE — PROGRESS NOTES
ANTICOAGULATION MANAGEMENT     Robert Richard 86 year old male is on warfarin with therapeutic INR result. (Goal INR 2.0-3.0)    Recent labs: (last 7 days)     01/18/22  1044   INR 2.8*       ASSESSMENT     Source(s): Chart Review and Patient/Caregiver Call       Warfarin doses taken: Warfarin taken as instructed    Diet: No new diet changes identified    New illness, injury, or hospitalization: No    Medication/supplement changes: None noted    Signs or symptoms of bleeding or clotting: No    Previous INR: Therapeutic last visit; previously outside of goal range    Additional findings: None     PLAN     Recommended plan for no diet, medication or health factor changes affecting INR     Dosing Instructions: Continue your current warfarin dose with next INR in 5 weeks       Summary  As of 1/18/2022    Full warfarin instructions:  2.5 mg every Fri; 5 mg all other days   Next INR check:  2/22/2022             Telephone call with Robert who verbalizes understanding and agrees to plan    Lab visit scheduled    Education provided: Please call back if any changes to your diet, medications or how you've been taking warfarin    Plan made per [facility] anticoagulation protocol    Raquel Pelletier RN  Anticoagulation Clinic  1/18/2022    _______________________________________________________________________     Anticoagulation Episode Summary     Current INR goal:  2.0-3.0   TTR:  89.1 % (1 y)   Target end date:  Indefinite   Send INR reminders to:  [facility]    Indications    Factor V Leiden mutation (H) [D68.51]  Venous thrombosis [I82.90]  Prothrombin mutation (H) [D68.52]  Long term current use of anticoagulant therapy [Z79.01]           Comments:  5 mg tablets, PM dose         Anticoagulation Care Providers     Provider Role Specialty Phone number    Stiven Donahue MD Referring Internal Medicine [phone]    Nate Cifuentes DO Responsible Internal Medicine [phone]

## 2022-01-22 ENCOUNTER — HEALTH MAINTENANCE LETTER (OUTPATIENT)
Age: 87
End: 2022-01-22

## 2022-01-25 ENCOUNTER — TELEPHONE (OUTPATIENT)
Dept: INTERNAL MEDICINE | Facility: CLINIC | Age: 87
End: 2022-01-25

## 2022-01-25 ENCOUNTER — LAB (OUTPATIENT)
Dept: LAB | Facility: CLINIC | Age: 87
End: 2022-01-25
Payer: COMMERCIAL

## 2022-01-25 DIAGNOSIS — E11.8 TYPE 2 DIABETES MELLITUS WITH COMPLICATION, WITHOUT LONG-TERM CURRENT USE OF INSULIN (H): Primary | ICD-10-CM

## 2022-01-25 DIAGNOSIS — E11.8 TYPE 2 DIABETES MELLITUS WITH COMPLICATION, WITHOUT LONG-TERM CURRENT USE OF INSULIN (H): ICD-10-CM

## 2022-01-25 LAB
ALBUMIN SERPL-MCNC: 3.4 G/DL (ref 3.4–5)
ALP SERPL-CCNC: 101 U/L (ref 40–150)
ALT SERPL W P-5'-P-CCNC: 25 U/L (ref 0–70)
ANION GAP SERPL CALCULATED.3IONS-SCNC: 7 MMOL/L (ref 3–14)
AST SERPL W P-5'-P-CCNC: 15 U/L (ref 0–45)
BILIRUB SERPL-MCNC: 0.4 MG/DL (ref 0.2–1.3)
BUN SERPL-MCNC: 39 MG/DL (ref 7–30)
CALCIUM SERPL-MCNC: 8.6 MG/DL (ref 8.5–10.1)
CHLORIDE BLD-SCNC: 113 MMOL/L (ref 94–109)
CHOLEST SERPL-MCNC: 222 MG/DL
CO2 SERPL-SCNC: 23 MMOL/L (ref 20–32)
CREAT SERPL-MCNC: 1.55 MG/DL (ref 0.66–1.25)
FASTING STATUS PATIENT QL REPORTED: YES
GFR SERPL CREATININE-BSD FRML MDRD: 44 ML/MIN/1.73M2
GLUCOSE BLD-MCNC: 97 MG/DL (ref 70–99)
HBA1C MFR BLD: 7.1 % (ref 0–5.6)
HDLC SERPL-MCNC: 59 MG/DL
LDLC SERPL CALC-MCNC: 127 MG/DL
NONHDLC SERPL-MCNC: 163 MG/DL
POTASSIUM BLD-SCNC: 4.1 MMOL/L (ref 3.4–5.3)
PROT SERPL-MCNC: 6.9 G/DL (ref 6.8–8.8)
SODIUM SERPL-SCNC: 143 MMOL/L (ref 133–144)
TRIGL SERPL-MCNC: 181 MG/DL

## 2022-01-25 PROCEDURE — 36415 COLL VENOUS BLD VENIPUNCTURE: CPT

## 2022-01-25 PROCEDURE — 80061 LIPID PANEL: CPT

## 2022-01-25 PROCEDURE — 83036 HEMOGLOBIN GLYCOSYLATED A1C: CPT

## 2022-01-25 PROCEDURE — 80053 COMPREHEN METABOLIC PANEL: CPT

## 2022-01-25 NOTE — TELEPHONE ENCOUNTER
----- Message from Chen Pelaez sent at 1/24/2022  7:03 PM CST -----  Regarding: orders  Patient has a lab appointment on 1/25. According to the appointment note it is for an A1C, but there are currently no orders in his chart.

## 2022-01-31 DIAGNOSIS — I10 HYPERTENSION GOAL BP (BLOOD PRESSURE) < 140/90: Primary | ICD-10-CM

## 2022-01-31 RX ORDER — LOSARTAN POTASSIUM 100 MG/1
100 TABLET ORAL DAILY
Qty: 90 TABLET | Refills: 3 | Status: SHIPPED | OUTPATIENT
Start: 2022-01-31 | End: 2023-01-25

## 2022-02-25 ENCOUNTER — ANTICOAGULATION THERAPY VISIT (OUTPATIENT)
Dept: ANTICOAGULATION | Facility: CLINIC | Age: 87
End: 2022-02-25

## 2022-02-25 ENCOUNTER — LAB (OUTPATIENT)
Dept: LAB | Facility: CLINIC | Age: 87
End: 2022-02-25
Payer: COMMERCIAL

## 2022-02-25 ENCOUNTER — OFFICE VISIT (OUTPATIENT)
Dept: INTERNAL MEDICINE | Facility: CLINIC | Age: 87
End: 2022-02-25
Payer: COMMERCIAL

## 2022-02-25 VITALS
HEIGHT: 70 IN | WEIGHT: 253 LBS | TEMPERATURE: 97.8 F | OXYGEN SATURATION: 94 % | BODY MASS INDEX: 36.22 KG/M2 | DIASTOLIC BLOOD PRESSURE: 84 MMHG | RESPIRATION RATE: 20 BRPM | SYSTOLIC BLOOD PRESSURE: 122 MMHG | HEART RATE: 72 BPM

## 2022-02-25 DIAGNOSIS — D68.51 FACTOR V LEIDEN MUTATION (H): ICD-10-CM

## 2022-02-25 DIAGNOSIS — E11.8 TYPE 2 DIABETES MELLITUS WITH COMPLICATION, WITHOUT LONG-TERM CURRENT USE OF INSULIN (H): ICD-10-CM

## 2022-02-25 DIAGNOSIS — I82.90 VENOUS THROMBOSIS: ICD-10-CM

## 2022-02-25 DIAGNOSIS — Z00.00 MEDICARE ANNUAL WELLNESS VISIT, SUBSEQUENT: Primary | ICD-10-CM

## 2022-02-25 DIAGNOSIS — E66.01 CLASS 2 SEVERE OBESITY DUE TO EXCESS CALORIES WITH SERIOUS COMORBIDITY AND BODY MASS INDEX (BMI) OF 38.0 TO 38.9 IN ADULT (H): ICD-10-CM

## 2022-02-25 DIAGNOSIS — Z86.718 PERSONAL HISTORY OF VENOUS THROMBOSIS AND EMBOLISM: ICD-10-CM

## 2022-02-25 DIAGNOSIS — Z79.01 LONG TERM CURRENT USE OF ANTICOAGULANT THERAPY: ICD-10-CM

## 2022-02-25 DIAGNOSIS — N18.31 STAGE 3A CHRONIC KIDNEY DISEASE (H): ICD-10-CM

## 2022-02-25 DIAGNOSIS — G25.81 RESTLESS LEG SYNDROME: ICD-10-CM

## 2022-02-25 DIAGNOSIS — I10 ESSENTIAL HYPERTENSION WITH GOAL BLOOD PRESSURE LESS THAN 140/90: ICD-10-CM

## 2022-02-25 DIAGNOSIS — D68.52 PROTHROMBIN MUTATION (H): ICD-10-CM

## 2022-02-25 DIAGNOSIS — D68.51 FACTOR V LEIDEN MUTATION (H): Primary | ICD-10-CM

## 2022-02-25 DIAGNOSIS — E66.812 CLASS 2 SEVERE OBESITY DUE TO EXCESS CALORIES WITH SERIOUS COMORBIDITY AND BODY MASS INDEX (BMI) OF 38.0 TO 38.9 IN ADULT (H): ICD-10-CM

## 2022-02-25 PROBLEM — C61 PRIMARY MALIGNANT NEOPLASM OF PROSTATE (H): Status: RESOLVED | Noted: 2017-02-10 | Resolved: 2022-02-25

## 2022-02-25 LAB — INR BLD: 2.8 (ref 0.9–1.1)

## 2022-02-25 PROCEDURE — 99397 PER PM REEVAL EST PAT 65+ YR: CPT | Performed by: INTERNAL MEDICINE

## 2022-02-25 PROCEDURE — 85610 PROTHROMBIN TIME: CPT

## 2022-02-25 PROCEDURE — 99214 OFFICE O/P EST MOD 30 MIN: CPT | Mod: 25 | Performed by: INTERNAL MEDICINE

## 2022-02-25 PROCEDURE — 36416 COLLJ CAPILLARY BLOOD SPEC: CPT

## 2022-02-25 RX ORDER — WARFARIN SODIUM 5 MG/1
TABLET ORAL
Qty: 90 TABLET | Refills: 3 | Status: SHIPPED | OUTPATIENT
Start: 2022-02-25 | End: 2023-03-07

## 2022-02-25 RX ORDER — SPIRONOLACTONE 25 MG/1
25 TABLET ORAL DAILY
Qty: 90 TABLET | Refills: 3 | Status: SHIPPED | OUTPATIENT
Start: 2022-02-25 | End: 2024-01-26

## 2022-02-25 RX ORDER — PRAMIPEXOLE DIHYDROCHLORIDE 1 MG/1
1 TABLET ORAL 2 TIMES DAILY
Qty: 180 TABLET | Refills: 1 | Status: SHIPPED | OUTPATIENT
Start: 2022-02-25 | End: 2022-11-07

## 2022-02-25 RX ORDER — GLIPIZIDE 5 MG/1
5 TABLET, FILM COATED, EXTENDED RELEASE ORAL DAILY
Qty: 90 TABLET | Refills: 3 | Status: SHIPPED | OUTPATIENT
Start: 2022-02-25 | End: 2024-01-26

## 2022-02-25 RX ORDER — ATENOLOL 50 MG/1
50 TABLET ORAL 2 TIMES DAILY
Qty: 180 TABLET | Refills: 3 | Status: SHIPPED | OUTPATIENT
Start: 2022-02-25 | End: 2023-09-05

## 2022-02-25 ASSESSMENT — ENCOUNTER SYMPTOMS
HEMATOCHEZIA: 0
NAUSEA: 0
HEADACHES: 0
NERVOUS/ANXIOUS: 0
PARESTHESIAS: 0
ABDOMINAL PAIN: 0
JOINT SWELLING: 0
FEVER: 0
PALPITATIONS: 0
WEAKNESS: 0
DIARRHEA: 0
FREQUENCY: 0
HEMATURIA: 0
ARTHRALGIAS: 0
SORE THROAT: 0
DIZZINESS: 0
SHORTNESS OF BREATH: 0
DYSURIA: 0
HEARTBURN: 0
COUGH: 0
CHILLS: 0
MYALGIAS: 0
EYE PAIN: 0
CONSTIPATION: 0

## 2022-02-25 ASSESSMENT — PAIN SCALES - GENERAL: PAINLEVEL: NO PAIN (0)

## 2022-02-25 ASSESSMENT — ACTIVITIES OF DAILY LIVING (ADL): CURRENT_FUNCTION: NO ASSISTANCE NEEDED

## 2022-02-25 NOTE — PROGRESS NOTES
"SUBJECTIVE:   Robert Richard is a 86 year old male who presents for Preventive Visit.    Patient has been advised of split billing requirements and indicates understanding: Yes  Are you in the first 12 months of your Medicare coverage?  No    Healthy Habits:     In general, how would you rate your overall health?  Good    Frequency of exercise:  1 day/week    Duration of exercise:  Less than 15 minutes    Do you usually eat at least 4 servings of fruit and vegetables a day, include whole grains    & fiber and avoid regularly eating high fat or \"junk\" foods?  No    Taking medications regularly:  Yes    Medication side effects:  None    Ability to successfully perform activities of daily living:  No assistance needed    Home Safety:  No safety concerns identified    Hearing Impairment:  Difficulty following a conversation in a noisy restaurant or crowded room    In the past 6 months, have you been bothered by leaking of urine?  No    In general, how would you rate your overall mental or emotional health?  Excellent      PHQ-2 Total Score: 0    Additional concerns today:  No    Doing ok, life is good.     Had vascular surgery procedure that worked, leg is healed up.  No pains or aches in the leg.    Weight is up from the summer.   Lives at [facility].      Not checking glucose too much, on glipizide.    Coumadin is good, no bleeding.    Restless legs is treated and stable.        Do you feel safe in your environment? Yes    Have you ever done Advance Care Planning? (For example, a Health Directive, POLST, or a discussion with a medical provider or your loved ones about your wishes): No, advance care planning information given to patient to review.  Advanced care planning was discussed at today's visit.       Fall risk  Fallen 2 or more times in the past year?: No  Any fall with injury in the past year?: No    Cognitive Screening   1) Repeat 3 items (Leader, Season, Table)    2) Clock draw: NORMAL  3) 3 item " recall: Recalls 1 object   Results: NORMAL clock, 1-2 items recalled: COGNITIVE IMPAIRMENT LESS LIKELY    Mini-CogTM Copyright MANUELITO Renteria. Licensed by the author for use in [facility]; reprinted with permission ([email or web address]). All rights reserved.      Do you have sleep apnea, excessive snoring or daytime drowsiness?: yes    Reviewed and updated as needed this visit by clinical staff   Tobacco  Allergies  Meds              Reviewed and updated as needed this visit by Provider                 Social History     Tobacco Use     Smoking status: Never Smoker     Smokeless tobacco: Never Used   Substance Use Topics     Alcohol use: Yes     Alcohol/week: 0.0 standard drinks     Comment: 1 drink every 1-2 weeks.     If you drink alcohol do you typically have >3 drinks per day or >7 drinks per week? No    Alcohol Use 2/25/2022   Prescreen: >3 drinks/day or >7 drinks/week? No       Current providers sharing in care for this patient include:   Patient Care Team:  Stiven Donahue MD as PCP - General (Internal Medicine)  Khadar García DPM as Assigned Musculoskeletal Provider  Stiven Donahue MD as Assigned PCP  Samuel Powers MD as Assigned Heart and Vascular Provider  Jay Salvador MD as Assigned Surgical Provider  Fallon Galdamez RD as Diabetes Educator (Dietitian, Registered)    The following health maintenance items are reviewed in Epic and correct as of today:  Health Maintenance Due   Topic Date Due     DIABETIC FOOT EXAM  Never done     ANNUAL REVIEW OF HM ORDERS  Never done     EYE EXAM  Never done     ZOSTER IMMUNIZATION (2 of 3) 06/02/2008     MEDICARE ANNUAL WELLNESS VISIT  01/21/2021     MICROALBUMIN  01/21/2021     FALL RISK ASSESSMENT  02/05/2022     HEMOGLOBIN  05/17/2022     Lab work is in process  Pneumonia Vaccine: up to date    Review of Systems   Constitutional: Negative for chills and fever.   HENT: Negative for congestion, ear pain, hearing loss and sore throat.    Eyes:  "Negative for pain and visual disturbance.   Respiratory: Negative for cough and shortness of breath.    Cardiovascular: Negative for chest pain, palpitations and peripheral edema.   Gastrointestinal: Negative for abdominal pain, constipation, diarrhea, heartburn, hematochezia and nausea.   Genitourinary: Negative for dysuria, frequency, genital sores, hematuria and urgency.   Musculoskeletal: Negative for arthralgias, joint swelling and myalgias.   Skin: Negative for rash.   Neurological: Negative for dizziness, weakness, headaches and paresthesias.   Psychiatric/Behavioral: Negative for mood changes. The patient is not nervous/anxious.        OBJECTIVE:   /84   Pulse 72   Temp 97.8  F (36.6  C) (Temporal)   Resp 20   Ht 1.765 m (5' 9.5\")   Wt 114.8 kg (253 lb)   SpO2 94%   BMI 36.83 kg/m   Estimated body mass index is 36.83 kg/m  as calculated from the following:    Height as of this encounter: 1.765 m (5' 9.5\").    Weight as of this encounter: 114.8 kg (253 lb).  Physical Exam  GENERAL: healthy, alert and no distress  EYES: Eyes grossly normal to inspection, PERRL and conjunctivae and sclerae normal  NECK: no adenopathy, no asymmetry, masses, or scars and thyroid normal to palpation  RESP: lungs clear to auscultation - no rales, rhonchi or wheezes  CV: regular rate and rhythm, normal S1 S2, no S3 or S4, no murmur, click or rub, no peripheral edema and peripheral pulses strong  ABDOMEN: soft, nontender, no hepatosplenomegaly, no masses and bowel sounds normal  MS: wearing wraps today.   SKIN: no suspicious lesions or rashes  NEURO: Normal strength and tone, mentation intact and speech normal  PSYCH: mentation appears normal, affect normal/bright        ASSESSMENT / PLAN:       ICD-10-CM    1. Medicare annual wellness visit, subsequent  Z00.00    2. Type 2 diabetes mellitus with complication, without long-term current use of insulin (H)  E11.8 glipiZIDE (GLUCOTROL XL) 5 MG 24 hr tablet   3. Restless leg " "syndrome  G25.81 pramipexole (MIRAPEX) 1 MG tablet   4. Essential hypertension with goal blood pressure less than 140/90  I10 spironolactone (ALDACTONE) 25 MG tablet   5. Personal history of venous thrombosis and embolism  Z86.718 warfarin ANTICOAGULANT (COUMADIN) 5 MG tablet   6. Class 2 severe obesity due to excess calories with serious comorbidity and body mass index (BMI) of 38.0 to 38.9 in adult (H)  E66.01     Z68.38    7. Stage 3a chronic kidney disease (H)  N18.31        Patient is overall doing well.  He is quite happy with his life.  He is 86 years old lives at [facility] and golfs every day when it is open.    Venous stasis disease has been treated with a venous procedure on the right leg in August.  Doing well has follow-up next week continues to wear his wraps regularly.    Diabetes is on glipizide A1c was 7.1.  Continue as he is doing try to lose some weight.    History of blood clot he is on Coumadin stable.    Obesity would like him to lose weight as his BMI is 37.    Blood pressure is treated with losartan and atenolol and spironolactone.    Chronic kidney disease stage IIIa chronic    COUNSELING:  Reviewed preventive health counseling, as reflected in patient instructions       Regular exercise       Healthy diet/nutrition    Estimated body mass index is 36.83 kg/m  as calculated from the following:    Height as of this encounter: 1.765 m (5' 9.5\").    Weight as of this encounter: 114.8 kg (253 lb).    Weight management plan: Discussed healthy diet and exercise guidelines    He reports that he has never smoked. He has never used smokeless tobacco.      Appropriate preventive services were discussed with this patient, including applicable screening as appropriate for cardiovascular disease, diabetes, osteopenia/osteoporosis, and glaucoma.  As appropriate for age/gender, discussed screening for colorectal cancer, prostate cancer, breast cancer, and cervical cancer. Checklist reviewing preventive " services available has been given to the patient.    Reviewed patients plan of care and provided an AVS. The Basic Care Plan (routine screening as documented in Health Maintenance) for Robert meets the Care Plan requirement. This Care Plan has been established and reviewed with the Patient.    Counseling Resources:  ATP IV Guidelines  Pooled Cohorts Equation Calculator  Breast Cancer Risk Calculator  Breast Cancer: Medication to Reduce Risk  FRAX Risk Assessment  ICSI Preventive Guidelines  Dietary Guidelines for Americans, 2010  [employer]'s MyPlate  ASA Prophylaxis  Lung CA Screening    Stiven Donahue MD  [facility]    Identified Health Risks:

## 2022-02-25 NOTE — PROGRESS NOTES
ANTICOAGULATION MANAGEMENT     Robert Richard 86 year old male is on warfarin with therapeutic INR result. (Goal INR 2.0-3.0)    Recent labs: (last 7 days)     02/25/22  0912   INR 2.8*       ASSESSMENT     Source(s): Chart Review I left a detailed voicemail with the orders below. I have also requested a call back if there have been any missed doses, concerns, illness, fever, or if there have been any changes in medications, activity level, or diet        Warfarin doses taken: Reviewed in chart    Diet: No new diet changes identified    New illness, injury, or hospitalization: No    Medication/supplement changes: None noted    Signs or symptoms of bleeding or clotting: No    Previous INR: Therapeutic last 2(+) visits    Additional findings: None     PLAN     Recommended plan for no diet, medication or health factor changes affecting INR     Dosing Instructions: Continue your current warfarin dose with next INR in 6 weeks       Summary  As of 2/25/2022    Full warfarin instructions:  2.5 mg every Fri; 5 mg all other days   Next INR check:  4/8/2022             Detailed voice message left for Robert with dosing instructions and follow up date.     Contact [phone]  to schedule and with any changes, questions or concerns.     Education provided: Please call back if any changes to your diet, medications or how you've been taking warfarin    Plan made per [facility] anticoagulation protocol    Chantell Boston RN  Anticoagulation Clinic  2/25/2022    _______________________________________________________________________     Anticoagulation Episode Summary     Current INR goal:  2.0-3.0   TTR:  89.1 % (1 y)   Target end date:  Indefinite   [facility] IN[facility]  [facility]    Indications    Factor V Leiden mutation (H) [D68.51]  Venous thrombosis [I82.90]  Prothrombin mutation (H) [D68.52]  Long term current use of anticoagulant therapy [Z79.01]           Comments:  5 mg tablets, PM dose         Anticoagulation  Care Providers     Provider Role Specialty Phone number    Stiven Donahue MD Referring Internal Medicine [phone]    Nate Cifuentes DO [facility] Internal Medicine [phone]

## 2022-03-01 ENCOUNTER — ANCILLARY PROCEDURE (OUTPATIENT)
Dept: ULTRASOUND IMAGING | Facility: CLINIC | Age: 87
End: 2022-03-01
Attending: SURGERY
Payer: COMMERCIAL

## 2022-03-01 ENCOUNTER — OFFICE VISIT (OUTPATIENT)
Dept: VASCULAR SURGERY | Facility: CLINIC | Age: 87
End: 2022-03-01
Attending: SURGERY
Payer: COMMERCIAL

## 2022-03-01 VITALS — SYSTOLIC BLOOD PRESSURE: 162 MMHG | DIASTOLIC BLOOD PRESSURE: 78 MMHG | HEART RATE: 52 BPM

## 2022-03-01 DIAGNOSIS — I83.019 VENOUS STASIS ULCER OF RIGHT LOWER LEG WITH EDEMA OF RIGHT LOWER LEG (H): ICD-10-CM

## 2022-03-01 DIAGNOSIS — I87.2 VENOUS STASIS DERMATITIS OF RIGHT LOWER EXTREMITY: ICD-10-CM

## 2022-03-01 DIAGNOSIS — R60.0 VENOUS STASIS ULCER OF RIGHT LOWER LEG WITH EDEMA OF RIGHT LOWER LEG (H): ICD-10-CM

## 2022-03-01 DIAGNOSIS — I83.891 VENOUS STASIS ULCER OF RIGHT LOWER LEG WITH EDEMA OF RIGHT LOWER LEG (H): ICD-10-CM

## 2022-03-01 DIAGNOSIS — I83.811 VARICOSE VEINS OF RIGHT LOWER EXTREMITY WITH PAIN: Primary | ICD-10-CM

## 2022-03-01 DIAGNOSIS — L97.919 VENOUS STASIS ULCER OF RIGHT LOWER LEG WITH EDEMA OF RIGHT LOWER LEG (H): ICD-10-CM

## 2022-03-01 PROCEDURE — 99213 OFFICE O/P EST LOW 20 MIN: CPT | Performed by: SURGERY

## 2022-03-01 PROCEDURE — 93971 EXTREMITY STUDY: CPT | Mod: RT | Performed by: SURGERY

## 2022-03-01 NOTE — PROGRESS NOTES
Robert Richard is 6 months status post a technically challenging radiofrequency ablation of the right greater saphenous vein performed for an incompetent right GSV with a right medial ankle venous stasis ulcer.  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  He presents today for routine 6-month post procedure venous ultrasound.    He is very pleased with his results and the performance of his right leg.  He still utilizes compression wraps to control his bilateral calf edema.  He utilizes skin moisturizers on a daily basis.  He remains active.  At today's visit he was accompanied by his wife and daughter.  He has no complaints whatsoever.    Exam:  Well-developed male in no acute distress.  His right leg looks excellent with no significant edema.  The medial ankle ulceration has completely healed.  No residual varicosities.  Bilateral hyperpigmentation in a stocking distribution.  Palpable pedal pulses bilaterally.    Imaging:    Lottie Guadarrama on 3/1/2022 10:33 AM   Name:  Robert Richard                                                        Patient ID: [id number]  Date: 2022                                                               : 1935  Sex: male                                                                                Examined by: OUMAR Guadarrama RVT  Age:  86 year old                                                                     Reading MD: LIZ     INDICATIONS:    Post VNUS Closure     EXAM TYPE  RIGHT LOWER EXTREMITY VENOUS DUPLEX   6 MONTH POST VNUS CLOSURE  GSV     TECHNICAL SUMMARY     Multiple transverse and longitudinal images of right lower extremity were obtained.     RIGHT:       The CFV demonstrates phasic flow, compresses, and responds to augmentations. Chronic non-occlusive thrombus is noted in the popliteal vein with filling defects on color doppler  (previously imaged on duplex exam 6/9/21). The posterior tibial and peroneal veins are widely patent and fully compressible with no evidence for DVT at this time.     The GSV is closed 25.6 mm from the SFJ to the distal calf.  A 10 cm segment of the GSV at the knee appears patent.      FINAL SUMMARY:  1. Right CFV is patent.  2. The right GSV is closed 25.6 mm from the SFJ to the distal calf.      ASSESSMENT:  6 months status post right GSV radiofrequency ablation clinically doing well.    RECOMMENDATION:  I reviewed all the above with Carlos and his family.  He understands that he has a component of right leg deep venous insufficiency.  He will continue with daily compression and skin moisturizers.  I have no venous concerns.  Despite the left leg hyperpigmentation he has no other significant symptoms.  Presently, he politely declines a left leg venous competency exam.  Venous surgical follow-up will therefore be with me on an as-needed basis.    Total length of this encounter was 20 minutes.    Gt Powers MD

## 2022-03-01 NOTE — LETTER
3/1/2022         RE: Robert Richard  [address] [place] [facility] [address]        Dear Colleague,    Thank you for referring your patient, Robert Richard, to the [facility] [place]. Please see a copy of my visit note below.    Robert Richard is 6 months status post a technically challenging radiofrequency ablation of the right greater saphenous vein performed for an incompetent right GSV with a right medial ankle venous stasis ulcer.  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  He presents today for routine 6-month post procedure venous ultrasound.    He is very pleased with his results and the performance of his right leg.  He still utilizes compression wraps to control his bilateral calf edema.  He utilizes skin moisturizers on a daily basis.  He remains active.  At today's visit he was accompanied by his wife and daughter.  He has no complaints whatsoever.    Exam:  Well-developed male in no acute distress.  His right leg looks excellent with no significant edema.  The medial ankle ulceration has completely healed.  No residual varicosities.  Bilateral hyperpigmentation in a stocking distribution.  Palpable pedal pulses bilaterally.    Imaging:    Lottie Guadarrama on 3/1/2022 10:33 AM   Name:  Robert Richard                                                        Patient ID: [id number]  Date: 2022                                                               : 1935  Sex: male                                                                                Examined by: OUMAR Guadarrama RVT  Age:  86 year old                                                                     Reading MD: LIZ     INDICATIONS:    Post VNUS Closure     EXAM TYPE  RIGHT LOWER EXTREMITY VENOUS DUPLEX   6 MONTH POST VNUS CLOSURE  GSV     TECHNICAL SUMMARY     Multiple transverse and  longitudinal images of right lower extremity were obtained.     RIGHT:       The CFV demonstrates phasic flow, compresses, and responds to augmentations. Chronic non-occlusive thrombus is noted in the popliteal vein with filling defects on color doppler (previously imaged on duplex exam 6/9/21). The posterior tibial and peroneal veins are widely patent and fully compressible with no evidence for DVT at this time.     The GSV is closed 25.6 mm from the SFJ to the distal calf.  A 10 cm segment of the GSV at the knee appears patent.      FINAL SUMMARY:  1. Right CFV is patent.  2. The right GSV is closed 25.6 mm from the SFJ to the distal calf.      ASSESSMENT:  6 months status post right GSV radiofrequency ablation clinically doing well.    RECOMMENDATION:  I reviewed all the above with Carlos and his family.  He understands that he has a component of right leg deep venous insufficiency.  He will continue with daily compression and skin moisturizers.  I have no venous concerns.  Despite the left leg hyperpigmentation he has no other significant symptoms.  Presently, he politely declines a left leg venous competency exam.  Venous surgical follow-up will therefore be with me on an as-needed basis.    Total length of this encounter was 20 minutes.    Gt Powers MD        Again, thank you for allowing me to participate in the care of your patient.        Sincerely,        Samuel Powers MD

## 2022-04-18 ENCOUNTER — TELEPHONE (OUTPATIENT)
Dept: ANTICOAGULATION | Facility: CLINIC | Age: 87
End: 2022-04-18
Payer: COMMERCIAL

## 2022-04-18 NOTE — TELEPHONE ENCOUNTER
ANTICOAGULATION     Robert Richard is overdue for INR check.      Was unable to reach patient and was unable to leave a voicemail. If patient calls, please schedule INR check as soon as possible.     Chantell Boston RN

## 2022-04-25 ENCOUNTER — TELEPHONE (OUTPATIENT)
Dept: ANTICOAGULATION | Facility: CLINIC | Age: 87
End: 2022-04-25
Payer: COMMERCIAL

## 2022-04-26 ENCOUNTER — TELEPHONE (OUTPATIENT)
Dept: INTERNAL MEDICINE | Facility: CLINIC | Age: 87
End: 2022-04-26
Payer: COMMERCIAL

## 2022-04-26 NOTE — TELEPHONE ENCOUNTER
Please check why he needs the amoxicillin.  If it is for dental prophylaxis for his joint.  He should not need it if he has had the joint replaced over a year ago.  Unless he has a history of septic joints.

## 2022-04-26 NOTE — TELEPHONE ENCOUNTER
Reason for Call:  Medication or medication refill:    Do you use a [facility]?  Name of the pharmacy and phone number for the current request:  [facility] [place] - [phone]     Name of the medication requested: Amoxicilian 500mg    Other request: patient request per pharmacy. Not in current meds    Can we leave a detailed message on this number? Not Applicable    Phone number patient can be reached at: Other phone number:     Best Time:     Call taken on 4/26/2022 at 10:12 AM by Margarita Burgos

## 2022-04-29 ENCOUNTER — ANTICOAGULATION THERAPY VISIT (OUTPATIENT)
Dept: ANTICOAGULATION | Facility: CLINIC | Age: 87
End: 2022-04-29

## 2022-04-29 ENCOUNTER — LAB (OUTPATIENT)
Dept: LAB | Facility: CLINIC | Age: 87
End: 2022-04-29
Payer: COMMERCIAL

## 2022-04-29 DIAGNOSIS — I82.90 VENOUS THROMBOSIS: ICD-10-CM

## 2022-04-29 DIAGNOSIS — D68.51 FACTOR V LEIDEN MUTATION (H): Primary | ICD-10-CM

## 2022-04-29 DIAGNOSIS — Z79.01 LONG TERM CURRENT USE OF ANTICOAGULANT THERAPY: ICD-10-CM

## 2022-04-29 DIAGNOSIS — D68.52 PROTHROMBIN MUTATION (H): ICD-10-CM

## 2022-04-29 DIAGNOSIS — D68.51 FACTOR V LEIDEN MUTATION (H): ICD-10-CM

## 2022-04-29 LAB — INR BLD: 3.4 (ref 0.9–1.1)

## 2022-04-29 PROCEDURE — 85610 PROTHROMBIN TIME: CPT

## 2022-04-29 PROCEDURE — 36416 COLLJ CAPILLARY BLOOD SPEC: CPT

## 2022-04-29 NOTE — PROGRESS NOTES
ANTICOAGULATION MANAGEMENT     Robert Richard 86 year old male is on warfarin with supratherapeutic INR result. (Goal INR 2.0-3.0)    Recent labs: (last 7 days)     04/29/22  1329   INR 3.4*       ASSESSMENT       Source(s): Chart Review and Patient/Caregiver Call       Warfarin doses taken: Warfarin taken as instructed    Diet: No new diet changes identified    New illness, injury, or hospitalization: No    Medication/supplement changes: None noted    Signs or symptoms of bleeding or clotting: No    Previous INR: Therapeutic last 2(+) visits    Additional findings: None       PLAN     Recommended plan for no diet, medication or health factor changes affecting INR     Dosing Instructions: continue your current warfarin dose (will have some extra greens/Giana K tonight) with next INR in 2 weeks       Summary  As of 4/29/2022    Full warfarin instructions:  2.5 mg every Fri; 5 mg all other days   Next INR check:  5/13/2022             Telephone call with Carlos who verbalizes understanding and agrees to plan    Patient offered & declined to schedule next visit    Education provided: Please call back if any changes to your diet, medications or how you've been taking warfarin, Goal range and significance of current result, Importance of therapeutic range, Importance of following up at instructed interval and Monitoring for bleeding signs and symptoms    Plan made per [facility] anticoagulation protocol    Raquel Pelletier, RN  Anticoagulation Clinic  4/29/2022    _______________________________________________________________________     Anticoagulation Episode Summary     Current INR goal:  2.0-3.0   TTR:  77.6 % (1 y)   Target end date:  Indefinite   Send INR reminders to:  [facility]    Indications    Factor V Leiden mutation (H) [D68.51]  Venous thrombosis [I82.90]  Prothrombin mutation (H) [D68.52]  Long term current use of anticoagulant therapy [Z79.01]           Comments:  5 mg tablets, PM dose          Anticoagulation Care Providers     Provider Role Specialty Phone number    Stiven Donahue MD Referring Internal Medicine [phone]    Nate Cifuentes DO [facility] Internal Medicine [phone]

## 2022-05-05 ENCOUNTER — TELEPHONE (OUTPATIENT)
Dept: INTERNAL MEDICINE | Facility: CLINIC | Age: 87
End: 2022-05-05
Payer: COMMERCIAL

## 2022-05-05 DIAGNOSIS — Z96.641 STATUS POST RIGHT HIP REPLACEMENT: Primary | ICD-10-CM

## 2022-05-05 RX ORDER — AMOXICILLIN 500 MG/1
CAPSULE ORAL
Qty: 8 CAPSULE | Refills: 1 | Status: SHIPPED | OUTPATIENT
Start: 2022-05-05

## 2022-05-05 NOTE — TELEPHONE ENCOUNTER
New prescription:    Amoxicillin 500 mg Oral Capsule (Amoxil)  Qty #4  SIG:  Bring to clinic in original bottle one hour prior to procedure where you will be instructed to take all 4 capsules          Routing refill request to provider for review/approval because:  Drug not active on patient's medication list    Sammie Banuelos CMA

## 2022-05-20 ENCOUNTER — TELEPHONE (OUTPATIENT)
Dept: ANTICOAGULATION | Facility: CLINIC | Age: 87
End: 2022-05-20
Payer: COMMERCIAL

## 2022-05-23 ENCOUNTER — ANTICOAGULATION THERAPY VISIT (OUTPATIENT)
Dept: ANTICOAGULATION | Facility: CLINIC | Age: 87
End: 2022-05-23

## 2022-05-23 ENCOUNTER — LAB (OUTPATIENT)
Dept: LAB | Facility: CLINIC | Age: 87
End: 2022-05-23
Payer: COMMERCIAL

## 2022-05-23 DIAGNOSIS — D68.51 FACTOR V LEIDEN MUTATION (H): ICD-10-CM

## 2022-05-23 DIAGNOSIS — Z79.01 LONG TERM CURRENT USE OF ANTICOAGULANT THERAPY: ICD-10-CM

## 2022-05-23 DIAGNOSIS — D68.52 PROTHROMBIN MUTATION (H): ICD-10-CM

## 2022-05-23 DIAGNOSIS — I82.90 VENOUS THROMBOSIS: ICD-10-CM

## 2022-05-23 DIAGNOSIS — D68.51 FACTOR V LEIDEN MUTATION (H): Primary | ICD-10-CM

## 2022-05-23 LAB — INR BLD: 2.2 (ref 0.9–1.1)

## 2022-05-23 PROCEDURE — 85610 PROTHROMBIN TIME: CPT

## 2022-05-23 PROCEDURE — 36416 COLLJ CAPILLARY BLOOD SPEC: CPT

## 2022-05-23 NOTE — PROGRESS NOTES
ANTICOAGULATION MANAGEMENT     Robert Richard 86 year old male is on warfarin with therapeutic INR result. (Goal INR 2.0-3.0)    Recent labs: (last 7 days)     05/23/22  0919   INR 2.2*       ASSESSMENT       Source(s): Chart Review       Warfarin doses taken: Reviewed in chart    Diet: No new diet changes identified    New illness, injury, or hospitalization: No    Medication/supplement changes: None noted    Signs or symptoms of bleeding or clotting: No    Previous INR: Supratherapeutic    Additional findings: None       PLAN     Recommended plan for no diet, medication or health factor changes affecting INR     Dosing Instructions: continue your current warfarin dose with next INR in 5 weeks       Summary  As of 5/23/2022    Full warfarin instructions:  2.5 mg every Fri; 5 mg all other days   Next INR check:  6/27/2022             Detailed voice message left for Carlos with dosing instructions and follow up date.     Contact [phone]  to schedule and with any changes, questions or concerns.     Education provided: Please call back if any changes to your diet, medications or how you've been taking warfarin    Plan made per [facility] anticoagulation protocol    Neelima Arreaga RN  Anticoagulation Clinic  5/23/2022    _______________________________________________________________________     Anticoagulation Episode Summary     Current INR goal:  2.0-3.0   TTR:  80.1 % (1 y)   Target end date:  Indefinite   Send INR reminders to:  [facility]    Indications    Factor V Leiden mutation (H) [D68.51]  Venous thrombosis [I82.90]  Prothrombin mutation (H) [D68.52]  Long term current use of anticoagulant therapy [Z79.01]           Comments:  5 mg tablets, PM dose         Anticoagulation Care Providers     Provider Role Specialty Phone number    Stiven Donahue MD Referring Internal Medicine [phone]    Nate Cifuentes DO Responsible Internal Medicine [phone]

## 2022-07-05 ENCOUNTER — TELEPHONE (OUTPATIENT)
Dept: ANTICOAGULATION | Facility: CLINIC | Age: 87
End: 2022-07-05

## 2022-07-05 NOTE — TELEPHONE ENCOUNTER
ANTICOAGULATION     Robertdagoberto Richard is overdue for INR check.      Left message for patient to call and schedule lab appointment as soon as possible. If returning call, please schedule.     Neelima Arreaga RN

## 2022-07-13 ENCOUNTER — ANTICOAGULATION THERAPY VISIT (OUTPATIENT)
Dept: ANTICOAGULATION | Facility: CLINIC | Age: 87
End: 2022-07-13

## 2022-07-13 ENCOUNTER — LAB (OUTPATIENT)
Dept: LAB | Facility: CLINIC | Age: 87
End: 2022-07-13
Payer: COMMERCIAL

## 2022-07-13 DIAGNOSIS — Z79.01 LONG TERM CURRENT USE OF ANTICOAGULANT THERAPY: ICD-10-CM

## 2022-07-13 DIAGNOSIS — I82.90 VENOUS THROMBOSIS: ICD-10-CM

## 2022-07-13 DIAGNOSIS — D68.51 FACTOR V LEIDEN MUTATION (H): Primary | ICD-10-CM

## 2022-07-13 DIAGNOSIS — D68.51 FACTOR V LEIDEN MUTATION (H): ICD-10-CM

## 2022-07-13 DIAGNOSIS — D68.52 PROTHROMBIN MUTATION (H): ICD-10-CM

## 2022-07-13 LAB — INR BLD: 3.2 (ref 0.9–1.1)

## 2022-07-13 PROCEDURE — 36416 COLLJ CAPILLARY BLOOD SPEC: CPT

## 2022-07-13 PROCEDURE — 85610 PROTHROMBIN TIME: CPT

## 2022-07-13 NOTE — PROGRESS NOTES
ANTICOAGULATION MANAGEMENT     Robert Richard 86 year old male is on warfarin with supratherapeutic INR result. (Goal INR 2.0-3.0)    Recent labs: (last 7 days)     07/13/22  1333   INR 3.2*       ASSESSMENT       Source(s): Chart Review    Previous INR was Therapeutic last visit; previously outside of goal range    Medication, diet, health changes since last INR chart reviewed; none identified           PLAN     Unable to reach pt today.    VM left to call [facility] back. Will try again later.     Follow up required to confirm warfarin dose taken and assess for changes    Raquel Pelletier, RN  Anticoagulation Clinic  7/13/2022

## 2022-07-13 NOTE — PROGRESS NOTES
ANTICOAGULATION MANAGEMENT     Robert Richard 86 year old male is on warfarin with supratherapeutic INR result. (Goal INR 2.0-3.0)    Recent labs: (last 7 days)     07/13/22  1333   INR 3.2*       ASSESSMENT       Source(s): Chart Review    Previous INR was Therapeutic last visit; previously outside of goal range    Medication, diet, health changes since last INR chart reviewed; none identified           PLAN     Unable to reach pt today.    Left message to continue current dose of warfarin 5 mg tonight. Request call back for assessment.    Follow up required to confirm warfarin dose taken and assess for changes         Raquel Pelletier, RN  Anticoagulation Clinic  7/13/2022

## 2022-07-14 NOTE — PROGRESS NOTES
ANTICOAGULATION MANAGEMENT     Robert Richard 86 year old male is on warfarin with supratherapeutic INR result. (Goal INR 2.0-3.0)    Recent labs: (last 7 days)     07/13/22  1333   INR 3.2*       ASSESSMENT       Source(s): Chart Review and Patient/Caregiver Call       Warfarin doses taken: Warfarin taken as instructed    Diet: Decreased greens/vitamin K in diet; plans to resume previous intake    New illness, injury, or hospitalization: No    Medication/supplement changes: None noted    Signs or symptoms of bleeding or clotting: No    Previous INR: Supratherapeutic    Additional findings: None       PLAN     Recommended plan for temporary change(s) affecting INR     Dosing Instructions: continue your current warfarin dose with next INR in 2 weeks       Summary  As of 7/13/2022    Full warfarin instructions:  2.5 mg every Fri; 5 mg all other days   Next INR check:  7/27/2022             Telephone call with Carlos who verbalizes understanding and agrees to plan    Lab visit scheduled    Education provided: Importance of consistent vitamin K intake    Plan made per [facility] anticoagulation protocol    Neelima Arreaga, RN  Anticoagulation Clinic  7/14/2022    _______________________________________________________________________     Anticoagulation Episode Summary     Current INR goal:  2.0-3.0   TTR:  77.2 % (1 y)   Target end date:  Indefinite   Send INR reminders to:  [facility]    Indications    Factor V Leiden mutation (H) [D68.51]  Venous thrombosis [I82.90]  Prothrombin mutation (H) [D68.52]  Long term current use of anticoagulant therapy [Z79.01]           Comments:           Anticoagulation Care Providers     Provider Role Specialty Phone number    Stiven Donahue MD Referring Internal Medicine [phone]    Nate Cifuentes DO Responsible Internal Medicine [phone]

## 2022-07-28 ENCOUNTER — ANTICOAGULATION THERAPY VISIT (OUTPATIENT)
Dept: ANTICOAGULATION | Facility: CLINIC | Age: 87
End: 2022-07-28

## 2022-07-28 ENCOUNTER — LAB (OUTPATIENT)
Dept: LAB | Facility: CLINIC | Age: 87
End: 2022-07-28
Payer: COMMERCIAL

## 2022-07-28 DIAGNOSIS — D68.51 FACTOR V LEIDEN MUTATION (H): Primary | ICD-10-CM

## 2022-07-28 DIAGNOSIS — D68.52 PROTHROMBIN MUTATION (H): ICD-10-CM

## 2022-07-28 DIAGNOSIS — Z79.01 LONG TERM CURRENT USE OF ANTICOAGULANT THERAPY: ICD-10-CM

## 2022-07-28 DIAGNOSIS — D68.51 FACTOR V LEIDEN MUTATION (H): ICD-10-CM

## 2022-07-28 DIAGNOSIS — I82.90 VENOUS THROMBOSIS: ICD-10-CM

## 2022-07-28 LAB — INR BLD: 3.7 (ref 0.9–1.1)

## 2022-07-28 PROCEDURE — 85610 PROTHROMBIN TIME: CPT

## 2022-07-28 PROCEDURE — 36416 COLLJ CAPILLARY BLOOD SPEC: CPT

## 2022-07-28 NOTE — PROGRESS NOTES
ANTICOAGULATION MANAGEMENT     Robert Richard 86 year old male is on warfarin with supratherapeutic INR result. (Goal INR 2.0-3.0)    Recent labs: (last 7 days)     07/28/22  1245   INR 3.7*       ASSESSMENT       Source(s): Chart Review and Patient/Caregiver Call       Warfarin doses taken: Warfarin taken as instructed    Diet: Decreased greens/vitamin K in diet; plans to resume previous intake Pt insists that he'll eat more greens this time and would like to keep dosing the same    New illness, injury, or hospitalization: No    Medication/supplement changes: None noted    Signs or symptoms of bleeding or clotting: No    Previous INR: Supratherapeutic    Additional findings: None       PLAN     Recommended plan for temporary change(s) affecting INR     Dosing Instructions: partial hold then continue your current warfarin dose with next INR in 2 weeks       Summary  As of 7/28/2022    Full warfarin instructions:  7/28: 2.5 mg; Otherwise 2.5 mg every Fri; 5 mg all other days   Next INR check:  8/11/2022             Telephone call with Carlos who verbalizes understanding and agrees to plan    Patient offered & declined to schedule next visit    Education provided: Importance of consistent vitamin K intake, Impact of vitamin K foods on INR and Monitoring for bleeding signs and symptoms    Plan made per [facility] anticoagulation protocol    Neelima Arreaga RN  Anticoagulation Clinic  7/28/2022    _______________________________________________________________________     Anticoagulation Episode Summary     Current INR goal:  2.0-3.0   TTR:  73.2 % (1 y)   Target end date:  Indefinite   Send INR reminders to:  [facility]    Indications    Factor V Leiden mutation (H) [D68.51]  Venous thrombosis [I82.90]  Prothrombin mutation (H) [D68.52]  Long term current use of anticoagulant therapy [Z79.01]           Comments:           Anticoagulation Care Providers     Provider Role Specialty Phone number    Stiven Donahue MD  [facility] Internal Medicine [phone]    Nate Cifuentes DO [facility] Internal Medicine [phone]

## 2022-08-12 ENCOUNTER — LAB (OUTPATIENT)
Dept: LAB | Facility: CLINIC | Age: 87
End: 2022-08-12
Payer: COMMERCIAL

## 2022-08-12 ENCOUNTER — ANTICOAGULATION THERAPY VISIT (OUTPATIENT)
Dept: ANTICOAGULATION | Facility: CLINIC | Age: 87
End: 2022-08-12

## 2022-08-12 DIAGNOSIS — I82.90 VENOUS THROMBOSIS: ICD-10-CM

## 2022-08-12 DIAGNOSIS — D68.52 PROTHROMBIN MUTATION (H): ICD-10-CM

## 2022-08-12 DIAGNOSIS — Z79.01 LONG TERM CURRENT USE OF ANTICOAGULANT THERAPY: ICD-10-CM

## 2022-08-12 DIAGNOSIS — D68.51 FACTOR V LEIDEN MUTATION (H): Primary | ICD-10-CM

## 2022-08-12 DIAGNOSIS — D68.51 FACTOR V LEIDEN MUTATION (H): ICD-10-CM

## 2022-08-12 LAB — INR BLD: 3.3 (ref 0.9–1.1)

## 2022-08-12 PROCEDURE — 85610 PROTHROMBIN TIME: CPT

## 2022-08-12 PROCEDURE — 36416 COLLJ CAPILLARY BLOOD SPEC: CPT

## 2022-08-12 NOTE — PROGRESS NOTES
ANTICOAGULATION MANAGEMENT     Robert Richard 86 year old male is on warfarin with supratherapeutic INR result. (Goal INR 2.0-3.0)    Recent labs: (last 7 days)     08/12/22  0747   INR 3.3*       ASSESSMENT       Source(s): Chart Review    Previous INR was Supratherapeutic    Medication, diet, health changes since last INR chart reviewed; none identified      I left a detailed voicemail with the orders reflected in flowsheet. I have also requested a call back if there have been any missed doses, concerns, illness, fever, or if there have been any changes in medications, activity level, or diet       PLAN     Recommended plan for no diet, medication or health factor changes affecting INR     Dosing Instructions: decrease your warfarin dose (7.7% change) with next INR in 2 weeks       Summary  As of 8/12/2022    Full warfarin instructions:  2.5 mg every Mon, Fri; 5 mg all other days   Next INR check:  8/26/2022             Detailed voice message left for Carlos with dosing instructions and follow up date.     Contact [phone]  to schedule and with any changes, questions or concerns.     Education provided: Goal range and significance of current result, Importance of therapeutic range, Importance of following up at instructed interval, Monitoring for bleeding signs and symptoms and Contact [phone]  with any changes, questions or concerns.     Plan made per [facility] anticoagulation protocol    Raquel Pelletier, RN  Anticoagulation Clinic  8/12/2022    _______________________________________________________________________     Anticoagulation Episode Summary     Current INR goal:  2.0-3.0   TTR:  69.1 % (1 y)   Target end date:  Indefinite   Send INR reminders to:  [facility]    Indications    Factor V Leiden mutation (H) [D68.51]  Venous thrombosis [I82.90]  Prothrombin mutation (H) [D68.52]  Long term current use of anticoagulant therapy [Z79.01]           Comments:           Anticoagulation Care Providers      Provider Role Specialty Phone number    Stiven Donahue MD Referring Internal Medicine [phone]    Nate Cifuentes DO [facility] Internal Medicine [phone]

## 2022-08-14 NOTE — DISCHARGE INSTRUCTIONS
Today the wounds on your right inner ankle appear improved.  Center area of damage is better, more scattered with more intact tissue within.  The scattered areas of distinct wound around the main area are also improved, fewer in number and all appear to be improving well.    We re applied the Silvercel in small pieces to the distinct areas of slough.  The Unna's boot and an absorptive pad all secured with gauze and an ace wrap.  I will see you again on Monday of next week July the 19th at 8 am.    Any concerns between now and then call our scheduling line at [phone] to get a message to me.    Thanks for coming in today.    Reji Malhotra RN [email or web address]   For information on Fall & Injury Prevention, visit: [email or web address] OR  [email or web address] OR  https://www.cdc.gov/steadi/patient.html

## 2022-08-28 ENCOUNTER — HEALTH MAINTENANCE LETTER (OUTPATIENT)
Age: 87
End: 2022-08-28

## 2022-08-29 ENCOUNTER — TELEPHONE (OUTPATIENT)
Dept: ANTICOAGULATION | Facility: CLINIC | Age: 87
End: 2022-08-29

## 2022-09-06 ENCOUNTER — LAB (OUTPATIENT)
Dept: LAB | Facility: CLINIC | Age: 87
End: 2022-09-06
Payer: COMMERCIAL

## 2022-09-06 ENCOUNTER — ANTICOAGULATION THERAPY VISIT (OUTPATIENT)
Dept: ANTICOAGULATION | Facility: CLINIC | Age: 87
End: 2022-09-06

## 2022-09-06 DIAGNOSIS — Z79.01 LONG TERM CURRENT USE OF ANTICOAGULANT THERAPY: ICD-10-CM

## 2022-09-06 DIAGNOSIS — D68.51 FACTOR V LEIDEN MUTATION (H): Primary | ICD-10-CM

## 2022-09-06 DIAGNOSIS — D68.51 FACTOR V LEIDEN MUTATION (H): ICD-10-CM

## 2022-09-06 DIAGNOSIS — I82.90 VENOUS THROMBOSIS: ICD-10-CM

## 2022-09-06 DIAGNOSIS — D68.52 PROTHROMBIN MUTATION (H): ICD-10-CM

## 2022-09-06 LAB — INR BLD: 2.2 (ref 0.9–1.1)

## 2022-09-06 PROCEDURE — 36416 COLLJ CAPILLARY BLOOD SPEC: CPT

## 2022-09-06 PROCEDURE — 85610 PROTHROMBIN TIME: CPT

## 2022-09-06 NOTE — PROGRESS NOTES
ANTICOAGULATION MANAGEMENT     Robert Richard 87 year old male is on warfarin with therapeutic INR result. (Goal INR 2.0-3.0)    Recent labs: (last 7 days)     09/06/22  1209   INR 2.2*       ASSESSMENT       Source(s): Chart Review    Previous INR was Supratherapeutic    Medication, diet, health changes since last INR chart reviewed; none identified    I left a detailed voicemail with the orders reflected in flowsheet. I have also requested a call back if there have been any missed doses, concerns, illness, fever, or if there have been any changes in medications, activity level, or diet         PLAN     Recommended plan for no diet, medication or health factor changes affecting INR     Dosing Instructions: Continue your current warfarin dose with next INR in 3 weeks       Summary  As of 9/6/2022    Full warfarin instructions:  2.5 mg every Mon, Fri; 5 mg all other days   Next INR check:  9/27/2022             Detailed voice message left for Carlos with dosing instructions and follow up date.     Contact [phone]  to schedule and with any changes, questions or concerns.     Education provided: Contact [phone]  with any changes, questions or concerns.     Plan made per [facility] anticoagulation protocol    Raquel Pelletier, RN  Anticoagulation Clinic  9/6/2022    _______________________________________________________________________     Anticoagulation Episode Summary     Current INR goal:  2.0-3.0   TTR:  67.3 % (1 y)   Target end date:  Indefinite   [facility]    Indications    Factor V Leiden mutation (H) [D68.51]  Venous thrombosis [I82.90]  Prothrombin mutation (H) [D68.52]  Long term current use of anticoagulant therapy [Z79.01]           Comments:           Anticoagulation Care Providers     Provider Role Specialty Phone number    Stiven Donahue MD Referring Internal Medicine [phone]    Nate Cifuentes DO Responsible Internal Medicine [phone]

## 2022-09-09 DIAGNOSIS — J31.0 CHRONIC RHINITIS: ICD-10-CM

## 2022-09-13 RX ORDER — IPRATROPIUM BROMIDE 42 UG/1
SPRAY, METERED NASAL
Qty: 45 ML | Refills: 1 | Status: SHIPPED | OUTPATIENT
Start: 2022-09-13 | End: 2023-11-20

## 2022-09-26 NOTE — PROGRESS NOTES
ANTICOAGULATION FOLLOW-UP CLINIC VISIT    Patient Name:  Robert Richard  Date:  9/25/2019  Contact Type:  Face to Face    SUBJECTIVE:  Patient Findings     Positives:   Change in medications (ceftin 9/9-9/19)             OBJECTIVE    INR Protime   Date Value Ref Range Status   09/25/2019 3.1 (A) 0.9 - 1.1 Final       ASSESSMENT / PLAN  INR assessment SUPRA    Recheck INR In: 6 WEEKS    INR Location Clinic      Anticoagulation Summary  As of 9/25/2019    INR goal:   2.0-3.0   TTR:   77.0 % (3.5 y)   INR used for dosing:   3.1! (9/25/2019)   Warfarin maintenance plan:   2.5 mg (5 mg x 0.5) every Fri; 5 mg (5 mg x 1) all other days   Full warfarin instructions:   2.5 mg every Fri; 5 mg all other days   Weekly warfarin total:   32.5 mg   No change documented:   Raquel Pelletier RN   Plan last modified:   Raquel Pelletier RN (3/22/2016)   Next INR check:   11/6/2019   Target end date:       Indications    Factor V Leiden mutation (H) [D68.51]  Venous thrombosis [I82.90]  Prothrombin mutation (H) [D68.52]  Long term current use of anticoagulant therapy [Z79.01]             Anticoagulation Episode Summary     INR check location:       Preferred lab:       Send INR reminders to:   [facility] [place]    Comments:   5 mg tablets, appt card, BP, PM dose        Anticoagulation Care Providers     Provider Role Specialty Phone number    Jesús Cifuentesrob Oconnell DO [facility] Internal Medicine [phone]            See the Encounter Report to view Anticoagulation Flowsheet and Dosing Calendar (Go to Encounters tab in chart review, and find the Anticoagulation Therapy Visit)    Dosage adjustment made based on physician directed care plan.    Raquel Pelletier RN                 
immune

## 2022-09-27 ENCOUNTER — ANTICOAGULATION THERAPY VISIT (OUTPATIENT)
Dept: ANTICOAGULATION | Facility: CLINIC | Age: 87
End: 2022-09-27

## 2022-09-27 ENCOUNTER — LAB (OUTPATIENT)
Dept: LAB | Facility: CLINIC | Age: 87
End: 2022-09-27
Payer: COMMERCIAL

## 2022-09-27 DIAGNOSIS — I82.90 VENOUS THROMBOSIS: ICD-10-CM

## 2022-09-27 DIAGNOSIS — D68.51 FACTOR V LEIDEN MUTATION (H): ICD-10-CM

## 2022-09-27 DIAGNOSIS — Z79.01 LONG TERM CURRENT USE OF ANTICOAGULANT THERAPY: ICD-10-CM

## 2022-09-27 DIAGNOSIS — D68.52 PROTHROMBIN MUTATION (H): ICD-10-CM

## 2022-09-27 DIAGNOSIS — D68.51 FACTOR V LEIDEN MUTATION (H): Primary | ICD-10-CM

## 2022-09-27 LAB — INR BLD: 2.4 (ref 0.9–1.1)

## 2022-09-27 PROCEDURE — 85610 PROTHROMBIN TIME: CPT

## 2022-09-27 PROCEDURE — 36416 COLLJ CAPILLARY BLOOD SPEC: CPT

## 2022-09-27 NOTE — PROGRESS NOTES
ANTICOAGULATION MANAGEMENT     Robert Richard 87 year old male is on warfarin with therapeutic INR result. (Goal INR 2.0-3.0)    Recent labs: (last 7 days)     09/27/22  0838   INR 2.4*       ASSESSMENT       Source(s): Chart Review       Warfarin doses taken: Reviewed in chart    Diet: No new diet changes identified    New illness, injury, or hospitalization: No    Medication/supplement changes: None noted    Signs or symptoms of bleeding or clotting: No    Previous INR: Therapeutic last 2(+) visits    Additional findings: None       PLAN     Recommended plan for no diet, medication or health factor changes affecting INR     Dosing Instructions: Continue your current warfarin dose with next INR in 4 weeks       Summary  As of 9/27/2022    Full warfarin instructions:  2.5 mg every Mon, Fri; 5 mg all other days   Next INR check:  10/25/2022             Detailed voice message left for Carlos with dosing instructions and follow up date.     Contact [phone]  to schedule and with any changes, questions or concerns.     Education provided: Please call back if any changes to your diet, medications or how you've been taking warfarin    Plan made per [facility] anticoagulation protocol    Neelima Arreaga RN  Anticoagulation Clinic  9/27/2022    _______________________________________________________________________     Anticoagulation Episode Summary     Current INR goal:  2.0-3.0   TTR:  67.2 % (1 y)   Target end date:  Indefinite   Send INR reminders to:  [facility]    Indications    Factor V Leiden mutation (H) [D68.51]  Venous thrombosis [I82.90]  Prothrombin mutation (H) [D68.52]  Long term current use of anticoagulant therapy [Z79.01]           Comments:           Anticoagulation Care Providers     Provider Role Specialty Phone number    Stiven Donahue MD Referring Internal Medicine [phone]    Nate Cifuentes DO Responsible Internal Medicine [phone]

## 2022-11-07 ENCOUNTER — ANTICOAGULATION THERAPY VISIT (OUTPATIENT)
Dept: ANTICOAGULATION | Facility: CLINIC | Age: 87
End: 2022-11-07

## 2022-11-07 ENCOUNTER — OFFICE VISIT (OUTPATIENT)
Dept: INTERNAL MEDICINE | Facility: CLINIC | Age: 87
End: 2022-11-07
Payer: COMMERCIAL

## 2022-11-07 VITALS
HEART RATE: 62 BPM | RESPIRATION RATE: 20 BRPM | DIASTOLIC BLOOD PRESSURE: 94 MMHG | SYSTOLIC BLOOD PRESSURE: 166 MMHG | OXYGEN SATURATION: 98 % | BODY MASS INDEX: 36.97 KG/M2 | TEMPERATURE: 97.7 F | WEIGHT: 254 LBS

## 2022-11-07 DIAGNOSIS — D68.51 FACTOR V LEIDEN MUTATION (H): Primary | ICD-10-CM

## 2022-11-07 DIAGNOSIS — M54.50 LUMBAR BACK PAIN: ICD-10-CM

## 2022-11-07 DIAGNOSIS — N52.9 ERECTILE DYSFUNCTION, UNSPECIFIED ERECTILE DYSFUNCTION TYPE: ICD-10-CM

## 2022-11-07 DIAGNOSIS — Z79.01 LONG TERM CURRENT USE OF ANTICOAGULANT THERAPY: ICD-10-CM

## 2022-11-07 DIAGNOSIS — D68.52 PROTHROMBIN MUTATION (H): ICD-10-CM

## 2022-11-07 DIAGNOSIS — G25.81 RESTLESS LEG SYNDROME: ICD-10-CM

## 2022-11-07 DIAGNOSIS — R29.898 WEAKNESS OF BOTH LOWER EXTREMITIES: ICD-10-CM

## 2022-11-07 DIAGNOSIS — I82.90 VENOUS THROMBOSIS: ICD-10-CM

## 2022-11-07 DIAGNOSIS — Z23 NEED FOR PROPHYLACTIC VACCINATION AND INOCULATION AGAINST INFLUENZA: ICD-10-CM

## 2022-11-07 DIAGNOSIS — N18.32 STAGE 3B CHRONIC KIDNEY DISEASE (H): ICD-10-CM

## 2022-11-07 DIAGNOSIS — E11.9 TYPE 2 DIABETES MELLITUS WITHOUT COMPLICATION, WITHOUT LONG-TERM CURRENT USE OF INSULIN (H): Primary | ICD-10-CM

## 2022-11-07 LAB
ANION GAP SERPL CALCULATED.3IONS-SCNC: 6 MMOL/L (ref 3–14)
BUN SERPL-MCNC: 33 MG/DL (ref 7–30)
CALCIUM SERPL-MCNC: 9 MG/DL (ref 8.5–10.1)
CHLORIDE BLD-SCNC: 112 MMOL/L (ref 94–109)
CO2 SERPL-SCNC: 23 MMOL/L (ref 20–32)
CREAT SERPL-MCNC: 1.48 MG/DL (ref 0.66–1.25)
GFR SERPL CREATININE-BSD FRML MDRD: 46 ML/MIN/1.73M2
GLUCOSE BLD-MCNC: 171 MG/DL (ref 70–99)
HBA1C MFR BLD: 6.9 % (ref 0–5.6)
INR BLD: 2.3 (ref 0.9–1.1)
POTASSIUM BLD-SCNC: 4 MMOL/L (ref 3.4–5.3)
SODIUM SERPL-SCNC: 141 MMOL/L (ref 133–144)

## 2022-11-07 PROCEDURE — 36415 COLL VENOUS BLD VENIPUNCTURE: CPT | Performed by: INTERNAL MEDICINE

## 2022-11-07 PROCEDURE — 85610 PROTHROMBIN TIME: CPT | Performed by: INTERNAL MEDICINE

## 2022-11-07 PROCEDURE — 90662 IIV NO PRSV INCREASED AG IM: CPT | Performed by: INTERNAL MEDICINE

## 2022-11-07 PROCEDURE — 80048 BASIC METABOLIC PNL TOTAL CA: CPT | Performed by: INTERNAL MEDICINE

## 2022-11-07 PROCEDURE — G0008 ADMIN INFLUENZA VIRUS VAC: HCPCS | Performed by: INTERNAL MEDICINE

## 2022-11-07 PROCEDURE — 83036 HEMOGLOBIN GLYCOSYLATED A1C: CPT | Performed by: INTERNAL MEDICINE

## 2022-11-07 PROCEDURE — 99214 OFFICE O/P EST MOD 30 MIN: CPT | Mod: 25 | Performed by: INTERNAL MEDICINE

## 2022-11-07 RX ORDER — SILDENAFIL 100 MG/1
50 TABLET, FILM COATED ORAL DAILY PRN
Qty: 20 TABLET | Refills: 3 | Status: SHIPPED | OUTPATIENT
Start: 2022-11-07 | End: 2024-06-17

## 2022-11-07 RX ORDER — MULTIVITAMIN
1 TABLET ORAL DAILY
COMMUNITY

## 2022-11-07 RX ORDER — PRAMIPEXOLE DIHYDROCHLORIDE 1 MG/1
1 TABLET ORAL 2 TIMES DAILY
Qty: 180 TABLET | Refills: 1 | Status: SHIPPED | OUTPATIENT
Start: 2022-11-07 | End: 2023-11-22

## 2022-11-07 NOTE — PROGRESS NOTES
"  Assessment & Plan     Type 2 diabetes mellitus without complication, without long-term current use of insulin (H)  Doing ok, will check hgba1c, continue glipizide.   - Hemoglobin A1c; Future  - Basic metabolic panel  (Ca, Cl, CO2, Creat, Gluc, K, Na, BUN); Future  - Hemoglobin A1c  - Basic metabolic panel  (Ca, Cl, CO2, Creat, Gluc, K, Na, BUN)    Stage 3b chronic kidney disease (H)  Stable but no metformin   - Basic metabolic panel  (Ca, Cl, CO2, Creat, Gluc, K, Na, BUN); Future  - Basic metabolic panel  (Ca, Cl, CO2, Creat, Gluc, K, Na, BUN)    Need for prophylactic vaccination and inoculation against influenza  Updated.   - INFLUENZA, QUAD, HIGH DOSE, PF, 65YR + (FLUZONE HD)    Restless leg syndrome  Ok but needs to take bid more often to help   - pramipexole (MIRAPEX) 1 MG tablet; Take 1 tablet (1 mg) by mouth 2 times daily 4pm and 9pm    Lumbar back pain  Will start with PT, then possible MRI and injections if needed with past back surgeries.   - Physical Therapy Referral; Future    Weakness of both lower extremities  PT to help him   - Physical Therapy Referral; Future    Erectile dysfunction, unspecified erectile dysfunction type  Refill viagra   - sildenafil (VIAGRA) 100 MG tablet; Take 0.5 tablets (50 mg) by mouth daily as needed    Long term current use of anticoagulant therapy  Continue coumadin   - INR point of care             BMI:   Estimated body mass index is 36.97 kg/m  as calculated from the following:    Height as of 2/25/22: 1.765 m (5' 9.5\").    Weight as of this encounter: 115.2 kg (254 lb).           No follow-ups on file.    Stiven Donahue MD  [facility]    Ashley Gonzalez is a 87 year old, presenting for the following health issues:  Back Pain and Diabetes    History of Present Illness       Back Pain:  He presents for follow up of back pain. Patient's back pain is a chronic problem.  Location of back pain:  Left lower back and right hip  Description of back pain: " cramping, gnawing and sharp  Back pain spreads: right thigh    Since patient first noticed back pain, pain is: always present, but gets better and worse  Does back pain interfere with his job:  Not applicable      CKD: He is not using over the counter pain medicine.     Diabetes:   He presents for follow up of diabetes.  He is not checking blood glucose. He has no concerns regarding his diabetes at this time.  He is having weight gain. The patient has not had a diabetic eye exam in the last 12 months.         Hypertension: He presents for follow up of hypertension.  He does check blood pressure  regularly outside of the clinic. Outside blood pressures have been over 140/90. He does not follow a low salt diet.       Taking his medications.  Losartan and spironolactone and atenolol but bp runs 150/90.  Will watch his bp and then may do amlodipine.     Glipizide for diabetes not checking sugars, a1c has been good 7.1    Restless leg is more frequent when sitting, less time to occur, happens at 30 minutes. Takes Mirapex at 4 and 8 pm     CKD and has been stable, on losartan.     Back pain, has stenosis before, can really catch him at times.  Very tight in his lower back and feels weaker in his legs. Previous back surgery     Past Medical History:   Diagnosis Date     Basal cell carcinoma      Cancer (H)      DVT (deep venous thrombosis) (H) 11/2003     DVT (deep venous thrombosis) (H) 12/08     DVT (deep venous thrombosis) (H) 10/2010     Factor V Leiden (H)      Gout      Other and unspecified hyperlipidemia      Prostate cancer (H) 2003    prostatectomy      Restless leg syndrome      Unspecified essential hypertension      Current Outpatient Medications   Medication     ASPIRIN NOT PRESCRIBED (INTENTIONAL)     atenolol (TENORMIN) 50 MG tablet     Bioflavonoid Products (DOMINIK-C) TABS     blood glucose (NO BRAND SPECIFIED) lancets standard     blood glucose (NO BRAND SPECIFIED) test strip     blood glucose monitoring  (NO BRAND SPECIFIED) meter device kit     glipiZIDE (GLUCOTROL XL) 5 MG 24 hr tablet     losartan (COZAAR) 100 MG tablet     multiple vitamin  tablet TABS     order for DME     ORDER FOR DME     pramipexole (MIRAPEX) 1 MG tablet     sildenafil (VIAGRA) 100 MG tablet     spironolactone (ALDACTONE) 25 MG tablet     warfarin ANTICOAGULANT (COUMADIN) 5 MG tablet     amoxicillin (AMOXIL) 500 MG capsule     betamethasone dipropionate (DIPROSONE) 0.05 % external ointment     cyanocobalamin (VITAMIN B-12) 1000 MCG tablet     incobotulinumtoxin A (XEOMIN) 50 units SOLR solution     ipratropium (ATROVENT) 0.06 % nasal spray     Multiple Vitamins-Minerals (MULTIVITAMIN ADULT PO)     STATIN NOT PRESCRIBED (INTENTIONAL)     No current facility-administered medications for this visit.     Social History     Tobacco Use     Smoking status: Never     Smokeless tobacco: Never   Substance Use Topics     Alcohol use: Yes     Alcohol/week: 0.0 standard drinks     Comment: 1 drink every 1-2 weeks.     Drug use: No       Review of Systems         Objective    BP (!) 166/94   Pulse 62   Temp 97.7  F (36.5  C) (Temporal)   Resp 20   Wt 115.2 kg (254 lb)   SpO2 98%   BMI 36.97 kg/m    There is no height or weight on file to calculate BMI.  Physical Exam   NAD  RRR  Lungs clear  Back is tight and stiff.

## 2022-11-07 NOTE — PROGRESS NOTES
ANTICOAGULATION MANAGEMENT     Robert Richard 87 year old male is on warfarin with therapeutic INR result. (Goal INR 2.0-3.0)    Recent labs: (last 7 days)     11/07/22  1708   INR 2.3*       ASSESSMENT       Source(s): Chart Review and Patient/Caregiver Call       Warfarin doses taken: Warfarin taken as instructed    Diet: No new diet changes identified    New illness, injury, or hospitalization: No    Medication/supplement changes: None noted    Signs or symptoms of bleeding or clotting: No    Previous INR: Therapeutic last 2(+) visits    Additional findings: None       PLAN     Recommended plan for no diet, medication or health factor changes affecting INR     Dosing Instructions: Continue your current warfarin dose with next INR in 4 weeks       Summary  As of 11/7/2022    Full warfarin instructions:  2.5 mg every Mon, Fri; 5 mg all other days; Starting 11/7/2022   Next INR check:  12/5/2022             Telephone call with Carlos who verbalizes understanding and agrees to plan and who agrees to plan and repeated back plan correctly    Lab visit scheduled    Education provided:     Please call back if any changes to your diet, medications or how you've been taking warfarin    Plan made per [facility] anticoagulation protocol    Chantell Boston RN  Anticoagulation Clinic  11/7/2022    _______________________________________________________________________     Anticoagulation Episode Summary     Current INR goal:  2.0-3.0   TTR:  67.3 % (1 y)   Target end date:  Indefinite   Send INR reminders to:  [facility]    Indications    Factor V Leiden mutation (H) [D68.51]  Venous thrombosis [I82.90]  Prothrombin mutation (H) [D68.52]  Long term current use of anticoagulant therapy [Z79.01]           Comments:           Anticoagulation Care Providers     Provider Role Specialty Phone number    Stiven Donahue MD Referring Internal Medicine [phone]    Nate Cifuentes DO Responsible Internal Medicine  [phone]

## 2022-12-05 ENCOUNTER — LAB (OUTPATIENT)
Dept: LAB | Facility: CLINIC | Age: 87
End: 2022-12-05
Payer: COMMERCIAL

## 2022-12-05 ENCOUNTER — ANTICOAGULATION THERAPY VISIT (OUTPATIENT)
Dept: ANTICOAGULATION | Facility: CLINIC | Age: 87
End: 2022-12-05

## 2022-12-05 DIAGNOSIS — I82.90 VENOUS THROMBOSIS: ICD-10-CM

## 2022-12-05 DIAGNOSIS — Z79.01 LONG TERM CURRENT USE OF ANTICOAGULANT THERAPY: ICD-10-CM

## 2022-12-05 DIAGNOSIS — D68.51 FACTOR V LEIDEN MUTATION (H): Primary | ICD-10-CM

## 2022-12-05 DIAGNOSIS — D68.52 PROTHROMBIN MUTATION (H): ICD-10-CM

## 2022-12-05 LAB — INR BLD: 2.8 (ref 0.9–1.1)

## 2022-12-05 PROCEDURE — 36416 COLLJ CAPILLARY BLOOD SPEC: CPT

## 2022-12-05 PROCEDURE — 85610 PROTHROMBIN TIME: CPT

## 2022-12-05 NOTE — PROGRESS NOTES
ANTICOAGULATION MANAGEMENT     Robert Richard 87 year old male is on warfarin with therapeutic INR result. (Goal INR 2.0-3.0)    Recent labs: (last 7 days)     12/05/22  0951   INR 2.8*       ASSESSMENT       Source(s): Chart Review    Previous INR was Therapeutic last 2(+) visits    Medication, diet, health changes since last INR chart reviewed; none identified       PLAN     Recommended plan for no diet, medication or health factor changes affecting INR     Dosing Instructions: Continue your current warfarin dose with next INR in 5 weeks       Summary  As of 12/5/2022    Full warfarin instructions:  2.5 mg every Mon, Fri; 5 mg all other days; Starting 12/5/2022   Next INR check:  1/9/2023             Detailed voice message left for Carlos with dosing instructions and follow up date.     Contact [phone]  to schedule and with any changes, questions or concerns.     Education provided:     Please call back if any changes to your diet, medications or how you've been taking warfarin    Goal range and lab monitoring: goal range and significance of current result    Plan made per [facility] anticoagulation protocol    Sara Donohue RN  Anticoagulation Clinic  12/5/2022    _______________________________________________________________________     Anticoagulation Episode Summary     Current INR goal:  2.0-3.0   TTR:  71.5 % (1 y)   Target end date:  Indefinite   Send INR reminders to:  [facility]    Indications    Factor V Leiden mutation (H) [D68.51]  Venous thrombosis [I82.90]  Prothrombin mutation (H) [D68.52]  Long term current use of anticoagulant therapy [Z79.01]           Comments:           Anticoagulation Care Providers     Provider Role Specialty Phone number    Stiven Donahue MD Referring Internal Medicine [phone]    Nate Cifuentes DO Responsible Internal Medicine [phone]

## 2022-12-07 DIAGNOSIS — Z86.718 PERSONAL HISTORY OF VENOUS THROMBOSIS AND EMBOLISM: ICD-10-CM

## 2022-12-07 DIAGNOSIS — D68.51 FACTOR V LEIDEN MUTATION (H): Primary | ICD-10-CM

## 2022-12-07 NOTE — PROGRESS NOTES
[facility] Same Day Surgery  Discharge Call Back  Robert Richard  1935  MRN: [id number]  Home: [phone] (home)   PCP: Nate Cifuentes    We are calling to see how you're doing since your surgery/procedure with us?   Comments: Felling good!  Clinical Questions  1. Have you had time to look at your discharge instructions? Do you have any questions in regards to the instructions?   Comment: yes, no  2. Do you feel your pain is being controlled with the regimen the surgeon sent you home on? (ie: prescription medications, over the counter pain medications, ice packs)   Comments: yes  3. Have you noticed any drainage on your dressing? Do you know what to do if you have bleeding as a result of your procedure?   Comments: no  4. Have you had any nausea/vomiting? Do you know how to treat this?   Comment: no  5. Have you had any signs/symptoms of infection? (ie: fever, swelling, heat, drainage or redness) Do you know what to do if you have?   Comment: no  6. Do you have a follow up appointment made with your surgeon? Do you have a number to contact them at if you need it?   Comment: yes, yes  Retained Foreign Object (MONICA, Hemovac, Penrose, Wound Packing, Vaginal Packing, Nasal Splints, Urethral Stents, Smith Catheter)  1. Do you still have na in place?   2. If the item is still in place, can you review the plan for removal with me? na  Recognition  Is there anyone from your care team that you would like to recognize?   Comments: Malik was great. Everybody was so friendly and great smiles.  Improvement  Our goal is to be the best. Do you have any suggestions for things that we could improve on?   Comments: no  You may be randomly selected to fill out a [facility] survey. We would appreciate you taking the time to fill this out. It is important to us if you would answer all of the questions on the survey.               Patent

## 2022-12-29 ENCOUNTER — ANCILLARY PROCEDURE (OUTPATIENT)
Dept: GENERAL RADIOLOGY | Facility: CLINIC | Age: 87
End: 2022-12-29
Attending: PODIATRIST
Payer: COMMERCIAL

## 2022-12-29 ENCOUNTER — OFFICE VISIT (OUTPATIENT)
Dept: PODIATRY | Facility: CLINIC | Age: 87
End: 2022-12-29
Payer: COMMERCIAL

## 2022-12-29 VITALS
HEIGHT: 70 IN | SYSTOLIC BLOOD PRESSURE: 152 MMHG | DIASTOLIC BLOOD PRESSURE: 78 MMHG | WEIGHT: 254 LBS | BODY MASS INDEX: 36.36 KG/M2

## 2022-12-29 DIAGNOSIS — R60.0 EDEMA OF LEFT FOOT: ICD-10-CM

## 2022-12-29 DIAGNOSIS — M15.3 POST-TRAUMATIC OSTEOARTHRITIS OF MULTIPLE JOINTS: ICD-10-CM

## 2022-12-29 DIAGNOSIS — E11.40 CONTROLLED TYPE 2 DIABETES WITH NEUROPATHY (H): ICD-10-CM

## 2022-12-29 DIAGNOSIS — R60.0 EDEMA OF LEFT FOOT: Primary | ICD-10-CM

## 2022-12-29 PROCEDURE — 73630 X-RAY EXAM OF FOOT: CPT | Mod: TC | Performed by: RADIOLOGY

## 2022-12-29 PROCEDURE — 99213 OFFICE O/P EST LOW 20 MIN: CPT | Performed by: PODIATRIST

## 2022-12-29 ASSESSMENT — PAIN SCALES - GENERAL: PAINLEVEL: EXTREME PAIN (8)

## 2022-12-29 NOTE — PROGRESS NOTES
HPI:  Robert Richard is a 87 year old male who is seen in consultation at the request of self.    Pt presents for eval of:   (Onset, Location, L/R, Character, Treatments, Injury if yes)    XR left foot 12/29/2022    DM type 2     Onset 12/25/2022, edema dorsal left foot, ankle and lower left leg. No injury noted. Presents minimal WB w/cane and DM shoes.  Constant, swelling. With any pressure and activity, instability, sharp, pain 8. No pain with rest.    Retired.    ROS:  10 point ROS neg other than the symptoms noted above in the HPI.    Patient Active Problem List   Diagnosis     Venous thrombosis     Restless leg syndrome     Sleep apnea     Factor V Leiden mutation (H)     Prothrombin mutation (H)     Back pain     Hyperlipidemia LDL goal <100     Gout     Malignant basal cell neoplasm of skin     Advanced directives, counseling/discussion     Low back pain     Personal history of venous thrombosis and embolism     Hip joint replacement status - right     Abnormal gait     Obesity     Personal history of prostate cancer     Spinal stenosis     Long term current use of anticoagulant therapy     Essential hypertension     Morbid obesity due to excess calories (H)     Diabetes mellitus, type 2 (H)     CKD (chronic kidney disease) stage 3, GFR 30-59 ml/min (H)     Asymmetrical sensorineural hearing loss     Dyslipidemia     Hemifacial spasm     Osteoarthritis of hip     Subjective tinnitus     Venous stasis dermatitis of right lower extremity       PAST MEDICAL HISTORY:   Past Medical History:   Diagnosis Date     Basal cell carcinoma      Cancer (H)      DVT (deep venous thrombosis) (H) 11/2003     DVT (deep venous thrombosis) (H) 12/08     DVT (deep venous thrombosis) (H) 10/2010     Factor V Leiden (H)      Gout      Other and unspecified hyperlipidemia      Prostate cancer (H) 2003    prostatectomy      Restless leg syndrome      Unspecified essential hypertension         PAST SURGICAL HISTORY:   Past Surgical  History:   Procedure Laterality Date     APPENDECTOMY  1950     JOINT REPLACEMENT  4/2011    R hip     LAMINECTOMY LUMBAR ONE LEVEL  12/2008     LAPAROSCOPIC HERNIORRHAPHY UMBILICAL N/A 1/3/2019    Procedure: laparoscopic umbilical hernia repair with mesh;  Surgeon: Jamal Thompson DO;  Location:  OR     [facility] REVISE TOTAL HIP REPLACEMENT  1/18/13    R hip        MEDICATIONS:   Current Outpatient Medications:      ASPIRIN NOT PRESCRIBED (INTENTIONAL), Please choose reason not prescribed from choices below., Disp:  , Rfl:      atenolol (TENORMIN) 50 MG tablet, Take 1 tablet (50 mg) by mouth 2 times daily, Disp: 180 tablet, Rfl: 3     Bioflavonoid Products (DOMINIK-C) TABS, Take 1,000 mg by mouth 2 times daily, Disp: 90 tablet, Rfl: 3     blood glucose (NO BRAND SPECIFIED) lancets standard, Use to test blood sugar 1 times daily or as directed., Disp: 100 lancet, Rfl: 3     blood glucose (NO BRAND SPECIFIED) test strip, Use to test blood sugar 1 times daily or as directed., Disp: 100 strip, Rfl: 3     blood glucose monitoring (NO BRAND SPECIFIED) meter device kit, Use to test blood sugar 1 times daily or as directed., Disp: 1 kit, Rfl: 0     glipiZIDE (GLUCOTROL XL) 5 MG 24 hr tablet, Take 1 tablet (5 mg) by mouth daily, Disp: 90 tablet, Rfl: 3     incobotulinumtoxin A (XEOMIN) 50 units SOLR solution, Inject 50 Units into the muscle once Every 6 months or so, Disp: , Rfl:      ipratropium (ATROVENT) 0.06 % nasal spray, USE 2 SPRAY(S) IN EACH NOSTRIL 4 TIMES DAILY, Disp: 45 mL, Rfl: 1     losartan (COZAAR) 100 MG tablet, Take 1 tablet (100 mg) by mouth daily, Disp: 90 tablet, Rfl: 3     multiple vitamin  tablet TABS, Take 1 tablet by mouth daily, Disp: , Rfl:      Multiple Vitamins-Minerals (MULTIVITAMIN ADULT PO), , Disp: , Rfl:      order for DME, One pair compression garment 20-30 mmHg may substitute per fitter.  Please call [facility] at [phone] for appt.  Will also need education regarding donning  agents tyvek sleeve, plastic sled or similar., Disp: 1 Units, Rfl: 0     ORDER FOR DME, Wear mask at night, Disp: 1 Units, Rfl: 0     pramipexole (MIRAPEX) 1 MG tablet, Take 1 tablet (1 mg) by mouth 2 times daily 4pm and 9pm, Disp: 180 tablet, Rfl: 1     sildenafil (VIAGRA) 100 MG tablet, Take 0.5 tablets (50 mg) by mouth daily as needed, Disp: 20 tablet, Rfl: 3     spironolactone (ALDACTONE) 25 MG tablet, Take 1 tablet (25 mg) by mouth daily, Disp: 90 tablet, Rfl: 3     STATIN NOT PRESCRIBED (INTENTIONAL), Please choose reason not prescribed, below, Disp: , Rfl:      warfarin ANTICOAGULANT (COUMADIN) 5 MG tablet, TAKE 0.5 TABLET EVERY FRIDAY AND 1 TABLET ON ALL OTHER DAYS OF THE WEEK, Disp: 90 tablet, Rfl: 3     amoxicillin (AMOXIL) 500 MG capsule, Take 4 caps 1 hour before procedure., Disp: 8 capsule, Rfl: 1     betamethasone dipropionate (DIPROSONE) 0.05 % external ointment, Apply topically daily, Disp: 150 g, Rfl: 3     cyanocobalamin (VITAMIN B-12) 1000 MCG tablet, Take 1 tablet (1,000 mcg) by mouth daily, Disp: 90 tablet, Rfl: 3     ALLERGIES:    Allergies   Allergen Reactions     Lipitor [Atorvastatin Calcium]      Muscle pain, weakness     Pravastatin Other (See Comments)     muscle aches     Simvastatin Other (See Comments)     muscle aches        SOCIAL HISTORY:   Social History     Socioeconomic History     Marital status: Single     Spouse name: Not on file     Number of children: Not on file     Years of education: Not on file     Highest education level: Not on file   Occupational History     Not on file   Tobacco Use     Smoking status: Never     Smokeless tobacco: Never   Substance and Sexual Activity     Alcohol use: Yes     Alcohol/week: 0.0 standard drinks     Comment: 1 drink every 1-2 weeks.     Drug use: No     Sexual activity: Yes     Partners: Female   Other Topics Concern     Parent/sibling w/ CABG, MI or angioplasty before 65F 55M? Not Asked   Social History Narrative     Not on file  "    Social Determinants of Health     Financial Resource Strain: Not on file   Food Insecurity: Not on file   Transportation Needs: Not on file   Physical Activity: Not on file   Stress: Not on file   Social Connections: Not on file   Intimate Partner Violence: Not on file   Housing Stability: Not on file        FAMILY HISTORY:   Family History   Problem Relation Age of Onset     Prostate Cancer Paternal Grandfather         EXAM:Vitals: BP (!) 152/78 (BP Location: Left arm, Patient Position: Sitting, Cuff Size: Adult Large)   Ht 1.765 m (5' 9.5\")   Wt 115.2 kg (254 lb)   BMI 36.97 kg/m    BMI= Body mass index is 36.97 kg/m .    General appearance: Patient is alert and fully cooperative with history & exam.  No sign of distress is noted during the visit.     Psychiatric: Affect is pleasant & appropriate.  Patient appears motivated to improve health.     Respiratory: Breathing is regular & unlabored while sitting.     HEENT: Hearing is intact to spoken word.  Speech is clear.  No gross evidence of visual impairment that would impact ambulation.     Vascular: DP & PT pulses are diminished bilateral temperature warm to warm proximal to distal on the left and warm to cool on the right.  Considerable edema tight edema noted bilateral and he normally wears Velcro boots to reduce edema.     Neurologic: Lower extremity sensation is intact to light touch.  Complete loss of protective threshold on the left foot.    Dermatologic: Skin is intact to both lower extremities with diminished texture, turgor and tone about the integument.  No open lesions or abrasions noted.    Musculoskeletal: Patient is ambulatory without assistive device or brace.  Considerable edema bilateral lower extremities.  There is limited range of motion through the ankle and rear foot bilateral equal bilateral.  Pain is noted with firm palpation throughout the left midfoot and tight edema.  Manual muscle strength is adequate for ambulation with " discomfort and guarding throughout the midfoot.  I am not able to isolate pinpoint area of pain other than across the midfoot rear foot of the left foot and ankle.  Not able to localize pain to any specific tendon crossing the ankle joint.    Radiographs 3 views left foot 12/29/2022 demonstrate degenerative changes throughout the midfoot rear foot with osteophytes noted dorsally consistent with osteoarthritis.  No acute cortical reaction or fracture or joint diastases.     ASSESSMENT:       ICD-10-CM    1. Edema of left foot  R60.0 XR Foot Left G/E 3 Views      2. 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      12/29/2022   Obtained and interpreted radiographs    Fracture boot, WB to tolerance in the boot and may rest without the fracture boot  compression during the day and off at night   RTC 3 weeks  Discussed risk of developing Charcot.    Khadar García DPM

## 2022-12-29 NOTE — LETTER
12/29/2022         RE: Robert Richard  [address] [facility] [address]        Dear Colleague,    Thank you for referring your patient, Robert Richard, to the [facility]. Please see a copy of my visit note below.    HPI:  Robert Richard is a 87 year old male who is seen in consultation at the request of self.    Pt presents for eval of:   (Onset, Location, L/R, Character, Treatments, Injury if yes)    XR left foot 12/29/2022    DM type 2     Onset 12/25/2022, edema dorsal left foot, ankle and lower left leg. No injury noted. Presents minimal WB w/cane and DM shoes.  Constant, swelling. With any pressure and activity, instability, sharp, pain 8. No pain with rest.    Retired.    ROS:  10 point ROS neg other than the symptoms noted above in the HPI.    Patient Active Problem List   Diagnosis     Venous thrombosis     Restless leg syndrome     Sleep apnea     Factor V Leiden mutation (H)     Prothrombin mutation (H)     Back pain     Hyperlipidemia LDL goal <100     Gout     Malignant basal cell neoplasm of skin     Advanced directives, counseling/discussion     Low back pain     Personal history of venous thrombosis and embolism     Hip joint replacement status - right     Abnormal gait     Obesity     Personal history of prostate cancer     Spinal stenosis     Long term current use of anticoagulant therapy     Essential hypertension     Morbid obesity due to excess calories (H)     Diabetes mellitus, type 2 (H)     CKD (chronic kidney disease) stage 3, GFR 30-59 ml/min (H)     Asymmetrical sensorineural hearing loss     Dyslipidemia     Hemifacial spasm     Osteoarthritis of hip     Subjective tinnitus     Venous stasis dermatitis of right lower extremity       PAST MEDICAL HISTORY:   Past Medical History:   Diagnosis Date     Basal cell carcinoma      Cancer (H)      DVT (deep venous thrombosis) (H) 11/2003     DVT (deep venous thrombosis) (H) 12/08     DVT (deep venous  thrombosis) (H) 10/2010     Factor V Leiden (H)      Gout      Other and unspecified hyperlipidemia      Prostate cancer (H) 2003    prostatectomy      Restless leg syndrome      Unspecified essential hypertension         PAST SURGICAL HISTORY:   Past Surgical History:   Procedure Laterality Date     APPENDECTOMY  1950     JOINT REPLACEMENT  4/2011    R hip     LAMINECTOMY LUMBAR ONE LEVEL  12/2008     LAPAROSCOPIC HERNIORRHAPHY UMBILICAL N/A 1/3/2019    Procedure: laparoscopic umbilical hernia repair with mesh;  Surgeon: Jamal Thompson DO;  Location:  OR     [facility] REVISE TOTAL HIP REPLACEMENT  1/18/13    R hip        MEDICATIONS:   Current Outpatient Medications:      ASPIRIN NOT PRESCRIBED (INTENTIONAL), Please choose reason not prescribed from choices below., Disp:  , Rfl:      atenolol (TENORMIN) 50 MG tablet, Take 1 tablet (50 mg) by mouth 2 times daily, Disp: 180 tablet, Rfl: 3     Bioflavonoid Products (DOMINIK-C) TABS, Take 1,000 mg by mouth 2 times daily, Disp: 90 tablet, Rfl: 3     blood glucose (NO BRAND SPECIFIED) lancets standard, Use to test blood sugar 1 times daily or as directed., Disp: 100 lancet, Rfl: 3     blood glucose (NO BRAND SPECIFIED) test strip, Use to test blood sugar 1 times daily or as directed., Disp: 100 strip, Rfl: 3     blood glucose monitoring (NO BRAND SPECIFIED) meter device kit, Use to test blood sugar 1 times daily or as directed., Disp: 1 kit, Rfl: 0     glipiZIDE (GLUCOTROL XL) 5 MG 24 hr tablet, Take 1 tablet (5 mg) by mouth daily, Disp: 90 tablet, Rfl: 3     incobotulinumtoxin A (XEOMIN) 50 units SOLR solution, Inject 50 Units into the muscle once Every 6 months or so, Disp: , Rfl:      ipratropium (ATROVENT) 0.06 % nasal spray, USE 2 SPRAY(S) IN EACH NOSTRIL 4 TIMES DAILY, Disp: 45 mL, Rfl: 1     losartan (COZAAR) 100 MG tablet, Take 1 tablet (100 mg) by mouth daily, Disp: 90 tablet, Rfl: 3     multiple vitamin  tablet TABS, Take 1 tablet by mouth daily, Disp: ,  Rfl:      Multiple Vitamins-Minerals (MULTIVITAMIN ADULT PO), , Disp: , Rfl:      order for DME, One pair compression garment 20-30 mmHg may substitute per fitter.  Please call [facility] at [phone] for appt.  Will also need education regarding donning agents tyvek sleeve, plastic sled or similar., Disp: 1 Units, Rfl: 0     ORDER FOR DME, Wear mask at night, Disp: 1 Units, Rfl: 0     pramipexole (MIRAPEX) 1 MG tablet, Take 1 tablet (1 mg) by mouth 2 times daily 4pm and 9pm, Disp: 180 tablet, Rfl: 1     sildenafil (VIAGRA) 100 MG tablet, Take 0.5 tablets (50 mg) by mouth daily as needed, Disp: 20 tablet, Rfl: 3     spironolactone (ALDACTONE) 25 MG tablet, Take 1 tablet (25 mg) by mouth daily, Disp: 90 tablet, Rfl: 3     STATIN NOT PRESCRIBED (INTENTIONAL), Please choose reason not prescribed, below, Disp: , Rfl:      warfarin ANTICOAGULANT (COUMADIN) 5 MG tablet, TAKE 0.5 TABLET EVERY FRIDAY AND 1 TABLET ON ALL OTHER DAYS OF THE WEEK, Disp: 90 tablet, Rfl: 3     amoxicillin (AMOXIL) 500 MG capsule, Take 4 caps 1 hour before procedure., Disp: 8 capsule, Rfl: 1     betamethasone dipropionate (DIPROSONE) 0.05 % external ointment, Apply topically daily, Disp: 150 g, Rfl: 3     cyanocobalamin (VITAMIN B-12) 1000 MCG tablet, Take 1 tablet (1,000 mcg) by mouth daily, Disp: 90 tablet, Rfl: 3     ALLERGIES:    Allergies   Allergen Reactions     Lipitor [Atorvastatin Calcium]      Muscle pain, weakness     Pravastatin Other (See Comments)     muscle aches     Simvastatin Other (See Comments)     muscle aches        SOCIAL HISTORY:   Social History     Socioeconomic History     Marital status: Single     Spouse name: Not on file     Number of children: Not on file     Years of education: Not on file     Highest education level: Not on file   Occupational History     Not on file   Tobacco Use     Smoking status: Never     Smokeless tobacco: Never   Substance and Sexual Activity     Alcohol use: Yes     Alcohol/week: 0.0  "standard drinks     Comment: 1 drink every 1-2 weeks.     Drug use: No     Sexual activity: Yes     Partners: Female   Other Topics Concern     Parent/sibling w/ CABG, MI or angioplasty before 65F 55M? Not Asked   Social History Narrative     Not on file     Social Determinants of Health     Financial Resource Strain: Not on file   Food Insecurity: Not on file   Transportation Needs: Not on file   Physical Activity: Not on file   Stress: Not on file   Social Connections: Not on file   Intimate Partner Violence: Not on file   Housing Stability: Not on file        FAMILY HISTORY:   Family History   Problem Relation Age of Onset     Prostate Cancer Paternal Grandfather         EXAM:Vitals: BP (!) 152/78 (BP Location: Left arm, Patient Position: Sitting, Cuff Size: Adult Large)   Ht 1.765 m (5' 9.5\")   Wt 115.2 kg (254 lb)   BMI 36.97 kg/m    BMI= Body mass index is 36.97 kg/m .    General appearance: Patient is alert and fully cooperative with history & exam.  No sign of distress is noted during the visit.     Psychiatric: Affect is pleasant & appropriate.  Patient appears motivated to improve health.     Respiratory: Breathing is regular & unlabored while sitting.     HEENT: Hearing is intact to spoken word.  Speech is clear.  No gross evidence of visual impairment that would impact ambulation.     Vascular: DP & PT pulses are diminished bilateral temperature warm to warm proximal to distal on the left and warm to cool on the right.  Considerable edema tight edema noted bilateral and he normally wears Velcro boots to reduce edema.     Neurologic: Lower extremity sensation is intact to light touch.  Complete loss of protective threshold on the left foot.    Dermatologic: Skin is intact to both lower extremities with diminished texture, turgor and tone about the integument.  No open lesions or abrasions noted.    Musculoskeletal: Patient is ambulatory without assistive device or brace.  Considerable edema bilateral " lower extremities.  There is limited range of motion through the ankle and rear foot bilateral equal bilateral.  Pain is noted with firm palpation throughout the left midfoot and tight edema.  Manual muscle strength is adequate for ambulation with discomfort and guarding throughout the midfoot.  I am not able to isolate pinpoint area of pain other than across the midfoot rear foot of the left foot and ankle.  Not able to localize pain to any specific tendon crossing the ankle joint.    Radiographs 3 views left foot 12/29/2022 demonstrate degenerative changes throughout the midfoot rear foot with osteophytes noted dorsally consistent with osteoarthritis.  No acute cortical reaction or fracture or joint diastases.     ASSESSMENT:       ICD-10-CM    1. Edema of left foot  R60.0 XR Foot Left G/E 3 Views      2. 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      12/29/2022   Obtained and interpreted radiographs    Fracture boot, WB to tolerance in the boot and may rest without the fracture boot  compression during the day and off at night   RTC 3 weeks  Discussed risk of developing Charcot.    Khadar García DPM        Again, thank you for allowing me to participate in the care of your patient.        Sincerely,        Khadar García DPM

## 2022-12-29 NOTE — NURSING NOTE
Dispensed 1 Pneumatic Walking Brace, Size large, with [facility] agreement signed by patient. Mendy Becerra CMA, December 29, 2022

## 2023-01-23 ENCOUNTER — TELEPHONE (OUTPATIENT)
Dept: ANTICOAGULATION | Facility: CLINIC | Age: 88
End: 2023-01-23

## 2023-01-23 ENCOUNTER — ANTICOAGULATION THERAPY VISIT (OUTPATIENT)
Dept: ANTICOAGULATION | Facility: CLINIC | Age: 88
End: 2023-01-23

## 2023-01-23 ENCOUNTER — OFFICE VISIT (OUTPATIENT)
Dept: PODIATRY | Facility: CLINIC | Age: 88
End: 2023-01-23
Payer: COMMERCIAL

## 2023-01-23 VITALS
HEIGHT: 70 IN | SYSTOLIC BLOOD PRESSURE: 134 MMHG | DIASTOLIC BLOOD PRESSURE: 70 MMHG | BODY MASS INDEX: 36.36 KG/M2 | WEIGHT: 254 LBS

## 2023-01-23 DIAGNOSIS — D68.51 FACTOR V LEIDEN MUTATION (H): Primary | ICD-10-CM

## 2023-01-23 DIAGNOSIS — Q66.6 PES VALGUS OF LEFT FOOT: ICD-10-CM

## 2023-01-23 DIAGNOSIS — D68.52 PROTHROMBIN MUTATION (H): ICD-10-CM

## 2023-01-23 DIAGNOSIS — I82.90 VENOUS THROMBOSIS: ICD-10-CM

## 2023-01-23 DIAGNOSIS — Z79.01 LONG TERM CURRENT USE OF ANTICOAGULANT THERAPY: ICD-10-CM

## 2023-01-23 DIAGNOSIS — M15.3 POST-TRAUMATIC OSTEOARTHRITIS OF MULTIPLE JOINTS: ICD-10-CM

## 2023-01-23 DIAGNOSIS — E11.40 CONTROLLED TYPE 2 DIABETES WITH NEUROPATHY (H): Primary | ICD-10-CM

## 2023-01-23 LAB — INR BLD: 2.9 (ref 0.9–1.1)

## 2023-01-23 PROCEDURE — 99213 OFFICE O/P EST LOW 20 MIN: CPT | Performed by: PODIATRIST

## 2023-01-23 PROCEDURE — 36416 COLLJ CAPILLARY BLOOD SPEC: CPT | Performed by: PODIATRIST

## 2023-01-23 PROCEDURE — 85610 PROTHROMBIN TIME: CPT | Performed by: PODIATRIST

## 2023-01-23 ASSESSMENT — PAIN SCALES - GENERAL: PAINLEVEL: NO PAIN (0)

## 2023-01-23 NOTE — PROGRESS NOTES
Chief Complaint   Patient presents with     RECHECK     dorsal pain is improved with minimal WB w/tall gray fx boot - Left foot/ankle/lower leg post traumatic osteoarthritis multiple joint with Edema, onset 12/25/2022; XR L foot 12/29/2022; LOV 12/29/2022     Diabetes     Type 2     Other     Present alone 1/23/2023  Warfarin     HPI:  Robert Richard is a 87 year old male who is seen in consultation at the request of self.    Pt presents for eval of:   (Onset, Location, L/R, Character, Treatments, Injury if yes)    XR left foot 12/29/2022    DM type 2     Onset 12/25/2022, edema dorsal left foot, ankle and lower left leg. No injury noted. Presents minimal WB w/cane and DM shoes.  Constant, swelling. With any pressure and activity, instability, sharp, pain 8. No pain with rest.    Retired.    ROS:  10 point ROS neg other than the symptoms noted above in the HPI.    Patient Active Problem List   Diagnosis     Venous thrombosis     Restless leg syndrome     Sleep apnea     Factor V Leiden mutation (H)     Prothrombin mutation (H)     Back pain     Hyperlipidemia LDL goal <100     Gout     Malignant basal cell neoplasm of skin     Advanced directives, counseling/discussion     Low back pain     Personal history of venous thrombosis and embolism     Hip joint replacement status - right     Abnormal gait     Obesity     Personal history of prostate cancer     Spinal stenosis     Long term current use of anticoagulant therapy     Essential hypertension     Morbid obesity due to excess calories (H)     Diabetes mellitus, type 2 (H)     CKD (chronic kidney disease) stage 3, GFR 30-59 ml/min (H)     Asymmetrical sensorineural hearing loss     Dyslipidemia     Hemifacial spasm     Osteoarthritis of hip     Subjective tinnitus     Venous stasis dermatitis of right lower extremity       PAST MEDICAL HISTORY:   Past Medical History:   Diagnosis Date     Basal cell carcinoma      Cancer (H)      DVT (deep venous thrombosis) (H)  11/2003     DVT (deep venous thrombosis) (H) 12/08     DVT (deep venous thrombosis) (H) 10/2010     Factor V Leiden (H)      Gout      Other and unspecified hyperlipidemia      Prostate cancer (H) 2003    prostatectomy      Restless leg syndrome      Unspecified essential hypertension         PAST SURGICAL HISTORY:   Past Surgical History:   Procedure Laterality Date     APPENDECTOMY  1950     JOINT REPLACEMENT  4/2011    R hip     LAMINECTOMY LUMBAR ONE LEVEL  12/2008     LAPAROSCOPIC HERNIORRHAPHY UMBILICAL N/A 1/3/2019    Procedure: laparoscopic umbilical hernia repair with mesh;  Surgeon: Jamal Thompson DO;  Location:  OR     [facility] REVISE TOTAL HIP REPLACEMENT  1/18/13    R hip        MEDICATIONS:   Current Outpatient Medications:      ASPIRIN NOT PRESCRIBED (INTENTIONAL), Please choose reason not prescribed from choices below., Disp:  , Rfl:      atenolol (TENORMIN) 50 MG tablet, Take 1 tablet (50 mg) by mouth 2 times daily, Disp: 180 tablet, Rfl: 3     betamethasone dipropionate (DIPROSONE) 0.05 % external ointment, Apply topically daily, Disp: 150 g, Rfl: 3     Bioflavonoid Products (DOMINIK-C) TABS, Take 1,000 mg by mouth 2 times daily, Disp: 90 tablet, Rfl: 3     blood glucose (NO BRAND SPECIFIED) lancets standard, Use to test blood sugar 1 times daily or as directed., Disp: 100 lancet, Rfl: 3     blood glucose (NO BRAND SPECIFIED) test strip, Use to test blood sugar 1 times daily or as directed., Disp: 100 strip, Rfl: 3     blood glucose monitoring (NO BRAND SPECIFIED) meter device kit, Use to test blood sugar 1 times daily or as directed., Disp: 1 kit, Rfl: 0     cyanocobalamin (VITAMIN B-12) 1000 MCG tablet, Take 1 tablet (1,000 mcg) by mouth daily, Disp: 90 tablet, Rfl: 3     glipiZIDE (GLUCOTROL XL) 5 MG 24 hr tablet, Take 1 tablet (5 mg) by mouth daily, Disp: 90 tablet, Rfl: 3     incobotulinumtoxin A (XEOMIN) 50 units SOLR solution, Inject 50 Units into the muscle once Every 6 months or so,  Disp: , Rfl:      ipratropium (ATROVENT) 0.06 % nasal spray, USE 2 SPRAY(S) IN EACH NOSTRIL 4 TIMES DAILY, Disp: 45 mL, Rfl: 1     losartan (COZAAR) 100 MG tablet, Take 1 tablet (100 mg) by mouth daily, Disp: 90 tablet, Rfl: 3     multiple vitamin  tablet TABS, Take 1 tablet by mouth daily, Disp: , Rfl:      Multiple Vitamins-Minerals (MULTIVITAMIN ADULT PO), , Disp: , Rfl:      order for DME, One pair compression garment 20-30 mmHg may substitute per fitter.  Please call [facility] at [phone] for appt.  Will also need education regarding donning agents tyvek sleeve, plastic sled or similar., Disp: 1 Units, Rfl: 0     ORDER FOR DME, Wear mask at night, Disp: 1 Units, Rfl: 0     pramipexole (MIRAPEX) 1 MG tablet, Take 1 tablet (1 mg) by mouth 2 times daily 4pm and 9pm, Disp: 180 tablet, Rfl: 1     sildenafil (VIAGRA) 100 MG tablet, Take 0.5 tablets (50 mg) by mouth daily as needed, Disp: 20 tablet, Rfl: 3     spironolactone (ALDACTONE) 25 MG tablet, Take 1 tablet (25 mg) by mouth daily, Disp: 90 tablet, Rfl: 3     STATIN NOT PRESCRIBED (INTENTIONAL), Please choose reason not prescribed, below, Disp: , Rfl:      warfarin ANTICOAGULANT (COUMADIN) 5 MG tablet, TAKE 0.5 TABLET EVERY FRIDAY AND 1 TABLET ON ALL OTHER DAYS OF THE WEEK, Disp: 90 tablet, Rfl: 3     amoxicillin (AMOXIL) 500 MG capsule, Take 4 caps 1 hour before procedure., Disp: 8 capsule, Rfl: 1     ALLERGIES:    Allergies   Allergen Reactions     Lipitor [Atorvastatin Calcium]      Muscle pain, weakness     Pravastatin Other (See Comments)     muscle aches     Simvastatin Other (See Comments)     muscle aches        SOCIAL HISTORY:   Social History     Socioeconomic History     Marital status: Single     Spouse name: Not on file     Number of children: Not on file     Years of education: Not on file     Highest education level: Not on file   Occupational History     Not on file   Tobacco Use     Smoking status: Never     Smokeless tobacco: Never  "  Substance and Sexual Activity     Alcohol use: Yes     Alcohol/week: 0.0 standard drinks     Comment: 1 drink every 1-2 weeks.     Drug use: No     Sexual activity: Yes     Partners: Female   Other Topics Concern     Parent/sibling w/ CABG, MI or angioplasty before 65F 55M? Not Asked   Social History Narrative     Not on file     Social Determinants of Health     Financial Resource Strain: Not on file   Food Insecurity: Not on file   Transportation Needs: Not on file   Physical Activity: Not on file   Stress: Not on file   Social Connections: Not on file   Intimate Partner Violence: Not on file   Housing Stability: Not on file        FAMILY HISTORY:   Family History   Problem Relation Age of Onset     Prostate Cancer Paternal Grandfather         EXAM:Vitals: /70 (BP Location: Left arm, Patient Position: Sitting, Cuff Size: Adult Large)   Ht 1.765 m (5' 9.5\")   Wt 115.2 kg (254 lb)   BMI 36.97 kg/m    BMI= Body mass index is 36.97 kg/m .    General appearance: Patient is alert and fully cooperative with history & exam.  No sign of distress is noted during the visit.     Psychiatric: Affect is pleasant & appropriate.  Patient appears motivated to improve health.     Respiratory: Breathing is regular & unlabored while sitting.     HEENT: Hearing is intact to spoken word.  Speech is clear.  No gross evidence of visual impairment that would impact ambulation.     Vascular: DP & PT pulses are diminished bilateral temperature warm to warm proximal to distal on the left and warm to cool on the right.  Considerable edema tight edema noted bilateral and he normally wears Velcro boots to reduce edema.     Neurologic: Lower extremity sensation is intact to light touch.  Complete loss of protective threshold on the left foot.    Dermatologic: Skin is intact to both lower extremities with diminished texture, turgor and tone about the integument.  No open lesions or abrasions noted.    Musculoskeletal: Patient is " ambulatory without assistive device or brace.  Edema is resolved  There is limited range of motion through the ankle and rear foot bilateral equal bilateral.  Minimal to no discomfort is noted with firm palpation throughout the left midfoot and tight edema.  Manual muscle strength is adequate for ambulation with discomfort and guarding throughout the midfoot.  I am not able to isolate pinpoint area of pain other than across the midfoot rear foot of the left foot and ankle.  Not able to localize pain to any specific tendon crossing the ankle joint.    Radiographs 3 views left foot 12/29/2022 demonstrate degenerative changes throughout the midfoot rear foot with osteophytes noted dorsally consistent with osteoarthritis.  No acute cortical reaction or fracture or joint diastases.    Hemoglobin A1C (%)   Date Value   11/07/2022 6.9 (H)   01/25/2022 7.1 (H)   06/15/2021 7.2 (H)   02/05/2021 8.3 (H)   01/21/2020 7.9 (H)   06/20/2019 7.1 (H)   10/16/2018 6.4 (H)   05/15/2018 6.5 (H)   10/18/2017 6.6 (H)   04/20/2016 6.5     Creatinine (mg/dL)   Date Value   11/07/2022 1.48 (H)   01/25/2022 1.55 (H)   05/17/2021 1.47 (H)   02/05/2021 1.44 (H)   01/21/2020 1.48 (H)   08/16/2019 1.42 (H)   06/20/2019 1.39 (H)   12/27/2018 1.51 (H)       ASSESSMENT:       ICD-10-CM    1. 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 Pes valgus of left foot  Q66.6            PLAN:  Reviewed patient[facility] chart in [facility].      12/29/2022   Obtained and interpreted radiographs    Fracture boot, WB to tolerance in the boot and may rest without the fracture boot  compression during the day and off at night   RTC 3 weeks  Discussed risk of developing Charcot.    1/23/2023  Last DM shoes 2 years ago.  Ordered new DM shoes  Is almost pain free so start coming out of boot as tolerated and stay in DM shoes.  He  was wearing slip on shoes and increased standing in shop likely cause this.   Follow-up once yearly for diabetic foot exam and if he is unable to progress out of the fracture boot.    Khadar García DPM

## 2023-01-23 NOTE — TELEPHONE ENCOUNTER
ANTICOAGULATION CLINIC REFERRAL RENEWAL REQUEST       An annual renewal order is required for all patients referred to [facility].?  Please review and sign the pended referral order for Robert Richard.       ANTICOAGULATION SUMMARY      Warfarin indication(s)   Factor 5 leiden mutation, venous thrombosis    Mechanical heart valve present?  NO       Current goal range   INR: 2.0-3.0     Goal appropriate for indication? Goal INR 2-3, standard for indication(s) above     Time in Therapeutic Range (TTR)  (Goal > 60%) 73.5%       Office visit with referring provider's group within last year yes on 11/7/22       Neelima Arreaga RN  [facility]

## 2023-01-23 NOTE — LETTER
1/23/2023         RE: Robert Richard  [id number] [facility] [address]        Dear Colleague,    Thank you for referring your patient, Robert Richard, to the [facility]. Please see a copy of my visit note below.    Chief Complaint   Patient presents with     RECHECK     dorsal pain is improved with minimal WB w/tall gray fx boot - Left foot/ankle/lower leg post traumatic osteoarthritis multiple joint with Edema, onset 12/25/2022; XR L foot 12/29/2022; LOV 12/29/2022     Diabetes     Type 2     Other     Present alone 1/23/2023  Warfarin     HPI:  Robert Richard is a 87 year old male who is seen in consultation at the request of self.    Pt presents for eval of:   (Onset, Location, L/R, Character, Treatments, Injury if yes)    XR left foot 12/29/2022    DM type 2     Onset 12/25/2022, edema dorsal left foot, ankle and lower left leg. No injury noted. Presents minimal WB w/cane and DM shoes.  Constant, swelling. With any pressure and activity, instability, sharp, pain 8. No pain with rest.    Retired.    ROS:  10 point ROS neg other than the symptoms noted above in the HPI.    Patient Active Problem List   Diagnosis     Venous thrombosis     Restless leg syndrome     Sleep apnea     Factor V Leiden mutation (H)     Prothrombin mutation (H)     Back pain     Hyperlipidemia LDL goal <100     Gout     Malignant basal cell neoplasm of skin     Advanced directives, counseling/discussion     Low back pain     Personal history of venous thrombosis and embolism     Hip joint replacement status - right     Abnormal gait     Obesity     Personal history of prostate cancer     Spinal stenosis     Long term current use of anticoagulant therapy     Essential hypertension     Morbid obesity due to excess calories (H)     Diabetes mellitus, type 2 (H)     CKD (chronic kidney disease) stage 3, GFR 30-59 ml/min (H)     Asymmetrical sensorineural hearing loss     Dyslipidemia     Hemifacial spasm      Osteoarthritis of hip     Subjective tinnitus     Venous stasis dermatitis of right lower extremity       PAST MEDICAL HISTORY:   Past Medical History:   Diagnosis Date     Basal cell carcinoma      Cancer (H)      DVT (deep venous thrombosis) (H) 11/2003     DVT (deep venous thrombosis) (H) 12/08     DVT (deep venous thrombosis) (H) 10/2010     Factor V Leiden (H)      Gout      Other and unspecified hyperlipidemia      Prostate cancer (H) 2003    prostatectomy      Restless leg syndrome      Unspecified essential hypertension         PAST SURGICAL HISTORY:   Past Surgical History:   Procedure Laterality Date     APPENDECTOMY  1950     JOINT REPLACEMENT  4/2011    R hip     LAMINECTOMY LUMBAR ONE LEVEL  12/2008     LAPAROSCOPIC HERNIORRHAPHY UMBILICAL N/A 1/3/2019    Procedure: laparoscopic umbilical hernia repair with mesh;  Surgeon: Jamal Thompson DO;  Location:  OR     [facility] REVISE TOTAL HIP REPLACEMENT  1/18/13    R hip        MEDICATIONS:   Current Outpatient Medications:      ASPIRIN NOT PRESCRIBED (INTENTIONAL), Please choose reason not prescribed from choices below., Disp:  , Rfl:      atenolol (TENORMIN) 50 MG tablet, Take 1 tablet (50 mg) by mouth 2 times daily, Disp: 180 tablet, Rfl: 3     betamethasone dipropionate (DIPROSONE) 0.05 % external ointment, Apply topically daily, Disp: 150 g, Rfl: 3     Bioflavonoid Products (DOMINIK-C) TABS, Take 1,000 mg by mouth 2 times daily, Disp: 90 tablet, Rfl: 3     blood glucose (NO BRAND SPECIFIED) lancets standard, Use to test blood sugar 1 times daily or as directed., Disp: 100 lancet, Rfl: 3     blood glucose (NO BRAND SPECIFIED) test strip, Use to test blood sugar 1 times daily or as directed., Disp: 100 strip, Rfl: 3     blood glucose monitoring (NO BRAND SPECIFIED) meter device kit, Use to test blood sugar 1 times daily or as directed., Disp: 1 kit, Rfl: 0     cyanocobalamin (VITAMIN B-12) 1000 MCG tablet, Take 1 tablet (1,000 mcg) by mouth daily,  Disp: 90 tablet, Rfl: 3     glipiZIDE (GLUCOTROL XL) 5 MG 24 hr tablet, Take 1 tablet (5 mg) by mouth daily, Disp: 90 tablet, Rfl: 3     incobotulinumtoxin A (XEOMIN) 50 units SOLR solution, Inject 50 Units into the muscle once Every 6 months or so, Disp: , Rfl:      ipratropium (ATROVENT) 0.06 % nasal spray, USE 2 SPRAY(S) IN EACH NOSTRIL 4 TIMES DAILY, Disp: 45 mL, Rfl: 1     losartan (COZAAR) 100 MG tablet, Take 1 tablet (100 mg) by mouth daily, Disp: 90 tablet, Rfl: 3     multiple vitamin  tablet TABS, Take 1 tablet by mouth daily, Disp: , Rfl:      Multiple Vitamins-Minerals (MULTIVITAMIN ADULT PO), , Disp: , Rfl:      order for DME, One pair compression garment 20-30 mmHg may substitute per fitter.  Please call [facility] at [phone] for appt.  Will also need education regarding donning agents tyvek sleeve, plastic sled or similar., Disp: 1 Units, Rfl: 0     ORDER FOR DME, Wear mask at night, Disp: 1 Units, Rfl: 0     pramipexole (MIRAPEX) 1 MG tablet, Take 1 tablet (1 mg) by mouth 2 times daily 4pm and 9pm, Disp: 180 tablet, Rfl: 1     sildenafil (VIAGRA) 100 MG tablet, Take 0.5 tablets (50 mg) by mouth daily as needed, Disp: 20 tablet, Rfl: 3     spironolactone (ALDACTONE) 25 MG tablet, Take 1 tablet (25 mg) by mouth daily, Disp: 90 tablet, Rfl: 3     STATIN NOT PRESCRIBED (INTENTIONAL), Please choose reason not prescribed, below, Disp: , Rfl:      warfarin ANTICOAGULANT (COUMADIN) 5 MG tablet, TAKE 0.5 TABLET EVERY FRIDAY AND 1 TABLET ON ALL OTHER DAYS OF THE WEEK, Disp: 90 tablet, Rfl: 3     amoxicillin (AMOXIL) 500 MG capsule, Take 4 caps 1 hour before procedure., Disp: 8 capsule, Rfl: 1     ALLERGIES:    Allergies   Allergen Reactions     Lipitor [Atorvastatin Calcium]      Muscle pain, weakness     Pravastatin Other (See Comments)     muscle aches     Simvastatin Other (See Comments)     muscle aches        SOCIAL HISTORY:   Social History     Socioeconomic History     Marital status: Single      "Spouse name: Not on file     Number of children: Not on file     Years of education: Not on file     Highest education level: Not on file   Occupational History     Not on file   Tobacco Use     Smoking status: Never     Smokeless tobacco: Never   Substance and Sexual Activity     Alcohol use: Yes     Alcohol/week: 0.0 standard drinks     Comment: 1 drink every 1-2 weeks.     Drug use: No     Sexual activity: Yes     Partners: Female   Other Topics Concern     Parent/sibling w/ CABG, MI or angioplasty before 65F 55M? Not Asked   Social History Narrative     Not on file     Social Determinants of Health     Financial Resource Strain: Not on file   Food Insecurity: Not on file   Transportation Needs: Not on file   Physical Activity: Not on file   Stress: Not on file   Social Connections: Not on file   Intimate Partner Violence: Not on file   Housing Stability: Not on file        FAMILY HISTORY:   Family History   Problem Relation Age of Onset     Prostate Cancer Paternal Grandfather         EXAM:Vitals: /70 (BP Location: Left arm, Patient Position: Sitting, Cuff Size: Adult Large)   Ht 1.765 m (5' 9.5\")   Wt 115.2 kg (254 lb)   BMI 36.97 kg/m    BMI= Body mass index is 36.97 kg/m .    General appearance: Patient is alert and fully cooperative with history & exam.  No sign of distress is noted during the visit.     Psychiatric: Affect is pleasant & appropriate.  Patient appears motivated to improve health.     Respiratory: Breathing is regular & unlabored while sitting.     HEENT: Hearing is intact to spoken word.  Speech is clear.  No gross evidence of visual impairment that would impact ambulation.     Vascular: DP & PT pulses are diminished bilateral temperature warm to warm proximal to distal on the left and warm to cool on the right.  Considerable edema tight edema noted bilateral and he normally wears Velcro boots to reduce edema.     Neurologic: Lower extremity sensation is intact to light touch.  " Complete loss of protective threshold on the left foot.    Dermatologic: Skin is intact to both lower extremities with diminished texture, turgor and tone about the integument.  No open lesions or abrasions noted.    Musculoskeletal: Patient is ambulatory without assistive device or brace.  Edema is resolved  There is limited range of motion through the ankle and rear foot bilateral equal bilateral.  Minimal to no discomfort is noted with firm palpation throughout the left midfoot and tight edema.  Manual muscle strength is adequate for ambulation with discomfort and guarding throughout the midfoot.  I am not able to isolate pinpoint area of pain other than across the midfoot rear foot of the left foot and ankle.  Not able to localize pain to any specific tendon crossing the ankle joint.    Radiographs 3 views left foot 12/29/2022 demonstrate degenerative changes throughout the midfoot rear foot with osteophytes noted dorsally consistent with osteoarthritis.  No acute cortical reaction or fracture or joint diastases.    Hemoglobin A1C (%)   Date Value   11/07/2022 6.9 (H)   01/25/2022 7.1 (H)   06/15/2021 7.2 (H)   02/05/2021 8.3 (H)   01/21/2020 7.9 (H)   06/20/2019 7.1 (H)   10/16/2018 6.4 (H)   05/15/2018 6.5 (H)   10/18/2017 6.6 (H)   04/20/2016 6.5     Creatinine (mg/dL)   Date Value   11/07/2022 1.48 (H)   01/25/2022 1.55 (H)   05/17/2021 1.47 (H)   02/05/2021 1.44 (H)   01/21/2020 1.48 (H)   08/16/2019 1.42 (H)   06/20/2019 1.39 (H)   12/27/2018 1.51 (H)       ASSESSMENT:       ICD-10-CM    1. 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 Pes valgus of left foot  Q66.6            PLAN:  Reviewed patient[facility] chart in [facility].      12/29/2022   Obtained and interpreted radiographs    Fracture boot, WB to tolerance in the boot and may rest without the fracture  boot  compression during the day and off at night   RTC 3 weeks  Discussed risk of developing Charcot.    1/23/2023  Last DM shoes 2 years ago.  Ordered new DM shoes  Is almost pain free so start coming out of boot as tolerated and stay in DM shoes.  He was wearing slip on shoes and increased standing in shop likely cause this.   Follow-up once yearly for diabetic foot exam and if he is unable to progress out of the fracture boot.    Khadar García DPM        Again, thank you for allowing me to participate in the care of your patient.        Sincerely,        Khadar García DPM

## 2023-01-23 NOTE — PATIENT INSTRUCTIONS
"DIABETES AND YOUR FEET    What effect does diabetes have on the feet?  Diabetes can result in several problems in the feet including contractures of the tendons leading to deformities and reduced function of the bones, skin ulcers or open sores on pressure points or prominent deformities, reduced sensation, reduced blood flow and thus reduced oxygen and immune cells to the tiny vessels in our feet. This all leads to higher risk of hospitalization, infections, and amputations.     What is neuropathy?  Neuropathy is a term used to describe a loss of nerve function.  Patients with diabetes are at risk of developing neuropathy if their sugars continue to run high and are above the normal value of 140.  The elevated blood sugar in the body enters the nerves causing it to swell and impair nerve function.  The higher the blood sugar and the longer it is elevated, the more damage is done to nerves.  This damage is permanent and irreversible.  These damaged sensory nerves can then cause reduced feeling or cause pain.  Damaged motor nerves can reduce blood flow and white blood cells into into your foot, skin and bones reducing your ability to heal a small problem. And neuropathy can cause tendons to become unbalanced and contribute to the formation of deformity and contractures in our feet. Often times, neuropathy can be prevented by controlling your blood sugar.  Your risk of developing neuropathy goes up dramatically as your hemoglobin A1C raises above 7.5.      How do I know if I have neuropathy?  When a person develops neuropathy, they usually begin to feel numbness or tingling in their feet and sometimes in their legs.  Other symptoms may include painful burning or hot feet, tingling, electrical sensations or feeling like insects or ants are crawling on your feet or legs.  If blood sugar remains above 140  for long periods of time, neuropathy can also occur in the hands.  When a person loses their \"protective threshold\" " or ability to detect a 5.07 Fleming [address]iliam monofilament is when they have elevated risk for developing foot deformity, contractures, foot infections, amputations, Charcot arthropathy, or other complications. Keep your hemoglobin A1C below 7.5 to reduce this risk.    What is vascular disease?  Peripheral vascular disease is a term used to describe a loss or decrease in circulation (blood flow).  There is a problem in getting blood, immune cells, and oxygen to areas that need it.  Similar to neuropathy, sugars can build up in the walls of the arteries (blood vessels) and cause them to become swollen, thickened and hardened.  This decreases the amount of blood that can go to an area that needs it.  Though this is common in the legs of diabetic patients, it can also affect other arteries (blood vessels) in the body such as in the heart, kidney, eyes, and the blood flow into bones.  It is often seen first in the small vessels of her body notably our feet and toes.    How do I know if I have vascular disease?  In the legs, vascular disease usually results in cramping.  Patients who develop leg cramps after walking the same distance every time (i.e. One block, half a mile, ect.) need to let their doctors know so that their circulation may be checked.  Cramps causing severe pain in the feet and/or legs while sleeping and the cramps go away when you stand or hang your legs off the side of the bed, may also be a sign of poor blood circulation.  Occasional cramping in cold weather or on rare occasions with activity may not be due to poor circulation, but you should inform your doctor.    How can these problems be prevented?  The key to prevention is good blood sugar control all day every day.  Inadequate blood sugar control is the most common way patients experience these problems. Reducing, controlling and measuring your daily consumption of sugar or carbohydrates is essential to understanding and managing diabetes.   Physical activity (exercise) is a very good way to help decrease your blood sugars.  Exercise can lower your blood sugar, blood pressure, and cholesterol.  It also reduces your risk for heart disease and stroke, relieves stress, and strengthens your heart, muscles and bones. Physical activity also increases your balance and reduces development of contractures and foot deformities over time. In addition, regular activity helps insulin work better, improves your blood circulation, and keeps your joints flexible.  If you're trying to lose weight, a combination of exercise and wise food choices can help you reach your target weight and maintain it.  Activity and exercise alone can not make up for poor diet choices, eating too much, or eating too many sugars or carbohydrates.  Ask your doctor for help when you are not meeting your blood sugar goals. Changes or increases in medication are powerful tools in reducing your blood sugar.    Know your blood sugar and hemoglobin A1C trend.  Upon first diagnosis or during acute illness, checking your blood sugar 4 times a day can help you understand how your diet, activity, and lifestyle affect your blood sugar.  Monitoring your hemoglobin A1C can help you understand how well you are managing blood sugar over the long run.  Your hemoglobin A1C tells you what your blood sugar averages all day, every day, over the past 90 days.       To experience the lowest risk of complications associated with diabetes such as neuropathy, loss of blood flow, bone or joint infection, charcot arthropathy, or amputation, the American Diabetes Association recommends a target hemoglobin A1C of less than 7.0%, while the American Association of Clinical Endocrinologists' recommendation is 6.5% or less.  Both organizations advise that the goals be individualized based on patient factors such as other health conditions, history of hypoglycemia, education, and life expectancy.  A patients risk of  experiencing complications associated with diabetes is only slightly elevated with a hemoglobin A1C above 6.0.  However, this risk goes up exponentially when the hemoglobin A1C is above 7.5.  The longer the hemoglobin A1C is elevated, the more risk that patient will experience in their lifetime. The damage that occurs to nerves, blood vessels, tendons, bones and body organs, while their hemoglobin A1C is elevated is mostly irreversible and worsens with each additional time period of elevated hemoglobin A1C.     You must understand and manage your disease.  Your health insurance or medical team cannot manage this disease for you.  When you take responsibility for understanding and managing your disease, you can expect to experience fewer problems associated with diabetes in your lifetime.  You will  Also experience a higher quality of life and health and reduced cost of health care.              Diabetic Foot Care Recommendations  The following are recommendations for avoiding serious foot problems or injury    DO'S  1. Be aware of your hemoglobin A1C and continue to follow up with your medical team for adjustments in your lifestyle and medication until your reach your A1C goal.  Keep this below 7.5 to reduce your risk of developing complications associated with diabetes.    2.  Wash your feet with lukewarm water and a mild soap and then dry them thoroughly, especially between the toes.  Gently floss your towel or washcloth between each toe at every bath.  Soaking your feet in water cannot clean dead skin, debris, and bacteria from your feet and is not necessary.   3. Examine your feet daily looking for cuts, corns, blisters, cracks, ect..., especially after wearing new shoes or increased or changed activities.  Make sure to look between your toes.  If you cannot see the bottom of your feet, set a mirror on the floor and hold your foot over it, or ask a family member to examine your feet for you daily.  Contact your  doctor immediately if new problems are noted or if sores are not healing.  4.  Immediately apply moisturizer cream such as Cetaphil to the tops and bottoms of your feet, avoiding areas between the toes.  Apply sunscreen or cover your feet if they will be exposed to extended sunlight.  5.Use clean comfortable shoes.  Socks should not have thick seams or cut off the circulation around the leg.  Break in new shoes slowly and rotate with older shoes until broken in.  Check the inside of your shoes with your hand to look for areas of irritation or objects that may have fallen into your shoes.    6. Keep slippers by the side of your bed for use during the night.  7. Shoes should be fitted by a professional and should not cause areas of irritation.  Check your feet regularly when wearing a new pair of shoes and replace them as needed.  8.  Talk to your doctor about proper exercise.  Exercise and stretching stimulate blood flow to your feet and maintain proper glucose levels.  Use it or lose it!  9.  Monitor your blood glucose level and your hemoglobin A1C.  Notify your doctor immediately if your blood sugar is abnormally high or low.  10.  Cut your nails straight across, but then gently round any sharp edges with a nail file.  If you have neuropathy, peripheral vascular disease or cannot see that well to trim your own toenails, see a medical professional for care.    DONT'S  1.  Do not soak your feet if you have an open sore or your provider has informed you that you have neuropathy or loss of protective threshold.  Use only lukewarm water and always check the temperature with your hand as hot water can easily burn your feet.    2.  Never use a hot water bottle or heating pad on your feet.  Also do not apply hot or cold compresses to your feet.  With decreased sensation, you could burn or freeze your feet.  Do not rest your feet near a heat source such as a heater or heat register.    3.  Do not apply any of these to your  feet:    - over the counter medicine for corns or warts    -  Harsh chemicals like boric acid    -  Do not self-treat corns, cuts, blisters or infections.  Always consult your doctor.   4.  Do not wear sandals, slippers or walk barefoot, especially on harsh surfaces.  5.  If you smoke, stop!!!      b

## 2023-01-23 NOTE — PROGRESS NOTES
ANTICOAGULATION MANAGEMENT     Robert Richard 87 year old male is on warfarin with therapeutic INR result. (Goal INR 2.0-3.0)    Recent labs: (last 7 days)     01/23/23  1050   INR 2.9*       ASSESSMENT       Source(s): Chart Review and Patient/Caregiver Call       Warfarin doses taken: Warfarin taken as instructed    Diet: No new diet changes identified    New illness, injury, or hospitalization: No    Medication/supplement changes: None noted    Signs or symptoms of bleeding or clotting: No    Previous INR: Therapeutic last 2(+) visits    Additional findings: None       PLAN     Recommended plan for no diet, medication or health factor changes affecting INR     Dosing Instructions: Continue your current warfarin dose with next INR in 4 weeks       Summary  As of 1/23/2023    Full warfarin instructions:  2.5 mg every Mon, Fri; 5 mg all other days   Next INR check:  2/20/2023             Telephone call with Carlos who verbalizes understanding and agrees to plan and who agrees to plan and repeated back plan correctly    Lab visit scheduled    Education provided:     None required    Plan made per [facility] anticoagulation protocol    Neelima Arreaga RN  Anticoagulation Clinic  1/23/2023    _______________________________________________________________________     Anticoagulation Episode Summary     Current INR goal:  2.0-3.0   TTR:  73.5 % (1 y)   Target end date:  Indefinite   Send INR reminders to:  [facility]    Indications    Factor V Leiden mutation (H) [D68.51]  Venous thrombosis [I82.90]  Prothrombin mutation (H) [D68.52]  Long term current use of anticoagulant therapy [Z79.01]           Comments:           Anticoagulation Care Providers     Provider Role Specialty Phone number    Stiven Donahue MD Referring Internal Medicine [phone]    Nate Cifuentes DO Responsible Internal Medicine [phone]

## 2023-01-24 DIAGNOSIS — I10 HYPERTENSION GOAL BP (BLOOD PRESSURE) < 140/90: ICD-10-CM

## 2023-01-25 RX ORDER — LOSARTAN POTASSIUM 100 MG/1
TABLET ORAL
Qty: 90 TABLET | Refills: 0 | Status: SHIPPED | OUTPATIENT
Start: 2023-01-25 | End: 2023-05-01

## 2023-02-17 ENCOUNTER — TELEPHONE (OUTPATIENT)
Dept: PODIATRY | Facility: CLINIC | Age: 88
End: 2023-02-17
Payer: COMMERCIAL

## 2023-02-17 NOTE — TELEPHONE ENCOUNTER
I received a message indicating patient tried calling back. LM for call back if needed.    Zaina Smith RN on 2/17/2023 at 9:56 AM

## 2023-02-17 NOTE — TELEPHONE ENCOUNTER
Approval form faxed to our office from [employer]. Sent to scanning.    Zaina Smith RN on 2/17/2023 at 12:15 PM

## 2023-02-17 NOTE — TELEPHONE ENCOUNTER
Request for records pertaining to a recent DME from [facility] with [facility]. No VINCENT on file. Obtained verbal permission over the phone from the patient to fax the records to [facility] with [facility]. Faxed DME order and last OV note to [facility] [phone]. Verified via [employer].    Zaina Smith RN on 2/17/2023 at 8:59 AM

## 2023-02-20 ENCOUNTER — LAB (OUTPATIENT)
Dept: LAB | Facility: CLINIC | Age: 88
End: 2023-02-20
Payer: COMMERCIAL

## 2023-02-20 ENCOUNTER — ANTICOAGULATION THERAPY VISIT (OUTPATIENT)
Dept: ANTICOAGULATION | Facility: CLINIC | Age: 88
End: 2023-02-20

## 2023-02-20 DIAGNOSIS — I82.90 VENOUS THROMBOSIS: ICD-10-CM

## 2023-02-20 DIAGNOSIS — D68.52 PROTHROMBIN MUTATION (H): ICD-10-CM

## 2023-02-20 DIAGNOSIS — D68.51 FACTOR V LEIDEN MUTATION (H): Primary | ICD-10-CM

## 2023-02-20 DIAGNOSIS — Z79.01 LONG TERM CURRENT USE OF ANTICOAGULANT THERAPY: ICD-10-CM

## 2023-02-20 LAB — INR BLD: 2.9 (ref 0.9–1.1)

## 2023-02-20 PROCEDURE — 85610 PROTHROMBIN TIME: CPT

## 2023-02-20 PROCEDURE — 36416 COLLJ CAPILLARY BLOOD SPEC: CPT

## 2023-02-20 NOTE — PROGRESS NOTES
ANTICOAGULATION MANAGEMENT     Robert Richard 87 year old male is on warfarin with therapeutic INR result. (Goal INR 2.0-3.0)    Recent labs: (last 7 days)     02/20/23  0859   INR 2.9*       ASSESSMENT       Source(s): Chart Review       Warfarin doses taken: Warfarin taken as instructed    Diet: No new diet changes identified    New illness, injury, or hospitalization: No    Medication/supplement changes: None noted    Signs or symptoms of bleeding or clotting: No    Previous INR: Therapeutic last 2(+) visits    Additional findings: None       PLAN     Recommended plan for no diet, medication or health factor changes affecting INR     Dosing Instructions: Continue your current warfarin dose with next INR in 4 weeks       Summary  As of 2/20/2023    Full warfarin instructions:  2.5 mg every Mon, Fri; 5 mg all other days   Next INR check:  3/20/2023             Telephone call with Carlos who verbalizes understanding and agrees to plan    Lab visit scheduled    Education provided:     Please call back if any changes to your diet, medications or how you've been taking warfarin    Plan made per [facility] anticoagulation protocol    Regina Luo RN  Anticoagulation Clinic  2/20/2023    _______________________________________________________________________     Anticoagulation Episode Summary     Current INR goal:  2.0-3.0   TTR:  73.5 % (1 y)   Target end date:  Indefinite   Send INR reminders to:  [facility]    Indications    Factor V Leiden mutation (H) [D68.51]  Venous thrombosis [I82.90]  Prothrombin mutation (H) [D68.52]  Long term current use of anticoagulant therapy [Z79.01]           Comments:           Anticoagulation Care Providers     Provider Role Specialty Phone number    Stiven Donahue MD Referring Internal Medicine [phone]    Nate Cifuentes DO Responsible Internal Medicine [phone]

## 2023-03-05 DIAGNOSIS — Z86.718 PERSONAL HISTORY OF VENOUS THROMBOSIS AND EMBOLISM: ICD-10-CM

## 2023-03-07 RX ORDER — WARFARIN SODIUM 5 MG/1
TABLET ORAL
Qty: 90 TABLET | Refills: 1 | Status: SHIPPED | OUTPATIENT
Start: 2023-03-07 | End: 2023-09-18

## 2023-03-07 NOTE — TELEPHONE ENCOUNTER
"Routing refill request to anticoagulant team for review/approval because:    Requested Prescriptions   Pending Prescriptions Disp Refills    warfarin ANTICOAGULANT (COUMADIN) 5 MG tablet [Pharmacy Med Name: Warfarin Sodium 5 MG Oral Tablet] 40 tablet 0     Sig: TAKE 1/2 TAB BY MOUTH EVERY FRIDAY AND 1 TAB ON ALL OTHER DAYS OF THE WEEK       Vitamin K Antagonists Failed - 3/5/2023  2:55 PM        Failed - INR is within goal in the past 6 weeks     Confirm INR is within goal in the past 6 weeks.     Recent Labs   Lab Test 02/20/23  0859   INR 2.9*                       Passed - Recent (12 mo) or future (30 days) visit within the authorizing provider's specialty     Patient has had an office visit with the authorizing provider or a provider within the authorizing providers department within the previous 12 mos or has a future within next 30 days. See \"Patient Info\" tab in inbasket, or \"Choose Columns\" in Meds & Orders section of the refill encounter.              Passed - Medication is active on med list        Passed - Patient is 18 years of age or older                   "

## 2023-03-07 NOTE — TELEPHONE ENCOUNTER
ANTICOAGULATION MANAGEMENT:  Medication Refill    Anticoagulation Summary  As of 2/20/2023    Warfarin maintenance plan:  2.5 mg (5 mg x 0.5) every Mon, Fri; 5 mg (5 mg x 1) all other days   Next INR check:  3/20/2023   Target end date:  Indefinite    Indications    Factor V Leiden mutation (H) [D68.51]  Venous thrombosis [I82.90]  Prothrombin mutation (H) [D68.52]  Long term current use of anticoagulant therapy [Z79.01]             Anticoagulation Care Providers     Provider Role Specialty Phone number    Stiven Donahue MD Referring Internal Medicine [phone]    Nate Cifuentes DO Responsible Internal Medicine [phone]          Visit with referring provider/group within last year: Yes    [facility] referral signed within last year: Yes    Robert meets all criteria for refill (current [facility] referral, office visit with referring provider/group in last year, lab monitoring up to date or not exceeding 2 weeks overdue). Rx instructions and quantity supplied updated to match patient's current dosing plan. Warfarin 90 day supply with 1 refill granted per [facility] protocol     Amina Bertrand, RN  Anticoagulation Clinic

## 2023-03-13 ENCOUNTER — OFFICE VISIT (OUTPATIENT)
Dept: PODIATRY | Facility: CLINIC | Age: 88
End: 2023-03-13
Payer: COMMERCIAL

## 2023-03-13 VITALS
BODY MASS INDEX: 36.79 KG/M2 | SYSTOLIC BLOOD PRESSURE: 144 MMHG | DIASTOLIC BLOOD PRESSURE: 82 MMHG | WEIGHT: 257 LBS | HEIGHT: 70 IN

## 2023-03-13 DIAGNOSIS — M15.3 POST-TRAUMATIC OSTEOARTHRITIS OF MULTIPLE JOINTS: ICD-10-CM

## 2023-03-13 DIAGNOSIS — M19.072 DJD (DEGENERATIVE JOINT DISEASE), ANKLE AND FOOT, LEFT: Primary | ICD-10-CM

## 2023-03-13 DIAGNOSIS — Q66.6 PES VALGUS OF LEFT FOOT: ICD-10-CM

## 2023-03-13 DIAGNOSIS — E11.40 CONTROLLED TYPE 2 DIABETES WITH NEUROPATHY (H): ICD-10-CM

## 2023-03-13 PROCEDURE — 99213 OFFICE O/P EST LOW 20 MIN: CPT | Performed by: PODIATRIST

## 2023-03-13 NOTE — PROGRESS NOTES
Chief Complaint   Patient presents with     RECHECK     D/C WB w/tall gray fx boot, dorsal pain has resolved @ 3/13/23 OV  - Left foot/ankle/lower leg post traumatic osteoarthritis multiple joint with Edema, onset 12/25/2022; XR L foot 12/29/2022; LOV 1/23/2023     Diabetes     Type 2, DM shoes obtained 3/13/2023     Other     Present alone 3/13/2023  Warfarin     Consult     Medial Left ankle pain after D/ C fx boot; new issue @ 3/13/2023 OV     HPI:  Robert Richard is a 87 year old male who is seen in consultation at the request of self.    Pt presents for eval of:   (Onset, Location, L/R, Character, Treatments, Injury if yes)    XR left foot 12/29/2022    DM type 2     Onset 12/25/2022, edema dorsal left foot, ankle and lower left leg. No injury noted. Presents minimal WB w/cane and DM shoes.  Constant, swelling. With any pressure and activity, instability, sharp, pain 8. No pain with rest.    Retired.    ROS:  10 point ROS neg other than the symptoms noted above in the HPI.    Patient Active Problem List   Diagnosis     Venous thrombosis     Restless leg syndrome     Sleep apnea     Factor V Leiden mutation (H)     Prothrombin mutation (H)     Back pain     Hyperlipidemia LDL goal <100     Gout     Malignant basal cell neoplasm of skin     Advanced directives, counseling/discussion     Low back pain     Personal history of venous thrombosis and embolism     Hip joint replacement status - right     Abnormal gait     Obesity     Personal history of prostate cancer     Spinal stenosis     Long term current use of anticoagulant therapy     Essential hypertension     Morbid obesity due to excess calories (H)     Diabetes mellitus, type 2 (H)     CKD (chronic kidney disease) stage 3, GFR 30-59 ml/min (H)     Asymmetrical sensorineural hearing loss     Dyslipidemia     Hemifacial spasm     Osteoarthritis of hip     Subjective tinnitus     Venous stasis dermatitis of right lower extremity       PAST MEDICAL HISTORY:    Past Medical History:   Diagnosis Date     Basal cell carcinoma      Cancer (H)      DVT (deep venous thrombosis) (H) 11/2003     DVT (deep venous thrombosis) (H) 12/08     DVT (deep venous thrombosis) (H) 10/2010     Factor V Leiden (H)      Gout      Other and unspecified hyperlipidemia      Prostate cancer (H) 2003    prostatectomy      Restless leg syndrome      Unspecified essential hypertension         PAST SURGICAL HISTORY:   Past Surgical History:   Procedure Laterality Date     APPENDECTOMY  1950     JOINT REPLACEMENT  4/2011    R hip     LAMINECTOMY LUMBAR ONE LEVEL  12/2008     LAPAROSCOPIC HERNIORRHAPHY UMBILICAL N/A 1/3/2019    Procedure: laparoscopic umbilical hernia repair with mesh;  Surgeon: Jamal Thompson DO;  Location:  OR     [facility] REVISE TOTAL HIP REPLACEMENT  1/18/13    R hip        MEDICATIONS:   Current Outpatient Medications:      ASPIRIN NOT PRESCRIBED (INTENTIONAL), Please choose reason not prescribed from choices below., Disp:  , Rfl:      atenolol (TENORMIN) 50 MG tablet, Take 1 tablet (50 mg) by mouth 2 times daily, Disp: 180 tablet, Rfl: 3     betamethasone dipropionate (DIPROSONE) 0.05 % external ointment, Apply topically daily, Disp: 150 g, Rfl: 3     Bioflavonoid Products (DOMINIK-C) TABS, Take 1,000 mg by mouth 2 times daily, Disp: 90 tablet, Rfl: 3     blood glucose (NO BRAND SPECIFIED) lancets standard, Use to test blood sugar 1 times daily or as directed., Disp: 100 lancet, Rfl: 3     blood glucose (NO BRAND SPECIFIED) test strip, Use to test blood sugar 1 times daily or as directed., Disp: 100 strip, Rfl: 3     blood glucose monitoring (NO BRAND SPECIFIED) meter device kit, Use to test blood sugar 1 times daily or as directed., Disp: 1 kit, Rfl: 0     cyanocobalamin (VITAMIN B-12) 1000 MCG tablet, Take 1 tablet (1,000 mcg) by mouth daily, Disp: 90 tablet, Rfl: 3     glipiZIDE (GLUCOTROL XL) 5 MG 24 hr tablet, Take 1 tablet (5 mg) by mouth daily, Disp: 90 tablet, Rfl:  3     incobotulinumtoxin A (XEOMIN) 50 units SOLR solution, Inject 50 Units into the muscle once Every 6 months or so, Disp: , Rfl:      ipratropium (ATROVENT) 0.06 % nasal spray, USE 2 SPRAY(S) IN EACH NOSTRIL 4 TIMES DAILY, Disp: 45 mL, Rfl: 1     losartan (COZAAR) 100 MG tablet, Take 1 tablet by mouth once daily, Disp: 90 tablet, Rfl: 0     multiple vitamin  tablet TABS, Take 1 tablet by mouth daily, Disp: , Rfl:      Multiple Vitamins-Minerals (MULTIVITAMIN ADULT PO), , Disp: , Rfl:      order for DME, One pair compression garment 20-30 mmHg may substitute per fitter.  Please call [facility] at [phone] for appt.  Will also need education regarding donning agents tyvek sleeve, plastic sled or similar., Disp: 1 Units, Rfl: 0     ORDER FOR DME, Wear mask at night, Disp: 1 Units, Rfl: 0     pramipexole (MIRAPEX) 1 MG tablet, Take 1 tablet (1 mg) by mouth 2 times daily 4pm and 9pm, Disp: 180 tablet, Rfl: 1     sildenafil (VIAGRA) 100 MG tablet, Take 0.5 tablets (50 mg) by mouth daily as needed, Disp: 20 tablet, Rfl: 3     spironolactone (ALDACTONE) 25 MG tablet, Take 1 tablet (25 mg) by mouth daily, Disp: 90 tablet, Rfl: 3     STATIN NOT PRESCRIBED (INTENTIONAL), Please choose reason not prescribed, below, Disp: , Rfl:      warfarin ANTICOAGULANT (COUMADIN) 5 MG tablet, TAKE 1/2 TAB BY MOUTH EVERY Mon and FRIDAY AND 1 TAB ON ALL OTHER DAYS OF THE WEEK as directed, Disp: 90 tablet, Rfl: 1     amoxicillin (AMOXIL) 500 MG capsule, Take 4 caps 1 hour before procedure., Disp: 8 capsule, Rfl: 1     ALLERGIES:    Allergies   Allergen Reactions     Lipitor [Atorvastatin Calcium]      Muscle pain, weakness     Pravastatin Other (See Comments)     muscle aches     Simvastatin Other (See Comments)     muscle aches        SOCIAL HISTORY:   Social History     Socioeconomic History     Marital status: Single     Spouse name: Not on file     Number of children: Not on file     Years of education: Not on file     Highest  "education level: Not on file   Occupational History     Not on file   Tobacco Use     Smoking status: Never     Smokeless tobacco: Never   Substance and Sexual Activity     Alcohol use: Yes     Alcohol/week: 0.0 standard drinks     Comment: 1 drink every 1-2 weeks.     Drug use: No     Sexual activity: Yes     Partners: Female   Other Topics Concern     Parent/sibling w/ CABG, MI or angioplasty before 65F 55M? Not Asked   Social History Narrative     Not on file     Social Determinants of Health     Financial Resource Strain: Not on file   Food Insecurity: Not on file   Transportation Needs: Not on file   Physical Activity: Not on file   Stress: Not on file   Social Connections: Not on file   Intimate Partner Violence: Not on file   Housing Stability: Not on file        FAMILY HISTORY:   Family History   Problem Relation Age of Onset     Prostate Cancer Paternal Grandfather         EXAM:Vitals: BP (!) 144/82 (BP Location: Left arm, Patient Position: Sitting, Cuff Size: Adult Large)   Ht 1.765 m (5' 9.5\")   Wt 116.6 kg (257 lb)   BMI 37.41 kg/m    BMI= Body mass index is 37.41 kg/m .    General appearance: Patient is alert and fully cooperative with history & exam.  No sign of distress is noted during the visit.     Psychiatric: Affect is pleasant & appropriate.  Patient appears motivated to improve health.     Respiratory: Breathing is regular & unlabored while sitting.     HEENT: Hearing is intact to spoken word.  Speech is clear.  No gross evidence of visual impairment that would impact ambulation.     Vascular: DP & PT pulses are diminished bilateral temperature warm to warm proximal to distal on the left and warm to cool on the right.  Considerable edema tight edema noted bilateral and he normally wears Velcro boots to reduce edema.     Neurologic: Lower extremity sensation is intact to light touch.  Complete loss of protective threshold on the left foot.    Dermatologic: Skin is intact to both lower " extremities with diminished texture, turgor and tone about the integument.  No open lesions or abrasions noted.    Musculoskeletal: Patient is ambulatory without assistive device or brace.  Patient does have mild generalized venous stasis edema bilateral lower extremities both pitting and nonpitting edema are noted.  Severe hemosiderin pigmentation bilateral lower extremities.  Today most discomfort is noted about the anterior medial joint line of the left ankle.  Subtle crepitus is noted throughout and mildly reduced range of motion through bilateral ankles.  Manual muscle strength equal bilateral.    Radiographs 3 views left foot 12/29/2022 demonstrate degenerative changes throughout the midfoot rear foot with osteophytes noted dorsally consistent with osteoarthritis.  No acute cortical reaction or fracture or joint diastases.    Hemoglobin A1C (%)   Date Value   11/07/2022 6.9 (H)   01/25/2022 7.1 (H)   06/15/2021 7.2 (H)   02/05/2021 8.3 (H)   01/21/2020 7.9 (H)   06/20/2019 7.1 (H)   10/16/2018 6.4 (H)   05/15/2018 6.5 (H)   10/18/2017 6.6 (H)   04/20/2016 6.5     Creatinine (mg/dL)   Date Value   11/07/2022 1.48 (H)   01/25/2022 1.55 (H)   05/17/2021 1.47 (H)   02/05/2021 1.44 (H)   01/21/2020 1.48 (H)   08/16/2019 1.42 (H)   06/20/2019 1.39 (H)   12/27/2018 1.51 (H)       ASSESSMENT:       ICD-10-CM    1. DJD (degenerative joint disease), ankle and foot, left  M19.072       2. 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 Post-traumatic osteoarthritis of multiple joints  M15.3       4. Pes valgus of left foot  Q66.6           PLAN:  Reviewed patient's chart in [facility].      12/29/2022   Obtained and interpreted radiographs    Fracture boot, WB to tolerance in the boot and may rest without the fracture boot  compression during the day and off at night   RTC 3 weeks  Discussed risk of developing Charcot.    1/23/2023  Last DM shoes 2 years ago.  Ordered new DM shoes  Is almost pain free so start  coming out of boot as tolerated and stay in DM shoes.  He was wearing slip on shoes and increased standing in shop likely cause this.   Follow-up once yearly for diabetic foot exam and if he is unable to progress out of the fracture boot.    3/13/2023  Patient does have donning aid for compression socks.    He has a $30 pay for PT and does not want to do that.  Offered it to him  Also offered injection if he would like  Follow-up as needed    Khadar García DPM

## 2023-03-13 NOTE — LETTER
3/13/2023         RE: Robert Richard  [address] [facility] [address]        Dear Colleague,    Thank you for referring your patient, Robert Richard, to the [facility]. Please see a copy of my visit note below.    Chief Complaint   Patient presents with     RECHECK     D/C WB w/tall gray fx boot, dorsal pain has resolved @ 3/13/23 OV  - Left foot/ankle/lower leg post traumatic osteoarthritis multiple joint with Edema, onset 12/25/2022; XR L foot 12/29/2022; LOV 1/23/2023     Diabetes     Type 2, DM shoes obtained 3/13/2023     Other     Present alone 3/13/2023  Warfarin     Consult     Medial Left ankle pain after D/ C fx boot; new issue @ 3/13/2023 OV     HPI:  Robert Richard is a 87 year old male who is seen in consultation at the request of self.    Pt presents for eval of:   (Onset, Location, L/R, Character, Treatments, Injury if yes)    XR left foot 12/29/2022    DM type 2     Onset 12/25/2022, edema dorsal left foot, ankle and lower left leg. No injury noted. Presents minimal WB w/cane and DM shoes.  Constant, swelling. With any pressure and activity, instability, sharp, pain 8. No pain with rest.    Retired.    ROS:  10 point ROS neg other than the symptoms noted above in the HPI.    Patient Active Problem List   Diagnosis     Venous thrombosis     Restless leg syndrome     Sleep apnea     Factor V Leiden mutation (H)     Prothrombin mutation (H)     Back pain     Hyperlipidemia LDL goal <100     Gout     Malignant basal cell neoplasm of skin     Advanced directives, counseling/discussion     Low back pain     Personal history of venous thrombosis and embolism     Hip joint replacement status - right     Abnormal gait     Obesity     Personal history of prostate cancer     Spinal stenosis     Long term current use of anticoagulant therapy     Essential hypertension     Morbid obesity due to excess calories (H)     Diabetes mellitus, type 2 (H)     CKD (chronic kidney  disease) stage 3, GFR 30-59 ml/min (H)     Asymmetrical sensorineural hearing loss     Dyslipidemia     Hemifacial spasm     Osteoarthritis of hip     Subjective tinnitus     Venous stasis dermatitis of right lower extremity       PAST MEDICAL HISTORY:   Past Medical History:   Diagnosis Date     Basal cell carcinoma      Cancer (H)      DVT (deep venous thrombosis) (H) 11/2003     DVT (deep venous thrombosis) (H) 12/08     DVT (deep venous thrombosis) (H) 10/2010     Factor V Leiden (H)      Gout      Other and unspecified hyperlipidemia      Prostate cancer (H) 2003    prostatectomy      Restless leg syndrome      Unspecified essential hypertension         PAST SURGICAL HISTORY:   Past Surgical History:   Procedure Laterality Date     APPENDECTOMY  1950     JOINT REPLACEMENT  4/2011    R hip     LAMINECTOMY LUMBAR ONE LEVEL  12/2008     LAPAROSCOPIC HERNIORRHAPHY UMBILICAL N/A 1/3/2019    Procedure: laparoscopic umbilical hernia repair with mesh;  Surgeon: Jamal Thompson DO;  Location:  OR     [email or web address] REVISE TOTAL HIP REPLACEMENT  1/18/13    R hip        MEDICATIONS:   Current Outpatient Medications:      ASPIRIN NOT PRESCRIBED (INTENTIONAL), Please choose reason not prescribed from choices below., Disp:  , Rfl:      atenolol (TENORMIN) 50 MG tablet, Take 1 tablet (50 mg) by mouth 2 times daily, Disp: 180 tablet, Rfl: 3     betamethasone dipropionate (DIPROSONE) 0.05 % external ointment, Apply topically daily, Disp: 150 g, Rfl: 3     Bioflavonoid Products (DOMINIK-C) TABS, Take 1,000 mg by mouth 2 times daily, Disp: 90 tablet, Rfl: 3     blood glucose (NO BRAND SPECIFIED) lancets standard, Use to test blood sugar 1 times daily or as directed., Disp: 100 lancet, Rfl: 3     blood glucose (NO BRAND SPECIFIED) test strip, Use to test blood sugar 1 times daily or as directed., Disp: 100 strip, Rfl: 3     blood glucose monitoring (NO BRAND SPECIFIED) meter device kit, Use to test blood sugar 1 times daily or as  directed., Disp: 1 kit, Rfl: 0     cyanocobalamin (VITAMIN B-12) 1000 MCG tablet, Take 1 tablet (1,000 mcg) by mouth daily, Disp: 90 tablet, Rfl: 3     glipiZIDE (GLUCOTROL XL) 5 MG 24 hr tablet, Take 1 tablet (5 mg) by mouth daily, Disp: 90 tablet, Rfl: 3     incobotulinumtoxin A (XEOMIN) 50 units SOLR solution, Inject 50 Units into the muscle once Every 6 months or so, Disp: , Rfl:      ipratropium (ATROVENT) 0.06 % nasal spray, USE 2 SPRAY(S) IN EACH NOSTRIL 4 TIMES DAILY, Disp: 45 mL, Rfl: 1     losartan (COZAAR) 100 MG tablet, Take 1 tablet by mouth once daily, Disp: 90 tablet, Rfl: 0     multiple vitamin  tablet TABS, Take 1 tablet by mouth daily, Disp: , Rfl:      Multiple Vitamins-Minerals (MULTIVITAMIN ADULT PO), , Disp: , Rfl:      order for DME, One pair compression garment 20-30 mmHg may substitute per fitter.  Please call [facility] at [phone] for appt.  Will also need education regarding donning agents tyvek sleeve, plastic sled or similar., Disp: 1 Units, Rfl: 0     ORDER FOR DME, Wear mask at night, Disp: 1 Units, Rfl: 0     pramipexole (MIRAPEX) 1 MG tablet, Take 1 tablet (1 mg) by mouth 2 times daily 4pm and 9pm, Disp: 180 tablet, Rfl: 1     sildenafil (VIAGRA) 100 MG tablet, Take 0.5 tablets (50 mg) by mouth daily as needed, Disp: 20 tablet, Rfl: 3     spironolactone (ALDACTONE) 25 MG tablet, Take 1 tablet (25 mg) by mouth daily, Disp: 90 tablet, Rfl: 3     STATIN NOT PRESCRIBED (INTENTIONAL), Please choose reason not prescribed, below, Disp: , Rfl:      warfarin ANTICOAGULANT (COUMADIN) 5 MG tablet, TAKE 1/2 TAB BY MOUTH EVERY Mon and FRIDAY AND 1 TAB ON ALL OTHER DAYS OF THE WEEK as directed, Disp: 90 tablet, Rfl: 1     amoxicillin (AMOXIL) 500 MG capsule, Take 4 caps 1 hour before procedure., Disp: 8 capsule, Rfl: 1     ALLERGIES:    Allergies   Allergen Reactions     Lipitor [Atorvastatin Calcium]      Muscle pain, weakness     Pravastatin Other (See Comments)     muscle aches      "Simvastatin Other (See Comments)     muscle aches        SOCIAL HISTORY:   Social History     Socioeconomic History     Marital status: Single     Spouse name: Not on file     Number of children: Not on file     Years of education: Not on file     Highest education level: Not on file   Occupational History     Not on file   Tobacco Use     Smoking status: Never     Smokeless tobacco: Never   Substance and Sexual Activity     Alcohol use: Yes     Alcohol/week: 0.0 standard drinks     Comment: 1 drink every 1-2 weeks.     Drug use: No     Sexual activity: Yes     Partners: Female   Other Topics Concern     Parent/sibling w/ CABG, MI or angioplasty before 65F 55M? Not Asked   Social History Narrative     Not on file     Social Determinants of Health     Financial Resource Strain: Not on file   Food Insecurity: Not on file   Transportation Needs: Not on file   Physical Activity: Not on file   Stress: Not on file   Social Connections: Not on file   Intimate Partner Violence: Not on file   Housing Stability: Not on file        FAMILY HISTORY:   Family History   Problem Relation Age of Onset     Prostate Cancer Paternal Grandfather         EXAM:Vitals: BP (!) 144/82 (BP Location: Left arm, Patient Position: Sitting, Cuff Size: Adult Large)   Ht 1.765 m (5' 9.5\")   Wt 116.6 kg (257 lb)   BMI 37.41 kg/m    BMI= Body mass index is 37.41 kg/m .    General appearance: Patient is alert and fully cooperative with history & exam.  No sign of distress is noted during the visit.     Psychiatric: Affect is pleasant & appropriate.  Patient appears motivated to improve health.     Respiratory: Breathing is regular & unlabored while sitting.     HEENT: Hearing is intact to spoken word.  Speech is clear.  No gross evidence of visual impairment that would impact ambulation.     Vascular: DP & PT pulses are diminished bilateral temperature warm to warm proximal to distal on the left and warm to cool on the right.  Considerable edema " tight edema noted bilateral and he normally wears Velcro boots to reduce edema.     Neurologic: Lower extremity sensation is intact to light touch.  Complete loss of protective threshold on the left foot.    Dermatologic: Skin is intact to both lower extremities with diminished texture, turgor and tone about the integument.  No open lesions or abrasions noted.    Musculoskeletal: Patient is ambulatory without assistive device or brace.  Patient does have mild generalized venous stasis edema bilateral lower extremities both pitting and nonpitting edema are noted.  Severe hemosiderin pigmentation bilateral lower extremities.  Today most discomfort is noted about the anterior medial joint line of the left ankle.  Subtle crepitus is noted throughout and mildly reduced range of motion through bilateral ankles.  Manual muscle strength equal bilateral.    Radiographs 3 views left foot 12/29/2022 demonstrate degenerative changes throughout the midfoot rear foot with osteophytes noted dorsally consistent with osteoarthritis.  No acute cortical reaction or fracture or joint diastases.    Hemoglobin A1C (%)   Date Value   11/07/2022 6.9 (H)   01/25/2022 7.1 (H)   06/15/2021 7.2 (H)   02/05/2021 8.3 (H)   01/21/2020 7.9 (H)   06/20/2019 7.1 (H)   10/16/2018 6.4 (H)   05/15/2018 6.5 (H)   10/18/2017 6.6 (H)   04/20/2016 6.5     Creatinine (mg/dL)   Date Value   11/07/2022 1.48 (H)   01/25/2022 1.55 (H)   05/17/2021 1.47 (H)   02/05/2021 1.44 (H)   01/21/2020 1.48 (H)   08/16/2019 1.42 (H)   06/20/2019 1.39 (H)   12/27/2018 1.51 (H)       ASSESSMENT:       ICD-10-CM    1. DJD (degenerative joint disease), ankle and foot, left  M19.072       2. 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 Post-traumatic osteoarthritis of multiple joints  M15.3       4. Pes valgus of left foot  Q66.6           PLAN:  Reviewed patient's chart in [facility].      12/29/2022   Obtained and interpreted radiographs    Fracture boot, WB to  tolerance in the boot and may rest without the fracture boot  compression during the day and off at night   RTC 3 weeks  Discussed risk of developing Charcot.    1/23/2023  Last DM shoes 2 years ago.  Ordered new DM shoes  Is almost pain free so start coming out of boot as tolerated and stay in DM shoes.  He was wearing slip on shoes and increased standing in shop likely cause this.   Follow-up once yearly for diabetic foot exam and if he is unable to progress out of the fracture boot.    3/13/2023  Patient does have donning aid for compression socks.    He has a $30 pay for PT and does not want to do that.  Offered it to him  Also offered injection if he would like  Follow-up as needed    Khadar García DPM        Again, thank you for allowing me to participate in the care of your patient.        Sincerely,        Khadar García DPM

## 2023-03-20 ENCOUNTER — ANTICOAGULATION THERAPY VISIT (OUTPATIENT)
Dept: ANTICOAGULATION | Facility: CLINIC | Age: 88
End: 2023-03-20

## 2023-03-20 ENCOUNTER — LAB (OUTPATIENT)
Dept: LAB | Facility: CLINIC | Age: 88
End: 2023-03-20
Payer: COMMERCIAL

## 2023-03-20 DIAGNOSIS — I82.90 VENOUS THROMBOSIS: ICD-10-CM

## 2023-03-20 DIAGNOSIS — D68.51 FACTOR V LEIDEN MUTATION (H): Primary | ICD-10-CM

## 2023-03-20 DIAGNOSIS — D68.52 PROTHROMBIN MUTATION (H): ICD-10-CM

## 2023-03-20 DIAGNOSIS — Z79.01 LONG TERM CURRENT USE OF ANTICOAGULANT THERAPY: ICD-10-CM

## 2023-03-20 LAB — INR BLD: 2.7 (ref 0.9–1.1)

## 2023-03-20 PROCEDURE — 36416 COLLJ CAPILLARY BLOOD SPEC: CPT

## 2023-03-20 PROCEDURE — 85610 PROTHROMBIN TIME: CPT

## 2023-03-20 NOTE — PROGRESS NOTES
ANTICOAGULATION MANAGEMENT     Robert Richard 87 year old male is on warfarin with therapeutic INR result. (Goal INR 2.0-3.0)    Recent labs: (last 7 days)     03/20/23  1037   INR 2.7*       ASSESSMENT       Source(s): Chart Review       Warfarin doses taken: Reviewed in chart    Diet: LM    New illness, injury, or hospitalization: No    Medication/supplement changes: None noted    Signs or symptoms of bleeding or clotting: No    Previous INR: Therapeutic last 2(+) visits    Additional findings: None         PLAN     Recommended plan for no diet, medication or health factor changes affecting INR     Dosing Instructions: Continue your current warfarin dose with next INR in 5 weeks       Summary  As of 3/20/2023    Full warfarin instructions:  2.5 mg every Mon, Fri; 5 mg all other days   Next INR check:  4/24/2023             Detailed voice message left for Carlos with dosing instructions and follow up date.     Contact [phone]  to schedule and with any changes, questions or concerns.     Education provided:     Please call back if any changes to your diet, medications or how you've been taking warfarin    Plan made per [facility] anticoagulation protocol    Neelima Arreaga RN  Anticoagulation Clinic  3/20/2023    _______________________________________________________________________     Anticoagulation Episode Summary     Current INR goal:  2.0-3.0   TTR:  73.9 % (1 y)   Target end date:  Indefinite   Send INR reminders to:  [facility]    Indications    Factor V Leiden mutation (H) [D68.51]  Venous thrombosis [I82.90]  Prothrombin mutation (H) [D68.52]  Long term current use of anticoagulant therapy [Z79.01]           Comments:           Anticoagulation Care Providers     Provider Role Specialty Phone number    Stiven Donahue MD Referring Internal Medicine [phone]    Nate Cifuentes DO Responsible Internal Medicine [phone]

## 2023-04-23 ENCOUNTER — HEALTH MAINTENANCE LETTER (OUTPATIENT)
Age: 88
End: 2023-04-23

## 2023-04-24 ENCOUNTER — LAB (OUTPATIENT)
Dept: LAB | Facility: CLINIC | Age: 88
End: 2023-04-24
Payer: COMMERCIAL

## 2023-04-24 ENCOUNTER — ANTICOAGULATION THERAPY VISIT (OUTPATIENT)
Dept: ANTICOAGULATION | Facility: CLINIC | Age: 88
End: 2023-04-24

## 2023-04-24 DIAGNOSIS — D68.52 PROTHROMBIN MUTATION (H): ICD-10-CM

## 2023-04-24 DIAGNOSIS — D68.51 FACTOR V LEIDEN MUTATION (H): Primary | ICD-10-CM

## 2023-04-24 DIAGNOSIS — Z79.01 LONG TERM CURRENT USE OF ANTICOAGULANT THERAPY: ICD-10-CM

## 2023-04-24 DIAGNOSIS — I82.90 VENOUS THROMBOSIS: ICD-10-CM

## 2023-04-24 LAB — INR BLD: 1.9 (ref 0.9–1.1)

## 2023-04-24 PROCEDURE — 36416 COLLJ CAPILLARY BLOOD SPEC: CPT

## 2023-04-24 PROCEDURE — 85610 PROTHROMBIN TIME: CPT

## 2023-04-24 NOTE — PROGRESS NOTES
ANTICOAGULATION MANAGEMENT     Robert Richard 87 year old male is on warfarin with subtherapeutic INR result. (Goal INR 2.0-3.0)    Recent labs: (last 7 days)     04/24/23  0921   INR 1.9*       ASSESSMENT       Source(s): Chart Review    Previous INR was Therapeutic last 2(+) visits    Medication, diet, health changes since last INR chart reviewed; none identified             PLAN     Recommended plan for no diet, medication or health factor changes affecting INR     Dosing Instructions: Continue your current warfarin dose with next INR in 2 weeks       Summary  As of 4/24/2023    Full warfarin instructions:  2.5 mg every Mon, Fri; 5 mg all other days   Next INR check:  5/8/2023             Detailed voice message left for Carlos with dosing instructions and follow up date.     Contact [phone]  to schedule and with any changes, questions or concerns.     Education provided:     Please call back if any changes to your diet, medications or how you've been taking warfarin    Plan made per [facility] anticoagulation protocol    Chantell Boston RN  Anticoagulation Clinic  4/24/2023    _______________________________________________________________________     Anticoagulation Episode Summary     Current INR goal:  2.0-3.0   TTR:  82.3 % (1 y)   Target end date:  Indefinite   Send INR reminders to:  [facility]    Indications    Factor V Leiden mutation (H) [D68.51]  Venous thrombosis [I82.90]  Prothrombin mutation (H) [D68.52]  Long term current use of anticoagulant therapy [Z79.01]           Comments:           Anticoagulation Care Providers     Provider Role Specialty Phone number    Stiven Donahue MD Referring Internal Medicine [phone]    Nate Cifuentes DO Responsible Internal Medicine [phone]

## 2023-04-28 DIAGNOSIS — I10 HYPERTENSION GOAL BP (BLOOD PRESSURE) < 140/90: ICD-10-CM

## 2023-05-01 RX ORDER — LOSARTAN POTASSIUM 100 MG/1
TABLET ORAL
Qty: 90 TABLET | Refills: 0 | Status: SHIPPED | OUTPATIENT
Start: 2023-05-01 | End: 2023-07-03

## 2023-05-01 NOTE — TELEPHONE ENCOUNTER
Pending Prescriptions:                       Disp   Refills    losartan (COZAAR) 100 MG tablet [Pharmacy *90 tab*0        Sig: Take 1 tablet by mouth once daily      Routing refill request to provider for review/approval because:  Labs out of range:  blood pressure, creatinine    Eli Damon RN on 5/1/2023 at 1:31 PM

## 2023-05-16 ENCOUNTER — DOCUMENTATION ONLY (OUTPATIENT)
Dept: ANTICOAGULATION | Facility: CLINIC | Age: 88
End: 2023-05-16
Payer: COMMERCIAL

## 2023-05-16 NOTE — PROGRESS NOTES
ANTICOAGULATION     Robert Lggeovany is overdue for INR check.      Left message for patient to call and schedule lab appointment as soon as possible. If returning call, please schedule.     Amina Bertrand RN

## 2023-05-24 ENCOUNTER — TELEPHONE (OUTPATIENT)
Dept: ANTICOAGULATION | Facility: CLINIC | Age: 88
End: 2023-05-24
Payer: COMMERCIAL

## 2023-05-24 NOTE — LETTER
[facility]  [address] [facility] [address]  Phone: [phone]  Fax: [phone]     May 24, 2023        Robert Richard  [address] [facility] [address]            Dear Robert,    You are currently under the care of [facility] for your warfarin (Coumadin , Jantoven ) therapy.  We are contacting you because our records show you were due for an INR on 5/8/23.    There are potentially serious risks when taking warfarin without careful monitoring and we want to make sure you are safely managed.  Routine lab monitoring is required for warfarin refills.     Please call [phone]  as soon as possible to schedule an appointment.  If there has been a change in your care or other concerns, please let us know so we can help and or update our records.     Sincerely,       [facility]

## 2023-05-24 NOTE — TELEPHONE ENCOUNTER
ANTICOAGULATION     Robert Richard is overdue for INR check.      Left message for patient to call and schedule lab appointment as soon as possible. If returning call, please schedule.  and Reminder letter sent    Camryn WILLS RN

## 2023-05-30 ENCOUNTER — LAB (OUTPATIENT)
Dept: LAB | Facility: CLINIC | Age: 88
End: 2023-05-30
Payer: COMMERCIAL

## 2023-05-30 ENCOUNTER — ANTICOAGULATION THERAPY VISIT (OUTPATIENT)
Dept: ANTICOAGULATION | Facility: CLINIC | Age: 88
End: 2023-05-30

## 2023-05-30 DIAGNOSIS — Z79.01 LONG TERM CURRENT USE OF ANTICOAGULANT THERAPY: ICD-10-CM

## 2023-05-30 DIAGNOSIS — I82.90 VENOUS THROMBOSIS: ICD-10-CM

## 2023-05-30 DIAGNOSIS — D68.52 PROTHROMBIN MUTATION (H): ICD-10-CM

## 2023-05-30 DIAGNOSIS — D68.51 FACTOR V LEIDEN MUTATION (H): Primary | ICD-10-CM

## 2023-05-30 LAB — INR BLD: 3.1 (ref 0.9–1.1)

## 2023-05-30 PROCEDURE — 36416 COLLJ CAPILLARY BLOOD SPEC: CPT

## 2023-05-30 PROCEDURE — 85610 PROTHROMBIN TIME: CPT

## 2023-05-30 NOTE — PROGRESS NOTES
ANTICOAGULATION MANAGEMENT     Robert Richard 87 year old male is on warfarin with supratherapeutic INR result. (Goal INR 2.0-3.0)    Recent labs: (last 7 days)     05/30/23  0740   INR 3.1*       ASSESSMENT       Source(s): Chart Review and Patient/Caregiver Call       Warfarin doses taken: Warfarin taken as instructed    Diet: Change in alcohol intake may be affecting INR. had some beer yesterday     Medication/supplement changes: None noted    New illness, injury, or hospitalization: No    Signs or symptoms of bleeding or clotting: No    Previous result: Subtherapeutic    Additional findings: None         PLAN     Recommended plan for temporary change(s) affecting INR     Dosing Instructions: Continue your current warfarin dose with next INR in 2 weeks       Summary  As of 5/30/2023    Full warfarin instructions:  2.5 mg every Mon, Fri; 5 mg all other days   Next INR check:  6/13/2023             Telephone call with Carlos who verbalizes understanding and agrees to plan    Lab visit scheduled    Education provided:     Symptom monitoring: monitoring for bleeding signs and symptoms    Plan made per [facility] anticoagulation protocol    Regina Luo RN  Anticoagulation Clinic  5/30/2023    _______________________________________________________________________     Anticoagulation Episode Summary     Current INR goal:  2.0-3.0   TTR:  84.3 % (1 y)   Target end date:  Indefinite   Send INR reminders to:  [facility]    Indications    Factor V Leiden mutation (H) [D68.51]  Venous thrombosis [I82.90]  Prothrombin mutation (H) [D68.52]  Long term current use of anticoagulant therapy [Z79.01]           Comments:           Anticoagulation Care Providers     Provider Role Specialty Phone number    Stiven Donahue MD Referring Internal Medicine [phone]    Nate Cifuentes DO Responsible Internal Medicine [phone]

## 2023-06-02 ENCOUNTER — HEALTH MAINTENANCE LETTER (OUTPATIENT)
Age: 88
End: 2023-06-02

## 2023-06-13 ENCOUNTER — ANTICOAGULATION THERAPY VISIT (OUTPATIENT)
Dept: ANTICOAGULATION | Facility: CLINIC | Age: 88
End: 2023-06-13

## 2023-06-13 ENCOUNTER — LAB (OUTPATIENT)
Dept: LAB | Facility: CLINIC | Age: 88
End: 2023-06-13
Payer: COMMERCIAL

## 2023-06-13 DIAGNOSIS — D68.52 PROTHROMBIN MUTATION (H): ICD-10-CM

## 2023-06-13 DIAGNOSIS — D68.51 FACTOR V LEIDEN MUTATION (H): Primary | ICD-10-CM

## 2023-06-13 DIAGNOSIS — Z79.01 LONG TERM CURRENT USE OF ANTICOAGULANT THERAPY: ICD-10-CM

## 2023-06-13 DIAGNOSIS — I82.90 VENOUS THROMBOSIS: ICD-10-CM

## 2023-06-13 LAB — INR BLD: 2.3 (ref 0.9–1.1)

## 2023-06-13 PROCEDURE — 36416 COLLJ CAPILLARY BLOOD SPEC: CPT

## 2023-06-13 PROCEDURE — 85610 PROTHROMBIN TIME: CPT

## 2023-06-13 NOTE — PROGRESS NOTES
ANTICOAGULATION MANAGEMENT     Robert Richard 87 year old male is on warfarin with therapeutic INR result. (Goal INR 2.0-3.0)    Recent labs: (last 7 days)     06/13/23  0733   INR 2.3*       ASSESSMENT       Source(s): Chart Review    Previous INR was Supratherapeutic    Medication, diet, health changes since last INR chart reviewed; none identified             PLAN     Recommended plan for no diet, medication or health factor changes affecting INR     Dosing Instructions: Continue your current warfarin dose with next INR in 3 weeks       Summary  As of 6/13/2023    Full warfarin instructions:  2.5 mg every Mon, Fri; 5 mg all other days   Next INR check:  7/5/2023             Detailed voice message left for Carlos with dosing instructions and follow up date.     Contact [phone]  to schedule and with any changes, questions or concerns.     Education provided:     Contact [phone]  with any changes, questions or concerns.     Plan made per [facility] anticoagulation protocol    Amina Bertrand RN  Anticoagulation Clinic  6/13/2023    _______________________________________________________________________     Anticoagulation Episode Summary     Current INR goal:  2.0-3.0   TTR:  83.9 % (1 y)   Target end date:  Indefinite   [facility]    Indications    Factor V Leiden mutation (H) [D68.51]  Venous thrombosis [I82.90]  Prothrombin mutation (H) [D68.52]  Long term current use of anticoagulant therapy [Z79.01]           Comments:           Anticoagulation Care Providers     Provider Role Specialty Phone number    Stiven Donahue MD Referring Internal Medicine [phone]    Nate Cifuentes DO Responsible Internal Medicine [phone]

## 2023-07-03 DIAGNOSIS — I10 HYPERTENSION GOAL BP (BLOOD PRESSURE) < 140/90: ICD-10-CM

## 2023-07-03 RX ORDER — LOSARTAN POTASSIUM 100 MG/1
TABLET ORAL
Qty: 90 TABLET | Refills: 0 | Status: SHIPPED | OUTPATIENT
Start: 2023-07-03 | End: 2023-10-31

## 2023-07-03 NOTE — TELEPHONE ENCOUNTER
"Requested Prescriptions   Pending Prescriptions Disp Refills    losartan (COZAAR) 100 MG tablet [Pharmacy Med Name: Losartan Potassium 100 MG Oral Tablet] 90 tablet 0     Sig: Take 1 tablet by mouth once daily       Angiotensin-II Receptors Failed - 7/3/2023  9:49 AM        Failed - Last blood pressure under 140/90 in past 12 months     BP Readings from Last 3 Encounters:   03/13/23 (!) 144/82   01/23/23 134/70   12/29/22 (!) 152/78                 Failed - Normal serum creatinine on file in past 12 months     Recent Labs   Lab Test 11/07/22  1708   CR 1.48*       Ok to refill medication if creatinine is low          Passed - Recent (12 mo) or future (30 days) visit within the authorizing provider's specialty     Patient has had an office visit with the authorizing provider or a provider within the authorizing providers department within the previous 12 mos or has a future within next 30 days. See \"Patient Info\" tab in inbasket, or \"Choose Columns\" in Meds & Orders section of the refill encounter.              Passed - Medication is active on med list        Passed - Patient is age 18 or older        Passed - Normal serum potassium on file in past 12 months     Recent Labs   Lab Test 11/07/22  1708   POTASSIUM 4.0                         "

## 2023-07-14 ENCOUNTER — TELEPHONE (OUTPATIENT)
Dept: ANTICOAGULATION | Facility: CLINIC | Age: 88
End: 2023-07-14
Payer: COMMERCIAL

## 2023-07-24 ENCOUNTER — ANTICOAGULATION THERAPY VISIT (OUTPATIENT)
Dept: ANTICOAGULATION | Facility: CLINIC | Age: 88
End: 2023-07-24

## 2023-07-24 ENCOUNTER — LAB (OUTPATIENT)
Dept: LAB | Facility: CLINIC | Age: 88
End: 2023-07-24
Payer: COMMERCIAL

## 2023-07-24 DIAGNOSIS — Z79.01 LONG TERM CURRENT USE OF ANTICOAGULANT THERAPY: ICD-10-CM

## 2023-07-24 DIAGNOSIS — I82.90 VENOUS THROMBOSIS: ICD-10-CM

## 2023-07-24 DIAGNOSIS — D68.52 PROTHROMBIN MUTATION (H): ICD-10-CM

## 2023-07-24 DIAGNOSIS — D68.51 FACTOR V LEIDEN MUTATION (H): Primary | ICD-10-CM

## 2023-07-24 LAB — INR BLD: 2.3 (ref 0.9–1.1)

## 2023-07-24 PROCEDURE — 85610 PROTHROMBIN TIME: CPT

## 2023-07-24 PROCEDURE — 36416 COLLJ CAPILLARY BLOOD SPEC: CPT

## 2023-07-24 NOTE — PROGRESS NOTES
ANTICOAGULATION MANAGEMENT     Robert Richard 87 year old male is on warfarin with therapeutic INR result. (Goal INR 2.0-3.0)    Recent labs: (last 7 days)     07/24/23  0840   INR 2.3*       ASSESSMENT     Source(s): Chart Review and Patient/Caregiver Call     Warfarin doses taken: Warfarin taken as instructed  Diet: No new diet changes identified  Medication/supplement changes: None noted  New illness, injury, or hospitalization: No  Signs or symptoms of bleeding or clotting: No  Previous result: Therapeutic last 2(+) visits  Additional findings: None       PLAN     Recommended plan for no diet, medication or health factor changes affecting INR     Dosing Instructions: Continue your current warfarin dose with next INR in 6 weeks       Summary  As of 7/24/2023      Full warfarin instructions:  2.5 mg every Mon, Fri; 5 mg all other days   Next INR check:  9/4/2023               Telephone call with Carlos who verbalizes understanding and agrees to plan and who agrees to plan and repeated back plan correctly    Patient offered & declined to schedule next visit    Education provided:   None required    Plan made per [facility] anticoagulation protocol    Neelima Arreaga RN  Anticoagulation Clinic  7/24/2023    _______________________________________________________________________     Anticoagulation Episode Summary       Current INR goal:  2.0-3.0   TTR:  89.5 % (1 y)   Target end date:  Indefinite   Send INR reminders to:  [facility]    Indications    Factor V Leiden mutation (H) [D68.51]  Venous thrombosis [I82.90]  Prothrombin mutation (H) [D68.52]  Long term current use of anticoagulant therapy [Z79.01]             Comments:               Anticoagulation Care Providers       Provider Role Specialty Phone number    Stiven Donahue MD Referring Internal Medicine [phone]    Nate Cifuentes DO Responsible Internal Medicine [phone]

## 2023-08-23 ENCOUNTER — PRE VISIT (OUTPATIENT)
Dept: NEUROSURGERY | Facility: CLINIC | Age: 88
End: 2023-08-23
Payer: COMMERCIAL

## 2023-08-23 NOTE — TELEPHONE ENCOUNTER
NEUROSURGERY- NEW PREVISIT PLANNING       Record Status/Location     Referring Provider  Self   Diagnosis Appt note New lower back pain   MRI (HEAD, NECK, SPINE) Na    CT Na    X-ray Na    INJECTION Pacs 12/31/12 XR Fluro Guided    PHYSICAL THERAPY Na    SURGERY Na

## 2023-08-24 NOTE — PROGRESS NOTES
ANTICOAGULATION FOLLOW-UP CLINIC VISIT    Patient Name:  Robert Richard  Date:  2018  Contact Type:  Face to Face    SUBJECTIVE:     Patient Findings     Positives:   No Problem Findings           OBJECTIVE    INR Protime   Date Value Ref Range Status   2018 2.1 (A) 0.86 - 1.14 Final       ASSESSMENT / PLAN  INR assessment THER    Recheck INR In: 6 WEEKS    INR Location Clinic      Anticoagulation Summary  As of 2018    INR goal:   2.0-3.0   TTR:   80.3 % (2.7 y)   INR used for dosin.1 (2018)   Warfarin maintenance plan:   2.5 mg (5 mg x 0.5) every Fri; 5 mg (5 mg x 1) all other days   Full warfarin instructions:   2.5 mg every Fri; 5 mg all other days   Weekly warfarin total:   32.5 mg   No change documented:   Raquel Pelletier RN   Plan last modified:   Raquel Pelletier RN (3/22/2016)   Next INR check:   2019   Target end date:       Indications    Factor V Leiden mutation (H) [D68.51]  Venous thrombosis [I82.90]  Prothrombin mutation (H) [D68.52]  Long-term (current) use of anticoagulants [Z79.01] [Z79.01]             Anticoagulation Episode Summary     INR check location:       Preferred lab:       Send INR reminders to:   [facility]ERI [facility]    Comments:   5 mg tablets, print out, BP, PM dose        Anticoagulation Care Providers     Provider Role Specialty Phone number    Jesús Cifuentesifford DO [facility] Internal Medicine [phone]            See the Encounter Report to view Anticoagulation Flowsheet and Dosing Calendar (Go to Encounters tab in chart review, and find the Anticoagulation Therapy Visit)    Dosage adjustment made based on physician directed care plan.      Raquel Pelletier RN                 
Abnormal Lactate: > 2

## 2023-08-28 ENCOUNTER — TELEPHONE (OUTPATIENT)
Dept: NEUROSURGERY | Facility: CLINIC | Age: 88
End: 2023-08-28
Payer: COMMERCIAL

## 2023-08-28 NOTE — TELEPHONE ENCOUNTER
Left message for patient to call and reschedule 8-31-23 appointment as the clinic has been cancelled due to provider has a family emergency.

## 2023-09-04 DIAGNOSIS — I10 HYPERTENSION GOAL BP (BLOOD PRESSURE) < 140/90: Primary | ICD-10-CM

## 2023-09-05 RX ORDER — ATENOLOL 50 MG/1
50 TABLET ORAL 2 TIMES DAILY
Qty: 180 TABLET | Refills: 3 | Status: SHIPPED | OUTPATIENT
Start: 2023-09-05 | End: 2024-01-26

## 2023-09-07 ENCOUNTER — OFFICE VISIT (OUTPATIENT)
Dept: NEUROSURGERY | Facility: CLINIC | Age: 88
End: 2023-09-07
Payer: COMMERCIAL

## 2023-09-07 ENCOUNTER — ANCILLARY PROCEDURE (OUTPATIENT)
Dept: GENERAL RADIOLOGY | Facility: CLINIC | Age: 88
End: 2023-09-07
Attending: NURSE PRACTITIONER
Payer: COMMERCIAL

## 2023-09-07 VITALS
BODY MASS INDEX: 35.81 KG/M2 | HEART RATE: 76 BPM | SYSTOLIC BLOOD PRESSURE: 136 MMHG | TEMPERATURE: 96.7 F | OXYGEN SATURATION: 94 % | WEIGHT: 246 LBS | DIASTOLIC BLOOD PRESSURE: 72 MMHG

## 2023-09-07 DIAGNOSIS — M54.50 CHRONIC MIDLINE LOW BACK PAIN WITHOUT SCIATICA: ICD-10-CM

## 2023-09-07 DIAGNOSIS — G89.29 CHRONIC MIDLINE LOW BACK PAIN WITHOUT SCIATICA: Primary | ICD-10-CM

## 2023-09-07 DIAGNOSIS — Z98.890 HX OF LUMBOSACRAL SPINE SURGERY: ICD-10-CM

## 2023-09-07 DIAGNOSIS — M54.50 CHRONIC MIDLINE LOW BACK PAIN WITHOUT SCIATICA: Primary | ICD-10-CM

## 2023-09-07 DIAGNOSIS — G89.29 CHRONIC MIDLINE LOW BACK PAIN WITHOUT SCIATICA: ICD-10-CM

## 2023-09-07 PROCEDURE — 72100 X-RAY EXAM L-S SPINE 2/3 VWS: CPT | Mod: TC | Performed by: RADIOLOGY

## 2023-09-07 PROCEDURE — 99203 OFFICE O/P NEW LOW 30 MIN: CPT | Performed by: NURSE PRACTITIONER

## 2023-09-07 ASSESSMENT — PAIN SCALES - GENERAL: PAINLEVEL: NO PAIN (0)

## 2023-09-07 NOTE — NURSING NOTE
"Robert Richard is a 88 year old male who presents for:  Chief Complaint   Patient presents with    Neurologic Problem     Low back pain, Weakness in bilateral legs.        Initial Vitals:  Pulse 76   Temp (!) 96.7  F (35.9  C) (Temporal)   Wt 246 lb (111.6 kg)   SpO2 94%   BMI 35.81 kg/m   Estimated body mass index is 35.81 kg/m  as calculated from the following:    Height as of 3/13/23: 5' 9.5\" (1.765 m).    Weight as of this encounter: 246 lb (111.6 kg).. Body surface area is 2.34 meters squared. BP completed using cuff size: large  No Pain (0)    Nursing Comments:     Coby Jeronimo    "

## 2023-09-07 NOTE — PROGRESS NOTES
[facility]  Neurosurgery Clinic Visit      CC: back pain    Primary care Provider: Stiven Donahue    Reason For Visit:   I was asked by self to consult on the patient for back pain.    HPI: Robert Richard is a 88 year old male with a history of lumbar decompression in 2008 who presents for evaluation of 1 year of symptoms. He describes a difficulty with balance and leg fatigue. He also reports midline low back pain as well. He states the symptoms have gradually gotten worse over the past year. No bowel/bladder complaints, radicular leg pain, or overt weakness. Uses a cane for ambulation.       Past Medical History:   Diagnosis Date    Basal cell carcinoma     Cancer (H)     DVT (deep venous thrombosis) (H) 11/2003    DVT (deep venous thrombosis) (H) 12/08    DVT (deep venous thrombosis) (H) 10/2010    Factor V Leiden (H)     Gout     Other and unspecified hyperlipidemia     Prostate cancer (H) 2003    prostatectomy     Restless leg syndrome     Unspecified essential hypertension        Past Medical History reviewed with patient during visit.    Past Surgical History:   Procedure Laterality Date    APPENDECTOMY  1950    JOINT REPLACEMENT  4/2011    R hip    LAMINECTOMY LUMBAR ONE LEVEL  12/2008    LAPAROSCOPIC HERNIORRHAPHY UMBILICAL N/A 1/3/2019    Procedure: laparoscopic umbilical hernia repair with mesh;  Surgeon: Jamal Thompson DO;  Location:  OR    [facility] REVISE TOTAL HIP REPLACEMENT  1/18/13    R hip     Past Surgical History reviewed with patient during visit.    Current Outpatient Medications   Medication    incobotulinumtoxin A (XEOMIN) 50 units SOLR solution    amoxicillin (AMOXIL) 500 MG capsule    ASPIRIN NOT PRESCRIBED (INTENTIONAL)    atenolol (TENORMIN) 50 MG tablet    betamethasone dipropionate (DIPROSONE) 0.05 % external ointment    Bioflavonoid Products (DOMINIK-C) TABS    blood glucose (NO BRAND SPECIFIED) lancets standard    blood glucose (NO BRAND SPECIFIED) test  strip    blood glucose monitoring (NO BRAND SPECIFIED) meter device kit    cyanocobalamin (VITAMIN B-12) 1000 MCG tablet    glipiZIDE (GLUCOTROL XL) 5 MG 24 hr tablet    ipratropium (ATROVENT) 0.06 % nasal spray    losartan (COZAAR) 100 MG tablet    multiple vitamin  tablet TABS    Multiple Vitamins-Minerals (MULTIVITAMIN ADULT PO)    order for DME    ORDER FOR DME    pramipexole (MIRAPEX) 1 MG tablet    sildenafil (VIAGRA) 100 MG tablet    spironolactone (ALDACTONE) 25 MG tablet    STATIN NOT PRESCRIBED (INTENTIONAL)    warfarin ANTICOAGULANT (COUMADIN) 5 MG tablet     No current facility-administered medications for this visit.       Allergies   Allergen Reactions    Lipitor [Atorvastatin Calcium]      Muscle pain, weakness    Pravastatin Other (See Comments)     muscle aches    Simvastatin Other (See Comments)     muscle aches       Social History     Socioeconomic History    Marital status: Single     Spouse name: None    Number of children: None    Years of education: None    Highest education level: None   Tobacco Use    Smoking status: Never    Smokeless tobacco: Never   Substance and Sexual Activity    Alcohol use: Yes     Alcohol/week: 0.0 standard drinks of alcohol     Comment: 1 drink every 1-2 weeks.    Drug use: No    Sexual activity: Yes     Partners: Female       Family History   Problem Relation Age of Onset    Prostate Cancer Paternal Grandfather          ROS: 10 point ROS neg other than the symptoms noted above in the HPI.    Vital Signs:   /72   Pulse 76   Temp (!) 96.7  F (35.9  C) (Temporal)   Wt 246 lb (111.6 kg)   SpO2 94%   BMI 35.81 kg/m        Examination:  Constitutional:  Alert, well nourished, NAD.  Memory: recent and remote memory   HEENT: Normocephalic, atraumatic.   Pulm:  Without shortness of breath   CV:  No pitting edema of BLE.      Neurological:  Awake  Alert  Oriented x 3  Speech clear    Motor exam:   Hip Flexion:                 Right: 5/5  Left:  5/5  Hip  Abduction:             Right:  5/5  Left:  5/5  Hip Adduction:             Right:  5/5  Left:  5/5  Plantar Flexion:           Right:  5/5  Left:  5/5  Dorsal Flexion:            Right:  5/5  Left:  5/5  EHL:                            Right:  5/5  Left:  5/5     Sensation normal to bilateral upper and lower extremities  Muscle tone to bilateral upper and lower extremities   Gait: Able to stand from a seated position. Normal non-antalgic, non-myelopathic gait.  Able to heel/toe walk without loss of balance    Lumbar examination reveals no tenderness of the spine or paraspinous muscles.  Hip height is symmetrical. Negative SI joint, sciatic notch or greater trochanteric tenderness to palpation bilaterally.  Straight leg raise is negative bilaterally.      Imaging:   No imaging to review.    Assessment/Plan:   Chronic midline low back pain without sciatica  Leg fatigue    Will proceed with lumbar xray and PT at this time. I will contact him with the lumbar xray results when they are available. Consideration for MRI will be dependent on XR imaging. He verbalized understanding and agreement.    Patient Instructions   -Lumbar xray to be completed today. I will contact you with the results and further recommendations.  -Physical therapy ordered. They will contact you to schedule.  -Please contact our clinic with questions or concerns at [phone].    Radha Lozano, [facility]  [address] [place] [id number]  Tel [phone]  Fax [phone]

## 2023-09-07 NOTE — PATIENT INSTRUCTIONS
-Lumbar xray to be completed today. I will contact you with the results and further recommendations.  -Physical therapy ordered. They will contact you to schedule.  -Please contact our clinic with questions or concerns at [phone].

## 2023-09-07 NOTE — LETTER
9/7/2023         RE: Robert Richard  [address] [facility] [address]        Dear Colleague,    Thank you for referring your patient, Robert Richard, to the [facility] NEUROSURGERY [facility]IC [place]. Please see a copy of my visit note below.    [facility] Neurosurgery  Neurosurgery Clinic Visit      CC: back pain    Primary care Provider: Stiven Donahue    Reason For Visit:   I was asked by self to consult on the patient for back pain.    HPI: Robert Richard is a 88 year old male with a history of lumbar decompression in 2008 who presents for evaluation of 1 year of symptoms. He describes a difficulty with balance and leg fatigue. He also reports midline low back pain as well. He states the symptoms have gradually gotten worse over the past year. No bowel/bladder complaints, radicular leg pain, or overt weakness. Uses a cane for ambulation.       Past Medical History:   Diagnosis Date     Basal cell carcinoma      Cancer (H)      DVT (deep venous thrombosis) (H) 11/2003     DVT (deep venous thrombosis) (H) 12/08     DVT (deep venous thrombosis) (H) 10/2010     Factor V Leiden (H)      Gout      Other and unspecified hyperlipidemia      Prostate cancer (H) 2003    prostatectomy      Restless leg syndrome      Unspecified essential hypertension        Past Medical History reviewed with patient during visit.    Past Surgical History:   Procedure Laterality Date     APPENDECTOMY  1950     JOINT REPLACEMENT  4/2011    R hip     LAMINECTOMY LUMBAR ONE LEVEL  12/2008     LAPAROSCOPIC HERNIORRHAPHY UMBILICAL N/A 1/3/2019    Procedure: laparoscopic umbilical hernia repair with mesh;  Surgeon: Jamal Thompson DO;  Location:  OR     [facility] REVISE TOTAL HIP REPLACEMENT  1/18/13    R hip     Past Surgical History reviewed with patient during visit.    Current Outpatient Medications   Medication     incobotulinumtoxin A (XEOMIN) 50 units SOLR solution     amoxicillin (AMOXIL) 500 MG capsule      ASPIRIN NOT PRESCRIBED (INTENTIONAL)     atenolol (TENORMIN) 50 MG tablet     betamethasone dipropionate (DIPROSONE) 0.05 % external ointment     Bioflavonoid Products (DOMINIK-C) TABS     blood glucose (NO BRAND SPECIFIED) lancets standard     blood glucose (NO BRAND SPECIFIED) test strip     blood glucose monitoring (NO BRAND SPECIFIED) meter device kit     cyanocobalamin (VITAMIN B-12) 1000 MCG tablet     glipiZIDE (GLUCOTROL XL) 5 MG 24 hr tablet     ipratropium (ATROVENT) 0.06 % nasal spray     losartan (COZAAR) 100 MG tablet     multiple vitamin  tablet TABS     Multiple Vitamins-Minerals (MULTIVITAMIN ADULT PO)     order for DME     ORDER FOR DME     pramipexole (MIRAPEX) 1 MG tablet     sildenafil (VIAGRA) 100 MG tablet     spironolactone (ALDACTONE) 25 MG tablet     STATIN NOT PRESCRIBED (INTENTIONAL)     warfarin ANTICOAGULANT (COUMADIN) 5 MG tablet     No current facility-administered medications for this visit.       Allergies   Allergen Reactions     Lipitor [Atorvastatin Calcium]      Muscle pain, weakness     Pravastatin Other (See Comments)     muscle aches     Simvastatin Other (See Comments)     muscle aches       Social History     Socioeconomic History     Marital status: Single     Spouse name: None     Number of children: None     Years of education: None     Highest education level: None   Tobacco Use     Smoking status: Never     Smokeless tobacco: Never   Substance and Sexual Activity     Alcohol use: Yes     Alcohol/week: 0.0 standard drinks of alcohol     Comment: 1 drink every 1-2 weeks.     Drug use: No     Sexual activity: Yes     Partners: Female       Family History   Problem Relation Age of Onset     Prostate Cancer Paternal Grandfather          ROS: 10 point ROS neg other than the symptoms noted above in the HPI.    Vital Signs:   /72   Pulse 76   Temp (!) 96.7  F (35.9  C) (Temporal)   Wt 246 lb (111.6 kg)   SpO2 94%   BMI 35.81 kg/m        Examination:  Constitutional:   Alert, well nourished, NAD.  Memory: recent and remote memory   HEENT: Normocephalic, atraumatic.   Pulm:  Without shortness of breath   CV:  No pitting edema of BLE.      Neurological:  Awake  Alert  Oriented x 3  Speech clear    Motor exam:   Hip Flexion:                 Right: 5/5  Left:  5/5  Hip Abduction:             Right:  5/5  Left:  5/5  Hip Adduction:             Right:  5/5  Left:  5/5  Plantar Flexion:           Right:  5/5  Left:  5/5  Dorsal Flexion:            Right:  5/5  Left:  5/5  EHL:                            Right:  5/5  Left:  5/5     Sensation normal to bilateral upper and lower extremities  Muscle tone to bilateral upper and lower extremities   Gait: Able to stand from a seated position. Normal non-antalgic, non-myelopathic gait.  Able to heel/toe walk without loss of balance    Lumbar examination reveals no tenderness of the spine or paraspinous muscles.  Hip height is symmetrical. Negative SI joint, sciatic notch or greater trochanteric tenderness to palpation bilaterally.  Straight leg raise is negative bilaterally.      Imaging:   No imaging to review.    Assessment/Plan:   Chronic midline low back pain without sciatica  Leg fatigue    Will proceed with lumbar xray and PT at this time. I will contact him with the lumbar xray results when they are available. Consideration for MRI will be dependent on XR imaging. He verbalized understanding and agreement.    Patient Instructions   -Lumbar xray to be completed today. I will contact you with the results and further recommendations.  -Physical therapy ordered. They will contact you to schedule.  -Please contact our clinic with questions or concerns at [phone].    Radha Lozano, [facility] Neurosurgery  [address] [place] [id number]  Tel [phone]  Fax [phone]      Again, thank you for allowing me to participate in the care of your patient.        Sincerely,        Radha FOOTE  Blake, NP

## 2023-09-08 ENCOUNTER — TELEPHONE (OUTPATIENT)
Dept: NEUROSURGERY | Facility: CLINIC | Age: 88
End: 2023-09-08
Payer: COMMERCIAL

## 2023-09-08 NOTE — TELEPHONE ENCOUNTER
Attempted to contact patient to discuss lumbar XR. Left message to return call. Lumbar xray shows anterolisthesis of L4 on L5 with severe disc space narrowing at L5-S1, moderate to severe disc space narrowing at L4-5, and moderate disc space narrowing with facet arthropathy. Would recommend lumbar MRI due to findings on XR.

## 2023-09-11 ENCOUNTER — TELEPHONE (OUTPATIENT)
Dept: NEUROSURGERY | Facility: CLINIC | Age: 88
End: 2023-09-11
Payer: COMMERCIAL

## 2023-09-11 ENCOUNTER — TELEPHONE (OUTPATIENT)
Dept: ANTICOAGULATION | Facility: CLINIC | Age: 88
End: 2023-09-11
Payer: COMMERCIAL

## 2023-09-12 ENCOUNTER — LAB (OUTPATIENT)
Dept: LAB | Facility: CLINIC | Age: 88
End: 2023-09-12
Payer: COMMERCIAL

## 2023-09-12 ENCOUNTER — ANTICOAGULATION THERAPY VISIT (OUTPATIENT)
Dept: ANTICOAGULATION | Facility: CLINIC | Age: 88
End: 2023-09-12

## 2023-09-12 DIAGNOSIS — Z79.01 LONG TERM CURRENT USE OF ANTICOAGULANT THERAPY: ICD-10-CM

## 2023-09-12 DIAGNOSIS — D68.52 PROTHROMBIN MUTATION (H): ICD-10-CM

## 2023-09-12 DIAGNOSIS — I82.90 VENOUS THROMBOSIS: ICD-10-CM

## 2023-09-12 DIAGNOSIS — D68.51 FACTOR V LEIDEN MUTATION (H): Primary | ICD-10-CM

## 2023-09-12 LAB — INR BLD: 2.9 (ref 0.9–1.1)

## 2023-09-12 PROCEDURE — 85610 PROTHROMBIN TIME: CPT

## 2023-09-12 PROCEDURE — 36416 COLLJ CAPILLARY BLOOD SPEC: CPT

## 2023-09-13 DIAGNOSIS — M43.16 ANTEROLISTHESIS OF LUMBAR SPINE: ICD-10-CM

## 2023-09-13 DIAGNOSIS — Z98.890 HX OF LUMBOSACRAL SPINE SURGERY: ICD-10-CM

## 2023-09-13 DIAGNOSIS — M54.50 CHRONIC MIDLINE LOW BACK PAIN WITHOUT SCIATICA: Primary | ICD-10-CM

## 2023-09-13 DIAGNOSIS — G89.29 CHRONIC MIDLINE LOW BACK PAIN WITHOUT SCIATICA: Primary | ICD-10-CM

## 2023-09-13 NOTE — TELEPHONE ENCOUNTER
Patient returning call from provider regarding results of recent imaging. Patient stated they had been leaving messages back and forth. Patient was asked if there was a good time to reach patient. Patient stated he is typically available anytime.

## 2023-09-13 NOTE — TELEPHONE ENCOUNTER
Returned patient call. Discussed lumbar xray results. Will proceed with lumbar MRI at this time. He verbalized understanding and agreement.

## 2023-09-18 DIAGNOSIS — Z86.718 PERSONAL HISTORY OF VENOUS THROMBOSIS AND EMBOLISM: ICD-10-CM

## 2023-09-18 RX ORDER — WARFARIN SODIUM 5 MG/1
TABLET ORAL
Qty: 90 TABLET | Refills: 1 | Status: SHIPPED | OUTPATIENT
Start: 2023-09-18 | End: 2024-01-26

## 2023-09-18 NOTE — TELEPHONE ENCOUNTER
ANTICOAGULATION MANAGEMENT:  Medication Refill    Anticoagulation Summary  As of 9/12/2023      Warfarin maintenance plan:  2.5 mg (5 mg x 0.5) every Mon, Fri; 5 mg (5 mg x 1) all other days   Next INR check:  10/24/2023   Target end date:  Indefinite    Indications    Factor V Leiden mutation (H) [D68.51]  Venous thrombosis [I82.90]  Prothrombin mutation (H) [D68.52]  Long term current use of anticoagulant therapy [Z79.01]                 Anticoagulation Care Providers       Provider Role Specialty Phone number    Stiven Donahue MD Referring Internal Medicine [phone]    Nate Cifuentes DO Responsible Internal Medicine [phone]            Refill Criteria    Visit with referring provider/group: Meets criteria: office visit within referring provider group in the last 1 year on 11/7/22    [facility] referral signed last signed: 01/23/2023; within last year: Yes    Lab monitoring not exceeding 2 weeks overdue: Yes    Robert meets all criteria for refill. Rx instructions and quantity match patient's current dosing plan. Warfarin 90 day supply with 1 refill granted per [facility] protocol     Amina Bertrand, RN  Anticoagulation Clinic

## 2023-09-21 ENCOUNTER — HOSPITAL ENCOUNTER (OUTPATIENT)
Dept: MRI IMAGING | Facility: CLINIC | Age: 88
Discharge: HOME | End: 2023-09-21
Attending: NURSE PRACTITIONER | Admitting: NURSE PRACTITIONER
Payer: COMMERCIAL

## 2023-09-21 DIAGNOSIS — Z98.890 HX OF LUMBOSACRAL SPINE SURGERY: ICD-10-CM

## 2023-09-21 DIAGNOSIS — M54.50 CHRONIC MIDLINE LOW BACK PAIN WITHOUT SCIATICA: ICD-10-CM

## 2023-09-21 DIAGNOSIS — M43.16 ANTEROLISTHESIS OF LUMBAR SPINE: ICD-10-CM

## 2023-09-21 DIAGNOSIS — G89.29 CHRONIC MIDLINE LOW BACK PAIN WITHOUT SCIATICA: ICD-10-CM

## 2023-09-21 PROCEDURE — 72148 MRI LUMBAR SPINE W/O DYE: CPT

## 2023-09-22 ENCOUNTER — TELEPHONE (OUTPATIENT)
Dept: NEUROSURGERY | Facility: CLINIC | Age: 88
End: 2023-09-22
Payer: COMMERCIAL

## 2023-09-22 NOTE — TELEPHONE ENCOUNTER
Contacted patient to discuss lumbar MRI results. Will proceed with PT and if not improved, would recommend follow up with Dr. Cleary in clinic to discuss other options. He verbalized understanding and agreement.

## 2023-10-02 ENCOUNTER — THERAPY VISIT (OUTPATIENT)
Dept: PHYSICAL THERAPY | Facility: CLINIC | Age: 88
End: 2023-10-02
Attending: NURSE PRACTITIONER
Payer: COMMERCIAL

## 2023-10-02 DIAGNOSIS — G89.29 CHRONIC MIDLINE LOW BACK PAIN WITHOUT SCIATICA: Primary | ICD-10-CM

## 2023-10-02 DIAGNOSIS — Z98.890 HX OF LUMBOSACRAL SPINE SURGERY: ICD-10-CM

## 2023-10-02 DIAGNOSIS — M54.50 CHRONIC MIDLINE LOW BACK PAIN WITHOUT SCIATICA: Primary | ICD-10-CM

## 2023-10-02 PROCEDURE — 97161 PT EVAL LOW COMPLEX 20 MIN: CPT | Mod: GP | Performed by: PHYSICAL THERAPIST

## 2023-10-02 PROCEDURE — 97112 NEUROMUSCULAR REEDUCATION: CPT | Mod: GP | Performed by: PHYSICAL THERAPIST

## 2023-10-02 NOTE — PROGRESS NOTES
PHYSICAL THERAPY EVALUATION  Type of Visit: Evaluation    See electronic medical record for Abuse and Falls Screening details.    Subjective Pt reports ongoing low back pain and feelings of weakness and imbalance that has progressed gradually over the past few years. Pt has history of Lumbar decompression in 2008 which resolved his radicular symptoms into his L leg.  Pt has avoided golf due to imbalance and L LE weakness. Pt reports his symptoms are more of a discomfort and main concern is more his balance and strength.  Pt reports history of multiple revisions of R RAMANA due to components wearing out.  Pt had done PT for 2.5 years with gradual improvements to a certain point.  Pt currently walking with a SEC due to imbalance.          Presenting condition or subjective complaint: Lower back and hip  Date of onset: 10/02/21    Relevant medical history: Cancer; DVT (blood clot); Hearing problems; High blood pressure; Overweight; Sleep disorder like apnea   Dates & types of surgery: Appendectomy 1950, R RAMANA, back surgery 2008.    Prior diagnostic imaging/testing results: MRI     Prior therapy history for the same diagnosis, illness or injury: Yes        Living Environment  Social support: With a significant other or spouse   Type of home: [address] [place] [address]; [facility]   Stairs to enter the home: No   Is there a railing: No   Ramp: No   Stairs inside the home: No   Is there a railing: No   Help at home: None  Equipment owned: Straight Cane; Four-point cane; Walker with wheels     Employment: No    Hobbies/Interests: golf, hunting, fishing, wood work, wood carving.    Patient goals for therapy: 1/23    Pain assessment: Pain present     Objective   LUMBAR SPINE EVALUATION  PAIN: Pain Level at Rest: 1/10  Pain Level with Use: 3/10  Pain Location: lumbar spine, hip, knee, and ankle  Pain Quality: Aching and Dull  Pain Frequency: intermittent  Pain is Worst: daytime  Pain is Exacerbated By: walking, sitting, sitting in chair with  compression on backs of legs.  Pain is Relieved By: otc medications and rest  Pain Progression: Unchanged  INTEGUMENTARY (edema, incisions): WNL  POSTURE: Sitting Posture: Rounded shoulders, Forward head  GAIT:   Weightbearing Status: WBAT  Assistive Device(s): Cane (single end)  Gait Deviations: Base of support increased  BALANCE/PROPRIOCEPTION:  Wilkes balance: 30/56  WEIGHTBEARING ALIGNMENT: WNL  NON-WEIGHTBEARING ALIGNMENT: WNL   ROM: Lumbar spine: 60%, Lumbar extension 30%  PELVIC/SI SCREEN:  NA  STRENGTH: WNL  MYOTOMES: WNL  DTR S: WNL  CORD SIGNS: WNL  DERMATOMES: WNL  NEURAL TENSION: Lumbar WNL  FLEXIBILITY:  Restricted through B LE's with calves and HS.   LUMBAR/HIP Special Tests:  NA    PELVIS/SI SPECIAL TESTS: WNL  FUNCTIONAL TESTS:  NA  PALPATION: WNL  Wilkes balance: 30/56.    Assessment & Plan   CLINICAL IMPRESSIONS  Medical Diagnosis: Chronic midline low back pain without sciatica, history of lumbosacral surgery.    Treatment Diagnosis: Chronic midline low back pain without sciatica, history of lumbosacral surgery.   Impression/Assessment: Patient is a 88 year old male with Low back pain, LE weakness, and imbalance complaints.  The following significant findings have been identified: Pain, Decreased ROM/flexibility, Decreased joint mobility, Decreased strength, Impaired balance, and Decreased proprioception. These impairments interfere with their ability to perform recreational activities, household mobility, and community mobility as compared to previous level of function.     Clinical Decision Making (Complexity):  Clinical Presentation: Stable/Uncomplicated  Clinical Presentation Rationale: based on medical and personal factors listed in PT evaluation  Clinical Decision Making (Complexity): Low complexity    PLAN OF CARE  Treatment Interventions:  Interventions: Gait Training, Manual Therapy, Neuromuscular Re-education, Therapeutic Activity, Therapeutic Exercise    Long Term Goals     PT Goal 1  Goal  Identifier: HEP  Goal Description: Pt will establish an HEP to address balance and walking activity tolerance in order to improve tolerance to recreational activities such as golf.  Target Date: 11/27/23  PT Goal 2  Goal Identifier: TUG  Goal Description: Pt will demonstrate <14 seconds  with least restrictive AD on TUG in order to demonstrate improved walking speed and reduced falls risk.  Target Date: 11/27/23      Frequency of Treatment: 1x/week  Duration of Treatment: 8 weeks    Recommended Referrals to Other Professionals: None  Education Assessment:   Learner/Method: Patient;Demonstration;Pictures/Video;No Barriers to Learning  Education Comments: HEP, AD use    Risks and benefits of evaluation/treatment have been explained.   Patient/Family/caregiver agrees with Plan of Care.     Evaluation Time:     PT Eval, Low Complexity Minutes (63554): 35     Signing Clinician: Enrico Mulligan PT      [facility]                                                                                   OUTPATIENT PHYSICAL THERAPY      PLAN OF TREATMENT FOR OUTPATIENT REHABILITATION   Patient's Last Name, First Name, Robert Camp    YOB: 1935   Provider's Name   [facility]   Medical Record No.  [id number]     Onset Date: 10/02/21  Start of Care Date: 10/02/23     Medical Diagnosis:  Chronic midline low back pain without sciatica, history of lumbosacral surgery.      PT Treatment Diagnosis:  Chronic midline low back pain without sciatica, history of lumbosacral surgery. Plan of Treatment  Frequency/Duration: 1x/week/ 8 weeks    Certification date from 10/02/23 to 11/27/23         See note for plan of treatment details and functional goals     Enrico Mulligan, PT                         I CERTIFY THE NEED FOR THESE SERVICES FURNISHED UNDER        THIS PLAN OF TREATMENT AND WHILE UNDER MY CARE .             Physician Signature                Date    X_____________________________________________________                    Referring Provider:  Radha Lozano NP      Initial Assessment  See Epic Evaluation- Start of Care Date: 10/02/23

## 2023-10-09 ENCOUNTER — THERAPY VISIT (OUTPATIENT)
Dept: PHYSICAL THERAPY | Facility: CLINIC | Age: 88
End: 2023-10-09
Attending: NURSE PRACTITIONER
Payer: COMMERCIAL

## 2023-10-09 DIAGNOSIS — Z98.890 HX OF LUMBOSACRAL SPINE SURGERY: ICD-10-CM

## 2023-10-09 DIAGNOSIS — M54.50 LOW BACK PAIN: Primary | ICD-10-CM

## 2023-10-09 PROCEDURE — 97110 THERAPEUTIC EXERCISES: CPT | Mod: GP | Performed by: PHYSICAL THERAPIST

## 2023-10-09 PROCEDURE — 97112 NEUROMUSCULAR REEDUCATION: CPT | Mod: GP | Performed by: PHYSICAL THERAPIST

## 2023-10-16 ENCOUNTER — THERAPY VISIT (OUTPATIENT)
Dept: PHYSICAL THERAPY | Facility: CLINIC | Age: 88
End: 2023-10-16
Attending: NURSE PRACTITIONER
Payer: COMMERCIAL

## 2023-10-16 DIAGNOSIS — M54.50 CHRONIC MIDLINE LOW BACK PAIN WITHOUT SCIATICA: Primary | ICD-10-CM

## 2023-10-16 DIAGNOSIS — Z98.890 HX OF LUMBOSACRAL SPINE SURGERY: ICD-10-CM

## 2023-10-16 DIAGNOSIS — G89.29 CHRONIC MIDLINE LOW BACK PAIN WITHOUT SCIATICA: Primary | ICD-10-CM

## 2023-10-16 PROCEDURE — 97110 THERAPEUTIC EXERCISES: CPT | Mod: GP

## 2023-10-16 PROCEDURE — 97112 NEUROMUSCULAR REEDUCATION: CPT | Mod: GP

## 2023-10-23 ENCOUNTER — THERAPY VISIT (OUTPATIENT)
Dept: PHYSICAL THERAPY | Facility: CLINIC | Age: 88
End: 2023-10-23
Attending: NURSE PRACTITIONER
Payer: COMMERCIAL

## 2023-10-23 DIAGNOSIS — M54.50 CHRONIC MIDLINE LOW BACK PAIN WITHOUT SCIATICA: Primary | ICD-10-CM

## 2023-10-23 DIAGNOSIS — Z98.890 HX OF LUMBOSACRAL SPINE SURGERY: ICD-10-CM

## 2023-10-23 DIAGNOSIS — G89.29 CHRONIC MIDLINE LOW BACK PAIN WITHOUT SCIATICA: Primary | ICD-10-CM

## 2023-10-23 PROCEDURE — 97112 NEUROMUSCULAR REEDUCATION: CPT | Mod: GP

## 2023-10-23 PROCEDURE — 97110 THERAPEUTIC EXERCISES: CPT | Mod: GP

## 2023-10-30 ENCOUNTER — THERAPY VISIT (OUTPATIENT)
Dept: PHYSICAL THERAPY | Facility: CLINIC | Age: 88
End: 2023-10-30
Attending: NURSE PRACTITIONER
Payer: COMMERCIAL

## 2023-10-30 DIAGNOSIS — I10 HYPERTENSION GOAL BP (BLOOD PRESSURE) < 140/90: ICD-10-CM

## 2023-10-30 DIAGNOSIS — Z98.890 HX OF LUMBOSACRAL SPINE SURGERY: ICD-10-CM

## 2023-10-30 DIAGNOSIS — M54.50 CHRONIC MIDLINE LOW BACK PAIN WITHOUT SCIATICA: Primary | ICD-10-CM

## 2023-10-30 DIAGNOSIS — G89.29 CHRONIC MIDLINE LOW BACK PAIN WITHOUT SCIATICA: Primary | ICD-10-CM

## 2023-10-30 PROCEDURE — 97110 THERAPEUTIC EXERCISES: CPT | Mod: GP

## 2023-10-30 PROCEDURE — 97112 NEUROMUSCULAR REEDUCATION: CPT | Mod: GP

## 2023-10-31 RX ORDER — LOSARTAN POTASSIUM 100 MG/1
TABLET ORAL
Qty: 90 TABLET | Refills: 0 | Status: SHIPPED | OUTPATIENT
Start: 2023-10-31 | End: 2024-01-26

## 2023-11-06 ENCOUNTER — THERAPY VISIT (OUTPATIENT)
Dept: PHYSICAL THERAPY | Facility: CLINIC | Age: 88
End: 2023-11-06
Attending: NURSE PRACTITIONER
Payer: COMMERCIAL

## 2023-11-06 DIAGNOSIS — G89.29 CHRONIC MIDLINE LOW BACK PAIN WITHOUT SCIATICA: Primary | ICD-10-CM

## 2023-11-06 DIAGNOSIS — M54.50 CHRONIC MIDLINE LOW BACK PAIN WITHOUT SCIATICA: Primary | ICD-10-CM

## 2023-11-06 DIAGNOSIS — Z98.890 HX OF LUMBOSACRAL SPINE SURGERY: ICD-10-CM

## 2023-11-06 PROCEDURE — 97112 NEUROMUSCULAR REEDUCATION: CPT | Mod: GP

## 2023-11-06 PROCEDURE — 97110 THERAPEUTIC EXERCISES: CPT | Mod: GP

## 2023-11-07 ENCOUNTER — TELEPHONE (OUTPATIENT)
Dept: ANTICOAGULATION | Facility: CLINIC | Age: 88
End: 2023-11-07
Payer: COMMERCIAL

## 2023-11-07 NOTE — TELEPHONE ENCOUNTER
ANTICOAGULATION     Robertdagoberto Richard is overdue for an INR check.     Left message for patient to call and schedule lab appointment as soon as possible. If returning call, please schedule.     Amina Bertrand RN

## 2023-11-09 ENCOUNTER — ANTICOAGULATION THERAPY VISIT (OUTPATIENT)
Dept: ANTICOAGULATION | Facility: CLINIC | Age: 88
End: 2023-11-09

## 2023-11-09 ENCOUNTER — LAB (OUTPATIENT)
Dept: LAB | Facility: CLINIC | Age: 88
End: 2023-11-09
Payer: COMMERCIAL

## 2023-11-09 DIAGNOSIS — I82.90 VENOUS THROMBOSIS: ICD-10-CM

## 2023-11-09 DIAGNOSIS — Z79.01 LONG TERM CURRENT USE OF ANTICOAGULANT THERAPY: ICD-10-CM

## 2023-11-09 DIAGNOSIS — D68.51 FACTOR V LEIDEN MUTATION (H): Primary | ICD-10-CM

## 2023-11-09 DIAGNOSIS — D68.52 PROTHROMBIN MUTATION (H): ICD-10-CM

## 2023-11-09 LAB — INR BLD: 3 (ref 0.9–1.1)

## 2023-11-09 PROCEDURE — 85610 PROTHROMBIN TIME: CPT

## 2023-11-09 PROCEDURE — 36416 COLLJ CAPILLARY BLOOD SPEC: CPT

## 2023-11-09 NOTE — PROGRESS NOTES
ANTICOAGULATION MANAGEMENT     Robert Richard 88 year old male is on warfarin with therapeutic INR result. (Goal INR 2.0-3.0)    Recent labs: (last 7 days)     11/09/23  1401   INR 3.0*       ASSESSMENT     Source(s): Chart Review  Previous INR was Therapeutic last 2(+) visits  Medication, diet, health changes since last INR chart reviewed; none identified         PLAN     Recommended plan for no diet, medication or health factor changes affecting INR     Dosing Instructions: Continue your current warfarin dose with next INR in 6 weeks       Summary  As of 11/9/2023      Full warfarin instructions:  2.5 mg every Mon, Fri; 5 mg all other days   Next INR check:  12/21/2023               Detailed voice message left for Carlos with dosing instructions and follow up date.     Contact [phone]  to schedule and with any changes, questions or concerns.     Education provided:   Contact [phone]  with any changes, questions or concerns.     Plan made per [facility] anticoagulation protocol    Amina Bertrand RN  Anticoagulation Clinic  11/9/2023    _______________________________________________________________________     Anticoagulation Episode Summary       Current INR goal:  2.0-3.0   TTR:  96.7% (1 y)   Target end date:  Indefinite   Send INR reminders to:  [facility]    Indications    Factor V Leiden mutation (H24) [D68.51]  Venous thrombosis [I82.90]  Prothrombin mutation (H24) [D68.52]  Long term current use of anticoagulant therapy [Z79.01]             Comments:               Anticoagulation Care Providers       Provider Role Specialty Phone number    Stiven Donahue MD Referring Internal Medicine [phone]    Nate Cifuentes DO Responsible Internal Medicine [phone]

## 2023-11-15 ENCOUNTER — HOSPITAL ENCOUNTER (OUTPATIENT)
Dept: WOUND CARE | Facility: CLINIC | Age: 88
Discharge: HOME | End: 2023-11-15
Attending: INTERNAL MEDICINE | Admitting: INTERNAL MEDICINE
Payer: COMMERCIAL

## 2023-11-15 DIAGNOSIS — D68.52 PROTHROMBIN MUTATION (H): Primary | ICD-10-CM

## 2023-11-15 PROCEDURE — 29580 STRAPPING UNNA BOOT: CPT

## 2023-11-15 PROCEDURE — G0463 HOSPITAL OUTPT CLINIC VISIT: HCPCS | Mod: 25

## 2023-11-15 NOTE — DISCHARGE INSTRUCTIONS
Today the wound to your inner left ankle is a venous ulcer as you have had in the past.  No other sites of wounds noted.    We applied the Unna's boot compression wrap to the left leg today and will re evaluate it on Monday of next week 11/20/23 at 2 pm.    Call with any concerns or question.  [phone].    Reji Malhotra RN [email or web address]

## 2023-11-15 NOTE — PROGRESS NOTES
[facility] Wound Clinic [facility]    Start of Care in [facility] Wound Clinic: 11/15/2023   Referring Provider: Godfrey Saleem MD  Primary Care Provider: Stiven Donahue   Wound Location: Left medial ankle     Wound Clinic Visit: Initial return visit today 11/15/23.    Reason for Visit:  Open venous ulcer.     Subjective:  On arrival today 11/15/23 the pt adds to the Wound History listed below.     Wound History: Pt well known to the Wound and Ostomy clinic here at [facility] from past visits.  Woney BROOKE RN cwocn has cared for the pt for a series of visits in October and November of 2019 for Left medial lower leg/ankle venous ulcers and again July and August of 2021 for right medial lower leg/ankle venous ulcers. During those series of visits the pt was primarily treated with Unna's boot compression to the affected leg with weekly changes, both the sites healed slowly but were intact for the past few years.  Pt reports today 11/15/23 on his initial return visit that the left inner ankle area started to give him new troubles in the spring of 2023.  Site would crust over and then some of the crust would come off and there would be some weeping.  He treated the site with  moisturizers and it remains pretty stable.  However in early/mid October the sites scab area slowly increased in size.  Pt has a past medical history which includes four episodes of DVT, he is anticoagulated INR values therapeutic and stable.  He in the past underwent venous ablation procedures with Dr Gio NUNN.      Past Medical History:  Patient Active Problem List   Diagnosis    Venous thrombosis    Restless leg syndrome    Sleep apnea    Factor V Leiden mutation (H24)    Prothrombin mutation (H24)    Back pain    Hyperlipidemia LDL goal <100    Gout    Malignant basal cell neoplasm of skin    Advanced directives, counseling/discussion    Low back pain    Personal history of venous thrombosis and embolism    Hip joint replacement  status - right    Abnormal gait    Obesity    Personal history of prostate cancer    Spinal stenosis    Long term current use of anticoagulant therapy    Essential hypertension    Morbid obesity due to excess calories (H)    Diabetes mellitus, type 2 (H)    CKD (chronic kidney disease) stage 3, GFR 30-59 ml/min (H)    Asymmetrical sensorineural hearing loss    Dyslipidemia    Hemifacial spasm    Osteoarthritis of hip    Subjective tinnitus    Venous stasis dermatitis of right lower extremity    Hx of lumbosacral spine surgery                Tobacco Use:     Tobacco Use      Smoking status: Never      Smokeless tobacco: Never     Diabetic: Type 2  HgbA1C:   Hemoglobin A1C   Date Value Ref Range Status   11/07/2022 6.9 (H) 0.0 - 5.6 % Final     Comment:     Normal <5.7%   Prediabetes 5.7-6.4%    Diabetes 6.5% or higher     Note: Adopted from ADA consensus guidelines.   06/15/2021 7.2 (H) 0 - 5.6 % Final     Comment:     Normal <5.7% Prediabetes 5.7-6.4%  Diabetes 6.5% or higher - adopted from ADA   consensus guidelines.     Checks Blood Glucose?:  No.  Average Readings: NA    Personal/social history:  Pt lives independently, is active and a daily golfer this past summer.    Objective:   Current treatment plan: pt is cleansing and leaving open to air  Last changed: NA    Wound #1 Left medial ankle, venous stasis ulcer.  Stage/tissue depth: partial thickness  3.5 cm L x 3.5 cm W x 0 cm D  Tunneling: none  Undermining: none  Wound bed type/amount: approximately 20 % open red nongranular tissue and 80 % mix of scabs of dried drainage and hyperkeratotic tissue; Not fluctuant  Wound Edges: NA no current depth.  Periwound: edema, dry skin, deep hemosiderin staining.  Drainage: small amounts serous  Odor: no  Pain: 2/10 with direct cares, feet are hypersensitive per pt.    Photo's from today's visit 11/15/23, initial return visit to Wound and Ostomy clinic.   Photo's L - R, left medial ankle, left lateral lower leg left  medial lower leg, left dorsal foot.    Dorsalis Pedal Pulse: weak but palpable: NA doppler: NA phasic  Posterior Tibial Pulse: unable to palpate: NA doppler: NA phasic  Hair growth: diminished knee distally  Capillary Refill: 3 to 4 seconds  Feet/toes color: hemosiderin staining, hammer toes present  Nails: wnl  R Leg: Edema Not assessed this visit. Ankle circumference NA cm. Calf circumference NA cm.  L Leg: Edema plus 2 pitting in ankle and foot, nonpitting firm woody edema in the lower leg. Ankle circumference 38 cm. Calf circumference 45 cm.    Mobility: with single pointed cane is independent, slow with altered gait.    Current offloading/footwear: large diabetic shoes  Sensation: peripheral neuropathy and hypersensitivity    Other callusing/areas of concern: None noted    Diet: Regular    Assessment:  Pt has an area of the left medial ankle consistent with venous dermatitis and small open ulcerations where the hyperkeratotic tissue and dried drainage has slit.  No signs of infection noted.  Very deep hemosiderin staining present about the entire right and lower leg lowers and the left foot, right foot not assessed today.  Overall the is noted to be slower moving with more difficulty and a more altered gait than I have noticed in our past visits and since then when we have seen each other in passing her at [facility].    Factors impacting wound healing:   Poor nutrition: inadequate supply of protein, carbohydrates, fatty acids, and trace elements essential for all phases of wound healing.  Reminder today on need for increased protein in diet for healing.    Delayed healing as part of normal aging process  Reduced Blood Supply: inadequate perfusion to heal wound  Medication: None, not currently taking prednisone as of initial return visit 11/15/23.  Chemotherapy: suppresses the immune system and inflammatory response, NA  Radiotherapy: increases production of free radical which damage cells, NA  Psychological  stress: none noted  Obesity: decreases tissue perfusion  Infection: prolongs inflammatory phase, uses vital nutrients, impairs epithelialization and releases toxins, none noted  Underlying Disease: diabetes mellitis well controlled, venous insufficiency.   Maceration: reduces wound tensile strength and inhibits epithelial migration, none noted  Patient compliance, has been compliant in all past encounters for wound and compression care.  Unrelieved pressure, NA  Immobility, limited but not immobile  Substance abuse: NA    Plan:  Today we discussed options and pt is in agreement have Unna's boot compression applied to the left lower leg and foot as described below.  He will keep the wrap in place, keep dry and return in just under one week for reevaluation and cares.  Instructed the pt that if the wrap become painful, slides down, feels like it is cutting into his skin or gets very wet, to contact the wound and ostomy clinic for work in visit.  Over the weekend when clinic is closed if he is able to have the wrap removed that would be the best option or to seek emergent cares if needed.    Lower left leg and foot washed with soap and water, wound site rinsed with saline, all dried well with gauze.  Unna's boot visco paste wrap applied spiral fashion from distal foot to just below the knee.  (I do not see a need for Silvercel or other direct topical primary dressing for the wound site today).  Kerlix roll gauze applied over Unna's boot.  Ace wrap with compression applied figure 8 fashion from distal foot to just below the knee.     Discussed/Education provided: plan of care with rationale;     Topical care: Wound/surrounding skin cleansed with soap and water and saline and gauze. Patted dry. See Plan for care provided today.    Pt is unable to perform the above compression/wound cares, all Unna's boot applications will be done in the Wound and Ostomy clinic.    Additional recommendations: None at this time.    The  following discharge instructions were reviewed with and sent home with the patient:  See AVS    The following supplies were sent home with the patient:  None    Return visit: 11/20/23.    Verbal, written, & demonstrative education provided.  Face to face time: approximately 30 minutes  Procedure: approximately 4 minutes initial application of an Unna's boot compression wrap to the left lower leg and foot.    Reji Malhotra RN, [facility]  [phone]

## 2023-11-17 ENCOUNTER — THERAPY VISIT (OUTPATIENT)
Dept: PHYSICAL THERAPY | Facility: CLINIC | Age: 88
End: 2023-11-17
Attending: NURSE PRACTITIONER
Payer: COMMERCIAL

## 2023-11-17 DIAGNOSIS — Z98.890 HX OF LUMBOSACRAL SPINE SURGERY: ICD-10-CM

## 2023-11-17 DIAGNOSIS — M54.50 CHRONIC MIDLINE LOW BACK PAIN WITHOUT SCIATICA: Primary | ICD-10-CM

## 2023-11-17 DIAGNOSIS — G89.29 CHRONIC MIDLINE LOW BACK PAIN WITHOUT SCIATICA: Primary | ICD-10-CM

## 2023-11-17 PROCEDURE — 97110 THERAPEUTIC EXERCISES: CPT | Mod: GP

## 2023-11-17 PROCEDURE — 97112 NEUROMUSCULAR REEDUCATION: CPT | Mod: GP

## 2023-11-20 ENCOUNTER — HOSPITAL ENCOUNTER (OUTPATIENT)
Dept: WOUND CARE | Facility: CLINIC | Age: 88
Discharge: HOME | End: 2023-11-20
Attending: INTERNAL MEDICINE | Admitting: INTERNAL MEDICINE
Payer: COMMERCIAL

## 2023-11-20 DIAGNOSIS — J31.0 CHRONIC RHINITIS: ICD-10-CM

## 2023-11-20 PROCEDURE — 29580 STRAPPING UNNA BOOT: CPT

## 2023-11-20 RX ORDER — IPRATROPIUM BROMIDE 42 UG/1
SPRAY, METERED NASAL
Qty: 45 ML | Refills: 0 | Status: SHIPPED | OUTPATIENT
Start: 2023-11-20 | End: 2024-03-15

## 2023-11-20 NOTE — PROGRESS NOTES
[facility] Wound Clinic [facility]    Start of Care in [facility] Wound Clinic: 11/15/2023   Referring Provider: Godfrey Saleem MD  Primary Care Provider: Stiven Donahue   Wound Location: Left medial ankle     Wound Clinic Visit: Scheduled follow up.    Reason for Visit:  Unna's boot compression to left lower leg and foot to treat left medial ankle ulcer.      Subjective:  On arrival today 11/20/23 alone, pt reports the following: Unna's boot applied to the left lower leg and foot on Wednesday of last week had no concerns.  Was comfortable, felt good, soothing, stayed in place well, was able to keep dry and clean.       Wound History: Pt well known to the Wound and Ostomy clinic here at [facility] from past visits.  Woney BROOKE RN cwocn has cared for the pt for a series of visits in October and November of 2019 for Left medial lower leg/ankle venous ulcers and again July and August of 2021 for right medial lower leg/ankle venous ulcers. During those series of visits the pt was primarily treated with Unna's boot compression to the affected leg with weekly changes, both the sites healed slowly but were intact for the past few years.  Pt reports today 11/15/23 on his initial return visit that the left inner ankle area started to give him new troubles in the spring of 2023.  Site would crust over and then some of the crust would come off and there would be some weeping.  He treated the site with  moisturizers and it remains pretty stable.  However in early/mid October the sites scab area slowly increased in size.  Pt has a past medical history which includes four episodes of DVT, he is anticoagulated INR values therapeutic and stable.  He in the past underwent venous ablation procedures with Dr Gio NUNN.  Unna's boot applied 11/15/23 at initial return visit to the Wound and Ostomy clinic.      Past Medical History:  Patient Active Problem List   Diagnosis    Venous thrombosis    Restless leg syndrome     Sleep apnea    Factor V Leiden mutation (H24)    Prothrombin mutation (H24)    Back pain    Hyperlipidemia LDL goal <100    Gout    Malignant basal cell neoplasm of skin    Advanced directives, counseling/discussion    Low back pain    Personal history of venous thrombosis and embolism    Hip joint replacement status - right    Abnormal gait    Obesity    Personal history of prostate cancer    Spinal stenosis    Long term current use of anticoagulant therapy    Essential hypertension    Morbid obesity due to excess calories (H)    Diabetes mellitus, type 2 (H)    CKD (chronic kidney disease) stage 3, GFR 30-59 ml/min (H)    Asymmetrical sensorineural hearing loss    Dyslipidemia    Hemifacial spasm    Osteoarthritis of hip    Subjective tinnitus    Venous stasis dermatitis of right lower extremity    Hx of lumbosacral spine surgery                Tobacco Use:     Tobacco Use      Smoking status: Never      Smokeless tobacco: Never     Diabetic: Type 2  HgbA1C:   Hemoglobin A1C   Date Value Ref Range Status   11/07/2022 6.9 (H) 0.0 - 5.6 % Final     Comment:     Normal <5.7%   Prediabetes 5.7-6.4%    Diabetes 6.5% or higher     Note: Adopted from ADA consensus guidelines.   06/15/2021 7.2 (H) 0 - 5.6 % Final     Comment:     Normal <5.7% Prediabetes 5.7-6.4%  Diabetes 6.5% or higher - adopted from ADA   consensus guidelines.     Checks Blood Glucose?:  No.  Average Readings: NA    Personal/social history:  Pt lives independently, is active and a daily golfer this past summer.    Objective:   Current treatment plan: Unna's boot compression distal left foot to just below the knee, Kerlix and Ace wrap   Last changed: Initial Unna's boot compression to the left lower leg and foot 11/15/23.    Wound #1 Left medial ankle, venous stasis ulcer.  Stage/tissue depth: partial thickness  3 cm L x 2.9 cm W x 0 cm D  Tunneling: none  Undermining: none  Wound bed type/amount: approximately 10 % open red nongranular tissue, 30 %  intact skin and 60 % mix of scabs of dried drainage and hyperkeratotic tissue; Not fluctuant  Wound Edges: NA no current depth.  Periwound: edema, dry skin, deep hemosiderin staining.  Drainage: none noted from wound during cares, small amount serosanguinous strike through drainage noted on the Kerlix of the dressing removed.    Odor: no  Pain: 2/10 with direct cares, feet are hypersensitive per pt.    Photo's from today's visit 11/20/23.  Photo's L - R, left medial ankle, left medial lower leg, left dorsal foot. Left anterior/lateral lower leg.      Photo's from 11/15/23, initial return visit to Wound and Ostomy clinic.   Photo's L - R, left medial ankle, left lateral lower leg left medial lower leg, left dorsal foot.    Dorsalis Pedal Pulse: weak but palpable: NA doppler: NA phasic  Posterior Tibial Pulse: unable to palpate: NA doppler: NA phasic  Hair growth: diminished knee distally  Capillary Refill: 3 to 4 seconds  Feet/toes color: hemosiderin staining, hammer toes present  Nails: wnl  R Leg: Edema Not assessed this visit. Ankle circumference NA cm. Calf circumference NA cm.  L Leg: Edema plus 2 pitting in ankle and foot, nonpitting firm woody edema in the lower leg. Ankle circumference 38.5 cm. Calf circumference 43 cm.    Mobility: with single pointed cane is independent, slow with altered gait.    Current offloading/footwear: large diabetic shoes  Sensation: peripheral neuropathy and hypersensitivity    Other callusing/areas of concern: None noted    Diet: Regular    Assessment:  The dressing removed was clean and dry on the outer surface of the ace wrap, inner Kerlix had small amount of serosanguinous strike through drainage present, all dry.  Lower leg and foot cleansed with soap and water, left medial ankle wound rinsed with saline and dried with gauze.  The wound site to the left medial ankle has less open visible tissue, less hyperkeratotic tissue present and now intact present where some of the  hyperkeratotic tissue came off today with removal of the Unna's boot.  Very deep hemosiderin staining present about the entire right and lower leg lowers and the left foot.  Edema in the left ankle is stable and edema in left calf is reduced some today, area of pitting edema remain in the foot and ankle.      Factors impacting wound healing:   Poor nutrition: inadequate supply of protein, carbohydrates, fatty acids, and trace elements essential for all phases of wound healing.  Reminder today on need for increased protein in diet for healing.    Delayed healing as part of normal aging process  Reduced Blood Supply: inadequate perfusion to heal wound  Medication: None, not currently taking prednisone as of initial return visit 11/15/23.  Chemotherapy: suppresses the immune system and inflammatory response, NA  Radiotherapy: increases production of free radical which damage cells, NA  Psychological stress: none noted  Obesity: decreases tissue perfusion  Infection: prolongs inflammatory phase, uses vital nutrients, impairs epithelialization and releases toxins, none noted  Underlying Disease: diabetes mellitis well controlled, venous insufficiency.   Maceration: reduces wound tensile strength and inhibits epithelial migration, none noted  Patient compliance, has been compliant in all past encounters for wound and compression care.  Unrelieved pressure, NA  Immobility, limited but not immobile  Substance abuse: NA    Plan:  We ill continue with the same plan of care as last week.    Lower left leg and foot washed with soap and water, wound site rinsed with saline, all dried well with gauze.  Unna's boot visco paste wrap applied spiral fashion from distal foot to just below the knee.   (I do not see a need for Silvercel or other direct topical primary dressing for the wound site today).  Kerlix roll gauze applied over Unna's boot.  Ace wrap with compression applied figure 8 fashion from distal foot to just below the  knee.     Discussed/Education provided: plan of care with rationale;     Topical care: Wound/surrounding skin cleansed with soap and water and saline and gauze. Patted dry. See Plan for care provided today.    Pt is unable to perform the above compression/wound cares, all Unna's boot applications will be done in the Wound and Ostomy clinic.    Additional recommendations: None at this time.    The following discharge instructions were reviewed with and sent home with the patient:  See AVS    The following supplies were sent home with the patient:  None    Return visit: 11/27/23.    Verbal, written, & demonstrative education provided.  Face to face time: approximately 20 minutes  Procedure: approximately 3 minutes initial application of an Unna's boot compression wrap to the left lower leg and foot.    Reji Malhotra RN, OCN  [phone]

## 2023-11-20 NOTE — DISCHARGE INSTRUCTIONS
Today the Unna's boot appears to be working to dry up that wound to your left inner ankle and we will re apply the wrap again today.    I will see you again next week Monday 11/27/23 at 11 am after your physical therapy appointment.     Call with any concerns or questions [phone].    Reji Malhotra RN cwocn

## 2023-11-22 DIAGNOSIS — G25.81 RESTLESS LEG SYNDROME: ICD-10-CM

## 2023-11-22 RX ORDER — PRAMIPEXOLE DIHYDROCHLORIDE 1 MG/1
TABLET ORAL
Qty: 180 TABLET | Refills: 0 | Status: SHIPPED | OUTPATIENT
Start: 2023-11-22 | End: 2024-01-26

## 2023-11-27 ENCOUNTER — HOSPITAL ENCOUNTER (OUTPATIENT)
Dept: WOUND CARE | Facility: CLINIC | Age: 88
Discharge: HOME | End: 2023-11-27
Attending: INTERNAL MEDICINE | Admitting: INTERNAL MEDICINE
Payer: COMMERCIAL

## 2023-11-27 ENCOUNTER — THERAPY VISIT (OUTPATIENT)
Dept: PHYSICAL THERAPY | Facility: CLINIC | Age: 88
End: 2023-11-27
Attending: NURSE PRACTITIONER
Payer: COMMERCIAL

## 2023-11-27 DIAGNOSIS — M54.50 CHRONIC MIDLINE LOW BACK PAIN WITHOUT SCIATICA: Primary | ICD-10-CM

## 2023-11-27 DIAGNOSIS — Z98.890 HX OF LUMBOSACRAL SPINE SURGERY: ICD-10-CM

## 2023-11-27 DIAGNOSIS — G89.29 CHRONIC MIDLINE LOW BACK PAIN WITHOUT SCIATICA: Primary | ICD-10-CM

## 2023-11-27 PROCEDURE — 29580 STRAPPING UNNA BOOT: CPT | Mod: LT

## 2023-11-27 PROCEDURE — 97110 THERAPEUTIC EXERCISES: CPT | Mod: GP

## 2023-11-27 PROCEDURE — 97112 NEUROMUSCULAR REEDUCATION: CPT | Mod: GP

## 2023-11-27 NOTE — PROGRESS NOTES
[facility]                                                                                   OUTPATIENT PHYSICAL THERAPY    PLAN OF TREATMENT FOR OUTPATIENT REHABILITATION   Patient's Last Name, First Name, Robert Camp    YOB: 1935   Provider's Name   [facility]   Medical Record No.  [id number]     Onset Date: 10/02/21  Start of Care Date: 10/02/23     Medical Diagnosis:  Chronic midline low back pain without sciatica, history of lumbosacral surgery.      PT Treatment Diagnosis:  Chronic midline low back pain without sciatica, history of lumbosacral surgery. Plan of Treatment  Frequency/Duration: 1x/week/ 10 weeks    Certification date from 11/28/2023 to 02/05/2024         See note for plan of treatment details and functional goals        11/27/23 0500   Appointment Info   Signing clinician's name / credentials Randall Cerna, PT, DPT   Visits Used 8   Medical Diagnosis Chronic midline low back pain without sciatica, history of lumbosacral surgery.   PT Tx Diagnosis Chronic midline low back pain without sciatica, history of lumbosacral surgery.   Progress Note/Certification   Start of Care Date 10/02/23   Onset of illness/injury or Date of Surgery 10/02/21   Therapy Frequency 1x/week   Predicted Duration 8 weeks   Certification date from 10/02/23   Certification date to 11/27/23   Progress Note Due Date 11/27/23       Present No   GOALS   PT Goals 2   PT Goal 1   Goal Identifier HEP   Goal Description Pt will establish an HEP to address balance and walking activity tolerance in order to improve tolerance to recreational activities such as golf.   Rationale to maximize safety and independence with performance of ADLs and functional tasks;to maximize safety and independence within the home;to maximize safety and independence within the community   Goal Progress Patient reports mod adherence to his HEP since  his previous session.   Target Date 11/27/23   PT Goal 2   Goal Identifier TUG   Goal Description Pt will demonstrate <14 seconds  with least restrictive AD on TUG in order to demonstrate improved walking speed and reduced falls risk.   Rationale to maximize safety and independence with performance of ADLs and functional tasks;to maximize safety and independence within the home;to maximize safety and independence within the community   Goal Progress 16.03 seconds (11/27/2023)   Target Date 11/27/23   Subjective Report   Subjective Report Patient in good spirits during today's session. Patient reports improved adherence to his HEP without abnormal increase in his LBP, and with no falls since his previous session. Patient ambulating with his SEC to today's session.   Treatment Interventions (PT)   Interventions Therapeutic Procedure/Exercise;Neuromuscular Re-education   Therapeutic Procedure/Exercise   Therapeutic Procedures: strength, endurance, ROM, flexibillity minutes (87238) 15   Ther Proc 1 - Details Seated BLE green TB ankle DF and PF 1x10-15 each; Standing green TB sidestepping x5 reps of 12 feet along raised plinth with BUE support on plinth; Time taken for education to emphasize to patient importance of  targeted strengthening exercises from his required upright mobility with respect to strengthening of his LE's, that his strengthening is targeted at strengthening of his LE's with eventual translation to improved functional stability from stronger musculature to better control his stability with these demands.   Skilled Intervention Selection, instruction, and modification of selected exercises for optimal therapeutic benefit. Education and cueing as detailed above.   Patient Response/Progress Patient reports and demonstrates understanding of today's given education. Improved BLE calf strengthening as compared to previous session today.   Neuromuscular Re-education   Neuromuscular re-ed of [facility],  balance, coord, kinesthetic sense, posture, proprioception minutes (97891) 25   Neuro Re-ed 1 - Details Wilkes Balance Scale and TUG assessment and performance by patient (36/56 BBS and 16.03 seconds for TUG); Education regarding importance of performance of his balance related HEP exercises to translate to improved performance of activities within the BBS and for subsequent improvements with general upright balance demands.   Skilled Intervention Wilkes and TUG instruction and assessment; Education as detailed above.   Patient Response/Progress Patient reports understanding of today's given education.   Education   Learner/Method Patient;Listening;Demonstration;No Barriers to Learning   Education Comments Patient reports understanding of future therapeutic progression.   Plan   Home program Tandem stance with each foot forward x2 reps of 30 seconds each; Tandem walking x2 reps of 30 feet each; Green TB sidestepping x2 reps of 20 feet each; Green TB BLE hip flexion and extension SLR's 1x10; Romberg stance x30 seconds with head turns horizontally; BLE ankle pumps throughout the day; Seated green TB LLE ankle PF and DF 1x10; Each exercise performed 2-3 times per day as tolerated.   Updates to plan of care 1x/week   Plan for next session Strengthening, balance.  Follow compliance with walking program.   Comments   Comments Patient reports wishing to continue with OP PT services for continued progression of balance and LE strengthening for improved stability with required functional mobility and ambulation demands.   Total Session Time   Timed Code Treatment Minutes 40   Total Treatment Time (sum of timed and untimed services) 40     - All goal dates updated to 02/05/2024.    Randall Cerna, PT, DPT    [facility]  O: [phone]  E: [email or web address]                          I CERTIFY THE NEED FOR THESE SERVICES FURNISHED UNDER        THIS PLAN OF TREATMENT AND WHILE UNDER MY CARE .              Physician Signature               Date    X_____________________________________________________                Referring Provider:  Radha Lozano NP    Initial Assessment  See Epic Evaluation- Start of Care Date: 10/02/23

## 2023-11-27 NOTE — DISCHARGE INSTRUCTIONS
Today the wound to your inner left ankle is all closed, no longer any of the thickened tissue or scabs remaining and no distinct open wounds.  The site remains very fragile and has a aidan appearance to it.  We reapplied the Unna's boot today and will plan to change it again next week on Tuesday Dec the 5th at 10 am after your therapy appointment.  [phone] call with any concerns or questions or to reschedule date or time of your next appointment.    Reji Malhotra RN cwocn

## 2023-11-27 NOTE — PROGRESS NOTES
[facility]    Start of Care in [facility]ound [facility]: 11/15/2023   Referring Provider: Godfrey Saleem MD  Primary Care Provider: Stiven Donahue   Wound Location: Left medial ankle     Wound Clinic Visit: Scheduled follow up.    Reason for Visit:  Unna's boot compression to left lower leg and foot to treat left medial ankle ulcer.      Subjective:  On arrival today 11/27/23 alone, pt reports the following: Unna's boot applied to the left lower leg and foot on week ago was comfortable, felt good, soothing, stayed in place well, was able to keep dry and clean.       Wound History: Pt well known to the Wound and Ostomy clinic here at [facility] from past visits.  c Reji BROOKE RN cwocn has cared for the pt for a series of visits in October and November of 2019 for Left medial lower leg/ankle venous ulcers and again July and August of 2021 for right medial lower leg/ankle venous ulcers. During those series of visits the pt was primarily treated with Unna's boot compression to the affected leg with weekly changes, both the sites healed slowly but were intact for the past few years.  Pt reports today 11/15/23 on his initial return visit that the left inner ankle area started to give him new troubles in the spring of 2023.  Site would crust over and then some of the crust would come off and there would be some weeping.  He treated the site with  moisturizers and it remains pretty stable.  However in early/mid October the sites scab area slowly increased in size.  Pt has a past medical history which includes four episodes of DVT, he is anticoagulated INR values therapeutic and stable.  He in the past underwent venous ablation procedures with Dr Gio NUNN.  Unna's boot applied 11/15/23 at initial return visit to the Wound and Ostomy clinic.      Past Medical History:  Patient Active Problem List   Diagnosis    Venous thrombosis    Restless leg syndrome    Sleep apnea    Factor V Leiden  mutation (H24)    Prothrombin mutation (H24)    Back pain    Hyperlipidemia LDL goal <100    Gout    Malignant basal cell neoplasm of skin    Advanced directives, counseling/discussion    Low back pain    Personal history of venous thrombosis and embolism    Hip joint replacement status - right    Abnormal gait    Obesity    Personal history of prostate cancer    Spinal stenosis    Long term current use of anticoagulant therapy    Essential hypertension    Morbid obesity due to excess calories (H)    Diabetes mellitus, type 2 (H)    CKD (chronic kidney disease) stage 3, GFR 30-59 ml/min (H)    Asymmetrical sensorineural hearing loss    Dyslipidemia    Hemifacial spasm    Osteoarthritis of hip    Subjective tinnitus    Venous stasis dermatitis of right lower extremity    Hx of lumbosacral spine surgery                Tobacco Use:     Tobacco Use      Smoking status: Never      Smokeless tobacco: Never     Diabetic: Type 2  HgbA1C:   Hemoglobin A1C   Date Value Ref Range Status   11/07/2022 6.9 (H) 0.0 - 5.6 % Final     Comment:     Normal <5.7%   Prediabetes 5.7-6.4%    Diabetes 6.5% or higher     Note: Adopted from ADA consensus guidelines.   06/15/2021 7.2 (H) 0 - 5.6 % Final     Comment:     Normal <5.7% Prediabetes 5.7-6.4%  Diabetes 6.5% or higher - adopted from ADA   consensus guidelines.     Checks Blood Glucose?:  No.  Average Readings: NA    Personal/social history:  Pt lives independently, is active and a daily golfer this past summer.    Objective:   Current treatment plan: Unna's boot compression distal left foot to just below the knee, Kerlix and Ace wrap   Last changed: 11/20/23    Wound #1 Left medial ankle, venous stasis ulcer.  Stage/tissue depth: partial thickness  2 cm L x 1.5 cm W x 0 cm D  Tunneling: none  Undermining: none  Wound bed type/amount: approximately 100 % intact but aidan and fragile pink dermal tissue; Not fluctuant  Wound Edges: NA no open wound currently  Periwound: edema, dry  skin, deep hemosiderin staining.  Drainage: none   Odor: no  Pain: 0/10 with direct cares, feet are hypersensitive per pt.    Photo's from today's visit.  L - R Medial ankle, medial ankle, anterior lower leg, lateral lower leg.        Photo's from 11/20/23.  L - R, left medial ankle, left medial lower leg, left dorsal foot. Left anterior/lateral lower leg.      Photo's from 11/15/23, initial return visit to Wound and Ostomy clinic.  L - R, left medial ankle, left lateral lower leg left medial lower leg, left dorsal foot.    Dorsalis Pedal Pulse: weak but palpable: NA doppler: NA phasic  Posterior Tibial Pulse: unable to palpate: NA doppler: NA phasic  Hair growth: diminished knee distally  Capillary Refill: 3 to 4 seconds  Feet/toes color: hemosiderin staining, hammer toes present  Nails: wnl  R Leg: Edema Not assessed this visit. Ankle circumference NA cm. Calf circumference NA cm.  L Leg: Edema plus 2 pitting in ankle and foot, nonpitting firm woody edema in the lower leg. Ankle circumference 36.5 cm. Calf circumference 42 cm.    Mobility: with single pointed cane is independent, slow with altered gait.    Current offloading/footwear: large diabetic shoes  Sensation: peripheral neuropathy and hypersensitivity    Other callusing/areas of concern: None noted    Diet: Regular    Assessment:  The dressing removed was clean and dry with no drainage noted to the any of the wrap.   Lower leg and foot cleansed with soap and water, left medial ankle wound rinsed with saline and dried with gauze.  During cleansing the hyperkeratotic tissue of the left medial ankle all came off with no open tissue below exposed.  The site is fragile, pink, aidan, but no longer any open wounds, no drainage, no blistering and no remaining hyperkeratotic tissue present.  Edema in the ankle and calf are reduced some.  No new wound sites or areas of concern noted on the left lower leg or foot.      Factors impacting wound healing:   Poor  nutrition: inadequate supply of protein, carbohydrates, fatty acids, and trace elements essential for all phases of wound healing.   Delayed healing as part of normal aging process  Reduced Blood Supply: inadequate perfusion to heal wound  Medication: None, not currently taking prednisone as of initial return visit 11/15/23.  Chemotherapy: suppresses the immune system and inflammatory response, NA  Radiotherapy: increases production of free radical which damage cells, NA  Psychological stress: none noted  Obesity: decreases tissue perfusion  Infection: prolongs inflammatory phase, uses vital nutrients, impairs epithelialization and releases toxins, none noted  Underlying Disease: diabetes mellitis well controlled, venous insufficiency.   Maceration: reduces wound tensile strength and inhibits epithelial migration, none noted  Patient compliance, has been compliant in all past encounters for wound and compression care.  Unrelieved pressure, NA  Immobility, limited but not immobile  Substance abuse: NA    Plan:  We ill continue with the same plan of care as last week.    Lower left leg and foot washed with soap and water, wound site rinsed with saline, all dried well with gauze.  Unna's boot visco paste wrap applied spiral fashion from distal foot to just below the knee.   Kerlix roll gauze applied over Unna's boot.  Ace wrap with compression applied figure 8 fashion from distal foot to just below the knee.     If the site is intact at his next weeks follow up visit Tuesday 12/5/23, we can consider discontinuing Unna's boot compression.     Discussed/Education provided: plan of care with rationale;     Topical care: Wound/surrounding skin cleansed with soap and water and saline and gauze. Patted dry. See Plan for care provided today.    Pt is unable to perform the above compression/wound cares, all Unna's boot applications will be done in the Wound and Ostomy clinic.    Additional recommendations: None at this  time.    The following discharge instructions were reviewed with and sent home with the patient:  See AVS    The following supplies were sent home with the patient:  None    Return visit: 12/5/23.    Verbal, written, & demonstrative education provided.  Face to face time: approximately 20 minutes  Procedure: approximately 3 minutes application of an Unna's boot compression wrap to the left lower leg and foot.    Reji Malhotra RN, CWOCN  [phone]

## 2023-12-05 ENCOUNTER — THERAPY VISIT (OUTPATIENT)
Dept: PHYSICAL THERAPY | Facility: CLINIC | Age: 88
End: 2023-12-05
Attending: NURSE PRACTITIONER
Payer: COMMERCIAL

## 2023-12-05 ENCOUNTER — HOSPITAL ENCOUNTER (OUTPATIENT)
Dept: WOUND CARE | Facility: CLINIC | Age: 88
Discharge: HOME | End: 2023-12-05
Attending: INTERNAL MEDICINE | Admitting: INTERNAL MEDICINE
Payer: COMMERCIAL

## 2023-12-05 DIAGNOSIS — Z98.890 HX OF LUMBOSACRAL SPINE SURGERY: ICD-10-CM

## 2023-12-05 DIAGNOSIS — M54.50 CHRONIC MIDLINE LOW BACK PAIN WITHOUT SCIATICA: Primary | ICD-10-CM

## 2023-12-05 DIAGNOSIS — G89.29 CHRONIC MIDLINE LOW BACK PAIN WITHOUT SCIATICA: Primary | ICD-10-CM

## 2023-12-05 PROCEDURE — G0463 HOSPITAL OUTPT CLINIC VISIT: HCPCS

## 2023-12-05 PROCEDURE — 97112 NEUROMUSCULAR REEDUCATION: CPT | Mod: GP

## 2023-12-05 PROCEDURE — 97110 THERAPEUTIC EXERCISES: CPT | Mod: GP

## 2023-12-05 NOTE — DISCHARGE INSTRUCTIONS
Today the wound on your left inner ankle is closed, small scab of thickened skin present but no open wound.  We will leave the Unna's boot off this week, and re evaluate on Tuesday next week Dec the 12 th at 10 am.  Call with any new concerns prior to next weeks appointment. [phone].    Reji Malhotra RN cwocn

## 2023-12-05 NOTE — PROGRESS NOTES
[facility]    Start of Care in [facility] Wound Clinic: 11/15/2023   Referring Provider: Godfrey Saleem MD  Primary Care Provider: Stiven Donahue   Wound Location: Left medial ankle     Wound Clinic Visit: Scheduled follow up.    Reason for Visit:  Unna's boot compression to left lower leg and foot to treat left medial ankle ulcer.      Subjective:  On arrival today 12/5/23 alone, pt reports the following: Unna's boot applied to the left lower leg and foot last week was comfortable, stayed in place well, no new concerns noted.        Wound History: Pt well known to the Wound and Ostomy clinic here at [facility] from past visits.  [facility] Reji BROOKE RN cwocn has cared for the pt for a series of visits in October and November of 2019 for Left medial lower leg/ankle venous ulcers and again July and August of 2021 for right medial lower leg/ankle venous ulcers. During those series of visits the pt was primarily treated with Unna's boot compression to the affected leg with weekly changes, both the sites healed slowly but were intact for the past few years.  Pt reports today 11/15/23 on his initial return visit that the left inner ankle area started to give him new troubles in the spring of 2023.  Site would crust over and then some of the crust would come off and there would be some weeping.  He treated the site with  moisturizers and it remains pretty stable.  However in early/mid October the sites scab area slowly increased in size.  Pt has a past medical history which includes four episodes of DVT, he is anticoagulated INR values therapeutic and stable.  He in the past underwent venous ablation procedures with Dr Gio NUNN.  Unna's boot applied 11/15/23 at initial return visit to the Wound and Ostomy clinic, changed weekly, held as of 12/5/23 as wound was closed.      Past Medical History:  Patient Active Problem List   Diagnosis    Venous thrombosis    Restless leg syndrome    Sleep  apnea    Factor V Leiden mutation (H24)    Prothrombin mutation (H24)    Back pain    Hyperlipidemia LDL goal <100    Gout    Malignant basal cell neoplasm of skin    Advanced directives, counseling/discussion    Low back pain    Personal history of venous thrombosis and embolism    Hip joint replacement status - right    Abnormal gait    Obesity    Personal history of prostate cancer    Spinal stenosis    Long term current use of anticoagulant therapy    Essential hypertension    Morbid obesity due to excess calories (H)    Diabetes mellitus, type 2 (H)    CKD (chronic kidney disease) stage 3, GFR 30-59 ml/min (H)    Asymmetrical sensorineural hearing loss    Dyslipidemia    Hemifacial spasm    Osteoarthritis of hip    Subjective tinnitus    Venous stasis dermatitis of right lower extremity    Hx of lumbosacral spine surgery                Tobacco Use:     Tobacco Use      Smoking status: Never      Smokeless tobacco: Never     Diabetic: Type 2  HgbA1C:   Hemoglobin A1C   Date Value Ref Range Status   11/07/2022 6.9 (H) 0.0 - 5.6 % Final     Comment:     Normal <5.7%   Prediabetes 5.7-6.4%    Diabetes 6.5% or higher     Note: Adopted from ADA consensus guidelines.   06/15/2021 7.2 (H) 0 - 5.6 % Final     Comment:     Normal <5.7% Prediabetes 5.7-6.4%  Diabetes 6.5% or higher - adopted from ADA   consensus guidelines.     Checks Blood Glucose?:  No.  Average Readings: NA    Personal/social history:  Pt lives independently, is active and a daily golfer this past summer.    Objective:   Current treatment plan: Unna's boot compression distal left foot to just below the knee, Kerlix and Ace wrap   Last changed: 11/27/23    Wound #1 Left medial ankle, venous stasis ulcer.  Stage/tissue depth: partial thickness  1.5 cm L x 0.5 cm W x 0 cm D  Tunneling: none  Undermining: none  Wound bed type/amount: 100 % scab and hyperkeratotic tissue mix; Not fluctuant  Wound Edges: NA no open wound currently  Periwound: edema, dry  skin, deep hemosiderin staining.  Drainage: none   Odor: no  Pain: 0/10 with direct cares, feet are hypersensitive per pt.    Photo's from today's visit 12/5/23.  L - R, medial left ankle, medial left lower leg, anterior left lower leg and foot, left lateral ankle.      Photo's from 11/27/23.  L - R Medial ankle, medial ankle, anterior lower leg, lateral lower leg.        Photo's from 11/20/23.  L - R, left medial ankle, left medial lower leg, left dorsal foot. Left anterior/lateral lower leg.      Photo's from 11/15/23, initial return visit to Wound and Ostomy clinic.  L - R, left medial ankle, left lateral lower leg left medial lower leg, left dorsal foot.    Dorsalis Pedal Pulse: weak but palpable: NA doppler: NA phasic  Posterior Tibial Pulse: unable to palpate: NA doppler: NA phasic  Hair growth: diminished knee distally  Capillary Refill: 3 to 4 seconds  Feet/toes color: hemosiderin staining, hammer toes present  Nails: wnl  R Leg: Edema Not assessed this visit. Ankle circumference NA cm. Calf circumference NA cm.  L Leg: Edema plus1 pitting in ankle and foot, nonpitting firm woody edema in the lower leg. Ankle circumference 37 cm. Calf circumference 39 cm.    Mobility: with single pointed cane is independent, slow with altered gait.    Current offloading/footwear: large diabetic shoes  Sensation: peripheral neuropathy and hypersensitivity    Other callusing/areas of concern: None noted    Diet: Regular    Assessment:  The dressing removed was clean and dry with no drainage noted to the any of the wrap.   Lower leg and foot cleansed with soap and water, left medial ankle wound site rinsed with saline and dried with gauze.  During cleansing the some of the hyperkeratotic tissue of the left medial ankle came off with no open tissue below exposed, leaving small vertical strip well adhered.  The remaining HK tissue is all dry but too well adhered to remove at this time.  No drainage.  Skin around the site was  noted to be more aidan and fragile last visit and that has mostly resolved.  Edema in the ankle is stable, reduced in the calf again this week.  No new wound sites or areas of concern noted on the left lower leg or foot.      Factors impacting wound healing:   Poor nutrition: inadequate supply of protein, carbohydrates, fatty acids, and trace elements essential for all phases of wound healing.   Delayed healing as part of normal aging process  Reduced Blood Supply: inadequate perfusion to heal wound  Medication: None, not currently taking prednisone as of initial return visit 11/15/23.  Chemotherapy: suppresses the immune system and inflammatory response, NA  Radiotherapy: increases production of free radical which damage cells, NA  Psychological stress: none noted  Obesity: decreases tissue perfusion  Infection: prolongs inflammatory phase, uses vital nutrients, impairs epithelialization and releases toxins, none noted.  Underlying Disease: diabetes mellitis well controlled, venous insufficiency.   Maceration: reduces wound tensile strength and inhibits epithelial migration, none noted.  Patient compliance, has been compliant in all past encounters for wound and compression care.  Unrelieved pressure, NA  Immobility, limited but not immobile  Substance abuse: NA    Plan:  The site of the wound to the left medial ankle has been dry and mostly resolved for the past week.  Will not re apply the Unna's boot this visit.  Will have the pt return next week to clinic for re evaluation.    Pt has not consistently worn any type of compression socks in the past.  We will try to transition the pt back to just keeping the site open to air with no compression, but if wound keeps redeveloping or pt gets new wound areas to the right or left lower leg, may have to consider compression stockings or garments once re healed.    Discussed/Education provided: plan of care with rationale;     Topical care: Wound/surrounding skin cleansed  with soap and water and saline and gauze. Patted dry. See Plan for care provided today.    Pt is unable to perform the above compression/wound cares, Unna's boot applications will be done in the Wound and Ostomy clinic when needed.    Additional recommendations: None at this time.    The following discharge instructions were reviewed with and sent home with the patient:  See AVS    The following supplies were sent home with the patient:  None    Return visit: 12/12/23.    Verbal, written, & demonstrative education provided.  Face to face time: approximately 25 minutes  Procedure: HENRÁN Malhotra RN, CWOCN  [phone]

## 2023-12-09 ENCOUNTER — HEALTH MAINTENANCE LETTER (OUTPATIENT)
Age: 88
End: 2023-12-09

## 2023-12-12 ENCOUNTER — THERAPY VISIT (OUTPATIENT)
Dept: PHYSICAL THERAPY | Facility: CLINIC | Age: 88
End: 2023-12-12
Attending: NURSE PRACTITIONER
Payer: COMMERCIAL

## 2023-12-12 ENCOUNTER — HOSPITAL ENCOUNTER (OUTPATIENT)
Dept: WOUND CARE | Facility: CLINIC | Age: 88
Discharge: HOME | End: 2023-12-12
Attending: INTERNAL MEDICINE | Admitting: INTERNAL MEDICINE
Payer: COMMERCIAL

## 2023-12-12 DIAGNOSIS — M54.50 CHRONIC MIDLINE LOW BACK PAIN WITHOUT SCIATICA: Primary | ICD-10-CM

## 2023-12-12 DIAGNOSIS — G89.29 CHRONIC MIDLINE LOW BACK PAIN WITHOUT SCIATICA: Primary | ICD-10-CM

## 2023-12-12 DIAGNOSIS — Z98.890 HX OF LUMBOSACRAL SPINE SURGERY: ICD-10-CM

## 2023-12-12 PROCEDURE — 97112 NEUROMUSCULAR REEDUCATION: CPT | Mod: GP

## 2023-12-12 PROCEDURE — G0463 HOSPITAL OUTPT CLINIC VISIT: HCPCS

## 2023-12-12 PROCEDURE — 97110 THERAPEUTIC EXERCISES: CPT | Mod: GP

## 2023-12-12 PROCEDURE — 97116 GAIT TRAINING THERAPY: CPT | Mod: GP

## 2023-12-12 NOTE — PROGRESS NOTES
[facility] Wound Clinic [facility]    Start of Care in [facility] Wound Clinic: 11/15/2023   Referring Provider: Godfrey Saleem MD  Primary Care Provider: Stiven Donahue   Wound Location: Left medial ankle     Wound Clinic Visit: Scheduled follow up.    Reason for Visit:  Reassess closed wound site of the left medial lower leg, Unna's boot compression stopped last visit.      Subjective:  On arrival today 12/12/23 alone, pt reports the following: He noticed some new crusty feeling tissue to the left medial ankle site where there was any open wound recently.  No drainage noted, no pain noted.         Wound History: Pt well known to the Wound and Ostomy clinic here at [facility] from past visits.  [facility] Reji BROOKE RN cwocn has cared for the pt for a series of visits in October and November of 2019 for Left medial lower leg/ankle venous ulcers and again July and August of 2021 for right medial lower leg/ankle venous ulcers. During those series of visits the pt was primarily treated with Unna's boot compression to the affected leg with weekly changes, both the sites healed slowly but were intact for the past few years.  Pt reports today 11/15/23 on his initial return visit that the left inner ankle area started to give him new troubles in the spring of 2023.  Site would crust over and then some of the crust would come off and there would be some weeping.  He treated the site with  moisturizers and it remains pretty stable.  However in early/mid October the sites scab area slowly increased in size.  Pt has a past medical history which includes four episodes of DVT, he is anticoagulated INR values therapeutic and stable.  He in the past underwent venous ablation procedures with Dr Gio NUNN.  Unna's boot applied 11/15/23 at initial return visit to the Wound and Ostomy clinic, changed weekly, held as of 12/5/23 as wound was closed.      Past Medical History:  Patient Active Problem List   Diagnosis    Venous  thrombosis    Restless leg syndrome    Sleep apnea    Factor V Leiden mutation (H24)    Prothrombin mutation (H24)    Back pain    Hyperlipidemia LDL goal <100    Gout    Malignant basal cell neoplasm of skin    Advanced directives, counseling/discussion    Low back pain    Personal history of venous thrombosis and embolism    Hip joint replacement status - right    Abnormal gait    Obesity    Personal history of prostate cancer    Spinal stenosis    Long term current use of anticoagulant therapy    Essential hypertension    Morbid obesity due to excess calories (H)    Diabetes mellitus, type 2 (H)    CKD (chronic kidney disease) stage 3, GFR 30-59 ml/min (H)    Asymmetrical sensorineural hearing loss    Dyslipidemia    Hemifacial spasm    Osteoarthritis of hip    Subjective tinnitus    Venous stasis dermatitis of right lower extremity    Hx of lumbosacral spine surgery                Tobacco Use:     Tobacco Use      Smoking status: Never      Smokeless tobacco: Never     Diabetic: Type 2  HgbA1C:   Hemoglobin A1C   Date Value Ref Range Status   11/07/2022 6.9 (H) 0.0 - 5.6 % Final     Comment:     Normal <5.7%   Prediabetes 5.7-6.4%    Diabetes 6.5% or higher     Note: Adopted from ADA consensus guidelines.   06/15/2021 7.2 (H) 0 - 5.6 % Final     Comment:     Normal <5.7% Prediabetes 5.7-6.4%  Diabetes 6.5% or higher - adopted from ADA   consensus guidelines.     Checks Blood Glucose?:  No.  Average Readings: NA    Personal/social history:  Pt lives independently, is active and a daily golfer this past summer.    Objective:   Current treatment plan: Left Medial ankle - Site is open to air  Last changed: 12/5/23    Wound #1 Left medial ankle, venous stasis ulcer.  Stage/tissue depth: partial thickness  0.5 cm L x 0.1 cm W x 0 cm D  Tunneling: none  Undermining: none  Wound bed type/amount: 100 % scab and hyperkeratotic tissue mix; Not fluctuant  Wound Edges: NA no open wound currently  Periwound: edema, dry skin,  deep hemosiderin staining.  Drainage: none   Odor: no  Pain: 0/10 with direct cares, feet are hypersensitive per pt.    Photo's from today's visit 12/12/23. L - R medial left ankle, from far and up close.       Photo's from 12/5/23.  L - R, medial left ankle, medial left lower leg, anterior left lower leg and foot, left lateral ankle.      Photo's from 11/20/23.  L - R, left medial ankle, left medial lower leg, left dorsal foot. Left anterior/lateral lower leg.      Photo's from 11/15/23, initial return visit to Wound and Ostomy clinic.  L - R, left medial ankle, left lateral lower leg left medial lower leg, left dorsal foot.    Dorsalis Pedal Pulse: weak but palpable: NA doppler: NA phasic  Posterior Tibial Pulse: unable to palpate: NA doppler: NA phasic  Hair growth: diminished knee distally  Capillary Refill: 3 to 4 seconds  Feet/toes color: hemosiderin staining, hammer toes present  Nails: wnl  R Leg: Edema Not assessed this visit. Ankle circumference NA cm. Calf circumference NA cm.  L Leg: Edema plus1 pitting in ankle and foot, nonpitting firm woody edema in the lower leg. Ankle circumference 37 cm. Calf circumference 39 cm.  No change    Mobility: with single pointed cane is independent, slow with altered gait.    Current offloading/footwear: large diabetic shoes  Sensation: peripheral neuropathy and hypersensitivity    Other callusing/areas of concern: None noted    Diet: Regular    Assessment:  Pt arrives with the left leg only covered in sock.  Site to the medial left ankle noted initially for approximately 6 small scabs/hyperkeratotic tissue dots and small line in cluster about the size of a quarter.  Was able to remove all but one of the dry semi loose sites today with moisturizer and measuring strip.  One spot left behind is the thin vertical strip of scab/HK tissue seen in picture above.  The site is very well adhered with no lifting edges.  Skin around the remaining scab/HK tissue is noted to be  mildly aidan but not moist and no pain.    Factors impacting wound healing:   Poor nutrition: inadequate supply of protein, carbohydrates, fatty acids, and trace elements essential for all phases of wound healing.   Delayed healing as part of normal aging process  Reduced Blood Supply: inadequate perfusion to heal wound  Medication: None, not currently taking prednisone as of initial return visit 11/15/23.  Chemotherapy: suppresses the immune system and inflammatory response, NA  Radiotherapy: increases production of free radical which damage cells, NA  Psychological stress: none noted  Obesity: decreases tissue perfusion  Infection: prolongs inflammatory phase, uses vital nutrients, impairs epithelialization and releases toxins, none noted.  Underlying Disease: diabetes mellitis well controlled, venous insufficiency.   Maceration: reduces wound tensile strength and inhibits epithelial migration, none noted.  Patient compliance, has been compliant in all past encounters for wound and compression care.  Unrelieved pressure, NA  Immobility, limited but not immobile  Substance abuse: NA    Plan:  The site of the wound to the left medial ankle remains closed, returning scab/HK tissue mix was mostly loose and easy to remove, no open tissue present after cares today.  Will not apply the Unna's boot this visit.  We applied Sween 24 to the area and instructed the pt to re apply once daily.  Will have pt return in two weeks for follow up visit.  Instructed him to call for any new wounds, blisters or lower leg pain, to set up a sooner reevaluation.      Discussed/Education provided: plan of care with rationale;     Topical care: Wound/surrounding skin cleansed with soap and water and saline and gauze. Patted dry. See Plan for care provided today.    Pt is unable to perform the above compression/wound cares, Unna's boot applications will be done in the Wound and Ostomy clinic when needed.    Additional recommendations: None at  this time.    The following discharge instructions were reviewed with and sent home with the patient:  See AVS    The following supplies were sent home with the patient:  None    Return visit: 12/27/23.    Verbal, written, & demonstrative education provided.  Face to face time: approximately 20 minutes  Procedure: HERNÁN Malhotra RN, CWOCN  [phone]

## 2023-12-12 NOTE — DISCHARGE INSTRUCTIONS
Today the wound on your left inner ankle is all closed, we removed the majority of the scabs present and there is no open tissue below.    There remains one thin well adhered scab and we will check that in about two weeks.    I have you scheduled to see me again in just over two weeks on Wednesday 12/27/23 at 9 am.  Any new concerns or questions [phone].    Reji Malhotra RN cwocn

## 2023-12-19 ENCOUNTER — THERAPY VISIT (OUTPATIENT)
Dept: PHYSICAL THERAPY | Facility: CLINIC | Age: 88
End: 2023-12-19
Attending: NURSE PRACTITIONER
Payer: COMMERCIAL

## 2023-12-19 DIAGNOSIS — G89.29 CHRONIC MIDLINE LOW BACK PAIN WITHOUT SCIATICA: Primary | ICD-10-CM

## 2023-12-19 DIAGNOSIS — Z98.890 HX OF LUMBOSACRAL SPINE SURGERY: ICD-10-CM

## 2023-12-19 DIAGNOSIS — M54.50 CHRONIC MIDLINE LOW BACK PAIN WITHOUT SCIATICA: Primary | ICD-10-CM

## 2023-12-19 PROCEDURE — 97112 NEUROMUSCULAR REEDUCATION: CPT | Mod: GP

## 2023-12-19 PROCEDURE — 97110 THERAPEUTIC EXERCISES: CPT | Mod: GP

## 2023-12-26 ENCOUNTER — THERAPY VISIT (OUTPATIENT)
Dept: PHYSICAL THERAPY | Facility: CLINIC | Age: 88
End: 2023-12-26
Attending: NURSE PRACTITIONER
Payer: COMMERCIAL

## 2023-12-26 DIAGNOSIS — M54.40 ACUTE LOW BACK PAIN WITH SCIATICA, SCIATICA LATERALITY UNSPECIFIED, UNSPECIFIED BACK PAIN LATERALITY: Primary | ICD-10-CM

## 2023-12-26 DIAGNOSIS — Z98.890 HX OF LUMBOSACRAL SPINE SURGERY: ICD-10-CM

## 2023-12-26 PROCEDURE — 97110 THERAPEUTIC EXERCISES: CPT | Mod: GP | Performed by: PHYSICAL THERAPIST

## 2023-12-26 PROCEDURE — 97112 NEUROMUSCULAR REEDUCATION: CPT | Mod: GP | Performed by: PHYSICAL THERAPIST

## 2023-12-27 ENCOUNTER — HOSPITAL ENCOUNTER (OUTPATIENT)
Dept: WOUND CARE | Facility: CLINIC | Age: 88
Discharge: HOME | End: 2023-12-27
Attending: INTERNAL MEDICINE | Admitting: INTERNAL MEDICINE
Payer: COMMERCIAL

## 2023-12-27 PROCEDURE — G0463 HOSPITAL OUTPT CLINIC VISIT: HCPCS

## 2023-12-27 NOTE — PROGRESS NOTES
[facility] Wound Clinic [facility]    Start of Care in [facility] Wound Clinic: 11/15/2023   Referring Provider: Godfrey Saleem MD  Primary Care Provider: Stiven Donahue   Wound Location: Left medial ankle     Wound Clinic Visit: Scheduled follow up.    Reason for Visit:  Reassess closed wound site of the left medial lower leg, Unna's boot compression stopped last visit.      Subjective:  On arrival today 12/27/23 alone, pt reports the following: He has been applying moisturizer to the left medial ankle site of now closed venous ulcer.  Site occasionally will feel a little crusty again but resolves with the moisturizer.  No pain at the site, no weeping drainage or open wound.  No other lower leg concerns.  Is using new cane with a forearm bracing bend which is making his ambulation easier and appears more safe, upright gait improved.        Wound History: Pt well known to the Wound and Ostomy clinic here at [facility] from past visits.  ney acevedoocn has cared for the pt for a series of visits in October and November of 2019 for Left medial lower leg/ankle venous ulcers and again July and August of 2021 for right medial lower leg/ankle venous ulcers. During those series of visits the pt was primarily treated with Unna's boot compression to the affected leg with weekly changes, both the sites healed slowly but were intact for the past few years.  Pt reports today 11/15/23 on his initial return visit that the left inner ankle area started to give him new troubles in the spring of 2023.  Site would crust over and then some of the crust would come off and there would be some weeping.  He treated the site with  moisturizers and it remains pretty stable.  However in early/mid October the sites scab area slowly increased in size.  Pt has a past medical history which includes four episodes of DVT, he is anticoagulated INR values therapeutic and stable.  He in the past underwent venous ablation  procedures with Dr Gio NUNN.  Ashly's boot applied 11/15/23 at initial return visit to the Wound and Ostomy clinic, changed weekly, held as of 12/5/23 as wound was closed.  Remains closed 12/27/23.    Past Medical History:  Patient Active Problem List   Diagnosis    Venous thrombosis    Restless leg syndrome    Sleep apnea    Factor V Leiden mutation (H24)    Prothrombin mutation (H24)    Back pain    Hyperlipidemia LDL goal <100    Gout    Malignant basal cell neoplasm of skin    Advanced directives, counseling/discussion    Low back pain    Personal history of venous thrombosis and embolism    Hip joint replacement status - right    Abnormal gait    Obesity    Personal history of prostate cancer    Spinal stenosis    Long term current use of anticoagulant therapy    Essential hypertension    Morbid obesity due to excess calories (H)    Diabetes mellitus, type 2 (H)    CKD (chronic kidney disease) stage 3, GFR 30-59 ml/min (H)    Asymmetrical sensorineural hearing loss    Dyslipidemia    Hemifacial spasm    Osteoarthritis of hip    Subjective tinnitus    Venous stasis dermatitis of right lower extremity    Hx of lumbosacral spine surgery                Tobacco Use:     Tobacco Use      Smoking status: Never      Smokeless tobacco: Never     Diabetic: Type 2  HgbA1C:   Hemoglobin A1C   Date Value Ref Range Status   11/07/2022 6.9 (H) 0.0 - 5.6 % Final     Comment:     Normal <5.7%   Prediabetes 5.7-6.4%    Diabetes 6.5% or higher     Note: Adopted from ADA consensus guidelines.   06/15/2021 7.2 (H) 0 - 5.6 % Final     Comment:     Normal <5.7% Prediabetes 5.7-6.4%  Diabetes 6.5% or higher - adopted from ADA   consensus guidelines.     Checks Blood Glucose?:  No.  Average Readings: NA    Personal/social history:  Pt lives independently, is active and a daily golfer this past summer.    Objective:   Current treatment plan: Left Medial ankle - Site is open to air.   Last changed: Applies moisturizer each am.     Wound  #1 Left medial ankle, venous stasis ulcer.  Stage/tissue depth: partial thickness  0 cm L x 0 cm W x 0 cm D  Tunneling: none  Undermining: none  Wound bed type/amount: 100 % reepithelialized; Not fluctuant  Wound Edges: NA no open wound currently  Periwound: edema, dry skin, deep hemosiderin staining.  Drainage: none   Odor: no  Pain: 0/10 with direct cares, feet are hypersensitive per pt.    Photo's from today's visit 12/27/23.      Photo's from 12/12/23. L - R medial left ankle, from far and up close.       Photo's from 11/20/23.  L - R, left medial ankle, left medial lower leg, left dorsal foot. Left anterior/lateral lower leg.      Photo's from 11/15/23, initial return visit to Wound and Ostomy clinic.  L - R, left medial ankle, left lateral lower leg left medial lower leg, left dorsal foot.    Dorsalis Pedal Pulse: weak but palpable: NA doppler: NA phasic  Posterior Tibial Pulse: unable to palpate: NA doppler: NA phasic  Hair growth: diminished knee distally  Capillary Refill: 3 to 4 seconds  Feet/toes color: hemosiderin staining, hammer toes present  Nails: wnl  R Leg: Edema Not assessed this visit. Ankle circumference NA cm. Calf circumference NA cm.  L Leg: Edema plus1 pitting in ankle and foot, nonpitting firm woody edema in the lower leg. Ankle circumference 37 cm. Calf circumference 39 cm.  No change    Mobility: with single pointed cane is independent, slow with altered gait.    Current offloading/footwear: large diabetic shoes  Sensation: peripheral neuropathy and hypersensitivity    Other callusing/areas of concern: None noted    Diet: Regular    Assessment:  Pt arrives with the left leg only covered in sock.  Site to the medial left ankle I all intact, no hyperkeratotic tissue, no wounds, no drainage, no scar tissue noted.    Factors impacting wound healing:   Poor nutrition: inadequate supply of protein, carbohydrates, fatty acids, and trace elements essential for all phases of wound healing.    Delayed healing as part of normal aging process  Reduced Blood Supply: inadequate perfusion to heal wound  Medication: None, not currently taking prednisone as of initial return visit 11/15/23.  Chemotherapy: suppresses the immune system and inflammatory response, NA  Radiotherapy: increases production of free radical which damage cells, NA  Psychological stress: none noted  Obesity: decreases tissue perfusion  Infection: prolongs inflammatory phase, uses vital nutrients, impairs epithelialization and releases toxins, none noted.  Underlying Disease: diabetes mellitis well controlled, venous insufficiency.   Maceration: reduces wound tensile strength and inhibits epithelial migration, none noted.  Patient compliance, has been compliant in all past encounters for wound and compression care.  Unrelieved pressure, NA  Immobility, limited but not immobile  Substance abuse: NA    Plan:  The site of the wound to the left medial ankle remains closed, returning scab/HK tissue mix was mostly loose and easy to remove, no open tissue present after cares today.  We applied Sween 24 to the area and instructed the pt to re apply once daily.  Also applied Sween 24 to the dry skin about both lower legs and would recommend same or similar application daily.   We will not schedule follow up visit at this time, pt can call to schedule if any new concerns arise, has contact information as needed.     Discussed/Education provided: plan of care with rationale;     Topical care: Wound/surrounding skin cleansed with soap and water and saline and gauze. Patted dry. See Plan for care provided today.    Pt is unable to perform the above compression/wound cares, Unna's boot applications will be done in the Wound and Ostomy clinic when needed.    Additional recommendations: None at this time.    The following discharge instructions were reviewed with and sent home with the patient:  Pt declined AVS today.    The following supplies were sent  home with the patient:  Remains of the Sween 24 opened and not used up.    Return visit: NA    Verbal, written, & demonstrative education provided.  Face to face time: approximately 15 minutes  Procedure: HERNÁN Malhotra RN, CWOCN  [phone]

## 2024-01-05 ENCOUNTER — TELEPHONE (OUTPATIENT)
Dept: ANTICOAGULATION | Facility: CLINIC | Age: 89
End: 2024-01-05
Payer: COMMERCIAL

## 2024-01-05 NOTE — TELEPHONE ENCOUNTER
ANTICOAGULATION     Robert Richard is overdue for an INR check.     Spoke with Carlos and scheduled lab appointment on 1/10/24    Makayla Chisholm RN

## 2024-01-10 ENCOUNTER — DOCUMENTATION ONLY (OUTPATIENT)
Dept: ANTICOAGULATION | Facility: CLINIC | Age: 89
End: 2024-01-10

## 2024-01-10 ENCOUNTER — ANTICOAGULATION THERAPY VISIT (OUTPATIENT)
Dept: ANTICOAGULATION | Facility: CLINIC | Age: 89
End: 2024-01-10

## 2024-01-10 ENCOUNTER — LAB (OUTPATIENT)
Dept: LAB | Facility: CLINIC | Age: 89
End: 2024-01-10
Payer: COMMERCIAL

## 2024-01-10 DIAGNOSIS — D68.52 PROTHROMBIN MUTATION (H): ICD-10-CM

## 2024-01-10 DIAGNOSIS — I82.90 VENOUS THROMBOSIS: ICD-10-CM

## 2024-01-10 DIAGNOSIS — Z79.01 LONG TERM CURRENT USE OF ANTICOAGULANT THERAPY: ICD-10-CM

## 2024-01-10 DIAGNOSIS — D68.51 FACTOR V LEIDEN MUTATION (H): Primary | ICD-10-CM

## 2024-01-10 LAB — INR BLD: 3.5 (ref 0.9–1.1)

## 2024-01-10 PROCEDURE — 85610 PROTHROMBIN TIME: CPT

## 2024-01-10 PROCEDURE — 36416 COLLJ CAPILLARY BLOOD SPEC: CPT

## 2024-01-10 NOTE — PROGRESS NOTES
ANTICOAGULATION CLINIC REFERRAL RENEWAL REQUEST       An annual renewal order is required for all patients referred to [facility].?  Please review and sign the pended referral order for Robert Richard.       ANTICOAGULATION SUMMARY      Warfarin indication(s)   Factor V leiden mutation,venous thrombosis and prothrombin mutation    Mechanical heart valve present?  NO       Current goal range   INR: 2.0-3.0     Goal appropriate for indication? Goal INR 2-3, standard for indication(s) above     Time in Therapeutic Range (TTR)  (Goal > 60%) 79.8%       Office visit with referring provider's group within last year no on 11/7/2022       Amina Bertrand RN  [facility]

## 2024-01-10 NOTE — PROGRESS NOTES
ANTICOAGULATION MANAGEMENT     Robert Richard 88 year old male is on warfarin with supratherapeutic INR result. (Goal INR 2.0-3.0)    Recent labs: (last 7 days)     01/10/24  0905   INR 3.5*       ASSESSMENT     Source(s): Chart Review and Patient/Caregiver Call     Warfarin doses taken: Warfarin taken as instructed  Diet: No new diet changes identified  Medication/supplement changes: None noted  New illness, injury, or hospitalization: Yes: URI symptoms the past 4 days, he states that today he is starting to feel better   Signs or symptoms of bleeding or clotting: No  Previous result: Therapeutic last 2(+) visits  Additional findings: None       PLAN     Recommended plan for temporary change(s) affecting INR     Dosing Instructions: partial hold then continue your current warfarin dose with next INR in 2 weeks       Summary  As of 1/10/2024      Full warfarin instructions:  1/10: 2.5 mg; Otherwise 2.5 mg every Mon, Fri; 5 mg all other days   Next INR check:  1/24/2024               Telephone call with Carlos who verbalizes understanding and agrees to plan    Lab visit scheduled    Education provided:   Goal range and lab monitoring: goal range and significance of current result  Contact [phone]  with any changes, questions or concerns.     Plan made per [facility] anticoagulation protocol    Amina Bertrand RN  Anticoagulation Clinic  1/10/2024    _______________________________________________________________________     Anticoagulation Episode Summary       Current INR goal:  2.0-3.0   TTR:  79.8% (1 y)   Target end date:  Indefinite   Send INR reminders to:  [facility]    Indications    Factor V Leiden mutation (H24) [D68.51]  Venous thrombosis [I82.90]  Prothrombin mutation (H24) [D68.52]  Long term current use of anticoagulant therapy [Z79.01]             Comments:               Anticoagulation Care Providers       Provider Role Specialty Phone number    Stiven Donahue MD Referring Internal Medicine  [phone]    Nate Cifuentes DO [facility] [phone]

## 2024-01-25 ENCOUNTER — TELEPHONE (OUTPATIENT)
Dept: NEUROSURGERY | Facility: CLINIC | Age: 89
End: 2024-01-25
Payer: COMMERCIAL

## 2024-01-25 NOTE — TELEPHONE ENCOUNTER
[facility]-r/s/ cancel appt with Radha Lozano NP and have patient scheduled with Dr. Cleary per staff message. Offered 02/08 with Dr. Cleary in [facility].

## 2024-01-26 ENCOUNTER — OFFICE VISIT (OUTPATIENT)
Dept: INTERNAL MEDICINE | Facility: CLINIC | Age: 89
End: 2024-01-26
Payer: COMMERCIAL

## 2024-01-26 ENCOUNTER — ANTICOAGULATION THERAPY VISIT (OUTPATIENT)
Dept: ANTICOAGULATION | Facility: CLINIC | Age: 89
End: 2024-01-26

## 2024-01-26 VITALS
BODY MASS INDEX: 37.77 KG/M2 | DIASTOLIC BLOOD PRESSURE: 86 MMHG | RESPIRATION RATE: 20 BRPM | OXYGEN SATURATION: 97 % | HEIGHT: 69 IN | SYSTOLIC BLOOD PRESSURE: 138 MMHG | HEART RATE: 58 BPM | TEMPERATURE: 97 F | WEIGHT: 255 LBS

## 2024-01-26 DIAGNOSIS — I82.90 VENOUS THROMBOSIS: ICD-10-CM

## 2024-01-26 DIAGNOSIS — R01.1 HEART MURMUR: ICD-10-CM

## 2024-01-26 DIAGNOSIS — E11.40 CONTROLLED TYPE 2 DIABETES WITH NEUROPATHY (H): ICD-10-CM

## 2024-01-26 DIAGNOSIS — Z00.00 ENCOUNTER FOR MEDICARE ANNUAL WELLNESS EXAM: Primary | ICD-10-CM

## 2024-01-26 DIAGNOSIS — Z79.01 LONG TERM CURRENT USE OF ANTICOAGULANT THERAPY: ICD-10-CM

## 2024-01-26 DIAGNOSIS — N18.31 STAGE 3A CHRONIC KIDNEY DISEASE (H): ICD-10-CM

## 2024-01-26 DIAGNOSIS — E66.01 CLASS 2 SEVERE OBESITY DUE TO EXCESS CALORIES WITH SERIOUS COMORBIDITY AND BODY MASS INDEX (BMI) OF 38.0 TO 38.9 IN ADULT (H): ICD-10-CM

## 2024-01-26 DIAGNOSIS — D68.51 FACTOR V LEIDEN MUTATION (H): ICD-10-CM

## 2024-01-26 DIAGNOSIS — E66.812 CLASS 2 SEVERE OBESITY DUE TO EXCESS CALORIES WITH SERIOUS COMORBIDITY AND BODY MASS INDEX (BMI) OF 38.0 TO 38.9 IN ADULT (H): ICD-10-CM

## 2024-01-26 DIAGNOSIS — Z86.718 PERSONAL HISTORY OF VENOUS THROMBOSIS AND EMBOLISM: ICD-10-CM

## 2024-01-26 DIAGNOSIS — D68.52 PROTHROMBIN MUTATION (H): ICD-10-CM

## 2024-01-26 DIAGNOSIS — I10 ESSENTIAL HYPERTENSION WITH GOAL BLOOD PRESSURE LESS THAN 140/90: ICD-10-CM

## 2024-01-26 DIAGNOSIS — E11.8 TYPE 2 DIABETES MELLITUS WITH COMPLICATION, WITHOUT LONG-TERM CURRENT USE OF INSULIN (H): ICD-10-CM

## 2024-01-26 DIAGNOSIS — D68.51 FACTOR V LEIDEN MUTATION (H): Primary | ICD-10-CM

## 2024-01-26 DIAGNOSIS — I10 HYPERTENSION GOAL BP (BLOOD PRESSURE) < 140/90: ICD-10-CM

## 2024-01-26 DIAGNOSIS — G25.81 RESTLESS LEG SYNDROME: ICD-10-CM

## 2024-01-26 PROBLEM — N18.32 STAGE 3B CHRONIC KIDNEY DISEASE (H): Status: RESOLVED | Noted: 2019-06-20 | Resolved: 2024-01-26

## 2024-01-26 PROBLEM — N18.32 STAGE 3B CHRONIC KIDNEY DISEASE (H): Status: ACTIVE | Noted: 2019-06-20

## 2024-01-26 LAB
ALBUMIN SERPL BCG-MCNC: 4.2 G/DL (ref 3.5–5.2)
ALP SERPL-CCNC: 118 U/L (ref 40–150)
ALT SERPL W P-5'-P-CCNC: 17 U/L (ref 0–70)
ANION GAP SERPL CALCULATED.3IONS-SCNC: 9 MMOL/L (ref 7–15)
AST SERPL W P-5'-P-CCNC: 19 U/L (ref 0–45)
BILIRUB SERPL-MCNC: 0.3 MG/DL
BUN SERPL-MCNC: 27 MG/DL (ref 8–23)
CALCIUM SERPL-MCNC: 9.1 MG/DL (ref 8.8–10.2)
CHLORIDE SERPL-SCNC: 108 MMOL/L (ref 98–107)
CHOLEST SERPL-MCNC: 225 MG/DL
CREAT SERPL-MCNC: 1.57 MG/DL (ref 0.67–1.17)
CREAT UR-MCNC: 87.8 MG/DL
DEPRECATED HCO3 PLAS-SCNC: 23 MMOL/L (ref 22–29)
EGFRCR SERPLBLD CKD-EPI 2021: 42 ML/MIN/1.73M2
FASTING STATUS PATIENT QL REPORTED: YES
GLUCOSE SERPL-MCNC: 131 MG/DL (ref 70–99)
HBA1C MFR BLD: 7.8 %
HDLC SERPL-MCNC: 66 MG/DL
HGB BLD-MCNC: 13.1 G/DL (ref 13.3–17.7)
INR PPP: 2.74 (ref 0.85–1.15)
LDLC SERPL CALC-MCNC: 134 MG/DL
MICROALBUMIN UR-MCNC: 103.4 MG/L
MICROALBUMIN/CREAT UR: 117.77 MG/G CR (ref 0–17)
NONHDLC SERPL-MCNC: 159 MG/DL
POTASSIUM SERPL-SCNC: 4.1 MMOL/L (ref 3.4–5.3)
PROT SERPL-MCNC: 7.1 G/DL (ref 6.4–8.3)
SODIUM SERPL-SCNC: 140 MMOL/L (ref 135–145)
TRIGL SERPL-MCNC: 126 MG/DL
VIT B12 SERPL-MCNC: 376 PG/ML (ref 232–1245)

## 2024-01-26 PROCEDURE — G0439 PPPS, SUBSEQ VISIT: HCPCS | Performed by: INTERNAL MEDICINE

## 2024-01-26 PROCEDURE — 82570 ASSAY OF URINE CREATININE: CPT | Performed by: INTERNAL MEDICINE

## 2024-01-26 PROCEDURE — 82043 UR ALBUMIN QUANTITATIVE: CPT | Performed by: INTERNAL MEDICINE

## 2024-01-26 PROCEDURE — 83036 HEMOGLOBIN GLYCOSYLATED A1C: CPT | Performed by: INTERNAL MEDICINE

## 2024-01-26 PROCEDURE — 85018 HEMOGLOBIN: CPT | Performed by: INTERNAL MEDICINE

## 2024-01-26 PROCEDURE — 80053 COMPREHEN METABOLIC PANEL: CPT | Performed by: INTERNAL MEDICINE

## 2024-01-26 PROCEDURE — 36415 COLL VENOUS BLD VENIPUNCTURE: CPT | Performed by: INTERNAL MEDICINE

## 2024-01-26 PROCEDURE — 82607 VITAMIN B-12: CPT | Performed by: INTERNAL MEDICINE

## 2024-01-26 PROCEDURE — 99214 OFFICE O/P EST MOD 30 MIN: CPT | Mod: 25 | Performed by: INTERNAL MEDICINE

## 2024-01-26 PROCEDURE — 85610 PROTHROMBIN TIME: CPT | Performed by: INTERNAL MEDICINE

## 2024-01-26 PROCEDURE — 80061 LIPID PANEL: CPT | Performed by: INTERNAL MEDICINE

## 2024-01-26 RX ORDER — SPIRONOLACTONE 25 MG/1
25 TABLET ORAL DAILY
Qty: 90 TABLET | Refills: 3 | Status: SHIPPED | OUTPATIENT
Start: 2024-01-26

## 2024-01-26 RX ORDER — GLIPIZIDE 5 MG/1
5 TABLET, FILM COATED, EXTENDED RELEASE ORAL DAILY
Qty: 90 TABLET | Refills: 3 | Status: SHIPPED | OUTPATIENT
Start: 2024-01-26

## 2024-01-26 RX ORDER — WARFARIN SODIUM 5 MG/1
TABLET ORAL
Qty: 90 TABLET | Refills: 1 | Status: ON HOLD | OUTPATIENT
Start: 2024-01-26 | End: 2024-06-02

## 2024-01-26 RX ORDER — ATENOLOL 50 MG/1
50 TABLET ORAL 2 TIMES DAILY
Qty: 180 TABLET | Refills: 3 | Status: SHIPPED | OUTPATIENT
Start: 2024-01-26

## 2024-01-26 RX ORDER — PRAMIPEXOLE DIHYDROCHLORIDE 1 MG/1
TABLET ORAL
Qty: 180 TABLET | Refills: 3 | Status: SHIPPED | OUTPATIENT
Start: 2024-01-26

## 2024-01-26 RX ORDER — RESPIRATORY SYNCYTIAL VIRUS VACCINE 120MCG/0.5
0.5 KIT INTRAMUSCULAR ONCE
Qty: 1 EACH | Refills: 0 | Status: CANCELLED | OUTPATIENT
Start: 2024-01-26 | End: 2024-01-26

## 2024-01-26 RX ORDER — LOSARTAN POTASSIUM 100 MG/1
100 TABLET ORAL DAILY
Qty: 90 TABLET | Refills: 3 | Status: ON HOLD | OUTPATIENT
Start: 2024-01-26 | End: 2024-06-02

## 2024-01-26 ASSESSMENT — ACTIVITIES OF DAILY LIVING (ADL): CURRENT_FUNCTION: TRANSPORTATION REQUIRES ASSISTANCE

## 2024-01-26 NOTE — PROGRESS NOTES
"Preventive Care Visit  Formerly [facility]  Stiven Donahue MD, Internal Medicine  Jan 26, 2024      SUBJECTIVE:   Carlos is a 88 year old, presenting for the following:  Physical        1/26/2024     8:27 AM   Additional Questions   Roomed by Steph     Are you in the first 12 months of your Medicare coverage?  No    Healthy Habits:     In general, how would you rate your overall health?  Good    Frequency of exercise:  1 day/week    Duration of exercise:  Less than 15 minutes    Do you usually eat at least 4 servings of fruit and vegetables a day, include whole grains    & fiber and avoid regularly eating high fat or \"junk\" foods?  Yes    Taking medications regularly:  Yes    Medication side effects:  None    Ability to successfully perform activities of daily living:  Transportation requires assistance    Home Safety:  No safety concerns identified    Hearing Impairment:  Difficulty following a conversation in a noisy restaurant or crowded room, difficult to understand a speaker at a public meeting or [employer] service, need to ask people to speak up or repeat themselves and difficulty understanding soft or whispered speech    In the past 6 months, have you been bothered by leaking of urine?  No    In general, how would you rate your overall mental or emotional health?  Excellent    Additional concerns today:  Yes    Feeling good, life is good.      Back problems, seeing neurosurgery, did therapy, no improvement.  Will follow up with Dr Cleary. 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      Lives on [facility]. Lives with his wife, still driving.     Coumadin is ok, no bleeding    Not checking sugars.       Today's PHQ-2 Score:       1/26/2024     8:31 AM   PHQ-2 ( 1999 [employer])   Q1: Little interest or pleasure in doing things 0   Q2: Feeling down, depressed or hopeless 0   PHQ-2 Score 0   Q1: Little interest or pleasure in doing things Not at all   Q2: Feeling down, " depressed or hopeless Not at all   PHQ-2 Score 0           Have you ever done Advance Care Planning? (For example, a Health Directive, POLST, or a discussion with a medical provider or your loved ones about your wishes): Yes, patient states has an Advance Care Planning document and will bring a copy to the clinic.       Fall risk  Fallen 2 or more times in the past year?: No  Any fall with injury in the past year?: No    Cognitive Screening   1) Repeat 3 items (Leader, Season, Table)    2) Clock draw: NORMAL  3) 3 item recall: Recalls 3 objects  Results: 3 items recalled: COGNITIVE IMPAIRMENT LESS LIKELY    Mini-CogTM Copyright S Myra. Licensed by the author for use in [facility]; reprinted with permission (sodora@[facility]). All rights reserved.      Do you have sleep apnea, excessive snoring or daytime drowsiness? : yes  Uses cpap and does well, helps snoring.     Reviewed and updated as needed this visit by clinical staff   Tobacco  Allergies  Meds              Reviewed and updated as needed this visit by Provider                  Social History     Tobacco Use    Smoking status: Never    Smokeless tobacco: Never   Substance Use Topics    Alcohol use: Yes     Alcohol/week: 0.0 standard drinks of alcohol     Comment: 1 drink every 1-2 weeks.         1/26/2024     8:31 AM   Alcohol Use   Prescreen: >3 drinks/day or >7 drinks/week? No     Do you have a current opioid prescription? No  Do you use any other controlled substances or medications that are not prescribed by a provider? None    Current providers sharing in care for this patient include:   Patient Care Team:  Stiven Donahue MD as PCP - General (Internal Medicine)  Stiven Donahue MD as Assigned PCP  Fallon Galdamez RD as Diabetes Educator (Dietitian, Registered)  Khadar García DPM as Assigned Surgical Provider  Radha Lozano NP as Assigned Neuroscience Provider    The following health maintenance items are reviewed in Epic  "and correct as of today:  Health Maintenance   Topic Date Due    DIABETIC FOOT EXAM  Never done    ANNUAL REVIEW OF HM ORDERS  Never done    RSV VACCINE (Pregnancy & 60+) (1 - 1-dose 60+ series) Never done    ZOSTER IMMUNIZATION (2 of 3) 06/02/2008    MICROALBUMIN  01/21/2021    HEMOGLOBIN  05/17/2022    DTAP/TDAP/TD IMMUNIZATION (2 - Td or Tdap) 07/10/2022    LIPID  01/25/2023    MEDICARE ANNUAL WELLNESS VISIT  02/25/2023    A1C  05/07/2023    BMP  11/07/2023    EYE EXAM  03/03/2024    FALL RISK ASSESSMENT  01/26/2025    ADVANCE CARE PLANNING  02/25/2027    PHQ-2 (once per calendar year)  Completed    INFLUENZA VACCINE  Completed    Pneumococcal Vaccine: 65+ Years  Completed    URINALYSIS  Completed    COVID-19 Vaccine  Completed    IPV IMMUNIZATION  Aged Out    HPV IMMUNIZATION  Aged Out    MENINGITIS IMMUNIZATION  Aged Out    RSV MONOCLONAL ANTIBODY  Aged Out     Lab work is in process  Shots are due.       Review of Systems     Review of Systems  CONSTITUTIONAL: NEGATIVE for fever, chills, change in weight  INTEGUMENTARY/SKIN: NEGATIVE for worrisome rashes, moles or lesions  EYES: NEGATIVE for vision changes or irritation  ENT/MOUTH: NEGATIVE for ear, mouth and throat problems  RESP: NEGATIVE for significant cough or SOB  CV: NEGATIVE for chest pain, palpitations or peripheral edema  GI: NEGATIVE for nausea, abdominal pain, heartburn, or change in bowel habits  MUSCULOSKELETAL:lowe back pains,   NEURO: balance issues   ENDOCRINE: NEGATIVE for temperature intolerance, skin/hair changes  HEME: NEGATIVE for bleeding problems  PSYCHIATRIC: NEGATIVE for changes in mood or affect    OBJECTIVE:   BP (!) 148/96   Pulse 58   Temp 97  F (36.1  C) (Temporal)   Resp 20   Ht 1.753 m (5' 9\")   Wt 115.7 kg (255 lb)   SpO2 97%   BMI 37.66 kg/m     Estimated body mass index is 37.66 kg/m  as calculated from the following:    Height as of this encounter: 1.753 m (5' 9\").    Weight as of this encounter: 115.7 kg (255 " lb).  Physical Exam  GENERAL: alert and no distress  NECK: no adenopathy, no asymmetry, masses, or scars  RESP: lungs clear to auscultation - no rales, rhonchi or wheezes  CV: regular rates and rhythm, normal S1 S2, no S3 or S4, grade 2/6 systolic murmur heard best over the base, peripheral pulses strong, and no peripheral edema  ABDOMEN: soft, nontender, no hepatosplenomegaly, no masses and bowel sounds normal  MS: no gross musculoskeletal defects noted, no edema        ASSESSMENT / PLAN:     Problem List Items Addressed This Visit       Restless leg syndrome    Relevant Medications    pramipexole (MIRAPEX) 1 MG tablet    Other Relevant Orders    Vitamin B12    OFFICE/OUTPT VISIT,EST,LEVL IV (Completed)    Factor V Leiden mutation (H24)    Relevant Orders    INR    OFFICE/OUTPT VISIT,EST,LEVL IV (Completed)    Personal history of venous thrombosis and embolism    Relevant Medications    warfarin ANTICOAGULANT (COUMADIN) 5 MG tablet    Obesity    Relevant Medications    glipiZIDE (GLUCOTROL XL) 5 MG 24 hr tablet    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 Bili, TP)    Type 2 diabetes mellitus with complication, without long-term current use of insulin (H)        Relevant Medications    glipiZIDE (GLUCOTROL XL) 5 MG 24 hr tablet    Other Relevant Orders    Lipid panel reflex to direct LDL Fasting    HEMOGLOBIN A1C    Comprehensive metabolic panel (BMP + Alb, Alk Phos, ALT, AST, Total. Bili, TP)    Vitamin B12    OFFICE/OUTPT  "VISIT,ESTROSA ELENA IV (Completed)    Essential hypertension with goal blood pressure less than 140/90        Relevant Medications    losartan (COZAAR) 100 MG tablet    spironolactone (ALDACTONE) 25 MG tablet    Other Relevant Orders    Comprehensive metabolic panel (BMP + Alb, Alk Phos, ALT, AST, Total. Bili, TP)    OFFICE/OUTPT VISIT,ESTAKINL IV (Completed)          Medicare wellness exam, patient is doing well lives with his wife at [facility], he continues to drive he is using a cane due to some back pain.  CPAP for obstructive sleep apnea  Gets his immunizations with the pharmacy.  Memory is good, no falls.    Other issues addressed type 2 diabetes on glipizide we will check an A1c today.    Chronic kidney disease stage IIIa therefore not always on metformin.    Hypertension on atenolol and losartan meds are refilled labs are drawn    Concerned with a new heart murmur could be aortic stenosis we will try to set him up for an echocardiogram for further evaluation.    Back pain and spinal stenosis he will be seen neurosurgery and may need possible surgery.                    Patient has been advised of split billing requirements and indicates understanding: Yes      Counseling  Reviewed preventive health counseling, as reflected in patient instructions       Regular exercise       Healthy diet/nutrition      BMI  Estimated body mass index is 37.66 kg/m  as calculated from the following:    Height as of this encounter: 1.753 m (5' 9\").    Weight as of this encounter: 115.7 kg (255 lb).   Weight management plan: Discussed healthy diet and exercise guidelines      He reports that he has never smoked. He has never used smokeless tobacco.      Appropriate preventive services were discussed with this patient, including applicable screening as appropriate for fall prevention, nutrition, physical activity, Tobacco-use cessation, weight loss and cognition.  Checklist reviewing preventive services available has been given " to the patient.    Reviewed patients plan of care and provided an AVS. The Basic Care Plan (routine screening as documented in Health Maintenance) for Robert meets the Care Plan requirement. This Care Plan has been established and reviewed with the Patient.        Signed Electronically by: Stiven Donahue MD    Identified Health Risks  He is at risk for lack of exercise and has been provided with information to increase physical activity for the benefit of his well-being.  The patient reports that he has difficulty with activities of daily living. I have asked that the patient make a follow up appointment in 53 weeks where this issue will be further evaluated and addressed.  The patient was provided with written information regarding signs of hearing loss.

## 2024-01-26 NOTE — PATIENT INSTRUCTIONS
"Patient Education   Personalized Prevention Plan  You are due for the preventive services outlined below.  Your care team is available to assist you in scheduling these services.  If you have already completed any of these items, please share that information with your care team to update in your medical record.  Health Maintenance Due   Topic Date Due     Diabetic Foot Exam  Never done     ANNUAL REVIEW OF HM ORDERS  Never done     RSV VACCINE (Pregnancy & 60+) (1 - 1-dose 60+ series) Never done     Zoster (Shingles) Vaccine (2 of 3) 06/02/2008     Kidney Microalbumin Urine Test  01/21/2021     Hemoglobin  05/17/2022     Diptheria Tetanus Pertussis (DTAP/TDAP/TD) Vaccine (2 - Td or Tdap) 07/10/2022     Cholesterol Lab  01/25/2023     A1C Lab  05/07/2023     Basic Metabolic Panel  11/07/2023     Learning About Being Physically Active  What is physical activity?     Being physically active means doing any kind of activity that gets your body moving.  The types of physical activity that can help you get fit and stay healthy include:  Aerobic or \"cardio\" activities. These make your heart beat faster and make you breathe harder, such as brisk walking, riding a bike, or running. They strengthen your heart and lungs and build up your endurance.  Strength training activities. These make your muscles work against, or \"resist,\" something. Examples include lifting weights or doing push-ups. These activities help tone and strengthen your muscles and bones.  Stretches. These let you move your joints and muscles through their full range of motion. Stretching helps you be more flexible.  Reaching a balance between these three types of physical activity is important because each one contributes to your overall fitness.  What are the benefits of being active?  Being active is one of the best things you can do for your health. It helps you to:  Feel stronger and have more energy to do all the things you like to do.  Focus better at " "school or work.  Feel, think, and sleep better.  Reach and stay at a healthy weight.  Lose fat and build lean muscle.  Lower your risk for serious health problems, including diabetes, heart attack, high blood pressure, and some cancers.  Keep your heart, lungs, bones, muscles, and joints strong and healthy.  How can you make being active part of your life?  Start slowly. Make it your long-term goal to get at least 30 minutes of exercise on most days of the week. Walking is a good choice. You also may want to do other activities, such as running, swimming, cycling, or playing tennis or team sports.  Pick activities that you like--ones that make your heart beat faster, your muscles stronger, and your muscles and joints more flexible. If you find more than one thing you like doing, do them all. You don't have to do the same thing every day.  Get your heart pumping every day. Any activity that makes your heart beat faster and keeps it at that rate for a while counts.  Here are some great ways to get your heart beating faster:  Go for a brisk walk, run, or hike.  Go for a swim or bike ride.  Take an online exercise class or dance.  Play a game of touch football, basketball, or soccer.  Play tennis, pickleball, or racquetball.  Climb stairs.  Even some household chores can be aerobic. Just do them at a faster pace. Raking or mowing the lawn, sweeping the garage, and vacuuming and cleaning your home all can help get your heart rate up.  Strengthen your muscles during the week. You don't have to lift heavy weights or grow big, bulky muscles to get stronger. Doing a few simple activities that make your muscles work against, or \"resist,\" something can help you get stronger. Aim for at least twice a week.  For example, you can:  Do push-ups or sit-ups, which use your own body weight as resistance.  Lift weights or dumbbells or use stretch bands at home or in a gym or community center.  Stretch your muscles often. Stretching " "will help you as you become more active. It can help you stay flexible and loosen tight muscles. It can also help improve your balance and posture and can be a great way to relax.  Be sure to stretch the muscles you'll be using when you work out. It's best to warm your muscles slightly before you stretch them. Walk or do some other light aerobic activity for a few minutes. Then start stretching.  When you stretch your muscles:  Do it slowly. Stretching is not about going fast or making sudden movements.  Don't push or bounce during a stretch.  Hold each stretch for at least 15 to 30 seconds, if you can. You should feel a stretch in the muscle, but not pain.  Breathe out as you do the stretch. Then breathe in as you hold the stretch. Don't hold your breath.  If you're worried about how more activity might affect your health, have a checkup before you start. Follow any special advice your doctor gives you for getting a smart start.  Where can you learn more?  Go to https://www.[employer].net/patiented  Enter W332 in the search box to learn more about \"Learning About Being Physically Active.\"  Current as of: June 6, 2023               Content Version: 13.8    7813-0812 [employer].   Care instructions adapted under license by your healthcare professional. If you have questions about a medical condition or this instruction, always ask your healthcare professional. [employer] disclaims any warranty or liability for your use of this information.      Activities of Daily Living    Your Health Risk Assessment indicates you have difficulties with activities of daily living such as housework, bathing, preparing meals, taking medication, etc. Please make a follow up appointment for us to address this issue in more detail.  Hearing Loss: Care Instructions  Overview     Hearing loss is a sudden or slow decrease in how well you hear. It can range from slight to profound. Permanent hearing loss can occur " with aging. It also can happen when you are exposed long-term to loud noise. Examples include listening to loud music, riding motorcycles, or being around other loud machines.  Hearing loss can affect your work and home life. It can make you feel lonely or depressed. You may feel that you have lost your independence. But hearing aids and other devices can help you hear better and feel connected to others.  Follow-up care is a key part of your treatment and safety. Be sure to make and go to all appointments, and call your doctor if you are having problems. It's also a good idea to know your test results and keep a list of the medicines you take.  How can you care for yourself at home?  Avoid loud noises whenever possible. This helps keep your hearing from getting worse.  Always wear hearing protection around loud noises.  Wear a hearing aid as directed.  A professional can help you pick a hearing aid that will work best for you.  You can also get hearing aids over the counter for mild to moderate hearing loss.  Have hearing tests as your doctor suggests. They can show whether your hearing has changed. Your hearing aid may need to be adjusted.  Use other devices as needed. These may include:  Telephone amplifiers and hearing aids that can connect to a television, stereo, radio, or microphone.  Devices that use lights or vibrations. These alert you to the doorbell, a ringing telephone, or a baby monitor.  Television closed-captioning. This shows the words at the bottom of the screen. Most new TVs can do this.  TTY (text telephone). This lets you type messages back and forth on the telephone instead of talking or listening. These devices are also called TDD. When messages are typed on the keyboard, they are sent over the phone line to a receiving TTY. The message is shown on a monitor.  Use text messaging, social media, and email if it is hard for you to communicate by telephone.  Try to learn a listening technique  "called speechreading. It is not lipreading. You pay attention to people's gestures, expressions, posture, and tone of voice. These clues can help you understand what a person is saying. Face the person you are talking to, and have them face you. Make sure the lighting is good. You need to see the other person's face clearly.  Think about counseling if you need help to adjust to your hearing loss.  When should you call for help?  Watch closely for changes in your health, and be sure to contact your doctor if:    You think your hearing is getting worse.     You have new symptoms, such as dizziness or nausea.   Where can you learn more?  Go to https://www.[employer].net/patiented  Enter R798 in the search box to learn more about \"Hearing Loss: Care Instructions.\"  Current as of: February 28, 2023               Content Version: 13.8    0757-6788 [employer].   Care instructions adapted under license by your healthcare professional. If you have questions about a medical condition or this instruction, always ask your healthcare professional. [employer], [employer] disclaims any warranty or liability for your use of this information.         "

## 2024-01-26 NOTE — PROGRESS NOTES
ANTICOAGULATION MANAGEMENT     Robert Richard 88 year old male is on warfarin with therapeutic INR result. (Goal INR 2.0-3.0)    Recent labs: (last 7 days)     01/26/24  0943   INR 2.74*       ASSESSMENT     Source(s): Chart Review  Previous INR was Supratherapeutic  Medication, diet, health changes since last INR chart reviewed; none identified         PLAN     Recommended plan for no diet, medication or health factor changes affecting INR     Dosing Instructions: Continue your current warfarin dose with next INR in 3 weeks       Summary  As of 1/26/2024      Full warfarin instructions:  2.5 mg every Mon, Fri; 5 mg all other days   Next INR check:  2/16/2024               Detailed voice message left for Carlos with dosing instructions and follow up date.         Education provided:   Please call back if any changes to your diet, medications or how you've been taking warfarin    Plan made per [facility] anticoagulation protocol    Regina Luo RN  Anticoagulation Clinic  1/26/2024    _______________________________________________________________________     Anticoagulation Episode Summary       Current INR goal:  2.0-3.0   TTR:  76.9% (1 y)   Target end date:  Indefinite   Send INR reminders to:  [facility]    Indications    Factor V Leiden mutation (H24) [D68.51]  Venous thrombosis [I82.90]  Prothrombin mutation (H24) [D68.52]  Long term current use of anticoagulant therapy [Z79.01]             Comments:               Anticoagulation Care Providers       Provider Role Specialty Phone number    Stiven Donahue MD Referring Internal Medicine [phone]    Nate Cifuentes DO Responsible Internal Medicine [phone]

## 2024-02-07 ENCOUNTER — HOSPITAL ENCOUNTER (OUTPATIENT)
Dept: CARDIOLOGY | Facility: CLINIC | Age: 89
Discharge: HOME | End: 2024-02-07
Attending: INTERNAL MEDICINE | Admitting: INTERNAL MEDICINE
Payer: COMMERCIAL

## 2024-02-07 ENCOUNTER — TELEPHONE (OUTPATIENT)
Dept: NEUROSURGERY | Facility: CLINIC | Age: 89
End: 2024-02-07
Payer: COMMERCIAL

## 2024-02-07 DIAGNOSIS — R01.1 HEART MURMUR: ICD-10-CM

## 2024-02-07 LAB — LVEF ECHO: NORMAL

## 2024-02-07 PROCEDURE — 93306 TTE W/DOPPLER COMPLETE: CPT

## 2024-02-07 PROCEDURE — 93306 TTE W/DOPPLER COMPLETE: CPT | Mod: 26 | Performed by: INTERNAL MEDICINE

## 2024-02-07 NOTE — TELEPHONE ENCOUNTER
Attempted to reach patient to remind them about appointment scheduled with Cj Cleary MD on 2/8/24 in our [place] location.  A voicemail was left with a call back number if the patient has questions or would like to reschedule.

## 2024-02-08 ENCOUNTER — OFFICE VISIT (OUTPATIENT)
Dept: NEUROSURGERY | Facility: CLINIC | Age: 89
End: 2024-02-08
Payer: COMMERCIAL

## 2024-02-08 VITALS
BODY MASS INDEX: 37.47 KG/M2 | OXYGEN SATURATION: 97 % | HEIGHT: 69 IN | WEIGHT: 253 LBS | DIASTOLIC BLOOD PRESSURE: 82 MMHG | HEART RATE: 79 BPM | TEMPERATURE: 95 F | SYSTOLIC BLOOD PRESSURE: 130 MMHG | RESPIRATION RATE: 18 BRPM

## 2024-02-08 DIAGNOSIS — M48.061 SPINAL STENOSIS, LUMBAR REGION, WITHOUT NEUROGENIC CLAUDICATION: ICD-10-CM

## 2024-02-08 DIAGNOSIS — M43.16 SPONDYLOLISTHESIS OF LUMBAR REGION: Primary | ICD-10-CM

## 2024-02-08 PROCEDURE — 99214 OFFICE O/P EST MOD 30 MIN: CPT | Performed by: NEUROLOGICAL SURGERY

## 2024-02-08 ASSESSMENT — PAIN SCALES - GENERAL: PAINLEVEL: NO PAIN (0)

## 2024-02-08 NOTE — LETTER
2/8/2024         RE: Robert Richard  [address] [facility] [address]        Dear Colleague,    Thank you for referring your patient, Robert Richard, to the [facility] NEUROS[facility]IN[facility] [place]. Please see a copy of my visit note below.    WILL CALL. Patient will review with family, the call back when he would like to proceed. Patient looking forward to this surgery so he can get back to the things he enjoys.    Reviewed pre- and post-operative instructions as outlined in the After Visit Summary/Patient Instructions with patient.   Surgery folder was given to patient    Patient Education Topic: Procedure with Dr. Cleary     Learner(s): Patient    Knowledge Level: Basic    Readiness to Learn: Ready    Method:  Verbal explanation and Written material     Outcome: Able to verbalize instructions    Barriers to Learning: No barrier    Covid Testing: n/a    Scheduling Number: [phone]    NDI/MARQUISE: Confirmation of completion within last 6 months    Smoking Status: Never    Pain Clinic: N/A    STD/FMLA: No  Job Description: Not applicable   Time Off: Not applicable      Patient had the opportunity for questions to be answered. Advised Patient to call clinic with any questions/concerns. Gave patient antibacterial soap for pre-surgery skin preparation.     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  Notes over a year of severe, throbbing, low back pain, with radiation to the bilateral buttocks and thighs.  When he walks more than 20 feet, he feels a deep heaviness in his legs, and feels that they will give out on him.  Needs a cane to walk.  PT and medical management without improvement.       Past Medical History:   Diagnosis Date     Basal cell carcinoma      Cancer (H)      DVT (deep venous thrombosis) (H) 11/2003     DVT (deep  venous thrombosis) (H) 12/08     DVT (deep venous thrombosis) (H) 10/2010     Factor V Leiden (H24)      Gout      Other and unspecified hyperlipidemia      Prostate cancer (H) 2003    prostatectomy      Restless leg syndrome      Unspecified essential hypertension      Past Surgical History:   Procedure Laterality Date     APPENDECTOMY  1950     JOINT REPLACEMENT  4/2011    R hip     LAMINECTOMY LUMBAR ONE LEVEL  12/2008     LAPAROSCOPIC HERNIORRHAPHY UMBILICAL N/A 1/3/2019    Procedure: laparoscopic umbilical hernia repair with mesh;  Surgeon: Jamal Thompson DO;  Location:  OR     [facility] REVISE TOTAL HIP REPLACEMENT  1/18/13    R hip     Social History     Socioeconomic History     Marital status: Single     Spouse name: Not on file     Number of children: Not on file     Years of education: Not on file     Highest education level: Not on file   Occupational History     Not on file   Tobacco Use     Smoking status: Never     Smokeless tobacco: Never   Substance and Sexual Activity     Alcohol use: Yes     Alcohol/week: 0.0 standard drinks of alcohol     Comment: 1 drink every 1-2 weeks.     Drug use: No     Sexual activity: Yes     Partners: Female   Other Topics Concern     Parent/sibling w/ CABG, MI or angioplasty before 65F 55M? Not Asked   Social History Narrative     Not on file     Social Determinants of Health     Financial Resource Strain: Low Risk  (1/26/2024)    Financial Resource Strain      Within the past 12 months, have you or your family members you live with been unable to get utilities (heat, electricity) when it was really needed?: No   Food Insecurity: Low Risk  (1/26/2024)    Food Insecurity      Within the past 12 months, did you worry that your food would run out before you got money to buy more?: No      Within the past 12 months, did the food you bought just not last and you didn t have money to get more?: No   Transportation Needs: Low Risk  (1/26/2024)    Transportation Needs     "  Within the past 12 months, has lack of transportation kept you from medical appointments, getting your medicines, non-medical meetings or appointments, work, or from getting things that you need?: No   Physical Activity: Not on file   Stress: Not on file   Social Connections: Not on file   Interpersonal Safety: Low Risk  (1/26/2024)    Interpersonal Safety      Do you feel physically and emotionally safe where you currently live?: Yes      Within the past 12 months, have you been hit, slapped, kicked or otherwise physically hurt by someone?: No      Within the past 12 months, have you been humiliated or emotionally abused in other ways by your partner or ex-partner?: No   Housing Stability: Low Risk  (1/26/2024)    Housing Stability      Do you have housing? : Yes      Are you worried about losing your housing?: No     Family History   Problem Relation Age of Onset     Prostate Cancer Paternal Grandfather         ROS: 10 point ROS neg other than the symptoms noted above in the HPI.    Physical Exam  /82   Pulse 79   Temp (!) 95  F (35  C) (Temporal)   Resp 18   Ht 1.753 m (5' 9\")   Wt 114.8 kg (253 lb)   SpO2 97%   BMI 37.36 kg/m    HEENT:  Normocephalic, atraumatic.  PERRLA.  EOM s intact.  Visual fields full to gross exam  Neck:  Supple, non-tender, without lymphadenopathy.  Heart:  No peripheral edema  Lungs:  No SOB  Abdomen:  Non-distended.   Skin:  Warm and dry.  Extremities:  No edema, cyanosis or clubbing.  Psychiatric:  No apparent distress  Musculoskeletal:  Normal bulk and tone    NEUROLOGICAL EXAMINATION:     Mental status:  Alert and Oriented x 3, speech is fluent.  Cranial nerves:  II-XII intact.   Motor:    Shoulder Abduction:  Right:  5/5   Left:  5/5  Biceps:                      Right:  5/5   Left:  5/5  Triceps:                     Right:  5/5   Left:  5/5  Wrist Extensors:       Right:  5/5   Left:  5/5  Wrist Flexors:           Right:  5/5   Left:  5/5  interosseus :            " Right:  5/5   Left:  5/5  Hip Flexion:                Right: 5/5  Left:  5/5  Quadriceps:             Right:  5/5  Left:  5/5  Hamstrings:             Right:  5/5  Left:  5/5  Gastroc Soleus:        Right:  5/5  Left:  5/5  Tib/Ant:                      Right:  5/5  Left:  5/5  EHL:                     Right:  5/5  Left:  5/5  Sensation:  Intact  Reflexes:  Negative Babinski.  Negative Clonus.  Negative Orellana's.    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, presents with severe back pain, leg pain, and neurogenic claudications    I had a discussion with the patient, reviewing the history, symptoms, and imaging  Reviewed that HAWK not a great option as he would need to come off anti-coagulation, and unlikely to work given severe stenosis and neurogenic claudications  PT without improvement  Not a candidate for NSAIDs given his anti-coagulation  Will plan for L4-5 TLIF and L2-3 decompression  Risks and benefits discussed including infection, hematoma, CSF leak, nerve root injury, pseudoarthrosis, and need for further surgery           Again, thank you for allowing me to participate in the care of your patient.        Sincerely,        Cj Cleary MD

## 2024-02-08 NOTE — NURSING NOTE
"Robert Richard is a 88 year old male who presents for:  Chief Complaint   Patient presents with    Neurologic Problem     Chronic midline low back pain without sciatica    Hx of lumbosacral spine surgery    Anterolisthesis of lumbar spine          Initial Vitals:  /82   Pulse 79   Temp (!) 95  F (35  C) (Temporal)   Resp 18   Ht 5' 9\" (1.753 m)   Wt 253 lb (114.8 kg)   SpO2 97%   BMI 37.36 kg/m   Estimated body mass index is 37.36 kg/m  as calculated from the following:    Height as of this encounter: 5' 9\" (1.753 m).    Weight as of this encounter: 253 lb (114.8 kg).. Body surface area is 2.36 meters squared. BP completed using cuff size: large  No Pain (0)    Nursing Comments:     Coby Jeronimo    "

## 2024-02-08 NOTE — PROGRESS NOTES
WILL CALL. Patient will review with family, the call back when he would like to proceed. Patient looking forward to this surgery so he can get back to the things he enjoys.    Reviewed pre- and post-operative instructions as outlined in the After Visit Summary/Patient Instructions with patient.   Surgery folder was given to patient    Patient Education Topic: Procedure with Dr. Cleary     Learner(s): Patient    Knowledge Level: Basic    Readiness to Learn: Ready    Method:  Verbal explanation and Written material     Outcome: Able to verbalize instructions    Barriers to Learning: No barrier    Covid Testing: n/a    Scheduling Number: [phone]    NDI/MARQUISE: Confirmation of completion within last 6 months    Smoking Status: Never    Pain Clinic: N/A    STD/FMLA: No  Job Description: Not applicable   Time Off: Not applicable      Patient had the opportunity for questions to be answered. Advised Patient to call clinic with any questions/concerns. Gave patient antibacterial soap for pre-surgery skin preparation.     Zaina Smith RN on 2/8/2024 at 11:45 AM

## 2024-02-08 NOTE — PATIENT INSTRUCTIONS
WILL CALL  Patient Instructions    Surgery scheduled at [facility] for L4-5 TLIF with decompression and L2-3 laminectomy (TLIF (transforaminal lumbar interbody fusion) with Dr. Cleary     Pre-Operative    Surgical risks: blood clots in the leg or lung, problems urinating, nerve damage, drainage from the incision, infection, stiffness    You will need a Pre-operative physical with primary care physician within 30 days prior to your surgical date.     You will spend 2-4 nights in the hospital.    Shower procedure  You will need to shower the night before and morning of surgery using the antimicrobial soap (chlorhexidine) given to you. Please refer to showering instruction sheet in your surgery education folder.    Eating/Drinking  Stop all solid foods 8 hours before surgery.  Keep drinking clear liquids until 4 hours before surgery  Clear liquids include water, clear juice, black coffee, or clear tea without milk, Gatorade, clear soda.     Medications  Discontinue Aspirin & NSAIDs (Advil/Ibuprofen, Indocin, Naproxen,Nuprin,Relafen/Nabumetone, Diclofenac,Meloxicam, Aleve, Celebrex) 7 days prior to surgical date. After surgery, do not begin taking these medications until given clearance as it may cause bleeding and interfere with healing.  It is ok to take Tylenol (Acetaminophen) for pain within the 7 days prior to surgery. Example: you could take 1000 mg 3 times per day. Do not exceed 3,000 mg per day.   If you are on chronic pain medication (oxycodone, Percocet, hydrocodone, Vicodin, Norco, Dilaudid, morphine, MS Contin, naltrexone, Suboxone, etc) or have a pain contract we will reach out to your pain clinic to gather your most recent records and recommendations for pain management post-op.  Please ask your provider who manages your chronic pain if they require you to schedule an office visit prior to surgery. Continue obtaining your pain medications from your current provider until surgery.  Our team will manage your acute post-op pain in the hospital and during the recovery period. Your pain team will continue to manage your chronic pain.   Any other medications prescribed, please discuss with your primary care provider at your pre-operative physical        Post-Operative    Incision Care  Look at your incision site every day. You  may need a mirror or family member to help you.   Watch for signs of infection  Redness, swelling, warmth, drainage (Green or yellow drainage (pus) from your incision or increased bloody drainage), and fever of 101 degrees or higher  [facility] [phone]  Remove dressing as instructed upon discharge  Do not apply lotions or ointments to incision  It is okay to shower, just pat the incision dry   No submerging incision in water such as pools, hot tubs, or baths for at least 8 weeks and until the incision is healed    Pain Management  Dealing with pain  As your body heals, you might feel a stabbing, burning, or aching pain. You may also have some numbness.  Everyone feels pain differently, we may ask you to rate your pain using a pain scale. This will let us know how much pain you feel.   Keep in mind that medicine won't take away all of your pain. It helps to try other ways to relax and ease pain.   Things to help with pain  After surgery, we will give you medicine for your pain. These medications work well, but they can make you drowsy, itchy, or sick to your stomach. If we give you narcotics for pain, try to take the pills with food.   For mild to moderate pain, you can take medication such as Tylenol. These can be used with narcotics, but make sure that your narcotic does not contain Tylenol.   Do NOT drive while taking narcotic pain medication  Do NOT drink alcohol while using any pain medication  You can utilize ice as needed (no longer than 20 minutes at one time)  Refills of pain medication: please call the neurosurgery clinic to request 2-3 days before you run  out  Aspirin & NSAIDS (ex. Ibuprofen, aleve, naproxen): Don't take NSAIDs until 6 weeks after surgery to reduce risk of bleeding and interference with bone healing     Bowel Care  Many people have constipation (hard stools) after surgery. The narcotic pain medication we often prescribed can contribute to constipation. To help prevent constipation: Drink plenty of fluid (8-10 glasses/day); Eat more fiber, such as whole grain bread, bran cereal, and fruits and vegetables; Stay active by walking; Over the counter stool softener may also help.      Activity Restrictions  For the first 6-8 weeks, no lifting > 10 pounds, no bending, twisting, or overhead reaching.  Take stairs in moderation   Walking is the best way to start exercise after surgery. Take short frequent walks. You may gradually increase the distance as tolerated. If you feel pain, decrease your activity, but DO NOT stop walking. Walking will help you gain strength and prevent muscle soreness and spasms.   Avoid bed rest and prolonged sitting for longer than 30 minutes (change positions frequently while awake)  No contact sports or high impact activities such as; running/jogging, snowmobile or 4 simmons riding or any other recreational vehicles until after given clearance at one of your follow up visits  If a brace is required per Dr. Cleary, Orthotics will fit you for the brace in the hospital. Brace type and length of time to wear it will be determined by Dr. Cleary.     Contact clinic right away or go to the Emergency Department if you develop:   Infection (incisional redness, swelling, warmth, drainage, or fever (temp > 101 F))  New injury  Bladder or bowel changes or loss of control    Signs of blood clot:  Swelling and/or warmth in one or both legs  Pain or tenderness in your leg, ankle, foot, or arm   Red or discolored skin     Go to the Emergency Department   If sudden onset of severe headache, weakness, confusion, change in level of consciousness, pain,  or loss of movement.  Chest pain  Trouble breathing     Post-Op Follow Up Appointments  2 week incision check & staple/suture removal with Neurosurgery Nurse  6 week post op with x-ray prior with our Physician Assistant or Nurse Practitioner  3 months post op with x-ray prior with our Physician Assistant or Nurse Practitioner  6 months post op with x-ray prior with our Physician Assistant or Nurse Practitioner  1 year post op with x-ray prior with our Physician Assistant or Nurse Practitioner  Please call to schedule follow up and xray appointments at [phone]    Resources  If you are currently employed, you will need to be off work for 4-6 weeks for recovery and healing.  Please fax any FMLA/short term disability paperwork to [phone] prior to surgery  You may call our clinic when you'd like to return to work and we can provide a work letter  To allow staff adequate time to complete paperwork, please send as soon as possible     [facility]  [facility] - [address] [address]  Telephone:  [phone]        Fax:  [phone]

## 2024-02-09 NOTE — PROGRESS NOTES
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  Notes over a year of severe, throbbing, low back pain, with radiation to the bilateral buttocks and thighs.  When he walks more than 20 feet, he feels a deep heaviness in his legs, and feels that they will give out on him.  Needs a cane to walk.  PT and medical management without improvement.       Past Medical History:   Diagnosis Date    Basal cell carcinoma     Cancer (H)     DVT (deep venous thrombosis) (H) 11/2003    DVT (deep venous thrombosis) (H) 12/08    DVT (deep venous thrombosis) (H) 10/2010    Factor V Leiden (H24)     Gout     Other and unspecified hyperlipidemia     Prostate cancer (H) 2003    prostatectomy     Restless leg syndrome     Unspecified essential hypertension      Past Surgical History:   Procedure Laterality Date    APPENDECTOMY  1950    JOINT REPLACEMENT  4/2011    R hip    LAMINECTOMY LUMBAR ONE LEVEL  12/2008    LAPAROSCOPIC HERNIORRHAPHY UMBILICAL N/A 1/3/2019    Procedure: laparoscopic umbilical hernia repair with mesh;  Surgeon: Jamal Thompson DO;  Location:  OR    [facility] REVISE TOTAL HIP REPLACEMENT  1/18/13    R hip     Social History     Socioeconomic History    Marital status: Single     Spouse name: Not on file    Number of children: Not on file    Years of education: Not on file    Highest education level: Not on file   Occupational History    Not on file   Tobacco Use    Smoking status: Never    Smokeless tobacco: Never   Substance and Sexual Activity    Alcohol use: Yes     Alcohol/week: 0.0 standard drinks of alcohol     Comment: 1 drink every 1-2 weeks.    Drug use: No    Sexual activity: Yes     Partners: Female   Other Topics Concern    Parent/sibling w/ CABG, MI or angioplasty before 65F 55M? Not Asked   Social History Narrative    Not on file     Social Determinants of Health     Financial Resource  "Strain: Low Risk  (1/26/2024)    Financial Resource Strain     Within the past 12 months, have you or your family members you live with been unable to get utilities (heat, electricity) when it was really needed?: No   Food Insecurity: Low Risk  (1/26/2024)    Food Insecurity     Within the past 12 months, did you worry that your food would run out before you got money to buy more?: No     Within the past 12 months, did the food you bought just not last and you didn t have money to get more?: No   Transportation Needs: Low Risk  (1/26/2024)    Transportation Needs     Within the past 12 months, has lack of transportation kept you from medical appointments, getting your medicines, non-medical meetings or appointments, work, or from getting things that you need?: No   Physical Activity: Not on file   Stress: Not on file   Social Connections: Not on file   Interpersonal Safety: Low Risk  (1/26/2024)    Interpersonal Safety     Do you feel physically and emotionally safe where you currently live?: Yes     Within the past 12 months, have you been hit, slapped, kicked or otherwise physically hurt by someone?: No     Within the past 12 months, have you been humiliated or emotionally abused in other ways by your partner or ex-partner?: No   Housing Stability: Low Risk  (1/26/2024)    Housing Stability     Do you have housing? : Yes     Are you worried about losing your housing?: No     Family History   Problem Relation Age of Onset    Prostate Cancer Paternal Grandfather         ROS: 10 point ROS neg other than the symptoms noted above in the HPI.    Physical Exam  /82   Pulse 79   Temp (!) 95  F (35  C) (Temporal)   Resp 18   Ht 1.753 m (5' 9\")   Wt 114.8 kg (253 lb)   SpO2 97%   BMI 37.36 kg/m    HEENT:  Normocephalic, atraumatic.  PERRLA.  EOM s intact.  Visual fields full to gross exam  Neck:  Supple, non-tender, without lymphadenopathy.  Heart:  No peripheral edema  Lungs:  No SOB  Abdomen:  Non-distended. "   Skin:  Warm and dry.  Extremities:  No edema, cyanosis or clubbing.  Psychiatric:  No apparent distress  Musculoskeletal:  Normal bulk and tone    NEUROLOGICAL EXAMINATION:     Mental status:  Alert and Oriented x 3, speech is fluent.  Cranial nerves:  II-XII intact.   Motor:    Shoulder Abduction:  Right:  5/5   Left:  5/5  Biceps:                      Right:  5/5   Left:  5/5  Triceps:                     Right:  5/5   Left:  5/5  Wrist Extensors:       Right:  5/5   Left:  5/5  Wrist Flexors:           Right:  5/5   Left:  5/5  interosseus :            Right:  5/5   Left:  5/5  Hip Flexion:                Right: 5/5  Left:  5/5  Quadriceps:             Right:  5/5  Left:  5/5  Hamstrings:             Right:  5/5  Left:  5/5  Gastroc Soleus:        Right:  5/5  Left:  5/5  Tib/Ant:                      Right:  5/5  Left:  5/5  EHL:                     Right:  5/5  Left:  5/5  Sensation:  Intact  Reflexes:  Negative Babinski.  Negative Clonus.  Negative Orellana's.    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, presents with severe back pain, leg pain, and neurogenic claudications    I had a discussion with the patient, reviewing the history, symptoms, and imaging  Reviewed that HAWK not a great option as he would need to come off anti-coagulation, and unlikely to work given severe stenosis and neurogenic claudications  PT without improvement  Not a candidate for NSAIDs given his anti-coagulation  Will plan for L4-5 TLIF and L2-3 decompression  Risks and benefits discussed including infection, hematoma, CSF leak, nerve root injury, pseudoarthrosis, and need for further surgery

## 2024-02-13 ENCOUNTER — TELEPHONE (OUTPATIENT)
Dept: INTERNAL MEDICINE | Facility: CLINIC | Age: 89
End: 2024-02-13
Payer: COMMERCIAL

## 2024-02-13 NOTE — TELEPHONE ENCOUNTER
Patient's significant other Opal called in. No consent to communicate on file. Patient gave verbal consent to writer to talk with Opal.    Patient last seen by you 1/26/24.    Opal states patient was seen by Dr. Cleary in neurosurgery 2/8/24, had an MRI and was told he needs surgery.    Patient and Opal are requesting PCP to review office notes and imaging from Dr. Cleary. Patient would like to discuss with PCP, states they did make an appointment but the first available was 3/19/24. Patient would like to know if you can see him any sooner to discuss?     Patient and Opal state they would like a second opinion and referral sent to the [facility] for this if possible.     Radha Chaudhary, BSN, RN

## 2024-02-14 ENCOUNTER — VIRTUAL VISIT (OUTPATIENT)
Dept: INTERNAL MEDICINE | Facility: CLINIC | Age: 89
End: 2024-02-14
Payer: COMMERCIAL

## 2024-02-14 ENCOUNTER — TELEPHONE (OUTPATIENT)
Dept: INTERNAL MEDICINE | Facility: CLINIC | Age: 89
End: 2024-02-14

## 2024-02-14 DIAGNOSIS — M48.062 SPINAL STENOSIS OF LUMBAR REGION WITH NEUROGENIC CLAUDICATION: Primary | ICD-10-CM

## 2024-02-14 DIAGNOSIS — E11.40 CONTROLLED TYPE 2 DIABETES WITH NEUROPATHY (H): ICD-10-CM

## 2024-02-14 DIAGNOSIS — D68.51 FACTOR V LEIDEN MUTATION (H): ICD-10-CM

## 2024-02-14 PROCEDURE — 99442 PR PHYSICIAN TELEPHONE EVALUATION 11-20 MIN: CPT | Performed by: INTERNAL MEDICINE

## 2024-02-14 ASSESSMENT — ANXIETY QUESTIONNAIRES
3. WORRYING TOO MUCH ABOUT DIFFERENT THINGS: NOT AT ALL
4. TROUBLE RELAXING: NOT AT ALL
GAD7 TOTAL SCORE: 0
7. FEELING AFRAID AS IF SOMETHING AWFUL MIGHT HAPPEN: NOT AT ALL
GAD7 TOTAL SCORE: 0
6. BECOMING EASILY ANNOYED OR IRRITABLE: NOT AT ALL
2. NOT BEING ABLE TO STOP OR CONTROL WORRYING: NOT AT ALL
1. FEELING NERVOUS, ANXIOUS, OR ON EDGE: NOT AT ALL
5. BEING SO RESTLESS THAT IT IS HARD TO SIT STILL: NOT AT ALL

## 2024-02-14 ASSESSMENT — PATIENT HEALTH QUESTIONNAIRE - PHQ9: SUM OF ALL RESPONSES TO PHQ QUESTIONS 1-9: 3

## 2024-02-14 NOTE — TELEPHONE ENCOUNTER
Patient is calling with his [facility] number,    Patient's number is : [phone]    This is for his referral.    Eli Damon RN on 2/14/2024 at 3:30 PM

## 2024-02-23 ENCOUNTER — MYC MEDICAL ADVICE (OUTPATIENT)
Dept: ANTICOAGULATION | Facility: CLINIC | Age: 89
End: 2024-02-23
Payer: COMMERCIAL

## 2024-02-26 ENCOUNTER — LAB (OUTPATIENT)
Dept: LAB | Facility: CLINIC | Age: 89
End: 2024-02-26
Payer: COMMERCIAL

## 2024-02-26 ENCOUNTER — ANTICOAGULATION THERAPY VISIT (OUTPATIENT)
Dept: ANTICOAGULATION | Facility: CLINIC | Age: 89
End: 2024-02-26

## 2024-02-26 DIAGNOSIS — Z79.01 LONG TERM CURRENT USE OF ANTICOAGULANT THERAPY: ICD-10-CM

## 2024-02-26 DIAGNOSIS — I82.90 VENOUS THROMBOSIS: ICD-10-CM

## 2024-02-26 DIAGNOSIS — D68.51 FACTOR V LEIDEN MUTATION (H): Primary | ICD-10-CM

## 2024-02-26 DIAGNOSIS — D68.52 PROTHROMBIN MUTATION (H): ICD-10-CM

## 2024-02-26 DIAGNOSIS — D68.51 FACTOR V LEIDEN MUTATION (H): ICD-10-CM

## 2024-02-26 LAB — INR BLD: 2.3 (ref 0.9–1.1)

## 2024-02-26 PROCEDURE — 36416 COLLJ CAPILLARY BLOOD SPEC: CPT

## 2024-02-26 PROCEDURE — 85610 PROTHROMBIN TIME: CPT

## 2024-02-26 NOTE — PROGRESS NOTES
ANTICOAGULATION MANAGEMENT     Robert Richard 88 year old male is on warfarin with therapeutic INR result. (Goal INR 2.0-3.0)    Recent labs: (last 7 days)     02/26/24  0904   INR 2.3*       ASSESSMENT     Source(s): Chart Review and Patient/Caregiver Call     Warfarin doses taken: Warfarin taken as instructed  Diet: No new diet changes identified  Medication/supplement changes: None noted  New illness, injury, or hospitalization: No  Signs or symptoms of bleeding or clotting: No  Previous result: Therapeutic last visit; previously outside of goal range  Additional findings: None       PLAN     Recommended plan for no diet, medication or health factor changes affecting INR     Dosing Instructions: Continue your current warfarin dose with next INR in 4 weeks       Summary  As of 2/26/2024      Full warfarin instructions:  2.5 mg every Mon, Fri; 5 mg all other days   Next INR check:  3/25/2024               Telephone call with Carlos who verbalizes understanding and agrees to plan and who agrees to plan and repeated back plan correctly    Lab visit scheduled    Education provided:   None required    Plan made per [facility] anticoagulation protocol    Neelima Arreaga, RN  Anticoagulation Clinic  2/26/2024    _______________________________________________________________________     Anticoagulation Episode Summary       Current INR goal:  2.0-3.0   TTR:  76.9% (1 y)   Target end date:  Indefinite   Send INR reminders to:  [facility]    Indications    Factor V Leiden mutation (H24) [D68.51]  Venous thrombosis [I82.90]  Prothrombin mutation (H24) [D68.52]  Long term current use of anticoagulant therapy [Z79.01]             Comments:               Anticoagulation Care Providers       Provider Role Specialty Phone number    Stiven Donahue MD Referring Internal Medicine [phone]    Nate Cifuentes DO Responsible Internal Medicine [phone]

## 2024-03-15 DIAGNOSIS — J31.0 CHRONIC RHINITIS: ICD-10-CM

## 2024-03-15 RX ORDER — IPRATROPIUM BROMIDE 42 UG/1
SPRAY, METERED NASAL
Qty: 45 ML | Refills: 0 | Status: SHIPPED | OUTPATIENT
Start: 2024-03-15

## 2024-03-20 NOTE — PROGRESS NOTES
"Carlos is a 88 year old who is being evaluated via a billable telephone visit.      What phone number would you like to be contacted at? [phone]  How would you like to obtain your AVS? 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  He did see neurosurgery.  Recommended to have a surgery but he is would like another opinion.  He knows that injections and PT will not do much for him.  He has pain with basically every step.    We did discuss he is at higher risk because he is 88, has diabetes which is controlled with an A1c of 7.8, and has factor V Leiden mutation is on Coumadin.  But all of these can be adjusted and managed for surgery.    He would like to go to [facility] where he has previously been for surgery.  We will do a referral to the spinal surgery team.               Subjective   Carlos is a 88 year old, presenting for the following health issues:  Back Pain        2/14/2024     1:05 PM   Additional Questions   Roomed by Michi SPENCER   Accompanied by Opal (Spouse)     HPI     Pain History:  When did you first notice your pain? \"LAST SUMMER\"    Have you seen this provider for your pain in the past? Yes   Where in your body do you have pain? Lower Back   Are you seeing anyone else for your pain? No    Called and talked with him and his wife.     Saw neurosurgery last week, abnormal MRI was quite severe.  Was told surgery was the only option.  Injections and PT won't help him.      MRI shows severe stenosis, thought injections wouldn't work.      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 " ----- Message from MARCO Sue sent at 3/20/2024  9:09 AM CDT -----  Triglycerides are a little elevated.  Recommend starting a daily fish oil supplement (OTC)    Vit D low: start taking 2000 IU of Vit D daily (OTC)    A1C has increased from prior to 7.8.  Are you taking the metformin, jardiance and trulicity as prescribed?  Any recent diet changes?         PDMP Review       None          Last CSA Agreement:   CSA -- Patient Level:    CSA: None found at the patient level.         Objective           Vitals:  No vitals were obtained today due to virtual visit.    Physical Exam   General: Alert and no distress //Respiratory: No audible wheeze, cough, or shortness of breath // Psychiatric:  Appropriate affect, tone, and pace of words            Phone call duration: 13 minutes  Signed Electronically by: Stiven Donahue MD

## 2024-03-28 ENCOUNTER — ANTICOAGULATION THERAPY VISIT (OUTPATIENT)
Dept: ANTICOAGULATION | Facility: CLINIC | Age: 89
End: 2024-03-28

## 2024-03-28 ENCOUNTER — LAB (OUTPATIENT)
Dept: LAB | Facility: CLINIC | Age: 89
End: 2024-03-28
Payer: COMMERCIAL

## 2024-03-28 DIAGNOSIS — D68.51 FACTOR V LEIDEN MUTATION (H): ICD-10-CM

## 2024-03-28 DIAGNOSIS — I82.90 VENOUS THROMBOSIS: ICD-10-CM

## 2024-03-28 DIAGNOSIS — D68.51 FACTOR V LEIDEN MUTATION (H): Primary | ICD-10-CM

## 2024-03-28 DIAGNOSIS — Z79.01 LONG TERM CURRENT USE OF ANTICOAGULANT THERAPY: ICD-10-CM

## 2024-03-28 DIAGNOSIS — D68.52 PROTHROMBIN MUTATION (H): ICD-10-CM

## 2024-03-28 LAB — INR BLD: 2.4 (ref 0.9–1.1)

## 2024-03-28 PROCEDURE — 36416 COLLJ CAPILLARY BLOOD SPEC: CPT

## 2024-03-28 PROCEDURE — 85610 PROTHROMBIN TIME: CPT

## 2024-03-28 NOTE — PROGRESS NOTES
ANTICOAGULATION MANAGEMENT     Robert Richard 88 year old male is on warfarin with therapeutic INR result. (Goal INR 2.0-3.0)    Recent labs: (last 7 days)     03/28/24  0850   INR 2.4*       ASSESSMENT     Source(s): Chart Review and Patient/Caregiver Call     Warfarin doses taken: Warfarin taken as instructed  Diet: No new diet changes identified  Medication/supplement changes: None noted  New illness, injury, or hospitalization: No  Signs or symptoms of bleeding or clotting: No  Previous result: Therapeutic last 2(+) visits  Additional findings: None       PLAN     Recommended plan for no diet, medication or health factor changes affecting INR     Dosing Instructions: Continue your current warfarin dose with next INR in 5 weeks       Summary  As of 3/28/2024      Full warfarin instructions:  2.5 mg every Mon, Fri; 5 mg all other days   Next INR check:  5/2/2024               Telephone call with Carlos who verbalizes understanding and agrees to plan and who agrees to plan and repeated back plan correctly    Lab visit scheduled    Education provided:   None required    Plan made per [facility] anticoagulation protocol    Neelima Arraega, RN  Anticoagulation Clinic  3/28/2024    _______________________________________________________________________     Anticoagulation Episode Summary       Current INR goal:  2.0-3.0   TTR:  76.9% (1 y)   Target end date:  Indefinite   Send INR reminders to:  [facility]    Indications    Factor V Leiden mutation (H24) [D68.51]  Venous thrombosis [I82.90]  Prothrombin mutation (H24) [D68.52]  Long term current use of anticoagulant therapy [Z79.01]             Comments:               Anticoagulation Care Providers       Provider Role Specialty Phone number    Stiven Donahue MD Referring Internal Medicine [phone]    Nate Cifuentes DO Responsible Internal Medicine [phone]

## 2024-04-16 ENCOUNTER — E-VISIT (OUTPATIENT)
Dept: FAMILY MEDICINE | Facility: CLINIC | Age: 89
End: 2024-04-16
Payer: COMMERCIAL

## 2024-04-16 DIAGNOSIS — J01.90 ACUTE SINUSITIS WITH SYMPTOMS > 10 DAYS: Primary | ICD-10-CM

## 2024-04-16 PROCEDURE — 99421 OL DIG E/M SVC 5-10 MIN: CPT | Performed by: INTERNAL MEDICINE

## 2024-04-17 RX ORDER — AMOXICILLIN 875 MG
875 TABLET ORAL 2 TIMES DAILY
Qty: 20 TABLET | Refills: 0 | Status: SHIPPED | OUTPATIENT
Start: 2024-04-17 | End: 2024-04-30

## 2024-04-25 ENCOUNTER — OFFICE VISIT (OUTPATIENT)
Dept: NEUROSURGERY | Facility: CLINIC | Age: 89
End: 2024-04-25
Payer: COMMERCIAL

## 2024-04-25 VITALS — DIASTOLIC BLOOD PRESSURE: 82 MMHG | HEART RATE: 70 BPM | OXYGEN SATURATION: 95 % | SYSTOLIC BLOOD PRESSURE: 145 MMHG

## 2024-04-25 DIAGNOSIS — M43.17 SPONDYLOLISTHESIS, LUMBOSACRAL REGION: Primary | ICD-10-CM

## 2024-04-25 DIAGNOSIS — M43.16 SPONDYLOLISTHESIS OF LUMBAR REGION: Primary | ICD-10-CM

## 2024-04-25 PROCEDURE — 99214 OFFICE O/P EST MOD 30 MIN: CPT | Performed by: NEUROLOGICAL SURGERY

## 2024-04-25 NOTE — PROGRESS NOTES
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  Notes over a year of severe, throbbing, low back pain, with radiation to the bilateral buttocks and thighs.  When he walks more than 20 feet, he feels a deep heaviness in his legs, and feels that they will give out on him.  Needs a cane to walk.  PT and medical management without improvement.    Returns for follow up.  Symptoms have worsened in the last 2 months, and patient is having severe heaviness in the legs and can only walk a few feet even with assist.       Past Medical History:   Diagnosis Date    Basal cell carcinoma     Cancer (H)     DVT (deep venous thrombosis) (H) 11/2003    DVT (deep venous thrombosis) (H) 12/08    DVT (deep venous thrombosis) (H) 10/2010    Factor V Leiden (H24)     Gout     Other and unspecified hyperlipidemia     Prostate cancer (H) 2003    prostatectomy     Restless leg syndrome     Unspecified essential hypertension      Past Surgical History:   Procedure Laterality Date    APPENDECTOMY  1950    JOINT REPLACEMENT  4/2011    R hip    LAMINECTOMY LUMBAR ONE LEVEL  12/2008    LAPAROSCOPIC HERNIORRHAPHY UMBILICAL N/A 1/3/2019    Procedure: laparoscopic umbilical hernia repair with mesh;  Surgeon: Jamal Thompson DO;  Location:  OR    [facility] REVISE TOTAL HIP REPLACEMENT  1/18/13    R hip     Social History     Socioeconomic History    Marital status: Single     Spouse name: Not on file    Number of children: Not on file    Years of education: Not on file    Highest education level: Not on file   Occupational History    Not on file   Tobacco Use    Smoking status: Never    Smokeless tobacco: Never   Substance and Sexual Activity    Alcohol use: Yes     Alcohol/week: 0.0 standard drinks of alcohol     Comment: 1 drink every 1-2 weeks.    Drug use: No    Sexual activity: Yes     Partners: Female   Other Topics Concern     Parent/sibling w/ CABG, MI or angioplasty before 65F 55M? Not Asked   Social History Narrative    Not on file     Social Determinants of Health     Financial Resource Strain: Low Risk  (1/26/2024)    Financial Resource Strain     Within the past 12 months, have you or your family members you live with been unable to get utilities (heat, electricity) when it was really needed?: No   Food Insecurity: Low Risk  (1/26/2024)    Food Insecurity     Within the past 12 months, did you worry that your food would run out before you got money to buy more?: No     Within the past 12 months, did the food you bought just not last and you didn t have money to get more?: No   Transportation Needs: Low Risk  (1/26/2024)    Transportation Needs     Within the past 12 months, has lack of transportation kept you from medical appointments, getting your medicines, non-medical meetings or appointments, work, or from getting things that you need?: No   Physical Activity: Not on file   Stress: Not on file   Social Connections: Not on file   Interpersonal Safety: Low Risk  (1/26/2024)    Interpersonal Safety     Do you feel physically and emotionally safe where you currently live?: Yes     Within the past 12 months, have you been hit, slapped, kicked or otherwise physically hurt by someone?: No     Within the past 12 months, have you been humiliated or emotionally abused in other ways by your partner or ex-partner?: No   Housing Stability: Low Risk  (1/26/2024)    Housing Stability     Do you have housing? : Yes     Are you worried about losing your housing?: No     Family History   Problem Relation Age of Onset    Prostate Cancer Paternal Grandfather         ROS: 10 point ROS neg other than the symptoms noted above in the HPI.    Physical Exam  BP (!) 145/82 (BP Location: Right arm)   Pulse 70   SpO2 95%   HEENT:  Normocephalic, atraumatic.  PERRLA.  EOM s intact.  Visual fields full to gross exam  Neck:  Supple, non-tender, without  lymphadenopathy.  Heart:  No peripheral edema  Lungs:  No SOB  Abdomen:  Non-distended.   Skin:  Warm and dry.  Extremities:  No edema, cyanosis or clubbing.  Psychiatric:  No apparent distress  Musculoskeletal:  Normal bulk and tone    NEUROLOGICAL EXAMINATION:     Mental status:  Alert and Oriented x 3, speech is fluent.  Cranial nerves:  II-XII intact.   Motor:    Shoulder Abduction:  Right:  5/5   Left:  5/5  Biceps:                      Right:  5/5   Left:  5/5  Triceps:                     Right:  5/5   Left:  5/5  Wrist Extensors:       Right:  5/5   Left:  5/5  Wrist Flexors:           Right:  5/5   Left:  5/5  interosseus :            Right:  5/5   Left:  5/5  Hip Flexion:                Right: 4/5  Left:  4/5  Quadriceps:             Right:  5/5  Left:  5/5  Hamstrings:             Right:  5/5  Left:  5/5  Gastroc Soleus:        Right:  5/5  Left:  5/5  Tib/Ant:                      Right:  4/5  Left:  4/5  EHL:                     Right:  4/5  Left:  4/5  Sensation:  Numbness in bilateral feet  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, presents with severe back pain, leg pain, and neurogenic claudications    Not a candidate for further physical therapy given marked weakness  Not a candidate for NSAIDs given his anti-coagulation  [place] did not offer him an appointment  I discussed multiple options for a second opinion with patient and his wife including [facility], [facility], and [facility]  They decided they would like to proceed with surgery  Will plan for L4-5 TLIF and L2-3 decompression  Risks and benefits discussed including infection, hematoma, CSF leak, nerve root injury, pseudoarthrosis, DVT/PE, MI, Stroke, Other medical complications, and need for further surgery

## 2024-04-25 NOTE — LETTER
4/25/2024         RE: Robert Richard  [id number] [place] [facility] [address]        Dear Colleague,    Thank you for referring your patient, Robert Richard, to the [facility] [place]. Please see a copy of my visit note below.    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  Notes over a year of severe, throbbing, low back pain, with radiation to the bilateral buttocks and thighs.  When he walks more than 20 feet, he feels a deep heaviness in his legs, and feels that they will give out on him.  Needs a cane to walk.  PT and medical management without improvement.    Returns for follow up.  Symptoms have worsened in the last 2 months, and patient is having severe heaviness in the legs and can only walk a few feet even with assist.       Past Medical History:   Diagnosis Date     Basal cell carcinoma      Cancer (H)      DVT (deep venous thrombosis) (H) 11/2003     DVT (deep venous thrombosis) (H) 12/08     DVT (deep venous thrombosis) (H) 10/2010     Factor V Leiden (H24)      Gout      Other and unspecified hyperlipidemia      Prostate cancer (H) 2003    prostatectomy      Restless leg syndrome      Unspecified essential hypertension      Past Surgical History:   Procedure Laterality Date     APPENDECTOMY  1950     JOINT REPLACEMENT  4/2011    R hip     LAMINECTOMY LUMBAR ONE LEVEL  12/2008     LAPAROSCOPIC HERNIORRHAPHY UMBILICAL N/A 1/3/2019    Procedure: laparoscopic umbilical hernia repair with mesh;  Surgeon: Jamal Thompson DO;  Location:  OR     [facility] REVISE TOTAL HIP REPLACEMENT  1/18/13    R hip     Social History     Socioeconomic History     Marital status: Single     Spouse name: Not on file     Number of children: Not on file     Years of education: Not on file     Highest education level: Not on file   Occupational History     Not on file    Tobacco Use     Smoking status: Never     Smokeless tobacco: Never   Substance and Sexual Activity     Alcohol use: Yes     Alcohol/week: 0.0 standard drinks of alcohol     Comment: 1 drink every 1-2 weeks.     Drug use: No     Sexual activity: Yes     Partners: Female   Other Topics Concern     Parent/sibling w/ CABG, MI or angioplasty before 65F 55M? Not Asked   Social History Narrative     Not on file     Social Determinants of Health     Financial Resource Strain: Low Risk  (1/26/2024)    Financial Resource Strain      Within the past 12 months, have you or your family members you live with been unable to get utilities (heat, electricity) when it was really needed?: No   Food Insecurity: Low Risk  (1/26/2024)    Food Insecurity      Within the past 12 months, did you worry that your food would run out before you got money to buy more?: No      Within the past 12 months, did the food you bought just not last and you didn t have money to get more?: No   Transportation Needs: Low Risk  (1/26/2024)    Transportation Needs      Within the past 12 months, has lack of transportation kept you from medical appointments, getting your medicines, non-medical meetings or appointments, work, or from getting things that you need?: No   Physical Activity: Not on file   Stress: Not on file   Social Connections: Not on file   Interpersonal Safety: Low Risk  (1/26/2024)    Interpersonal Safety      Do you feel physically and emotionally safe where you currently live?: Yes      Within the past 12 months, have you been hit, slapped, kicked or otherwise physically hurt by someone?: No      Within the past 12 months, have you been humiliated or emotionally abused in other ways by your partner or ex-partner?: No   Housing Stability: Low Risk  (1/26/2024)    Housing Stability      Do you have housing? : Yes      Are you worried about losing your housing?: No     Family History   Problem Relation Age of Onset     Prostate Cancer  Paternal Grandfather         ROS: 10 point ROS neg other than the symptoms noted above in the HPI.    Physical Exam  BP (!) 145/82 (BP Location: Right arm)   Pulse 70   SpO2 95%   HEENT:  Normocephalic, atraumatic.  PERRLA.  EOM s intact.  Visual fields full to gross exam  Neck:  Supple, non-tender, without lymphadenopathy.  Heart:  No peripheral edema  Lungs:  No SOB  Abdomen:  Non-distended.   Skin:  Warm and dry.  Extremities:  No edema, cyanosis or clubbing.  Psychiatric:  No apparent distress  Musculoskeletal:  Normal bulk and tone    NEUROLOGICAL EXAMINATION:     Mental status:  Alert and Oriented x 3, speech is fluent.  Cranial nerves:  II-XII intact.   Motor:    Shoulder Abduction:  Right:  5/5   Left:  5/5  Biceps:                      Right:  5/5   Left:  5/5  Triceps:                     Right:  5/5   Left:  5/5  Wrist Extensors:       Right:  5/5   Left:  5/5  Wrist Flexors:           Right:  5/5   Left:  5/5  interosseus :            Right:  5/5   Left:  5/5  Hip Flexion:                Right: 4/5  Left:  4/5  Quadriceps:             Right:  5/5  Left:  5/5  Hamstrings:             Right:  5/5  Left:  5/5  Gastroc Soleus:        Right:  5/5  Left:  5/5  Tib/Ant:                      Right:  4/5  Left:  4/5  EHL:                     Right:  4/5  Left:  4/5  Sensation:  Numbness in bilateral feet  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, presents with severe back pain, leg pain, and neurogenic claudications    Not a candidate for further physical therapy given marked weakness  Not a candidate for NSAIDs given his anti-coagulation  [place] did not offer him an appointment  I discussed multiple options for a second opinion with patient and his wife including [facility], [facility], and [place]  They decided they would like to proceed with surgery  Will plan for L4-5 TLIF and L2-3  decompression  Risks and benefits discussed including infection, hematoma, CSF leak, nerve root injury, pseudoarthrosis, DVT/PE, MI, Stroke, Other medical complications, and need for further surgery           Again, thank you for allowing me to participate in the care of your patient.        Sincerely,        Cj Cleary MD

## 2024-04-25 NOTE — PROGRESS NOTES
Re-reviewed key points of patient education with patient and spouse for upcoming surgery during this visit. He expressed need for a TCU after the hospital. Advised he collect a list of his top 4 locations and bring to the hospital during recovery. From there, the  will assist to secure placement.    Zaina Smith RN on 4/25/2024 at 11:26 AM

## 2024-04-25 NOTE — NURSING NOTE
"Robert Richard is a 88 year old male who presents for:  Chief Complaint   Patient presents with    RECHECK     Limited ROM with back and \"maximum discomfort\" when attempting some activities        Initial Vitals:  BP (!) 145/82 (BP Location: Right arm)   Pulse 70   SpO2 95%  Estimated body mass index is 37.36 kg/m  as calculated from the following:    Height as of 2/8/24: 5' 9\" (1.753 m).    Weight as of 2/8/24: 253 lb (114.8 kg).. There is no height or weight on file to calculate BSA. BP completed using cuff size: large  Data Unavailable    Nursing Comments:     Jason Morin    "

## 2024-04-29 DIAGNOSIS — M43.17 SPONDYLOLISTHESIS, LUMBOSACRAL REGION: Primary | ICD-10-CM

## 2024-04-29 DIAGNOSIS — Z01.818 PRE-OP TESTING: ICD-10-CM

## 2024-04-30 ENCOUNTER — OFFICE VISIT (OUTPATIENT)
Dept: INTERNAL MEDICINE | Facility: CLINIC | Age: 89
End: 2024-04-30
Payer: COMMERCIAL

## 2024-04-30 ENCOUNTER — ANTICOAGULATION THERAPY VISIT (OUTPATIENT)
Dept: ANTICOAGULATION | Facility: CLINIC | Age: 89
End: 2024-04-30

## 2024-04-30 ENCOUNTER — LAB (OUTPATIENT)
Dept: LAB | Facility: CLINIC | Age: 89
End: 2024-04-30
Payer: COMMERCIAL

## 2024-04-30 ENCOUNTER — TELEPHONE (OUTPATIENT)
Dept: INTERNAL MEDICINE | Facility: CLINIC | Age: 89
End: 2024-04-30

## 2024-04-30 VITALS
RESPIRATION RATE: 20 BRPM | TEMPERATURE: 97 F | WEIGHT: 250.7 LBS | BODY MASS INDEX: 38 KG/M2 | SYSTOLIC BLOOD PRESSURE: 138 MMHG | DIASTOLIC BLOOD PRESSURE: 80 MMHG | HEIGHT: 68 IN | OXYGEN SATURATION: 96 % | HEART RATE: 64 BPM

## 2024-04-30 DIAGNOSIS — M48.062 SPINAL STENOSIS OF LUMBAR REGION WITH NEUROGENIC CLAUDICATION: ICD-10-CM

## 2024-04-30 DIAGNOSIS — G47.33 OBSTRUCTIVE SLEEP APNEA SYNDROME: ICD-10-CM

## 2024-04-30 DIAGNOSIS — D68.51 FACTOR V LEIDEN MUTATION (H): Primary | ICD-10-CM

## 2024-04-30 DIAGNOSIS — Z79.01 LONG TERM CURRENT USE OF ANTICOAGULANT THERAPY: ICD-10-CM

## 2024-04-30 DIAGNOSIS — D68.52 PROTHROMBIN MUTATION (H): ICD-10-CM

## 2024-04-30 DIAGNOSIS — R01.1 HEART MURMUR: ICD-10-CM

## 2024-04-30 DIAGNOSIS — I82.90 VENOUS THROMBOSIS: ICD-10-CM

## 2024-04-30 DIAGNOSIS — G89.29 CHRONIC MIDLINE LOW BACK PAIN WITH SCIATICA, SCIATICA LATERALITY UNSPECIFIED: ICD-10-CM

## 2024-04-30 DIAGNOSIS — E66.812 CLASS 2 OBESITY DUE TO EXCESS CALORIES WITHOUT SERIOUS COMORBIDITY WITH BODY MASS INDEX (BMI) OF 37.0 TO 37.9 IN ADULT: ICD-10-CM

## 2024-04-30 DIAGNOSIS — E66.09 CLASS 2 OBESITY DUE TO EXCESS CALORIES WITHOUT SERIOUS COMORBIDITY WITH BODY MASS INDEX (BMI) OF 37.0 TO 37.9 IN ADULT: ICD-10-CM

## 2024-04-30 DIAGNOSIS — D68.51 FACTOR V LEIDEN MUTATION (H): ICD-10-CM

## 2024-04-30 DIAGNOSIS — Z01.818 PREOPERATIVE EXAMINATION: Primary | ICD-10-CM

## 2024-04-30 DIAGNOSIS — Z86.718 PERSONAL HISTORY OF VENOUS THROMBOSIS AND EMBOLISM: ICD-10-CM

## 2024-04-30 DIAGNOSIS — E11.40 CONTROLLED TYPE 2 DIABETES WITH NEUROPATHY (H): ICD-10-CM

## 2024-04-30 DIAGNOSIS — M54.40 CHRONIC MIDLINE LOW BACK PAIN WITH SCIATICA, SCIATICA LATERALITY UNSPECIFIED: ICD-10-CM

## 2024-04-30 DIAGNOSIS — I10 ESSENTIAL HYPERTENSION WITH GOAL BLOOD PRESSURE LESS THAN 140/90: ICD-10-CM

## 2024-04-30 LAB
ALBUMIN SERPL BCG-MCNC: 4.1 G/DL (ref 3.5–5.2)
ALBUMIN UR-MCNC: 30 MG/DL
ALP SERPL-CCNC: 98 U/L (ref 40–150)
ALT SERPL W P-5'-P-CCNC: 21 U/L (ref 0–70)
ANION GAP SERPL CALCULATED.3IONS-SCNC: 10 MMOL/L (ref 7–15)
APPEARANCE UR: CLEAR
AST SERPL W P-5'-P-CCNC: 22 U/L (ref 0–45)
BILIRUB DIRECT SERPL-MCNC: <0.2 MG/DL (ref 0–0.3)
BILIRUB SERPL-MCNC: 0.6 MG/DL
BILIRUB UR QL STRIP: NEGATIVE
BUN SERPL-MCNC: 20.1 MG/DL (ref 8–23)
CALCIUM SERPL-MCNC: 9.4 MG/DL (ref 8.8–10.2)
CHLORIDE SERPL-SCNC: 103 MMOL/L (ref 98–107)
COLOR UR AUTO: YELLOW
CREAT SERPL-MCNC: 1.47 MG/DL (ref 0.67–1.17)
DEPRECATED HCO3 PLAS-SCNC: 26 MMOL/L (ref 22–29)
EGFRCR SERPLBLD CKD-EPI 2021: 46 ML/MIN/1.73M2
ERYTHROCYTE [DISTWIDTH] IN BLOOD BY AUTOMATED COUNT: 12.8 % (ref 10–15)
GLUCOSE SERPL-MCNC: 122 MG/DL (ref 70–99)
GLUCOSE UR STRIP-MCNC: NEGATIVE MG/DL
HBA1C MFR BLD: 7 %
HCT VFR BLD AUTO: 39.2 % (ref 40–53)
HGB BLD-MCNC: 13.1 G/DL (ref 13.3–17.7)
HGB UR QL STRIP: ABNORMAL
INR BLD: 2 (ref 0.9–1.1)
KETONES UR STRIP-MCNC: NEGATIVE MG/DL
LEUKOCYTE ESTERASE UR QL STRIP: NEGATIVE
MCH RBC QN AUTO: 31 PG (ref 26.5–33)
MCHC RBC AUTO-ENTMCNC: 33.4 G/DL (ref 31.5–36.5)
MCV RBC AUTO: 93 FL (ref 78–100)
MUCOUS THREADS #/AREA URNS LPF: PRESENT /LPF
NITRATE UR QL: NEGATIVE
PH UR STRIP: 5 [PH] (ref 5–7)
PLATELET # BLD AUTO: 153 10E3/UL (ref 150–450)
POTASSIUM SERPL-SCNC: 3.9 MMOL/L (ref 3.4–5.3)
PROT SERPL-MCNC: 7.4 G/DL (ref 6.4–8.3)
RBC # BLD AUTO: 4.22 10E6/UL (ref 4.4–5.9)
RBC URINE: 2 /HPF
SODIUM SERPL-SCNC: 139 MMOL/L (ref 135–145)
SP GR UR STRIP: 1.01 (ref 1–1.03)
SQUAMOUS EPITHELIAL: <1 /HPF
UROBILINOGEN UR STRIP-MCNC: NORMAL MG/DL
WBC # BLD AUTO: 8.3 10E3/UL (ref 4–11)
WBC URINE: 1 /HPF

## 2024-04-30 PROCEDURE — 83036 HEMOGLOBIN GLYCOSYLATED A1C: CPT | Performed by: INTERNAL MEDICINE

## 2024-04-30 PROCEDURE — 99214 OFFICE O/P EST MOD 30 MIN: CPT | Performed by: INTERNAL MEDICINE

## 2024-04-30 PROCEDURE — 81001 URINALYSIS AUTO W/SCOPE: CPT | Performed by: INTERNAL MEDICINE

## 2024-04-30 PROCEDURE — 82248 BILIRUBIN DIRECT: CPT | Performed by: INTERNAL MEDICINE

## 2024-04-30 PROCEDURE — 93000 ELECTROCARDIOGRAM COMPLETE: CPT | Performed by: INTERNAL MEDICINE

## 2024-04-30 PROCEDURE — 85610 PROTHROMBIN TIME: CPT

## 2024-04-30 PROCEDURE — 36416 COLLJ CAPILLARY BLOOD SPEC: CPT

## 2024-04-30 PROCEDURE — 36415 COLL VENOUS BLD VENIPUNCTURE: CPT | Performed by: INTERNAL MEDICINE

## 2024-04-30 PROCEDURE — 80053 COMPREHEN METABOLIC PANEL: CPT | Performed by: INTERNAL MEDICINE

## 2024-04-30 PROCEDURE — 85027 COMPLETE CBC AUTOMATED: CPT | Performed by: INTERNAL MEDICINE

## 2024-04-30 RX ORDER — RESPIRATORY SYNCYTIAL VIRUS VACCINE 120MCG/0.5
0.5 KIT INTRAMUSCULAR ONCE
Qty: 1 EACH | Refills: 0 | Status: CANCELLED | OUTPATIENT
Start: 2024-04-30 | End: 2024-04-30

## 2024-04-30 ASSESSMENT — PAIN SCALES - GENERAL: PAINLEVEL: NO PAIN (0)

## 2024-04-30 NOTE — PROGRESS NOTES
ANTICOAGULATION MANAGEMENT     Robert Richard 88 year old male is on warfarin with therapeutic INR result. (Goal INR 2.0-3.0)    Recent labs: (last 7 days)     04/30/24  0940   INR 2.0*       ASSESSMENT     Source(s): Chart Review  Previous INR was Therapeutic last 2(+) visits  Medication, diet, health changes since last INR chart reviewed; none identified  Upcoming Lumbar 4 to Lumbar 5 Transforaminal Interbody Fusion with decompression of stenosis and Lumbar 2 to Lumbar 3 bilateral laminectomy for decompression of stenosis on 5/10/24. Note sent to [facility] to advise on hold plan.   Patient going to TCU s/p procedure          PLAN     Recommended plan for no diet, medication or health factor changes affecting INR     Dosing Instructions: Continue your current warfarin dose with next INR in 2 weeks       Summary  As of 4/30/2024      Full warfarin instructions:  2.5 mg every Mon, Fri; 5 mg all other days   Next INR check:  5/14/2024               Detailed voice message left for Carlos with dosing instructions and follow up date.     Contact [phone]  to schedule and with any changes, questions or concerns.     Education provided:   Contact [phone]  with any changes, questions or concerns.     Plan made per [facility] anticoagulation protocol    Amina Bertrand RN  Anticoagulation Clinic  4/30/2024    _______________________________________________________________________     Anticoagulation Episode Summary       Current INR goal:  2.0-3.0   TTR:  78.9% (1 y)   Target end date:  Indefinite   Send INR reminders to:  [facility] [place]    Indications    Factor V Leiden mutation (H24) [D68.51]  Venous thrombosis [I82.90]  Prothrombin mutation (H24) [D68.52]  Long term current use of anticoagulant therapy [Z79.01]             Comments:               Anticoagulation Care Providers       Provider Role Specialty Phone number    Stiven Doanhue MD Referring Internal Medicine [phone]    Nate Cifuentes DO  [facility] Internal Medicine [phone]

## 2024-04-30 NOTE — TELEPHONE ENCOUNTER
[facility]-PROCEDURAL ANTICOAGULATION  MANAGEMENT    Robert requesting pre-procedure hold orders for warfarin and review for bridging      Procedure date: 5/10/24       Procedure:  Lumbar 4 to Lumbar 5 Transforaminal Interbody Fusion with decompression of stenosis and Lumbar 2 to Lumbar 3 bilateral laminectomy for decompression of stenosis      Procedure location and phone number (if external): [facility]     Number of warfarin hold days requested and/or target INR: 5 days    Pre-op date:  had done today 4/30/24      Routing to Anticoagulation Pharmacist for review.      Amina Bertrand RN

## 2024-04-30 NOTE — PATIENT INSTRUCTIONS
Patient Instructions:  ++++++++++++++++++++++++++++++++++++++++++++   I have asked the patient to :    Do not take warfarin 5 days prior to the procedure.  Do not take losartan or glipizide the morning of the procedure.  Take atenolol the morning of the procedure.  Do not skip this medication.        Thank you for allowing me to participate in the perioperative care.

## 2024-04-30 NOTE — PROGRESS NOTES
Preoperative Evaluation  [address] [address]  Phone: [phone]  Primary Provider: Stiven Donahue  Pre-op Performing Provider: DIPIKA WINSLOW  Apr 30, 2024       Carlos is a 88 year old, presenting for the following:  Pre-Op Exam        4/30/2024    10:02 AM   Additional Questions   Roomed by Ingrid SALAZAR     Surgical Information  Surgery/Procedure: Lumbar 4 to Lumbar 5 Transforaminal Interbody Fusion with decompression of stenosis and Lumbar 2 to Lumbar 3 bilateral laminectomy for decompression of stenosis   Surgery Location: [facility]  Surgeon: Dr. Cleary  Surgery Date: 5/10/2024  Time of Surgery: 10:25 am  Where patient plans to recover: Other: ? [facility] or [facility]   Fax number for surgical facility: Note does not need to be faxed, will be available electronically in Epic.    Assessment & Plan     The proposed surgical procedure is considered INTERMEDIATE risk.    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    Essential hypertension with goal blood pressure less than 140/90    - Basic metabolic panel  (Ca, Cl, CO2, Creat, Gluc, K, Na, BUN); Future  - UA Macroscopic with reflex to Microscopic and Culture - Lab Collect; Future  - EKG 12-lead complete w/read - Clinics  - Basic metabolic panel  (Ca, Cl, CO2, Creat, Gluc, K, Na, BUN)  - UA Macroscopic with reflex to Microscopic and  Culture - Lab Collect    Heart murmur  Consistent mild aortic valve stenosis.  Echo confirmed    Obstructive sleep apnea syndrome      Class 2 obesity due to excess calories without serious comorbidity with body mass index (BMI) of 37.0 to 37.9 in adult        Possible Sleep Apnea:   Based on patient's history and body habitus, he is at increased risk for obstructive sleep apnea.  Do not leave unattended in the supine position.     - No identified additional risk factors other than previously addressed    Antiplatelet or Anticoagulation Medication Instructions   - warfarin: Thromboembolic risk is low (<4%/year). HOLD warfarin for 5 days without bridging before procedure.     Additional Medication Instructions  Patient is instructed to hold the losartan and glipizide the morning of the procedure.Specifically instructed to take the atenolol as usual.        Recommendation  APPROVAL GIVEN to proceed with proposed procedure, without further diagnostic evaluation.          Subjective       Via the Health Maintenance questionnaire, the patient has reported the following services have been completed -Eye Exam, this information has been sent to the abstraction team.  HPI related to upcoming procedure: Lumbar 4 to Lumbar 5 Transforaminal Interbody Fusion with decompression of stenosis and Lumbar 2 to Lumbar 3 bilateral laminectomy for decompression of stenosis         4/30/2024     9:51 AM   Preop Questions   1. Have you ever had a heart attack or stroke? No   2. Have you ever had surgery on your heart or blood vessels, such as a stent placement, a coronary artery bypass, or surgery on an artery in your head, neck, heart, or legs? YES - Left leg   3. Do you have chest pain with activity? No   4. Do you have a history of  heart failure? No   5. Do you currently have a cold, bronchitis or symptoms of other infection? YES - wheezing   6. Do you have a cough, shortness of breath, or wheezing? YES - wheezing   7. Do you or anyone  in your family have previous history of blood clots? YES - Self   8. Do you or does anyone in your family have a serious bleeding problem such as prolonged bleeding following surgeries or cuts? No   9. Have you ever had problems with anemia or been told to take iron pills? No   10. Have you had any abnormal blood loss such as black, tarry or bloody stools? No   11. Have you ever had a blood transfusion? No   12. Are you willing to have a blood transfusion if it is medically needed before, during, or after your surgery? Yes   13. Have you or any of your relatives ever had problems with anesthesia? No   14. Do you have sleep apnea, excessive snoring or daytime drowsiness? YES - snoring   14a. Do you have a CPAP machine? Yes   15. Do you have any artifical heart valves or other implanted medical devices like a pacemaker, defibrillator, or continuous glucose monitor? No   16. Do you have artificial joints? YES - right hip   17. Are you allergic to latex? No       Health Care Directive  Patient does not have a Health Care Directive or Living Will: Discussed advance care planning with patient; however, patient declined at this time.    Preoperative Review of    reviewed - no record of controlled substances prescribed.          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 Provider to review and confirm.      Personal history of venous thrombosis and embolism 01/21/2014     Priority: Medium    Hip joint replacement status - right 01/21/2014     Priority: Medium    Abnormal gait 01/21/2014     Priority: Medium    Obesity 01/21/2014     Priority: Medium    Personal history of prostate cancer 01/21/2014     Priority: Medium    Osteoarthritis of hip 11/26/2013     Priority: Medium    Malignant basal cell neoplasm of skin 07/24/2012     Priority: Medium     Do you wish to do the replacement in the background? yes        Advanced directives, counseling/discussion 07/24/2012     Priority: Medium     Patient states has Advance Directive and will bring in a copy to clinic. 7/24/2012         Gout 07/03/2012     Priority: Medium    Back pain 09/27/2011     Priority: Medium    Hyperlipidemia LDL goal <100 09/27/2011     Priority: Medium    Factor V Leiden mutation (H24) 09/08/2011     Priority: Medium    Prothrombin mutation (H24) 09/08/2011     Priority: Medium     [id number]      Sleep apnea 05/19/2011     Priority: Medium    Venous thrombosis 10/20/2010     Priority: Medium    Restless leg syndrome 10/20/2010     Priority: Medium      Past Medical History:   Diagnosis Date    Basal cell carcinoma     Cancer (H)     DVT (deep venous thrombosis) (H) 11/2003    DVT (deep venous thrombosis) (H) 12/08    DVT (deep venous thrombosis) (H) 10/2010    Factor V Leiden (H24)     Gout     Other and unspecified hyperlipidemia     Prostate cancer (H) 2003    prostatectomy     Restless leg syndrome     Unspecified essential hypertension      Past Surgical History:   Procedure Laterality Date    APPENDECTOMY  1950    JOINT REPLACEMENT  4/2011    R hip    LAMINECTOMY LUMBAR ONE LEVEL  12/2008    LAPAROSCOPIC HERNIORRHAPHY UMBILICAL N/A  1/3/2019    Procedure: laparoscopic umbilical hernia repair with mesh;  Surgeon: Jamal Thompson DO;  Location:  OR    [facility] REVISE TOTAL HIP REPLACEMENT  1/18/13    R hip     Current Outpatient Medications   Medication Sig Dispense Refill    amoxicillin (AMOXIL) 500 MG capsule Take 4 caps 1 hour before procedure. 8 capsule 1    ASPIRIN NOT PRESCRIBED (INTENTIONAL) Please choose reason not prescribed from choices below.      atenolol (TENORMIN) 50 MG tablet Take 1 tablet (50 mg) by mouth 2 times daily 180 tablet 3    glipiZIDE (GLUCOTROL XL) 5 MG 24 hr tablet Take 1 tablet (5 mg) by mouth daily 90 tablet 3    ipratropium (ATROVENT) 0.06 % nasal spray USE 2 SPRAY(S) IN EACH NOSTRIL 4 TIMES DAILY 45 mL 0    losartan (COZAAR) 100 MG tablet Take 1 tablet (100 mg) by mouth daily 90 tablet 3    multiple vitamin  tablet TABS Take 1 tablet by mouth daily      ORDER FOR DME Wear mask at night 1 Units 0    pramipexole (MIRAPEX) 1 MG tablet TAKE 1 TABLET BY MOUTH TWICE DAILY AT  4  PM  AND  9   tablet 3    sildenafil (VIAGRA) 100 MG tablet Take 0.5 tablets (50 mg) by mouth daily as needed 20 tablet 3    spironolactone (ALDACTONE) 25 MG tablet Take 1 tablet (25 mg) by mouth daily 90 tablet 3    warfarin ANTICOAGULANT (COUMADIN) 5 MG tablet TAKE 1/2 TAB BY MOUTH EVERY Mon and FRIDAY AND 1 TAB ON ALL OTHER DAYS OF THE WEEK as directed 90 tablet 1    amoxicillin (AMOXIL) 875 MG tablet Take 1 tablet (875 mg) by mouth 2 times daily (Patient not taking: Reported on 4/30/2024) 20 tablet 0    blood glucose (NO BRAND SPECIFIED) lancets standard Use to test blood sugar 1 times daily or as directed. 100 lancet 3    blood glucose (NO BRAND SPECIFIED) test strip Use to test blood sugar 1 times daily or as directed. 100 strip 3    blood glucose monitoring (NO BRAND SPECIFIED) meter device kit Use to test blood sugar 1 times daily or as directed. 1 kit 0    cyanocobalamin (VITAMIN B-12) 1000 MCG tablet Take 1 tablet (1,000 mcg)  by mouth daily 90 tablet 3    incobotulinumtoxin A (XEOMIN) 50 units SOLR solution Inject 50 Units into the muscle once Every 6 months or so      Multiple Vitamins-Minerals (MULTIVITAMIN ADULT PO)       order for DME One pair compression garment 20-30 mmHg may substitute per fitter.  Please call [facility] at [phone] for appt.    Will also need education regarding donning agents tyvek sleeve, plastic sled or similar. 1 Units 0    STATIN NOT PRESCRIBED (INTENTIONAL) Please choose reason not prescribed, below         Allergies   Allergen Reactions    Lipitor [Atorvastatin Calcium]      Muscle pain, weakness    Pravastatin Other (See Comments)     muscle aches    Simvastatin Other (See Comments)     muscle aches        Social History     Tobacco Use    Smoking status: Never    Smokeless tobacco: Never   Substance Use Topics    Alcohol use: Yes     Alcohol/week: 0.0 standard drinks of alcohol     Comment: 1 drink every 1-2 weeks.     Family History   Problem Relation Age of Onset    Prostate Cancer Paternal Grandfather      History   Drug Use No         Review of Systems    Review of Systems  CONSTITUTIONAL: NEGATIVE for fever, chills, change in weight  INTEGUMENTARY/SKIN: NEGATIVE for worrisome rashes, moles or lesions  EYES: NEGATIVE for vision changes or irritation  ENT/MOUTH: NEGATIVE for ear, mouth and throat problems  RESP: NEGATIVE for significant cough or SOB  BREAST: NEGATIVE for masses, tenderness or discharge  CV: NEGATIVE for chest pain, palpitations or peripheral edema  GI: NEGATIVE for nausea, abdominal pain, heartburn, or change in bowel habits  : NEGATIVE for frequency, dysuria, or hematuria  MUSCULOSKELETAL: NEGATIVE for significant arthralgias or myalgia  NEURO: Patient complains of chronic midline back pain with radiation to bilateral buttocks down the posterior aspect of both legs.  ENDOCRINE: NEGATIVE for temperature intolerance, skin/hair changes  HEME: NEGATIVE for bleeding  "problems  PSYCHIATRIC: NEGATIVE for changes in mood or affect    Objective    /80 (BP Location: Right arm, Patient Position: Chair)   Pulse 64   Temp 97  F (36.1  C) (Temporal)   Resp 20   Ht 1.734 m (5' 8.25\")   Wt 113.7 kg (250 lb 11.2 oz)   SpO2 96%   BMI 37.84 kg/m     Estimated body mass index is 37.84 kg/m  as calculated from the following:    Height as of this encounter: 1.734 m (5' 8.25\").    Weight as of this encounter: 113.7 kg (250 lb 11.2 oz).  Physical Exam  GENERAL: alert and no distress  EYES: Eyes grossly normal to inspection, PERRL and conjunctivae and sclerae normal  HENT: ear canals and TM's normal, nose and mouth without ulcers or lesions  NECK: no adenopathy, no asymmetry, masses, or scars  RESP: lungs clear to auscultation - no rales, rhonchi or wheezes  CV: regular rate and rhythm, normal S1 S2, no S3 or S4, no murmur, click or rub, no peripheral edema  ABDOMEN: soft, nontender, no hepatosplenomegaly, no masses and bowel sounds normal  MS: no gross musculoskeletal defects noted, no edema  SKIN: no suspicious lesions or rashes  NEURO: Normal strength and tone, sensory exam grossly normal, mentation intact, and straight leg raising is mildly positive bilaterally.  Patient is able to ambulate with minimal assistance.  Does use a cane.  PSYCH: mentation appears normal, affect normal/bright    Recent Labs   Lab Test 04/30/24  0940 03/28/24  0850 02/26/24  0904 01/26/24  0943 12/05/22  0951 11/07/22  1708   HGB  --   --   --  13.1*  --   --    INR 2.0* 2.4*   < > 2.74*   < > 2.3*   NA  --   --   --  140  --  141   POTASSIUM  --   --   --  4.1  --  4.0   CR  --   --   --  1.57*  --  1.48*   A1C  --   --   --  7.8*  --  6.9*    < > = values in this interval not displayed.        Diagnostics  Labs pending at this time.  Results will be reviewed when available.   EKG demonstrates a normal EKG.  No evidence of ischemia or arrhythmia noted.  Minimal bradycardia (59 bpm) is noted      Revised " Cardiac Risk Index (RCRI)  The patient has the following serious cardiovascular risks for perioperative complications:   - No serious cardiac risks = 0 points     RCRI Interpretation: 1 point: Class II (low risk - 0.9% complication rate)         Signed Electronically by: Nate Cifuentes DO  Copy of this evaluation report is provided to requesting physician.

## 2024-04-30 NOTE — TELEPHONE ENCOUNTER
Left message to call back     Amina Bertrand RN  [facility]

## 2024-04-30 NOTE — TELEPHONE ENCOUNTER
"JESSICA-PROCEDURAL ANTICOAGULATION  MANAGEMENT    ASSESSMENT     Warfarin interruption plan for Lumbar fusion and laminectomy on 2024.    Indication for Anticoagulation: Recurrent DVT, Heterozygous Factor V Leiden, and Prothrombin Gene Mutation    DVT  L leg 10/2010  DVT R leg  &  historical    Jessica-Procedure Risk stratification for thromboembolism: high ( Chest guidelines)    HIGH RISK:  CHEST Perioperative Management guidelines suggests bridging patients at high risk for thromboembolism when interrupting warfarin for an elective surgery/procedure    RECOMMENDATION     Pre-Procedure:  Hold warfarin for 5 days, until after procedure startin2024   Enoxaparin (Lovenox) 80 mg subq Q 12 hrs (0.75 mg/kg Q 12 hrs for CrCl 30-60 ml/min per [facility] P&T approved dose adjustment protocol)   Start enoxaparin: 2024  Last dose of enoxaparin prior to procedure: 2024 AM  (~24 hours prior to procedure)    Post-Procedure:  Resume warfarin dose if okay with provider doing procedure on night of procedure, per inpatient   Resume enoxaparin (Lovenox) per inpatient when okay with provider doing procedure. Continue until INR >= 2.0  Recheck INR ~3-4  days after resuming warfarin   ?     Plan routed to referring provider for approval  ?   Eveline Mulligan [facility]    SUBJECTIVE/OBJECTIVE     Robert Richard, a 88 year old male    Goal INR Range: 2.0-3.0     Patient bridged in past: Yes: last 2014 with surgery      Wt Readings from Last 3 Encounters:   24 113.7 kg (250 lb 11.2 oz)   24 114.8 kg (253 lb)   24 115.7 kg (255 lb)      Ideal body weight: 69 kg (152 lb 1 oz)  Adjusted ideal body weight: 86.9 kg (191 lb 8.3 oz)     Estimated body mass index is 37.84 kg/m  as calculated from the following:    Height as of an earlier encounter on 24: 1.734 m (5' 8.25\").    Weight as of an earlier encounter on 24: 113.7 kg (250 lb 11.2 oz).    Lab Results   Component " Value Date    INR 2.0 (H) 04/30/2024    INR 2.4 (H) 03/28/2024    INR 2.3 (H) 02/26/2024     Lab Results   Component Value Date    HGB 13.1 (L) 04/30/2024    HCT 39.2 (L) 04/30/2024     04/30/2024     Lab Results   Component Value Date    CR 1.47 (H) 04/30/2024    CR 1.57 (H) 01/26/2024    CR 1.48 (H) 11/07/2022     Estimated Creatinine Clearance: 43.5 mL/min (A) (based on SCr of 1.47 mg/dL (H)).

## 2024-05-01 NOTE — TELEPHONE ENCOUNTER
Left a detailed voicemail message requesting a call back. It seems that his procedure may have been rescheduled from 5/10/24 to 5/28/24. Need pt to confirm.

## 2024-05-02 NOTE — TELEPHONE ENCOUNTER
Patient is returning care team call and is confirming that his procedure is not until 5/28/24. His insurance company wanted a more recent MRI prior to the procedure so he is getting the MRI on 5/14/24 and the procedure on 5/28/24.

## 2024-05-02 NOTE — TELEPHONE ENCOUNTER
Procedure calendar note updated below with new procedure date/holds/bridge. I spoke with Carlos and he is coming in for his next INR on 5/14/24 so we will discuss this in detail at this time as well as send in his lovenox prescription. [facility] calendar should be updated at that time as well. Will post-pone this procedure note till 5/14/2.     Chantell Boston RN, BSN  [facility] Anticoagulation Team

## 2024-05-14 ENCOUNTER — TELEPHONE (OUTPATIENT)
Dept: INTERNAL MEDICINE | Facility: CLINIC | Age: 89
End: 2024-05-14

## 2024-05-14 ENCOUNTER — LAB (OUTPATIENT)
Dept: LAB | Facility: CLINIC | Age: 89
End: 2024-05-14
Payer: COMMERCIAL

## 2024-05-14 ENCOUNTER — ANTICOAGULATION THERAPY VISIT (OUTPATIENT)
Dept: ANTICOAGULATION | Facility: CLINIC | Age: 89
End: 2024-05-14

## 2024-05-14 ENCOUNTER — HOSPITAL ENCOUNTER (OUTPATIENT)
Dept: MRI IMAGING | Facility: CLINIC | Age: 89
Discharge: HOME | End: 2024-05-14
Attending: NEUROLOGICAL SURGERY | Admitting: NEUROLOGICAL SURGERY
Payer: COMMERCIAL

## 2024-05-14 DIAGNOSIS — D68.52 PROTHROMBIN MUTATION (H): ICD-10-CM

## 2024-05-14 DIAGNOSIS — I82.90 VENOUS THROMBOSIS: ICD-10-CM

## 2024-05-14 DIAGNOSIS — Z79.01 LONG TERM CURRENT USE OF ANTICOAGULANT THERAPY: ICD-10-CM

## 2024-05-14 DIAGNOSIS — D68.51 FACTOR V LEIDEN MUTATION (H): ICD-10-CM

## 2024-05-14 DIAGNOSIS — D68.51 FACTOR V LEIDEN MUTATION (H): Primary | ICD-10-CM

## 2024-05-14 DIAGNOSIS — M43.17 SPONDYLOLISTHESIS, LUMBOSACRAL REGION: ICD-10-CM

## 2024-05-14 DIAGNOSIS — Z01.818 PRE-OP TESTING: ICD-10-CM

## 2024-05-14 LAB — INR BLD: 2.1 (ref 0.9–1.1)

## 2024-05-14 PROCEDURE — 36416 COLLJ CAPILLARY BLOOD SPEC: CPT

## 2024-05-14 PROCEDURE — 72148 MRI LUMBAR SPINE W/O DYE: CPT

## 2024-05-14 PROCEDURE — 85610 PROTHROMBIN TIME: CPT

## 2024-05-14 RX ORDER — ENOXAPARIN SODIUM 100 MG/ML
80 INJECTION SUBCUTANEOUS EVERY 12 HOURS
Qty: 4 ML | Refills: 1 | Status: ON HOLD | OUTPATIENT
Start: 2024-05-14 | End: 2024-06-02

## 2024-05-14 NOTE — TELEPHONE ENCOUNTER
Patient Returning Call    Reason for call:  Returing call, no answer at ph# left    Information relayed to patient:  Will leave TE for call back    Patient has additional questions:  No      Could we [facility] this [facility] or would you prefer to receive a phone call?:   Patient would prefer a phone call   Okay to leave a detailed message?: Yes at Cell number on file:    Telephone Information:   Mobile [phone]

## 2024-05-14 NOTE — TELEPHONE ENCOUNTER
Pt given procedure instructions over the phone and will be printed for  in clinic.     DONG MenezesN, RN- [facility] RN

## 2024-05-15 NOTE — PROGRESS NOTES
ANTICOAGULATION MANAGEMENT     Robert Richard 88 year old male is on warfarin with therapeutic INR result. (Goal INR 2.0-3.0)    Recent labs: (last 7 days)     05/14/24  0901   INR 2.1*       ASSESSMENT     Source(s): Chart Review and Patient/Caregiver Call     Warfarin doses taken: Warfarin taken as instructed  Diet: No new diet changes identified  Medication/supplement changes: None noted  New illness, injury, or hospitalization: No  Signs or symptoms of bleeding or clotting: No  Previous result: Therapeutic last 2(+) visits  Additional findings: Upcoming surgery/procedure lumbar fusion 5/28/24       PLAN     Recommended plan for no diet, medication or health factor changes affecting INR     Dosing Instructions: Continue your current warfarin dose, hold 5 days prior to procedure (see procedure plan TE) with next INR in 2 weeks       Summary  As of 5/14/2024      Full warfarin instructions:  5/23: Hold; 5/24: Hold; 5/25: Hold; 5/26: Hold; 5/27: Hold; Otherwise 2.5 mg every Mon, Fri; 5 mg all other days   Next INR check:  5/28/2024               Telephone call with Carlos who verbalizes understanding and agrees to plan and who agrees to plan and repeated back plan correctly         Education provided:   Please call back if any changes to your diet, medications or how you've been taking warfarin    Plan made per [facility] anticoagulation protocol    Neelima Arreaga RN  Anticoagulation Clinic  5/15/2024    _______________________________________________________________________     Anticoagulation Episode Summary       Current INR goal:  2.0-3.0   TTR:  78.8% (1 y)   Target end date:  Indefinite   Send INR reminders to:  [facility]    Indications    Factor V Leiden mutation (H24) [D68.51]  Venous thrombosis [I82.90]  Prothrombin mutation (H24) [D68.52]  Long term current use of anticoagulant therapy [Z79.01]             Comments:               Anticoagulation Care Providers       Provider Role Specialty Phone number     Stiven Donahue MD [facility] Internal Medicine [phone]    Nate Cifuentes DO [facility] Internal Medicine [phone]

## 2024-05-19 ENCOUNTER — E-VISIT (OUTPATIENT)
Dept: FAMILY MEDICINE | Facility: CLINIC | Age: 89
End: 2024-05-19
Payer: COMMERCIAL

## 2024-05-19 ENCOUNTER — APPOINTMENT (OUTPATIENT)
Dept: GENERAL RADIOLOGY | Facility: CLINIC | Age: 89
End: 2024-05-19
Attending: EMERGENCY MEDICINE
Payer: COMMERCIAL

## 2024-05-19 ENCOUNTER — HOSPITAL ENCOUNTER (EMERGENCY)
Facility: CLINIC | Age: 89
Discharge: HOME | End: 2024-05-19
Attending: EMERGENCY MEDICINE | Admitting: EMERGENCY MEDICINE
Payer: COMMERCIAL

## 2024-05-19 VITALS
TEMPERATURE: 97.6 F | DIASTOLIC BLOOD PRESSURE: 116 MMHG | HEART RATE: 68 BPM | HEIGHT: 71 IN | RESPIRATION RATE: 18 BRPM | SYSTOLIC BLOOD PRESSURE: 148 MMHG | WEIGHT: 246 LBS | OXYGEN SATURATION: 97 % | BODY MASS INDEX: 34.44 KG/M2

## 2024-05-19 DIAGNOSIS — J30.1 SEASONAL ALLERGIC RHINITIS DUE TO POLLEN: ICD-10-CM

## 2024-05-19 DIAGNOSIS — J40 BRONCHITIS: ICD-10-CM

## 2024-05-19 DIAGNOSIS — I10 ACCELERATED HYPERTENSION: ICD-10-CM

## 2024-05-19 DIAGNOSIS — R05.2 SUBACUTE COUGH: ICD-10-CM

## 2024-05-19 DIAGNOSIS — R05.2 SUBACUTE COUGH: Primary | ICD-10-CM

## 2024-05-19 PROCEDURE — 99207 PR NON-BILLABLE SERV PER CHARTING: CPT | Performed by: INTERNAL MEDICINE

## 2024-05-19 PROCEDURE — 99284 EMERGENCY DEPT VISIT MOD MDM: CPT | Performed by: EMERGENCY MEDICINE

## 2024-05-19 PROCEDURE — 94640 AIRWAY INHALATION TREATMENT: CPT

## 2024-05-19 PROCEDURE — 999N000157 HC STATISTIC RCP TIME EA 10 MIN

## 2024-05-19 PROCEDURE — 999N000156 HC STATISTIC RCP CONSULT EA 30 MIN

## 2024-05-19 PROCEDURE — 71046 X-RAY EXAM CHEST 2 VIEWS: CPT

## 2024-05-19 PROCEDURE — 250N000009 HC RX 250: Performed by: EMERGENCY MEDICINE

## 2024-05-19 RX ORDER — BENZONATATE 200 MG/1
200 CAPSULE ORAL 3 TIMES DAILY PRN
Qty: 20 CAPSULE | Refills: 0 | Status: SHIPPED | OUTPATIENT
Start: 2024-05-19 | End: 2024-08-02

## 2024-05-19 RX ORDER — IPRATROPIUM BROMIDE AND ALBUTEROL SULFATE 2.5; .5 MG/3ML; MG/3ML
3 SOLUTION RESPIRATORY (INHALATION) ONCE
Status: COMPLETED | OUTPATIENT
Start: 2024-05-19 | End: 2024-05-19

## 2024-05-19 RX ORDER — AZITHROMYCIN 250 MG/1
TABLET, FILM COATED ORAL
Qty: 6 TABLET | Refills: 0 | Status: SHIPPED | OUTPATIENT
Start: 2024-05-19 | End: 2024-05-24

## 2024-05-19 RX ORDER — ALBUTEROL SULFATE 90 UG/1
2 AEROSOL, METERED RESPIRATORY (INHALATION) EVERY 6 HOURS PRN
Qty: 18 G | Refills: 0 | Status: SHIPPED | OUTPATIENT
Start: 2024-05-19

## 2024-05-19 RX ORDER — PREDNISONE 20 MG/1
TABLET ORAL
Qty: 10 TABLET | Refills: 0 | Status: ON HOLD | OUTPATIENT
Start: 2024-05-19 | End: 2024-05-28

## 2024-05-19 RX ADMIN — IPRATROPIUM BROMIDE AND ALBUTEROL SULFATE 3 ML: .5; 3 SOLUTION RESPIRATORY (INHALATION) at 09:58

## 2024-05-19 ASSESSMENT — COLUMBIA-SUICIDE SEVERITY RATING SCALE - C-SSRS
2. HAVE YOU ACTUALLY HAD ANY THOUGHTS OF KILLING YOURSELF IN THE PAST MONTH?: NO
1. IN THE PAST MONTH, HAVE YOU WISHED YOU WERE DEAD OR WISHED YOU COULD GO TO SLEEP AND NOT WAKE UP?: NO
6. HAVE YOU EVER DONE ANYTHING, STARTED TO DO ANYTHING, OR PREPARED TO DO ANYTHING TO END YOUR LIFE?: NO

## 2024-05-19 ASSESSMENT — ACTIVITIES OF DAILY LIVING (ADL): ADLS_ACUITY_SCORE: 35

## 2024-05-19 NOTE — ED PROVIDER NOTES
History     Chief Complaint   Patient presents with    Cough     HPI  Robert Richard is a 88 year old male who presents with persistent cough.  Symptoms have been going on for the last few weeks.  He did have a virtual visit with his provider, who started him on amoxicillin.  He finished this last week.  Said that he never felt better when taking the amoxicillin.  The cough has persisted.  It is nonproductive.  He says he feels like there is a tickle in his throat.  Also has a runny nose.  Has not been running a fever.  No chest pain, no shortness of breath, no nausea or vomiting.  Has intermittent swelling of his lower extremities, but says this has been an ongoing issue for many years, has not had any increased swelling recently.  Is unaware if he takes a water pill or not.  No known sick contacts.  Had been on a medication for blood pressure many years ago, and that caused him a persistent cough, so the medication was changed but he says this has been many years so he does not think the cough is due to any of his medication.    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 Provider to review and confirm.      Personal history of venous thrombosis and embolism 01/21/2014     Priority: Medium    Hip joint replacement status - right 01/21/2014     Priority: Medium    Abnormal gait 01/21/2014     Priority: Medium    Obesity 01/21/2014     Priority: Medium    Personal history of prostate cancer 01/21/2014     Priority: Medium    Osteoarthritis of hip 11/26/2013     Priority: Medium    Malignant basal cell neoplasm of skin 07/24/2012     Priority: Medium     Do you wish to do the replacement in the background? yes        Advanced directives, counseling/discussion 07/24/2012     Priority: Medium     Patient states has Advance Directive and will bring in a copy to clinic. 7/24/2012         Gout 07/03/2012     Priority: Medium    Back pain 09/27/2011     Priority: Medium    Hyperlipidemia LDL goal <100 09/27/2011     Priority: Medium    Factor V Leiden mutation (H24) 09/08/2011     Priority: Medium    Prothrombin mutation (H24) 09/08/2011     Priority: Medium     [id number]      Sleep apnea 05/19/2011     Priority: Medium    Venous thrombosis 10/20/2010     Priority: Medium    Restless leg syndrome 10/20/2010     Priority: Medium        Past Medical History:    Past Medical History:   Diagnosis Date    Basal cell carcinoma     Cancer (H)     DVT (deep venous thrombosis) (H) 11/2003    DVT (deep venous thrombosis) (H) 12/08    DVT (deep venous thrombosis) (H) 10/2010    Factor V Leiden (H24)     Gout     Other and unspecified hyperlipidemia     Prostate cancer (H) 2003    Restless leg syndrome     Unspecified essential hypertension        Past Surgical History:    Past Surgical History:   Procedure Laterality Date    APPENDECTOMY  1950    JOINT REPLACEMENT  4/2011    R hip    LAMINECTOMY  "LUMBAR ONE LEVEL  12/2008    LAPAROSCOPIC HERNIORRHAPHY UMBILICAL N/A 1/3/2019    Procedure: laparoscopic umbilical hernia repair with mesh;  Surgeon: Jamal Thompson DO;  Location:  OR    [facility] REVISE TOTAL HIP REPLACEMENT  1/18/13    R hip       Family History:    Family History   Problem Relation Age of Onset    Prostate Cancer Paternal Grandfather        Social History:  Marital Status:  Single [1]  Social History     Tobacco Use    Smoking status: Never    Smokeless tobacco: Never   Vaping Use    Vaping status: Never Used   Substance Use Topics    Alcohol use: Yes     Alcohol/week: 0.0 standard drinks of alcohol     Comment: 1 drink every 1-2 weeks.    Drug use: No        Medications:    albuterol (PROAIR HFA/PROVENTIL HFA/VENTOLIN HFA) 108 (90 Base) MCG/ACT inhaler  azithromycin (ZITHROMAX) 250 MG tablet  benzonatate (TESSALON) 200 MG capsule  predniSONE (DELTASONE) 20 MG tablet  amoxicillin (AMOXIL) 500 MG capsule  ASPIRIN NOT PRESCRIBED (INTENTIONAL)  atenolol (TENORMIN) 50 MG tablet  enoxaparin ANTICOAGULANT (LOVENOX) 80 MG/0.8ML syringe  glipiZIDE (GLUCOTROL XL) 5 MG 24 hr tablet  ipratropium (ATROVENT) 0.06 % nasal spray  losartan (COZAAR) 100 MG tablet  multiple vitamin  tablet TABS  Multiple Vitamins-Minerals (MULTIVITAMIN ADULT PO)  order for DME  ORDER FOR DME  pramipexole (MIRAPEX) 1 MG tablet  sildenafil (VIAGRA) 100 MG tablet  spironolactone (ALDACTONE) 25 MG tablet  STATIN NOT PRESCRIBED (INTENTIONAL)  warfarin ANTICOAGULANT (COUMADIN) 5 MG tablet          Review of Systems   All other systems reviewed and are negative.      Physical Exam   BP: (!) 205/120 (pt states that he has not taken his medication)  Pulse: 57  Temp: 97.6  F (36.4  C)  Resp: 18  Height: 180.3 cm (5' 11\")  Weight: 111.6 kg (246 lb)  SpO2: 98 %      Physical Exam  Vitals and nursing note reviewed.   Constitutional:       General: He is not in acute distress.     Appearance: He is not diaphoretic.   HENT:      Nose: No " congestion or rhinorrhea.      Mouth/Throat:      Mouth: Mucous membranes are moist.   Eyes:      Conjunctiva/sclera: Conjunctivae normal.   Cardiovascular:      Rate and Rhythm: Normal rate and regular rhythm.   Neurological:      Mental Status: He is alert.         ED Course        Procedures              Critical Care time:  none               Results for orders placed or performed during the hospital encounter of 05/19/24 (from the past 24 hour(s))   XR Chest 2 Views    Narrative    EXAM: XR CHEST 2 VIEWS  LOCATION: [facility]  DATE: 5/19/2024    INDICATION: Wheezing, persistent cough  COMPARISON: None.      Impression    IMPRESSION: Heart is normal in size. Minimal bibasilar atelectasis. Lungs are otherwise clear.       Medications   ipratropium - albuterol 0.5 mg/2.5 mg/3 mL (DUONEB) neb solution 3 mL (3 mLs Nebulization $Given 5/19/24 2732)       Assessments & Plan (with Medical Decision Making)  Carlos is an 88-year-old male presenting with cough for the last few weeks.  See history and physical exam as above  Pleasant 88-year-old male in no acute distress, is hypertensive with blood pressure of 205/120.  He states that he did not take his blood pressure medication this morning.  Also states that when he comes in for clinic visits his blood pressure is usually high and then it goes back down.  Is not endorsing headache, vision changes, chest pain.  He is otherwise vitally stable, oxygenating at 97% on room air.  Does have diminished air movement and mild end expiratory wheezing.  Will plan to get a chest x-ray to evaluate for underlying pneumonia.  Since he was given an antibiotic course and did not improve with this, explained that he may have a viral illness that would not be responsive to antibiotics, or could have also had a viral illness and now experiencing postviral cough syndrome.  With his reported rhinorrhea and postnasal drainage, suspect there is a component of  allergic rhinitis.  Will also try a neb treatment to help with his acute wheezing.  Chest x-ray as above.  Has minimal bibasilar atelectasis but no evidence of consolidation, pleural effusion, pneumothorax.  He feels about the same after receiving the neb treatment.  His oxygen saturations have been upper 90s to 100% on room air.  He does not appear to be in any distress.  Will plan to start him on several medications.  Will send in albuterol inhaler to help with the acute wheezing and cough.  Also given Tessalon to help with ongoing cough.  Would like to try him on azithromycin to see if bridge for atypical organisms will resolve his symptoms.  Will also do a short course of steroids to help with the acute wheezing and cough, and did warn him that it may briefly elevate his blood sugars, but he does not check them regularly.  Advised that he should start taking oral antihistamine to help with the allergic rhinitis and associated symptoms.  Follow-up with primary provider in clinic if symptoms do not improve with the provided medications.  If he develops worsening symptoms should return to the emergency room for evaluation.  All questions answered and discharged in stable condition     I have reviewed the nursing notes.    I have reviewed the findings, diagnosis, plan and need for follow up with the patient.           Medical Decision Making  The patient's presentation was of low complexity (an acute and uncomplicated illness or injury).    The patient's evaluation involved:  review of external note(s) from 1 sources (follow-up visit)  ordering and/or review of 1 test(s) in this encounter (see separate area of note for details)    The patient's management necessitated moderate risk (prescription drug management including medications given in the ED).        Discharge Medication List as of 5/19/2024 10:41 AM        START taking these medications    Details   albuterol (PROAIR HFA/PROVENTIL HFA/VENTOLIN HFA) 108 (90  Base) MCG/ACT inhaler Inhale 2 puffs into the lungs every 6 hours as needed for shortness of breath, wheezing or cough, Disp-18 g, R-0, E-PrescribePharmacy may dispense brand covered by insurance (Proair, or proventil or ventolin or generic albuterol inhaler)      azithromycin (ZITHROMAX) 250 MG tablet Take 2 tablets (500 mg) by mouth daily for 1 day, THEN 1 tablet (250 mg) daily for 4 days., Disp-6 tablet, R-0, E-Prescribe      benzonatate (TESSALON) 200 MG capsule Take 1 capsule (200 mg) by mouth 3 times daily as needed for cough, Disp-20 capsule, R-0, E-Prescribe      predniSONE (DELTASONE) 20 MG tablet Take two tablets (= 40mg) each day for 5 (five) days, Disp-10 tablet, R-0, E-Prescribe             Final diagnoses:   Subacute cough   Accelerated hypertension   Bronchitis   Seasonal allergic rhinitis due to pollen       5/19/2024   [facility] EMERGENCY DEPT       Sri Chopra DO  05/19/24 7688

## 2024-05-19 NOTE — PROGRESS NOTES
05/19/24 0958 05/19/24 1000   Vital Signs   Pulse  --  57   Oximeter Heart Rate  --  57 bpm   BP  --  (!) 175/87   Oxygen Therapy   SpO2  --  96 %   O2 Device  --  None (Room air)   Nebulizer Assessment & Treatment   $RT Use ONLY Delivery Method Nebulizer - Initial  --    Nebulizer Device Mouthpiece  --    Pretreatment Heart Rate (beats/min) 59  --    Pretreatment Resp Rate (breaths/min) 16  --    Pretreat Breath Sounds - Bilat - All Lobes clear;diminished  --    Pretreat Breath Sounds - TERESA clear  --    Pretreat Breath Sounds - LLL wheezes, expiratory  --    Pretreat Breath Sounds - RUL clear  --    Pretreat Breath Sounds - RML clear  --    Pretreat Breath Sounds - RLL clear  --    Patient Position Vera's  --    Respiratory Treatment Status (SVN) given  --    Breath Sounds Post-Respiratory Treatment   Posttreatment Heart Rate (beats/min) 57  --    Posttreatment Resp Rate (breaths/min) 18  --    Posttreatment Assessment (SVN) breath sounds unchanged;patient reports breathing improved  --    Signs of Intolerance (SVN) none  --    Breath Sounds Posttreatment All Fields no change  --    Breath Sounds Posttreatment TERESA clear  --    Breath Sounds Posttreatment LLL wheezes, expiratory  --    Breath Sounds Posttreatment RUL clear  --    Breath Sounds Posttreatment RML clear  --    Breath Sounds Posttreatment RLL clear  --      Patient has persistent cough since beginning of April  Sinus drainage down back of throat and feeling of tightness in throat   Increased wheezing post neb patient states his breathing feels less tight  History of allergies from pollens and environmental triggers  Patient has LISSETTE and states he does not clean his machine for weeks at a time and worries that could be causing symptoms do to mold or fungus in tubing  Spirometry patient's age is ouside the predicted's window  However Pre and Post FVC FEV1 shows no improvement post Neb treatment  Loops show upper airway posiable restriction

## 2024-05-19 NOTE — ED TRIAGE NOTES
Pt presents with a persistent cough.  Pt states that it started three weeks.  Pt was on amoxicillin for 10 days from his provider, ended last weekend.  Pt states that did not end the cough.  Pt states that is not able to cough anything up.      Triage Assessment (Adult)       Row Name 05/19/24 0902          Triage Assessment    Airway WDL WDL        Respiratory WDL    Respiratory WDL WDL        Skin Circulation/Temperature WDL    Skin Circulation/Temperature WDL WDL        Cardiac WDL    Cardiac WDL WDL        Peripheral/Neurovascular WDL    Peripheral Neurovascular WDL WDL        Cognitive/Neuro/Behavioral WDL    Cognitive/Neuro/Behavioral WDL WDL

## 2024-05-19 NOTE — DISCHARGE INSTRUCTIONS
"Your x-ray does not show any sign of pneumonia or fluid    To help with your allergies, taking a medication such as Allegra, Claritin, Zyrtec, or Xyzal (or any of the equivalent generic brands, can help with the runny nose and postnasal drainage.  These can be purchased over-the-counter.    Suspect that you have bronchitis.  Will prescribe an inhaler to help with the wheezing and cough.  You may use 2 puffs every 4-6 hours as needed to help with symptoms    Will also place you on a different antibiotic, 1 that has anti-inflammatory properties and covers different bacteria that may be causing bronchitis.  Start taking today    Will also put you on a steroid.  This can make you feel \"wild up\" and cause difficulty with sleep.  You should take first thing in the morning with your medications and not later at night before bed.  Be aware that steroids can cause your blood sugar to be elevated, so we will only put you on a short course.    You may take the Tessalon up to 3 times daily as needed to help with cough    Follow-up with your primary doctor in clinic within 1 to 2 weeks    If you develop any new or worsening symptoms do not hesitate to return to the emergency room for evaluation    It was a pleasure talking to you today! Feel better!  "

## 2024-05-20 ENCOUNTER — DOCUMENTATION ONLY (OUTPATIENT)
Dept: ANTICOAGULATION | Facility: CLINIC | Age: 89
End: 2024-05-20
Payer: COMMERCIAL

## 2024-05-20 ENCOUNTER — PATIENT OUTREACH (OUTPATIENT)
Dept: INTERNAL MEDICINE | Facility: CLINIC | Age: 89
End: 2024-05-20
Payer: COMMERCIAL

## 2024-05-20 NOTE — PROGRESS NOTES
ANTICOAGULATION  MANAGEMENT: Discharge Review    Robert Richard chart reviewed for anticoagulation continuity of care    Emergency room visit on 5/19/24 for a cough .    Discharge disposition: Home    Results:    Recent labs: (last 7 days)     05/14/24  0901   INR 2.1*     Anticoagulation inpatient management:     not applicable     Anticoagulation discharge instructions:     Warfarin dosing: home regimen continued   Bridging: No   INR goal change: No      Medication changes affecting anticoagulation: Yes: started on prednisone and Azithromycin     Additional factors affecting anticoagulation: Yes: has surgery scheduled for 5/28/24. Will start holding warfarin on 5/23/24.  Azithromycin does not usually peak until 5-6 days, so if surgery is postponed would need to recheck his INR at the end of the week, othewise will be holding warfarin in that timeframe.      PLAN     No adjustment to anticoagulation plan needed    Patient not contacted    No adjustment to Anticoagulation Calendar was required    Amina Bertrand RN

## 2024-05-21 NOTE — TELEPHONE ENCOUNTER
Transitions of Care Outreach  Chief Complaint   Patient presents with    Hospital F/U       Most Recent Admission Date: 5/19/2024   Most Recent Admission Diagnosis:      Most Recent Discharge Date: 5/19/2024   Most Recent Discharge Diagnosis: Subacute cough - R05.2  Accelerated hypertension - I10  Bronchitis - J40  Seasonal allergic rhinitis due to pollen - J30.1     Transitions of Care Assessment    Discharge Assessment  How are you doing now that you are home?: Great  How are your symptoms? (Red Flag symptoms escalate to triage hotline per guidelines): Improved  Do you know how to contact your clinic care team if you have future questions or changes to your health status? : Yes  Does the patient have their discharge instructions? : Yes  Does the patient have questions regarding their discharge instructions? : No  Were you started on any new medications or were there changes to any of your previous medications? : Yes  Does the patient have all of their medications?: Yes  Do you have questions regarding any of your medications? : No  Do you have all of your needed medical supplies or equipment (DME)?  (i.e. oxygen tank, CPAP, cane, etc.): Yes    Follow up Plan     Discharge Follow-Up  Discharge follow up appointment scheduled in alignment with recommended follow up timeframe or Transitions of Risk Category? (Low = within 30 days; Moderate= within 14 days; High= within 7 days): No  Patient's follow up appointment not scheduled: Patient declined scheduling support. Education on the importance of transitions of care follow up. Provided scheduling phone number.    Future Appointments   Date Time Provider Department Center   6/14/2024  9:30 AM NurseStaci [facility]   7/11/2024 10:30 AM Radha Lozano NP [facility]   8/29/2024 10:30 AM Radha Lozano NP [facility]       Outpatient Plan as outlined on AVS reviewed with patient.    For any urgent concerns, please contact  our 24 hour nurse triage line: [phone] ([phone])       Radha Chaudhary, DONGN, RN

## 2024-05-24 RX ORDER — WARFARIN SODIUM 5 MG/1
2.5 TABLET ORAL
Status: ON HOLD | COMMUNITY
End: 2024-06-02

## 2024-05-24 NOTE — PROGRESS NOTES
PTA medications updated by Medication Scribe prior to surgery via phone call with patient (last doses completed by Nurse)     Medication history sources: Patient, Surescripts, and (ER visit)  In the past week, patient estimated taking medication this percent of the time: Greater than 90%      Significant changes made to the medication list:  Patient reports no longer taking the following meds (med scribe removed from PTA med list): Multivitamin(Duplicate)      Additional medication history information:   Patient was advised to bring: Atrovent nasal spray, albuterol HFA    Medication reconciliation completed by provider prior to medication history? No    Time spent in this activity: 40 minutes    The information provided in this note is only as accurate as the sources available at the time of update(s)      Prior to Admission medications    Medication Sig Last Dose Taking? Auth Provider Long Term End Date   albuterol (PROAIR HFA/PROVENTIL HFA/VENTOLIN HFA) 108 (90 Base) MCG/ACT inhaler Inhale 2 puffs into the lungs every 6 hours as needed for shortness of breath, wheezing or cough Unknown at prn Yes Sri Chopra DO Yes    amoxicillin (AMOXIL) 500 MG capsule Take 4 caps 1 hour before procedure. More than a month at prn Yes Stiven Donahue MD     atenolol (TENORMIN) 50 MG tablet Take 1 tablet (50 mg) by mouth 2 times daily  at PM Yes Stiven Donahue MD Yes    benzonatate (TESSALON) 200 MG capsule Take 1 capsule (200 mg) by mouth 3 times daily as needed for cough Unknown at prn Yes Sri Chopra DO     enoxaparin ANTICOAGULANT (LOVENOX) 80 MG/0.8ML syringe Inject 0.8 mLs (80 mg) Subcutaneous every 12 hours  at AM Yes Stiven Donahue MD     glipiZIDE (GLUCOTROL XL) 5 MG 24 hr tablet Take 1 tablet (5 mg) by mouth daily  at PM Yes Stiven Donahue MD Yes    ipratropium (ATROVENT) 0.06 % nasal spray USE 2 SPRAY(S) IN EACH NOSTRIL 4 TIMES DAILY  at AM Yes Stiven Donahue MD     losartan (COZAAR) 100  MG tablet Take 1 tablet (100 mg) by mouth daily  at pm Yes Stiven Donahue MD Yes    multiple vitamin  tablet TABS Take 1 tablet by mouth daily  at pm Yes Reported, Patient     pramipexole (MIRAPEX) 1 MG tablet TAKE 1 TABLET BY MOUTH TWICE DAILY AT  4  PM  AND  9  PM  at pm Yes Stiven Donahue MD Yes    sildenafil (VIAGRA) 100 MG tablet Take 0.5 tablets (50 mg) by mouth daily as needed Unknown at prn Yes Stiven Donahue MD Yes    spironolactone (ALDACTONE) 25 MG tablet Take 1 tablet (25 mg) by mouth daily  at pm Yes Stiven Donahue MD Yes    warfarin ANTICOAGULANT (COUMADIN) 5 MG tablet Take 2.5 mg by mouth twice a week (0.5 x 5 mg = 2.5 mg:Monday & Fridays) 5/20/2024 at PM Yes Reported, Patient     warfarin ANTICOAGULANT (COUMADIN) 5 MG tablet TAKE 1/2 TAB BY MOUTH EVERY Mon and FRIDAY AND 1 TAB ON ALL OTHER DAYS OF THE WEEK as directed 5/22/2024 at PM Yes Stiven Donahue MD     ASPIRIN NOT PRESCRIBED (INTENTIONAL) Please choose reason not prescribed from choices below.   Stiven Donahue MD Yes    azithromycin (ZITHROMAX) 250 MG tablet Take 2 tablets (500 mg) by mouth daily for 1 day, THEN 1 tablet (250 mg) daily for 4 days.   Sri Chopra,   5/24/24   order for DME One pair compression garment 20-30 mmHg may substitute per fitter.  Please call [facility] at [phone] for appt.    Will also need education regarding donning agents tyvek sleeve, plastic sled or similar.   Khadar García, JANINE     ORDER FOR DME Wear mask at night   Ole Bell MD     predniSONE (DELTASONE) 20 MG tablet Take two tablets (= 40mg) each day for 5 (five) days  at am  Sri Chopra DO     STATIN NOT PRESCRIBED (INTENTIONAL) Please choose reason not prescribed, below   Stiven Donahue MD         Medication history completed by: Aviva Julian LPN

## 2024-05-28 ENCOUNTER — HOSPITAL ENCOUNTER (INPATIENT)
Facility: CLINIC | Age: 89
LOS: 5 days | Discharge: NURSING, REHAB OR LONG-TERM CARE | DRG: 453 | End: 2024-06-02
Attending: NEUROLOGICAL SURGERY | Admitting: NEUROLOGICAL SURGERY
Payer: COMMERCIAL

## 2024-05-28 ENCOUNTER — APPOINTMENT (OUTPATIENT)
Dept: GENERAL RADIOLOGY | Facility: CLINIC | Age: 89
DRG: 453 | End: 2024-05-28
Attending: NEUROLOGICAL SURGERY
Payer: COMMERCIAL

## 2024-05-28 ENCOUNTER — ANESTHESIA (OUTPATIENT)
Dept: SURGERY | Facility: CLINIC | Age: 89
DRG: 453 | End: 2024-05-28
Payer: COMMERCIAL

## 2024-05-28 ENCOUNTER — ANESTHESIA EVENT (OUTPATIENT)
Dept: SURGERY | Facility: CLINIC | Age: 89
DRG: 453 | End: 2024-05-28
Payer: COMMERCIAL

## 2024-05-28 DIAGNOSIS — Z98.1 S/P LUMBAR FUSION: Primary | ICD-10-CM

## 2024-05-28 DIAGNOSIS — R33.9 URINARY RETENTION: ICD-10-CM

## 2024-05-28 DIAGNOSIS — Z86.718 PERSONAL HISTORY OF VENOUS THROMBOSIS AND EMBOLISM: ICD-10-CM

## 2024-05-28 DIAGNOSIS — D68.51 FACTOR V LEIDEN MUTATION (H): ICD-10-CM

## 2024-05-28 DIAGNOSIS — J98.11 ATELECTASIS: ICD-10-CM

## 2024-05-28 DIAGNOSIS — I10 HYPERTENSION GOAL BP (BLOOD PRESSURE) < 140/90: ICD-10-CM

## 2024-05-28 DIAGNOSIS — I10 ESSENTIAL HYPERTENSION WITH GOAL BLOOD PRESSURE LESS THAN 140/90: ICD-10-CM

## 2024-05-28 LAB
CREAT SERPL-MCNC: 1.76 MG/DL (ref 0.67–1.17)
EGFRCR SERPLBLD CKD-EPI 2021: 37 ML/MIN/1.73M2
GLUCOSE BLDC GLUCOMTR-MCNC: 177 MG/DL (ref 70–99)
GLUCOSE BLDC GLUCOMTR-MCNC: 189 MG/DL (ref 70–99)
GLUCOSE BLDC GLUCOMTR-MCNC: 198 MG/DL (ref 70–99)
GLUCOSE SERPL-MCNC: 159 MG/DL (ref 70–99)
INR PPP: 1.08 (ref 0.85–1.15)
LACTATE SERPL-SCNC: 1.1 MMOL/L (ref 0.7–2)
POTASSIUM SERPL-SCNC: 4.1 MMOL/L (ref 3.4–5.3)

## 2024-05-28 PROCEDURE — 22853 INSJ BIOMECHANICAL DEVICE: CPT | Performed by: NEUROLOGICAL SURGERY

## 2024-05-28 PROCEDURE — 710N000009 HC RECOVERY PHASE 1, LEVEL 1, PER MIN: Performed by: NEUROLOGICAL SURGERY

## 2024-05-28 PROCEDURE — 250N000009 HC RX 250: Performed by: NEUROLOGICAL SURGERY

## 2024-05-28 PROCEDURE — 82565 ASSAY OF CREATININE: CPT | Performed by: NEUROLOGICAL SURGERY

## 2024-05-28 PROCEDURE — 370N000017 HC ANESTHESIA TECHNICAL FEE, PER MIN: Performed by: NEUROLOGICAL SURGERY

## 2024-05-28 PROCEDURE — L8699 PROSTHETIC IMPLANT NOS: HCPCS | Performed by: NEUROLOGICAL SURGERY

## 2024-05-28 PROCEDURE — 36415 COLL VENOUS BLD VENIPUNCTURE: CPT | Performed by: ANESTHESIOLOGY

## 2024-05-28 PROCEDURE — 36415 COLL VENOUS BLD VENIPUNCTURE: CPT | Performed by: NEUROLOGICAL SURGERY

## 2024-05-28 PROCEDURE — 999N000141 HC STATISTIC PRE-PROCEDURE NURSING ASSESSMENT: Performed by: NEUROLOGICAL SURGERY

## 2024-05-28 PROCEDURE — 250N000013 HC RX MED GY IP 250 OP 250 PS 637: Performed by: NURSE PRACTITIONER

## 2024-05-28 PROCEDURE — 360N000086 HC SURGERY LEVEL 6 W/ FLUORO, PER MIN: Performed by: NEUROLOGICAL SURGERY

## 2024-05-28 PROCEDURE — C1762 CONN TISS, HUMAN(INC FASCIA): HCPCS | Performed by: NEUROLOGICAL SURGERY

## 2024-05-28 PROCEDURE — 278N000051 HC OR IMPLANT GENERAL: Performed by: NEUROLOGICAL SURGERY

## 2024-05-28 PROCEDURE — 250N000013 HC RX MED GY IP 250 OP 250 PS 637: Performed by: PHYSICIAN ASSISTANT

## 2024-05-28 PROCEDURE — 63047 LAM FACETEC & FORAMOT LUMBAR: CPT | Mod: AS | Performed by: NURSE PRACTITIONER

## 2024-05-28 PROCEDURE — 120N000001 HC R&B MED SURG/OB

## 2024-05-28 PROCEDURE — 99222 1ST HOSP IP/OBS MODERATE 55: CPT | Performed by: PHYSICIAN ASSISTANT

## 2024-05-28 PROCEDURE — 250N000009 HC RX 250: Performed by: NURSE ANESTHETIST, CERTIFIED REGISTERED

## 2024-05-28 PROCEDURE — 61783 SCAN PROC SPINAL: CPT | Mod: AS | Performed by: NURSE PRACTITIONER

## 2024-05-28 PROCEDURE — 63052 LAM FACETC/FRMT ARTHRD LUM 1: CPT | Mod: XU | Performed by: NEUROLOGICAL SURGERY

## 2024-05-28 PROCEDURE — 85610 PROTHROMBIN TIME: CPT | Performed by: ANESTHESIOLOGY

## 2024-05-28 PROCEDURE — 84132 ASSAY OF SERUM POTASSIUM: CPT | Performed by: ANESTHESIOLOGY

## 2024-05-28 PROCEDURE — 250N000025 HC SEVOFLURANE, PER MIN: Performed by: NEUROLOGICAL SURGERY

## 2024-05-28 PROCEDURE — 250N000012 HC RX MED GY IP 250 OP 636 PS 637: Performed by: PHYSICIAN ASSISTANT

## 2024-05-28 PROCEDURE — 22853 INSJ BIOMECHANICAL DEVICE: CPT | Mod: AS | Performed by: NURSE PRACTITIONER

## 2024-05-28 PROCEDURE — 250N000011 HC RX IP 250 OP 636: Performed by: NURSE ANESTHETIST, CERTIFIED REGISTERED

## 2024-05-28 PROCEDURE — 22840 INSERT SPINE FIXATION DEVICE: CPT | Performed by: NEUROLOGICAL SURGERY

## 2024-05-28 PROCEDURE — C1713 ANCHOR/SCREW BN/BN,TIS/BN: HCPCS | Performed by: NEUROLOGICAL SURGERY

## 2024-05-28 PROCEDURE — 22633 ARTHRD CMBN 1NTRSPC LUMBAR: CPT | Mod: AS | Performed by: NURSE PRACTITIONER

## 2024-05-28 PROCEDURE — 250N000011 HC RX IP 250 OP 636: Performed by: NURSE PRACTITIONER

## 2024-05-28 PROCEDURE — 82947 ASSAY GLUCOSE BLOOD QUANT: CPT | Performed by: ANESTHESIOLOGY

## 2024-05-28 PROCEDURE — P9045 ALBUMIN (HUMAN), 5%, 250 ML: HCPCS | Mod: JZ | Performed by: NURSE ANESTHETIST, CERTIFIED REGISTERED

## 2024-05-28 PROCEDURE — 61783 SCAN PROC SPINAL: CPT | Mod: XU | Performed by: NEUROLOGICAL SURGERY

## 2024-05-28 PROCEDURE — 258N000003 HC RX IP 258 OP 636: Performed by: NURSE ANESTHETIST, CERTIFIED REGISTERED

## 2024-05-28 PROCEDURE — 258N000003 HC RX IP 258 OP 636: Performed by: NURSE PRACTITIONER

## 2024-05-28 PROCEDURE — 63047 LAM FACETEC & FORAMOT LUMBAR: CPT | Mod: XS | Performed by: NEUROLOGICAL SURGERY

## 2024-05-28 PROCEDURE — 999N000179 XR SURGERY CARM FLUORO LESS THAN 5 MIN W STILLS

## 2024-05-28 PROCEDURE — 20930 SP BONE ALGRFT MORSEL ADD-ON: CPT | Performed by: NEUROLOGICAL SURGERY

## 2024-05-28 PROCEDURE — 20936 SP BONE AGRFT LOCAL ADD-ON: CPT | Performed by: NEUROLOGICAL SURGERY

## 2024-05-28 PROCEDURE — 22633 ARTHRD CMBN 1NTRSPC LUMBAR: CPT | Performed by: NEUROLOGICAL SURGERY

## 2024-05-28 PROCEDURE — 272N000001 HC OR GENERAL SUPPLY STERILE: Performed by: NEUROLOGICAL SURGERY

## 2024-05-28 PROCEDURE — 250N000011 HC RX IP 250 OP 636: Performed by: NEUROLOGICAL SURGERY

## 2024-05-28 PROCEDURE — 83605 ASSAY OF LACTIC ACID: CPT | Performed by: NEUROLOGICAL SURGERY

## 2024-05-28 PROCEDURE — 99100 ANES PT EXTEME AGE<1 YR&>70: CPT | Performed by: NURSE ANESTHETIST, CERTIFIED REGISTERED

## 2024-05-28 PROCEDURE — 22840 INSERT SPINE FIXATION DEVICE: CPT | Mod: AS | Performed by: NURSE PRACTITIONER

## 2024-05-28 PROCEDURE — 258N000003 HC RX IP 258 OP 636: Performed by: ANESTHESIOLOGY

## 2024-05-28 PROCEDURE — 22633 ARTHRD CMBN 1NTRSPC LUMBAR: CPT | Performed by: ANESTHESIOLOGY

## 2024-05-28 PROCEDURE — 63052 LAM FACETC/FRMT ARTHRD LUM 1: CPT | Mod: AS | Performed by: NURSE PRACTITIONER

## 2024-05-28 PROCEDURE — 22633 ARTHRD CMBN 1NTRSPC LUMBAR: CPT | Performed by: NURSE ANESTHETIST, CERTIFIED REGISTERED

## 2024-05-28 DEVICE — IMP ROD MEDT SOLERA CVD 5.5X35MM TI 1553201035: Type: IMPLANTABLE DEVICE | Site: SPINE LUMBAR | Status: FUNCTIONAL

## 2024-05-28 DEVICE — IMPLANTABLE DEVICE: Type: IMPLANTABLE DEVICE | Site: SPINE LUMBAR | Status: FUNCTIONAL

## 2024-05-28 DEVICE — MAGNETOS EASYPACK PUTTY 1.5CC 1-2MM USA
Type: IMPLANTABLE DEVICE | Site: SPINE LUMBAR | Status: FUNCTIONAL
Brand: MAGNETOS

## 2024-05-28 DEVICE — MAGNETOS EASYPACK PUTTY 2.5CC 1-2MM USA
Type: IMPLANTABLE DEVICE | Site: SPINE LUMBAR | Status: FUNCTIONAL
Brand: MAGNETOS

## 2024-05-28 DEVICE — DURAGEN® DURAL GRAFT MATRIX 5 PK, 1X1 DOM
Type: IMPLANTABLE DEVICE | Site: SPINE LUMBAR | Status: FUNCTIONAL
Brand: DURAGEN®

## 2024-05-28 DEVICE — IMP SPI INTERBODY MEDT CATALYFT PL 40 SHORT 7MM 6069073: Type: IMPLANTABLE DEVICE | Site: SPINE LUMBAR | Status: FUNCTIONAL

## 2024-05-28 DEVICE — GRAFT BONE INFUSE BMP XSM 7510100: Type: IMPLANTABLE DEVICE | Site: SPINE LUMBAR | Status: FUNCTIONAL

## 2024-05-28 DEVICE — GRAFT BONE FIBERS DBF 9ML INJ T50309 PRELOADED: Type: IMPLANTABLE DEVICE | Site: SPINE LUMBAR | Status: FUNCTIONAL

## 2024-05-28 RX ORDER — ONDANSETRON 4 MG/1
4 TABLET, ORALLY DISINTEGRATING ORAL EVERY 6 HOURS PRN
Status: DISCONTINUED | OUTPATIENT
Start: 2024-05-28 | End: 2024-06-02 | Stop reason: HOSPADM

## 2024-05-28 RX ORDER — MEPERIDINE HYDROCHLORIDE 25 MG/ML
12.5 INJECTION INTRAMUSCULAR; INTRAVENOUS; SUBCUTANEOUS EVERY 5 MIN PRN
Status: DISCONTINUED | OUTPATIENT
Start: 2024-05-28 | End: 2024-05-28 | Stop reason: HOSPADM

## 2024-05-28 RX ORDER — POLYETHYLENE GLYCOL 3350 17 G/17G
17 POWDER, FOR SOLUTION ORAL DAILY
Status: DISCONTINUED | OUTPATIENT
Start: 2024-05-29 | End: 2024-06-02 | Stop reason: HOSPADM

## 2024-05-28 RX ORDER — FENTANYL CITRATE 0.05 MG/ML
25 INJECTION, SOLUTION INTRAMUSCULAR; INTRAVENOUS EVERY 5 MIN PRN
Status: DISCONTINUED | OUTPATIENT
Start: 2024-05-28 | End: 2024-05-28 | Stop reason: HOSPADM

## 2024-05-28 RX ORDER — HYDRALAZINE HYDROCHLORIDE 20 MG/ML
10 INJECTION INTRAMUSCULAR; INTRAVENOUS EVERY 4 HOURS PRN
Status: DISCONTINUED | OUTPATIENT
Start: 2024-05-28 | End: 2024-06-02 | Stop reason: HOSPADM

## 2024-05-28 RX ORDER — DEXAMETHASONE SODIUM PHOSPHATE 4 MG/ML
4 INJECTION, SOLUTION INTRA-ARTICULAR; INTRALESIONAL; INTRAMUSCULAR; INTRAVENOUS; SOFT TISSUE
Status: DISCONTINUED | OUTPATIENT
Start: 2024-05-28 | End: 2024-05-28 | Stop reason: HOSPADM

## 2024-05-28 RX ORDER — NALOXONE HYDROCHLORIDE 0.4 MG/ML
0.1 INJECTION, SOLUTION INTRAMUSCULAR; INTRAVENOUS; SUBCUTANEOUS
Status: DISCONTINUED | OUTPATIENT
Start: 2024-05-28 | End: 2024-05-28 | Stop reason: HOSPADM

## 2024-05-28 RX ORDER — HYDROXYZINE HYDROCHLORIDE 10 MG/1
10 TABLET, FILM COATED ORAL EVERY 6 HOURS PRN
Status: DISCONTINUED | OUTPATIENT
Start: 2024-05-28 | End: 2024-06-02 | Stop reason: HOSPADM

## 2024-05-28 RX ORDER — LIDOCAINE 40 MG/G
CREAM TOPICAL
Status: DISCONTINUED | OUTPATIENT
Start: 2024-05-28 | End: 2024-06-02 | Stop reason: HOSPADM

## 2024-05-28 RX ORDER — LABETALOL HYDROCHLORIDE 5 MG/ML
5 INJECTION, SOLUTION INTRAVENOUS
Status: DISCONTINUED | OUTPATIENT
Start: 2024-05-28 | End: 2024-05-28 | Stop reason: HOSPADM

## 2024-05-28 RX ORDER — OXYCODONE HYDROCHLORIDE 5 MG/1
5 TABLET ORAL EVERY 4 HOURS PRN
Status: DISCONTINUED | OUTPATIENT
Start: 2024-05-28 | End: 2024-05-29

## 2024-05-28 RX ORDER — ONDANSETRON 2 MG/ML
4 INJECTION INTRAMUSCULAR; INTRAVENOUS EVERY 30 MIN PRN
Status: DISCONTINUED | OUTPATIENT
Start: 2024-05-28 | End: 2024-05-28 | Stop reason: HOSPADM

## 2024-05-28 RX ORDER — CEFAZOLIN SODIUM/WATER 2 G/20 ML
2 SYRINGE (ML) INTRAVENOUS SEE ADMIN INSTRUCTIONS
Status: DISCONTINUED | OUTPATIENT
Start: 2024-05-28 | End: 2024-05-28 | Stop reason: HOSPADM

## 2024-05-28 RX ORDER — GLIPIZIDE 5 MG/1
5 TABLET, FILM COATED, EXTENDED RELEASE ORAL DAILY
Status: DISCONTINUED | OUTPATIENT
Start: 2024-05-28 | End: 2024-05-28

## 2024-05-28 RX ORDER — LABETALOL HYDROCHLORIDE 5 MG/ML
10 INJECTION, SOLUTION INTRAVENOUS
Status: DISCONTINUED | OUTPATIENT
Start: 2024-05-28 | End: 2024-06-02 | Stop reason: HOSPADM

## 2024-05-28 RX ORDER — ONDANSETRON 2 MG/ML
4 INJECTION INTRAMUSCULAR; INTRAVENOUS EVERY 6 HOURS PRN
Status: DISCONTINUED | OUTPATIENT
Start: 2024-05-28 | End: 2024-06-02 | Stop reason: HOSPADM

## 2024-05-28 RX ORDER — SODIUM CHLORIDE 9 MG/ML
INJECTION, SOLUTION INTRAVENOUS CONTINUOUS
Status: DISCONTINUED | OUTPATIENT
Start: 2024-05-28 | End: 2024-06-02 | Stop reason: HOSPADM

## 2024-05-28 RX ORDER — NALOXONE HYDROCHLORIDE 0.4 MG/ML
0.2 INJECTION, SOLUTION INTRAMUSCULAR; INTRAVENOUS; SUBCUTANEOUS
Status: DISCONTINUED | OUTPATIENT
Start: 2024-05-28 | End: 2024-06-02 | Stop reason: HOSPADM

## 2024-05-28 RX ORDER — HYDRALAZINE HYDROCHLORIDE 20 MG/ML
2.5-5 INJECTION INTRAMUSCULAR; INTRAVENOUS
Status: DISCONTINUED | OUTPATIENT
Start: 2024-05-28 | End: 2024-05-28 | Stop reason: HOSPADM

## 2024-05-28 RX ORDER — GABAPENTIN 100 MG/1
100 CAPSULE ORAL
Status: COMPLETED | OUTPATIENT
Start: 2024-05-28 | End: 2024-05-28

## 2024-05-28 RX ORDER — EPHEDRINE SULFATE 50 MG/ML
INJECTION, SOLUTION INTRAMUSCULAR; INTRAVENOUS; SUBCUTANEOUS PRN
Status: DISCONTINUED | OUTPATIENT
Start: 2024-05-28 | End: 2024-05-28

## 2024-05-28 RX ORDER — ONDANSETRON 4 MG/1
4 TABLET, ORALLY DISINTEGRATING ORAL EVERY 30 MIN PRN
Status: DISCONTINUED | OUTPATIENT
Start: 2024-05-28 | End: 2024-05-28 | Stop reason: HOSPADM

## 2024-05-28 RX ORDER — HYDROMORPHONE HCL IN WATER/PF 6 MG/30 ML
0.2 PATIENT CONTROLLED ANALGESIA SYRINGE INTRAVENOUS
Status: DISCONTINUED | OUTPATIENT
Start: 2024-05-28 | End: 2024-06-02 | Stop reason: HOSPADM

## 2024-05-28 RX ORDER — SODIUM CHLORIDE, SODIUM LACTATE, POTASSIUM CHLORIDE, CALCIUM CHLORIDE 600; 310; 30; 20 MG/100ML; MG/100ML; MG/100ML; MG/100ML
INJECTION, SOLUTION INTRAVENOUS CONTINUOUS
Status: DISCONTINUED | OUTPATIENT
Start: 2024-05-28 | End: 2024-05-28 | Stop reason: HOSPADM

## 2024-05-28 RX ORDER — CALCIUM CARBONATE 500 MG/1
500 TABLET, CHEWABLE ORAL 4 TIMES DAILY PRN
Status: DISCONTINUED | OUTPATIENT
Start: 2024-05-28 | End: 2024-06-02 | Stop reason: HOSPADM

## 2024-05-28 RX ORDER — PROCHLORPERAZINE MALEATE 5 MG
5 TABLET ORAL EVERY 6 HOURS PRN
Status: DISCONTINUED | OUTPATIENT
Start: 2024-05-28 | End: 2024-06-02 | Stop reason: HOSPADM

## 2024-05-28 RX ORDER — ALBUTEROL SULFATE 90 UG/1
2 AEROSOL, METERED RESPIRATORY (INHALATION) EVERY 6 HOURS PRN
Status: DISCONTINUED | OUTPATIENT
Start: 2024-05-28 | End: 2024-06-02 | Stop reason: HOSPADM

## 2024-05-28 RX ORDER — ATENOLOL 25 MG/1
50 TABLET ORAL 2 TIMES DAILY
Status: DISCONTINUED | OUTPATIENT
Start: 2024-05-28 | End: 2024-06-02 | Stop reason: HOSPADM

## 2024-05-28 RX ORDER — BUPIVACAINE HYDROCHLORIDE AND EPINEPHRINE 2.5; 5 MG/ML; UG/ML
INJECTION, SOLUTION INFILTRATION; PERINEURAL PRN
Status: DISCONTINUED | OUTPATIENT
Start: 2024-05-28 | End: 2024-05-28 | Stop reason: HOSPADM

## 2024-05-28 RX ORDER — ACETAMINOPHEN 325 MG/1
975 TABLET ORAL EVERY 8 HOURS
Qty: 27 TABLET | Refills: 0 | Status: COMPLETED | OUTPATIENT
Start: 2024-05-28 | End: 2024-05-31

## 2024-05-28 RX ORDER — PROPOFOL 10 MG/ML
INJECTION, EMULSION INTRAVENOUS PRN
Status: DISCONTINUED | OUTPATIENT
Start: 2024-05-28 | End: 2024-05-28

## 2024-05-28 RX ORDER — HYDROMORPHONE HCL IN WATER/PF 6 MG/30 ML
0.4 PATIENT CONTROLLED ANALGESIA SYRINGE INTRAVENOUS EVERY 5 MIN PRN
Status: DISCONTINUED | OUTPATIENT
Start: 2024-05-28 | End: 2024-05-28 | Stop reason: HOSPADM

## 2024-05-28 RX ORDER — SODIUM CHLORIDE, SODIUM LACTATE, POTASSIUM CHLORIDE, CALCIUM CHLORIDE 600; 310; 30; 20 MG/100ML; MG/100ML; MG/100ML; MG/100ML
INJECTION, SOLUTION INTRAVENOUS CONTINUOUS PRN
Status: DISCONTINUED | OUTPATIENT
Start: 2024-05-28 | End: 2024-05-28

## 2024-05-28 RX ORDER — PROPOFOL 10 MG/ML
INJECTION, EMULSION INTRAVENOUS CONTINUOUS PRN
Status: DISCONTINUED | OUTPATIENT
Start: 2024-05-28 | End: 2024-05-28

## 2024-05-28 RX ORDER — HYDROMORPHONE HCL IN WATER/PF 6 MG/30 ML
0.2 PATIENT CONTROLLED ANALGESIA SYRINGE INTRAVENOUS EVERY 5 MIN PRN
Status: DISCONTINUED | OUTPATIENT
Start: 2024-05-28 | End: 2024-05-28 | Stop reason: HOSPADM

## 2024-05-28 RX ORDER — PRAMIPEXOLE DIHYDROCHLORIDE 1 MG/1
1 TABLET ORAL 2 TIMES DAILY
Status: DISCONTINUED | OUTPATIENT
Start: 2024-05-28 | End: 2024-06-02 | Stop reason: HOSPADM

## 2024-05-28 RX ORDER — ACETAMINOPHEN 325 MG/1
325-650 TABLET ORAL EVERY 6 HOURS PRN
COMMUNITY
End: 2024-06-03 | Stop reason: ALTCHOICE

## 2024-05-28 RX ORDER — SPIRONOLACTONE 25 MG/1
25 TABLET ORAL DAILY
Status: DISCONTINUED | OUTPATIENT
Start: 2024-05-28 | End: 2024-06-02 | Stop reason: HOSPADM

## 2024-05-28 RX ORDER — METHOCARBAMOL 750 MG/1
750 TABLET, FILM COATED ORAL EVERY 6 HOURS
Status: DISCONTINUED | OUTPATIENT
Start: 2024-05-28 | End: 2024-05-29

## 2024-05-28 RX ORDER — CEFAZOLIN SODIUM 2 G/100ML
2 INJECTION, SOLUTION INTRAVENOUS EVERY 8 HOURS
Status: DISCONTINUED | OUTPATIENT
Start: 2024-05-28 | End: 2024-05-31

## 2024-05-28 RX ORDER — LIDOCAINE HYDROCHLORIDE 20 MG/ML
INJECTION, SOLUTION INFILTRATION; PERINEURAL PRN
Status: DISCONTINUED | OUTPATIENT
Start: 2024-05-28 | End: 2024-05-28

## 2024-05-28 RX ORDER — DEXAMETHASONE SODIUM PHOSPHATE 4 MG/ML
INJECTION, SOLUTION INTRA-ARTICULAR; INTRALESIONAL; INTRAMUSCULAR; INTRAVENOUS; SOFT TISSUE PRN
Status: DISCONTINUED | OUTPATIENT
Start: 2024-05-28 | End: 2024-05-28

## 2024-05-28 RX ORDER — FENTANYL CITRATE 50 UG/ML
INJECTION, SOLUTION INTRAMUSCULAR; INTRAVENOUS PRN
Status: DISCONTINUED | OUTPATIENT
Start: 2024-05-28 | End: 2024-05-28

## 2024-05-28 RX ORDER — NALOXONE HYDROCHLORIDE 0.4 MG/ML
0.4 INJECTION, SOLUTION INTRAMUSCULAR; INTRAVENOUS; SUBCUTANEOUS
Status: DISCONTINUED | OUTPATIENT
Start: 2024-05-28 | End: 2024-06-02 | Stop reason: HOSPADM

## 2024-05-28 RX ORDER — AMOXICILLIN 250 MG
1 CAPSULE ORAL 2 TIMES DAILY
Status: DISCONTINUED | OUTPATIENT
Start: 2024-05-28 | End: 2024-06-02 | Stop reason: HOSPADM

## 2024-05-28 RX ORDER — CEFAZOLIN SODIUM/WATER 2 G/20 ML
2 SYRINGE (ML) INTRAVENOUS
Status: COMPLETED | OUTPATIENT
Start: 2024-05-28 | End: 2024-05-28

## 2024-05-28 RX ORDER — BISACODYL 10 MG
10 SUPPOSITORY, RECTAL RECTAL DAILY PRN
Status: DISCONTINUED | OUTPATIENT
Start: 2024-05-31 | End: 2024-06-02 | Stop reason: HOSPADM

## 2024-05-28 RX ORDER — HYDROMORPHONE HCL IN WATER/PF 6 MG/30 ML
0.1 PATIENT CONTROLLED ANALGESIA SYRINGE INTRAVENOUS
Status: DISCONTINUED | OUTPATIENT
Start: 2024-05-28 | End: 2024-06-02 | Stop reason: HOSPADM

## 2024-05-28 RX ORDER — DEXTROSE MONOHYDRATE 25 G/50ML
25-50 INJECTION, SOLUTION INTRAVENOUS
Status: DISCONTINUED | OUTPATIENT
Start: 2024-05-28 | End: 2024-06-02 | Stop reason: HOSPADM

## 2024-05-28 RX ORDER — NICOTINE POLACRILEX 4 MG
15-30 LOZENGE BUCCAL
Status: DISCONTINUED | OUTPATIENT
Start: 2024-05-28 | End: 2024-06-02 | Stop reason: HOSPADM

## 2024-05-28 RX ORDER — OXYCODONE HYDROCHLORIDE 5 MG/1
10 TABLET ORAL EVERY 4 HOURS PRN
Status: DISCONTINUED | OUTPATIENT
Start: 2024-05-28 | End: 2024-05-29

## 2024-05-28 RX ORDER — IPRATROPIUM BROMIDE 42 UG/1
2 SPRAY, METERED NASAL 4 TIMES DAILY
Status: DISCONTINUED | OUTPATIENT
Start: 2024-05-28 | End: 2024-06-02 | Stop reason: HOSPADM

## 2024-05-28 RX ORDER — LOSARTAN POTASSIUM 100 MG/1
100 TABLET ORAL DAILY
Status: DISCONTINUED | OUTPATIENT
Start: 2024-05-28 | End: 2024-05-30

## 2024-05-28 RX ORDER — ACETAMINOPHEN 325 MG/1
650 TABLET ORAL EVERY 4 HOURS PRN
Status: DISCONTINUED | OUTPATIENT
Start: 2024-05-31 | End: 2024-06-02 | Stop reason: HOSPADM

## 2024-05-28 RX ORDER — FENTANYL CITRATE 0.05 MG/ML
50 INJECTION, SOLUTION INTRAMUSCULAR; INTRAVENOUS EVERY 5 MIN PRN
Status: DISCONTINUED | OUTPATIENT
Start: 2024-05-28 | End: 2024-05-28 | Stop reason: HOSPADM

## 2024-05-28 RX ORDER — ONDANSETRON 2 MG/ML
INJECTION INTRAMUSCULAR; INTRAVENOUS PRN
Status: DISCONTINUED | OUTPATIENT
Start: 2024-05-28 | End: 2024-05-28

## 2024-05-28 RX ADMIN — ROCURONIUM BROMIDE 50 MG: 50 INJECTION, SOLUTION INTRAVENOUS at 07:38

## 2024-05-28 RX ADMIN — SODIUM CHLORIDE, POTASSIUM CHLORIDE, SODIUM LACTATE AND CALCIUM CHLORIDE: 600; 310; 30; 20 INJECTION, SOLUTION INTRAVENOUS at 07:45

## 2024-05-28 RX ADMIN — PRAMIPEXOLE DIHYDROCHLORIDE 1 MG: 1 TABLET ORAL at 17:23

## 2024-05-28 RX ADMIN — ROCURONIUM BROMIDE 20 MG: 50 INJECTION, SOLUTION INTRAVENOUS at 08:28

## 2024-05-28 RX ADMIN — HYDROMORPHONE HYDROCHLORIDE 0.5 MG: 1 INJECTION, SOLUTION INTRAMUSCULAR; INTRAVENOUS; SUBCUTANEOUS at 11:17

## 2024-05-28 RX ADMIN — Medication 2 G: at 11:46

## 2024-05-28 RX ADMIN — LIDOCAINE HYDROCHLORIDE 60 MG: 20 INJECTION, SOLUTION INFILTRATION; PERINEURAL at 07:38

## 2024-05-28 RX ADMIN — PHENYLEPHRINE HYDROCHLORIDE 200 MCG: 10 INJECTION INTRAVENOUS at 08:20

## 2024-05-28 RX ADMIN — PRAMIPEXOLE DIHYDROCHLORIDE 1 MG: 1 TABLET ORAL at 22:27

## 2024-05-28 RX ADMIN — METHOCARBAMOL 750 MG: 750 TABLET ORAL at 15:59

## 2024-05-28 RX ADMIN — OXYCODONE HYDROCHLORIDE 5 MG: 5 TABLET ORAL at 20:47

## 2024-05-28 RX ADMIN — ROCURONIUM BROMIDE 10 MG: 50 INJECTION, SOLUTION INTRAVENOUS at 09:03

## 2024-05-28 RX ADMIN — HYDROXYZINE HYDROCHLORIDE 10 MG: 10 TABLET ORAL at 22:27

## 2024-05-28 RX ADMIN — HYDROMORPHONE HYDROCHLORIDE 0.5 MG: 1 INJECTION, SOLUTION INTRAMUSCULAR; INTRAVENOUS; SUBCUTANEOUS at 08:37

## 2024-05-28 RX ADMIN — PROPOFOL 20 MCG/KG/MIN: 10 INJECTION, EMULSION INTRAVENOUS at 08:08

## 2024-05-28 RX ADMIN — PROPOFOL 150 MG: 10 INJECTION, EMULSION INTRAVENOUS at 07:38

## 2024-05-28 RX ADMIN — METHOCARBAMOL 750 MG: 750 TABLET ORAL at 20:41

## 2024-05-28 RX ADMIN — Medication 7.5 MG: at 08:50

## 2024-05-28 RX ADMIN — SODIUM CHLORIDE: 9 INJECTION, SOLUTION INTRAVENOUS at 16:00

## 2024-05-28 RX ADMIN — ACETAMINOPHEN 975 MG: 325 TABLET, FILM COATED ORAL at 15:59

## 2024-05-28 RX ADMIN — FENTANYL CITRATE 50 MCG: 50 INJECTION INTRAMUSCULAR; INTRAVENOUS at 07:30

## 2024-05-28 RX ADMIN — Medication 5 MG: at 11:21

## 2024-05-28 RX ADMIN — CEFAZOLIN SODIUM 2 G: 2 INJECTION, SOLUTION INTRAVENOUS at 20:40

## 2024-05-28 RX ADMIN — ROCURONIUM BROMIDE 10 MG: 50 INJECTION, SOLUTION INTRAVENOUS at 11:23

## 2024-05-28 RX ADMIN — DEXAMETHASONE SODIUM PHOSPHATE 4 MG: 4 INJECTION, SOLUTION INTRA-ARTICULAR; INTRALESIONAL; INTRAMUSCULAR; INTRAVENOUS; SOFT TISSUE at 08:20

## 2024-05-28 RX ADMIN — ATENOLOL 50 MG: 25 TABLET ORAL at 20:42

## 2024-05-28 RX ADMIN — FENTANYL CITRATE 50 MCG: 50 INJECTION INTRAMUSCULAR; INTRAVENOUS at 07:46

## 2024-05-28 RX ADMIN — PHENYLEPHRINE HYDROCHLORIDE 150 MCG: 10 INJECTION INTRAVENOUS at 08:40

## 2024-05-28 RX ADMIN — Medication 7.5 MG: at 09:29

## 2024-05-28 RX ADMIN — SENNOSIDES AND DOCUSATE SODIUM 1 TABLET: 50; 8.6 TABLET ORAL at 20:42

## 2024-05-28 RX ADMIN — ALBUMIN (HUMAN): 12.5 SOLUTION INTRAVENOUS at 09:45

## 2024-05-28 RX ADMIN — Medication 2 G: at 07:45

## 2024-05-28 RX ADMIN — SODIUM CHLORIDE, POTASSIUM CHLORIDE, SODIUM LACTATE AND CALCIUM CHLORIDE: 600; 310; 30; 20 INJECTION, SOLUTION INTRAVENOUS at 06:45

## 2024-05-28 RX ADMIN — PHENYLEPHRINE HYDROCHLORIDE 200 MCG: 10 INJECTION INTRAVENOUS at 08:02

## 2024-05-28 RX ADMIN — INSULIN ASPART 1 UNITS: 100 INJECTION, SOLUTION INTRAVENOUS; SUBCUTANEOUS at 19:01

## 2024-05-28 RX ADMIN — ONDANSETRON 4 MG: 2 INJECTION INTRAMUSCULAR; INTRAVENOUS at 11:48

## 2024-05-28 RX ADMIN — OXYCODONE HYDROCHLORIDE 5 MG: 5 TABLET ORAL at 16:00

## 2024-05-28 RX ADMIN — Medication 5 MG: at 09:38

## 2024-05-28 RX ADMIN — SUGAMMADEX 200 MG: 100 INJECTION, SOLUTION INTRAVENOUS at 12:42

## 2024-05-28 RX ADMIN — ROCURONIUM BROMIDE 10 MG: 50 INJECTION, SOLUTION INTRAVENOUS at 09:42

## 2024-05-28 RX ADMIN — PHENYLEPHRINE HYDROCHLORIDE 0.3 MCG/KG/MIN: 10 INJECTION INTRAVENOUS at 08:08

## 2024-05-28 RX ADMIN — ROCURONIUM BROMIDE 10 MG: 50 INJECTION, SOLUTION INTRAVENOUS at 10:28

## 2024-05-28 ASSESSMENT — ACTIVITIES OF DAILY LIVING (ADL)
ADLS_ACUITY_SCORE: 24
ADLS_ACUITY_SCORE: 24
ADLS_ACUITY_SCORE: 28
ADLS_ACUITY_SCORE: 24
ADLS_ACUITY_SCORE: 28
ADLS_ACUITY_SCORE: 24
ADLS_ACUITY_SCORE: 24
ADLS_ACUITY_SCORE: 33
ADLS_ACUITY_SCORE: 24
ADLS_ACUITY_SCORE: 28

## 2024-05-28 ASSESSMENT — ENCOUNTER SYMPTOMS
SEIZURES: 0
DYSRHYTHMIAS: 0

## 2024-05-28 ASSESSMENT — LIFESTYLE VARIABLES: TOBACCO_USE: 0

## 2024-05-28 ASSESSMENT — COPD QUESTIONNAIRES: COPD: 0

## 2024-05-28 NOTE — ANESTHESIA POSTPROCEDURE EVALUATION
Patient: Robert Richard    Procedure: Procedure(s):  Lumbar 4 to Lumbar 5 Transforaminal Interbody Fusion with decompression of stenosis and Lumbar 2 to Lumbar 3 bilateral laminectomy for decompression of stenosis       Anesthesia Type:  General    Note:     Postop Pain Control: Uneventful            Sign Out: Well controlled pain   PONV:    Neuro/Psych: Uneventful            Sign Out: Acceptable/Baseline neuro status   Airway/Respiratory: Uneventful            Sign Out: Acceptable/Baseline resp. status   CV/Hemodynamics: Uneventful            Sign Out: Acceptable CV status; No obvious hypovolemia; No obvious fluid overload   Other NRE:    DID A NON-ROUTINE EVENT OCCUR?            Last vitals:  Vitals Value Taken Time   BP 99/47 05/28/24 1402   Temp 36.1  C (97  F) 05/28/24 1300   Pulse 76 05/28/24 1405   Resp 14 05/28/24 1405   SpO2 96 % 05/28/24 1405   Vitals shown include unfiled device data.    Electronically Signed By: Livan Pettit MD  May 28, 2024  2:07 PM

## 2024-05-28 NOTE — PROGRESS NOTES
Neurosurgery Post Op Note    Procedure:        Lumbar 4 to Lumbar 5 transforaminal interbody fusion with decompression of stenosis and Lumbar 2 to Lumbar 3 bilateral laminectomy for decompression of stenosis     -HOB flat overnight  -MONICA drain to gravity, abx while drain is in place   -Hold PTA warfarin x 1 week post op   -Continue pain control measures as needed   -Routine wound care   -PT/OT  -Hospitalist consult     Li Montiel CNP  [facility] Neurosurgery  Tel [phone]  Pager [phone]

## 2024-05-28 NOTE — BRIEF OP NOTE
[facility]    Brief Operative Note    Pre-operative diagnosis: Spondylolisthesis, lumbosacral region [M43.17]  Post-operative diagnosis Same as pre-operative diagnosis    Procedure: Lumbar 4 to Lumbar 5 Transforaminal Interbody Fusion with decompression of stenosis and Lumbar 2 to Lumbar 3 bilateral laminectomy for decompression of stenosis, Bilateral - Spine    Surgeon: Surgeons and Role:     * Cj Cleary MD - Primary     * Li Montiel NP - Assisting  Anesthesia: General   Estimated Blood Loss: 400 ml    Drains: Alfredo-Balderrama  Specimens: * No specimens in log *  Findings: See op note  Implants:   Implant Name Type Inv. Item Serial No.  Lot No. LRB No. Used Action   BONE GRAFT PUTTY MAGNETOS EASYPACK PUTTY 1.5CC 703-048-US - [id number] Bone/Tissue Synthetic BONE GRAFT PUTTY MAGNETOS EASYPACK PUTTY 1.5CC 703-048-US  OQVestir  N/A 1 Implanted   BONE GRAFT PUTTY MAGNETOS EASYPACK PUTTY 2.5CC 703-050-US - [id number]  BONE GRAFT PUTTY MAGNETOS EASYPACK PUTTY 2.5CC 703-050-US  OQVestir  N/A 1 Implanted   BONE GRAFT PUTTY MAGNETOS EASYPACK PUTTY 1.5CC 703-048-US - [id number]  BONE GRAFT PUTTY MAGNETOS EASYPACK PUTTY 1.5CC 703-048-US  OQVestir  N/A 1 Implanted   GRAFT BONE FIBERS DBF 9ML INJ [id number] PRELOADED - [id number] Bone/Tissue/Biologic GRAFT BONE FIBERS DBF 9ML INJ [id number] PRELOADED [id number] [employer] [id number] N/A 1 Implanted   TITAN NANOLOCK SHANK OSTEOGRIP THREADFORM Metallic Hardware/[place]   [employer] [id number] N/A 1 Implanted   GRAFT BONE INFUSE BMP XSM [id number] - [id number]  GRAFT BONE INFUSE BMP XSM [id number]  [employer] INC-DANEK [id number] N/A 1 Implanted   TITAN NANOLOCK  SHANK OSTEOGRIP THREADFORM Metallic Hardware/[place]   [employer] [id number] N/A 1 Implanted   TITAN NANOLOCK SHANK, OSTEOGRIP 6.5MM X 55MM Metallic Hardware/[place]   [employer] [id number] N/A 1 Implanted   TITAN NANOLOCK SHANK OSTEOGRIP  "THREADFORM Metallic Hardware/[place]   [employer] [id number] N/A 1 Implanted   IMP SPI INTERBODY MEDT CATALYFT PL 40 SHORT 7MM [id number] - [id number] Metallic Hardware/[place] IMP SPI INTERBODY MEDT CATALYFT PL 40 SHORT 7MM [id number]  [employer] [id number] N/A 1 Implanted   MAS HEAD AND SS FOR 5.5/6.0 NIRANJAN, 2 PK Metallic Hardware/[place]   [employer] [id number] N/A 1 Implanted   5.5-6.0 RMAS & SETSCREW Metallic Hardware/[place]   [employer] [id number] N/A 1 Implanted   5.5-6.0 RMAS & SETSCREW Metallic Hardware/[place]   [employer] [id number] N/A 1 Implanted   GRAFT DURAGEN 1X1\" ID-110 - [id number] Other GRAFT DURAGEN 1X1\" ID-110  [facility] [id number] N/A 1 Implanted   IMP NIRANJAN MEDT SOLERA CVD 5.5X35MM TI [id number] - [id number] Metallic Hardware/[place] IMP NIRANJAN MEDT SOLERA CVD 5.5X35MM TI [id number]  [employer] INC [id number] N/A 2 Implanted             "

## 2024-05-28 NOTE — ANESTHESIA PREPROCEDURE EVALUATION
Anesthesia Pre-Procedure Evaluation    Patient: Robert Richard   MRN: [id number] : 1935        Procedure : Procedure(s):  Lumbar 4 to Lumbar 5 Transforaminal Interbody Fusion with decompression of stenosis and Lumbar 2 to Lumbar 3 bilateral laminectomy for decompression of stenosis          Past Medical History:   Diagnosis Date    Basal cell carcinoma     Cancer (H)     DVT (deep venous thrombosis) (H) 2003    DVT (deep venous thrombosis) (H) 2008    DVT (deep venous thrombosis) (H) 10/2010    Factor V Leiden (H24)     Gout     Other and unspecified hyperlipidemia     Prostate cancer (H)     prostatectomy     Restless leg syndrome     Sleep apnea     Unspecified essential hypertension       Past Surgical History:   Procedure Laterality Date    APPENDECTOMY  1950    JOINT REPLACEMENT  2011    R hip    LAMINECTOMY LUMBAR ONE LEVEL  2008    LAPAROSCOPIC HERNIORRHAPHY UMBILICAL N/A 1/3/2019    Procedure: laparoscopic umbilical hernia repair with mesh;  Surgeon: Jamal Thompson DO;  Location:  OR    [facility] [[email or web address] REVISE TOTAL HIP REPLACEMENT  13    R hip      Allergies   Allergen Reactions    Lipitor [Atorvastatin Calcium]      Muscle pain, weakness    Pravastatin Other (See Comments)     muscle aches    Simvastatin Other (See Comments)     muscle aches      Social History     Tobacco Use    Smoking status: Never    Smokeless tobacco: Never   Substance Use Topics    Alcohol use: Yes     Alcohol/week: 0.0 standard drinks of alcohol     Comment: 1 drink every 1-2 weeks.      Wt Readings from Last 1 Encounters:   24 112.5 kg (248 lb)        Anesthesia Evaluation            ROS/MED HX  ENT/Pulmonary:     (+) sleep apnea, uses CPAP,                    asthma (RAD)  Treatment: Inhaler prn,              (-) tobacco use and COPD   Neurologic:    (-) no seizures and no CVA   Cardiovascular:     (+)  hypertension- -   -  - -                           valvular problems/murmurs type: AS      Previous cardiac testing   Echo: Date: 2/24 Results:  Left ventricular systolic function is normal.  The visual ejection fraction is 60-65%.  The right ventricle is normal in structure, function and size.  Calcified aortic valve. Vmax 2.8 m/sec, mean gradient 17 mmHg, ARLEY by  continuity 1.4 cm2, DVI 0.35. Findings consistent with mild-moderate aortic  stenosis. This is at a low normal stroke volume index (40.8 ml/m2).  The inferior vena cava was normal in size with preserved respiratory  variability.  There is no pericardial effusion.    Stress Test:  Date: Results:    ECG Reviewed:  Date: 4/24 Results:  SB at 59 bpm  Cath:  Date: Results:   (-) CAD, CHF and arrhythmiasPacemaker: moderate. Pacemaker: moderate.   METS/Exercise Tolerance:     Hematologic: Comments: FV Leiden    (+) History of blood clots (h/o DVT),               Musculoskeletal:   (+)  arthritis,             GI/Hepatic:    (-) GERD and liver disease   Renal/Genitourinary:     (+) renal disease, type: CRI,            Endo:     (+)  type II DM,   Not using insulin, - not using insulin pump.   Diabetic complications: neuropathy.      Obesity,    (-) Type I DM   Psychiatric/Substance Use:       Infectious Disease:       Malignancy:   (+) Malignancy, History of Prostate.    Other:            Physical Exam    Airway        Mallampati: III   TM distance: > 3 FB   Neck ROM: limited   Mouth opening: < 3 cm    Respiratory Devices and Support         Dental  no notable dental history     (+) Modest Abnormalities - crowns, retainers, 1 or 2 missing teeth      Cardiovascular          Rhythm and rate: regular     Pulmonary           (+) decreased breath sounds           OUTSIDE LABS:  CBC:   Lab Results   Component Value Date    WBC 8.3 04/30/2024    WBC 6.5 05/17/2021    HGB 13.1 (L) 04/30/2024    HGB 13.1 (L) 01/26/2024    HCT 39.2 (L) 04/30/2024    HCT 38.7 (L) 05/17/2021     04/30/2024     05/17/2021     BMP:   Lab Results   Component Value Date  "    04/30/2024     01/26/2024    POTASSIUM 3.9 04/30/2024    POTASSIUM 4.1 01/26/2024    CHLORIDE 103 04/30/2024    CHLORIDE 108 (H) 01/26/2024    CO2 26 04/30/2024    CO2 23 01/26/2024    BUN 20.1 04/30/2024    BUN 27.0 (H) 01/26/2024    CR 1.47 (H) 04/30/2024    CR 1.57 (H) 01/26/2024     (H) 04/30/2024     (H) 01/26/2024     COAGS:   Lab Results   Component Value Date    PTT 34 10/09/2010    INR 1.08 05/28/2024     POC: No results found for: \"BGM\", \"HCG\", \"HCGS\"  HEPATIC:   Lab Results   Component Value Date    ALBUMIN 4.1 04/30/2024    PROTTOTAL 7.4 04/30/2024    ALT 21 04/30/2024    AST 22 04/30/2024    ALKPHOS 98 04/30/2024    BILITOTAL 0.6 04/30/2024     OTHER:   Lab Results   Component Value Date    A1C 7.0 (H) 04/30/2024    FREDI 9.4 04/30/2024    MAG 2.2 08/16/2019    TSH 1.44 12/27/2018    CRP 4.4 08/16/2019    SED 12 06/14/2012       Anesthesia Plan    ASA Status:  3       Anesthesia Type: General.     - Airway: ETT   Induction: Intravenous, Propofol.   Maintenance: Balanced.   Techniques and Equipment:     - Lines/Monitors: 2nd IV, Arterial Line     Consents    Anesthesia Plan(s) and associated risks, benefits, and realistic alternatives discussed. Questions answered and patient/representative(s) expressed understanding.     - Discussed:     - Discussed with:  Patient       Use of blood products discussed: Yes.     - Discussed with: Patient.     - Consented: consented to blood products     Postoperative Care    Pain management: Multi-modal analgesia.   PONV prophylaxis: Ondansetron (or other 5HT-3)     Comments:    Other Comments: Diphenhydramine 12.5 mg  Dexmedetomidine  No midazolam           Livan Pettit MD    I have reviewed the pertinent notes and labs in the chart from the past 30 days and (re)examined the patient.  Any updates or changes from those notes are reflected in this note.            # Drug Induced Coagulation Defect: home medication list includes an " "anticoagulant medication   # DMII: A1C = 7.0 % (Ref range: <5.7 %) within past 6 months  # Obesity: Estimated body mass index is 34.59 kg/m  as calculated from the following:    Height as of this encounter: 1.803 m (5' 11\").    Weight as of this encounter: 112.5 kg (248 lb).      "

## 2024-05-28 NOTE — CONSULTS
[facility]  Consult Note - Hospitalist Service  Date of Admission:  5/28/2024  Date of Consultation: May 28, 2024  Consult Requested by: Orthopedics  Reason for Consult: Medication reconciliation and management of medical comorbidities    Assessment & Plan   Robert Richard is a 88 year old male with PMH significant for NIDDM2, CKD3, factor 5 leiden mutation with multiple prior DVTs, long term use of warfarin, HTN, mild aortic stenosis, RLS, prostate cancer s/p prostatectomy who was admitted to [facility] on 5/28/2024 by the neurosurgery service for elective lumbar spinal fusion, laminectomy and decompression.     Lumbar stenosis and spondylolisthesis s/p L4-5 fusion and decompression, L2-3 laminectomy and decompression 5/28/2024   Surgery with Dr. Fabiano Cleary. GETA. EBL 200mL. No immediate postoperative complications.  - Routine postoperative cares per primary service, including IVF, pain control, abx, DVT ppx, and disposition  - PT/OT as per primary service  - Aggressive pulmonary toilet, frequent IS use 10x/hour while awake  - MONICA management per NSG  - SW/CC consult for disposition planning    Factor V Leiden mutation with hx DVT x3 2003, 2008, 2010, on warfarin  AC held PTA for surgery and bridged with lovenox  -Plan to discuss with NSG prior to discharge about possibility of bridging in setting of factor 5 leiden with hx multiple prior DVTs  -Daily INR    Mild aortic valve stenosis  Benign essential hypertension  PTA regimen: atenolol 50mg BID, losartan 100mg/d, spironolactone 25mg/d  - Continue PTA BB with hold parameters  - Hold PTA spironolactone and losartan until BMP and vitals in AM  - PRN IV labetalol/hydralazine available for SBP >180  - Monitor BP trend and need for medication adjustments    Chronic kidney disease, stage 3b  Baseline Cr 1.5-1.6 range.  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  4mg decadron intraoperatively, expect transient hyperglycemia.  - Hypoglycemia protocol  - Preprandial and HS fingerstick checks or Q 4 hours while NPO  - Hold PTA oral antiglycemics for now  - Sliding scale insulin Medium     Recent Labs   Lab 05/28/24  1255 05/28/24  0605   * 159*       Other pertinent history and chronic stable diagnoses: Appropriate PTA medications will be resumed.  Hx prostate cancer s/p prostatectomy 2003  Gout  BCC  Sleep apnea  RLS  Hyperlipidemia, intolerant to statins       The patient's care was discussed with the Attending Physician, Dr. Bell, Bedside Nurse, Patient, and Patient's Family.    Clinically Significant Risk Factors Present on Admission               # Drug Induced Coagulation Defect: home medication list includes an anticoagulant medication    # Hypertension: Noted on problem list     # DMII: A1C = 7.0 % (Ref range: <5.7 %) within past 6 months    # Obesity: Estimated body mass index is 34.59 kg/m  as calculated from the following:    Height as of this encounter: 1.803 m (5' 11\").    Weight as of this encounter: 112.5 kg (248 lb).              SCOTT Garvin, PAChristianeC  Hospitalist BRITTNY  [facility]  Securely message with the Vocera Web Console (learn more here)  Text page via DocDoc Paging/Directory  ______________________________________________________________________    Chief Complaint   Back pain    History is obtained from the patient, family, and electronic health record    History of Present Illness   Robert Richard is a 88 year old male with PMH significant for NIDDM2, CKD3, factor 5 leiden mutation with multiple prior DVTs, long term use of warfarin, HTN, mild aortic stenosis, RLS, prostate cancer s/p prostatectomy who was admitted to [facility] on 5/28/2024 by the neurosurgery service " for elective lumbar spinal fusion, laminectomy and decompression due to spondylolisthesis and stenosis. Surgery with Dr. Fabiano Cleayr. GETA. EBL 200mL. No immediate postoperative complications. Given 4mg decadron intraoperatively. The Hospitalist service was consulted for medical co-management. Preoperative H&P reviewed. During my visit, patient laying flat per orders. Otherwise feeling well. Asking about TCU on discharge. No current c/o. Weaning O2.       Past Medical History    Past Medical History:   Diagnosis Date    Basal cell carcinoma     Cancer (H)     DVT (deep venous thrombosis) (H) 11/2003    DVT (deep venous thrombosis) (H) 12/2008    DVT (deep venous thrombosis) (H) 10/2010    Factor V Leiden (H24)     Gout     Other and unspecified hyperlipidemia     Prostate cancer (H) 2003    prostatectomy     Restless leg syndrome     Sleep apnea     Unspecified essential hypertension        Past Surgical History   Past Surgical History:   Procedure Laterality Date    APPENDECTOMY  1950    JOINT REPLACEMENT  4/2011    R hip    LAMINECTOMY LUMBAR ONE LEVEL  12/2008    LAPAROSCOPIC HERNIORRHAPHY UMBILICAL N/A 1/3/2019    Procedure: laparoscopic umbilical hernia repair with mesh;  Surgeon: Jamal Thompson DO;  Location:  OR    [facility] REVISE TOTAL HIP REPLACEMENT  1/18/13    R hip       Medications   I have reviewed this patient's current medications  Medications Prior to Admission   Medication Sig Dispense Refill Last Dose    acetaminophen (TYLENOL) 325 MG tablet Take 325-650 mg by mouth every 6 hours as needed for mild pain   5/27/2024    albuterol (PROAIR HFA/PROVENTIL HFA/VENTOLIN HFA) 108 (90 Base) MCG/ACT inhaler Inhale 2 puffs into the lungs every 6 hours as needed for shortness of breath, wheezing or cough 18 g 0 Unknown at prn    amoxicillin (AMOXIL) 500 MG capsule Take 4 caps 1 hour before procedure. 8 capsule 1 More than a month at prn    atenolol (TENORMIN) 50 MG tablet Take 1 tablet (50 mg) by mouth 2  times daily 180 tablet 3 5/27/2024 at PM    benzonatate (TESSALON) 200 MG capsule Take 1 capsule (200 mg) by mouth 3 times daily as needed for cough 20 capsule 0 Past Week at prn    enoxaparin ANTICOAGULANT (LOVENOX) 80 MG/0.8ML syringe Inject 0.8 mLs (80 mg) Subcutaneous every 12 hours 4 mL 1 5/27/2024 at AM    glipiZIDE (GLUCOTROL XL) 5 MG 24 hr tablet Take 1 tablet (5 mg) by mouth daily 90 tablet 3 5/27/2024 at PM    ipratropium (ATROVENT) 0.06 % nasal spray USE 2 SPRAY(S) IN EACH NOSTRIL 4 TIMES DAILY 45 mL 0 5/27/2024 at AM    losartan (COZAAR) 100 MG tablet Take 1 tablet (100 mg) by mouth daily 90 tablet 3 5/27/2024 at pm    multiple vitamin  tablet TABS Take 1 tablet by mouth daily    at pm    pramipexole (MIRAPEX) 1 MG tablet TAKE 1 TABLET BY MOUTH TWICE DAILY AT  4  PM  AND  9   tablet 3 5/27/2024 at pm    sildenafil (VIAGRA) 100 MG tablet Take 0.5 tablets (50 mg) by mouth daily as needed 20 tablet 3 Unknown at prn    spironolactone (ALDACTONE) 25 MG tablet Take 1 tablet (25 mg) by mouth daily 90 tablet 3 5/27/2024 at pm    warfarin ANTICOAGULANT (COUMADIN) 5 MG tablet Take 2.5 mg by mouth twice a week (0.5 x 5 mg = 2.5 mg:Monday & Fridays)   Past Week at PM    warfarin ANTICOAGULANT (COUMADIN) 5 MG tablet TAKE 1/2 TAB BY MOUTH EVERY Mon and FRIDAY AND 1 TAB ON ALL OTHER DAYS OF THE WEEK as directed 90 tablet 1 Past Week at PM    order for DME One pair compression garment 20-30 mmHg may substitute per fitter.  Please call [facility] at [phone] for appt.    Will also need education regarding donning agents tyvek sleeve, plastic sled or similar. 1 Units 0     ORDER FOR DME Wear mask at night 1 Units 0     STATIN NOT PRESCRIBED (INTENTIONAL) Please choose reason not prescribed, below             Review of Systems    The 10 point Review of Systems is negative other than noted in the HPI or here.     Social History   I have reviewed this patient's social history and updated it with pertinent  information if needed.  Social History     Tobacco Use    Smoking status: Never    Smokeless tobacco: Never   Vaping Use    Vaping status: Never Used   Substance Use Topics    Alcohol use: Yes     Alcohol/week: 0.0 standard drinks of alcohol     Comment: 1 drink every 1-2 weeks.    Drug use: No         Allergies   Allergies   Allergen Reactions    Gabapentin Dizziness    Lipitor [Atorvastatin Calcium]      Muscle pain, weakness    Pravastatin Other (See Comments)     muscle aches    Simvastatin Other (See Comments)     muscle aches    Statins Muscle Pain (Myalgia)        Physical Exam   Vital Signs: Temp: 97.6  F (36.4  C) Temp src: Oral BP: 138/70 Pulse: 76   Resp: 16 SpO2: 94 % O2 Device: Nasal cannula Oxygen Delivery: 3 LPM  Weight: 248 lbs 0 oz    General: Awake, alert, very pleasant elderly man who appears stated age. Looks comfortable laying flat in bed. No acute distress.  HEENT: Normocephalic, atraumatic. Extraocular movements intact.  Respiratory: Clear to auscultation bilaterally, no rales, wheezing, or rhonchi.  Cardiovascular: Regular rate and rhythm, +S1 and S2, no murmur auscultated. No peripheral edema.   Gastrointestinal: Soft, non-tender, obese but non-distended. Bowel sounds present.  Skin: Warm, dry. No obvious rashes or lesions on exposed skin. Dorsalis pedis pulses palpable bilaterally.  Musculoskeletal: No joint swelling, erythema or tenderness. Moves all extremities equally.  Neurologic: AAO x3.   Psychiatric: Appropriate mood and affect. No obvious anxiety or depression.    Medical Decision Making       45 MINUTES SPENT BY ME on the date of service doing chart review, history, exam, documentation & further activities per the note.      Data     I have personally reviewed the following data over the past 24 hrs:    N/A  \   N/A   / N/A     N/A N/A N/A /  177 (H)   4.1 N/A 1.76 (H) \     Procal: N/A CRP: N/A Lactic Acid: 1.1       INR:  1.08 PTT:  N/A   D-dimer:  N/A Fibrinogen:  N/A        Imaging results reviewed over the past 24 hrs:   No results found for this or any previous visit (from the past 24 hour(s)).

## 2024-05-28 NOTE — ANESTHESIA PROCEDURE NOTES
Arterial Line Procedure Note    Pre-Procedure   Staff -        Anesthesiologist:  Livan Pettit MD       Performed By: Anesthesiologist       Location: pre-op       Pre-Anesthestic Checklist: patient identified, IV checked, site marked, risks and benefits discussed, informed consent, monitors and equipment checked and pre-op evaluation  Timeout:       Correct Patient: Yes        Correct Procedure: Yes        Correct Site: Yes        Correct Position: Yes   Line Placement:   This line was placed Pre Induction starting at 5/28/2024 7:11 AM and ending at 5/28/2024 7:18 AM  Procedure   Procedure: arterial line       Insertion Site: radial.  Sterile Prep        All elements of maximal sterile barrier technique followed       Patient Prep/Sterile Barriers: hand hygiene, sterile gloves, hat, mask       Skin prep: Chloraprep  Insertion/Injection        Technique: Seldinger Technique        1. Ultrasound was used to evaluate the access site.       2. Artery evaluated via ultrasound for patency/adequacy.       3. Using real-time ultrasound the needle/catheter was observed entering the artery/vein.       4. Permanent image was captured and entered into the patient's record.       5. The visualized structures were anatomically normal.       6. There were no apparent abnormal pathologic findings.       Catheter Type/Size: 20 gauge, 1.75 in/4.5 cm quick cath (integral wire)  Narrative        Tegaderm dressing used.       Arterial waveform: Yes    Comments:  Site sterilely prepped with Chloraprep.  Lidocaine 1% used for skin topicalization.  Left radial artery palpated.  Arrow catheter advanced into radial artery, with return of bright red blood.  Pulsatile waveform on monitor.  Patient tolerated procedure well.

## 2024-05-28 NOTE — OR NURSING
Pt has significant bruising chest, upper abd, right upper arm and right chest to lateral chest. For 3 days after hitting chest and arm on car door 3 days ago. [place] notified.

## 2024-05-28 NOTE — OP NOTE
Date of surgery: May 28, 2024  Surgeon: Cj Cleary MD  Assistant: Li Montiel NP (Note: The assistant was present for and assisted with the entire surgery, and his/her role as an assistant was crucial for aid in positioning, exposure, suctioning, retraction, and closure)    Preoperative diagnosis: L4-5 spondylolisthesis and Lumbar stenosis  Postoperative diagnosis: L4-5 spondylolisthesis and Lumbar stenosis     Procedure:  1.  L2-3 bilateral laminectomies for decompression of stenosis  2.  L4-5 insertion of bilateral pedicle screws and rods (Medtronic Module X)  3.  L4-5 bilateral laminectomies for decompression of stenosis  4.  L4-5 complete facetectomy, transforaminal discectomy, and interbody arthrodesis  5.  L4-5 insertion of intervertebral graft with allograft  6.  L4-5 bilateral transverse process posterolateral arthrodesis and fusion with autograft and allograft  7.  Use of O-Arm intraoperative CT Scan  8.  Use of Medtronic Stealth intraoperative neuro-navigation  9.  Use of intraoperative microscope and fluoroscopy     EBL: 200 mL     Indications: 88 year old male with history of L4-5 surgery, presented with progressive back and leg pain and weakness.  MRI showed L4-5 spondylolisthesis and severe stenosis, as well as L2-3 stenosis.  Extensive non-surgical management without improvement.  Risks, benefits, indications, and alternatives were discussed with the patient and family in detail, and they wished to proceed.      Description of surgery: The patient was positioned prone.  Sterile prepping and draping procedures were performed.  Antibiotics were administered and timeout was performed.  A midline lumbar incision was performed.  The monopolar was used to expose the spinous processes, lamina, facets, and transverse processes at L4 -L5 and L2-L3.  The reference frame was attached, and an O-arm intra-operative CT scan was performed.  Medtronic Stealth neuro-navigation was registered.  Under navigation,  bilateral pedicle screws were inserted at L4 and L5.  The [place] rongeurs was used to remove the spinous processes at L2-L3 and L4-L5.  The microscopy was brought into the field, and under microscopy, the high speed drill was then used to perform bilateral laminectomies at L2-L3 and L4-L5.  The [place] rongeurs were then used to remove the ligamentum flavum with decompression of the nerve roots at both levels, and the [place] elevator was used to dissect the scar tissue at L4-L5.  A durotomy was repaired with 6-0 goretex and enforced with Duragen and Duraseal.  The drill was then used to perform a bilateral complete facetectomy at L4-L5. The 15 blade was used to perform an annulotomy in the disk space at L4-L5.  A complete discectomy was performed with josep, the pituitary rongeurs, and curets.  Interbody arthrodesis was performed with josep and curets.  An intervertebral graft was inserted into the disk space at L4-L5 with allograft.  Rods and set screws were inserted.  Antibiotic irrigation was performed.  Posterolateral arthrodesis was performed and autograft and allograft were packed for posterolateral fusion.  Hemostasis was achieved.  Fluoroscopy demonstrated excellent positioning of the hardware.  The fascia was closed with 0-Vicryl sutures, and the dermal layer was closed with 2-0 vicryl sutures.  The skin was closed with staples.

## 2024-05-28 NOTE — ANESTHESIA CARE TRANSFER NOTE
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    Anesthesia Type:   General     Note:    Oropharynx: oropharynx clear of all foreign objects  Level of Consciousness: awake  Oxygen Supplementation: face mask  Level of Supplemental Oxygen (L/min / FiO2): 6  Independent Airway: airway patency satisfactory and stable  Dentition: dentition unchanged  Vital Signs Stable: post-procedure vital signs reviewed and stable  Report to RN Given: handoff report given  Patient transferred to: PACU    Handoff Report: Identifed the Patient, Identified the Reponsible Provider, Reviewed the pertinent medical history, Discussed the surgical course, Reviewed Intra-OP anesthesia mangement and issues during anesthesia, Set expectations for post-procedure period and Allowed opportunity for questions and acknowledgement of understanding      Vitals:  Vitals Value Taken Time   BP     Temp     Pulse 80 05/28/24 1303   Resp 21 05/28/24 1303   SpO2 98 % 05/28/24 1303   Vitals shown include unfiled device data.    Electronically Signed By: SKIP Olivas CRNA  May 28, 2024  1:05 PM

## 2024-05-28 NOTE — ANESTHESIA PROCEDURE NOTES
Airway       Patient location during procedure: OR  Staff -        CRNA: Gail Valentine APRN CRNA       Performed By: CRNA  Consent for Airway        Urgency: elective  Indications and Patient Condition       Indications for airway management: jose-procedural         Mask difficulty assessment: 2 - vent by mask + OA or adjuvant +/- NMBA    Final Airway Details       Final airway type: endotracheal airway       Successful airway: ETT - single  Endotracheal Airway Details        ETT size (mm): 8.0       Cuffed: yes       Successful intubation technique: video laryngoscopy       VL Blade Size: Glidescope 4       Grade View of Cords: 1       Adjucts: stylet       Position: Right       Measured from: gums/teeth       Secured at (cm): 22       Bite block used: None    Post intubation assessment        Placement verified by: capnometry, equal breath sounds and chest rise        Number of attempts at approach: 1       Secured with: tape       Ease of procedure: easy       Dentition: Intact and Unchanged

## 2024-05-29 ENCOUNTER — APPOINTMENT (OUTPATIENT)
Dept: PHYSICAL THERAPY | Facility: CLINIC | Age: 89
DRG: 453 | End: 2024-05-29
Attending: NURSE PRACTITIONER
Payer: COMMERCIAL

## 2024-05-29 LAB
ANION GAP SERPL CALCULATED.3IONS-SCNC: 8 MMOL/L (ref 7–15)
BUN SERPL-MCNC: 29.2 MG/DL (ref 8–23)
CALCIUM SERPL-MCNC: 8.1 MG/DL (ref 8.8–10.2)
CHLORIDE SERPL-SCNC: 108 MMOL/L (ref 98–107)
CREAT SERPL-MCNC: 1.6 MG/DL (ref 0.67–1.17)
DEPRECATED HCO3 PLAS-SCNC: 23 MMOL/L (ref 22–29)
EGFRCR SERPLBLD CKD-EPI 2021: 41 ML/MIN/1.73M2
GLUCOSE BLDC GLUCOMTR-MCNC: 143 MG/DL (ref 70–99)
GLUCOSE BLDC GLUCOMTR-MCNC: 149 MG/DL (ref 70–99)
GLUCOSE BLDC GLUCOMTR-MCNC: 164 MG/DL (ref 70–99)
GLUCOSE BLDC GLUCOMTR-MCNC: 171 MG/DL (ref 70–99)
GLUCOSE BLDC GLUCOMTR-MCNC: 194 MG/DL (ref 70–99)
GLUCOSE SERPL-MCNC: 215 MG/DL (ref 70–99)
HGB BLD-MCNC: 7.5 G/DL (ref 13.3–17.7)
INR PPP: 1.16 (ref 0.85–1.15)
LACTATE SERPL-SCNC: 1.5 MMOL/L (ref 0.7–2)
POTASSIUM SERPL-SCNC: 4.4 MMOL/L (ref 3.4–5.3)
SODIUM SERPL-SCNC: 139 MMOL/L (ref 135–145)

## 2024-05-29 PROCEDURE — 999N000157 HC STATISTIC RCP TIME EA 10 MIN

## 2024-05-29 PROCEDURE — 36415 COLL VENOUS BLD VENIPUNCTURE: CPT | Performed by: PHYSICIAN ASSISTANT

## 2024-05-29 PROCEDURE — 250N000013 HC RX MED GY IP 250 OP 250 PS 637: Performed by: NURSE PRACTITIONER

## 2024-05-29 PROCEDURE — 94660 CPAP INITIATION&MGMT: CPT

## 2024-05-29 PROCEDURE — 250N000011 HC RX IP 250 OP 636: Performed by: NURSE PRACTITIONER

## 2024-05-29 PROCEDURE — 0SG00AJ FUSION OF LUMBAR VERTEBRAL JOINT WITH INTERBODY FUSION DEVICE, POSTERIOR APPROACH, ANTERIOR COLUMN, OPEN APPROACH: ICD-10-PCS | Performed by: NEUROLOGICAL SURGERY

## 2024-05-29 PROCEDURE — 85018 HEMOGLOBIN: CPT | Performed by: NURSE PRACTITIONER

## 2024-05-29 PROCEDURE — 0ST20ZZ RESECTION OF LUMBAR VERTEBRAL DISC, OPEN APPROACH: ICD-10-PCS | Performed by: NEUROLOGICAL SURGERY

## 2024-05-29 PROCEDURE — 01NB0ZZ RELEASE LUMBAR NERVE, OPEN APPROACH: ICD-10-PCS | Performed by: NEUROLOGICAL SURGERY

## 2024-05-29 PROCEDURE — 00UT0KZ SUPPLEMENT SPINAL MENINGES WITH NONAUTOLOGOUS TISSUE SUBSTITUTE, OPEN APPROACH: ICD-10-PCS | Performed by: NEUROLOGICAL SURGERY

## 2024-05-29 PROCEDURE — 97530 THERAPEUTIC ACTIVITIES: CPT | Mod: GP | Performed by: PHYSICAL THERAPIST

## 2024-05-29 PROCEDURE — 8E0WXBF COMPUTER ASSISTED PROCEDURE OF TRUNK REGION, WITH FLUOROSCOPY: ICD-10-PCS | Performed by: NEUROLOGICAL SURGERY

## 2024-05-29 PROCEDURE — 83605 ASSAY OF LACTIC ACID: CPT | Performed by: PHYSICIAN ASSISTANT

## 2024-05-29 PROCEDURE — 85610 PROTHROMBIN TIME: CPT | Performed by: PHYSICIAN ASSISTANT

## 2024-05-29 PROCEDURE — 82374 ASSAY BLOOD CARBON DIOXIDE: CPT | Performed by: PHYSICIAN ASSISTANT

## 2024-05-29 PROCEDURE — 99232 SBSQ HOSP IP/OBS MODERATE 35: CPT | Performed by: PHYSICIAN ASSISTANT

## 2024-05-29 PROCEDURE — 120N000001 HC R&B MED SURG/OB

## 2024-05-29 PROCEDURE — 97161 PT EVAL LOW COMPLEX 20 MIN: CPT | Mod: GP | Performed by: PHYSICAL THERAPIST

## 2024-05-29 PROCEDURE — 5A09357 ASSISTANCE WITH RESPIRATORY VENTILATION, LESS THAN 24 CONSECUTIVE HOURS, CONTINUOUS POSITIVE AIRWAY PRESSURE: ICD-10-PCS | Performed by: NEUROLOGICAL SURGERY

## 2024-05-29 PROCEDURE — 0SG0071 FUSION OF LUMBAR VERTEBRAL JOINT WITH AUTOLOGOUS TISSUE SUBSTITUTE, POSTERIOR APPROACH, POSTERIOR COLUMN, OPEN APPROACH: ICD-10-PCS | Performed by: NEUROLOGICAL SURGERY

## 2024-05-29 PROCEDURE — 258N000003 HC RX IP 258 OP 636: Performed by: NURSE PRACTITIONER

## 2024-05-29 RX ORDER — OXYCODONE HYDROCHLORIDE 5 MG/1
5 TABLET ORAL EVERY 4 HOURS PRN
Status: DISCONTINUED | OUTPATIENT
Start: 2024-05-29 | End: 2024-06-02 | Stop reason: HOSPADM

## 2024-05-29 RX ORDER — OXYCODONE HYDROCHLORIDE 5 MG/1
5 TABLET ORAL EVERY 4 HOURS PRN
Status: DISCONTINUED | OUTPATIENT
Start: 2024-05-29 | End: 2024-05-29

## 2024-05-29 RX ORDER — METHOCARBAMOL 750 MG/1
750 TABLET, FILM COATED ORAL EVERY 6 HOURS PRN
Status: DISCONTINUED | OUTPATIENT
Start: 2024-05-29 | End: 2024-06-02 | Stop reason: HOSPADM

## 2024-05-29 RX ORDER — CARBOXYMETHYLCELLULOSE SODIUM 5 MG/ML
1 SOLUTION/ DROPS OPHTHALMIC
Status: DISCONTINUED | OUTPATIENT
Start: 2024-05-29 | End: 2024-06-02 | Stop reason: HOSPADM

## 2024-05-29 RX ORDER — ENOXAPARIN SODIUM 100 MG/ML
40 INJECTION SUBCUTANEOUS EVERY 24 HOURS
Status: DISCONTINUED | OUTPATIENT
Start: 2024-05-30 | End: 2024-06-02 | Stop reason: HOSPADM

## 2024-05-29 RX ADMIN — ACETAMINOPHEN 975 MG: 325 TABLET, FILM COATED ORAL at 15:04

## 2024-05-29 RX ADMIN — PRAMIPEXOLE DIHYDROCHLORIDE 1 MG: 1 TABLET ORAL at 22:39

## 2024-05-29 RX ADMIN — CEFAZOLIN SODIUM 2 G: 2 INJECTION, SOLUTION INTRAVENOUS at 12:32

## 2024-05-29 RX ADMIN — PRAMIPEXOLE DIHYDROCHLORIDE 1 MG: 1 TABLET ORAL at 15:05

## 2024-05-29 RX ADMIN — METHOCARBAMOL 750 MG: 750 TABLET ORAL at 08:01

## 2024-05-29 RX ADMIN — INSULIN ASPART 1 UNITS: 100 INJECTION, SOLUTION INTRAVENOUS; SUBCUTANEOUS at 14:46

## 2024-05-29 RX ADMIN — SENNOSIDES AND DOCUSATE SODIUM 1 TABLET: 50; 8.6 TABLET ORAL at 08:01

## 2024-05-29 RX ADMIN — INSULIN ASPART 2 UNITS: 100 INJECTION, SOLUTION INTRAVENOUS; SUBCUTANEOUS at 08:02

## 2024-05-29 RX ADMIN — ACETAMINOPHEN 975 MG: 325 TABLET, FILM COATED ORAL at 08:00

## 2024-05-29 RX ADMIN — METHOCARBAMOL 750 MG: 750 TABLET ORAL at 02:20

## 2024-05-29 RX ADMIN — HYDROMORPHONE HYDROCHLORIDE 0.2 MG: 0.2 INJECTION, SOLUTION INTRAMUSCULAR; INTRAVENOUS; SUBCUTANEOUS at 12:22

## 2024-05-29 RX ADMIN — SENNOSIDES AND DOCUSATE SODIUM 1 TABLET: 50; 8.6 TABLET ORAL at 20:39

## 2024-05-29 RX ADMIN — OXYCODONE HYDROCHLORIDE 5 MG: 5 TABLET ORAL at 15:05

## 2024-05-29 RX ADMIN — CEFAZOLIN SODIUM 2 G: 2 INJECTION, SOLUTION INTRAVENOUS at 04:14

## 2024-05-29 RX ADMIN — ACETAMINOPHEN 975 MG: 325 TABLET, FILM COATED ORAL at 02:20

## 2024-05-29 RX ADMIN — CEFAZOLIN SODIUM 2 G: 2 INJECTION, SOLUTION INTRAVENOUS at 19:49

## 2024-05-29 RX ADMIN — OXYCODONE HYDROCHLORIDE 10 MG: 5 TABLET ORAL at 04:16

## 2024-05-29 RX ADMIN — POLYETHYLENE GLYCOL 3350 17 G: 17 POWDER, FOR SOLUTION ORAL at 08:11

## 2024-05-29 RX ADMIN — SODIUM CHLORIDE: 9 INJECTION, SOLUTION INTRAVENOUS at 13:37

## 2024-05-29 RX ADMIN — OXYCODONE HYDROCHLORIDE 5 MG: 5 TABLET ORAL at 08:11

## 2024-05-29 RX ADMIN — IPRATROPIUM BROMIDE 2 SPRAY: 42 SPRAY, METERED NASAL at 22:40

## 2024-05-29 ASSESSMENT — ACTIVITIES OF DAILY LIVING (ADL)
ADLS_ACUITY_SCORE: 28
DEPENDENT_IADLS:: INDEPENDENT
ADLS_ACUITY_SCORE: 29
ADLS_ACUITY_SCORE: 29
ADLS_ACUITY_SCORE: 28
ADLS_ACUITY_SCORE: 29
ADLS_ACUITY_SCORE: 28
ADLS_ACUITY_SCORE: 29

## 2024-05-29 NOTE — PROGRESS NOTES
[facility]  Neurosurgery Daily Progress Note    Assessment & Plan   Procedure(s):  Lumbar 4 to Lumbar 5 Transforaminal Interbody Fusion with decompression of stenosis and Lumbar 2 to Lumbar 3 bilateral laminectomy for decompression of stenosis   1 Day Post-Op    -Gradually increase HOB and activity as tolerated   -Monitor for headaches   -MONICA drain to gravity, abx while drain is in place. Likely plan to remove and stitch drain site tomorrow.   -Repeat hemoglobin tomorrow   -Okay to start prophylactic dose Lovenox tomorrow, then transition to therapeutic dose at 1 week post op   -Hold PTA warfarin x 1 week post op   -Continue pain control measures as needed   -Routine wound care   -PT/OT  -Daughter would like to discuss possible TCU placement with Social Work team   -Appreciate assistance from specialties        Discussed with Dr. Evin Montiel, CNP  [facility] Neurosurgery  Tel [phone]  Pager [phone]    Interval History   Patient was seen resting in bed. Daughter at bedside. Patient reports 5/10 low back pain. Denies any headaches, radicular pain, numbness, or new symptoms. Neuro exam stable. Incision CDI.     MONICA drain to gravity, 75ml serosanguinous output overnight  Hemoglobin 7.5    Physical Exam   Temp: 98.2  F (36.8  C) Temp src: Oral BP: 108/57 Pulse: 73   Resp: 14 SpO2: 99 % O2 Device: Nasal cannula Oxygen Delivery: 1 LPM  Vitals:    05/28/24 0623   Weight: 112.5 kg (248 lb)       Mental status:  Alert and oriented x 3, speech is fluent, following commands.   Motor:    Iliopsoas  (hip flexion)               Right: 4/5  Left:  4/5  Quadriceps  (knee extension)       Right:  5/5  Left:  5/5  Hamstrings  (knee flexion)            Right:  5/5  Left:  5/5  Gastroc Soleus  (PF)                          Right:  5/5  Left:  5/5  Tibialis Ant  (DF)                          Right:  5/5  Left:  5/5  EHL                          Right:  5/5  Left:  5/5    Sensation:   Intact to light touch in BLE  Incision: CDI with dressing   Drain: MONICA drain to gravity, serosanguinous output

## 2024-05-29 NOTE — CONSULTS
Care Management Initial Consult    General Information  Assessment completed with: Children, Patient, Patient's daughter: Anna  Type of CM/SW Visit: Initial Assessment    Primary Care Provider verified and updated as needed: Yes   Readmission within the last 30 days: current reason for admission unrelated to previous admission   Return Category: Planned Surgery  Reason for Consult: facility placement  Advance Care Planning:            Communication Assessment  Patient's communication style: spoken language (English or Bilingual)    Hearing Difficulty or Deaf: yes   Wear Glasses or Blind: no    Cognitive  Cognitive/Neuro/Behavioral: WDL                      Living Environment:   People in home: spouse  Joy  Current living Arrangements: house      Able to return to prior arrangements: no ([facility])  Living Arrangement Comments: Patient lives in a 1level town home with no steps with spouse.    Family/Social Support:  Care provided by: self  Provides care for: no one  Marital Status:   Wife  Joy       Description of Support System: Supportive, Involved         Current Resources:   Patient receiving home care services: No     Community Resources: None  Equipment currently used at home: none  Supplies currently used at home: None    Employment/Financial:  Employment Status: retired        Financial Concerns: none   Referral to Financial Worker: No  Finance Comments: Patient has [employer]. Although, the patient does not required a 3 night IP stay per Medicare, they will need a Prior Authorization for approval of coverage. PT and OT notes are greatly appreciated for this.    Does the patient's insurance plan have a 3 day qualifying hospital stay waiver?  Yes     Which insurance plan 3 day waiver is available? Alternative insurance waiver    Will the waiver be used for post-acute placement? No    Lifestyle & Psychosocial Needs:  Social Determinants of Health     Food Insecurity: Low Risk  (1/26/2024)     Food Insecurity     Within the past 12 months, did you worry that your food would run out before you got money to buy more?: No     Within the past 12 months, did the food you bought just not last and you didn t have money to get more?: No   Depression: Not at risk (2/14/2024)    PHQ-2     PHQ-2 Score: 0   Housing Stability: Low Risk  (1/26/2024)    Housing Stability     Do you have housing? : Yes     Are you worried about losing your housing?: No   Tobacco Use: Low Risk  (5/28/2024)    Patient History     Smoking Tobacco Use: Never     Smokeless Tobacco Use: Never     Passive Exposure: Not on file   Financial Resource Strain: Low Risk  (1/26/2024)    Financial Resource Strain     Within the past 12 months, have you or your family members you live with been unable to get utilities (heat, electricity) when it was really needed?: No   Alcohol Use: Not on file   Transportation Needs: Low Risk  (1/26/2024)    Transportation Needs     Within the past 12 months, has lack of transportation kept you from medical appointments, getting your medicines, non-medical meetings or appointments, work, or from getting things that you need?: No   Physical Activity: Not on file   Interpersonal Safety: Low Risk  (1/26/2024)    Interpersonal Safety     Do you feel physically and emotionally safe where you currently live?: Yes     Within the past 12 months, have you been hit, slapped, kicked or otherwise physically hurt by someone?: No     Within the past 12 months, have you been humiliated or emotionally abused in other ways by your partner or ex-partner?: No   Stress: Not on file   Social Connections: Not on file   Health Literacy: Not on file       Functional Status:  Prior to admission patient needed assistance:   Dependent ADLs:: Independent  Dependent IADLs:: Independent       Mental Health Status:  Mental Health Status: No Current Concerns       Chemical Dependency Status:  Chemical Dependency Status: No Current Concerns              Values/Beliefs:  Spiritual, Cultural Beliefs, Yazidi Practices, Values that affect care: yes  Description of Beliefs that Will Affect Care: Citizen of [place] [employer]            Additional Information:  CM Initial Assessment    Care management was consulted for discharge planning/disposition.  Writer met with patient to complete consult. Per [facility] TCU recc.     Writer met with the patient's daughter, Anna at bedside. Writer went over insurance coverage ([facility] Medicare Advantage) and what it covers( Therepies, Meds, and room and board (shared room only)) and what is not ( Private room and transportation). Writer went over the discharge process.     Anna stated they would like the patient to go somewhere near home. Writer provided a list and daughter picked out [facility], [facility], and [facility].     Writer will send out referrals and continue to follow.       VIKRAM Baumann  [facility]  Social Work
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
## 2024-05-29 NOTE — PROGRESS NOTES
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 Taking Tylenol and Oxy PRN. Last dose 6pm  A&Ox4  Voiding freitas catheter on   Tele NSR  Lung Sounds clear on RA   Dressing Intact, clean and dry. Bed flat @ all time per MD order and MONICA set to gravity.    Orders Placed This Encounter      Advance Diet as Tolerated: Regular Diet Adult       Plan: TBD

## 2024-05-29 NOTE — PROGRESS NOTES
A&OX4, VSS on RA. POD 0 of Lumbar 4 to Lumbar 5 Transforaminal Interbody Fusion with decompression of stenosis and Lumbar 2 to Lumbar 3 bilateral laminectomy for decompression of stenosis today. Pain adequately managed with scheduled meds + Oxy + atarax this shift. Strict bedrest with flat bed overnight. Dressing on the back c/d/I, MONICA drain to gravity with bright red blood. Smith with good UO. CMS intact. IVF at 75mL/hr. On IV ancef. Regular diet.Continue to monitor.

## 2024-05-29 NOTE — PROGRESS NOTES
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     Lumbar stenosis and spondylolisthesis s/p L4-5 fusion and decompression, L2-3 laminectomy and decompression 5/28/2024   Surgery with Dr. Fabiano Cleary. GETA. EBL 200mL. No immediate postoperative complications.  - Routine postoperative cares per primary service, including IVF, pain control, abx, DVT ppx, and disposition  - PT/OT as per primary service  - Aggressive pulmonary toilet, frequent IS use 10x/hour while awake  - MONICA management per NS  - SW/CC consult for disposition planning      Intake/Output Summary (Last 24 hours) at 5/29/2024 1451  Last data filed at 5/29/2024 1208  Gross per 24 hour   Intake 220 ml   Output 954 ml   Net -734 ml       Factor V Leiden mutation with hx DVT x3 2003, 2008, 2010, on warfarin  AC held PTA for surgery and bridged with lovenox  -Given factor 5 leiden with hx multiple prior DVTs, d/w NSG 05/29/24 and plan is to start ppx dose Lovenox 5/30/24, then transition to therapeutic dose 1 week postoperative. Restart warfarin 1 week postopeartively.   -Daily INR    Mild aortic valve stenosis  Benign essential hypertension  PTA regimen: atenolol 50mg BID, losartan 100mg/d, spironolactone 25mg/d  - Continue PTA BB with hold parameters  - Hold PTA spironolactone and losartan while BPs soft, monitoring Hgb  - PRN IV labetalol/hydralazine available for SBP >180  - Monitor BP trend and need for medication adjustments    Chronic kidney disease, stage 3b  Baseline Cr 1.5-1.6  "range.  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  4mg decadron intraoperatively, expect transient hyperglycemia.  - Hypoglycemia protocol  - Preprandial and HS fingerstick checks or Q 4 hours while NPO  - Hold PTA oral antiglycemics for now  - Sliding scale insulin Medium     Recent Labs   Lab 05/29/24  1437 05/29/24  1200 05/29/24  0807 05/29/24  0736 05/28/24  2141 05/28/24  1720   * 143* 215* 194* 198* 189*       Other pertinent history and chronic stable diagnoses: Appropriate PTA medications will be resumed.  Hx prostate cancer s/p prostatectomy 2003  Gout  BCC  Sleep apnea  RLS  Hyperlipidemia, intolerant to statins            Diet: Advance Diet as Tolerated: Regular Diet Adult    DVT Prophylaxis: Defer to primary service  Smith Catheter: Not present  Lines: None     Cardiac Monitoring: None  Code Status: Full Code      Clinically Significant Risk Factors               # Coagulation Defect: INR = 1.16 (Ref range: 0.85 - 1.15) and/or PTT = N/A, will monitor for bleeding    # Hypertension: Noted on problem list       # DMII: A1C = 7.0 % (Ref range: <5.7 %) within past 6 months, PRESENT ON ADMISSION  # Obesity: Estimated body mass index is 34.59 kg/m  as calculated from the following:    Height as of this encounter: 1.803 m (5' 11\").    Weight as of this encounter: 112.5 kg (248 lb)., PRESENT ON ADMISSION            Disposition Plan     Medically Ready for Discharge: Anticipated in 2-4 Days           The patient's care was discussed with the Attending Physician, Dr. Aguilar, Bedside Nurse, Patient, Patient's Family, and Primary team.    Radha Simmons PA-C  Hospitalist Service  [facility]  Securely message with [employer] (more info)  Text page via [facility] Paging/Directory "   ______________________________________________________________________    Interval History   Seen and examined. Hgb drop, BPs soft. Holding antihypertensives. Pain earlier today requiring some dilaudid and patient sleepy since. No current c/o. Will likely need TCU. D/w NSG regarding AC plan as above given factor 5 leiden. Daughter at bedside during visit, supportive. Questions welcomed and answered to the best of my knowledge.    Physical Exam   Vital Signs: Temp: 98.2  F (36.8  C) Temp src: Oral BP: 106/53 Pulse: 73   Resp: 14 SpO2: 99 % O2 Device: Nasal cannula Oxygen Delivery: 1 LPM  Weight: 248 lbs 0 oz    General: Awake, alert, very pleasant elderly man who appears stated age. Looks comfortable sleeping in bed. Arouses easily to voice. No acute distress.  HEENT: Normocephalic, atraumatic. Extraocular movements intact.  Respiratory: Clear to auscultation bilaterally, no rales, wheezing, or rhonchi.  Cardiovascular: Regular rate and rhythm, +S1 and S2, no murmur auscultated. No peripheral edema. PCDs in place.  Gastrointestinal: Soft, non-tender, obese but non-distended. Bowel sounds present. MONICA in place with 75mL serosanguinous/bloody output.   Skin: Warm, dry. No obvious rashes or lesions on exposed skin. Dorsalis pedis pulses palpable bilaterally.  Musculoskeletal: No joint swelling, erythema or tenderness. Moves all extremities equally.  Neurologic: AAO x3.   Psychiatric: Appropriate mood and affect. No obvious anxiety or depression.    Medical Decision Making       35 MINUTES SPENT BY ME on the date of service doing chart review, history, exam, documentation & further activities per the note.      Data     I have personally reviewed the following data over the past 24 hrs:    N/A  \   7.5 (L)   / N/A     139 108 (H) 29.2 (H) /  149 (H)   4.4 23 1.60 (H) \     Procal: N/A CRP: N/A Lactic Acid: 1.5       INR:  1.16 (H) PTT:  N/A   D-dimer:  N/A Fibrinogen:  N/A       Imaging results reviewed over the past 24  hrs:   No results found for this or any previous visit (from the past 24 hour(s)).

## 2024-05-29 NOTE — PROGRESS NOTES
Rested well overnight. Remained flat, on bedrest. Incision dressing CDI. VSS - wore home CPAP. Tolerating regular diet. MONICA to gravity with serosanguinous output. Smith removed this AM, due to void. CMS intact. A&Ox4. PT/OT ordered.

## 2024-05-30 ENCOUNTER — APPOINTMENT (OUTPATIENT)
Dept: PHYSICAL THERAPY | Facility: CLINIC | Age: 89
DRG: 453 | End: 2024-05-30
Attending: NEUROLOGICAL SURGERY
Payer: COMMERCIAL

## 2024-05-30 ENCOUNTER — APPOINTMENT (OUTPATIENT)
Dept: OCCUPATIONAL THERAPY | Facility: CLINIC | Age: 89
DRG: 453 | End: 2024-05-30
Attending: NURSE PRACTITIONER
Payer: COMMERCIAL

## 2024-05-30 LAB
ANION GAP SERPL CALCULATED.3IONS-SCNC: 10 MMOL/L (ref 7–15)
BUN SERPL-MCNC: 27.1 MG/DL (ref 8–23)
CALCIUM SERPL-MCNC: 8.3 MG/DL (ref 8.8–10.2)
CHLORIDE SERPL-SCNC: 106 MMOL/L (ref 98–107)
CREAT SERPL-MCNC: 1.4 MG/DL (ref 0.67–1.17)
DEPRECATED HCO3 PLAS-SCNC: 22 MMOL/L (ref 22–29)
EGFRCR SERPLBLD CKD-EPI 2021: 49 ML/MIN/1.73M2
GLUCOSE BLDC GLUCOMTR-MCNC: 153 MG/DL (ref 70–99)
GLUCOSE BLDC GLUCOMTR-MCNC: 153 MG/DL (ref 70–99)
GLUCOSE BLDC GLUCOMTR-MCNC: 156 MG/DL (ref 70–99)
GLUCOSE BLDC GLUCOMTR-MCNC: 177 MG/DL (ref 70–99)
GLUCOSE BLDC GLUCOMTR-MCNC: 196 MG/DL (ref 70–99)
GLUCOSE SERPL-MCNC: 230 MG/DL (ref 70–99)
HGB BLD-MCNC: 7.4 G/DL (ref 13.3–17.7)
PLATELET # BLD AUTO: 161 10E3/UL (ref 150–450)
POTASSIUM SERPL-SCNC: 4.1 MMOL/L (ref 3.4–5.3)
SODIUM SERPL-SCNC: 138 MMOL/L (ref 135–145)

## 2024-05-30 PROCEDURE — 250N000013 HC RX MED GY IP 250 OP 250 PS 637: Performed by: NURSE PRACTITIONER

## 2024-05-30 PROCEDURE — 97530 THERAPEUTIC ACTIVITIES: CPT | Mod: GO

## 2024-05-30 PROCEDURE — 250N000011 HC RX IP 250 OP 636: Performed by: NURSE PRACTITIONER

## 2024-05-30 PROCEDURE — 250N000013 HC RX MED GY IP 250 OP 250 PS 637: Performed by: PHYSICIAN ASSISTANT

## 2024-05-30 PROCEDURE — 94660 CPAP INITIATION&MGMT: CPT

## 2024-05-30 PROCEDURE — 85018 HEMOGLOBIN: CPT | Performed by: NURSE PRACTITIONER

## 2024-05-30 PROCEDURE — 250N000013 HC RX MED GY IP 250 OP 250 PS 637: Performed by: STUDENT IN AN ORGANIZED HEALTH CARE EDUCATION/TRAINING PROGRAM

## 2024-05-30 PROCEDURE — 85049 AUTOMATED PLATELET COUNT: CPT | Performed by: NEUROLOGICAL SURGERY

## 2024-05-30 PROCEDURE — 120N000001 HC R&B MED SURG/OB

## 2024-05-30 PROCEDURE — 82565 ASSAY OF CREATININE: CPT | Performed by: PHYSICIAN ASSISTANT

## 2024-05-30 PROCEDURE — 97530 THERAPEUTIC ACTIVITIES: CPT | Mod: GP | Performed by: PHYSICAL THERAPIST

## 2024-05-30 PROCEDURE — 99232 SBSQ HOSP IP/OBS MODERATE 35: CPT | Performed by: PHYSICIAN ASSISTANT

## 2024-05-30 PROCEDURE — 97535 SELF CARE MNGMENT TRAINING: CPT | Mod: GO

## 2024-05-30 PROCEDURE — 97165 OT EVAL LOW COMPLEX 30 MIN: CPT | Mod: GO

## 2024-05-30 PROCEDURE — 36415 COLL VENOUS BLD VENIPUNCTURE: CPT | Performed by: NURSE PRACTITIONER

## 2024-05-30 PROCEDURE — 250N000011 HC RX IP 250 OP 636: Performed by: PHYSICIAN ASSISTANT

## 2024-05-30 PROCEDURE — 999N000157 HC STATISTIC RCP TIME EA 10 MIN

## 2024-05-30 PROCEDURE — 82374 ASSAY BLOOD CARBON DIOXIDE: CPT | Performed by: PHYSICIAN ASSISTANT

## 2024-05-30 PROCEDURE — 258N000003 HC RX IP 258 OP 636: Performed by: NURSE PRACTITIONER

## 2024-05-30 RX ORDER — LOSARTAN POTASSIUM 50 MG/1
50 TABLET ORAL DAILY
Status: DISCONTINUED | OUTPATIENT
Start: 2024-05-30 | End: 2024-06-02 | Stop reason: HOSPADM

## 2024-05-30 RX ORDER — GLIPIZIDE 5 MG/1
5 TABLET, FILM COATED, EXTENDED RELEASE ORAL
Status: DISCONTINUED | OUTPATIENT
Start: 2024-05-30 | End: 2024-06-02 | Stop reason: HOSPADM

## 2024-05-30 RX ADMIN — ATENOLOL 50 MG: 25 TABLET ORAL at 08:17

## 2024-05-30 RX ADMIN — CEFAZOLIN SODIUM 2 G: 2 INJECTION, SOLUTION INTRAVENOUS at 20:25

## 2024-05-30 RX ADMIN — SENNOSIDES AND DOCUSATE SODIUM 1 TABLET: 50; 8.6 TABLET ORAL at 20:33

## 2024-05-30 RX ADMIN — OXYCODONE HYDROCHLORIDE 2.5 MG: 5 TABLET ORAL at 20:49

## 2024-05-30 RX ADMIN — CEFAZOLIN SODIUM 2 G: 2 INJECTION, SOLUTION INTRAVENOUS at 13:07

## 2024-05-30 RX ADMIN — INSULIN ASPART 2 UNITS: 100 INJECTION, SOLUTION INTRAVENOUS; SUBCUTANEOUS at 13:07

## 2024-05-30 RX ADMIN — SENNOSIDES AND DOCUSATE SODIUM 1 TABLET: 50; 8.6 TABLET ORAL at 08:18

## 2024-05-30 RX ADMIN — ENOXAPARIN SODIUM 40 MG: 40 INJECTION SUBCUTANEOUS at 08:18

## 2024-05-30 RX ADMIN — IPRATROPIUM BROMIDE 2 SPRAY: 42 SPRAY, METERED NASAL at 08:18

## 2024-05-30 RX ADMIN — PRAMIPEXOLE DIHYDROCHLORIDE 1 MG: 1 TABLET ORAL at 21:01

## 2024-05-30 RX ADMIN — INSULIN ASPART 1 UNITS: 100 INJECTION, SOLUTION INTRAVENOUS; SUBCUTANEOUS at 08:22

## 2024-05-30 RX ADMIN — PRAMIPEXOLE DIHYDROCHLORIDE 1 MG: 1 TABLET ORAL at 18:30

## 2024-05-30 RX ADMIN — ACETAMINOPHEN 975 MG: 325 TABLET, FILM COATED ORAL at 23:42

## 2024-05-30 RX ADMIN — SODIUM CHLORIDE: 9 INJECTION, SOLUTION INTRAVENOUS at 03:04

## 2024-05-30 RX ADMIN — CEFAZOLIN SODIUM 2 G: 2 INJECTION, SOLUTION INTRAVENOUS at 03:38

## 2024-05-30 RX ADMIN — INSULIN ASPART 1 UNITS: 100 INJECTION, SOLUTION INTRAVENOUS; SUBCUTANEOUS at 18:31

## 2024-05-30 RX ADMIN — ACETAMINOPHEN 975 MG: 325 TABLET, FILM COATED ORAL at 18:30

## 2024-05-30 RX ADMIN — ACETAMINOPHEN 975 MG: 325 TABLET, FILM COATED ORAL at 08:17

## 2024-05-30 RX ADMIN — ATENOLOL 50 MG: 25 TABLET ORAL at 20:33

## 2024-05-30 RX ADMIN — IPRATROPIUM BROMIDE 2 SPRAY: 42 SPRAY, METERED NASAL at 21:06

## 2024-05-30 RX ADMIN — POLYETHYLENE GLYCOL 3350 17 G: 17 POWDER, FOR SOLUTION ORAL at 08:17

## 2024-05-30 RX ADMIN — IPRATROPIUM BROMIDE 2 SPRAY: 42 SPRAY, METERED NASAL at 13:07

## 2024-05-30 RX ADMIN — GLIPIZIDE 5 MG: 5 TABLET, FILM COATED, EXTENDED RELEASE ORAL at 18:30

## 2024-05-30 RX ADMIN — OXYCODONE HYDROCHLORIDE 5 MG: 5 TABLET ORAL at 13:07

## 2024-05-30 RX ADMIN — ACETAMINOPHEN 975 MG: 325 TABLET, FILM COATED ORAL at 00:03

## 2024-05-30 ASSESSMENT — ACTIVITIES OF DAILY LIVING (ADL)
ADLS_ACUITY_SCORE: 30
ADLS_ACUITY_SCORE: 28
PREVIOUS_RESPONSIBILITIES: MEAL PREP;HOUSEKEEPING;LAUNDRY;SHOPPING;YARDWORK;MEDICATION MANAGEMENT;FINANCES;DRIVING
ADLS_ACUITY_SCORE: 29
ADLS_ACUITY_SCORE: 30
ADLS_ACUITY_SCORE: 29
ADLS_ACUITY_SCORE: 28
ADLS_ACUITY_SCORE: 30
ADLS_ACUITY_SCORE: 28
ADLS_ACUITY_SCORE: 30
ADLS_ACUITY_SCORE: 29
ADLS_ACUITY_SCORE: 28
ADLS_ACUITY_SCORE: 28
ADLS_ACUITY_SCORE: 30
ADLS_ACUITY_SCORE: 29
ADLS_ACUITY_SCORE: 30
ADLS_ACUITY_SCORE: 28
ADLS_ACUITY_SCORE: 29
ADLS_ACUITY_SCORE: 28
ADLS_ACUITY_SCORE: 30
ADLS_ACUITY_SCORE: 30
ADLS_ACUITY_SCORE: 28

## 2024-05-30 NOTE — PROGRESS NOTES
05/30/24 1122   Appointment Info   Signing Clinician's Name / Credentials (OT) GIA Gonzales   Student Supervision Direct Patient Contact Provided;Therapy services provided with the co-signing licensed therapist guiding and directing the services, and providing the skilled judgement and assessment throughout the session   Rehab Comments (OT) 1 eval, 1 ADL, 1 TA   Living Environment   People in Home significant other   Current Living Arrangements house   Home Accessibility no concerns   Transportation Anticipated family or friend will provide   Living Environment Comments Pt lives w spouse in one level house. Bathroom has walk in shower, raised toilet seat, and grab bars throughout   Self-Care   Usual Activity Tolerance moderate   Current Activity Tolerance fair   Equipment Currently Used at Home raised toilet seat;shower chair;grab bar, tub/shower;grab bar, toilet   Activity/Exercise/Self-Care Comment Pt is ind in ADLs at baseline. Spouse supports as needed at home. Pt drives; will not drive after surgery -- daughter will provide.   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 POD1   Existing Precautions/Restrictions fall;spinal;weight bearing;oxygen  therapy device and L/min  (pt on 3L O2 via NC - not baseline)   Left Lower Extremity (Weight-bearing Status) weight-bearing as tolerated (WBAT)   Right Lower Extremity (Weight-bearing Status) weight-bearing as tolerated (WBAT)   Cognitive Status Examination   Orientation Status orientation to person, place and time   Visual Perception   Visual Impairment/Limitations corrective lenses for reading   Sensory   Sensory Comments No numbness or tingling at baseline   Pain Assessment   Patient Currently in Pain Yes, see Vital Sign flowsheet  (5-6/10 at baseline)   Strength Comprehensive (MMT)   General Manual Muscle Testing (MMT) Assessment neck/trunk strength deficits identified;lower extremity strength deficits identified   Coordination   Upper Extremity Coordination No deficits were identified   Transfers   Transfers toilet transfer;sit-stand transfer   Sit-Stand Transfer   Sit-Stand [place] (Transfers) moderate assist (50% patient effort);2 person assist   Assistive Device (Sit-Stand Transfers)   (SaraSteady)   Toilet Transfer   Type (Toilet Transfer) sit-stand   [place] Level (Toilet Transfer) moderate assist (50% patient effort);2 person assist   Assistive Device (Toilet Transfer) other (see comments)  (Kenyatta Steady)   Activities of Daily Living   BADL Assessment/Intervention grooming;toileting;lower body dressing   Lower Body Dressing Assessment/Training   Position (Lower Body Dressing) supported sitting;supported standing   Assistive Devices (Lower Body Dressing) reacher;sock-aid   Comment, (Lower Body Dressing) Per clinical judgement   [place] Level (Lower Body Dressing) moderate assist (50% patient effort);verbal cues   Grooming Assessment/Training   Position (Grooming) supported standing   [place] Level (Grooming) wash face, hands;minimum assist (75% patient effort);other (see comments)  (ax2)   Comment, (Grooming) Kenyatta Steady   Toileting   Position (Toileting) supported standing;supported  sitting   Assistive Devices (Toileting) grab bar, toilet   Comment, (Toileting) Kenyatta Steady   [place] Level (Toileting) perform perineal hygiene;dependent (less than 25% patient effort)   Clinical Impression   Criteria for Skilled Therapeutic Interventions Met (OT) Yes, treatment indicated   OT Diagnosis Decreased ADL independence   OT Problem List-Impairments impacting ADL activity tolerance impaired;strength;pain;post-surgical precautions   Assessment of Occupational Performance 1-3 Performance Deficits   Identified Performance Deficits Toileting, dressing, transfers   Planned Therapy Interventions (OT) ADL retraining;IADL retraining;strengthening;transfer training   Clinical Decision Making Complexity (OT) problem focused assessment/low complexity   Risk & Benefits of therapy have been explained patient   OT Total Evaluation Time   OT Eval, Low Complexity Minutes (15497) 10   OT Goals   Therapy Frequency (OT) 5 times/week   OT Predicted Duration/Target Date for Goal Attainment 06/06/24   OT Goals Lower Body Dressing;Toilet Transfer/Toileting;Hygiene/Grooming;OT Goal 1   OT: Hygiene/Grooming minimal assist;using adaptive equipment;within precautions  (Use SS)   OT: Lower Body Dressing Minimal assist;within precautions;using adaptive equipment  (Use SS)   OT: Toilet Transfer/Toileting Minimal assist;toilet transfer;using adaptive equipment;within precautions  (SS)   OT: Goal 1 Pt will  SS for 7 mins to increase activity tolerance needed for I/ADLs, Min A.   Interventions   Interventions Quick Adds Self-Care/Home Management;Therapeutic Activity   Self-Care/Home Management   Self-Care/Home Mgmt/ADL, Compensatory, Meal Prep Minutes (32745) 12   Symptoms Noted During/After Treatment (Meal Preparation/Planning Training) increased pain;shortness of breath;fatigue   Treatment Detail/Skilled Intervention Pt greeted while seated on toilet, agreeable to OT. Pt performed STS from toilet to SS w mod assist of 2.  Required dependent assistance for pericares, to maintain precautions. Pt wheeled to sinkside w SS, min assist of 2. Pt completed hand washing g/h task sinkside while standing in SS w min assist of 2, forearms braced on sink. VCs given for hand techniques and safety protocols w SS. Pt pushed back to chair w SS and min assist of 2 for support.   Therapeutic Activities   Therapeutic Activity Minutes (49894) 12   Symptoms noted during/after treatment increased pain;shortness of breath;increase work of breath   Treatment Detail/Skilled Intervention Pt stood up in SS for 8 mins with min assist of 2. Pt with HR in low 40s, may have been d/t inaccurate reading, RN updated. While standing, pt edu on breathing techniques. Therapist giving VCs while standing for deep breaths as pt dropped to 86% while standing. Once completed, pt performed stand to sit with controlled movement, min assist of 2, and SS for support. Pts O2 increased to 92%. Pt edu on spinal precautions and what movements to avoid while recovering. Pt left sitting upright in chair, all needs met, chair alarm on, call light in reach.   OT Discharge Planning   OT Plan LB dressing w AE, standing tolerance in SS for g/h tasks, toilet transfers   OT Discharge Recommendation (DC Rec) Transitional Care Facility   OT Rationale for DC Rec Pt functioning below baseline w ADLs d/t decreased activity tolerance and pain s/p spinal fusion and laminectomy. Pt lives at home w spouse, who can support w cares, but pt continues to require min-mod ax2 w SS for ADL and transfers. Pt would benefit from continued therapy to regain ind in ADLs.   OT Brief overview of current status min-mod ax2 w SS for transfers, ADLs   Total Session Time   Timed Code Treatment Minutes 24   Total Session Time (sum of timed and untimed services) 34

## 2024-05-30 NOTE — PROGRESS NOTES
Neurosurgery progress note    Drain with 20 mL output overnight, 100 mL output thus far this morning from 7 to 9 AM.    Patient feels like he is moving in the right direction, back pain is decreased and managed with pain medication, transferred from bed to chair with physical therapy.    Denies headaches    Repeat hemoglobin today pending    Exam    Alert and oriented no acute distress  Bilateral lower extremities of 5-5 strength  Bandage clean dry and intact  Drain in place with light pink serosanguineous drainage    Assessment    Postop day 2 status post Lumbar 4 to Lumbar 5 Transforaminal Interbody Fusion with decompression of stenosis and Lumbar 2 to Lumbar 3 bilateral laminectomy for decompression of stenosis, Dr. Cleary    Plan    -Monitor for headaches   -MONICA drain to gravity, abx while drain is in place.  Monitor output today.  -Repeat hemoglobin this morning pending  -Okay to start prophylactic dose Lovenox today, then transition to therapeutic dose at 1 week post op   -Hold PTA warfarin x 1 week post op   -Continue pain control measures as needed   -Routine wound care   -PT/OT  -Patient has TCU bed available when ready to discharge, possibly tomorrow

## 2024-05-30 NOTE — PROGRESS NOTES
Paged from RN patient drowsy with 5-10 mg oxy today  Recommend reducing oxy to 2.5-5mg q4H PRN  RN in agreement     Margarita Garnica PA-C  [address] [address]  Tel [phone]  Fax [phone]  Text page via [facility] Paging/Directory

## 2024-05-30 NOTE — PROGRESS NOTES
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     Lumbar stenosis and spondylolisthesis s/p L4-5 fusion and decompression, L2-3 laminectomy and decompression 5/28/2024   Surgery with Dr. Fabiano Cleary. GETA. EBL 200mL. No immediate postoperative complications.  - Routine postoperative cares per primary service, including IVF, pain control, abx, DVT ppx, and disposition  - PT/OT as per primary service  - Aggressive pulmonary toilet, frequent IS use 10x/hour while awake  - MONICA management per NS  - SW/CC consult for disposition planning    Intake/Output Summary (Last 24 hours) at 5/30/2024 1259  Last data filed at 5/30/2024 1200  Gross per 24 hour   Intake 100 ml   Output 1005 ml   Net -905 ml       Mild hypoxia  Monitor closely. No chest pain, lightheadedness, dizziness. In setting of postop, Hgb 7.4 range, and pain.   -Aggressive use of IS  -Monitor Hgb  -Started ppx Lovenox 05/30/24  -Monitor for any worsening hypoxia and consider workup if increasing oxygen needs    Acute blood loss anemia  Baseline appears 13 range. 7.5-7.4 postoperatively.  -Monitor  -MONICA remainsinplace  -Transfusions per surgeon if warranted  -monitor closely while on Lovenox    Recent Labs   Lab 05/30/24  1053 05/29/24  0807   HGB 7.4* 7.5*       Factor V Leiden mutation with hx DVT x3 2003, 2008, 2010, on warfarin  AC held PTA for surgery and bridged with lovenox  -Given factor 5 leiden with hx multiple prior DVTs, d/w NSG 05/29/24 and started ppx dose Lovenox 5/30/24, then  transition to therapeutic dose 1 week postoperative. Restart warfarin 1 week postopeartively.   -Daily INR  -Monitor hemoglobin while on Lovenox    Mild aortic valve stenosis  Benign essential hypertension  PTA regimen: atenolol 50mg BID, losartan 100mg/d, spironolactone 25mg/d  - Continue PTA BB with hold parameters  - Hold PTA spironolactone and losartan while BPs soft, monitoring Hgb  - Start 1/2 dose ARB 05/30/24 and monitor ability to increase to previous dose 100mg/d  - PRN IV labetalol/hydralazine available for SBP >180  - Monitor BP trend and need for medication adjustments    Chronic kidney disease, stage 3b  Baseline Cr 1.5-1.6 range.  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  4mg decadron intraoperatively, expect transient hyperglycemia.  - Hypoglycemia protocol  - Preprandial and HS fingerstick checks or Q 4 hours while NPO  - Restart PTA glipizide 5mg/d 05/30/24   - Sliding scale insulin Medium   - Consistent CHO diet - High 75mg/meal    Recent Labs   Lab 05/30/24  1210 05/30/24  1053 05/30/24  0808 05/30/24  0237 05/29/24  2138 05/29/24  1645   * 230* 153* 156* 171* 164*       Other pertinent history and chronic stable diagnoses: Appropriate PTA medications will be resumed.  Hx prostate cancer s/p prostatectomy 2003  Gout  BCC  Sleep apnea  RLS  Hyperlipidemia, intolerant to statins            Diet: High Consistent Carb (75 g CHO per Meal) Diet    DVT Prophylaxis: Enoxaparin (Lovenox) SQ  Smith Catheter: Not present  Lines: None     Cardiac Monitoring: None  Code Status: Full Code      Clinically Significant Risk Factors               # Coagulation Defect: INR = 1.16 (Ref range: 0.85 - 1.15) and/or PTT = N/A, will monitor for bleeding    # Hypertension: Noted on problem list       #  "DMII: A1C = 7.0 % (Ref range: <5.7 %) within past 6 months, PRESENT ON ADMISSION  # Obesity: Estimated body mass index is 34.59 kg/m  as calculated from the following:    Height as of this encounter: 1.803 m (5' 11\").    Weight as of this encounter: 112.5 kg (248 lb)., PRESENT ON ADMISSION       # Financial/Environmental Concerns: none         Disposition Plan     Medically Ready for Discharge: Anticipated in 2-4 Days           The patient's care was discussed with the Attending Physician, Dr. Aguilar, Bedside Nurse, Patient, and Patient's Family.    Radha Simmons PA-C  Hospitalist Service  [facility]  Securely message with [employer] (more info)  Text page via [facility] Paging/Directory   ______________________________________________________________________    Interval History   Seen and examined.  Daughter at bedside.  No chest pain, lightheadedness, dizziness, or shortness of breath.  Requiring 2 to 3 L of oxygen.  Has been trying to use incentive spirometer but not much.  No nausea or vomiting.  Taking p.o.  Pain better today.  Starting prophylactic dose of Lovenox, per neurosurgery, warfarin on hold.  Plans for TCU on discharge. Questions welcomed and answered to the best of my knowledge.    Physical Exam   Vital Signs: Temp: 99.3  F (37.4  C) Temp src: Oral BP: 134/75 Pulse: 62   Resp: 16 SpO2: 91 % O2 Device: Nasal cannula Oxygen Delivery: 2.5 LPM  Weight: 248 lbs 0 oz    General: Awake, alert, very pleasant elderly man who appears stated age. Looks comfortable sleeping in bed. Arouses easily to voice. No acute distress.  HEENT: Normocephalic, atraumatic. Extraocular movements intact.  Respiratory: Clear to auscultation bilaterally, no rales, wheezing, or rhonchi.  Cardiovascular: Regular rate and rhythm, +S1 and S2, no murmur auscultated. No peripheral edema. PCDs in place.  Gastrointestinal: Soft, non-tender, obese but non-distended. Bowel sounds present. MONICA in place with 75mL " serosanguinous/bloody output.   Skin: Warm, dry. No obvious rashes or lesions on exposed skin. Dorsalis pedis pulses palpable bilaterally.  Musculoskeletal: No joint swelling, erythema or tenderness. Moves all extremities equally.  Neurologic: AAO x3.   Psychiatric: Appropriate mood and affect. No obvious anxiety or depression.    Medical Decision Making       35 MINUTES SPENT BY ME on the date of service doing chart review, history, exam, documentation & further activities per the note.      Data     I have personally reviewed the following data over the past 24 hrs:    N/A  \   7.4 (L)   / 161     138 106 27.1 (H) /  196 (H)   4.1 22 1.40 (H) \       Imaging results reviewed over the past 24 hrs:   No results found for this or any previous visit (from the past 24 hour(s)).

## 2024-05-31 ENCOUNTER — APPOINTMENT (OUTPATIENT)
Dept: GENERAL RADIOLOGY | Facility: CLINIC | Age: 89
DRG: 453 | End: 2024-05-31
Attending: STUDENT IN AN ORGANIZED HEALTH CARE EDUCATION/TRAINING PROGRAM
Payer: COMMERCIAL

## 2024-05-31 ENCOUNTER — APPOINTMENT (OUTPATIENT)
Dept: OCCUPATIONAL THERAPY | Facility: CLINIC | Age: 89
DRG: 453 | End: 2024-05-31
Attending: NEUROLOGICAL SURGERY
Payer: COMMERCIAL

## 2024-05-31 ENCOUNTER — APPOINTMENT (OUTPATIENT)
Dept: CT IMAGING | Facility: CLINIC | Age: 89
DRG: 453 | End: 2024-05-31
Attending: STUDENT IN AN ORGANIZED HEALTH CARE EDUCATION/TRAINING PROGRAM
Payer: COMMERCIAL

## 2024-05-31 ENCOUNTER — APPOINTMENT (OUTPATIENT)
Dept: PHYSICAL THERAPY | Facility: CLINIC | Age: 89
DRG: 453 | End: 2024-05-31
Attending: NEUROLOGICAL SURGERY
Payer: COMMERCIAL

## 2024-05-31 LAB
GLUCOSE BLDC GLUCOMTR-MCNC: 106 MG/DL (ref 70–99)
GLUCOSE BLDC GLUCOMTR-MCNC: 142 MG/DL (ref 70–99)
GLUCOSE BLDC GLUCOMTR-MCNC: 143 MG/DL (ref 70–99)
GLUCOSE BLDC GLUCOMTR-MCNC: 194 MG/DL (ref 70–99)
HGB BLD-MCNC: 7.4 G/DL (ref 13.3–17.7)

## 2024-05-31 PROCEDURE — 250N000013 HC RX MED GY IP 250 OP 250 PS 637: Performed by: NURSE PRACTITIONER

## 2024-05-31 PROCEDURE — 36415 COLL VENOUS BLD VENIPUNCTURE: CPT | Performed by: PHYSICIAN ASSISTANT

## 2024-05-31 PROCEDURE — 120N000001 HC R&B MED SURG/OB

## 2024-05-31 PROCEDURE — 85018 HEMOGLOBIN: CPT | Performed by: PHYSICIAN ASSISTANT

## 2024-05-31 PROCEDURE — 250N000011 HC RX IP 250 OP 636: Performed by: PHYSICIAN ASSISTANT

## 2024-05-31 PROCEDURE — 250N000011 HC RX IP 250 OP 636: Performed by: NURSE PRACTITIONER

## 2024-05-31 PROCEDURE — 73030 X-RAY EXAM OF SHOULDER: CPT | Mod: RT

## 2024-05-31 PROCEDURE — 97530 THERAPEUTIC ACTIVITIES: CPT | Mod: GP | Performed by: PHYSICAL THERAPIST

## 2024-05-31 PROCEDURE — 250N000013 HC RX MED GY IP 250 OP 250 PS 637: Performed by: PHYSICIAN ASSISTANT

## 2024-05-31 PROCEDURE — 99233 SBSQ HOSP IP/OBS HIGH 50: CPT | Performed by: STUDENT IN AN ORGANIZED HEALTH CARE EDUCATION/TRAINING PROGRAM

## 2024-05-31 PROCEDURE — 97110 THERAPEUTIC EXERCISES: CPT | Mod: GP | Performed by: PHYSICAL THERAPIST

## 2024-05-31 PROCEDURE — 71045 X-RAY EXAM CHEST 1 VIEW: CPT

## 2024-05-31 PROCEDURE — 71250 CT THORAX DX C-: CPT

## 2024-05-31 PROCEDURE — 97535 SELF CARE MNGMENT TRAINING: CPT | Mod: GO

## 2024-05-31 PROCEDURE — 250N000013 HC RX MED GY IP 250 OP 250 PS 637: Performed by: STUDENT IN AN ORGANIZED HEALTH CARE EDUCATION/TRAINING PROGRAM

## 2024-05-31 RX ORDER — METHOCARBAMOL 500 MG/1
500 TABLET, FILM COATED ORAL 4 TIMES DAILY PRN
Qty: 40 TABLET | Refills: 0 | Status: SHIPPED | OUTPATIENT
Start: 2024-05-31 | End: 2024-06-03

## 2024-05-31 RX ORDER — AMOXICILLIN 250 MG
1 CAPSULE ORAL DAILY
Qty: 30 TABLET | Refills: 0 | Status: SHIPPED | OUTPATIENT
Start: 2024-05-31 | End: 2024-08-02

## 2024-05-31 RX ORDER — OXYCODONE HYDROCHLORIDE 5 MG/1
2.5 TABLET ORAL EVERY 6 HOURS PRN
Qty: 40 TABLET | Refills: 0 | Status: SHIPPED | OUTPATIENT
Start: 2024-05-31 | End: 2024-06-13

## 2024-05-31 RX ADMIN — OXYCODONE HYDROCHLORIDE 5 MG: 5 TABLET ORAL at 21:19

## 2024-05-31 RX ADMIN — CEFAZOLIN SODIUM 2 G: 2 INJECTION, SOLUTION INTRAVENOUS at 03:58

## 2024-05-31 RX ADMIN — POLYETHYLENE GLYCOL 3350 17 G: 17 POWDER, FOR SOLUTION ORAL at 08:28

## 2024-05-31 RX ADMIN — ACETAMINOPHEN 975 MG: 325 TABLET, FILM COATED ORAL at 08:28

## 2024-05-31 RX ADMIN — GLIPIZIDE 5 MG: 5 TABLET, FILM COATED, EXTENDED RELEASE ORAL at 18:16

## 2024-05-31 RX ADMIN — LOSARTAN POTASSIUM 50 MG: 50 TABLET, FILM COATED ORAL at 08:32

## 2024-05-31 RX ADMIN — OXYCODONE HYDROCHLORIDE 2.5 MG: 5 TABLET ORAL at 05:47

## 2024-05-31 RX ADMIN — SENNOSIDES AND DOCUSATE SODIUM 1 TABLET: 50; 8.6 TABLET ORAL at 21:18

## 2024-05-31 RX ADMIN — IPRATROPIUM BROMIDE 2 SPRAY: 42 SPRAY, METERED NASAL at 21:19

## 2024-05-31 RX ADMIN — PRAMIPEXOLE DIHYDROCHLORIDE 1 MG: 1 TABLET ORAL at 21:18

## 2024-05-31 RX ADMIN — INSULIN ASPART 1 UNITS: 100 INJECTION, SOLUTION INTRAVENOUS; SUBCUTANEOUS at 18:17

## 2024-05-31 RX ADMIN — PRAMIPEXOLE DIHYDROCHLORIDE 1 MG: 1 TABLET ORAL at 18:16

## 2024-05-31 RX ADMIN — IPRATROPIUM BROMIDE 2 SPRAY: 42 SPRAY, METERED NASAL at 08:31

## 2024-05-31 RX ADMIN — IPRATROPIUM BROMIDE 2 SPRAY: 42 SPRAY, METERED NASAL at 18:17

## 2024-05-31 RX ADMIN — INSULIN ASPART 2 UNITS: 100 INJECTION, SOLUTION INTRAVENOUS; SUBCUTANEOUS at 12:24

## 2024-05-31 RX ADMIN — ENOXAPARIN SODIUM 40 MG: 40 INJECTION SUBCUTANEOUS at 08:29

## 2024-05-31 RX ADMIN — ATENOLOL 50 MG: 25 TABLET ORAL at 21:18

## 2024-05-31 RX ADMIN — SENNOSIDES AND DOCUSATE SODIUM 1 TABLET: 50; 8.6 TABLET ORAL at 08:29

## 2024-05-31 RX ADMIN — ATENOLOL 50 MG: 25 TABLET ORAL at 08:32

## 2024-05-31 ASSESSMENT — ACTIVITIES OF DAILY LIVING (ADL)
ADLS_ACUITY_SCORE: 29
ADLS_ACUITY_SCORE: 30
ADLS_ACUITY_SCORE: 30
ADLS_ACUITY_SCORE: 29
ADLS_ACUITY_SCORE: 31
ADLS_ACUITY_SCORE: 29
ADLS_ACUITY_SCORE: 30
ADLS_ACUITY_SCORE: 29
ADLS_ACUITY_SCORE: 30

## 2024-05-31 NOTE — PROVIDER NOTIFICATION
Writer sent [facility] message to first call provider for neurosurgery requesting clarification on IV antibiotics that are still ordered with indication to continue while MONICA drain in place. MONICA was pulled at bedside with Kala PALOMO earlier this AM. Writer also asking provider to enter indwelling catheter orders as neurosurgery updated this morning patient required freitas placement after retention. Awaiting response.

## 2024-05-31 NOTE — PROGRESS NOTES
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 on 5/28/2024 by the neurosurgery service for elective lumbar spinal fusion, laminectomy and decompression.      Lumbar stenosis and spondylolisthesis s/p L4-5 fusion and decompression, L2-3 laminectomy and decompression 5/28/2024   Surgery with Dr. Fabiano Cleary. GETA. EBL 200mL. No immediate postoperative complications.  - Routine postoperative cares per primary service, including IVF, pain control, abx, DVT ppx, and disposition  - PT/OT as per primary service  - Aggressive pulmonary toilet, frequent IS use 10x/hour while awake  - MONICA management per NSG  - SW/CC consult for disposition planning          Acute respiratory failure with hypoxia hypoxia  Monitor closely. No chest pain, lightheadedness, dizziness. In setting of postop, Hgb 7.4 range, and pain.   -Aggressive use of IS  -Monitor Hgb  -Started ppx Lovenox 05/30/24  -Monitor for any worsening hypoxia and consider workup if increasing oxygen needs  -Chest xray shows atelectasis 5/31/24 encourage ICS     Acute blood loss anemia  # Chest wall hematoma    *Patient prior to admission had bumped into his tailgate of his car [place] and has bruising over the chest wall and right shoulder with no pain.  He did not seek medical help at that time    No hemoglobin check on admissions unknown if the hematoma caused a drop in the hemoglobin versus postsurgical    Currently hemoglobin is 7.4    Will order x-ray of the right shoulder and CT of the chest and abdomen pelvis without contrast    Patient has been hemodynamically stable with no pain in his chest wall.    Baseline appears 13 range. 7.5-7.4 postoperatively.  -Monitor hb 7.4 on 5/31/24  -MONICA remainsinplace  -Transfusions per surgeon if warranted  -monitor closely while on Lovenox        Factor V Leiden mutation with hx DVT x3 2003, 2008, 2010, on warfarin  AC held PTA for surgery and bridged with lovenox  -Given factor 5 leiden with hx multiple prior DVTs, d/w NSG 05/29/24 and started ppx dose Lovenox 5/30/24, then transition to therapeutic dose 1 week postoperative. Restart warfarin 1 week postopeartively.   -Daily INR  -Monitor hemoglobin while on Lovenox     Mild aortic valve stenosis  Benign essential hypertension  PTA regimen: atenolol 50mg BID, losartan 100mg/d, spironolactone 25mg/d  - Continue PTA BB with hold parameters  - Hold PTA spironolactone and losartan while BPs soft, monitoring Hgb  - Start 1/2 dose ARB 05/30/24 and monitor ability to increase to previous dose 100mg/d  - PRN IV labetalol/hydralazine available for SBP >180  - Monitor BP trend and need for medication adjustments     Chronic kidney disease, stage 3b  Baseline Cr 1.5-1.6 range.  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  4mg decadron intraoperatively, expect transient hyperglycemia.  - Hypoglycemia protocol  - Preprandial and HS fingerstick checks or Q 4 hours while NPO  - Restart PTA glipizide 5mg/d 05/30/24   - Sliding scale insulin Medium   - Consistent CHO diet - High 75mg/meal            Recent Labs   Lab 05/31/24  1207 05/31/24  0221 05/30/24  2154 05/30/24  1650 05/30/24  1210 05/30/24  1053   * 106* 153* 177* 196* 230*          # Urinary retention  Patient had a voiding trial which failed and Smith had to be placed again for urinary retention  -Discussed with neurosurgery to attempt voiding trial in 1 to 2 days  "after ambulation  -Will add Flomax     Other pertinent history and chronic stable diagnoses: Appropriate PTA medications will be resumed.  Hx prostate cancer s/p prostatectomy 2003  Gout  BCC  Sleep apnea  RLS  Hyperlipidemia, intolerant to statins             Diet: High Consistent Carb (75 g CHO per Meal) Diet    DVT Prophylaxis: Enoxaparin (Lovenox) SQ  Smith Catheter: PRESENT, indication: Acute retention or obstruction, Immobilization due to spinal injuries or other multiple traumatic injuries  Lines: None     Cardiac Monitoring: None  Code Status: Full Code      Clinically Significant Risk Factors               # Coagulation Defect: INR = 1.16 (Ref range: 0.85 - 1.15) and/or PTT = N/A, will monitor for bleeding    # Hypertension: Noted on problem list       # DMII: A1C = 7.0 % (Ref range: <5.7 %) within past 6 months, PRESENT ON ADMISSION  # Obesity: Estimated body mass index is 34.59 kg/m  as calculated from the following:    Height as of this encounter: 1.803 m (5' 11\").    Weight as of this encounter: 112.5 kg (248 lb)., PRESENT ON ADMISSION       # Financial/Environmental Concerns: none         Disposition Plan     Medically Ready for Discharge: Per primary team                    Jennifer Aguilar MD  Hospitalist Service  [facility]  Securely message with [employer] (more info)  Text page via [employer] Paging/Directory   ______________________________________________________________________    Interval History   *Patient prior to admission had bumped into his tailgate of his car [place] and has bruising over the chest wall and right shoulder with no pain.  He did not seek medical help at that time  Feels well  Discussed with patient's daughter at bedside    Patient feels well.    No chest wall pain.    Encourage use of incentive spirometry        Physical Exam   Vital Signs: Temp: 98.7  F (37.1  C) Temp src: Oral BP: 132/66 Pulse: 62   Resp: 16 SpO2: 92 % O2 Device: Nasal " cannula Oxygen Delivery: 2 LPM  Weight: 248 lbs 0 oz    Physical Exam  Cardiovascular:      Rate and Rhythm: Normal rate and regular rhythm.   Pulmonary:      Effort: Pulmonary effort is normal. No respiratory distress.      Breath sounds: No wheezing.   Abdominal:      General: There is no distension.      Palpations: Abdomen is soft.      Tenderness: There is no abdominal tenderness.   Skin:     Comments: Chest wall bruising and right shoulder bruising          Medical Decision Making       50 MINUTES SPENT BY ME on the date of service doing chart review, history, exam, documentation & further activities per the note.      Data     I have personally reviewed the following data over the past 24 hrs:    N/A  \   7.4 (L)   / N/A     N/A N/A N/A /  194 (H)   N/A N/A N/A \       Imaging results reviewed over the past 24 hrs:   Recent Results (from the past 24 hour(s))   XR Chest Port 1 View    Narrative    CHEST PORTABLE ONE VIEW May 31, 2024 1:12 PM       INDICATION: Hypoxia.    COMPARISON: 5/19/2024.       Impression    IMPRESSION: Mild bibasilar atelectasis, improved from previous.  Otherwise negative chest.

## 2024-05-31 NOTE — PROGRESS NOTES
[facility]    Neurosurgery  Daily Note    Assessment & Plan   Procedure(s):  Lumbar 4 to Lumbar 5 Transforaminal Interbody Fusion with decompression of stenosis and Lumbar 2 to Lumbar 3 bilateral laminectomy for decompression of stenosis   3 Days Post-Op    Plan:  -Drain removed, stitch applied. Order placed to discontinue abx  -OK to continue lovenox DVT ppx then transition to therapeutic dose at 1 week post op   -Hold PTA warfarin x 1 week post op   -Continue pain control measures as needed   -Routine wound care   -PT/OT  -Smith placed yesterday for retention - would appreciate hospitalist assistance with management, message sent.    -NSGY scripts printed for discharge to TCU when coordinated    JIMENEZ GREENBERG PA-C    Interval History   Continued improvement in pain but still with back pain. Still working on transferring, has not walked distances yet.  Hgb 7.4  MONICA 45mL    Physical Exam   Temp: 98.7  F (37.1  C) Temp src: Oral BP: 132/66 Pulse: 62   Resp: 16 SpO2: 92 % O2 Device: Nasal cannula Oxygen Delivery: 2 LPM  Vitals:    05/28/24 0623   Weight: 112.5 kg (248 lb)     Vital Signs with Ranges  Temp:  [98.6  F (37  C)-99.3  F (37.4  C)] 98.7  F (37.1  C)  Pulse:  [55-71] 62  Resp:  [16] 16  BP: (116-142)/(61-75) 132/66  SpO2:  [90 %-99 %] 92 %  I/O last 3 completed shifts:  In: 100 [P.O.:100]  Out: 1175 [Urine:875; Drains:300]    Alert and oriented.  Strength 5/5 in bilateral lower extremities  Sensation intact light touch in bilateral lower extremities  Incision CDI with staples intact, drain intact    Drain removed without difficulty.  Drain site sutured with ethilon and covered with gauze and tape. Incision redressed with Island dressing. Hemostasis achieved. Patient tolerated the procedure well. Drain intact upon inspection after removal.       Medications   Current Facility-Administered Medications   Medication Dose Route Frequency Provider Last Rate Last Admin    No lozenges  or gum should be given while patient on BIPAP/AVAPS/AVAPS AE   Does not apply Continuous PRN Cj Cleary MD        Patient may continue current oral medications   Does not apply Continuous PRN Cj Cleary MD        sodium chloride 0.9 % infusion   Intravenous Continuous Li Montiel NP   Stopped at 05/30/24 1050      Current Facility-Administered Medications   Medication Dose Route Frequency Provider Last Rate Last Admin    atenolol (TENORMIN) tablet 50 mg  50 mg Oral BID Radha Simmons PA-C   50 mg at 05/31/24 0832    ceFAZolin (ANCEF) 2 g in 100 mL D5W intermittent infusion  2 g Intravenous Q8H Li Montiel  mL/hr at 05/31/24 0358 2 g at 05/31/24 0358    enoxaparin ANTICOAGULANT (LOVENOX) injection 40 mg  40 mg Subcutaneous Q24H Frantz Martinez PA-C   40 mg at 05/31/24 0829    glipiZIDE (GLUCOTROL XL) 24 hr tablet 5 mg  5 mg Oral Daily with supper Radha Simmons PA-C   5 mg at 05/30/24 1830    insulin aspart (NovoLOG) injection (RAPID ACTING)  1-7 Units Subcutaneous TID AC Radha Simmons PA-C   1 Units at 05/30/24 1831    insulin aspart (NovoLOG) injection (RAPID ACTING)  1-5 Units Subcutaneous At Bedtime Radha Simmons PA-C        ipratropium (ATROVENT) 0.06 % spray 2 spray  2 spray Both Nostrils 4x Daily Li Montiel NP   2 spray at 05/31/24 0831    losartan (COZAAR) tablet 50 mg  50 mg Oral Daily Radha Simmons PA-C   50 mg at 05/31/24 0832    polyethylene glycol (MIRALAX) Packet 17 g  17 g Oral Daily Li Montiel NP   17 g at 05/31/24 0828    pramipexole (MIRAPEX) tablet 1 mg  1 mg Oral BID Li Montiel NP   1 mg at 05/30/24 2101    senna-docusate (SENOKOT-S/PERICOLACE) 8.6-50 MG per tablet 1 tablet  1 tablet Oral BID Li Montiel NP   1 tablet at 05/31/24 0829    sodium chloride (PF) 0.9% PF flush 3 mL  3 mL Intracatheter Q8H Li Montiel NP   3 mL at 05/31/24 0549    [Held by provider]  spironolactone (ALDACTONE) tablet 25 mg  25 mg Oral Daily Li Montiel, NP           Plans discussed with Dr. Cleary who was in agreement with plans    Rachael Mulligan PA-C  [facility] Neurosurgery  [address] [address]

## 2024-05-31 NOTE — PROGRESS NOTES
Care Management Follow Up    Length of Stay (days): 3    Expected Discharge Date: 06/02/2024     Concerns to be Addressed: discharge planning     Patient plan of care discussed at interdisciplinary rounds: Yes    Anticipated Discharge Disposition: Skilled Nursing Facility, Transitional Care     Anticipated Discharge Services: None  Anticipated Discharge DME: None    Patient/family educated on Medicare website which has current facility and service quality ratings: yes  Education Provided on the Discharge Plan: Yes  Patient/Family in Agreement with the Plan: yes    Referrals Placed by CM/SW:    Private pay costs discussed: private room/amenity fees, transportation costs, and insurance costs co-pays    Additional Information:  Writer received a call from admissions over at [facility] who stated they reached out to the writer yesterday with information regarding the patient's coverage with their insurance. Carlos has [employer]. Days 21 - 100 have a $203/day copay. Remaining MOP is $2616.48 Prior Auth required - hospital to obtain Auth. We are in COIVD outbreak until 05/08 (unless there are additional cases) Would he still be interested in our facility or would this be a barrier for him accepting our facility? We also have $16/d PRF. Is this something he would be okay with?     Writer was made aware [facility] can accept clinically.     Writer followed up with the patient and daughter, Anna, at bedside. Anna and patient were made aware of the COVID status and would agree to go there knowing that is a risk. Writer made them aware that if the facility goes in a lockdown for quaratine, they would not be able to accept and the patient would have to go to an alternative facility, if medically ready in the meantime. Patient and daughter in agreement.       VIKRAM Baumann  [facility]  Social Work

## 2024-05-31 NOTE — PROVIDER NOTIFICATION
.Notified provider about indwelling freitas catheter discussed removal or continued need.    Did provider choose to remove indwelling freitas catheter? No    Provider's freitas indication for keeping indwelling freitas catheter: Retention.    Is there an order for indwelling freitas catheter? No- Requested neurosurgery review and place orders    Per conversation with rounding neurosurgery provider, team will likely consult urology for further direction on acute retention.

## 2024-06-01 ENCOUNTER — APPOINTMENT (OUTPATIENT)
Dept: PHYSICAL THERAPY | Facility: CLINIC | Age: 89
DRG: 453 | End: 2024-06-01
Attending: NEUROLOGICAL SURGERY
Payer: COMMERCIAL

## 2024-06-01 LAB
ANION GAP SERPL CALCULATED.3IONS-SCNC: 9 MMOL/L (ref 7–15)
BUN SERPL-MCNC: 28.7 MG/DL (ref 8–23)
CALCIUM SERPL-MCNC: 8.3 MG/DL (ref 8.8–10.2)
CHLORIDE SERPL-SCNC: 106 MMOL/L (ref 98–107)
CREAT SERPL-MCNC: 1.42 MG/DL (ref 0.67–1.17)
DEPRECATED HCO3 PLAS-SCNC: 24 MMOL/L (ref 22–29)
EGFRCR SERPLBLD CKD-EPI 2021: 48 ML/MIN/1.73M2
ERYTHROCYTE [DISTWIDTH] IN BLOOD BY AUTOMATED COUNT: 13.3 % (ref 10–15)
GLUCOSE BLDC GLUCOMTR-MCNC: 127 MG/DL (ref 70–99)
GLUCOSE BLDC GLUCOMTR-MCNC: 134 MG/DL (ref 70–99)
GLUCOSE BLDC GLUCOMTR-MCNC: 142 MG/DL (ref 70–99)
GLUCOSE BLDC GLUCOMTR-MCNC: 187 MG/DL (ref 70–99)
GLUCOSE BLDC GLUCOMTR-MCNC: 191 MG/DL (ref 70–99)
GLUCOSE SERPL-MCNC: 145 MG/DL (ref 70–99)
HCT VFR BLD AUTO: 23.5 % (ref 40–53)
HGB BLD-MCNC: 7.4 G/DL (ref 13.3–17.7)
HGB BLD-MCNC: 7.4 G/DL (ref 13.3–17.7)
MCH RBC QN AUTO: 31.1 PG (ref 26.5–33)
MCHC RBC AUTO-ENTMCNC: 31.5 G/DL (ref 31.5–36.5)
MCV RBC AUTO: 99 FL (ref 78–100)
PLATELET # BLD AUTO: 154 10E3/UL (ref 150–450)
POTASSIUM SERPL-SCNC: 3.8 MMOL/L (ref 3.4–5.3)
RBC # BLD AUTO: 2.38 10E6/UL (ref 4.4–5.9)
SODIUM SERPL-SCNC: 139 MMOL/L (ref 135–145)
WBC # BLD AUTO: 6.8 10E3/UL (ref 4–11)

## 2024-06-01 PROCEDURE — 99233 SBSQ HOSP IP/OBS HIGH 50: CPT | Performed by: STUDENT IN AN ORGANIZED HEALTH CARE EDUCATION/TRAINING PROGRAM

## 2024-06-01 PROCEDURE — 99222 1ST HOSP IP/OBS MODERATE 55: CPT | Performed by: SURGERY

## 2024-06-01 PROCEDURE — 250N000013 HC RX MED GY IP 250 OP 250 PS 637: Performed by: STUDENT IN AN ORGANIZED HEALTH CARE EDUCATION/TRAINING PROGRAM

## 2024-06-01 PROCEDURE — 80048 BASIC METABOLIC PNL TOTAL CA: CPT | Performed by: STUDENT IN AN ORGANIZED HEALTH CARE EDUCATION/TRAINING PROGRAM

## 2024-06-01 PROCEDURE — 36415 COLL VENOUS BLD VENIPUNCTURE: CPT | Performed by: STUDENT IN AN ORGANIZED HEALTH CARE EDUCATION/TRAINING PROGRAM

## 2024-06-01 PROCEDURE — 97530 THERAPEUTIC ACTIVITIES: CPT | Mod: GP

## 2024-06-01 PROCEDURE — 250N000013 HC RX MED GY IP 250 OP 250 PS 637: Performed by: PHYSICIAN ASSISTANT

## 2024-06-01 PROCEDURE — 85027 COMPLETE CBC AUTOMATED: CPT | Performed by: STUDENT IN AN ORGANIZED HEALTH CARE EDUCATION/TRAINING PROGRAM

## 2024-06-01 PROCEDURE — 85018 HEMOGLOBIN: CPT | Performed by: PHYSICIAN ASSISTANT

## 2024-06-01 PROCEDURE — 36415 COLL VENOUS BLD VENIPUNCTURE: CPT | Performed by: PHYSICIAN ASSISTANT

## 2024-06-01 PROCEDURE — 250N000013 HC RX MED GY IP 250 OP 250 PS 637: Performed by: NURSE PRACTITIONER

## 2024-06-01 PROCEDURE — 97116 GAIT TRAINING THERAPY: CPT | Mod: GP

## 2024-06-01 PROCEDURE — 250N000011 HC RX IP 250 OP 636: Performed by: PHYSICIAN ASSISTANT

## 2024-06-01 PROCEDURE — 120N000001 HC R&B MED SURG/OB

## 2024-06-01 RX ORDER — TAMSULOSIN HYDROCHLORIDE 0.4 MG/1
0.4 CAPSULE ORAL DAILY
Status: DISCONTINUED | OUTPATIENT
Start: 2024-06-01 | End: 2024-06-02 | Stop reason: HOSPADM

## 2024-06-01 RX ADMIN — IPRATROPIUM BROMIDE 2 SPRAY: 42 SPRAY, METERED NASAL at 10:02

## 2024-06-01 RX ADMIN — ENOXAPARIN SODIUM 40 MG: 40 INJECTION SUBCUTANEOUS at 08:30

## 2024-06-01 RX ADMIN — TAMSULOSIN HYDROCHLORIDE 0.4 MG: 0.4 CAPSULE ORAL at 12:21

## 2024-06-01 RX ADMIN — ATENOLOL 50 MG: 25 TABLET ORAL at 21:20

## 2024-06-01 RX ADMIN — SENNOSIDES AND DOCUSATE SODIUM 1 TABLET: 50; 8.6 TABLET ORAL at 08:30

## 2024-06-01 RX ADMIN — POLYETHYLENE GLYCOL 3350 17 G: 17 POWDER, FOR SOLUTION ORAL at 08:30

## 2024-06-01 RX ADMIN — SENNOSIDES AND DOCUSATE SODIUM 1 TABLET: 50; 8.6 TABLET ORAL at 21:20

## 2024-06-01 RX ADMIN — PRAMIPEXOLE DIHYDROCHLORIDE 1 MG: 1 TABLET ORAL at 16:25

## 2024-06-01 RX ADMIN — INSULIN ASPART 2 UNITS: 100 INJECTION, SOLUTION INTRAVENOUS; SUBCUTANEOUS at 12:20

## 2024-06-01 RX ADMIN — LOSARTAN POTASSIUM 50 MG: 50 TABLET, FILM COATED ORAL at 08:30

## 2024-06-01 RX ADMIN — PRAMIPEXOLE DIHYDROCHLORIDE 1 MG: 1 TABLET ORAL at 21:20

## 2024-06-01 RX ADMIN — ATENOLOL 50 MG: 25 TABLET ORAL at 08:30

## 2024-06-01 RX ADMIN — GLIPIZIDE 5 MG: 5 TABLET, FILM COATED, EXTENDED RELEASE ORAL at 16:25

## 2024-06-01 RX ADMIN — IPRATROPIUM BROMIDE 2 SPRAY: 42 SPRAY, METERED NASAL at 13:26

## 2024-06-01 ASSESSMENT — ACTIVITIES OF DAILY LIVING (ADL)
ADLS_ACUITY_SCORE: 35
ADLS_ACUITY_SCORE: 30
ADLS_ACUITY_SCORE: 30
ADLS_ACUITY_SCORE: 39
ADLS_ACUITY_SCORE: 35
ADLS_ACUITY_SCORE: 39
ADLS_ACUITY_SCORE: 31
ADLS_ACUITY_SCORE: 39
ADLS_ACUITY_SCORE: 35
ADLS_ACUITY_SCORE: 35
ADLS_ACUITY_SCORE: 39
ADLS_ACUITY_SCORE: 31
ADLS_ACUITY_SCORE: 31
ADLS_ACUITY_SCORE: 35
ADLS_ACUITY_SCORE: 35
ADLS_ACUITY_SCORE: 34
ADLS_ACUITY_SCORE: 30
ADLS_ACUITY_SCORE: 39
ADLS_ACUITY_SCORE: 30
ADLS_ACUITY_SCORE: 30
ADLS_ACUITY_SCORE: 34

## 2024-06-01 NOTE — PROGRESS NOTES
A/O x4. [place] with bilateral hearing aids on. Assist 1-2/wlk/gb to BR/chair. Denies pain. Back dressing changed. CMS intact. O2 sat de-sating btw 87-90%, prn 2L NC applied. Smith in place and patent. Discharge pending, plan of care ongoing.

## 2024-06-01 NOTE — PROGRESS NOTES
Care Management Follow Up    Length of Stay (days): 4    Expected Discharge Date: 06/02/2024     Concerns to be Addressed: discharge planning     Patient plan of care discussed at interdisciplinary rounds: Yes    Anticipated Discharge Disposition: Skilled Nursing Facility, Transitional Care     Anticipated Discharge Services: None  Anticipated Discharge DME: None    Patient/family educated on Medicare website which has current facility and service quality ratings: yes  Education Provided on the Discharge Plan: Yes  Patient/Family in Agreement with the Plan: yes    Referrals Placed by CM/SW:    Private pay costs discussed: Not applicable    Additional Information:  [facility] at [facility] called regarding discharge status. Writer sent message to Dr. Aguilar to find out. Call to [facility] for authorization. Writer received auth, [id number] from 6/1/24-6/7/24. Writer called [facility] to update. If orders are available today before 1300 they can accept him today but if not then they can accept him tomorrow.     Call to Anna to update. She is asking if [facility] has a room as that's closer to her and would prefer a private room. Discussed transportation cost of wheelchair vs stretcher. Anna would like to transport patient but understands that if he is unable to safely get in/out of a car that transportation will need to be set up. Writer will discuss with bedside nurse in AM to see if this is an option for him. Anna understands that they may not have space for him and so [facility] would be his only option  (she declined [facility] as she did not like this facility after looking further at it). Call placed to [facility] and they do not have admissions over the weekend.    Called Anna back to confirm. She said that she would like medical transport set up as she said that she has concerns about whether or not patient can safely get in and out of the car. In the morning, writer will confirm plan and talk to TCU. Writer will  coordinate transport and update Anna.    VIKRAM Ellsworth

## 2024-06-01 NOTE — PROGRESS NOTES
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 on 5/28/2024 by the neurosurgery service for elective lumbar spinal fusion, laminectomy and decompression.      Lumbar stenosis and spondylolisthesis s/p L4-5 fusion and decompression, L2-3 laminectomy and decompression 5/28/2024   Surgery with Dr. Fabiano Cleary. GETA. EBL 200mL. No immediate postoperative complications.  - Routine postoperative cares per primary service, including IVF, pain control, abx, DVT ppx, and disposition  - PT/OT as per primary service  - Aggressive pulmonary toilet, frequent IS use 10x/hour while awake  - MONICA management per NSG  - SW/CC consult for disposition planning          Acute respiratory failure with hypoxia   Monitor closely. No chest pain, lightheadedness, dizziness. In setting of postop, Hgb 7.4 range, and pain.   -Aggressive use of IS  -Monitor Hgb  -Started ppx Lovenox 05/30/24  -Monitor for any worsening hypoxia and consider workup if increasing oxygen needs  -Chest xray shows atelectasis 5/31/24 encourage ICS     Acute blood loss anemia  # Pectoral muscle hematoma  # Abdominal wall brusiing   *Patient prior to admission had bumped into his tailgate of his car [place] and has bruising over the chest wall and right shoulder with no pain.  He did not seek medical help at that time    No hemoglobin check on admissions unknown if the hematoma caused a drop in the hemoglobin versus postsurgical  CT chest abdomen shows right intramuscular pectoral hematoma 4.8 x 3.5 x 7.4 cm.   Currently hemoglobin stable  is 7.4  -Due to patient having bruising around his flank.  I also  ordered trauma surgery consult  -X-ray shoulder right side negative for fracture or dislocationuperior migration of the humeral head relative to the glenoid, compatible with rotator cuff tearing. Moderate glenohumeral osteoarthrosis. Moderate acromioclavicular osteoarthrosis.  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 Restart warfarin 1 week postopeartively.   -Daily INR  -Monitor hemoglobin while on Lovenox     Mild aortic valve stenosis  Benign essential hypertension  PTA regimen: atenolol 50mg BID, losartan 100mg/d, spironolactone 25mg/d  - Continue PTA BB with hold parameters  \- Start 1/2 dose ARB 05/30/24 and monitor ability to increase to previous dose 100mg/d  - PRN IV labetalol/hydralazine available for SBP >180  - Monitor BP trend and need for medication adjustments  -Recheck BMP today if creatinine stable then would PTA spironolactone     Chronic kidney disease, stage 3b  Baseline Cr 1.5-1.6 range.  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  4mg decadron intraoperatively, expect transient hyperglycemia.  - Hypoglycemia protocol  - Preprandial and HS fingerstick checks or Q 4 hours while NPO  -Continue t PTA glipizide 5mg/d 05/30/24   - Sliding scale insulin Medium   - Consistent CHO diet - High 75mg/meal             # Urinary retention  Patient had a voiding trial which failed and Smith had to be placed again for urinary retention  -Discussed with neurosurgery to attempt  "voiding trial in 1 to 2 days after ambulation has improved otherwise could discharge on Smith and can attempt voiding trial at the rehab  -Started Flomax    Other pertinent history and chronic stable diagnoses: Appropriate PTA medications will be resumed.  Hx prostate cancer s/p prostatectomy 2003  Gout  BCC  Sleep apnea  RLS  Hyperlipidemia, intolerant to statins             Diet: High Consistent Carb (75 g CHO per Meal) Diet    DVT Prophylaxis: Enoxaparin (Lovenox) SQ  Smith Catheter: PRESENT, indication: Acute retention or obstruction, Immobilization due to spinal injuries or other multiple traumatic injuries  Lines: None     Cardiac Monitoring: None  Code Status: Full Code      Clinically Significant Risk Factors                  # Hypertension: Noted on problem list       # DMII: A1C = 7.0 % (Ref range: <5.7 %) within past 6 months   # Obesity: Estimated body mass index is 34.59 kg/m  as calculated from the following:    Height as of this encounter: 1.803 m (5' 11\").    Weight as of this encounter: 112.5 kg (248 lb).        # Financial/Environmental Concerns: none         Disposition Plan     Medically Ready for Discharge: Anticipated Tomorrow             Jennifer Aguilar MD  Hospitalist Service  [facility]  Securely message with [employer] (more info)  Text page via [email or web address] Paging/Directory   ______________________________________________________________________    Interval History   Examined at bedside.  Patient feels well.  Denies any chest pain or shortness of breath.  Does not have any any pain in the chest wall.  Does not have any abdominal pain.  Discussed with patient's nurse     Physical Exam   Vital Signs: Temp: 98.9  F (37.2  C) Temp src: Oral BP: (!) 142/70 Pulse: 64   Resp: 18 SpO2: 92 % O2 Device: None (Room air) Oxygen Delivery: 2 LPM  Weight: 248 lbs 0 oz    Physical Exam  Cardiovascular:      Rate and Rhythm: Normal rate and regular rhythm.   Pulmonary:      " Effort: No respiratory distress.      Breath sounds: Normal breath sounds.   Abdominal:      General: There is no distension.      Palpations: Abdomen is soft.      Tenderness: There is no abdominal tenderness.   Skin:     Comments: Bruising and hematoma on the chest wall and bruising to the right shoulder area and also bruising in the right lower abdominal          Medical Decision Making       40 MINUTES SPENT BY ME on the date of service doing chart review, history, exam, documentation & further activities per the note.      Data     I have personally reviewed the following data over the past 24 hrs:    N/A  \   7.4 (L)   / N/A     N/A N/A N/A /  191 (H)   N/A N/A N/A \       Imaging results reviewed over the past 24 hrs:   Recent Results (from the past 24 hour(s))   XR Chest Port 1 View    Narrative    CHEST PORTABLE ONE VIEW May 31, 2024 1:12 PM       INDICATION: Hypoxia.    COMPARISON: 5/19/2024.       Impression    IMPRESSION: Mild bibasilar atelectasis, improved from previous.  Otherwise negative chest.    THEA LOMBARDI MD         SYSTEM ID:  [id number]   CT Chest Abdomen Pelvis w/o Contrast    Narrative    EXAM: CT CHEST ABDOMEN PELVIS W/O CONTRAST  LOCATION: [facility]  DATE: 5/31/2024    INDICATION: Chest wall hematoma, tailgate trauma to chest.  COMPARISON: None.  TECHNIQUE: CT scan of the chest, abdomen, and pelvis was performed without IV contrast. Multiplanar reformats were obtained. Dose reduction techniques were used.   CONTRAST: None.    FINDINGS:   LUNGS AND PLEURA: Benign calcified granuloma in the right lung. Mild atelectasis in both lungs.    MEDIASTINUM/AXILLAE: Moderate aortic valve calcification. No adenopathy.    CORONARY ARTERY CALCIFICATION: Mild.    HEPATOBILIARY: Normal.    PANCREAS: Normal.    SPLEEN: Normal.    ADRENAL GLANDS: Normal.    KIDNEYS/BLADDER: There is a Smith catheter in the bladder, this appears to be in good position. The urinary tract is  otherwise normal.    BOWEL: Minimal findings of diverticulosis with no evidence for diverticulitis.    LYMPH NODES: Normal.    VASCULATURE: Mild calcification with no aneurysm.    PELVIC ORGANS: Surgically absent.    MUSCULOSKELETAL: Right RAMANA. Postoperative changes in the lumbar spine. Asymmetric enlargement of the right pectoralis musculature compared to the left secondary to an intramuscular hematoma. The hematoma is estimated to measure 4.8 x 3.5 x 7.4 cm. Old   mid to lower thoracic spine compression fractures.      Impression    IMPRESSION:  1.  Right intermuscular pectoral hematoma measuring 4.8 x 3.5 x 7.4 cm.    2.  Numerous nonacute incidental findings, please refer to above report for details.   XR Shoulder Right 2 Views    Narrative    EXAM: XR SHOULDER RIGHT 2 VIEWS  LOCATION: [facility]  DATE: 5/31/2024    INDICATION: Right shoulder pain after injury.  COMPARISON: None.      Impression    IMPRESSION: Right shoulder negative for fracture or dislocation. Superior migration of the humeral head relative to the glenoid, compatible with rotator cuff tearing. Moderate glenohumeral osteoarthrosis. Moderate acromioclavicular osteoarthrosis.

## 2024-06-01 NOTE — CONSULTS
[facility]    General Surgery Consultation    Date of Admission:  5/28/2024  Date of Consult (When I saw the patient): 06/01/24    Assessment & Plan   Robert Richard is a 88 year old male who was admitted on 5/28/2024. I was asked to see the patient for chest trauma.  Patient was admitted on May 28 for elective spine surgery.  Surgery went well and he is slated to be discharged to transitional care unit fairly soon.  Due to patient's chest trauma, a CT was ordered and this revealed a right pectoral hematoma.  This is not fluctuant and the ecchymosis appears to be improving.  Patient has soreness but no exquisite pain.  No rib fractures or intrathoracic trauma.  I do not expect anything more needs to be done regarding his subacute trauma.  Plans for reinitiation of his anticoagulation can be made based on his postsurgical course, does not need to be curtailed because of his chest trauma.  No further recommendations, should follow-up with primary..    Active Problems:    S/P lumbar fusion      Anish Jackson MD, MD    Code Status    Full Code    Reason for Consult   Reason for consult: Chest trauma    Primary Care Physician   Stiven Donahue    Chief Complaint   Chest trauma, bruising    History of Present Illness   Robert Richard is a 88 year old male who presents with chest trauma.  Patient was recently admitted for a lumbar spine surgery.  This was on 28 May.  The Saturday before admission, patient accidentally struck his truck tailgate with his right chest.  Had some pain and bruising and a somewhat difficult time extricating himself from his position.  No shortness of breath or loss of consciousness.  Patient does have chronic right shoulder issues.  He had significant bruising afterwards but otherwise felt okay.  After his surgery, the extensive ecchymoses were noted and a chest CT was ordered.  This did not reveal any intrathoracic chest trauma.  No rib fractures,  pneumothorax, hemothorax, or sternal fracture.  A 4 cm pectoral hematoma was noted within the right pectoral muscle.  We are asked to evaluate.    Past Medical History   I have reviewed this patient's medical history and updated it with pertinent information if needed.   Past Medical History:   Diagnosis Date    Basal cell carcinoma     Cancer (H)     DVT (deep venous thrombosis) (H) 11/2003    DVT (deep venous thrombosis) (H) 12/2008    DVT (deep venous thrombosis) (H) 10/2010    Factor V Leiden (H24)     Gout     Other and unspecified hyperlipidemia     Prostate cancer (H) 2003    prostatectomy     Restless leg syndrome     Sleep apnea     Unspecified essential hypertension        Past Surgical History   I have reviewed this patient's surgical history and updated it with pertinent information if needed.  Past Surgical History:   Procedure Laterality Date    APPENDECTOMY  1950    JOINT REPLACEMENT  4/2011    R hip    LAMINECTOMY LUMBAR ONE LEVEL  12/2008    LAPAROSCOPIC HERNIORRHAPHY UMBILICAL N/A 1/3/2019    Procedure: laparoscopic umbilical hernia repair with mesh;  Surgeon: Jamal Thompson DO;  Location:  OR    [facility] REVISE TOTAL HIP REPLACEMENT  1/18/13    R hip       Prior to Admission Medications   Prior to Admission Medications   Prescriptions Last Dose Informant Patient Reported? Taking?   ORDER FOR DME   No No   Sig: Wear mask at night   STATIN NOT PRESCRIBED (INTENTIONAL)   No No   Sig: Please choose reason not prescribed, below   acetaminophen (TYLENOL) 325 MG tablet 5/27/2024  Yes Yes   Sig: Take 325-650 mg by mouth every 6 hours as needed for mild pain   albuterol (PROAIR HFA/PROVENTIL HFA/VENTOLIN HFA) 108 (90 Base) MCG/ACT inhaler Unknown at prn Self No Yes   Sig: Inhale 2 puffs into the lungs every 6 hours as needed for shortness of breath, wheezing or cough   amoxicillin (AMOXIL) 500 MG capsule More than a month at prn Self No Yes   Sig: Take 4 caps 1 hour before procedure.   atenolol  (TENORMIN) 50 MG tablet 5/27/2024 at PM Self No Yes   Sig: Take 1 tablet (50 mg) by mouth 2 times daily   benzonatate (TESSALON) 200 MG capsule Past Week at prn Self No Yes   Sig: Take 1 capsule (200 mg) by mouth 3 times daily as needed for cough   enoxaparin ANTICOAGULANT (LOVENOX) 80 MG/0.8ML syringe 5/27/2024 at AM Self No Yes   Sig: Inject 0.8 mLs (80 mg) Subcutaneous every 12 hours   glipiZIDE (GLUCOTROL XL) 5 MG 24 hr tablet 5/27/2024 at PM Self No Yes   Sig: Take 1 tablet (5 mg) by mouth daily   ipratropium (ATROVENT) 0.06 % nasal spray 5/27/2024 at AM Self No Yes   Sig: USE 2 SPRAY(S) IN EACH NOSTRIL 4 TIMES DAILY   losartan (COZAAR) 100 MG tablet 5/27/2024 at pm Self No Yes   Sig: Take 1 tablet (100 mg) by mouth daily   multiple vitamin  tablet TABS  at pm Self Yes Yes   Sig: Take 1 tablet by mouth daily   order for DME   No No   Sig: One pair compression garment 20-30 mmHg may substitute per fitter.  Please call [facility] at [phone] for appt.    Will also need education regarding donning agents tyvek sleeve, plastic sled or similar.   pramipexole (MIRAPEX) 1 MG tablet 5/27/2024 at pm Self No Yes   Sig: TAKE 1 TABLET BY MOUTH TWICE DAILY AT  4  PM  AND  9  PM   sildenafil (VIAGRA) 100 MG tablet Unknown at prn Self No Yes   Sig: Take 0.5 tablets (50 mg) by mouth daily as needed   spironolactone (ALDACTONE) 25 MG tablet 5/27/2024 at pm Self No Yes   Sig: Take 1 tablet (25 mg) by mouth daily   warfarin ANTICOAGULANT (COUMADIN) 5 MG tablet Past Week at PM Self No Yes   Sig: TAKE 1/2 TAB BY MOUTH EVERY Mon and FRIDAY AND 1 TAB ON ALL OTHER DAYS OF THE WEEK as directed   warfarin ANTICOAGULANT (COUMADIN) 5 MG tablet Past Week at PM Self Yes Yes   Sig: Take 2.5 mg by mouth twice a week (0.5 x 5 mg = 2.5 mg:Monday & Fridays)      Facility-Administered Medications: None     Allergies   Allergies   Allergen Reactions    Gabapentin Dizziness    Lipitor [Atorvastatin Calcium]      Muscle pain, weakness     Pravastatin Other (See Comments)     muscle aches    Simvastatin Other (See Comments)     muscle aches    Statins Muscle Pain (Myalgia)       Social History   I have reviewed this patient's social history and updated it with pertinent information if needed. Robert Richard  reports that he has never smoked. He has never used smokeless tobacco. He reports current alcohol use. He reports that he does not use drugs.    Family History   Family history reviewed with patient and is noncontributory.    Review of Systems   The 10 point Review of Systems is negative other than noted in the HPI or here.  No shortness of breath.    Physical Exam   Temp: 98.9  F (37.2  C) Temp src: Oral BP: (!) 142/70 Pulse: 64   Resp: 18 SpO2: 92 % O2 Device: Nasal cannula Oxygen Delivery: 2 LPM  Vital Signs with Ranges  Temp:  [97.7  F (36.5  C)-98.9  F (37.2  C)] 98.9  F (37.2  C)  Pulse:  [60-67] 64  Resp:  [18] 18  BP: (139-149)/(67-82) 142/70  SpO2:  [88 %-95 %] 92 %  248 lbs 0 oz    Strong older gentleman in no distress.  Patient has a pleasant affect and communicates well.   Pupils equal round and reactive to light.   No cervical lymphadenopathy or thyromegaly.   Lung fields clear, breathing comfortably.  Extensive evolving ecchymoses throughout the upper chest, worse on the right.  Appearance of gynecomastia on the right, likely exacerbated by his intrapectoral hematoma.  Also bruising in the left wrist and forearm.  CNS exam intact in both upper extremities.  Heart normal sinus rhythm.  No murmurs rubs or gallops.  Abdomen soft, nontender, nondistended.  Skin warm, dry.  No obvious rashes or lesions.      Data   I have personally reviewed the patient's right shoulder x-rays and chest CT images.

## 2024-06-01 NOTE — PROGRESS NOTES
[facility]    Neurosurgery  Daily Note    Assessment & Plan   Procedure(s):  Lumbar 4 to Lumbar 5 Transforaminal Interbody Fusion with decompression of stenosis and Lumbar 2 to Lumbar 3 bilateral laminectomy for decompression of stenosis   4 Days Post-Op    Plan:  -OK to continue lovenox DVT ppx then transition to therapeutic dose at 1 week post op   -Hold PTA warfarin x 1 week post op   -Continue pain control measures as needed   -Routine wound care   -PT/OT  -Wound cares: Continue to monitor incision as well as drain site   -Smith placed yesterday for retention - hospitalist recommended attempting voiding trial again in 1-2 days after ambulation, they added Flomax    Discharge pending TCU placement.  Neurosurgery scripts have been signed when ready and discharge instructions placed.  Okay from neurosurgery perspective to discharge to TCU.    JIMENEZ GREENBERG PA-C    Interval History   Stable.  Still with back pain but mild improvement since yesterday.  Denies new or worsening lower extremity radicular symptoms or weakness.  A.m. hemoglobin remains 7.4  Hospitalist working up chest contusion patient had prior to admission.       Physical Exam   Temp: 98.9  F (37.2  C) Temp src: Oral BP: (!) 142/70 Pulse: 64   Resp: 18 SpO2: 92 % O2 Device: None (Room air) Oxygen Delivery: 2 LPM  Vitals:    05/28/24 0623   Weight: 112.5 kg (248 lb)     Vital Signs with Ranges  Temp:  [97.7  F (36.5  C)-98.9  F (37.2  C)] 98.9  F (37.2  C)  Pulse:  [60-67] 64  Resp:  [18] 18  BP: (139-149)/(67-82) 142/70  SpO2:  [90 %-95 %] 92 %  I/O last 3 completed shifts:  In: -   Out: 835 [Urine:835]    Alert and oriented.  Moving lower extremities equally, up and walking with therapy.  Incision CDI, drain site with suture CDI without active drainage    Medications   Current Facility-Administered Medications   Medication Dose Route Frequency Provider Last Rate Last Admin    No lozenges or gum should be given while  patient on BIPAP/AVAPS/AVAPS AE   Does not apply Continuous PRN Cj Cleary MD        Patient may continue current oral medications   Does not apply Continuous PRN Cj Cleary MD        sodium chloride 0.9 % infusion   Intravenous Continuous Li Montiel NP   Stopped at 05/30/24 1050      Current Facility-Administered Medications   Medication Dose Route Frequency Provider Last Rate Last Admin    atenolol (TENORMIN) tablet 50 mg  50 mg Oral BID Radha Simmons PA-C   50 mg at 06/01/24 0830    enoxaparin ANTICOAGULANT (LOVENOX) injection 40 mg  40 mg Subcutaneous Q24H Frantz Martinez PA-C   40 mg at 06/01/24 0830    glipiZIDE (GLUCOTROL XL) 24 hr tablet 5 mg  5 mg Oral Daily with supper Radha Simmons PA-C   5 mg at 05/31/24 1816    insulin aspart (NovoLOG) injection (RAPID ACTING)  1-7 Units Subcutaneous TID AC Radha Simmons PA-C   1 Units at 05/31/24 1817    insulin aspart (NovoLOG) injection (RAPID ACTING)  1-5 Units Subcutaneous At Bedtime Radha Simmons PA-C        ipratropium (ATROVENT) 0.06 % spray 2 spray  2 spray Both Nostrils 4x Daily Li Montiel NP   2 spray at 06/01/24 1002    losartan (COZAAR) tablet 50 mg  50 mg Oral Daily Radha Simmons PA-C   50 mg at 06/01/24 0830    polyethylene glycol (MIRALAX) Packet 17 g  17 g Oral Daily Li Montiel NP   17 g at 06/01/24 0830    pramipexole (MIRAPEX) tablet 1 mg  1 mg Oral BID Li Montiel NP   1 mg at 05/31/24 2118    senna-docusate (SENOKOT-S/PERICOLACE) 8.6-50 MG per tablet 1 tablet  1 tablet Oral BID Li Montiel NP   1 tablet at 06/01/24 0830    sodium chloride (PF) 0.9% PF flush 3 mL  3 mL Intracatheter Q8H Li Montiel NP   3 mL at 06/01/24 0653    [Held by provider] spironolactone (ALDACTONE) tablet 25 mg  25 mg Oral Daily Li Montiel NP Megan Johnson PA-C  [facility]  [address] [facility]  [address] [address]

## 2024-06-02 ENCOUNTER — LAB REQUISITION (OUTPATIENT)
Dept: LAB | Facility: CLINIC | Age: 89
End: 2024-06-02
Payer: COMMERCIAL

## 2024-06-02 ENCOUNTER — APPOINTMENT (OUTPATIENT)
Dept: PHYSICAL THERAPY | Facility: CLINIC | Age: 89
DRG: 453 | End: 2024-06-02
Attending: NEUROLOGICAL SURGERY
Payer: COMMERCIAL

## 2024-06-02 VITALS
RESPIRATION RATE: 16 BRPM | WEIGHT: 248 LBS | DIASTOLIC BLOOD PRESSURE: 65 MMHG | OXYGEN SATURATION: 90 % | HEART RATE: 57 BPM | SYSTOLIC BLOOD PRESSURE: 129 MMHG | HEIGHT: 71 IN | BODY MASS INDEX: 34.72 KG/M2 | TEMPERATURE: 98.2 F

## 2024-06-02 DIAGNOSIS — R76.12 NONSPECIFIC REACTION TO CELL MEDIATED IMMUNITY MEASUREMENT OF GAMMA INTERFERON ANTIGEN RESPONSE WITHOUT ACTIVE TUBERCULOSIS: ICD-10-CM

## 2024-06-02 LAB
GLUCOSE BLDC GLUCOMTR-MCNC: 139 MG/DL (ref 70–99)
GLUCOSE BLDC GLUCOMTR-MCNC: 146 MG/DL (ref 70–99)
HGB BLD-MCNC: 7.2 G/DL (ref 13.3–17.7)
PLATELET # BLD AUTO: 165 10E3/UL (ref 150–450)

## 2024-06-02 PROCEDURE — 250N000013 HC RX MED GY IP 250 OP 250 PS 637: Performed by: PHYSICIAN ASSISTANT

## 2024-06-02 PROCEDURE — 97110 THERAPEUTIC EXERCISES: CPT | Mod: GP

## 2024-06-02 PROCEDURE — 36415 COLL VENOUS BLD VENIPUNCTURE: CPT | Performed by: PHYSICIAN ASSISTANT

## 2024-06-02 PROCEDURE — 250N000013 HC RX MED GY IP 250 OP 250 PS 637: Performed by: STUDENT IN AN ORGANIZED HEALTH CARE EDUCATION/TRAINING PROGRAM

## 2024-06-02 PROCEDURE — 36415 COLL VENOUS BLD VENIPUNCTURE: CPT | Mod: ORL | Performed by: FAMILY MEDICINE

## 2024-06-02 PROCEDURE — 85018 HEMOGLOBIN: CPT | Performed by: PHYSICIAN ASSISTANT

## 2024-06-02 PROCEDURE — 250N000011 HC RX IP 250 OP 636: Performed by: PHYSICIAN ASSISTANT

## 2024-06-02 PROCEDURE — 97530 THERAPEUTIC ACTIVITIES: CPT | Mod: GP

## 2024-06-02 PROCEDURE — 99233 SBSQ HOSP IP/OBS HIGH 50: CPT | Performed by: STUDENT IN AN ORGANIZED HEALTH CARE EDUCATION/TRAINING PROGRAM

## 2024-06-02 PROCEDURE — 250N000013 HC RX MED GY IP 250 OP 250 PS 637: Performed by: NURSE PRACTITIONER

## 2024-06-02 PROCEDURE — 85049 AUTOMATED PLATELET COUNT: CPT | Performed by: NEUROLOGICAL SURGERY

## 2024-06-02 PROCEDURE — 86481 TB AG RESPONSE T-CELL SUSP: CPT | Mod: ORL | Performed by: FAMILY MEDICINE

## 2024-06-02 PROCEDURE — P9603 ONE-WAY ALLOW PRORATED MILES: HCPCS | Mod: ORL | Performed by: FAMILY MEDICINE

## 2024-06-02 RX ORDER — TAMSULOSIN HYDROCHLORIDE 0.4 MG/1
0.4 CAPSULE ORAL DAILY
DISCHARGE
Start: 2024-06-03 | End: 2024-08-02

## 2024-06-02 RX ORDER — WARFARIN SODIUM 5 MG/1
2.5 TABLET ORAL
DISCHARGE
Start: 2024-06-03 | End: 2024-08-16

## 2024-06-02 RX ORDER — ENOXAPARIN SODIUM 100 MG/ML
INJECTION SUBCUTANEOUS
DISCHARGE
Start: 2024-06-03 | End: 2024-06-13

## 2024-06-02 RX ORDER — LOSARTAN POTASSIUM 50 MG/1
50 TABLET ORAL DAILY
DISCHARGE
Start: 2024-06-02 | End: 2024-08-02

## 2024-06-02 RX ORDER — WARFARIN SODIUM 5 MG/1
TABLET ORAL
DISCHARGE
Start: 2024-06-02 | End: 2024-06-17

## 2024-06-02 RX ADMIN — TAMSULOSIN HYDROCHLORIDE 0.4 MG: 0.4 CAPSULE ORAL at 08:24

## 2024-06-02 RX ADMIN — LOSARTAN POTASSIUM 50 MG: 50 TABLET, FILM COATED ORAL at 08:24

## 2024-06-02 RX ADMIN — ENOXAPARIN SODIUM 40 MG: 40 INJECTION SUBCUTANEOUS at 08:24

## 2024-06-02 RX ADMIN — ATENOLOL 50 MG: 25 TABLET ORAL at 08:24

## 2024-06-02 RX ADMIN — SENNOSIDES AND DOCUSATE SODIUM 1 TABLET: 50; 8.6 TABLET ORAL at 08:24

## 2024-06-02 RX ADMIN — POLYETHYLENE GLYCOL 3350 17 G: 17 POWDER, FOR SOLUTION ORAL at 08:24

## 2024-06-02 RX ADMIN — INSULIN ASPART 1 UNITS: 100 INJECTION, SOLUTION INTRAVENOUS; SUBCUTANEOUS at 08:26

## 2024-06-02 RX ADMIN — IPRATROPIUM BROMIDE 2 SPRAY: 42 SPRAY, METERED NASAL at 08:24

## 2024-06-02 ASSESSMENT — ACTIVITIES OF DAILY LIVING (ADL)
ADLS_ACUITY_SCORE: 34
ADLS_ACUITY_SCORE: 35
ADLS_ACUITY_SCORE: 34
ADLS_ACUITY_SCORE: 35
ADLS_ACUITY_SCORE: 34
ADLS_ACUITY_SCORE: 34

## 2024-06-02 NOTE — PROVIDER NOTIFICATION
Writer paged neurosurgery regarding todays Hgb of 7.2, patient expected to discharge to TCU today- inquiring if neurosurgery would like to transfuse prior to discharge? Awaiting response.

## 2024-06-02 NOTE — PROGRESS NOTES
Goal Outcome Evaluation:  Patient vital signs are at baseline: Yes  Patient able to ambulate as they were prior to admission or with assist devices provided by therapies during their stay:  No,  Reason:  A of 1-2 GB/W.   Patient MUST void prior to discharge:  No,  Reason:  Smith in place for retention  Patient able to tolerate oral intake:  Yes  Pain has adequate pain control using Oral analgesics:  Yes, Denies pain.  Does patient have an identified :  Yes  Has goal D/C date and time been discussed with patient:  No,  Reason:  TBD anticipating TCU     POD 5 of L4-5 fusion and L2-3 laminectomy CMS intact. Scattered bruising. CPAP in use overnight. PIV (2) SL. BS checks. Monitoring Hgb.

## 2024-06-02 NOTE — PROGRESS NOTES
[facility]    Medicine Progress Note - Hospitalist Service    Date of Admission:  5/28/2024    Assessment & Plan   Robert Richard is a 88 year old male with PMH significant for NIDDM2, CKD3, factor 5 leiden mutation with multiple prior DVTs, long term use of warfarin, HTN, mild aortic stenosis, RLS, prostate cancer s/p prostatectomy who was admitted to [facility] on 5/28/2024 by the neurosurgery service for elective lumbar spinal fusion, laminectomy and decompression.      Lumbar stenosis and spondylolisthesis s/p L4-5 fusion and decompression, L2-3 laminectomy and decompression 5/28/2024   Surgery with Dr. Fabiano Cleary. GETA. EBL 200mL. No immediate postoperative complications.  - Routine postoperative cares per primary service, including IVF, pain control, abx, DVT ppx, and disposition  - PT/OT as per primary service  - Aggressive pulmonary toilet, frequent IS use 10x/hour while awake  - MONICA management per NSG  -Hemoglobin transfusion as per neurosurgery  -        Acute respiratory failure with hypoxia   Monitor closely. No chest pain, lightheadedness, dizziness. In setting of postop, Hgb 7.4 range, and pain.   -Aggressive use of IS  -Started ppx Lovenox 05/30/24  --Chest xray shows atelectasis 5/31/24 encourage ICS  -Mild hypoxia likely due to atelectasis.  Encourage ICS  -Patient continued to have mild hypoxia.  Oxygen requirements have been stable    Addendum discussed with  and oxygen order placed which was later on faxed to the facility.  Patient discharged on oxygen  Patient's oxygen needs throughout admission has been stable without increase no respiratory distress, no shortness of breath or chest pain      Acute blood loss anemia  # Pectoral muscle hematoma  # Abdominal wall brusiing   *Patient prior to admission had bumped into his tailgate of his car [place] and has bruising over the chest wall and right shoulder with no pain.  He did not  seek medical help at that time    No hemoglobin check on admissions unknown if the hematoma caused a drop in the hemoglobin versus postsurgical  CT chest abdomen shows right intramuscular pectoral hematoma 4.8 x 3.5 x 7.4 cm.   Currently hemoglobin stable  is 7.4  -Due to patient having bruising around his flank.  I also ordered trauma surgery consult  -X-ray shoulder right side negative for fracture or dislocationuperior migration of the humeral head relative to the glenoid, compatible with rotator cuff tearing. Moderate glenohumeral osteoarthrosis. Moderate acromioclavicular osteoarthrosis.  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 Restart warfarin 1 week postopeartively.   -Discussed with neurosurgery okay to start Coumadin from 6/5/24  -Patient also has been having low hemoglobin likely both due to.  And also having pectoral muscle hematoma.  Discussed with hematology and recommended to continue bridging with prophylactic dose of Lovenox until INR is therapeutic.  Prophylactic dose of Lovenox can be stopped once INR is therapeutic    mild aortic valve stenosis  Benign essential hypertension  PTA regimen: atenolol 50mg BID, losartan 100mg/d, spironolactone 25mg/d  - Continue PTA BB with hold parameters  \-- Monitor BP trend and need for medication adjustments  -Resume spironolactone  -During admission losartan was continued at 50 mg per daily which can be increased to 100 mg as an outpatient      Chronic kidney disease, stage 3b  Baseline Cr 1.5-1.6 range.  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  4mg decadron intraoperatively, expect transient hyperglycemia.  - -Continue t PTA glipizide 5mg/d 05/30/24   - -POC HHS           # Urinary retention  Patient had a voiding trial which failed and Smith had to be placed again for urinary retention  -Discussed with neurosurgery to attempt voiding trial in 1 to 2 days after ambulation has improved otherwise could discharge on Smith and can attempt voiding trial at the rehab  -Started Flomax  -Patient is going to be discharged with Smith and attempt voiding trial at the rehab     Other pertinent history and chronic stable diagnoses: Appropriate PTA medications will be resumed.  Hx prostate cancer s/p prostatectomy 2003  Gout  BCC  Sleep apnea-continue home CPAP  RLS  Hyperlipidemia, intolerant to statins             Diet: Diet  Diet    DVT Prophylaxis: Enoxaparin (Lovenox) SQ  Smith Catheter: Not present  Lines: None     Cardiac Monitoring: None  Code Status: Full Code      Clinically Significant Risk Factors                  # Hypertension: Noted on problem list       # DMII: A1C = 7.0 % (Ref range: <5.7 %) within past 6 months   # Obesity: Estimated body mass index is 34.59 kg/m  as calculated from the following:    Height as of this encounter: 1.803 m (5' 11\").    Weight as of this encounter: 112.5 kg (248 lb).      # Financial/Environmental Concerns: none         Disposition Plan     Medically Ready for Discharge: Ready Now             Jennifer Aguilar MD  Hospitalist Service  [facility]  Securely message with Hermann (more info)  Text page via [employer] Paging/Directory   ______________________________________________________________________    Interval History     Patient seen and examined at bedside patient feels well.  He does not " have any chest pain.  He does not have any shortness of breath.    His pain is well-controlled.  He feels well.    He was initially weaned off to room air but later in the day had saturations in 88-89 and had to be placed back on oxygen    Discussed with patient nurse multiple times throughout the day    Physical Exam   Vital Signs: Temp: 98.2  F (36.8  C) Temp src: Oral BP: 129/65 Pulse: 57   Resp: 16 SpO2: 90 % O2 Device: None (Room air) Oxygen Delivery: 2 LPM  Weight: 248 lbs 0 oz        Medical Decision Making       50 MINUTES SPENT BY ME on the date of service doing chart review, history, exam, documentation & further activities per the note.      Data     I have personally reviewed the following data over the past 24 hrs:    N/A  \   7.2 (L)   / 165     N/A N/A N/A /  146 (H)   N/A N/A N/A \       Imaging results reviewed over the past 24 hrs:   No results found for this or any previous visit (from the past 24 hour(s)).

## 2024-06-02 NOTE — PROGRESS NOTES
Care Management Discharge Note    Discharge Date: 06/02/2024       Discharge Disposition: Skilled Nursing Facility, Transitional Care    Discharge Services: None    Discharge DME: None    Discharge Transportation: family or friend will provide    Private pay costs discussed: private room/amenity fees and transportation costs    Does the patient's insurance plan have a 3 day qualifying hospital stay waiver?  No    PAS Confirmation Code:  [id number]  Patient/family educated on Medicare website which has current facility and service quality ratings: yes    Education Provided on the Discharge Plan: Yes  Persons Notified of Discharge Plans: yes  Patient/Family in Agreement with the Plan: yes    Handoff Referral Completed: No    Additional Information:  Patient has discharge orders for discharge today. Writer called and spoke to [facility] who [facility] accept patient today. PAS completed. Orders faxed to [facility]. Spoke to daughter Anna to confirm plan and ride time. She plans to meet patient at facility. Call to [facility] transport and wheelchair ride scheduled today between [phone] (per request of facility after 1300) with oxygen. Anna updated on ride time and in agreement.     PAS-RR    D: Per DHS regulation, MODE completed and submitted PAS-RR to [place] [employer] Direct Connect via the Senior LinkAge Line.  PAS-RR confirmation # is : [id number]    I: SW spoke with patient and they are aware a PAS-RR has been submitted.  MODE reviewed with patient that they may be contacted for a follow up appointment within 10 days of hospital discharge if their SNF stay is < 30 days.  Contact information for Senior LinkAge Line was also provided.    A: patient verbalized understanding.    P: Further questions may be directed to [facility] LinkAge Line at #[phone], option #4 for PAS-RR staff.    1600: Writer informed that discharge orders were updated with oxygen. Call to facility but was unable to reach anyone on the  [address] unit where patient will be staying. Orders faxed to previous fax number that writer sent original discharge orders to, [phone]. Unable to leave a voicemail to update anyone of this.     VIKRAM Ellsworth

## 2024-06-02 NOTE — DISCHARGE SUMMARY
[facility]    Discharge Summary  Neurosurgery    Date of Admission:  5/28/2024  Date of Discharge:  6/2/2024  Discharging Provider: JIMENEZ GREENBERG PA-C  Date of Service (when I saw the patient): 06/02/24    Discharge Diagnoses   Active Problems:    S/P lumbar fusion      History of Present Illness   Robert Richard is an 88 year old male who presented with progressive back and leg pain, weakness.  He has a history of L4-5 surgery evidence of L4-5 spondylolisthesis and severe stenosis as well as L2-3 stenosis.  He underwent L4-5 TLIF and L2-3 bilateral laminectomies for decompression of stenosis with Dr. Cleary on 5/28/2024.   mL.  A durotomy was repaired during the surgery and thus maintain HOB flat overnight after surgery.  He tolerated elevating HOB and ambulation afterwards without increase in symptoms.  MONICA drain removed on 5/31/2024.  Suture placed at drain site.  Since surgery patient reports that he has noted improvement every day.  He is still slow to walk distances but has been working with therapy.  Hemoglobin range 7.2-7.5 after surgery.  He is asymptomatic with no complaints of lightheadedness, increasing shortness of breath.  He did have a hematoma along his chest wall which was from trauma prior to surgery, surgery evaluated and cleared the patient.  He also had postoperative urinary retention requiring Smith placement again on 5/31/2024.  Hospitalist assisted with management and added Flomax, recommended voiding trial once more ambulatory at TCU.  Therapy had recommended TCU.  He is on warfarin for history of DVT, factor V Leiden.  Okay to restart warfarin on 6/4/2024.  He has been on Lovenox for DVT PPx in hospital.    Hospital Course   Robert Richard was admitted on 5/28/2024.  The following problems were addressed during his hospitalization:    Active Problems:    S/P lumbar fusion    Assessment and plan:  -Follow-up with neurosurgery at 2 weeks  postoperatively for incision check, 6 weeks and 12 weeks with x-rays prior  -Monitor closely for any increasing symptoms in regards to your back and surgery including increasing back pain, leg pain, leg weakness or numbness, fevers or chills.  Please closely monitor your incision for any increasing redness or warmth, drainage.  -Smith discontinuation trial to be done at TCU when patient ambulatory  -TCU to work with patient on ambulation.  Please limit bending, lifting, twisting until follow-up.  -You may restart warfarin on 6/4/2024.  -NSGY discharge medications: Oxycodone, methocarbamol, senna  -Closely monitor for any increasing symptoms related to low hemoglobin including increasing shortness of breath, dizziness or lightheadedness    I have discussed the following assessment and plan with Dr. Cleary who is in agreement with initial plan and will follow up with further consultation recommendations.    Rachael Mulligan PA-C  [facility]  [place] [id number]    Significant Results and Procedures   Date of surgery: May 28, 2024  Surgeon: Cj Cleary MD  Assistant: Li Montiel NP (Note: The assistant was present for and assisted with the entire surgery, and his/her role as an assistant was crucial for aid in positioning, exposure, suctioning, retraction, and closure)     Preoperative diagnosis: L4-5 spondylolisthesis and Lumbar stenosis  Postoperative diagnosis: L4-5 spondylolisthesis and Lumbar stenosis     Procedure:  1.  L2-3 bilateral laminectomies for decompression of stenosis  2.  L4-5 insertion of bilateral pedicle screws and rods ([employer] Module X)  3.  L4-5 bilateral laminectomies for decompression of stenosis  4.  L4-5 complete facetectomy, transforaminal discectomy, and interbody arthrodesis  5.  L4-5 insertion of intervertebral graft with allograft  6.  L4-5 bilateral transverse process posterolateral arthrodesis and fusion  with autograft and allograft  7.  Use of O-Arm intraoperative CT Scan  8.  Use of [employer] Stealth intraoperative neuro-navigation  9.  Use of intraoperative microscope and fluoroscopy     EBL: 200 mL    Pending Results   None      Code Status   Full Code    Primary Care Physician   Stiven Donahue    Physical Exam   Temp: 98.2  F (36.8  C) Temp src: Oral BP: 129/65 Pulse: 57   Resp: 16 SpO2: 90 % O2 Device: None (Room air) Oxygen Delivery: 2 LPM  Vitals:    05/28/24 0623   Weight: 112.5 kg (248 lb)     Vital Signs with Ranges  Temp:  [98.2  F (36.8  C)-98.5  F (36.9  C)] 98.2  F (36.8  C)  Pulse:  [57-60] 57  Resp:  [16-22] 16  BP: (109-147)/(60-69) 129/65  SpO2:  [88 %-94 %] 90 %  I/O last 3 completed shifts:  In: -   Out: 1375 [Urine:1375]    Constitutional: Awake, alert, cooperative, no apparent distress, and appears stated age.  Eyes: Lids and lashes normal, pupils equal, round and reactive to light, extra ocular muscles intact  ENT: Normocephalic, without obvious abnormality, atraumatic  Respiratory: No increased work of breathing  Skin: No bruising or bleeding, normal skin color, texture, turgor, no redness, warmth, or swelling.  Incision with staples intact, minimal drainage, open to air.  Musculoskeletal: There is no redness, warmth, or swelling of the joints.  Full range of motion noted.  Motor strength is 5 out of 5 all extremities bilaterally.  Tone is normal.  Neurologic: Awake, alert, oriented to name, place and time.  Cranial nerves II-XII are grossly intact.  Motor is 5 out of 5 bilaterally.     Neuropsychiatric: Calm, normal eye contact, alert, normal affect, oriented to self, place, time and situation, memory for past and recent events intact and thought process normal.      Time Spent on this Encounter   I, JIMENEZ GREENBERG PA-C, personally saw the patient today and spent greater than 30 minutes discharging this patient.    Discharge Disposition   Discharged to home  Condition at discharge:  Stable    Consultations This Hospital Stay   HOSPITALIST IP CONSULT  OCCUPATIONAL THERAPY ADULT IP CONSULT  PHYSICAL THERAPY ADULT IP CONSULT  CARE MANAGEMENT / SOCIAL WORK IP CONSULT  UROLOGY IP CONSULT  TRAUMA SURGERY IP CONSULT  PHYSICAL THERAPY ADULT IP CONSULT  OCCUPATIONAL THERAPY ADULT IP CONSULT    Discharge Orders      Reason for your hospital stay    Lumbar spine surgery with Dr. Cleary     Follow-up and recommended labs and tests     Your Neurosurgical follow-up appointments have been scheduled. You may call [phone] to make, confirm or change your appointment dates and/or times.     Activity    Please limit your lifting to no more that ten pounds and avoid excessive bending, twisting and turning at the lumbar spine. You should also avoid excessive jostling and jarring activities.     Monitor and record    Monitor for any increasing symptoms including back pain, leg pain, leg weakness or numbness, fevers or chills and contact neurosurgery at [phone]     Wound care and dressings    OK to leave incision open to air. Monitor for any increase in drainage, redness, pain or warmth and contact our office. You may shower but do not soak or scrub your incision.     General info for SNF    Length of Stay Estimate: Short Term Care: Estimated # of Days <30  Condition at Discharge: Stable  Level of care:skilled   Rehabilitation Potential: Fair  Admission H&P remains valid and up-to-date: Yes  Recent Chemotherapy: N/A  Use Nursing Home Standing Orders: Yes     Mantoux instructions    Give two-step Mantoux (PPD) Per Facility Policy Yes     Glucose monitor nursing POCT    Before meals and at bedtime     Smith catheter    To straight gravity drainage. Change catheter every 2 weeks and PRN for leaking or decreased urine output with signs of bladder distention. DO NOT change catheter without a specific Provider order IF diagnosis of benign prostatic hypertrophy (BPH), neurogenic bladder, or other urological  conditions     Additional Discharge Instructions    Continue Lovenox 40 mg daily until Patients INR is theraupatic    Please hold warfarin and resume on 6/5/24 and continue lovenox until INR is therapeutic and then stop lovenox  Needs INR checks from 6/5/24      Needs trial of void when ambulating better  and removal of freitas     General info for SNF    Length of Stay Estimate: Short Term Care: Estimated # of Days <30  Condition at Discharge: Improving  Level of care:skilled   Rehabilitation Potential: Excellent  Admission H&P remains valid and up-to-date: Yes  Recent Chemotherapy: N/A  Use Nursing Home Standing Orders: Yes     Mantoux instructions    Give two-step Mantoux (PPD) Per Facility Policy Yes     Follow Up and recommended labs and tests    Please limit your lifting to no more that ten pounds and avoid excessive bending, twisting and turning at the lumbar spine. You should also avoid excessive jostling and jarring activities.    Your Neurosurgical follow-up appointments have been scheduled. You may call [phone] to make, confirm or change your appointment dates and/or times.     Reason for your hospital stay    Lumbar surgery with Dr. Cleary     Freitas catheter    To straight gravity drainage. Change catheter every 2 weeks and PRN for leaking or decreased urine output with signs of bladder distention. DO NOT change catheter without a specific Provider order IF diagnosis of benign prostatic hypertrophy (BPH), neurogenic bladder, or other urological condition. May try voiding trial once ambulatory.     Wound care    OK to leave incision open to air. Monitor for any increase in drainage, redness, pain or warmth and contact our office. You may shower but do not soak or scrub your incision.     Additional Discharge Instructions    Hemoglobin post op ranged 7.2-7.5, please monitor for increasing symptoms.     Activity - Up with assistive device     Activity - Up with nursing assistance     Follow Up and  recommended labs and tests    Follow up with Nursing home physician.  Cbc and bmp, INR     CPAP - Continue Home CPAP    Continue Home CPAP per home equipment settings.     Full Code     Physical Therapy Adult Consult    Evaluate and treat as clinically indicated.    Reason:  sp lumbar fusion     Occupational Therapy Adult Consult    Evaluate and treat as clinically indicated.    Reason:  sp lumbar fusion     Fall precautions     Fall precautions     Pneumatic Compression Device     Bilateral calf. Remove 30 mins BID.     Diet    Follow this diet upon discharge: Regular     Diet    Follow this diet upon discharge: Regular     Discharge Medications   Current Discharge Medication List        START taking these medications    Details   methocarbamol (ROBAXIN) 500 MG tablet Take 1 tablet (500 mg) by mouth 4 times daily as needed for muscle spasms  Qty: 40 tablet, Refills: 0    Associated Diagnoses: S/P lumbar fusion      oxyCODONE (ROXICODONE) 5 MG tablet Take 0.5 tablets (2.5 mg) by mouth every 6 hours as needed for moderate to severe pain  Qty: 40 tablet, Refills: 0    Associated Diagnoses: S/P lumbar fusion      senna-docusate (SENOKOT-S/PERICOLACE) 8.6-50 MG tablet Take 1 tablet by mouth daily  Qty: 30 tablet, Refills: 0    Associated Diagnoses: S/P lumbar fusion      tamsulosin (FLOMAX) 0.4 MG capsule Take 1 capsule (0.4 mg) by mouth daily    Associated Diagnoses: Urinary retention           CONTINUE these medications which have CHANGED    Details   enoxaparin ANTICOAGULANT (LOVENOX) 40 MG/0.4ML syringe Continue 40mg every 24 hours until INR is theraupatic (Inr goal is 2-3) and then stop    Associated Diagnoses: Factor V Leiden mutation (H24)      losartan (COZAAR) 50 MG tablet Take 1 tablet (50 mg) by mouth daily    Associated Diagnoses: Hypertension goal BP (blood pressure) < 140/90      !! warfarin ANTICOAGULANT (COUMADIN) 5 MG tablet TAKE 1/2 TAB BY MOUTH EVERY Mon and FRIDAY AND 1 TAB ON ALL OTHER DAYS OF THE  WEEK as directedTAKE 1/2 TAB BY MOUTH EVERY Mon and FRIDAY AND 1 TAB ON ALL OTHER DAYS OF THE WEEK as directed   Hold coumadin and resume on 6/5/24    Associated Diagnoses: Personal history of venous thrombosis and embolism      !! warfarin ANTICOAGULANT (COUMADIN) 5 MG tablet Take 0.5 tablets (2.5 mg) by mouth twice a week (0.5 x 5 mg = 2.5 mg:Monday & Fridays)Take 2.5 mg by mouth twice a week (0.5 x 5 mg = 2.5 mg:Monday & Fridays)  Hold coumadin and resume on 6/5/24    Associated Diagnoses: Factor V Leiden mutation (H24)       !! - Potential duplicate medications found. Please discuss with provider.        CONTINUE these medications which have NOT CHANGED    Details   acetaminophen (TYLENOL) 325 MG tablet Take 325-650 mg by mouth every 6 hours as needed for mild pain      albuterol (PROAIR HFA/PROVENTIL HFA/VENTOLIN HFA) 108 (90 Base) MCG/ACT inhaler Inhale 2 puffs into the lungs every 6 hours as needed for shortness of breath, wheezing or cough  Qty: 18 g, Refills: 0    Comments: Pharmacy may dispense brand covered by insurance (Proair, or proventil or ventolin or generic albuterol inhaler)      amoxicillin (AMOXIL) 500 MG capsule Take 4 caps 1 hour before procedure.  Qty: 8 capsule, Refills: 1    Associated Diagnoses: Status post right hip replacement      atenolol (TENORMIN) 50 MG tablet Take 1 tablet (50 mg) by mouth 2 times daily  Qty: 180 tablet, Refills: 3    Associated Diagnoses: Hypertension goal BP (blood pressure) < 140/90      benzonatate (TESSALON) 200 MG capsule Take 1 capsule (200 mg) by mouth 3 times daily as needed for cough  Qty: 20 capsule, Refills: 0      glipiZIDE (GLUCOTROL XL) 5 MG 24 hr tablet Take 1 tablet (5 mg) by mouth daily  Qty: 90 tablet, Refills: 3    Associated Diagnoses: Type 2 diabetes mellitus with complication, without long-term current use of insulin (H)      ipratropium (ATROVENT) 0.06 % nasal spray USE 2 SPRAY(S) IN EACH NOSTRIL 4 TIMES DAILY  Qty: 45 mL, Refills: 0     Associated Diagnoses: Chronic rhinitis      multiple vitamin  tablet TABS Take 1 tablet by mouth daily      pramipexole (MIRAPEX) 1 MG tablet TAKE 1 TABLET BY MOUTH TWICE DAILY AT  4  PM  AND  9  PM  Qty: 180 tablet, Refills: 3    Associated Diagnoses: Restless leg syndrome      sildenafil (VIAGRA) 100 MG tablet Take 0.5 tablets (50 mg) by mouth daily as needed  Qty: 20 tablet, Refills: 3    Associated Diagnoses: Erectile dysfunction, unspecified erectile dysfunction type      spironolactone (ALDACTONE) 25 MG tablet Take 1 tablet (25 mg) by mouth daily  Qty: 90 tablet, Refills: 3    Associated Diagnoses: Essential hypertension with goal blood pressure less than 140/90      !! order for DME One pair compression garment 20-30 mmHg may substitute per fitter.  Please call [employer] at [phone] for appt.    Will also need education regarding donning agents tyvek sleeve, plastic sled or similar.  Qty: 1 Units, Refills: 0    Associated Diagnoses: Type 2 diabetes mellitus with other circulatory complication, without long-term current use of insulin (H); Venous stasis ulcer of left calf limited to breakdown of skin with varicose veins (H)      !! ORDER FOR DME Wear mask at night  Qty: 1 Units, Refills: 0    Associated Diagnoses: Sleep apnea      STATIN NOT PRESCRIBED (INTENTIONAL) Please choose reason not prescribed, below    Associated Diagnoses: Type 2 diabetes mellitus with complication, without long-term current use of insulin (H)       !! - Potential duplicate medications found. Please discuss with provider.        Allergies   Allergies   Allergen Reactions    Gabapentin Dizziness    Lipitor [Atorvastatin Calcium]      Muscle pain, weakness    Pravastatin Other (See Comments)     muscle aches    Simvastatin Other (See Comments)     muscle aches    Statins Muscle Pain (Myalgia)

## 2024-06-02 NOTE — PROGRESS NOTES
Neurosurgery progress note:    Postop day 5 Lumbar 4 to Lumbar 5 Transforaminal Interbody Fusion with decompression of stenosis and Lumbar 2 to Lumbar 3 bilateral laminectomy for decompression of stenosis     Hemoglobin today 7.2.  Patient remains asymptomatic.  Will continue to monitor.  Okay to still discharge.    Discharge instructions and medications signed in chart.  TCU ready for patient.    Will continue Smith catheter and trial of voiding at TCU once ambulatory.    May restart warfarin 1 week postop.    Full examination from today and discharge summary.    Discussed with Dr. Hua HITCHCOCK [facility]  [address] [id number] [phone]

## 2024-06-03 ENCOUNTER — TRANSITIONAL CARE UNIT VISIT (OUTPATIENT)
Dept: GERIATRICS | Facility: CLINIC | Age: 89
End: 2024-06-03
Payer: COMMERCIAL

## 2024-06-03 ENCOUNTER — DOCUMENTATION ONLY (OUTPATIENT)
Dept: ANTICOAGULATION | Facility: CLINIC | Age: 89
End: 2024-06-03

## 2024-06-03 VITALS
BODY MASS INDEX: 33.05 KG/M2 | TEMPERATURE: 98.6 F | DIASTOLIC BLOOD PRESSURE: 58 MMHG | HEART RATE: 70 BPM | OXYGEN SATURATION: 90 % | SYSTOLIC BLOOD PRESSURE: 143 MMHG | RESPIRATION RATE: 18 BRPM | WEIGHT: 237 LBS

## 2024-06-03 DIAGNOSIS — G89.29 CHRONIC BILATERAL LOW BACK PAIN WITHOUT SCIATICA: ICD-10-CM

## 2024-06-03 DIAGNOSIS — Z86.718 HISTORY OF DEEP VENOUS THROMBOSIS (DVT) OF DISTAL VEIN OF LEFT LOWER EXTREMITY: ICD-10-CM

## 2024-06-03 DIAGNOSIS — M62.81 GENERALIZED MUSCLE WEAKNESS: ICD-10-CM

## 2024-06-03 DIAGNOSIS — M54.50 CHRONIC BILATERAL LOW BACK PAIN WITHOUT SCIATICA: ICD-10-CM

## 2024-06-03 DIAGNOSIS — E11.40 CONTROLLED TYPE 2 DIABETES WITH NEUROPATHY (H): ICD-10-CM

## 2024-06-03 DIAGNOSIS — Z98.1 S/P LUMBAR FUSION: ICD-10-CM

## 2024-06-03 DIAGNOSIS — M48.061 SPINAL STENOSIS OF LUMBAR REGION WITHOUT NEUROGENIC CLAUDICATION: Primary | ICD-10-CM

## 2024-06-03 DIAGNOSIS — D68.51 FACTOR V LEIDEN MUTATION (H): ICD-10-CM

## 2024-06-03 DIAGNOSIS — D64.9 ANEMIA, UNSPECIFIED TYPE: ICD-10-CM

## 2024-06-03 DIAGNOSIS — M43.16 SPONDYLOLISTHESIS OF LUMBAR REGION: ICD-10-CM

## 2024-06-03 DIAGNOSIS — I10 ESSENTIAL HYPERTENSION: ICD-10-CM

## 2024-06-03 PROCEDURE — 99309 SBSQ NF CARE MODERATE MDM 30: CPT | Performed by: NURSE PRACTITIONER

## 2024-06-03 RX ORDER — ACETAMINOPHEN 500 MG
500-1000 TABLET ORAL 3 TIMES DAILY
Status: SHIPPED
Start: 2024-06-03

## 2024-06-03 NOTE — PROGRESS NOTES
ANTICOAGULATION  MANAGEMENT: Discharge Review    Robert Richard chart reviewed for anticoagulation continuity of care    Hospital Admission on 5/28/2024 for Lumber decompression and laminectomy.    Discharge disposition: TCU guardian of angles    Results:    Recent labs: (last 7 days)     05/28/24  0605 05/29/24  0807   INR 1.08 1.16*     Anticoagulation inpatient management:     Held to be restarted 6/5/2024 per discharge orders.     Anticoagulation discharge instructions:     Warfarin dosing:  Per TCU ACC will resume magmt when discharged   Bridging: bridging with enoxaparin (Lovenox)   INR goal change: No      Medication changes affecting anticoagulation: Yes: oxycodone, methocarbamol, senna    Additional factors affecting anticoagulation: Yes: low HGB currently  -     PLAN     No adjustment to anticoagulation plan needed    Patient not contacted    Anticoagulation Calendar updated    Regina Luo RN

## 2024-06-03 NOTE — LETTER
6/3/2024        RE: Robert Richard  [address] [facility] [address]        [facility] GERIATRICS    PRIMARY CARE PROVIDER AND CLINIC:  Stiven Donahue MD, [address] [facility] [address]  Chief Complaint   Patient presents with     Hospital F/U      [place] Medical Record Number:  [id number]  Place of Service where encounter took place:  [facility] ([facility]) [8]    Robert Richard  is a 88 year old  (1935), admitted to the above facility from  [facility]. Hospital stay 05/28/2024 through 06/02/2024..   HPI:    Robert Richard is an 88 year old male who presented with progressive back and leg pain, weakness.  He has a history of L4-5 surgery evidence of L4-5 spondylolisthesis and severe stenosis as well as L2-3 stenosis.  He underwent L4-5 TLIF and L2-3 bilateral laminectomies for decompression of stenosis with Dr. Cleary on 5/28/2024.   mL.  A durotomy was repaired during the surgery and thus maintain HOB flat overnight after surgery.  He tolerated elevating HOB and ambulation afterwards without increase in symptoms.  MONICA drain removed on 5/31/2024.  Suture placed at drain site.  Since surgery patient reports that he has noted improvement every day.  He is still slow to walk distances but has been working with therapy.  Hemoglobin range 7.2-7.5 after surgery.  He is asymptomatic with no complaints of lightheadedness, increasing shortness of breath.  He did have a hematoma along his chest wall which was from trauma prior to surgery, surgery evaluated and cleared the patient.  He also had postoperative urinary retention requiring Smith placement again on 5/31/2024.  Hospitalist assisted with management and added Flomax, recommended voiding trial once more ambulatory at [facility].  Therapy had recommended TCU.  He is on warfarin for history of DVT, factor V Leiden.  Okay to restart warfarin on 6/4/2024.  He has been on Lovenox for DVT PPx in  hospital.         Hospital Course  Robert Richard was admitted on 5/28/2024.  The following problems were addressed during his hospitalization:     Active Problems:    S/P lumbar fusion     Assessment and plan:  -Follow-up with neurosurgery at 2 weeks postoperatively for incision check, 6 weeks and 12 weeks with x-rays prior  -Monitor closely for any increasing symptoms in regards to your back and surgery including increasing back pain, leg pain, leg weakness or numbness, fevers or chills.  Please closely monitor your incision for any increasing redness or warmth, drainage.  -Freitas discontinuation trial to be done at TCU when patient ambulatory  -TCU to work with patient on ambulation.  Please limit bending, lifting, twisting until follow-up.  -You may restart warfarin on 6/4/2024.  -NSGY discharge medications: Oxycodone, methocarbamol, senna  -Closely monitor for any increasing symptoms related to low hemoglobin including increasing shortness of breath, dizziness or lightheadedness     Patient seen for follow up, oriented, generally healthy and active, uses cane, incision stapled (approx 25) and small <1cm sutured, pain controlled, freitas normal output, VS WNL, legs aidan/edema, R arm and chest bruising from accident prior to surgery, ambulatory.    CODE STATUS/ADVANCE DIRECTIVES DISCUSSION:  Full Code  on file  ALLERGIES:   Allergies   Allergen Reactions     Gabapentin Dizziness     Lipitor [Atorvastatin Calcium]      Muscle pain, weakness     Pravastatin Other (See Comments)     muscle aches     Simvastatin Other (See Comments)     muscle aches     Statins Muscle Pain (Myalgia)      PAST MEDICAL HISTORY:   Past Medical History:   Diagnosis Date     Basal cell carcinoma      Cancer (H)      DVT (deep venous thrombosis) (H) 11/2003     DVT (deep venous thrombosis) (H) 12/2008     DVT (deep venous thrombosis) (H) 10/2010     Factor V Leiden (H24)      Gout      Other and unspecified hyperlipidemia      Prostate  cancer (H) 2003    prostatectomy      Restless leg syndrome      Sleep apnea      Unspecified essential hypertension       PAST SURGICAL HISTORY:   has a past surgical history that includes Laminectomy lumbar one level (12/2008); joint replacement (4/2011); REVISE TOTAL HIP REPLACEMENT (1/18/13); Laparoscopic herniorrhaphy umbilical (N/A, 1/3/2019); and appendectomy (1950).  FAMILY HISTORY: family history includes Prostate Cancer in his paternal grandfather.  SOCIAL HISTORY:   reports that he has never smoked. He has never used smokeless tobacco. He reports current alcohol use. He reports that he does not use drugs.  Patient's living condition: lives alone    Post Discharge Medication Reconciliation Status:   MED REC REQUIRED  Post Medication Reconciliation Status: discharge medications reconciled and changed, per note/orders       Current Outpatient Medications   Medication Sig Dispense Refill     acetaminophen (TYLENOL) 500 MG tablet Take 1-2 tablets (500-1,000 mg) by mouth 3 times daily       albuterol (PROAIR HFA/PROVENTIL HFA/VENTOLIN HFA) 108 (90 Base) MCG/ACT inhaler Inhale 2 puffs into the lungs every 6 hours as needed for shortness of breath, wheezing or cough 18 g 0     amoxicillin (AMOXIL) 500 MG capsule Take 4 caps 1 hour before procedure. 8 capsule 1     atenolol (TENORMIN) 50 MG tablet Take 1 tablet (50 mg) by mouth 2 times daily 180 tablet 3     benzonatate (TESSALON) 200 MG capsule Take 1 capsule (200 mg) by mouth 3 times daily as needed for cough 20 capsule 0     enoxaparin ANTICOAGULANT (LOVENOX) 40 MG/0.4ML syringe Continue 40mg every 24 hours until INR is theraupatic (Inr goal is 2-3) and then stop       glipiZIDE (GLUCOTROL XL) 5 MG 24 hr tablet Take 1 tablet (5 mg) by mouth daily 90 tablet 3     ipratropium (ATROVENT) 0.06 % nasal spray USE 2 SPRAY(S) IN EACH NOSTRIL 4 TIMES DAILY 45 mL 0     losartan (COZAAR) 50 MG tablet Take 1 tablet (50 mg) by mouth daily       multiple vitamin  tablet TABS  Take 1 tablet by mouth daily       order for DME One pair compression garment 20-30 mmHg may substitute per fitter.  Please call [facility] at [phone] for appt.    Will also need education regarding donning agents tyvek sleeve, plastic sled or similar. 1 Units 0     ORDER FOR DME Wear mask at night 1 Units 0     oxyCODONE (ROXICODONE) 5 MG tablet Take 0.5 tablets (2.5 mg) by mouth every 6 hours as needed for moderate to severe pain 40 tablet 0     pramipexole (MIRAPEX) 1 MG tablet TAKE 1 TABLET BY MOUTH TWICE DAILY AT  4  PM  AND  9   tablet 3     senna-docusate (SENOKOT-S/PERICOLACE) 8.6-50 MG tablet Take 1 tablet by mouth daily 30 tablet 0     sildenafil (VIAGRA) 100 MG tablet Take 0.5 tablets (50 mg) by mouth daily as needed 20 tablet 3     spironolactone (ALDACTONE) 25 MG tablet Take 1 tablet (25 mg) by mouth daily 90 tablet 3     STATIN NOT PRESCRIBED (INTENTIONAL) Please choose reason not prescribed, below       tamsulosin (FLOMAX) 0.4 MG capsule Take 1 capsule (0.4 mg) by mouth daily       warfarin ANTICOAGULANT (COUMADIN) 5 MG tablet TAKE 1/2 TAB BY MOUTH EVERY Mon and FRIDAY AND 1 TAB ON ALL OTHER DAYS OF THE WEEK as directedTAKE 1/2 TAB BY MOUTH EVERY Mon and FRIDAY AND 1 TAB ON ALL OTHER DAYS OF THE WEEK as directed   Hold coumadin and resume on 6/5/24       warfarin ANTICOAGULANT (COUMADIN) 5 MG tablet Take 0.5 tablets (2.5 mg) by mouth twice a week (0.5 x 5 mg = 2.5 mg:Monday & Fridays)Take 2.5 mg by mouth twice a week (0.5 x 5 mg = 2.5 mg:Monday & Fridays)  Hold coumadin and resume on 6/5/24       No current facility-administered medications for this visit.       ROS:  4 point ROS including Respiratory, CV, GI and , other than that noted in the HPI,  is negative    Vitals:  BP (!) 143/58   Pulse 70   Temp 98.6  F (37  C)   Resp 18   Wt 107.5 kg (237 lb)   SpO2 90%   BMI 33.05 kg/m    Exam:  GENERAL APPEARANCE:  in no distress, appears healthy  ENT:  Mouth and posterior oropharynx  normal, moist mucous membranes  RESP:  lungs clear to auscultation , no respiratory distress  CV:  peripheral edema 1-2+ in lower legs, rate-normal  ABDOMEN:  bowel sounds normal  M/S:   Gait and station abnormal unsteady  SKIN:  Inspection of skin and subcutaneous tissue baseline  NEURO:   Examination of sensation by touch normal  PSYCH:  affect and mood normal    Lab/Diagnostic data:  Recent labs in [facility] reviewed by me today.     ASSESSMENT/PLAN:    (M48.061) Spinal stenosis of lumbar region without neurogenic claudication  (primary encounter diagnosis)  (M43.16) Spondylolisthesis of lumbar region  (Z98.1) S/P lumbar fusion  (M54.50,  G89.29) Chronic bilateral low back pain without sciatica  (M62.81) Generalized muscle weakness  Comment: post elective surgery 5/28, pain controlled, incision approximated  Plan:   -PT/OT eval and treat  -WBAT  -TEDs bilateral  -CBC, BMP, A1C on 6/5  -restart warfarin/INR's  -continue lovenox until INR >2  -discontinue freitas 6/5  -continue new flomax  -change APAP to TID  -discontinue robaxin  -continue oxycodone/senna  -follow up ortho PRN    (D64.9) Anemia, unspecified type  Comment: Hgb low in hospital 7.4  Plan: CBC on 6/5    (Z86.718) History of deep venous thrombosis (DVT) of distal vein of left lower extremity  (D68.51) Factor V Leiden mutation (H24)  Comment: Hx clot, leg edema+  Plan:   -discontinue lovenox when INR >2  -warfarin 5mg daily  -recheck INR on 6/6  -TEDs bilateral on am off pm    (E11.40) 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

## 2024-06-03 NOTE — PROGRESS NOTES
[facility] GERIATRICS    PRIMARY CARE PROVIDER AND CLINIC:  Stiven Donahue MD, [address] [facility] [address]  Chief Complaint   Patient presents with    Hospital F/U      [place] Medical Record Number:  [id number]  Place of Service where encounter took place:  [facility] ([facility]) [8]    Robert Richard  is a 88 year old  (1935), admitted to the above facility from  [facility]. Hospital stay 05/28/2024 through 06/02/2024..   HPI:    Robert Richard is an 88 year old male who presented with progressive back and leg pain, weakness.  He has a history of L4-5 surgery evidence of L4-5 spondylolisthesis and severe stenosis as well as L2-3 stenosis.  He underwent L4-5 TLIF and L2-3 bilateral laminectomies for decompression of stenosis with Dr. Cleary on 5/28/2024.   mL.  A durotomy was repaired during the surgery and thus maintain HOB flat overnight after surgery.  He tolerated elevating HOB and ambulation afterwards without increase in symptoms.  MONICA drain removed on 5/31/2024.  Suture placed at drain site.  Since surgery patient reports that he has noted improvement every day.  He is still slow to walk distances but has been working with therapy.  Hemoglobin range 7.2-7.5 after surgery.  He is asymptomatic with no complaints of lightheadedness, increasing shortness of breath.  He did have a hematoma along his chest wall which was from trauma prior to surgery, surgery evaluated and cleared the patient.  He also had postoperative urinary retention requiring Smith placement again on 5/31/2024.  Hospitalist assisted with management and added Flomax, recommended voiding trial once more ambulatory at TCU.  Therapy had recommended TCU.  He is on warfarin for history of DVT, factor V Leiden.  Okay to restart warfarin on 6/4/2024.  He has been on Lovenox for DVT PPx in hospital.         Hospital Course  Robert Richard was admitted on 5/28/2024.  The following  problems were addressed during his hospitalization:     Active Problems:    S/P lumbar fusion     Assessment and plan:  -Follow-up with neurosurgery at 2 weeks postoperatively for incision check, 6 weeks and 12 weeks with x-rays prior  -Monitor closely for any increasing symptoms in regards to your back and surgery including increasing back pain, leg pain, leg weakness or numbness, fevers or chills.  Please closely monitor your incision for any increasing redness or warmth, drainage.  -Freitas discontinuation trial to be done at TCU when patient ambulatory  -TCU to work with patient on ambulation.  Please limit bending, lifting, twisting until follow-up.  -You may restart warfarin on 6/4/2024.  -NSGY discharge medications: Oxycodone, methocarbamol, senna  -Closely monitor for any increasing symptoms related to low hemoglobin including increasing shortness of breath, dizziness or lightheadedness     Patient seen for follow up, oriented, generally healthy and active, uses cane, incision stapled (approx 25) and small <1cm sutured, pain controlled, freitas normal output, VS WNL, legs aidan/edema, R arm and chest bruising from accident prior to surgery, ambulatory.    CODE STATUS/ADVANCE DIRECTIVES DISCUSSION:  Full Code  on file  ALLERGIES:   Allergies   Allergen Reactions    Gabapentin Dizziness    Lipitor [Atorvastatin Calcium]      Muscle pain, weakness    Pravastatin Other (See Comments)     muscle aches    Simvastatin Other (See Comments)     muscle aches    Statins Muscle Pain (Myalgia)      PAST MEDICAL HISTORY:   Past Medical History:   Diagnosis Date    Basal cell carcinoma     Cancer (H)     DVT (deep venous thrombosis) (H) 11/2003    DVT (deep venous thrombosis) (H) 12/2008    DVT (deep venous thrombosis) (H) 10/2010    Factor V Leiden (H24)     Gout     Other and unspecified hyperlipidemia     Prostate cancer (H) 2003    prostatectomy     Restless leg syndrome     Sleep apnea     Unspecified essential  hypertension       PAST SURGICAL HISTORY:   has a past surgical history that includes Laminectomy lumbar one level (12/2008); joint replacement (4/2011); REVISE TOTAL HIP REPLACEMENT (1/18/13); Laparoscopic herniorrhaphy umbilical (N/A, 1/3/2019); and appendectomy (1950).  FAMILY HISTORY: family history includes Prostate Cancer in his paternal grandfather.  SOCIAL HISTORY:   reports that he has never smoked. He has never used smokeless tobacco. He reports current alcohol use. He reports that he does not use drugs.  Patient's living condition: lives alone    Post Discharge Medication Reconciliation Status:   MED REC REQUIRED  Post Medication Reconciliation Status: discharge medications reconciled and changed, per note/orders       Current Outpatient Medications   Medication Sig Dispense Refill    acetaminophen (TYLENOL) 500 MG tablet Take 1-2 tablets (500-1,000 mg) by mouth 3 times daily      albuterol (PROAIR HFA/PROVENTIL HFA/VENTOLIN HFA) 108 (90 Base) MCG/ACT inhaler Inhale 2 puffs into the lungs every 6 hours as needed for shortness of breath, wheezing or cough 18 g 0    amoxicillin (AMOXIL) 500 MG capsule Take 4 caps 1 hour before procedure. 8 capsule 1    atenolol (TENORMIN) 50 MG tablet Take 1 tablet (50 mg) by mouth 2 times daily 180 tablet 3    benzonatate (TESSALON) 200 MG capsule Take 1 capsule (200 mg) by mouth 3 times daily as needed for cough 20 capsule 0    enoxaparin ANTICOAGULANT (LOVENOX) 40 MG/0.4ML syringe Continue 40mg every 24 hours until INR is theraupatic (Inr goal is 2-3) and then stop      glipiZIDE (GLUCOTROL XL) 5 MG 24 hr tablet Take 1 tablet (5 mg) by mouth daily 90 tablet 3    ipratropium (ATROVENT) 0.06 % nasal spray USE 2 SPRAY(S) IN EACH NOSTRIL 4 TIMES DAILY 45 mL 0    losartan (COZAAR) 50 MG tablet Take 1 tablet (50 mg) by mouth daily      multiple vitamin  tablet TABS Take 1 tablet by mouth daily      order for DME One pair compression garment 20-30 mmHg may substitute per  chelo.  Please call [facility] at [phone] for appt.    Will also need education regarding donning agents tyvek sleeve, plastic sled or similar. 1 Units 0    ORDER FOR DME Wear mask at night 1 Units 0    oxyCODONE (ROXICODONE) 5 MG tablet Take 0.5 tablets (2.5 mg) by mouth every 6 hours as needed for moderate to severe pain 40 tablet 0    pramipexole (MIRAPEX) 1 MG tablet TAKE 1 TABLET BY MOUTH TWICE DAILY AT  4  PM  AND  9   tablet 3    senna-docusate (SENOKOT-S/PERICOLACE) 8.6-50 MG tablet Take 1 tablet by mouth daily 30 tablet 0    sildenafil (VIAGRA) 100 MG tablet Take 0.5 tablets (50 mg) by mouth daily as needed 20 tablet 3    spironolactone (ALDACTONE) 25 MG tablet Take 1 tablet (25 mg) by mouth daily 90 tablet 3    STATIN NOT PRESCRIBED (INTENTIONAL) Please choose reason not prescribed, below      tamsulosin (FLOMAX) 0.4 MG capsule Take 1 capsule (0.4 mg) by mouth daily      warfarin ANTICOAGULANT (COUMADIN) 5 MG tablet TAKE 1/2 TAB BY MOUTH EVERY Mon and FRIDAY AND 1 TAB ON ALL OTHER DAYS OF THE WEEK as directedTAKE 1/2 TAB BY MOUTH EVERY Mon and FRIDAY AND 1 TAB ON ALL OTHER DAYS OF THE WEEK as directed   Hold coumadin and resume on 6/5/24      warfarin ANTICOAGULANT (COUMADIN) 5 MG tablet Take 0.5 tablets (2.5 mg) by mouth twice a week (0.5 x 5 mg = 2.5 mg:Monday & Fridays)Take 2.5 mg by mouth twice a week (0.5 x 5 mg = 2.5 mg:Monday & Fridays)  Hold coumadin and resume on 6/5/24       No current facility-administered medications for this visit.       ROS:  4 point ROS including Respiratory, CV, GI and , other than that noted in the HPI,  is negative    Vitals:  BP (!) 143/58   Pulse 70   Temp 98.6  F (37  C)   Resp 18   Wt 107.5 kg (237 lb)   SpO2 90%   BMI 33.05 kg/m    Exam:  GENERAL APPEARANCE:  in no distress, appears healthy  ENT:  Mouth and posterior oropharynx normal, moist mucous membranes  RESP:  lungs clear to auscultation , no respiratory distress  CV:  peripheral edema  1-2+ in lower legs, rate-normal  ABDOMEN:  bowel sounds normal  M/S:   Gait and station abnormal unsteady  SKIN:  Inspection of skin and subcutaneous tissue baseline  NEURO:   Examination of sensation by touch normal  PSYCH:  affect and mood normal    Lab/Diagnostic data:  Recent labs in [facility] reviewed by me today.     ASSESSMENT/PLAN:    (M48.061) Spinal stenosis of lumbar region without neurogenic claudication  (primary encounter diagnosis)  (M43.16) Spondylolisthesis of lumbar region  (Z98.1) S/P lumbar fusion  (M54.50,  G89.29) Chronic bilateral low back pain without sciatica  (M62.81) Generalized muscle weakness  Comment: post elective surgery 5/28, pain controlled, incision approximated  Plan:   -PT/OT eval and treat  -WBAT  -TEDs bilateral  -CBC, BMP, A1C on 6/5  -restart warfarin/INR's  -continue lovenox until INR >2  -discontinue freitas 6/5  -continue new flomax  -change APAP to TID  -discontinue robaxin  -continue oxycodone/senna  -follow up ortho PRN    (D64.9) Anemia, unspecified type  Comment: Hgb low in hospital 7.4  Plan: CBC on 6/5    (Z86.718) History of deep venous thrombosis (DVT) of distal vein of left lower extremity  (D68.51) Factor V Leiden mutation (H24)  Comment: Hx clot, leg edema+  Plan:   -discontinue lovenox when INR >2  -warfarin 5mg daily  -recheck INR on 6/6  -TEDs bilateral on am off pm    (E11.40) 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

## 2024-06-04 ENCOUNTER — LAB REQUISITION (OUTPATIENT)
Dept: LAB | Facility: CLINIC | Age: 89
End: 2024-06-04
Payer: COMMERCIAL

## 2024-06-04 ENCOUNTER — DOCUMENTATION ONLY (OUTPATIENT)
Dept: GERIATRICS | Facility: CLINIC | Age: 89
End: 2024-06-04
Payer: COMMERCIAL

## 2024-06-04 DIAGNOSIS — D64.9 ANEMIA, UNSPECIFIED: ICD-10-CM

## 2024-06-04 DIAGNOSIS — N18.9 CHRONIC KIDNEY DISEASE, UNSPECIFIED: ICD-10-CM

## 2024-06-04 DIAGNOSIS — E08.9 DIABETES MELLITUS DUE TO UNDERLYING CONDITION WITHOUT COMPLICATIONS (H): ICD-10-CM

## 2024-06-04 LAB
GAMMA INTERFERON BACKGROUND BLD IA-ACNC: 0.06 IU/ML
M TB IFN-G BLD-IMP: NEGATIVE
M TB IFN-G CD4+ BCKGRND COR BLD-ACNC: 0.7 IU/ML
MITOGEN IGNF BCKGRD COR BLD-ACNC: 0 IU/ML
MITOGEN IGNF BCKGRD COR BLD-ACNC: 0 IU/ML
QUANTIFERON MITOGEN: 0.76 IU/ML
QUANTIFERON NIL TUBE: 0.06 IU/ML
QUANTIFERON TB1 TUBE: 0.06 IU/ML
QUANTIFERON TB2 TUBE: 0.06

## 2024-06-05 ENCOUNTER — TELEPHONE (OUTPATIENT)
Dept: GERIATRICS | Facility: CLINIC | Age: 89
End: 2024-06-05

## 2024-06-05 ENCOUNTER — TELEPHONE (OUTPATIENT)
Dept: GERIATRICS | Facility: CLINIC | Age: 89
End: 2024-06-05
Payer: COMMERCIAL

## 2024-06-05 LAB
ANION GAP SERPL CALCULATED.3IONS-SCNC: 10 MMOL/L (ref 7–15)
BUN SERPL-MCNC: 22.1 MG/DL (ref 8–23)
CALCIUM SERPL-MCNC: 8.6 MG/DL (ref 8.8–10.2)
CHLORIDE SERPL-SCNC: 104 MMOL/L (ref 98–107)
CREAT SERPL-MCNC: 1.24 MG/DL (ref 0.67–1.17)
DEPRECATED HCO3 PLAS-SCNC: 22 MMOL/L (ref 22–29)
EGFRCR SERPLBLD CKD-EPI 2021: 56 ML/MIN/1.73M2
ERYTHROCYTE [DISTWIDTH] IN BLOOD BY AUTOMATED COUNT: 14.4 % (ref 10–15)
HBA1C MFR BLD: 6.8 %
HCT VFR BLD AUTO: 23.8 % (ref 40–53)
HGB BLD-MCNC: 7.7 G/DL (ref 13.3–17.7)
MCH RBC QN AUTO: 31.8 PG (ref 26.5–33)
MCHC RBC AUTO-ENTMCNC: 32.4 G/DL (ref 31.5–36.5)
MCV RBC AUTO: 98 FL (ref 78–100)
PLATELET # BLD AUTO: 224 10E3/UL (ref 150–450)
POTASSIUM SERPL-SCNC: 3.9 MMOL/L (ref 3.4–5.3)
RBC # BLD AUTO: 2.42 10E6/UL (ref 4.4–5.9)
SODIUM SERPL-SCNC: 136 MMOL/L (ref 135–145)
WBC # BLD AUTO: 6 10E3/UL (ref 4–11)

## 2024-06-05 PROCEDURE — 85027 COMPLETE CBC AUTOMATED: CPT | Mod: ORL | Performed by: NURSE PRACTITIONER

## 2024-06-05 PROCEDURE — 80048 BASIC METABOLIC PNL TOTAL CA: CPT | Mod: ORL | Performed by: NURSE PRACTITIONER

## 2024-06-05 PROCEDURE — 83036 HEMOGLOBIN GLYCOSYLATED A1C: CPT | Mod: ORL | Performed by: NURSE PRACTITIONER

## 2024-06-05 PROCEDURE — 36415 COLL VENOUS BLD VENIPUNCTURE: CPT | Mod: ORL | Performed by: NURSE PRACTITIONER

## 2024-06-05 NOTE — TELEPHONE ENCOUNTER
[facility] Geriatrics Lab Note     Provider: SKIP Rucker CNP   Facility: [facility]  Facility Type:  TCU    Allergies   Allergen Reactions    Gabapentin Dizziness    Lipitor [Atorvastatin Calcium]      Muscle pain, weakness    Pravastatin Other (See Comments)     muscle aches    Simvastatin Other (See Comments)     muscle aches    Statins Muscle Pain (Myalgia)       Labs Reviewed by provider: A1c and CBC (BMP pending)     Verbal Order/Direction given by Provider: fecal occult with next stool; dx: anemia    Provider giving Order:  SKIP Rucker CNP     Verbal Order given to: Vannessa Doherty RN

## 2024-06-05 NOTE — TELEPHONE ENCOUNTER
[facility] Geriatrics Triage Nurse INR     Provider: SKIP Rucker CNP   Facility: [facility]   Facility Type:  TCU    Caller: Vannessa  Call Back Number: [phone]  Reason for call: INR  Diagnosis/Goal: DVT/Factor V Lieden    Date INR New Dose/Orders   6/5 1.1 5 mg      Coumadin was just restarted on 6/4.  Was on hold due to surgery.  Last INR was on 5/29 at 1.16.    HD: 2.5 mg po on Mon and 5 mg po AOD.    Heparin/Lovenox:  Yes; Lovenox 40 mg daily till INR >2  Currently on ABX?: No  Other interacting medication:  None  Missed or refused doses: No    Verbal Order/Direction given by Provider: Continue same Coumadin dose of 5 mg po daily and recheck INR tomorrow.     Provider Giving Order:  SKIP Rucekr CNP     Verbal Order given to: Vannessa Munroe RN

## 2024-06-06 ENCOUNTER — TRANSITIONAL CARE UNIT VISIT (OUTPATIENT)
Dept: GERIATRICS | Facility: CLINIC | Age: 89
End: 2024-06-06
Payer: COMMERCIAL

## 2024-06-06 VITALS
SYSTOLIC BLOOD PRESSURE: 145 MMHG | HEART RATE: 70 BPM | DIASTOLIC BLOOD PRESSURE: 72 MMHG | RESPIRATION RATE: 18 BRPM | OXYGEN SATURATION: 93 % | WEIGHT: 237 LBS | TEMPERATURE: 98.1 F | BODY MASS INDEX: 33.05 KG/M2

## 2024-06-06 DIAGNOSIS — M43.16 SPONDYLOLISTHESIS OF LUMBAR REGION: ICD-10-CM

## 2024-06-06 DIAGNOSIS — D64.9 ANEMIA, UNSPECIFIED TYPE: ICD-10-CM

## 2024-06-06 DIAGNOSIS — D68.51 FACTOR V LEIDEN MUTATION (H): Primary | ICD-10-CM

## 2024-06-06 DIAGNOSIS — R33.9 URINE RETENTION: ICD-10-CM

## 2024-06-06 DIAGNOSIS — Z98.1 S/P LUMBAR FUSION: ICD-10-CM

## 2024-06-06 LAB — GLUCOSE SERPL-MCNC: 120 MG/DL (ref 70–99)

## 2024-06-06 PROCEDURE — 99309 SBSQ NF CARE MODERATE MDM 30: CPT | Performed by: NURSE PRACTITIONER

## 2024-06-06 RX ORDER — FERROUS SULFATE 325(65) MG
325 TABLET ORAL
Status: SHIPPED
Start: 2024-06-06 | End: 2024-06-17

## 2024-06-06 NOTE — PROGRESS NOTES
[facility] GERIATRICS    Chief Complaint   Patient presents with    RECHECK     HPI:  Robert Richard is a 88 year old  (1935), who is being seen today for an episodic care visit at: [facility] ([facility]) [8]. Today's concern is:   1. Factor V Leiden mutation (H24)    2. Spondylolisthesis of lumbar region    3. S/P lumbar fusion    4. Anemia, unspecified type    5. Urine retention      Patient seen for follow up, post fusion L4-5, incision approximated/stapled, pain controlled, ambulatory, refusing TEDs, Hgb 7.7 with probable surgical blood loss and bruising along chest and R arm, FOBT on 6/5 negative, had freitas for retention and removed 6/5 and now voiding well without abdominal pain nor distension, INR today 1.1, overall appears healthy.    Allergies, and PMH/PSH reviewed in [facility] today.  REVIEW OF SYSTEMS:  4 point ROS including Respiratory, CV, GI and , other than that noted in the HPI,  is negative    Objective:   BP (!) 145/72   Pulse 70   Temp 98.1  F (36.7  C)   Resp 18   Wt 107.5 kg (237 lb)   SpO2 93%   BMI 33.05 kg/m    GENERAL APPEARANCE:  in no distress, appears healthy  ENT:  Mouth and posterior oropharynx normal, moist mucous membranes  RESP:  lungs clear to auscultation , no respiratory distress  CV:  peripheral edema 1-2+ in lower legs, rate-normal  ABDOMEN:  bowel sounds normal  M/S:   Gait and station abnormal unsteady  SKIN:  Inspection of skin and subcutaneous tissue baseline  NEURO:   Examination of sensation by touch normal  PSYCH:  affect and mood normal    Labs done in SNF are in [facility]. Please refer to them using [facility]Care Everywhere.    Assessment/Plan:  (D68.51) Factor V Leiden mutation (H24)  Comment: INR 1.1  Plan: warfarin 7.5mg daily  -recheck INR on 6/10  -continue lovenox daily until INR >2 then DC      (M43.16) Spondylolisthesis of lumbar region  (primary encounter diagnosis)  (Z98.1) S/P lumbar fusion  (D64.9) Anemia, unspecified  type  Comment: pain controlled, Hgb 7.7, asymptomatic, FOBT negative  Plan:   -PT/OT working with  -Hgb, B12, Fe on 6/10  -side rails for bed  -discontinue TEDs, refuses    (R33.9) Urine retention  Comment: freitas removed 6/5, voiding well  Plan: continue bladder scans TID for now  -for PVR >600mL do straight cath    MED REC REQUIRED  Post Medication Reconciliation Status: medication reconcilation previously completed during another office visit          Electronically signed by: SKIP Maxwell CNP

## 2024-06-06 NOTE — LETTER
6/6/2024      Robert Richard  [address] [facility] [address]        [facility] GERIATRICS    Chief Complaint   Patient presents with     RECHECK     HPI:  Robert Richard is a 88 year old  (1935), who is being seen today for an episodic care visit at: [facility] ([facility]) [8]. Today's concern is:   1. Factor V Leiden mutation (H24)    2. Spondylolisthesis of lumbar region    3. S/P lumbar fusion    4. Anemia, unspecified type    5. Urine retention      Patient seen for follow up, post fusion L4-5, incision approximated/stapled, pain controlled, ambulatory, refusing TEDs, Hgb 7.7 with probable surgical blood loss and bruising along chest and R arm, FOBT on 6/5 negative, had freitas for retention and removed 6/5 and now voiding well without abdominal pain nor distension, INR today 1.1, overall appears healthy.    Allergies, and PMH/PSH reviewed in EPIC today.  REVIEW OF SYSTEMS:  4 point ROS including Respiratory, CV, GI and , other than that noted in the HPI,  is negative    Objective:   BP (!) 145/72   Pulse 70   Temp 98.1  F (36.7  C)   Resp 18   Wt 107.5 kg (237 lb)   SpO2 93%   BMI 33.05 kg/m    GENERAL APPEARANCE:  in no distress, appears healthy  ENT:  Mouth and posterior oropharynx normal, moist mucous membranes  RESP:  lungs clear to auscultation , no respiratory distress  CV:  peripheral edema 1-2+ in lower legs, rate-normal  ABDOMEN:  bowel sounds normal  M/S:   Gait and station abnormal unsteady  SKIN:  Inspection of skin and subcutaneous tissue baseline  NEURO:   Examination of sensation by touch normal  PSYCH:  affect and mood normal    Labs done in SNF are in [facility]. Please refer to them using [employer]/Care Everywhere.    Assessment/Plan:  (D68.51) Factor V Leiden mutation (H24)  Comment: INR 1.1  Plan: warfarin 7.5mg daily  -recheck INR on 6/10  -continue lovenox daily until INR >2 then DC      (M43.16) Spondylolisthesis of lumbar region  (primary encounter  diagnosis)  (Z98.1) S/P lumbar fusion  (D64.9) Anemia, unspecified type  Comment: pain controlled, Hgb 7.7, asymptomatic, FOBT negative  Plan:   -PT/OT working with  -Hgb, B12, Fe on 6/10  -side rails for bed  -discontinue TEDs, refuses    (R33.9) Urine retention  Comment: freitas removed 6/5, voiding well  Plan: continue bladder scans TID for now  -for PVR >600mL do straight cath    MED REC REQUIRED  Post Medication Reconciliation Status: medication reconcilation previously completed during another office visit          Electronically signed by: SKIP Mxawell CNP          Sincerely,        SKIP Maxwell CNP

## 2024-06-09 ENCOUNTER — LAB REQUISITION (OUTPATIENT)
Dept: LAB | Facility: CLINIC | Age: 89
End: 2024-06-09
Payer: COMMERCIAL

## 2024-06-09 VITALS
DIASTOLIC BLOOD PRESSURE: 78 MMHG | BODY MASS INDEX: 33.05 KG/M2 | WEIGHT: 237 LBS | SYSTOLIC BLOOD PRESSURE: 148 MMHG | OXYGEN SATURATION: 94 % | HEART RATE: 64 BPM | TEMPERATURE: 98.1 F | RESPIRATION RATE: 18 BRPM

## 2024-06-09 DIAGNOSIS — D64.9 ANEMIA, UNSPECIFIED: ICD-10-CM

## 2024-06-10 ENCOUNTER — TRANSITIONAL CARE UNIT VISIT (OUTPATIENT)
Dept: GERIATRICS | Facility: CLINIC | Age: 89
End: 2024-06-10
Payer: COMMERCIAL

## 2024-06-10 DIAGNOSIS — D68.51 FACTOR V LEIDEN MUTATION (H): Primary | ICD-10-CM

## 2024-06-10 DIAGNOSIS — D64.9 ANEMIA, UNSPECIFIED TYPE: ICD-10-CM

## 2024-06-10 DIAGNOSIS — R33.9 URINE RETENTION: ICD-10-CM

## 2024-06-10 LAB
HGB BLD-MCNC: 8.2 G/DL (ref 13.3–17.7)
INR (EXTERNAL): 2.8 (ref 0.9–1.1)
IRON SERPL-MCNC: 48 UG/DL (ref 61–157)
VIT B12 SERPL-MCNC: 450 PG/ML (ref 232–1245)

## 2024-06-10 PROCEDURE — 85018 HEMOGLOBIN: CPT | Mod: ORL | Performed by: NURSE PRACTITIONER

## 2024-06-10 PROCEDURE — 82607 VITAMIN B-12: CPT | Mod: ORL | Performed by: NURSE PRACTITIONER

## 2024-06-10 PROCEDURE — 36415 COLL VENOUS BLD VENIPUNCTURE: CPT | Mod: ORL | Performed by: NURSE PRACTITIONER

## 2024-06-10 PROCEDURE — 83540 ASSAY OF IRON: CPT | Mod: ORL | Performed by: NURSE PRACTITIONER

## 2024-06-10 PROCEDURE — 99309 SBSQ NF CARE MODERATE MDM 30: CPT | Performed by: NURSE PRACTITIONER

## 2024-06-10 PROCEDURE — P9603 ONE-WAY ALLOW PRORATED MILES: HCPCS | Mod: ORL | Performed by: NURSE PRACTITIONER
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
## 2024-06-13 ENCOUNTER — TRANSITIONAL CARE UNIT VISIT (OUTPATIENT)
Dept: GERIATRICS | Facility: CLINIC | Age: 89
End: 2024-06-13
Payer: COMMERCIAL

## 2024-06-13 VITALS
DIASTOLIC BLOOD PRESSURE: 77 MMHG | BODY MASS INDEX: 33.05 KG/M2 | WEIGHT: 237 LBS | OXYGEN SATURATION: 90 % | SYSTOLIC BLOOD PRESSURE: 155 MMHG | TEMPERATURE: 97.7 F | RESPIRATION RATE: 18 BRPM | HEART RATE: 63 BPM

## 2024-06-13 DIAGNOSIS — D68.51 FACTOR V LEIDEN MUTATION (H): Primary | ICD-10-CM

## 2024-06-13 DIAGNOSIS — M48.061 SPINAL STENOSIS OF LUMBAR REGION WITHOUT NEUROGENIC CLAUDICATION: ICD-10-CM

## 2024-06-13 DIAGNOSIS — Z98.1 S/P LUMBAR FUSION: ICD-10-CM

## 2024-06-13 DIAGNOSIS — D64.9 ANEMIA, UNSPECIFIED TYPE: ICD-10-CM

## 2024-06-13 LAB — INR (EXTERNAL): 2.4 (ref 0.9–1.1)

## 2024-06-13 PROCEDURE — 99309 SBSQ NF CARE MODERATE MDM 30: CPT | Performed by: NURSE PRACTITIONER

## 2024-06-13 RX ORDER — OXYCODONE HYDROCHLORIDE 5 MG/1
5 TABLET ORAL EVERY 4 HOURS PRN
Status: SHIPPED
Start: 2024-06-13 | End: 2024-08-02

## 2024-06-13 NOTE — PROGRESS NOTES
[facility] GERIATRICS    Chief Complaint   Patient presents with    RECHECK     HPI:  Robert Richard is a 88 year old  (1935), who is being seen today for an episodic care visit at: [facility] ([facility]) [8]. Today's concern is:   1. Factor V Leiden mutation (H24)    2. Spinal stenosis of lumbar region without neurogenic claudication    3. S/P lumbar fusion    4. Anemia, unspecified type      Patient seen for follow up, INR today 2.4, no s/s bvleed nor bruise, Hgb slowly increasing from 7.4 to 8.2 plus Fe 48 after starting supplement and B12 450, post elective L4-5 fusion with moderate pain and having difficulty sleeping, overall appears healthy.    Allergies, and PMH/PSH reviewed in EPIC today.  REVIEW OF SYSTEMS:  4 point ROS including Respiratory, CV, GI and , other than that noted in the HPI,  is negative    Objective:   BP (!) 155/77   Pulse 63   Temp 97.7  F (36.5  C)   Resp 18   Wt 107.5 kg (237 lb)   SpO2 90%   BMI 33.05 kg/m    GENERAL APPEARANCE:  in no distress, appears healthy  ENT:  Mouth and posterior oropharynx normal, moist mucous membranes  RESP:  lungs clear to auscultation , no respiratory distress  CV:  peripheral edema moderate+ in lower legs, rate-normal  ABDOMEN:  bowel sounds normal  M/S:   Gait and station abnormal unsteady  SKIN:  Inspection of skin and subcutaneous tissue baseline  NEURO:   Examination of sensation by touch normal  PSYCH:  affect and mood normal    Labs done in SNF are in [facility]. Please refer to them using [facility]/Care Everywhere.    Assessment/Plan:  (D68.51) Factor V Leiden mutation (H24)  (primary encounter diagnosis)  Comment: INR 2.4  Plan: warfarin 5mg daily  -recheck INR on 6/17    (M48.061) Spinal stenosis of lumbar region without neurogenic claudication  (Z98.1) S/P lumbar fusion  Comment: elective surgery 5/28, incision approximated, pain+  Plan:   -PT/OT working with  -increase oxycodone to 5mg Q4h PRN  -continue APAP  TID  -follow up ortho on 6/14    (D64.9) Anemia, unspecified type  Comment: Hgb 8.2, Fe 48, B12 450  Plan: continue Fe 325mg daily  -recheck Hgb in 1-2 weeks    MED REC REQUIRED  Post Medication Reconciliation Status: medication reconcilation previously completed during another office visit      Electronically signed by: SKIP Maxwell CNP

## 2024-06-13 NOTE — LETTER
6/13/2024      Robert Richard  [address] [facility] [address]        [facility] GERIATRICS    Chief Complaint   Patient presents with     RECHECK     HPI:  Robert Richard is a 88 year old  (1935), who is being seen today for an episodic care visit at: [facility] ([facility]) [8]. Today's concern is:   1. Factor V Leiden mutation (H24)    2. Spinal stenosis of lumbar region without neurogenic claudication    3. S/P lumbar fusion    4. Anemia, unspecified type      Patient seen for follow up, INR today 2.4, no s/s bvleed nor bruise, Hgb slowly increasing from 7.4 to 8.2 plus Fe 48 after starting supplement and B12 450, post elective L4-5 fusion with moderate pain and having difficulty sleeping, overall appears healthy.    Allergies, and PMH/PSH reviewed in EPIC today.  REVIEW OF SYSTEMS:  4 point ROS including Respiratory, CV, GI and , other than that noted in the HPI,  is negative    Objective:   BP (!) 155/77   Pulse 63   Temp 97.7  F (36.5  C)   Resp 18   Wt 107.5 kg (237 lb)   SpO2 90%   BMI 33.05 kg/m    GENERAL APPEARANCE:  in no distress, appears healthy  ENT:  Mouth and posterior oropharynx normal, moist mucous membranes  RESP:  lungs clear to auscultation , no respiratory distress  CV:  peripheral edema moderate+ in lower legs, rate-normal  ABDOMEN:  bowel sounds normal  M/S:   Gait and station abnormal unsteady  SKIN:  Inspection of skin and subcutaneous tissue baseline  NEURO:   Examination of sensation by touch normal  PSYCH:  affect and mood normal    Labs done in SNF are in [facility]. Please refer to them using [facility]/Care Everywhere.    Assessment/Plan:  (D68.51) Factor V Leiden mutation (H24)  (primary encounter diagnosis)  Comment: INR 2.4  Plan: warfarin 5mg daily  -recheck INR on 6/17    (M48.061) Spinal stenosis of lumbar region without neurogenic claudication  (Z98.1) S/P lumbar fusion  Comment: elective surgery 5/28, incision approximated, pain+  Plan:    -PT/OT working with  -increase oxycodone to 5mg Q4h PRN  -continue APAP TID  -follow up ortho on 6/14    (D64.9) Anemia, unspecified type  Comment: Hgb 8.2, Fe 48, B12 450  Plan: continue Fe 325mg daily  -recheck Hgb in 1-2 weeks    MED REC REQUIRED  Post Medication Reconciliation Status: medication reconcilation previously completed during another office visit      Electronically signed by: SKIP Maxwell CNP          Sincerely,        SKIP Maxwell CNP

## 2024-06-13 NOTE — CONFIDENTIAL NOTE
Post-op Nurse Visit:    Patient seen today per the order of  { Neurosurgeons:460604}.   DOS: ***  Procedure: ***    Pain/Neuro Assessment  ***/10 to *** described as ***.   Numbness/tingling: ***   Strength: ***Equal strength to bilateral {upper lower:300864} extremities. Denies weakness.     Pain Relief Measures:  Oxycodone: ***  Tylenol: ***  Robaxin: ***  Ice: ***     Incision   ***Incision inspected. Edges well-approximated. No redness, swelling, drainage, or warmth noted. {Incision:582452}    Activity  ***Following restrictions   Falls: *** none  Patient is walking {frequency:872549} without difficulty.   ***Denies redness, swelling, or warmth to bilateral calves.   Wearing brace? {Yes and No:056643}    GI/  Difficulty swallowing? {Yes and No:739813}  Patient's appetite is {APPETITE:905940}  Bowel/bladder problems? {Yes and No:406716}  Taking stool softeners? {Yes and No:013539}     Refills/x-rays/return to work  Refills given at this appointment? {Yes and No:307459}  Sent for x-rays after this appointment? {Yes and No:822996}  Ordered future x-rays? {Yes and No:354530}  Scheduling team notified? (Yes or No. If xr order placed***)  Return to work discussed at this appointment? {Yes and No:585006} ***    All of patient's questions addressed today. Patient was instructed to call with any additional questions/concerns.         Instructions for Patient    Incision  ***Steri-strips were applied today, they will fall off in 7-10 days. Do not to pull them off.   Keep your incision clean and dry at all times.   It is okay to shower, just pat the incision dry   No submerging incision in water such as pools, hot tubs, or baths for at least 8 weeks and until the incision is healed  Do not apply lotions or ointments to incision    Activity  No lifting greater than 10 pounds (***25 pounds for Evin spine post-ops). {NoLimited:253936} bending, twisting, or overhead reaching.  Walking is the best way to start exercise  after surgery. Take short frequent walks. You may gradually increase the distance as tolerated. If you feel pain, decrease your activity, but DO NOT stop walking. Walking will help you gain strength, prevent muscle soreness and spasms, and prevent blood clots.   ***Continue to wear brace as directed by your surgeon  Avoid bed rest and prolonged sitting for longer than 30 minutes (change positions frequently while awake)  No contact sports or high impact activities such as; running/jogging, snowmobile or 4 simmons riding or any other recreational vehicles until after given clearance at one of your follow up visits    Medications   Refills of pain medication:   Please call the neurosurgery clinic to request 2-3 days before you run out. A nurse will call you back to obtain a pain assessment.   Weaning from narcotic pain medications  When it is time, start weaning by extending the time between doses.   For example, if you're taking 2 tabs every 4 hours, spread it out to 2 tabs over 4.5, 5, 6 hours. At that point you can certainly cut down to 1 tab, then wean to an as needed basis until completely done with them.  Don't take more than 3000mg of Acetaminophen in 24 hours  {NSAIDS:413326}  Encouraged icing for at least 3-4 times throughout the day for 20-30 minutes at a time. Avoid heat to the incision area.   Taking stool softeners regularly can reduce constipation commonly caused by narcotic pain medications.    Contact clinic or Emergency Room if you develop:   Infection (redness, swelling, warmth, drainage, fever over 101 F)  New injury  Bladder or bowel changes or loss of control    Signs of blood clot:  Swelling and/or warmth in one or both legs  Pain or tenderness in your leg, ankle, foot, or arm   Red or discolored skin     Go to the Emergency Room   If sudden onset of severe headache, weakness, confusion, change in level of consciousness, pain, or loss of movement.  Chest pain  Trouble breathing     Post-operative  appointments  {Post-operative appointments:979646}    [facility]  [facility]  [address]  [place] [address]  Telephone:  [phone]  Fax:  [phone]

## 2024-06-13 NOTE — PROGRESS NOTES
Post-op Nurse Visit:    Patient seen today per the order of  Cj Cleary MD .   DOS: 5/28/24  Procedure: L4-5 TLIF with decompression of stenosis and L2-3 bilateral laminectomy for decompression of stenosis    Resides at [facility] presently. Copy of papers sent to scanning.     Pain/Neuro Assessment  8/10 to low back described as aching.   Numbness/tingling: denies. Does report a chronic altered sensation the bottoms of feet. No change since surgery   Strength: Equal strength to bilateral lower extremities. Denies weakness.     Pain Relief Measures:  Oxycodone: 1 tab q4-6h  Tylenol: 1000mg in the afternoon  Ice: none   Denies headaches  Warfarin: restarted     Incision   Incision inspected. Edges well-approximated. No redness, swelling, drainage, or warmth noted. Incision prepped with betadine and staple(s)  removed without difficulty. Steri-strips applied.    Activity  Following restrictions   PT/OT: patient feels this is strengthening legs  Falls:  none  Patient is walking occasionally  without difficulty. Uses walker. Walks with assistance. Presents today in wheelchair. Expressed he doesn't feel strong enough to discharge home yet. I provided support and [facility] to [facility] home and will not do so until safe.  Denies redness, swelling, or warmth to bilateral calves.     GI/  Patient's appetite is normal  Bowel/bladder problems? No  Smith removed and voiding well.  Taking stool softeners? No     Refills/x-rays/return to work  Refills given at this appointment? No  Sent for x-rays after this appointment? No  Ordered future x-rays? Yes  Return to work discussed at this appointment? No retired.    All of patient's questions addressed today. Patient was instructed to call with any additional questions/concerns. Sent additional AVS for [facility]' records.    Zaina Smith RN on 6/14/2024 at 9:54 AM

## 2024-06-14 ENCOUNTER — ALLIED HEALTH/NURSE VISIT (OUTPATIENT)
Dept: NEUROSURGERY | Facility: CLINIC | Age: 89
End: 2024-06-14
Payer: COMMERCIAL

## 2024-06-14 DIAGNOSIS — Z98.1 S/P LUMBAR SPINAL FUSION: Primary | ICD-10-CM

## 2024-06-14 PROCEDURE — 99207 PR NO CHARGE NURSE ONLY: CPT

## 2024-06-14 NOTE — PATIENT INSTRUCTIONS
Instructions for Patient    Incision  Steri-strips were applied today, they will fall off in 7-10 days. Do not to pull them off.   Keep your incision clean and dry at all times.   It is okay to shower, just pat the incision dry   No submerging incision in water such as pools, hot tubs, or baths for at least 8 weeks and until the incision is healed  Do not apply lotions or ointments to incision    Activity  No lifting greater than 25 pounds . No bending, twisting, or overhead reaching.  Walking is the best way to start exercise after surgery. Take short frequent walks. You may gradually increase the distance as tolerated. If you feel pain, decrease your activity, but DO NOT stop walking. Walking will help you gain strength, prevent muscle soreness and spasms, and prevent blood clots.   Avoid bed rest and prolonged sitting for longer than 30 minutes (change positions frequently while awake)  No contact sports or high impact activities such as; running/jogging, snowmobile or 4 simmons riding or any other recreational vehicles until after given clearance at one of your follow up visits    Medications   Refills of pain medication:   Please call the neurosurgery clinic to request 2-3 days before you run out. A nurse will call you back to obtain a pain assessment.   Weaning from narcotic pain medications  When it is time, start weaning by extending the time between doses.   For example, if you're taking 2 tabs every 4 hours, spread it out to 2 tabs over 4.5, 5, 6 hours. At that point you can certainly cut down to 1 tab, then wean to an as needed basis until completely done with them.  Don't take more than 3000mg of Acetaminophen in 24 hours  Avoid Aspirin and NSAIDs (ex: ibuprofen/Advil) until given clearance (likely at the 6-week post-op appointment).  Encouraged icing for at least 3-4 times throughout the day for 20-30 minutes at a time. Especially today after the staples have been removed. Avoid heat to the incision  area.   Taking stool softeners regularly can reduce constipation commonly caused by narcotic pain medications.    Contact clinic or Emergency Room if you develop:   Infection (redness, swelling, warmth, drainage, fever over 101 F)  New injury  Bladder or bowel changes or loss of control    Signs of blood clot:  Swelling and/or warmth in one or both legs  Pain or tenderness in your leg, ankle, foot, or arm   Red or discolored skin     Go to the Emergency Room   If sudden onset of severe headache, weakness, confusion, change in level of consciousness, pain, or loss of movement.  Chest pain  Trouble breathing     Post-operative appointments  Arrive 30 minutes before your 6 week, 3 month, 6 month, and 1 year post-op appointments to allow time for an x-ray before each    [facility]  [place] [address] [place] [address]  Telephone:  [phone]  Fax:  [phone]

## 2024-06-17 ENCOUNTER — TRANSITIONAL CARE UNIT VISIT (OUTPATIENT)
Dept: GERIATRICS | Facility: CLINIC | Age: 89
End: 2024-06-17
Payer: COMMERCIAL

## 2024-06-17 VITALS
OXYGEN SATURATION: 94 % | TEMPERATURE: 98.1 F | RESPIRATION RATE: 15 BRPM | WEIGHT: 237 LBS | BODY MASS INDEX: 33.18 KG/M2 | HEART RATE: 65 BPM | DIASTOLIC BLOOD PRESSURE: 82 MMHG | SYSTOLIC BLOOD PRESSURE: 157 MMHG | HEIGHT: 71 IN

## 2024-06-17 DIAGNOSIS — Z98.890 HX OF LUMBOSACRAL SPINE SURGERY: Primary | ICD-10-CM

## 2024-06-17 DIAGNOSIS — G25.81 RESTLESS LEG SYNDROME: ICD-10-CM

## 2024-06-17 DIAGNOSIS — I10 ESSENTIAL HYPERTENSION: ICD-10-CM

## 2024-06-17 DIAGNOSIS — E11.40 CONTROLLED TYPE 2 DIABETES WITH NEUROPATHY (H): ICD-10-CM

## 2024-06-17 DIAGNOSIS — R33.9 URINE RETENTION: ICD-10-CM

## 2024-06-17 DIAGNOSIS — Z86.718 HISTORY OF DEEP VENOUS THROMBOSIS (DVT) OF DISTAL VEIN OF LEFT LOWER EXTREMITY: ICD-10-CM

## 2024-06-17 PROBLEM — E66.01 MORBID OBESITY DUE TO EXCESS CALORIES (H): Status: RESOLVED | Noted: 2017-07-31 | Resolved: 2024-06-17

## 2024-06-17 PROCEDURE — 99305 1ST NF CARE MODERATE MDM 35: CPT | Performed by: FAMILY MEDICINE

## 2024-06-17 ASSESSMENT — ENCOUNTER SYMPTOMS
SHORTNESS OF BREATH: 0
ABDOMINAL PAIN: 0
BACK PAIN: 1
APPETITE CHANGE: 0
FEVER: 0
CHILLS: 0
CONSTIPATION: 0
NAUSEA: 0
DIARRHEA: 0
COUGH: 0

## 2024-06-17 NOTE — PROGRESS NOTES
HISTORY OF PRESENT ILLNESS:  Robert is a 88 year old male, (1935), is being seen today at [facility] following hospitalization at [facility] from 5/28/2024 through 6/2/2024.  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  He feels his leg weakness is improving.  He feels physical therapy and Occupational Therapy are helping.  He does still get back pain limited to the back and denies radiation.  He feels oxycodone has been helpful.  He stated he tried gabapentin once and did not like how he felt on it and did not continue to take it.  He does complain of not sleeping well and being very restless.  He just finished therapy and is having more severe back pain.  Nursing offered him an ice pack and he declined at this point.  He feels that if he just moves right he will be able to improve the pain.      Past Medical History/  Patient Active Problem List    Diagnosis Date Noted    Urine retention 06/06/2024     Priority: Medium    Spondylolisthesis of lumbar region 06/03/2024     Priority: Medium    Generalized muscle weakness 06/03/2024     Priority: Medium    Anemia, unspecified type 06/03/2024     Priority: Medium    History of deep venous thrombosis (DVT) of distal vein of left lower extremity 06/03/2024     Priority: Medium    S/P lumbar fusion 05/28/2024     Priority: Medium    Hx of lumbosacral spine surgery 10/02/2023     Priority: Medium    Asymmetrical sensorineural hearing loss 06/09/2021     Priority: Medium    Subjective tinnitus 06/09/2021     Priority: Medium    Venous stasis dermatitis of right lower extremity 06/09/2021     Priority: Medium    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 yes        Gout 07/03/2012     Priority: Medium    Back pain 09/27/2011     Priority: Medium    Hyperlipidemia LDL goal <100 09/27/2011     Priority: Medium    Factor V Leiden mutation (H24) 09/08/2011     Priority: Medium    Prothrombin mutation (H24) 09/08/2011     Priority: Medium     [id number]      Sleep apnea 05/19/2011     Priority: Medium    Restless leg syndrome 10/20/2010     Priority: Medium       Past Surgical History:   Procedure Laterality Date    APPENDECTOMY  1950    JOINT REPLACEMENT  4/2011    R hip    LAMINECTOMY LUMBAR ONE LEVEL  12/2008    LAPAROSCOPIC HERNIORRHAPHY UMBILICAL N/A 1/3/2019    Procedure: laparoscopic umbilical hernia repair with mesh;  Surgeon: Jamal Thompson DO;  Location:  OR    OPTICAL TRACKING SYSTEM FUSION POSTERIOR SPINE LUMBAR Bilateral 5/28/2024    Procedure: Lumbar 4 to Lumbar 5 Transforaminal Interbody Fusion with decompression of stenosis and Lumbar 2 to Lumbar 3 bilateral laminectomy for decompression of stenosis;  Surgeon: Cj Cleary MD;  Location:  OR    [facility] REVISE TOTAL HIP REPLACEMENT  1/18/13    R hip       Family History   Problem Relation Age of Onset    Prostate Cancer Paternal Grandfather         Social History     Tobacco Use    Smoking status: Never    Smokeless tobacco: Never   Substance Use Topics    Alcohol use: Yes     Alcohol/week: 0.0 standard drinks of alcohol     Comment: 1 drink every 1-2 weeks.        Current Outpatient Medications   Medication Sig Dispense Refill     acetaminophen (TYLENOL) 500 MG tablet Take 1-2 tablets (500-1,000 mg) by mouth 3 times daily      albuterol (PROAIR HFA/PROVENTIL HFA/VENTOLIN HFA) 108 (90 Base) MCG/ACT inhaler Inhale 2 puffs into the lungs every 6 hours as needed for shortness of breath, wheezing or cough 18 g 0    atenolol (TENORMIN) 50 MG tablet Take 1 tablet (50 mg) by mouth 2 times daily 180 tablet 3    benzonatate (TESSALON) 200 MG capsule Take 1 capsule (200 mg) by mouth 3 times daily as needed for cough 20 capsule 0    glipiZIDE (GLUCOTROL XL) 5 MG 24 hr tablet Take 1 tablet (5 mg) by mouth daily 90 tablet 3    ipratropium (ATROVENT) 0.06 % nasal spray USE 2 SPRAY(S) IN EACH NOSTRIL 4 TIMES DAILY 45 mL 0    losartan (COZAAR) 50 MG tablet Take 1 tablet (50 mg) by mouth daily      multiple vitamin  tablet TABS Take 1 tablet by mouth daily      oxyCODONE (ROXICODONE) 5 MG tablet Take 1 tablet (5 mg) by mouth every 4 hours as needed for moderate to severe pain      pramipexole (MIRAPEX) 1 MG tablet TAKE 1 TABLET BY MOUTH TWICE DAILY AT  4  PM  AND  9   tablet 3    senna-docusate (SENOKOT-S/PERICOLACE) 8.6-50 MG tablet Take 1 tablet by mouth daily 30 tablet 0    spironolactone (ALDACTONE) 25 MG tablet Take 1 tablet (25 mg) by mouth daily 90 tablet 3    tamsulosin (FLOMAX) 0.4 MG capsule Take 1 capsule (0.4 mg) by mouth daily      amoxicillin (AMOXIL) 500 MG capsule Take 4 caps 1 hour before procedure. 8 capsule 1    STATIN NOT PRESCRIBED (INTENTIONAL) Please choose reason not prescribed, below      warfarin ANTICOAGULANT (COUMADIN) 5 MG tablet Take 0.5 tablets (2.5 mg) by mouth twice a week (0.5 x 5 mg = 2.5 mg:Monday & Fridays)Take 2.5 mg by mouth twice a week (0.5 x 5 mg = 2.5 mg:Monday & Fridays)  Hold coumadin and resume on 6/5/24       No current facility-administered medications for this visit.       Allergies   Allergen Reactions    Gabapentin Dizziness    Lipitor [Atorvastatin Calcium]      Muscle pain, weakness    Pravastatin  "Other (See Comments)     muscle aches    Simvastatin Other (See Comments)     muscle aches    Statins Muscle Pain (Myalgia)       Information reviewed:  Medications, vital signs, orders, and nursing notes.    Review of Systems   Constitutional:  Negative for appetite change, chills and fever.   HENT:          Complains of chronic throat congestion and phlegm production   Respiratory:  Negative for cough and shortness of breath.    Cardiovascular:  Negative for chest pain.   Gastrointestinal:  Negative for abdominal pain, constipation, diarrhea and nausea.   Musculoskeletal:  Positive for back pain (Mid lower back without radiation.  Denies numbness or tingling in his legs.  Does admit to some weakness in his legs).   Skin:  Negative for rash.        OBJECTIVE:  BP (!) 157/82   Pulse 65   Temp 98.1  F (36.7  C)   Resp 15   Ht 1.803 m (5' 11\")   Wt 107.5 kg (237 lb)   SpO2 94%   BMI 33.05 kg/m    Physical Exam  Constitutional:       General: He is not in acute distress.  Neck:      Thyroid: No thyromegaly.   Cardiovascular:      Rate and Rhythm: Normal rate and regular rhythm.      Heart sounds: No murmur heard.  Pulmonary:      Effort: Pulmonary effort is normal. No respiratory distress.      Breath sounds: Normal breath sounds. No wheezing or rales.   Abdominal:      General: Bowel sounds are normal. There is no distension.      Palpations: Abdomen is soft.      Tenderness: There is no abdominal tenderness.   Musculoskeletal:      Right lower leg: Edema present.      Left lower leg: Edema present.   Skin:     General: Skin is warm and dry.      Comments: Bilateral venous stasis changes in both legs to mid shin   Neurological:      Mental Status: He is alert and oriented to person, place, and time.   Psychiatric:         Mood and Affect: Mood normal.         Behavior: Behavior normal.         Thought Content: Thought content normal.         Judgment: Judgment normal.          ASSESSMENT/PLAN:    MED REC " REQUIRED  Post Medication Reconciliation Status:  Discharge medications reconciled and changed, see notes/orders     Hx of lumbosacral spine surgery  Continue to follow with neurosurgery as scheduled.  Continue physical therapy and Occupational Therapy.  He will continue to use the oxycodone as needed.  Discussed using gabapentin only at night to see if it helps out with his pain as well as sleep.  He feels it is reasonable to try this and will start 300 mg at bedtime only.    History of deep venous thrombosis (DVT) of distal vein of left lower extremity  He has a history of DVT as well as factor V Leiden mutation and prothrombin mutation.  He is at high risk for clots after surgery.  He is on chronic warfarin which has been restarted since his surgery.    Urine retention  Continue tamsulosin    Restless leg syndrome  Continue Mirapex    Essential hypertension  Currently on atenolol and losartan.  Blood pressures are running a little elevated which is most likely due to pain.  However if they do not improve would consider increasing his losartan.    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

## 2024-06-17 NOTE — ASSESSMENT & PLAN NOTE
He has a history of DVT as well as factor V Leiden mutation and prothrombin mutation.  He is at high risk for clots after surgery.  He is on chronic warfarin which has been restarted since his surgery.

## 2024-06-17 NOTE — ASSESSMENT & PLAN NOTE
Continue to follow with neurosurgery as scheduled.  Continue physical therapy and Occupational Therapy.  He will continue to use the oxycodone as needed.  Discussed using gabapentin only at night to see if it helps out with his pain as well as sleep.  He feels it is reasonable to try this and will start 300 mg at bedtime only.

## 2024-06-17 NOTE — ASSESSMENT & PLAN NOTE
Currently on atenolol and losartan.  Blood pressures are running a little elevated which is most likely due to pain.  However if they do not improve would consider increasing his losartan.

## 2024-06-17 NOTE — ASSESSMENT & PLAN NOTE
Lab Results   Component Value Date    A1C 6.8 06/05/2024    A1C 7.0 04/30/2024    A1C 7.8 01/26/2024    A1C 6.9 11/07/2022    A1C 7.1 01/25/2022    A1C 7.2 06/15/2021    A1C 8.3 02/05/2021    A1C 7.9 01/21/2020    A1C 7.1 06/20/2019    A1C 6.4 10/16/2018     Controlled on glipizide

## 2024-06-20 ENCOUNTER — TRANSITIONAL CARE UNIT VISIT (OUTPATIENT)
Dept: GERIATRICS | Facility: CLINIC | Age: 89
End: 2024-06-20
Payer: COMMERCIAL

## 2024-06-20 VITALS
OXYGEN SATURATION: 92 % | DIASTOLIC BLOOD PRESSURE: 80 MMHG | HEART RATE: 60 BPM | WEIGHT: 243.2 LBS | SYSTOLIC BLOOD PRESSURE: 157 MMHG | TEMPERATURE: 98.1 F | BODY MASS INDEX: 33.92 KG/M2 | RESPIRATION RATE: 18 BRPM

## 2024-06-20 DIAGNOSIS — D68.51 FACTOR V LEIDEN MUTATION (H): Primary | ICD-10-CM

## 2024-06-20 DIAGNOSIS — Z98.1 S/P LUMBAR FUSION: ICD-10-CM

## 2024-06-20 DIAGNOSIS — M48.061 SPINAL STENOSIS OF LUMBAR REGION WITHOUT NEUROGENIC CLAUDICATION: ICD-10-CM

## 2024-06-20 DIAGNOSIS — D64.9 ANEMIA, UNSPECIFIED TYPE: ICD-10-CM

## 2024-06-20 LAB — INR (EXTERNAL): 2.3 (ref 0.9–1.1)

## 2024-06-20 PROCEDURE — 99309 SBSQ NF CARE MODERATE MDM 30: CPT | Performed by: NURSE PRACTITIONER

## 2024-06-20 RX ORDER — GABAPENTIN 300 MG/1
300 CAPSULE ORAL AT BEDTIME
Status: SHIPPED
Start: 2024-06-20 | End: 2024-07-05

## 2024-06-20 NOTE — LETTER
6/20/2024      Robert Richard  [address] [facility] [address]        [facility] GERIATRICS    Chief Complaint   Patient presents with     RECHECK     HPI:  Robert Richard is a 88 year old  (1935), who is being seen today for an episodic care visit at: [facility] ([facility]) [8]. Today's concern is:   1. Factor V Leiden mutation (H24)    2. Spinal stenosis of lumbar region without neurogenic claudication    3. S/P lumbar fusion    4. Anemia, unspecified type      Patient seen for follow up, INR today 2.3, no s/s bruising nor bleeding, post spinal fusion with staples out and incision approximated, afebrile, WBC 6.0, participating with therapies, Hgb low end 8.2, B12 450, Fe 48, asymptomatic, overall appears healthy.    Allergies, and PMH/PSH reviewed in EPIC today.  REVIEW OF SYSTEMS:  4 point ROS including Respiratory, CV, GI and , other than that noted in the HPI,  is negative    Objective:   BP (!) 157/80   Pulse 60   Temp 98.1  F (36.7  C)   Resp 18   Wt 110.3 kg (243 lb 3.2 oz)   SpO2 92%   BMI 33.92 kg/m    GENERAL APPEARANCE:  in no distress, appears healthy  ENT:  Mouth and posterior oropharynx normal, moist mucous membranes  RESP:  lungs clear to auscultation , no respiratory distress  CV:  peripheral edema mild+ in lower legs/pedal, rate-normal  ABDOMEN:  bowel sounds normal  M/S:   Gait and station abnormal unsteady  SKIN:  Inspection of skin and subcutaneous tissue baseline  NEURO:   Examination of sensation by touch normal  PSYCH:  affect and mood normal    Labs done in SNF are in [facility]. Please refer to them using [employer]/Care Everywhere.    Assessment/Plan:  (D68.51) Factor V Leiden mutation (H24)  (primary encounter diagnosis)  Comment: INR 2.3  Plan: warfarin 2.5mg daily  -recheck INR on 6/24    (M48.061) Spinal stenosis of lumbar region without neurogenic claudication  (Z98.1) S/P lumbar fusion  Comment: post fusion, incision approximated, afebrile  Plan:  PT/OT working with  -start gabapentin 300mg at bedtime  -continue APAP TID, oxycodone PRN  -follow up ortho on 7/11    (D64.9) Anemia, unspecified type  Comment: Hgb was 7.7, then 8.2, asymptomatic  Plan: check Hgb, Fe on 6/24  -if Hgb not up again plan to check FOBT    MED REC REQUIRED  Post Medication Reconciliation Status: medication reconcilation previously completed during another office visit        Electronically signed by: SKIP Maxwell CNP          Sincerely,        SKIP Maxwell CNP

## 2024-06-20 NOTE — PROGRESS NOTES
[facility] GERIATRICS    Chief Complaint   Patient presents with    RECHECK     HPI:  Robert Richard is a 88 year old  (1935), who is being seen today for an episodic care visit at: [facility] ([facility]) [8]. Today's concern is:   1. Factor V Leiden mutation (H24)    2. Spinal stenosis of lumbar region without neurogenic claudication    3. S/P lumbar fusion    4. Anemia, unspecified type      Patient seen for follow up, INR today 2.3, no s/s bruising nor bleeding, post spinal fusion with staples out and incision approximated, afebrile, WBC 6.0, participating with therapies, Hgb low end 8.2, B12 450, Fe 48, asymptomatic, overall appears healthy.    Allergies, and PMH/PSH reviewed in EPIC today.  REVIEW OF SYSTEMS:  4 point ROS including Respiratory, CV, GI and , other than that noted in the HPI,  is negative    Objective:   BP (!) 157/80   Pulse 60   Temp 98.1  F (36.7  C)   Resp 18   Wt 110.3 kg (243 lb 3.2 oz)   SpO2 92%   BMI 33.92 kg/m    GENERAL APPEARANCE:  in no distress, appears healthy  ENT:  Mouth and posterior oropharynx normal, moist mucous membranes  RESP:  lungs clear to auscultation , no respiratory distress  CV:  peripheral edema mild+ in lower legs/pedal, rate-normal  ABDOMEN:  bowel sounds normal  M/S:   Gait and station abnormal unsteady  SKIN:  Inspection of skin and subcutaneous tissue baseline  NEURO:   Examination of sensation by touch normal  PSYCH:  affect and mood normal    Labs done in SNF are in [facility]. Please refer to them using [facility]/Care Everywhere.    Assessment/Plan:  (D68.51) Factor V Leiden mutation (H24)  (primary encounter diagnosis)  Comment: INR 2.3  Plan: warfarin 2.5mg daily  -recheck INR on 6/24    (M48.061) Spinal stenosis of lumbar region without neurogenic claudication  (Z98.1) S/P lumbar fusion  Comment: post fusion, incision approximated, afebrile  Plan: PT/OT working with  -start gabapentin 300mg at bedtime  -continue APAP TID,  oxycodone PRN  -follow up ortho on 7/11    (D64.9) Anemia, unspecified type  Comment: Hgb was 7.7, then 8.2, asymptomatic  Plan: check Hgb, Fe on 6/24  -if Hgb not up again plan to check FOBT    MED REC REQUIRED  Post Medication Reconciliation Status: medication reconcilation previously completed during another office visit        Electronically signed by: SKIP Maxwell CNP

## 2024-06-21 ENCOUNTER — DOCUMENTATION ONLY (OUTPATIENT)
Dept: OTHER | Facility: CLINIC | Age: 89
End: 2024-06-21
Payer: COMMERCIAL

## 2024-06-23 ENCOUNTER — LAB REQUISITION (OUTPATIENT)
Dept: LAB | Facility: CLINIC | Age: 89
End: 2024-06-23
Payer: COMMERCIAL

## 2024-06-23 VITALS
HEART RATE: 64 BPM | BODY MASS INDEX: 33.89 KG/M2 | SYSTOLIC BLOOD PRESSURE: 114 MMHG | OXYGEN SATURATION: 95 % | WEIGHT: 243 LBS | TEMPERATURE: 97.8 F | RESPIRATION RATE: 18 BRPM | DIASTOLIC BLOOD PRESSURE: 75 MMHG

## 2024-06-23 DIAGNOSIS — D64.9 ANEMIA, UNSPECIFIED: ICD-10-CM

## 2024-06-24 ENCOUNTER — TRANSITIONAL CARE UNIT VISIT (OUTPATIENT)
Dept: GERIATRICS | Facility: CLINIC | Age: 89
End: 2024-06-24
Payer: COMMERCIAL

## 2024-06-24 DIAGNOSIS — M48.061 SPINAL STENOSIS OF LUMBAR REGION WITHOUT NEUROGENIC CLAUDICATION: ICD-10-CM

## 2024-06-24 DIAGNOSIS — Z98.1 S/P LUMBAR FUSION: ICD-10-CM

## 2024-06-24 DIAGNOSIS — D68.51 FACTOR V LEIDEN MUTATION (H): Primary | ICD-10-CM

## 2024-06-24 DIAGNOSIS — N18.30 STAGE 3 CHRONIC KIDNEY DISEASE, UNSPECIFIED WHETHER STAGE 3A OR 3B CKD (H): ICD-10-CM

## 2024-06-24 LAB
HGB BLD-MCNC: 10.8 G/DL (ref 13.3–17.7)
INR (EXTERNAL): 2 (ref 0.9–1.1)
IRON SERPL-MCNC: 62 UG/DL (ref 61–157)

## 2024-06-24 PROCEDURE — P9603 ONE-WAY ALLOW PRORATED MILES: HCPCS | Mod: ORL | Performed by: NURSE PRACTITIONER

## 2024-06-24 PROCEDURE — 99309 SBSQ NF CARE MODERATE MDM 30: CPT | Performed by: NURSE PRACTITIONER

## 2024-06-24 PROCEDURE — 36415 COLL VENOUS BLD VENIPUNCTURE: CPT | Mod: ORL | Performed by: NURSE PRACTITIONER

## 2024-06-24 PROCEDURE — 85018 HEMOGLOBIN: CPT | Mod: ORL | Performed by: NURSE PRACTITIONER

## 2024-06-24 PROCEDURE — 83540 ASSAY OF IRON: CPT | Mod: ORL | Performed by: NURSE PRACTITIONER

## 2024-06-24 NOTE — PROGRESS NOTES
[facility] GERIATRICS    Chief Complaint   Patient presents with    RECHECK     HPI:  Robert Richard is a 88 year old  (1935), who is being seen today for an episodic care visit at: [facility] ([facility]) [8]. Today's concern is:   1. Factor V Leiden mutation (H24)    2. Spinal stenosis of lumbar region without neurogenic claudication    3. S/P lumbar fusion    4. Stage 3 chronic kidney disease, unspecified whether stage 3a or 3b CKD (H)      Patient seen for follow up, INR today 2.0, no s/s bleed, misc UE bruising, post fusion with incision approximated, pain+ but controlled, participating with therapies, recent creat 1.24, GFR 56, overall appears healthy for age.    Allergies, and PMH/PSH reviewed in EPIC today.  REVIEW OF SYSTEMS:  4 point ROS including Respiratory, CV, GI and , other than that noted in the HPI,  is negative    Objective:   /75   Pulse 64   Temp 97.8  F (36.6  C)   Resp 18   Wt 110.2 kg (243 lb)   SpO2 95%   BMI 33.89 kg/m    GENERAL APPEARANCE:  in no distress, appears healthy  ENT:  Mouth and posterior oropharynx normal, moist mucous membranes  RESP:  lungs clear to auscultation , no respiratory distress  CV:  peripheral edema mild+ in lower legs, rate-normal  ABDOMEN:  bowel sounds normal  M/S:   Gait and station abnormal unsteady  SKIN:  Inspection of skin and subcutaneous tissue baseline  NEURO:   Examination of sensation by touch normal  PSYCH:  affect and mood normal    Labs done in SNF are in [facility]. Please refer to them using [facility]/Care Everywhere.    Assessment/Plan:  (D68.51) Factor V Leiden mutation (H24)  (primary encounter diagnosis)  Comment: INR 2.0  Plan: warfarin 5mg daily  -recheck INR on 6/27    (M48.061) Spinal stenosis of lumbar region without neurogenic claudication  (Z98.1) S/P lumbar fusion  Comment: elective fusion on 5/28, pain+, incision approximated  Plan: PT/OT working with   -continue gabapentin 300mg at bedtime, oxy Q4  PRN, APAP TID  -lovenox completed, no s/s DVT  -plan to discontinue oxycodone this week if not using    (N18.30) Stage 3 chronic kidney disease, unspecified whether stage 3a or 3b CKD (H)  Comment: creat 1.24, GFR 56  Plan: dose meds renally as possible  -repeat BMP in 2-3 months    MED REC REQUIRED  Post Medication Reconciliation Status: medication reconcilation previously completed during another office visit          Electronically signed by: SKIP Maxwell CNP

## 2024-06-24 NOTE — LETTER
6/24/2024      Robert Richard  [address] [facility] [address]        [facility] GERIATRICS    Chief Complaint   Patient presents with     RECHECK     HPI:  Robert Richard is a 88 year old  (1935), who is being seen today for an episodic care visit at: [facility] ([facility]) [8]. Today's concern is:   1. Factor V Leiden mutation (H24)    2. Spinal stenosis of lumbar region without neurogenic claudication    3. S/P lumbar fusion    4. Stage 3 chronic kidney disease, unspecified whether stage 3a or 3b CKD (H)      Patient seen for follow up, INR today 2.0, no s/s bleed, misc UE bruising, post fusion with incision approximated, pain+ but controlled, participating with therapies, recent creat 1.24, GFR 56, overall appears healthy for age.    Allergies, and PMH/PSH reviewed in EPIC today.  REVIEW OF SYSTEMS:  4 point ROS including Respiratory, CV, GI and , other than that noted in the HPI,  is negative    Objective:   /75   Pulse 64   Temp 97.8  F (36.6  C)   Resp 18   Wt 110.2 kg (243 lb)   SpO2 95%   BMI 33.89 kg/m    GENERAL APPEARANCE:  in no distress, appears healthy  ENT:  Mouth and posterior oropharynx normal, moist mucous membranes  RESP:  lungs clear to auscultation , no respiratory distress  CV:  peripheral edema mild+ in lower legs, rate-normal  ABDOMEN:  bowel sounds normal  M/S:   Gait and station abnormal unsteady  SKIN:  Inspection of skin and subcutaneous tissue baseline  NEURO:   Examination of sensation by touch normal  PSYCH:  affect and mood normal    Labs done in SNF are in [facility]. Please refer to them using [facility]/Care Everywhere.    Assessment/Plan:  (D68.51) Factor V Leiden mutation (H24)  (primary encounter diagnosis)  Comment: INR 2.0  Plan: warfarin 5mg daily  -recheck INR on 6/27    (M48.061) Spinal stenosis of lumbar region without neurogenic claudication  (Z98.1) S/P lumbar fusion  Comment: elective fusion on 5/28, pain+, incision  approximated  Plan: PT/OT working with   -continue gabapentin 300mg at bedtime, oxy Q4 PRN, APAP TID  -lovenox completed, no s/s DVT  -plan to discontinue oxycodone this week if not using    (N18.30) Stage 3 chronic kidney disease, unspecified whether stage 3a or 3b CKD (H)  Comment: creat 1.24, GFR 56  Plan: dose meds renally as possible  -repeat BMP in 2-3 months    MED REC REQUIRED  Post Medication Reconciliation Status: medication reconcilation previously completed during another office visit          Electronically signed by: SKIP Maxwell CNP          Sincerely,        SKIP Maxwell CNP

## 2024-06-27 ENCOUNTER — TRANSITIONAL CARE UNIT VISIT (OUTPATIENT)
Dept: GERIATRICS | Facility: CLINIC | Age: 89
End: 2024-06-27
Payer: COMMERCIAL

## 2024-06-27 VITALS
DIASTOLIC BLOOD PRESSURE: 82 MMHG | TEMPERATURE: 97.6 F | WEIGHT: 245.8 LBS | HEART RATE: 62 BPM | SYSTOLIC BLOOD PRESSURE: 179 MMHG | BODY MASS INDEX: 34.28 KG/M2 | RESPIRATION RATE: 16 BRPM | OXYGEN SATURATION: 93 %

## 2024-06-27 DIAGNOSIS — N18.32 STAGE 3B CHRONIC KIDNEY DISEASE (H): Primary | ICD-10-CM

## 2024-06-27 DIAGNOSIS — M48.061 SPINAL STENOSIS OF LUMBAR REGION WITHOUT NEUROGENIC CLAUDICATION: ICD-10-CM

## 2024-06-27 DIAGNOSIS — D68.51 FACTOR V LEIDEN MUTATION (H): ICD-10-CM

## 2024-06-27 DIAGNOSIS — Z98.1 S/P LUMBAR FUSION: ICD-10-CM

## 2024-06-27 LAB — INR (EXTERNAL): 2.7 (ref 0.9–1.1)

## 2024-06-27 PROCEDURE — 99309 SBSQ NF CARE MODERATE MDM 30: CPT | Performed by: NURSE PRACTITIONER

## 2024-06-27 NOTE — LETTER
6/27/2024      Robert Richard  [address] [facility] [address]        [facility] GERIATRICS    Chief Complaint   Patient presents with     RECHECK     HPI:  Robert Richard is a 88 year old  (1935), who is being seen today for an episodic care visit at: [facility] ([facility]) [8]. Today's concern is:   1. Stage 3b chronic kidney disease (H)    2. Factor V Leiden mutation (H24)    3. Spinal stenosis of lumbar region without neurogenic claudication    4. S/P lumbar fusion      Patient seen for follow up, post spinal fusion, tolerated well, pain controlled, incision approximated, patient slightly hesitant for TCU discharge, also INR 2.7 with no s/s bleed nor bruise, also recent creat 1.24, GFR 56, overall appears healthy.    Allergies, and PMH/PSH reviewed in EPIC today.  REVIEW OF SYSTEMS:  4 point ROS including Respiratory, CV, GI and , other than that noted in the HPI,  is negative    Objective:   BP (!) 179/82   Pulse 62   Temp 97.6  F (36.4  C)   Resp 16   Wt 111.5 kg (245 lb 12.8 oz)   SpO2 93%   BMI 34.28 kg/m    GENERAL APPEARANCE:  in no distress, appears healthy  ENT:  Mouth and posterior oropharynx normal, moist mucous membranes  RESP:  lungs clear to auscultation , no respiratory distress  CV:  peripheral edema 1-2+ in lower legs, rate-normal  ABDOMEN:  bowel sounds normal  M/S:   Gait and station abnormal unsteady  SKIN:  Inspection of skin and subcutaneous tissue baseline  NEURO:   Examination of sensation by touch normal  PSYCH:  affect and mood normal    Labs done in SNF are in [facility]. Please refer to them using [facility]/Care Everywhere.    Assessment/Plan:  (N18.32) Stage 3b chronic kidney disease (H)  (primary encounter diagnosis)  Comment: creat 1.24, GFR 56  Plan: dose meds renally  -repeat BMP in 1-2 weeks    (D68.51) Factor V Leiden mutation (H24)  Comment: INR 2.7  Plan: warfarin 2.5mg daily  -recheck INR on 7/1    (M48.061) Spinal stenosis of lumbar  region without neurogenic claudication  (Z98.1) S/P lumbar fusion  Comment: post fusion, pain controlled, ambulating  Plan: PT/OT working with   -continue APAP TID, oxycodone PTN  -plan to reduce oxycodone next week  -follow up ortho on 7/11    MED REC REQUIRED  Post Medication Reconciliation Status: medication reconcilation previously completed during another office visit        Electronically signed by: SKIP Maxwell CNP         Documentation of Face-to-Face and Certification for Home Health Services     Patient: Robert Richard   YOB: 1935  MR Number: [id number]  Today's Date: 6/27/2024    I certify that patient: Robert Richard is under my care and that I, or a nurse practitioner or physician's assistant working with me, had a face-to-face encounter that meets the physician face-to-face encounter requirements with this patient on: 6/27/2024.    This encounter with the patient was in whole, or in part, for the following medical condition, which is the primary reason for home health care:   1. Stage 3b chronic kidney disease (H)    2. Factor V Leiden mutation (H24)    3. Spinal stenosis of lumbar region without neurogenic claudication    4. S/P lumbar fusion          I certify that, based on my findings, the following services are medically necessary home health services: Nursing, Occupational Therapy, Physical Therapy, and HHA .    My clinical findings support the need for the above services because: Nurse is needed: To provide caregiver training to assist with: med teaching, pain.., Occupational Therapy Services are needed to assess and treat cognitive ability and address ADL safety due to impairment in transfers., Physical Therapy Services are needed to assess and treat the following functional impairments: gait., and HHA for bath.    Further, I certify that my clinical findings support that this patient is homebound (i.e. absences from home require considerable and taxing  effort and are for medical reasons or [employer] services or infrequently or of short duration when for other reasons) because: Requires assistance of another person or specialized equipment to access medical services because patient: Is unable to operate assistive equipment on their own...    Based on the above findings. I certify that this patient is confined to the home and needs intermittent skilled nursing care, physical therapy and/or speech therapy.  The patient is under my care, and I have initiated the establishment of the plan of care.  This patient will be followed by a physician who will periodically review the plan of care.  Physician/Provider to provide follow up care: Stiven Donahue    Responsible Medicare certified PECOS Physician: Jay Rojas NP  Electronic Physician Signature  Date: 6/27/2024           Sincerely,        SKIP Maxwell CNP

## 2024-06-27 NOTE — PROGRESS NOTES
[facility] GERIATRICS    Chief Complaint   Patient presents with    RECHECK     HPI:  Robert Richard is a 88 year old  (1935), who is being seen today for an episodic care visit at: [facility] ([facility]) [8]. Today's concern is:   1. Stage 3b chronic kidney disease (H)    2. Factor V Leiden mutation (H24)    3. Spinal stenosis of lumbar region without neurogenic claudication    4. S/P lumbar fusion      Patient seen for follow up, post spinal fusion, tolerated well, pain controlled, incision approximated, patient slightly hesitant for TCU discharge, also INR 2.7 with no s/s bleed nor bruise, also recent creat 1.24, GFR 56, overall appears healthy.    Allergies, and PMH/PSH reviewed in EPIC today.  REVIEW OF SYSTEMS:  4 point ROS including Respiratory, CV, GI and , other than that noted in the HPI,  is negative    Objective:   BP (!) 179/82   Pulse 62   Temp 97.6  F (36.4  C)   Resp 16   Wt 111.5 kg (245 lb 12.8 oz)   SpO2 93%   BMI 34.28 kg/m    GENERAL APPEARANCE:  in no distress, appears healthy  ENT:  Mouth and posterior oropharynx normal, moist mucous membranes  RESP:  lungs clear to auscultation , no respiratory distress  CV:  peripheral edema 1-2+ in lower legs, rate-normal  ABDOMEN:  bowel sounds normal  M/S:   Gait and station abnormal unsteady  SKIN:  Inspection of skin and subcutaneous tissue baseline  NEURO:   Examination of sensation by touch normal  PSYCH:  affect and mood normal    Labs done in SNF are in [facility]. Please refer to them using [facility]/Care Everywhere.    Assessment/Plan:  (N18.32) Stage 3b chronic kidney disease (H)  (primary encounter diagnosis)  Comment: creat 1.24, GFR 56  Plan: dose meds renally  -repeat BMP in 1-2 weeks    (D68.51) Factor V Leiden mutation (H24)  Comment: INR 2.7  Plan: warfarin 2.5mg daily  -recheck INR on 7/1    (M48.061) Spinal stenosis of lumbar region without neurogenic claudication  (Z98.1) S/P lumbar fusion  Comment: post  fusion, pain controlled, ambulating  Plan: PT/OT working with   -continue APAP TID, oxycodone PTN  -plan to reduce oxycodone next week  -follow up ortho on 7/11    MED REC REQUIRED  Post Medication Reconciliation Status: medication reconcilation previously completed during another office visit        Electronically signed by: SKIP Maxwell CNP         Documentation of Face-to-Face and Certification for Home Health Services     Patient: Robert Richard   YOB: 1935  MR Number: [id number]  Today's Date: 6/27/2024    I certify that patient: Robert Richard is under my care and that I, or a nurse practitioner or physician's assistant working with me, had a face-to-face encounter that meets the physician face-to-face encounter requirements with this patient on: 6/27/2024.    This encounter with the patient was in whole, or in part, for the following medical condition, which is the primary reason for home health care:   1. Stage 3b chronic kidney disease (H)    2. Factor V Leiden mutation (H24)    3. Spinal stenosis of lumbar region without neurogenic claudication    4. S/P lumbar fusion          I certify that, based on my findings, the following services are medically necessary home health services: Nursing, Occupational Therapy, Physical Therapy, and HHA .    My clinical findings support the need for the above services because: Nurse is needed: To provide caregiver training to assist with: med teaching, pain.., Occupational Therapy Services are needed to assess and treat cognitive ability and address ADL safety due to impairment in transfers., Physical Therapy Services are needed to assess and treat the following functional impairments: gait., and HHA for bath.    Further, I certify that my clinical findings support that this patient is homebound (i.e. absences from home require considerable and taxing effort and are for medical reasons or [employer] services or infrequently or of short  duration when for other reasons) because: Requires assistance of another person or specialized equipment to access medical services because patient: Is unable to operate assistive equipment on their own...    Based on the above findings. I certify that this patient is confined to the home and needs intermittent skilled nursing care, physical therapy and/or speech therapy.  The patient is under my care, and I have initiated the establishment of the plan of care.  This patient will be followed by a physician who will periodically review the plan of care.  Physician/Provider to provide follow up care: Stiven Donahue    Responsible Medicare certified PECOS Physician: Jay Rojas NP  Electronic Physician Signature  Date: 6/27/2024

## 2024-06-27 NOTE — LETTER
6/27/2024      Robert Richard  [address] [facility] [address]        [facility] GERIATRICS    Chief Complaint   Patient presents with     RECHECK     HPI:  Robert Richard is a 88 year old  (1935), who is being seen today for an episodic care visit at: [facility] ([facility]) [8]. Today's concern is:   1. Stage 3b chronic kidney disease (H)    2. Factor V Leiden mutation (H24)    3. Spinal stenosis of lumbar region without neurogenic claudication    4. S/P lumbar fusion      Patient seen for follow up, post spinal fusion, tolerated well, pain controlled, incision approximated, patient slightly hesitant for TCU discharge, also INR 2.7 with no s/s bleed nor bruise, also recent creat 1.24, GFR 56, overall appears healthy.    Allergies, and PMH/PSH reviewed in EPIC today.  REVIEW OF SYSTEMS:  4 point ROS including Respiratory, CV, GI and , other than that noted in the HPI,  is negative    Objective:   BP (!) 179/82   Pulse 62   Temp 97.6  F (36.4  C)   Resp 16   Wt 111.5 kg (245 lb 12.8 oz)   SpO2 93%   BMI 34.28 kg/m    GENERAL APPEARANCE:  in no distress, appears healthy  ENT:  Mouth and posterior oropharynx normal, moist mucous membranes  RESP:  lungs clear to auscultation , no respiratory distress  CV:  peripheral edema 1-2+ in lower legs, rate-normal  ABDOMEN:  bowel sounds normal  M/S:   Gait and station abnormal unsteady  SKIN:  Inspection of skin and subcutaneous tissue baseline  NEURO:   Examination of sensation by touch normal  PSYCH:  affect and mood normal    Labs done in SNF are in [facility]. Please refer to them using [facility]/Care Everywhere.    Assessment/Plan:  (N18.32) Stage 3b chronic kidney disease (H)  (primary encounter diagnosis)  Comment: creat 1.24, GFR 56  Plan: dose meds renally  -repeat BMP in 1-2 weeks    (D68.51) Factor V Leiden mutation (H24)  Comment: INR 2.7  Plan: warfarin 2.5mg daily  -recheck INR on 7/1    (M48.061) Spinal stenosis of lumbar  region without neurogenic claudication  (Z98.1) S/P lumbar fusion  Comment: post fusion, pain controlled, ambulating  Plan: PT/OT working with   -continue APAP TID, oxycodone PTN  -plan to reduce oxycodone next week  -follow up ortho on 7/11    MED REC REQUIRED  Post Medication Reconciliation Status: medication reconcilation previously completed during another office visit        Electronically signed by: SKIP Maxwell CNP          Sincerely,        SKIP Maxwell CNP

## 2024-07-01 ENCOUNTER — ANTICOAGULATION THERAPY VISIT (OUTPATIENT)
Dept: ANTICOAGULATION | Facility: CLINIC | Age: 89
End: 2024-07-01
Payer: COMMERCIAL

## 2024-07-01 DIAGNOSIS — Z79.01 LONG TERM CURRENT USE OF ANTICOAGULANT THERAPY: ICD-10-CM

## 2024-07-01 DIAGNOSIS — D68.51 FACTOR V LEIDEN MUTATION (H): Primary | ICD-10-CM

## 2024-07-01 DIAGNOSIS — D68.52 PROTHROMBIN MUTATION (H): ICD-10-CM

## 2024-07-01 LAB — INR (EXTERNAL): 2.1 (ref 0.9–1.1)

## 2024-07-01 NOTE — PROGRESS NOTES
ANTICOAGULATION MANAGEMENT     Robert Richard 88 year old male is on warfarin with therapeutic INR result. (Goal INR 2.0-3.0)    Recent labs: (last 7 days)     07/01/24  1302   INR 2.1*       ASSESSMENT     Source(s): Chart Review and Home Care/Facility Nurse     Warfarin doses taken: Warfarin taken as instructed  Diet: No new diet changes identified  Medication/supplement changes: None noted  New illness, injury, or hospitalization: No, post TCU discharge 7/30/23, s/p lumbar fusion  Signs or symptoms of bleeding or clotting: No  Previous result: Therapeutic last 2(+) visits  Additional findings: None       PLAN     Recommended plan for temporary change(s) affecting INR     Dosing Instructions: Continue your current warfarin dose with next INR in 1 week       Summary  As of 7/1/2024      Full warfarin instructions:  2.5 mg every Mon, Fri; 5 mg all other days   Next INR check:  7/8/2024               Telephone call with Jazlyn home care nurse who agrees to plan and repeated back plan correctly    Orders given to  Homecare nurse/facility to recheck    Education provided: Please call back if any changes to your diet, medications or how you've been taking warfarin  Goal range and lab monitoring: goal range and significance of current result    Plan made per [facility] anticoagulation protocol    Marian Martinez RN  Anticoagulation Clinic  7/1/2024    _______________________________________________________________________     Anticoagulation Episode Summary       Current INR goal:  2.0-3.0   TTR:  78.7% (11.3 mo)   Target end date:  Indefinite   [facility]  [facility]    Indications    Factor V Leiden mutation (H24) [D68.51]  Venous thrombosis (Resolved) [I82.90]  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

## 2024-07-05 DIAGNOSIS — M48.061 SPINAL STENOSIS OF LUMBAR REGION WITHOUT NEUROGENIC CLAUDICATION: ICD-10-CM

## 2024-07-07 RX ORDER — GABAPENTIN 300 MG/1
300 CAPSULE ORAL AT BEDTIME
Qty: 30 CAPSULE | Refills: 1 | Status: SHIPPED | OUTPATIENT
Start: 2024-07-07 | End: 2024-09-29

## 2024-07-08 ENCOUNTER — ANTICOAGULATION THERAPY VISIT (OUTPATIENT)
Dept: ANTICOAGULATION | Facility: CLINIC | Age: 89
End: 2024-07-08
Payer: COMMERCIAL

## 2024-07-08 DIAGNOSIS — M48.061 SPINAL STENOSIS OF LUMBAR REGION WITHOUT NEUROGENIC CLAUDICATION: ICD-10-CM

## 2024-07-08 DIAGNOSIS — D68.52 PROTHROMBIN MUTATION (H): ICD-10-CM

## 2024-07-08 DIAGNOSIS — Z79.01 LONG TERM CURRENT USE OF ANTICOAGULANT THERAPY: ICD-10-CM

## 2024-07-08 DIAGNOSIS — D68.51 FACTOR V LEIDEN MUTATION (H): Primary | ICD-10-CM

## 2024-07-08 LAB — INR (EXTERNAL): 1.2 (ref 0.9–1.1)

## 2024-07-08 NOTE — PROGRESS NOTES
ANTICOAGULATION MANAGEMENT     Robert Richard 88 year old male is on warfarin with subtherapeutic INR result. (Goal INR 2.0-3.0)    Recent labs: (last 7 days)     07/08/24  1140   INR 1.2*       ASSESSMENT     Source(s): Chart Review and Home Care/Facility Nurse     Warfarin doses taken: Warfarin taken as instructed  Diet: Increased greens/vitamin K in diet; ongoing change  Medication/supplement changes: None noted  New illness, injury, or hospitalization: No  Signs or symptoms of bleeding or clotting: No  Previous result: Therapeutic last 2(+) visits  Additional findings:  Eating lots of fresh cherries.        PLAN     Recommended plan for ongoing change(s) affecting INR     Dosing Instructions: booster dose then Increase your warfarin dose (8% change) with next INR in 1 week   (note, Patient will have INR done at upcoming office visit. Call Home care nurse Anna [phone] with dosing after INR received from clinic appt. Next Home care Visit 7/15/24)    Summary  As of 7/8/2024      Full warfarin instructions:  7/8: 5 mg; Otherwise 2.5 mg every Mon; 5 mg all other days   Next INR check:  7/15/2024               Telephone call with Anna home care nurse who agrees to plan and repeated back plan correctly    Orders given to  Homecare nurse/facility to recheck    Education provided: Please call back if any changes to your diet, medications or how you've been taking warfarin    Plan made per [facility] anticoagulation protocol    Flower Villegas, RN  Anticoagulation Clinic  7/8/2024    _______________________________________________________________________     Anticoagulation Episode Summary       Current INR goal:  2.0-3.0   TTR:  76.8% (11.3 mo)   Target end date:  Indefinite   Send INR reminders to:  ANT[facility] [place]    Indications    Factor V Leiden mutation (H24) [D68.51]  Venous thrombosis (Resolved) [I82.90]  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 Transfer back to [facility] when discharged             Anticoagulation Care Providers       Provider Role Specialty Phone number    Stiven Donahue MD Referring Internal Medicine [phone]    Nate Cifuentes DO [facility] Internal Medicine [phone]

## 2024-07-09 RX ORDER — GABAPENTIN 300 MG/1
300 CAPSULE ORAL AT BEDTIME
Qty: 30 CAPSULE | Refills: 1 | OUTPATIENT
Start: 2024-07-09

## 2024-07-10 ENCOUNTER — TELEPHONE (OUTPATIENT)
Dept: INTERNAL MEDICINE | Facility: CLINIC | Age: 89
End: 2024-07-10
Payer: COMMERCIAL

## 2024-07-10 NOTE — TELEPHONE ENCOUNTER
Home Care is calling regarding an established patient with [facility].       Requesting orders from: Stiven Donahue  Provider is following patient: Yes  Is this a 60-day recertification request?  No    Orders Requested    Skilled Nursing  Request for initial certification (first set of orders)   Frequency:  1x/wk for 4 wks  then 1 every other x/wk for 4 wks    Physical Therapy  Request for initial evaluation and treatment (one time)     Occupational Therapy  Request for initial evaluation and treatment (one time)       Confirmed ok to leave a detailed message with call back.  Contact information confirmed and updated as needed.    Allyn Crook RN

## 2024-07-10 NOTE — TELEPHONE ENCOUNTER
Left message on confidential voicemail with Dr. Donahue's approval for Home Care orders as requested.    Carolina Gonzalez RN on 7/10/2024 at 11:57 AM

## 2024-07-11 ENCOUNTER — OFFICE VISIT (OUTPATIENT)
Dept: NEUROSURGERY | Facility: CLINIC | Age: 89
End: 2024-07-11
Payer: COMMERCIAL

## 2024-07-11 ENCOUNTER — ANCILLARY PROCEDURE (OUTPATIENT)
Dept: GENERAL RADIOLOGY | Facility: CLINIC | Age: 89
End: 2024-07-11
Attending: NURSE PRACTITIONER
Payer: COMMERCIAL

## 2024-07-11 VITALS
DIASTOLIC BLOOD PRESSURE: 88 MMHG | SYSTOLIC BLOOD PRESSURE: 148 MMHG | RESPIRATION RATE: 16 BRPM | OXYGEN SATURATION: 96 % | HEART RATE: 57 BPM

## 2024-07-11 DIAGNOSIS — Z98.1 S/P LUMBAR SPINAL FUSION: ICD-10-CM

## 2024-07-11 DIAGNOSIS — Z98.1 S/P LUMBAR SPINAL FUSION: Primary | ICD-10-CM

## 2024-07-11 PROCEDURE — 99024 POSTOP FOLLOW-UP VISIT: CPT | Performed by: NURSE PRACTITIONER

## 2024-07-11 PROCEDURE — 72100 X-RAY EXAM L-S SPINE 2/3 VWS: CPT | Mod: TC | Performed by: RADIOLOGY

## 2024-07-11 ASSESSMENT — PAIN SCALES - GENERAL: PAINLEVEL: NO PAIN (0)

## 2024-07-11 NOTE — LETTER
7/11/2024      Robert Richard  [address] [facility] [address]      Dear Colleague,    Thank you for referring your patient, Robert Richard, to the [facility] NEUROSURGERY CL[facility]IC [place]. Please see a copy of my visit note below.      Writer cleansed wound with saline and gauze. Multiple scabs fell off with this cleanse. 1 scab remains and I anticipate it will fall off within 1-2 days since it is very loose. Incision looks good without s/s infection.    Zaina Smith RN on 7/11/2024 at 11:09 AM      [facility] Neurosurgery  Neurosurgery Follow Up:    HPI: 6 weeks s/p L4-5 TLIF with L2-3 decompression with Dr. Cleary on 5/28/2024. Doing well after surgery. Reports happy with the progress he has made. Pain in the back with movement othewise minimal pain. Denies any radicular leg pain, paresthesias, or overt weakness. Generally feels weak due to overuse. Was discharged from TCU and has now begun home PT.     Medical, surgical, family, and social history unchanged since prior exam.  Exam:  Constitutional:  Alert, well nourished, NAD.  HEENT: Normocephalic, atraumatic.   Pulm:  Without shortness of breath   CV:  No pitting edema of BLE.      Vital Signs:  BP (!) 148/88   Pulse 57   Resp 16   SpO2 96%     Neurological:  Awake  Alert  Oriented x 3  Motor exam:        IP Q DF PF EHL  R   5  5   5   5    5  L   5  5   5   5    5     Able to spontaneously move L/E bilaterally  Sensation intact throughout all L/E dermatomes     Incisions:  Healing well with no signs/symptoms of infection. A couple areas of scabbing on wound. Will have RN clean today.  Imaging: AP and lateral films reveal intact and stable hardware.     A/P: s/p lumbar spine fusion, s/p lumbar decompression  Reviewed lumbar xray in clinic. Ok to advance activity gradually. Ok to continue therapy as recommended. Follow up in 6 weeks with XR prior as scheduled. He verbalized understanding and agreement.   Patient Instructions   - May  increase lifting restriction to 20-25 pounds   - Follow up in 6 weeks as scheduled with lumbar xray prior.    - Call the clinic at [phone] for increased pain or any other questions and concerns.    Radha Lozano, JODI  [facility] Neurosurgery  [address] [place] [address]  Tel [phone]  Fax [phone]      Again, thank you for allowing me to participate in the care of your patient.        Sincerely,        Radha Lozano, NP

## 2024-07-11 NOTE — PROGRESS NOTES
[facility] Neurosurgery  Neurosurgery Follow Up:    HPI: 6 weeks s/p L4-5 TLIF with L2-3 decompression with Dr. Cleary on 5/28/2024. Doing well after surgery. Reports happy with the progress he has made. Pain in the back with movement othewise minimal pain. Denies any radicular leg pain, paresthesias, or overt weakness. Generally feels weak due to overuse. Was discharged from TCU and has now begun home PT.     Medical, surgical, family, and social history unchanged since prior exam.  Exam:  Constitutional:  Alert, well nourished, NAD.  HEENT: Normocephalic, atraumatic.   Pulm:  Without shortness of breath   CV:  No pitting edema of BLE.      Vital Signs:  BP (!) 148/88   Pulse 57   Resp 16   SpO2 96%     Neurological:  Awake  Alert  Oriented x 3  Motor exam:        IP Q DF PF EHL  R   5  5   5   5    5  L   5  5   5   5    5     Able to spontaneously move L/E bilaterally  Sensation intact throughout all L/E dermatomes     Incisions:  Healing well with no signs/symptoms of infection. A couple areas of scabbing on wound. Will have RN clean today.  Imaging: AP and lateral films reveal intact and stable hardware.     A/P: s/p lumbar spine fusion, s/p lumbar decompression  Reviewed lumbar xray in clinic. Ok to advance activity gradually. Ok to continue therapy as recommended. Follow up in 6 weeks with XR prior as scheduled. He verbalized understanding and agreement.   Patient Instructions   - May increase lifting restriction to 20-25 pounds   - Follow up in 6 weeks as scheduled with lumbar xray prior.    - Call the clinic at [phone] for increased pain or any other questions and concerns.    Radha Lozano, JODI  [facility] Neurosurgery  [address] [place] [address]  [address] [place] [address]  Tel [phone]  Fax [phone]

## 2024-07-11 NOTE — PROGRESS NOTES
Writer cleansed wound with saline and gauze. Multiple scabs fell off with this cleanse. 1 scab remains and I anticipate it will fall off within 1-2 days since it is very loose. Incision looks good without s/s infection.    Zaina Smith RN on 7/11/2024 at 11:09 AM

## 2024-07-11 NOTE — PATIENT INSTRUCTIONS
- May increase lifting restriction to 20-25 pounds   - Follow up in 6 weeks as scheduled with lumbar xray prior.    - Call the clinic at [phone] for increased pain or any other questions and concerns.

## 2024-07-11 NOTE — NURSING NOTE
"Robert Richard is a 88 year old male who presents for:  Chief Complaint   Patient presents with    Neurologic Problem     5/28/2024  Lumbar 4 to Lumbar 5 Transforaminal Interbody Fusion with decompression of stenosis  Lumbar 2 to Lumbar 3 bilateral laminectomy for decompression of stenosis              Initial Vitals:  BP (!) 148/88   Pulse 57   Resp 16   SpO2 96%  Estimated body mass index is 34.28 kg/m  as calculated from the following:    Height as of 6/17/24: 5' 11\" (1.803 m).    Weight as of 6/27/24: 245 lb 12.8 oz (111.5 kg).. There is no height or weight on file to calculate BSA. BP completed using cuff size: large  No Pain (0)    Nursing Comments:     Coby Jeronimo    "

## 2024-07-12 ENCOUNTER — ANTICOAGULATION THERAPY VISIT (OUTPATIENT)
Dept: ANTICOAGULATION | Facility: CLINIC | Age: 89
End: 2024-07-12

## 2024-07-12 ENCOUNTER — LAB (OUTPATIENT)
Dept: LAB | Facility: CLINIC | Age: 89
End: 2024-07-12
Payer: COMMERCIAL

## 2024-07-12 DIAGNOSIS — D68.51 FACTOR V LEIDEN MUTATION (H): ICD-10-CM

## 2024-07-12 DIAGNOSIS — D68.52 PROTHROMBIN MUTATION (H): ICD-10-CM

## 2024-07-12 DIAGNOSIS — Z79.01 LONG TERM CURRENT USE OF ANTICOAGULANT THERAPY: ICD-10-CM

## 2024-07-12 DIAGNOSIS — D68.51 FACTOR V LEIDEN MUTATION (H): Primary | ICD-10-CM

## 2024-07-12 LAB — INR BLD: 1.1 (ref 0.9–1.1)

## 2024-07-12 PROCEDURE — 36416 COLLJ CAPILLARY BLOOD SPEC: CPT

## 2024-07-12 PROCEDURE — 85610 PROTHROMBIN TIME: CPT

## 2024-07-12 NOTE — PROGRESS NOTES
ANTICOAGULATION MANAGEMENT     Robert Richard 88 year old male is on warfarin with subtherapeutic INR result. (Goal INR 2.0-3.0)    Recent labs: (last 7 days)     07/12/24  1130   INR 1.1       ASSESSMENT     Source(s): Chart Review  Previous INR was Subtherapeutic  Medication, diet, health changes since last INR - per last INR note, pt has been eating more foods that contain Giana K, and the plan is to keep that up.     I tried to call Peg,  nurse [phone], no answer. Detailed VM left with dosing and recheck date. I also called pt, no answer.       I left a detailed voicemail for both Peg and pt with the orders reflected in flowsheet. I have also requested a call back if there have been any missed doses, concerns, illness, fever, or if there have been any changes in medications, activity level, or diet           PLAN     Recommended plan for ongoing change(s) affecting INR     Dosing Instructions: Increase your warfarin dose (15.4% change) with next INR in 3 days       Summary  As of 7/12/2024      Full warfarin instructions:  7.5 mg every Fri; 5 mg all other days   Next INR check:  7/15/2024               Detailed voice message left for pt and Peg  nurse,  with dosing instructions and follow up date.     Orders given to  Homecare nurse/facility to recheck    Education provided: Please call back if any changes to your diet, medications or how you've been taking warfarin  Goal range and lab monitoring: goal range and significance of current result, Importance of therapeutic range, and Importance of following up at instructed interval  Symptom monitoring: monitoring for clotting signs and symptoms and monitoring for stroke signs and symptoms    Plan made per [facility] anticoagulation protocol    Raquel Pelletier, RN  Anticoagulation Clinic  7/12/2024    _______________________________________________________________________     Anticoagulation Episode Summary       Current INR goal:  2.0-3.0   TTR:  75.7% (11.3  mo)   Target end date:  Indefinite   Send INR reminders to:  [facility]    Indications    Factor V Leiden mutation (H24) [D68.51]  Venous thrombosis (Resolved) [I82.90]  Prothrombin mutation (H24) [D68.52]  Long term current use of anticoagulant therapy [Z79.01]             Comments:  Home care. Transfer back to [place] when discharged             Anticoagulation Care Providers       Provider Role Specialty Phone number    Stiven Donahue MD Referring Internal Medicine [phone]    Nate Cifuentes DO [facility] Internal Medicine [phone]

## 2024-07-12 NOTE — PROGRESS NOTES
ANTICOAGULATION MANAGEMENT     Robert Richard 88 year old male is on warfarin with subtherapeutic INR result. (Goal INR 2.0-3.0)    Recent labs: (last 7 days)     07/12/24  1130   INR 1.1       ASSESSMENT     Source(s): Chart Review  Previous INR was Subtherapeutic  Medication, diet, health changes since last INR - per last Anticoag Episode, Peg (HC nurse,[phone]), should be called with INR result and dosing. HC will see pt again on Monday. I have called Peg, no answer, VM left to call [facility] back          PLAN     Unable to reach Peg,  nurse,  today.    Left message to take 7.5 mg Fr and 5 mg Sat and Sun this weekend. Request call back for assessment.    Follow up required to confirm warfarin dose taken and assess for changes    Raquel Pelletier, RN  Anticoagulation Clinic  7/12/2024

## 2024-07-15 ENCOUNTER — ANTICOAGULATION THERAPY VISIT (OUTPATIENT)
Dept: ANTICOAGULATION | Facility: CLINIC | Age: 89
End: 2024-07-15
Payer: COMMERCIAL

## 2024-07-15 DIAGNOSIS — D68.52 PROTHROMBIN MUTATION (H): ICD-10-CM

## 2024-07-15 DIAGNOSIS — D68.51 FACTOR V LEIDEN MUTATION (H): Primary | ICD-10-CM

## 2024-07-15 DIAGNOSIS — Z79.01 LONG TERM CURRENT USE OF ANTICOAGULANT THERAPY: ICD-10-CM

## 2024-07-15 LAB — INR (EXTERNAL): 1.3 (ref 0.9–1.1)

## 2024-07-15 NOTE — PROGRESS NOTES
ANTICOAGULATION MANAGEMENT     Robert Richrad 88 year old male is on warfarin with subtherapeutic INR result. (Goal INR 2.0-3.0)    Recent labs: (last 7 days)     07/15/24  0950   INR 1.3*       ASSESSMENT     Source(s): Chart Review and Home Care/Facility Nurse     Warfarin doses taken: Warfarin taken as instructed, confirmed patient has the peach, 5 mg tablets on hand  Diet:  Eating more vegetables, cutting back on fruit intake , Patient reports he has 1-2 alcoholic drinks (beer) per week  Medication/supplement changes: None noted  New illness, injury, or hospitalization: No  Signs or symptoms of bleeding or clotting: No  Previous result: Subtherapeutic  Additional findings: Patient dose was increased on 7/12, moving in the right direction but still low. Will boost today and increase dosing as patient continues to add vitamin K foods into diet.       PLAN     Recommended plan for ongoing change(s) affecting INR     Dosing Instructions: booster dose then Increase your warfarin dose (13.3% change) with next INR in 4 days       Summary  As of 7/15/2024      Full warfarin instructions:  7/15: 10 mg; Otherwise 7.5 mg every Mon, Wed, Fri; 5 mg all other days   Next INR check:  7/19/2024               Telephone call with [place] home care nurse who verbalizes understanding and agrees to plan    Orders given to  Homecare nurse/facility to recheck    Education provided: Please call back if any changes to your diet, medications or how you've been taking warfarin    Plan made per [facility] anticoagulation protocol    Nimco Ivan, RN  Anticoagulation Clinic  7/15/2024    _______________________________________________________________________     Anticoagulation Episode Summary       Current INR goal:  2.0-3.0   TTR:  74.8% (11.3 mo)   Target end date:  Indefinite   Send INR reminders to:  [facility]    Indications    Factor V Leiden mutation (H24) [D68.51]  Venous thrombosis (Resolved) [I82.90]  Prothrombin mutation (H24)  [D68.52]  Long term current use of anticoagulant therapy [Z79.01]             Comments:  Home care. Transfer back to [place] when discharged             Anticoagulation Care Providers       Provider Role Specialty Phone number    Stiven Donahue MD Referring Internal Medicine [phone]    Nate Cifuentes DO Responsible Internal Medicine [phone]

## 2024-07-17 ENCOUNTER — TELEPHONE (OUTPATIENT)
Dept: INTERNAL MEDICINE | Facility: CLINIC | Age: 89
End: 2024-07-17

## 2024-07-17 NOTE — TELEPHONE ENCOUNTER
Reason for Call:  Form, our goal is to have forms completed with 72 hours, however, some forms may require a visit or additional information.    Type of letter, form or note:  Home Health Certification    Who is the form from?: Home care    Where did the form come from: form was faxed in    What clinic location was the form placed at?: [facility]    Where the form was placed: Given to MA/RN    What number is listed as a contact on the form?: [phone]       Additional comments: [place]    Call taken on 7/17/2024 at 8:01 AM by Natalia Avila

## 2024-07-19 ENCOUNTER — ANTICOAGULATION THERAPY VISIT (OUTPATIENT)
Dept: ANTICOAGULATION | Facility: CLINIC | Age: 89
End: 2024-07-19
Payer: COMMERCIAL

## 2024-07-19 DIAGNOSIS — D68.51 FACTOR V LEIDEN MUTATION (H): Primary | ICD-10-CM

## 2024-07-19 DIAGNOSIS — D68.52 PROTHROMBIN MUTATION (H): ICD-10-CM

## 2024-07-19 DIAGNOSIS — Z79.01 LONG TERM CURRENT USE OF ANTICOAGULANT THERAPY: ICD-10-CM

## 2024-07-19 LAB — INR (EXTERNAL): 2.3

## 2024-07-19 NOTE — PROGRESS NOTES
ANTICOAGULATION MANAGEMENT     Robert Richard 88 year old male is on warfarin with therapeutic INR result. (Goal INR 2.0-3.0)    Recent labs: (last 7 days)     07/19/24  0900   INR 2.3       ASSESSMENT     Source(s): Chart Review and Home Care/Facility Nurse     Warfarin doses taken: Warfarin taken as instructed confirmed that he took the 10 mg booster dose on Monday  Diet:  Did not increase greens intake since  last inr-  eats 2 salads a week- alcohol ( beer or coolers)  1-2 a week  Medication/supplement changes: None noted  New illness, injury, or hospitalization: No  Signs or symptoms of bleeding or clotting: No  Previous result: Subtherapeutic  Additional findings: None       PLAN     Recommended plan for temporary change(s) and ongoing change(s) affecting INR     Dosing Instructions: Continue your current warfarin dose with next INR in 4 days       Summary  As of 7/19/2024      Full warfarin instructions:  7.5 mg every Mon, Wed, Fri; 5 mg all other days   Next INR check:  7/23/2024               Telephone call with Carlos and Home Care nurse Markos verbalizes understanding and agrees to plan and who agrees to plan and repeated back plan correctly    Orders given to  Homecare nurse/facility to recheck    Education provided: Contact [phone] with any changes, questions or concerns.     Plan made per [facility] anticoagulation protocol    Opal Byrne RN  Anticoagulation Clinic  7/19/2024    _______________________________________________________________________     Anticoagulation Episode Summary       Current INR goal:  2.0-3.0   TTR:  74.0% (11.3 mo)   Target end date:  Indefinite   Send INR reminders to:  [facility]    Indications    Factor V Leiden mutation (H24) [D68.51]  Venous thrombosis (Resolved) [I82.90]  Prothrombin mutation (H24) [D68.52]  Long term current use of anticoagulant therapy [Z79.01]             Comments:  Home care. Transfer back to [place] when discharged              Anticoagulation Care Providers       Provider Role Specialty Phone number    Stiven Donahue MD Referring Internal Medicine [phone]    Nate Cifuentes DO [facility] Internal Medicine [phone]

## 2024-07-21 DIAGNOSIS — I10 HYPERTENSION GOAL BP (BLOOD PRESSURE) < 140/90: ICD-10-CM

## 2024-07-22 RX ORDER — LOSARTAN POTASSIUM 100 MG/1
TABLET ORAL
Qty: 90 TABLET | Refills: 1 | Status: SHIPPED | OUTPATIENT
Start: 2024-07-22

## 2024-07-23 ENCOUNTER — ANTICOAGULATION THERAPY VISIT (OUTPATIENT)
Dept: ANTICOAGULATION | Facility: CLINIC | Age: 89
End: 2024-07-23
Payer: COMMERCIAL

## 2024-07-23 DIAGNOSIS — Z79.01 LONG TERM CURRENT USE OF ANTICOAGULANT THERAPY: ICD-10-CM

## 2024-07-23 DIAGNOSIS — D68.51 FACTOR V LEIDEN MUTATION (H): Primary | ICD-10-CM

## 2024-07-23 DIAGNOSIS — D68.52 PROTHROMBIN MUTATION (H): ICD-10-CM

## 2024-07-23 LAB — INR (EXTERNAL): 3.9 (ref 0.9–1.1)

## 2024-07-23 NOTE — PROGRESS NOTES
ANTICOAGULATION MANAGEMENT     Robert Richard 88 year old male is on warfarin with supratherapeutic INR result. (Goal INR 2.0-3.0)    Recent labs: (last 7 days)     07/23/24  1133   INR 3.9*       ASSESSMENT     Source(s): Chart Review and Home Care/Facility Nurse     Warfarin doses taken: Warfarin taken as instructed  Diet: No new diet changes identified. Patient claims to have no diet changes. However upon reviewing the last INR notes, it seems that patient's diet fluctuates a lot. Writer had a discussion about vitamin K and it's effects on INR. Patient also states he has not had any alcohol since last INR.   Medication/supplement changes: None noted  New illness, injury, or hospitalization: No  Signs or symptoms of bleeding or clotting: No  Previous result: Therapeutic last visit; previously outside of goal range  Additional findings: Concerns for rapid rise       PLAN     Recommended plan for no diet, medication or health factor changes affecting INR     Dosing Instructions: partial hold then decrease your warfarin dose (12% change) with next INR in 1 week       Summary  As of 7/23/2024      Full warfarin instructions:  7/23: 2.5 mg; Otherwise 7.5 mg every Mon; 5 mg all other days   Next INR check:  7/30/2024               Telephone call with [facility] home care nurse who verbalizes understanding and agrees to plan    Orders given to  Homecare nurse/facility to recheck    Education provided: Please call back if any changes to your diet, medications or how you've been taking warfarin  Dietary considerations: importance of consistent vitamin K intake  Symptom monitoring: monitoring for bleeding signs and symptoms, monitoring for clotting signs and symptoms, and monitoring for stroke signs and symptoms  Contact [phone] with any changes, questions or concerns.     Plan made per [facility] anticoagulation protocol    Opal Mata RN  Anticoagulation  Clinic  7/23/2024    _______________________________________________________________________     Anticoagulation Episode Summary       Current INR goal:  2.0-3.0   TTR:  73.3% (11.3 mo)   Target end date:  Indefinite   Send INR reminders to:  [facility]    Indications    Factor V Leiden mutation (H24) [D68.51]  Venous thrombosis (Resolved) [I82.90]  Prothrombin mutation (H24) [D68.52]  Long term current use of anticoagulant therapy [Z79.01]             Comments:  Home care. Transfer back to [place] when discharged             Anticoagulation Care Providers       Provider Role Specialty Phone number    Stiven Donahue MD Referring Internal Medicine [phone]    Nate Cifuentes DO [facility] Internal Medicine [phone]

## 2024-07-23 NOTE — PROGRESS NOTES
Nurse [facility], calling back to discuss dosing. Wife wanted to clarify dosing. Advised on plan below and confirmed she had it correctly.  Nimco Ivan RN

## 2024-07-29 ENCOUNTER — TELEPHONE (OUTPATIENT)
Dept: INTERNAL MEDICINE | Facility: CLINIC | Age: 89
End: 2024-07-29
Payer: COMMERCIAL

## 2024-07-29 NOTE — TELEPHONE ENCOUNTER
Reason for Call:  Appointment Request    Patient requesting this type of appt:  patient called and would like a post op appt with only Godfrey Renae at  [facility]  this week.    Reason:  Back surgery; med review    Please contact patient.  Thank you.    Requested provider: Stiven Donahue    Reason patient unable to be scheduled: Needs to be scheduled by clinic    When does patient want to be seen/preferred time: 3-7 days    Comments: na    Could we send this information to you in [place] or would you prefer to receive a phone call?:   Patient would prefer a phone call   Okay to leave a detailed message?: Yes at Cell number on file:    Telephone Information:   Mobile [phone]       Call taken on 7/29/2024 at 10:29 AM by Suzi Duran

## 2024-07-30 ENCOUNTER — ANTICOAGULATION THERAPY VISIT (OUTPATIENT)
Dept: ANTICOAGULATION | Facility: CLINIC | Age: 89
End: 2024-07-30
Payer: COMMERCIAL

## 2024-07-30 LAB — INR (EXTERNAL): 4.3 (ref 0.9–1.1)

## 2024-07-30 NOTE — PROGRESS NOTES
ANTICOAGULATION MANAGEMENT     Robert Richard 88 year old male is on warfarin with supratherapeutic INR result. (Goal INR 2.0-3.0)    Recent labs: (last 7 days)     07/30/24  0000   INR 4.3*       ASSESSMENT     Source(s): Chart Review  Previous INR was Supratherapeutic  HCN left voicemail stating that pt has had no changes and took warfarin as directed.  According to previous [facility] encounters, pt may be inconsistent with green intake, diet and alcohol intake.         PLAN     Recommended plan for ongoing change(s) affecting INR     Dosing Instructions: hold dose then decrease your warfarin dose (13.3% change) with next INR in 1 week       Summary  As of 7/30/2024      Full warfarin instructions:  7/30: Hold; Otherwise 2.5 mg every Sat; 5 mg all other days   Next INR check:  8/6/2024               Detailed voice message left for Anna at [phone] home care nurse with dosing instructions and follow up date.     Orders given to  Homecare nurse/facility to recheck    Education provided: Please call back if any changes to your diet, medications or how you've been taking warfarin  Contact [phone] with any changes, questions or concerns.     Plan made per [facility] anticoagulation protocol    Mena Araujo, RN  Anticoagulation Clinic  7/30/2024    _______________________________________________________________________     Anticoagulation Episode Summary       Current INR goal:  2.0-3.0   TTR:  71.3% (11.3 mo)   Target end date:  Indefinite   Send INR reminders to:  [facility]    Indications    Factor V Leiden mutation (H24) [D68.51]  Venous thrombosis (Resolved) [I82.90]  Prothrombin mutation (H24) [D68.52]  Long term current use of anticoagulant therapy [Z79.01]             Comments:  Home care. Transfer back to [place] when discharged             Anticoagulation Care Providers       Provider Role Specialty Phone number    Stiven Donahue MD Referring Internal Medicine [phone]    Nate Cifuentes  DO [facility] Internal Medicine [phone]

## 2024-08-01 NOTE — TELEPHONE ENCOUNTER
Multiple messages left for patient with appointment day and time.    Closing encounter.    Natalia Avila XRO/

## 2024-08-02 ENCOUNTER — OFFICE VISIT (OUTPATIENT)
Dept: INTERNAL MEDICINE | Facility: CLINIC | Age: 89
End: 2024-08-02
Payer: COMMERCIAL

## 2024-08-02 VITALS
DIASTOLIC BLOOD PRESSURE: 86 MMHG | RESPIRATION RATE: 16 BRPM | WEIGHT: 242.8 LBS | SYSTOLIC BLOOD PRESSURE: 160 MMHG | BODY MASS INDEX: 33.99 KG/M2 | HEART RATE: 63 BPM | OXYGEN SATURATION: 96 % | HEIGHT: 71 IN | TEMPERATURE: 97.8 F

## 2024-08-02 DIAGNOSIS — N18.32 STAGE 3B CHRONIC KIDNEY DISEASE (H): ICD-10-CM

## 2024-08-02 DIAGNOSIS — M48.061 SPINAL STENOSIS OF LUMBAR REGION WITHOUT NEUROGENIC CLAUDICATION: ICD-10-CM

## 2024-08-02 DIAGNOSIS — I10 ESSENTIAL HYPERTENSION WITH GOAL BLOOD PRESSURE LESS THAN 140/90: ICD-10-CM

## 2024-08-02 DIAGNOSIS — D64.9 POSTOPERATIVE ANEMIA: ICD-10-CM

## 2024-08-02 DIAGNOSIS — D68.51 FACTOR V LEIDEN MUTATION (H): ICD-10-CM

## 2024-08-02 DIAGNOSIS — Z98.890 H/O LUMBOSACRAL SPINE SURGERY: Primary | ICD-10-CM

## 2024-08-02 PROCEDURE — G2211 COMPLEX E/M VISIT ADD ON: HCPCS | Performed by: INTERNAL MEDICINE

## 2024-08-02 PROCEDURE — 99214 OFFICE O/P EST MOD 30 MIN: CPT | Mod: 24 | Performed by: INTERNAL MEDICINE

## 2024-08-02 ASSESSMENT — PAIN SCALES - GENERAL: PAINLEVEL: NO PAIN (0)

## 2024-08-02 NOTE — PROGRESS NOTES
"  Assessment & Plan   Problem List Items Addressed This Visit       Factor V Leiden mutation (H24)    Relevant Orders    CBC with platelets    INR    Stage 3b chronic kidney disease (H)     Other Visit Diagnoses       H/O lumbosacral spine surgery    -  Primary    Spinal stenosis of lumbar region without neurogenic claudication        Postoperative anemia        Essential hypertension with goal blood pressure less than 140/90              Patient is here after spinal surgery at the end of May.  He is doing very well he says his pain is minimal.  Normal pain pills.  He is walking with canes bilaterally but he is happy with the surgery.    Postoperative anemia he will do a hemoglobin next week when he does his INR he is on chronic Coumadin for factor V Leiden mutation.  The lesion on his ear he can try some topical to see if this helps it.    Hypertension is running too high we will have him go on his atenolol twice a day he takes his atenolol at night and may be wearing off at this point.  Twice daily dosing should help this.      The longitudinal plan of care for the diagnosis(es)/condition(s) as documented were addressed during this visit. Due to the added complexity in care, I will continue to support Carlos in the subsequent management and with ongoing continuity of care.            BMI  Estimated body mass index is 33.86 kg/m  as calculated from the following:    Height as of this encounter: 1.803 m (5' 11\").    Weight as of this encounter: 110.1 kg (242 lb 12.8 oz).       FUTURE APPOINTMENTS:       - Follow-up for annual visit or as needed      Subjective   Carlos is a 88 year old, presenting for the following health issues:  Surgical Followup        8/2/2024     3:24 PM   Additional Questions   Roomed by Abigail         8/2/2024     3:24 PM   Patient Reported Additional Medications   Patient reports taking the following new medications none     History of Present Illness       Reason for visit:  Post surgical follow " "up - lower back    He eats 0-1 servings of fruits and vegetables daily.He consumes 0 sweetened beverage(s) daily.He exercises with enough effort to increase his heart rate 20 to 29 minutes per day.  He exercises with enough effort to increase his heart rate 7 days per week.   He is taking medications regularly.     Patient is following up after having Lumbar 4 to Lumbar 5 Transforaminal Interbody Fusion with decompression of stenosis and Lumbar 2 to Lumbar 3 bilateral laminectomy for decompression of stenosis done on 5/28/2024 at [facility].      Patient states he is currently feeling \"terrific\" and that life is good.     Patient also has a little spot on the back of his right ear he would like the provider to look at. Patient states the scab keeps rubbing off on the towel.       [facility]   Now back home the last month.  Walking with canes bilateral. Feels good, no pains, back is better.     If he sits, then gets creaky, restless legs.     Catheter out at [facility] and urinating ok.           Concern - scab/red spot on back of right ear  Onset: 6 months ago   Description: Spot that won't heal. Towel rubs off the scab, it starts to heal and then the process repeats  Intensity: mild  Progression of Symptoms:  same  Accompanying Signs & Symptoms: redness  Previous history of similar problem: no   Precipitating factors:        Worsened by: nothing   Alleviating factors:        Improved by: nothing  Therapies tried and outcome: None                  Objective    BP (!) 188/100 (BP Location: Left arm, Patient Position: Sitting, Cuff Size: Adult Large)   Pulse 63   Temp 97.8  F (36.6  C) (Oral)   Resp 16   Ht 1.803 m (5' 11\")   Wt 110.1 kg (242 lb 12.8 oz)   SpO2 96%   BMI 33.86 kg/m    Body mass index is 33.86 kg/m .  Physical Exam   Check blood pressure still high 160/86.  He says it is normally better  Heart " regular  Lungs clear  Extremities have 1+ edema  His right ear does have a scab on the backside of it possibly from hearing aid rubbing.  Can try some bacitracin on this.            Signed Electronically by: Stiven Donahue MD

## 2024-08-06 ENCOUNTER — ANTICOAGULATION THERAPY VISIT (OUTPATIENT)
Dept: ANTICOAGULATION | Facility: CLINIC | Age: 89
End: 2024-08-06
Payer: COMMERCIAL

## 2024-08-06 ENCOUNTER — TELEPHONE (OUTPATIENT)
Dept: INTERNAL MEDICINE | Facility: CLINIC | Age: 89
End: 2024-08-06
Payer: COMMERCIAL

## 2024-08-06 DIAGNOSIS — D68.51 FACTOR V LEIDEN MUTATION (H): Primary | ICD-10-CM

## 2024-08-06 DIAGNOSIS — Z98.890 H/O LUMBOSACRAL SPINE SURGERY: Primary | ICD-10-CM

## 2024-08-06 DIAGNOSIS — D68.52 PROTHROMBIN MUTATION (H): ICD-10-CM

## 2024-08-06 DIAGNOSIS — Z79.01 LONG TERM CURRENT USE OF ANTICOAGULANT THERAPY: ICD-10-CM

## 2024-08-06 LAB — INR (EXTERNAL): 5.9 (ref 0.9–1.1)

## 2024-08-06 NOTE — TELEPHONE ENCOUNTER
RN had a Visit this am with the patient.      Calling with ROSIE for patient    BP was 150/100 had not taken am pills yet. No other symptoms. Denies dizziness, lightheadedness, chest pain or shortness of breath.    Patient has a BP cuff at home and was asked to recheck BP an hour after taking medication.      Will route to PCP  Would you like us to follow up with the patient? Last visit 8/2 /86    Lottie Mcarthur RN on 8/6/2024 at 11:30 AM

## 2024-08-06 NOTE — PROGRESS NOTES
ANTICOAGULATION MANAGEMENT     Robert Richard 88 year old male is on warfarin with supratherapeutic INR result. (Goal INR 2.0-3.0)    Recent labs: (last 7 days)     08/06/24  1007   INR 5.9*       ASSESSMENT     Source(s): Chart Review, Patient/Caregiver Call, and Home Care/Facility Nurse     Warfarin doses taken: Warfarin taken as instructed, tablet color verified.  Diet: No new diet changes identified  Medication/supplement changes: None noted  New illness, injury, or hospitalization: No  Signs or symptoms of bleeding or clotting: No  Previous result: Supratherapeutic  Additional findings: None       PLAN     Recommended plan for no diet, medication or health factor changes affecting INR     Dosing Instructions: hold dose then decrease your warfarin dose (15.4% change) with next INR in 2 days       Summary  As of 8/6/2024      Full warfarin instructions:  8/6: Hold; Otherwise 2.5 mg every Mon, Wed, Sat; 5 mg all other days   Next INR check:  8/8/2024               Telephone call with [facility] home care nurse who agrees to plan and repeated back plan correctly    Orders given to  Homecare nurse/facility to recheck    Education provided: Dietary considerations: importance of consistent vitamin K intake and impact of vitamin K foods on INR    Plan made per [facility] anticoagulation protocol    Steph Dougherty RN  Anticoagulation Clinic  8/6/2024    _______________________________________________________________________     Anticoagulation Episode Summary       Current INR goal:  2.0-3.0   TTR:  69.2% (11.3 mo)   Target end date:  Indefinite   Send INR reminders to:  [facility]    Indications    Factor V Leiden mutation (H24) [D68.51]  Venous thrombosis (Resolved) [I82.90]  Prothrombin mutation (H24) [D68.52]  Long term current use of anticoagulant therapy [Z79.01]             Comments:  Home care. Transfer back to [place] when discharged             Anticoagulation Care Providers       Provider Role  Specialty Phone number    Stiven Donahue MD Referring Internal Medicine [phone]    Nate Cifuentes DO [facility] Internal Medicine [phone]

## 2024-08-08 ENCOUNTER — LAB (OUTPATIENT)
Dept: LAB | Facility: CLINIC | Age: 89
End: 2024-08-08
Payer: COMMERCIAL

## 2024-08-08 ENCOUNTER — ANTICOAGULATION THERAPY VISIT (OUTPATIENT)
Dept: ANTICOAGULATION | Facility: CLINIC | Age: 89
End: 2024-08-08

## 2024-08-08 ENCOUNTER — TELEPHONE (OUTPATIENT)
Dept: INTERNAL MEDICINE | Facility: CLINIC | Age: 89
End: 2024-08-08

## 2024-08-08 DIAGNOSIS — Z79.01 LONG TERM CURRENT USE OF ANTICOAGULANT THERAPY: ICD-10-CM

## 2024-08-08 DIAGNOSIS — D68.52 PROTHROMBIN MUTATION (H): ICD-10-CM

## 2024-08-08 DIAGNOSIS — D68.51 FACTOR V LEIDEN MUTATION (H): Primary | ICD-10-CM

## 2024-08-08 DIAGNOSIS — D68.51 FACTOR V LEIDEN MUTATION (H): ICD-10-CM

## 2024-08-08 LAB
ERYTHROCYTE [DISTWIDTH] IN BLOOD BY AUTOMATED COUNT: 13.2 % (ref 10–15)
HCT VFR BLD AUTO: 37.9 % (ref 40–53)
HGB BLD-MCNC: 12.4 G/DL (ref 13.3–17.7)
INR PPP: 3.04 (ref 0.85–1.15)
MCH RBC QN AUTO: 30.5 PG (ref 26.5–33)
MCHC RBC AUTO-ENTMCNC: 32.7 G/DL (ref 31.5–36.5)
MCV RBC AUTO: 93 FL (ref 78–100)
PLATELET # BLD AUTO: 170 10E3/UL (ref 150–450)
RBC # BLD AUTO: 4.07 10E6/UL (ref 4.4–5.9)
WBC # BLD AUTO: 5.9 10E3/UL (ref 4–11)

## 2024-08-08 PROCEDURE — 85027 COMPLETE CBC AUTOMATED: CPT

## 2024-08-08 PROCEDURE — 36415 COLL VENOUS BLD VENIPUNCTURE: CPT

## 2024-08-08 PROCEDURE — 85610 PROTHROMBIN TIME: CPT

## 2024-08-08 NOTE — TELEPHONE ENCOUNTER
General Call    Contacts       Contact Date/Time Type Contact Phone/Fax    08/08/2024 01:53 PM CDT Phone (Incoming) Carlos Richard (Self) [phone] (M)          Reason for Call:  Returning a missed call from INR RN Steph    What are your questions or concerns:  na    Date of last appointment with provider: 08/02/2024    Could we send this information to you in [place] or would you prefer to receive a phone call?:   Patient would prefer a phone call   Okay to leave a detailed message?: Yes at Cell number on file:    Telephone Information:   Mobile [phone]

## 2024-08-08 NOTE — PROGRESS NOTES
ANTICOAGULATION MANAGEMENT     Robert Richard 88 year old male is on warfarin with therapeutic INR result. (Goal INR 2.0-3.0)    Recent labs: (last 7 days)     08/08/24  0818   INR 3.04*       ASSESSMENT     Source(s): Chart Review and Patient/Caregiver Call     Warfarin doses taken: Warfarin taken as instructed  Diet: No new diet changes identified  Medication/supplement changes: None noted  New illness, injury, or hospitalization: No  Signs or symptoms of bleeding or clotting: No  Previous result: Supratherapeutic  Additional findings: None  Female was there and wrote down dosing  Patient notes that he wants to do his labs in clinic going forward. Will change patient back to clinic [facility].  Matilde QURESHI informed of the above information. Will fax the orders over to home care.       PLAN     Recommended plan for no diet, medication or health factor changes affecting INR     Dosing Instructions: Continue your current warfarin dose with next INR in 5 days       Summary  As of 8/8/2024      Full warfarin instructions:  2.5 mg every Mon, Wed, Sat; 5 mg all other days   Next INR check:  8/13/2024               Telephone call with Carlos who verbalizes understanding and agrees to plan and written down by female in the back ground.    Lab visit scheduled    Education provided: None required    Plan made per [facility] anticoagulation protocol    Steph Dougherty RN  Anticoagulation Clinic  8/8/2024    _______________________________________________________________________     Anticoagulation Episode Summary       Current INR goal:  2.0-3.0   TTR:  68.6% (11.3 mo)   Target end date:  Indefinite   Send INR reminders to:  [facility]    Indications    Factor V Leiden mutation (H24) [D68.51]  Venous thrombosis (Resolved) [I82.90]  Prothrombin mutation (H24) [D68.52]  Long term current use of anticoagulant therapy [Z79.01]             Comments:  Home care. Transfer back to [place] when discharged             Anticoagulation Care  Providers       Provider Role Specialty Phone number    Stiven Donahue MD Referring Internal Medicine [phone]    Nate Cifuentes DO [facility] Internal Medicine [phone]              Orders need to be faxed to home care at [phone]

## 2024-08-08 NOTE — TELEPHONE ENCOUNTER
Please see the August 8, 2024 Anticoagulation encounter for further information.     Steph Dougherty RN    [facility] Anticoagulation Clinic

## 2024-08-08 NOTE — TELEPHONE ENCOUNTER
I have attempted to contact this patient by phone with the following results: no answer.    Patient Contact    Attempt # 1    Was call answered?  No.  Left message on voicemail with information to call me back.    Aguila Llamas RN on 8/8/2024 at 12:02 PM

## 2024-08-08 NOTE — TELEPHONE ENCOUNTER
Yes check on him and his bp.   Labs were good   Make sure he is taking atenolol 50 mg twice a day, losartan and spironolactone.

## 2024-08-09 NOTE — TELEPHONE ENCOUNTER
"Called to check in with patient. He states he still has no symptoms, state she is \"healthy as a horse.\" He states he plans to check is blood pressure in 15 minutes or so and will call if he gets a high reading again. He states he is willing to come into the clinic for a BP check and to compare his machine to ours if needed.     He states he was taking all of his medications in the evening before, but that he now has changed some the morning.     He confirmed he is taking the atenolol twice daily, losartan once daily in the morning, and spironolactone once daily in the morning.     He states he also switched his warfarin to be taken in the morning.     Patient states he also has a separate request. He would like to do physical therapy a the clinic. He state she did PT through home care, and that is done now. He states toward the end his PT through home care was \"very marginal.\" He states he can ask Radha Lozano when he sees her again but that isn't until the end of the month and he doesn't want to wait that long. Notified patient PCP is out of office until Monday. He states he is ok waiting for PCP to review the request.     PT referral pended for provider review.       Radha Chaudhary, DONGN, RN    "

## 2024-08-13 ENCOUNTER — ALLIED HEALTH/NURSE VISIT (OUTPATIENT)
Dept: FAMILY MEDICINE | Facility: CLINIC | Age: 89
End: 2024-08-13
Payer: COMMERCIAL

## 2024-08-13 ENCOUNTER — ANTICOAGULATION THERAPY VISIT (OUTPATIENT)
Dept: ANTICOAGULATION | Facility: CLINIC | Age: 89
End: 2024-08-13

## 2024-08-13 ENCOUNTER — LAB (OUTPATIENT)
Dept: LAB | Facility: CLINIC | Age: 89
End: 2024-08-13
Payer: COMMERCIAL

## 2024-08-13 DIAGNOSIS — H57.89 REDNESS OF LEFT EYE: Primary | ICD-10-CM

## 2024-08-13 DIAGNOSIS — D68.51 FACTOR V LEIDEN MUTATION (H): ICD-10-CM

## 2024-08-13 DIAGNOSIS — N18.32 STAGE 3B CHRONIC KIDNEY DISEASE (H): Primary | ICD-10-CM

## 2024-08-13 DIAGNOSIS — Z79.01 LONG TERM CURRENT USE OF ANTICOAGULANT THERAPY: ICD-10-CM

## 2024-08-13 DIAGNOSIS — D68.52 PROTHROMBIN MUTATION (H): ICD-10-CM

## 2024-08-13 DIAGNOSIS — D68.51 FACTOR V LEIDEN MUTATION (H): Primary | ICD-10-CM

## 2024-08-13 LAB
CREAT UR-MCNC: 142.8 MG/DL
INR BLD: 6 (ref 0.9–1.1)
MICROALBUMIN UR-MCNC: 100.4 MG/L
MICROALBUMIN/CREAT UR: 70.31 MG/G CR (ref 0–17)
PHOSPHATE SERPL-MCNC: 3 MG/DL (ref 2.5–4.5)
PTH-INTACT SERPL-MCNC: 73 PG/ML (ref 15–65)

## 2024-08-13 PROCEDURE — 84100 ASSAY OF PHOSPHORUS: CPT

## 2024-08-13 PROCEDURE — 83970 ASSAY OF PARATHORMONE: CPT

## 2024-08-13 PROCEDURE — 99207 PR NO CHARGE NURSE ONLY: CPT

## 2024-08-13 PROCEDURE — 82570 ASSAY OF URINE CREATININE: CPT

## 2024-08-13 PROCEDURE — 82043 UR ALBUMIN QUANTITATIVE: CPT

## 2024-08-13 PROCEDURE — 36415 COLL VENOUS BLD VENIPUNCTURE: CPT

## 2024-08-13 PROCEDURE — 85610 PROTHROMBIN TIME: CPT

## 2024-08-13 NOTE — PROGRESS NOTES
S: Bloodshot eye    B: Patient was added to the nurse schedule for evaluation of left bloodshot eye.    A: Patient left eye is dark red. The sclera is completely dark red.   Patient denies vision changes/loss, nose bleeds, headaches. Patient states he felt as though something was scratching his eye, so he rubbed it a little to hard.     R: Dr. Donahue was able to visit briefly with patient; recommended holding coumadin for three days and recheck INR. Patient verbalized understanding, agreeable to plan and no further questions at this time.    [facility]     Carolina Gonzalez RN on 8/13/2024 at 10:17 AM

## 2024-08-13 NOTE — PROGRESS NOTES
ANTICOAGULATION MANAGEMENT     Robert Richard 88 year old male is on warfarin with supratherapeutic INR result. (Goal INR 2.0-3.0)    Recent labs: (last 7 days)     08/13/24  0843   INR 6.0*       ASSESSMENT     Source(s): Chart Review and Patient/Caregiver Call     Warfarin doses taken: Warfarin taken as instructed  Diet: No new diet changes identified  Medication/supplement changes: None noted  New illness, injury, or hospitalization: No  Signs or symptoms of bleeding or clotting: Yes: Blood shot eye. See nurse visit for today.   Previous result: Supratherapeutic  Additional findings: None       PLAN     Recommended plan for temporary change(s) affecting INR     Dosing Instructions: hold 3 doses then continue your current warfarin dose with next INR in 3 days. Per nurse visit from today Dr. Donahue had briefly saw they patient and instructed he hold his warfarin for 3 days.        Summary  As of 8/13/2024      Full warfarin instructions:  8/13: Hold; 8/14: Hold; 8/15: Hold; Otherwise 2.5 mg every Mon, Wed, Sat; 5 mg all other days   Next INR check:  8/16/2024               Telephone call with Carlos who verbalizes understanding and agrees to plan    Lab visit scheduled    Education provided: Symptom monitoring: monitoring for bleeding signs and symptoms and when to seek medical attention/emergency care  Contact [phone] with any changes, questions or concerns.     Plan made per [facility] anticoagulation protocol    Camryn WILLS RN  Anticoagulation Clinic  8/13/2024    _______________________________________________________________________     Anticoagulation Episode Summary       Current INR goal:  2.0-3.0   TTR:  67.1% (11.3 mo)   Target end date:  Indefinite   [facility]    Indications    Factor V Leiden mutation (H24) [D68.51]  Venous thrombosis (Resolved) [I82.90]  Prothrombin mutation (H24) [D68.52]  Long term current use of anticoagulant therapy [Z79.01]             Comments:                Anticoagulation Care Providers       Provider Role Specialty Phone number    Stiven Donahue MD Referring Internal Medicine [phone]    Nate Cifuentes DO [facility] Internal Medicine [phone]

## 2024-08-16 ENCOUNTER — LAB (OUTPATIENT)
Dept: LAB | Facility: CLINIC | Age: 89
End: 2024-08-16
Payer: COMMERCIAL

## 2024-08-16 ENCOUNTER — ANTICOAGULATION THERAPY VISIT (OUTPATIENT)
Dept: ANTICOAGULATION | Facility: CLINIC | Age: 89
End: 2024-08-16

## 2024-08-16 DIAGNOSIS — D68.51 FACTOR V LEIDEN MUTATION (H): Primary | ICD-10-CM

## 2024-08-16 DIAGNOSIS — Z79.01 LONG TERM CURRENT USE OF ANTICOAGULANT THERAPY: ICD-10-CM

## 2024-08-16 DIAGNOSIS — D68.51 FACTOR V LEIDEN MUTATION (H): ICD-10-CM

## 2024-08-16 DIAGNOSIS — D68.52 PROTHROMBIN MUTATION (H): ICD-10-CM

## 2024-08-16 LAB — INR BLD: 2.8 (ref 0.9–1.1)

## 2024-08-16 PROCEDURE — 36416 COLLJ CAPILLARY BLOOD SPEC: CPT

## 2024-08-16 PROCEDURE — 85610 PROTHROMBIN TIME: CPT

## 2024-08-16 RX ORDER — WARFARIN SODIUM 5 MG/1
TABLET ORAL
Qty: 60 TABLET | Refills: 0 | Status: SHIPPED | OUTPATIENT
Start: 2024-08-19 | End: 2024-09-24

## 2024-08-16 NOTE — TELEPHONE ENCOUNTER
Requested Prescriptions   Pending Prescriptions Disp Refills    warfarin ANTICOAGULANT (COUMADIN) 5 MG tablet       Sig: Take 0.5 tablets (2.5 mg) by mouth twice a week (0.5 x 5 mg = 2.5 mg:Monday & Fridays)Take 2.5 mg by mouth twice a week (0.5 x 5 mg = 2.5 mg:Monday & Fridays)  Hold coumadin and resume on 6/5/24       There is no refill protocol information for this order            Marielena Potts MA

## 2024-08-16 NOTE — PROGRESS NOTES
ANTICOAGULATION MANAGEMENT     Robert Richard 88 year old male is on warfarin with therapeutic INR result. (Goal INR 2.0-3.0)    Recent labs: (last 7 days)     08/16/24  0748   INR 2.8*       ASSESSMENT     Source(s): Chart Review and Patient/Caregiver Call     Warfarin doses taken: Held for supratherapeutic  recently which may be affecting INR  Diet: No new diet changes identified  Medication/supplement changes: None noted  New illness, injury, or hospitalization: No  Signs or symptoms of bleeding or clotting: bloodshot eye, no new symptoms- will cont to monitor.  Previous result: Supratherapeutic  Additional findings: None       PLAN     Recommended plan for temporary change(s) and ongoing change(s) affecting INR     Dosing Instructions: Last INR pt had no factors and needed a a dose adjustment, based on that, decrease your warfarin dose (18.2% change) with next INR in 4 days       Summary  As of 8/16/2024      Full warfarin instructions:  5 mg every Sun, Thu; 2.5 mg all other days   Next INR check:  8/20/2024               Telephone call with Carlos who verbalizes understanding and agrees to plan and who agrees to plan and repeated back plan correctly    Lab visit scheduled    Education provided: Symptom monitoring: monitoring for bleeding signs and symptoms, monitoring for clotting signs and symptoms, and when to seek medical attention/emergency care    Plan made with [facility] Pharmacist Marie Arreaga, RN  Anticoagulation Clinic  8/16/2024    _______________________________________________________________________     Anticoagulation Episode Summary       Current INR goal:  2.0-3.0   TTR:  66.3% (11.3 mo)   Target end date:  Indefinite   Send INR reminders to:  [facility]    Indications    Factor V Leiden mutation (H24) [D68.51]  Venous thrombosis (Resolved) [I82.90]  Prothrombin mutation (H24) [D68.52]  Long term current use of anticoagulant therapy [Z79.01]             Comments:                Anticoagulation Care Providers       Provider Role Specialty Phone number    Stiven Donahue MD Referring Internal Medicine [phone]    Nate Cifuentes DO [facility] Internal Medicine [phone]

## 2024-08-20 ENCOUNTER — THERAPY VISIT (OUTPATIENT)
Dept: PHYSICAL THERAPY | Facility: CLINIC | Age: 89
End: 2024-08-20
Attending: INTERNAL MEDICINE
Payer: COMMERCIAL

## 2024-08-20 DIAGNOSIS — Z98.890 HX OF LUMBOSACRAL SPINE SURGERY: Primary | ICD-10-CM

## 2024-08-20 DIAGNOSIS — Z98.890 H/O LUMBOSACRAL SPINE SURGERY: ICD-10-CM

## 2024-08-20 PROCEDURE — 97161 PT EVAL LOW COMPLEX 20 MIN: CPT | Mod: GP | Performed by: PHYSICAL THERAPIST

## 2024-08-20 PROCEDURE — 97110 THERAPEUTIC EXERCISES: CPT | Mod: GP | Performed by: PHYSICAL THERAPIST

## 2024-08-20 NOTE — PROGRESS NOTES
PHYSICAL THERAPY EVALUATION  Type of Visit: Evaluation       Fall Risk Screen:  Fall screen completed by: PT  Have you fallen 2 or more times in the past year?: No  Have you fallen and had an injury in the past year?: No  Is patient a fall risk?: Yes  Fall screen comments: SBA and gait belt with balance.    Subjective   Pt had Surgery in May 28: L4-5 fusion, L2-3 laminectomy.  Pt transferred to a nursing home for one month before returning home. Pt currently presents with use of B canes with forearm support.  Pt reports concern with weakness in his legs.  Pt reports some continued discomfort in posterior R hip/buttock area that he attributes to multiple hip surgeries with complication.            Presenting condition or subjective complaint: lower ack surgry  Date of onset: 05/28/24    Relevant medical history: Cancer; Diabetes; DVT (blood clot); Hearing problems   Dates & types of surgery: lower back  uppe back   hip replace prosate cancer removal    Prior diagnostic imaging/testing results: MRI; CT scan; X-ray; EMG     Prior therapy history for the same diagnosis, illness or injury:          Living Environment  Social support: With a significant other or spouse   Type of home: [facility]; 1 level   Stairs to enter the home:         Ramp: No   Stairs inside the home: No       Help at home: [employer] (home health aide/personal care attendant, family, etc)  Equipment owned: Straight Cane     Employment:      Hobbies/Interests:      Patient goals for therapy:   Strength, golf.     Pain assessment: Pain present     Objective   LUMBAR SPINE EVALUATION  PAIN: Pain Level at Rest: 0/10  Pain Level with Use: 3/10  Pain Location: lumbar spine  Pain Quality: Aching  Pain Frequency: intermittent  Pain is Worst: daytime  Pain is Exacerbated By: initially after surgery had incision pain.  Pain is Relieved By: rest and use  Pain Progression: Improved  INTEGUMENTARY (edema, incisions): WNL  POSTURE: Sitting Posture: Rounded  shoulders, Forward head  GAIT:   Weightbearing Status: WBAT  Assistive Device(s): Cane (single end)B canes with forearm supported.  Gait Deviations: WNL Slow speed  BALANCE/PROPRIOCEPTION:  Difficulty with romberg with head turns.   WEIGHTBEARING ALIGNMENT: WNL  NON-WEIGHTBEARING ALIGNMENT: WNL   ROM:  Flexion not assessed due to post-op status, extension to 20%.   PELVIC/SI SCREEN:  NA due to post-op status.  STRENGTH:  4/5 globally B LE's.  DTR S: WNL  CORD SIGNS: WNL  DERMATOMES: WNL  NEURAL TENSION: Lumbar WNL  FLEXIBILITY:  Restricted HS and calves.  LUMBAR/HIP Special Tests:  NA due to post-op status.    FUNCTIONAL TESTS:  10 rep sit to stand: 42 seconds.    Assessment & Plan   CLINICAL IMPRESSIONS  Medical Diagnosis: H/o Lumbosacral spine surgery.    Treatment Diagnosis: H/o Lumbosacral spine surgery.   Impression/Assessment: Patient is a 88 year old male with Low back complaints.  The following significant findings have been identified: Pain, Decreased strength, Impaired balance, Decreased proprioception, Impaired gait, Impaired muscle performance, and Decreased activity tolerance. These impairments interfere with their ability to perform work tasks, recreational activities, household mobility, and community mobility as compared to previous level of function.     Clinical Decision Making (Complexity):  Clinical Presentation: Stable/Uncomplicated  Clinical Presentation Rationale: based on medical and personal factors listed in PT evaluation  Clinical Decision Making (Complexity): Low complexity    PLAN OF CARE  Treatment Interventions:  Interventions: Gait Training, Manual Therapy, Neuromuscular Re-education, Therapeutic Activity, Therapeutic Exercise    Long Term Goals     PT Goal 1  Goal Identifier: HEP  Goal Description: Pt will be independent with HEP in order to improve LE strength and balance.  Target Date: 10/01/24  PT Goal 2  Goal Identifier: Ambulation  Goal Description: Pt will demonstrate ability  to ambulate without AD for 500 feet with normalized gait pattern in order to improve independence with household mobility.  Target Date: 11/12/24      Frequency of Treatment: 1x/week  Duration of Treatment: 12 weeks    Recommended Referrals to Other Professionals:   Education Assessment:   Learner/Method: Patient  Education Comments: HEP    Risks and benefits of evaluation/treatment have been explained.   Patient/Family/caregiver agrees with Plan of Care.     Evaluation Time:     PT Eval, Low Complexity Minutes (56661): 20     Signing Clinician: ANTONIO Johnson [facility]                                                                                   OUTPATIENT PHYSICAL THERAPY      PLAN OF TREATMENT FOR OUTPATIENT REHABILITATION   Patient's Last Name, First Name, Robert Camp    YOB: 1935   Provider's Name   [facility]   Medical Record No.  [id number]     Onset Date: 05/28/24  Start of Care Date: 08/20/24     Medical Diagnosis:  H/o Lumbosacral spine surgery.      PT Treatment Diagnosis:  H/o Lumbosacral spine surgery. Plan of Treatment  Frequency/Duration: 1x/week/ 12 weeks    Certification date from 08/20/24 to 11/12/24         See note for plan of treatment details and functional goals     Enrico Mulligan, ANTONIO                         I CERTIFY THE NEED FOR THESE SERVICES FURNISHED UNDER        THIS PLAN OF TREATMENT AND WHILE UNDER MY CARE .             Physician Signature               Date    X_____________________________________________________                  Referring Provider:  Stiven Donahue    Initial Assessment  See Epic Evaluation- Start of Care Date: 08/20/24

## 2024-08-21 ENCOUNTER — ANTICOAGULATION THERAPY VISIT (OUTPATIENT)
Dept: LAB | Facility: OTHER | Age: 89
End: 2024-08-21

## 2024-08-21 ENCOUNTER — LAB (OUTPATIENT)
Dept: LAB | Facility: CLINIC | Age: 89
End: 2024-08-21
Payer: COMMERCIAL

## 2024-08-21 ENCOUNTER — MYC MEDICAL ADVICE (OUTPATIENT)
Dept: ANTICOAGULATION | Facility: CLINIC | Age: 89
End: 2024-08-21
Payer: COMMERCIAL

## 2024-08-21 DIAGNOSIS — Z79.01 LONG TERM CURRENT USE OF ANTICOAGULANT THERAPY: ICD-10-CM

## 2024-08-21 DIAGNOSIS — D68.52 PROTHROMBIN MUTATION (H): ICD-10-CM

## 2024-08-21 DIAGNOSIS — D68.51 FACTOR V LEIDEN MUTATION (H): Primary | ICD-10-CM

## 2024-08-21 DIAGNOSIS — D68.51 FACTOR V LEIDEN MUTATION (H): ICD-10-CM

## 2024-08-21 LAB — INR BLD: 1.7 (ref 0.9–1.1)

## 2024-08-21 PROCEDURE — 36416 COLLJ CAPILLARY BLOOD SPEC: CPT

## 2024-08-21 PROCEDURE — 85610 PROTHROMBIN TIME: CPT

## 2024-08-21 NOTE — PROGRESS NOTES
ANTICOAGULATION MANAGEMENT     Robert Richard 88 year old male is on warfarin with subtherapeutic INR result. (Goal INR 2.0-3.0)    Recent labs: (last 7 days)     08/21/24  1356   INR 1.7*       ASSESSMENT     Source(s): Chart Review and Patient/Caregiver Call     Warfarin doses taken: Warfarin taken as instructed  Diet: No new diet changes identified  Medication/supplement changes: None noted  New illness, injury, or hospitalization: No  Signs or symptoms of bleeding or clotting: No  Previous result: Therapeutic last visit; previously outside of goal range  Additional findings:  has not had full week of new maint dose, will continue and check next week       PLAN     Recommended plan for no diet, medication or health factor changes affecting INR     Dosing Instructions: Continue your current warfarin dose with next INR in 1 week       Summary  As of 8/21/2024      Full warfarin instructions:  5 mg every Sun, Thu; 2.5 mg all other days   Next INR check:  8/29/2024               Telephone call with Carlos who verbalizes understanding and agrees to plan    Lab visit scheduled    Education provided: Please call back if any changes to your diet, medications or how you've been taking warfarin  Goal range and lab monitoring: goal range and significance of current result, Importance of therapeutic range, and Importance of following up at instructed interval    Plan made with [facility] Pharmacist Eveline East, TITUS  Anticoagulation Clinic  8/21/2024    _______________________________________________________________________     Anticoagulation Episode Summary       Current INR goal:  2.0-3.0   TTR:  65.9% (11.3 mo)   Target end date:  Indefinite   Send INR reminders to:  [facility]    Indications    Factor V Leiden mutation (H24) [D68.51]  Venous thrombosis (Resolved) [I82.90]  Prothrombin mutation (H24) [D68.52]  Long term current use of anticoagulant therapy [Z79.01]             Comments:                Anticoagulation Care Providers       Provider Role Specialty Phone number    Stiven Donahue MD Referring Internal Medicine [phone]    Nate Cifuentes DO [facility] Internal Medicine [phone]

## 2024-08-29 ENCOUNTER — LAB (OUTPATIENT)
Dept: LAB | Facility: CLINIC | Age: 89
End: 2024-08-29
Payer: COMMERCIAL

## 2024-08-29 ENCOUNTER — OFFICE VISIT (OUTPATIENT)
Dept: NEUROSURGERY | Facility: CLINIC | Age: 89
End: 2024-08-29
Payer: COMMERCIAL

## 2024-08-29 ENCOUNTER — ANTICOAGULATION THERAPY VISIT (OUTPATIENT)
Dept: ANTICOAGULATION | Facility: CLINIC | Age: 89
End: 2024-08-29

## 2024-08-29 ENCOUNTER — ANCILLARY PROCEDURE (OUTPATIENT)
Dept: GENERAL RADIOLOGY | Facility: CLINIC | Age: 89
End: 2024-08-29
Attending: NURSE PRACTITIONER
Payer: COMMERCIAL

## 2024-08-29 VITALS
DIASTOLIC BLOOD PRESSURE: 92 MMHG | HEART RATE: 84 BPM | SYSTOLIC BLOOD PRESSURE: 122 MMHG | TEMPERATURE: 97.2 F | RESPIRATION RATE: 16 BRPM

## 2024-08-29 DIAGNOSIS — D68.52 PROTHROMBIN MUTATION (H): ICD-10-CM

## 2024-08-29 DIAGNOSIS — D68.51 FACTOR V LEIDEN MUTATION (H): ICD-10-CM

## 2024-08-29 DIAGNOSIS — Z79.01 LONG TERM CURRENT USE OF ANTICOAGULANT THERAPY: ICD-10-CM

## 2024-08-29 DIAGNOSIS — Z98.1 S/P LUMBAR SPINAL FUSION: Primary | ICD-10-CM

## 2024-08-29 DIAGNOSIS — D68.51 FACTOR V LEIDEN MUTATION (H): Primary | ICD-10-CM

## 2024-08-29 DIAGNOSIS — Z98.1 S/P LUMBAR SPINAL FUSION: ICD-10-CM

## 2024-08-29 LAB — INR BLD: 1.1 (ref 0.9–1.1)

## 2024-08-29 PROCEDURE — 72100 X-RAY EXAM L-S SPINE 2/3 VWS: CPT | Mod: TC | Performed by: RADIOLOGY

## 2024-08-29 PROCEDURE — 36416 COLLJ CAPILLARY BLOOD SPEC: CPT

## 2024-08-29 PROCEDURE — 99024 POSTOP FOLLOW-UP VISIT: CPT | Performed by: NURSE PRACTITIONER

## 2024-08-29 PROCEDURE — 85610 PROTHROMBIN TIME: CPT

## 2024-08-29 ASSESSMENT — PAIN SCALES - GENERAL: PAINLEVEL: NO PAIN (0)

## 2024-08-29 NOTE — PATIENT INSTRUCTIONS
-May continue to increase activities as tolerated.   -Continue outpatient therapies.   -Follow up in clinic in 3 months with lumbar xray prior.   -Please contact our clinic with questions or concerns at [phone].

## 2024-08-29 NOTE — NURSING NOTE
"Robert Richard is a 88 year old male who presents for:  Chief Complaint   Patient presents with    Neurologic Problem     12 week s/p L4-5 TLIF with L2-3 decompression with Dr. Cleary on 5/28/2024.        Initial Vitals:  BP (!) 122/92   Pulse 84   Temp 97.2  F (36.2  C) (Temporal)   Resp 16  Estimated body mass index is 33.86 kg/m  as calculated from the following:    Height as of 8/2/24: 5' 11\" (1.803 m).    Weight as of 8/2/24: 242 lb 12.8 oz (110.1 kg).. There is no height or weight on file to calculate BSA. BP completed using cuff size: regular  No Pain (0)    Nursing Comments: Carlos to follow up with Primary Care provider regarding elevated blood pressure.      Coby Jeronimo    "

## 2024-08-29 NOTE — PROGRESS NOTES
ANTICOAGULATION MANAGEMENT     Robert Richard 88 year old male is on warfarin with subtherapeutic INR result. (Goal INR 2.0-3.0)    Recent labs: (last 7 days)     08/29/24  1021   INR 1.1       ASSESSMENT     Source(s): Chart Review and Patient/Caregiver Call     Warfarin doses taken: Warfarin taken as instructed  Diet: No new diet changes identified  Medication/supplement changes: None noted  New illness, injury, or hospitalization: No  Signs or symptoms of bleeding or clotting: No  Previous result: Subtherapeutic  Additional findings: None       PLAN     Recommended plan for no diet, medication or health factor changes affecting INR     Dosing Instructions: booster dose then continue your current warfarin dose with next INR in 1 week. Patient agreed to do a booster dose today but he did not want to increase the MD at this time.        Summary  As of 8/29/2024      Full warfarin instructions:  5 mg every Sun, Thu; 2.5 mg all other days   Next INR check:  9/5/2024               Telephone call with Carlos who verbalizes understanding and agrees to plan    Lab visit scheduled    Education provided: Contact [phone] with any changes, questions or concerns.     Plan made per [facility] anticoagulation protocol    Camryn WILLS RN  Anticoagulation Clinic  8/29/2024    _______________________________________________________________________     Anticoagulation Episode Summary       Current INR goal:  2.0-3.0   TTR:  63.6% (11.3 mo)   Target end date:  Indefinite   [facility] [place]    Indications    Factor V Leiden mutation (H24) [D68.51]  Venous thrombosis (Resolved) [I82.90]  Prothrombin mutation (H24) [D68.52]  Long term current use of anticoagulant therapy [Z79.01]             Comments:               Anticoagulation Care Providers       Provider Role Specialty Phone number    Stiven Donahue MD Referring Internal Medicine [phone]    Nate Cifuentes DO [facility] Internal Medicine  [phone]

## 2024-08-29 NOTE — PROGRESS NOTES
ANTICOAGULATION MANAGEMENT     Robert Richard 88 year old male is on warfarin with subtherapeutic INR result. (Goal INR 2.0-3.0)    Recent labs: (last 7 days)     08/29/24  1021   INR 1.1       ASSESSMENT     Source(s): Chart Review  Previous INR was Subtherapeutic  Medication, diet, health changes since last INR: Dose was recently decreased due to an INR of 6.0.         PLAN     Unable to reach Carlos today.    Left message to take a booster dose of warfarin,  10 mg tonight. Request call back for assessment.    Follow up required to confirm warfarin dose taken and assess for changes    Camryn WILLS RN  Anticoagulation Clinic  8/29/2024

## 2024-08-29 NOTE — PROGRESS NOTES
[facility] Neurosurgery  Neurosurgery Follow Up:    HPI: 3 months s/p L4-5 TLIF with decompression with Dr. Cleary on 5/28/2024.  Doing well. Minimal pain. No overt weakness. Has bee working with therapies and has begun outpatient PT. Incision well healed.     Medical, surgical, family, and social history unchanged since prior exam.  Exam:  Constitutional:  Alert, well nourished, NAD.  HEENT: Normocephalic, atraumatic.   Pulm:  Without shortness of breath   CV:  No pitting edema of BLE.      Vital Signs:  BP (!) 122/92   Pulse 84   Temp 97.2  F (36.2  C) (Temporal)   Resp 16     Neurological:  Awake  Alert  Oriented x 3  Motor exam:        IP Q DF PF EHL  R   5  5   5   5    5  L   5  5   5   5    5     Able to spontaneously move L/E bilaterally  Sensation intact throughout all L/E dermatomes     Incisions:  Healed nicely  Imaging: AP and lateral films reveal intact and stable hardware.     A/P: s/p lumbar spine fusion  May continue to increase activities as tolerated. Follow up in 3 months with lumbar xray prior. He verbalized understanding and agreement.   Patient Instructions   -May continue to increase activities as tolerated.   -Continue outpatient therapies.   -Follow up in clinic in 3 months with lumbar xray prior.   -Please contact our clinic with questions or concerns at [phone].    Radha Lozano, JODI  [facility] Neurosurgery  [address] [place] [id number]  Tel [phone]  Fax [phone]

## 2024-08-29 NOTE — LETTER
8/29/2024      Robert Richard  [address] [facility] [address]      Dear Colleague,    Thank you for referring your patient, Robert Richard, to the [facility] NEUROSURGERY CLINIC [place]. Please see a copy of my visit note below.    [facility] Neurosurgery  Neurosurgery Follow Up:    HPI: 3 months s/p L4-5 TLIF with decompression with Dr. Cleary on 5/28/2024.  Doing well. Minimal pain. No overt weakness. Has bee working with therapies and has begun outpatient PT. Incision well healed.     Medical, surgical, family, and social history unchanged since prior exam.  Exam:  Constitutional:  Alert, well nourished, NAD.  HEENT: Normocephalic, atraumatic.   Pulm:  Without shortness of breath   CV:  No pitting edema of BLE.      Vital Signs:  BP (!) 122/92   Pulse 84   Temp 97.2  F (36.2  C) (Temporal)   Resp 16     Neurological:  Awake  Alert  Oriented x 3  Motor exam:        IP Q DF PF EHL  R   5  5   5   5    5  L   5  5   5   5    5     Able to spontaneously move L/E bilaterally  Sensation intact throughout all L/E dermatomes     Incisions:  Healed nicely  Imaging: AP and lateral films reveal intact and stable hardware.     A/P: s/p lumbar spine fusion  May continue to increase activities as tolerated. Follow up in 3 months with lumbar xray prior. He verbalized understanding and agreement.   Patient Instructions   -May continue to increase activities as tolerated.   -Continue outpatient therapies.   -Follow up in clinic in 3 months with lumbar xray prior.   -Please contact our clinic with questions or concerns at [phone].    Radha Lozano CNP  [facility] Neurosurgery  [address] [place] [id number]  Tel [phone]  Fax [phone]      Again, thank you for allowing me to participate in the care of your patient.        Sincerely,        Radha Lozano NP

## 2024-09-03 ENCOUNTER — LAB (OUTPATIENT)
Dept: LAB | Facility: CLINIC | Age: 89
End: 2024-09-03
Payer: COMMERCIAL

## 2024-09-03 ENCOUNTER — ANTICOAGULATION THERAPY VISIT (OUTPATIENT)
Dept: ANTICOAGULATION | Facility: CLINIC | Age: 89
End: 2024-09-03

## 2024-09-03 DIAGNOSIS — D68.52 PROTHROMBIN MUTATION (H): ICD-10-CM

## 2024-09-03 DIAGNOSIS — D68.51 FACTOR V LEIDEN MUTATION (H): Primary | ICD-10-CM

## 2024-09-03 DIAGNOSIS — Z79.01 LONG TERM CURRENT USE OF ANTICOAGULANT THERAPY: ICD-10-CM

## 2024-09-03 DIAGNOSIS — D68.51 FACTOR V LEIDEN MUTATION (H): ICD-10-CM

## 2024-09-03 LAB — INR BLD: 1.7 (ref 0.9–1.1)

## 2024-09-03 PROCEDURE — 85610 PROTHROMBIN TIME: CPT

## 2024-09-03 PROCEDURE — 36416 COLLJ CAPILLARY BLOOD SPEC: CPT

## 2024-09-03 NOTE — PROGRESS NOTES
ANTICOAGULATION MANAGEMENT     Robert Richard 89 year old male is on warfarin with subtherapeutic INR result. (Goal INR 2.0-3.0)    Recent labs: (last 7 days)     09/03/24  1054   INR 1.7*       ASSESSMENT     Source(s): Chart Review and Patient/Caregiver Call     Warfarin doses taken: Warfarin taken as instructed  Diet: No new diet changes identified  Medication/supplement changes: None noted  New illness, injury, or hospitalization: No  Signs or symptoms of bleeding or clotting: No  Previous result: Subtherapeutic  Additional findings: None       PLAN     Recommended plan for no diet, medication or health factor changes affecting INR     Dosing Instructions: Increase your warfarin dose (11.1% change) with next INR in 2 weeks       Summary  As of 9/3/2024      Full warfarin instructions:  5 mg every Sun, Tue, Thu; 2.5 mg all other days   Next INR check:  9/17/2024               Telephone call with Carlos who verbalizes understanding and agrees to plan    Lab visit scheduled    Education provided: Please call back if any changes to your diet, medications or how you've been taking warfarin    Plan made per [facility] anticoagulation protocol    Regina Luo RN  Anticoagulation Clinic  9/3/2024    _______________________________________________________________________     Anticoagulation Episode Summary       Current INR goal:  2.0-3.0   TTR:  62.1% (11.3 mo)   Target end date:  Indefinite   Send INR reminders to:  [facility]    Indications    Factor V Leiden mutation (H24) [D68.51]  Venous thrombosis (Resolved) [I82.90]  Prothrombin mutation (H24) [D68.52]  Long term current use of anticoagulant therapy [Z79.01]             Comments:               Anticoagulation Care Providers       Provider Role Specialty Phone number    Stiven Donahue MD Referring Internal Medicine [phone]    Nate Cifuentes DO Responsible Internal Medicine [phone]

## 2024-09-06 ENCOUNTER — THERAPY VISIT (OUTPATIENT)
Dept: PHYSICAL THERAPY | Facility: CLINIC | Age: 89
End: 2024-09-06
Attending: INTERNAL MEDICINE
Payer: COMMERCIAL

## 2024-09-06 DIAGNOSIS — Z98.890 H/O LUMBOSACRAL SPINE SURGERY: Primary | ICD-10-CM

## 2024-09-06 PROCEDURE — 97110 THERAPEUTIC EXERCISES: CPT | Mod: GP

## 2024-09-06 PROCEDURE — 97112 NEUROMUSCULAR REEDUCATION: CPT | Mod: GP

## 2024-09-09 ENCOUNTER — THERAPY VISIT (OUTPATIENT)
Dept: PHYSICAL THERAPY | Facility: CLINIC | Age: 89
End: 2024-09-09
Attending: INTERNAL MEDICINE
Payer: COMMERCIAL

## 2024-09-09 DIAGNOSIS — Z98.890 H/O LUMBOSACRAL SPINE SURGERY: Primary | ICD-10-CM

## 2024-09-09 PROCEDURE — 97110 THERAPEUTIC EXERCISES: CPT | Mod: GP

## 2024-09-09 PROCEDURE — 97112 NEUROMUSCULAR REEDUCATION: CPT | Mod: GP

## 2024-09-16 ENCOUNTER — THERAPY VISIT (OUTPATIENT)
Dept: PHYSICAL THERAPY | Facility: CLINIC | Age: 89
End: 2024-09-16
Attending: INTERNAL MEDICINE
Payer: COMMERCIAL

## 2024-09-16 DIAGNOSIS — Z98.890 H/O LUMBOSACRAL SPINE SURGERY: Primary | ICD-10-CM

## 2024-09-16 PROCEDURE — 97110 THERAPEUTIC EXERCISES: CPT | Mod: GP

## 2024-09-16 PROCEDURE — 97112 NEUROMUSCULAR REEDUCATION: CPT | Mod: GP

## 2024-09-17 ENCOUNTER — ANTICOAGULATION THERAPY VISIT (OUTPATIENT)
Dept: ANTICOAGULATION | Facility: CLINIC | Age: 89
End: 2024-09-17

## 2024-09-17 ENCOUNTER — LAB (OUTPATIENT)
Dept: LAB | Facility: CLINIC | Age: 89
End: 2024-09-17
Payer: COMMERCIAL

## 2024-09-17 DIAGNOSIS — D68.52 PROTHROMBIN MUTATION (H): ICD-10-CM

## 2024-09-17 DIAGNOSIS — D68.51 FACTOR V LEIDEN MUTATION (H): ICD-10-CM

## 2024-09-17 DIAGNOSIS — D68.51 FACTOR V LEIDEN MUTATION (H): Primary | ICD-10-CM

## 2024-09-17 DIAGNOSIS — Z79.01 LONG TERM CURRENT USE OF ANTICOAGULANT THERAPY: ICD-10-CM

## 2024-09-17 LAB — INR BLD: 1.3 (ref 0.9–1.1)

## 2024-09-17 PROCEDURE — 36416 COLLJ CAPILLARY BLOOD SPEC: CPT

## 2024-09-17 PROCEDURE — 85610 PROTHROMBIN TIME: CPT

## 2024-09-17 NOTE — PROGRESS NOTES
ANTICOAGULATION MANAGEMENT     Robert Richard 89 year old male is on warfarin with subtherapeutic INR result. (Goal INR 2.0-3.0)    Recent labs: (last 7 days)     09/17/24  0838   INR 1.3*       ASSESSMENT     Source(s): Chart Review  Previous INR was Subtherapeutic  Medication, diet, health changes since last INR chart reviewed; none identified         PLAN     Spoke with pt earlier and he asked that I call back so that we could verify what dose he took. Unable to reach him this afternoon.    Left message to take a booster dose of warfarin,  7.5 mg tonight. Request call back for assessment.    Follow up required to confirm warfarin dose taken and assess for changes    Neelima Arreaga RN  9/17/2024  Anticoagulation Clinic  [facility] for routing messages: mirlande WALLACE  [facility] patient phone line: [phone]

## 2024-09-17 NOTE — PROGRESS NOTES
ANTICOAGULATION MANAGEMENT     Robert Richard 89 year old male is on warfarin with subtherapeutic INR result. (Goal INR 2.0-3.0)    Recent labs: (last 7 days)     09/17/24  0838   INR 1.3*       ASSESSMENT     Source(s): Chart Review and Patient/Caregiver Call     Warfarin doses taken: Less warfarin taken than planned which may be affecting INR Pt was confused with which dosing to follow, discussed discarding old dosing notes. Dated todays dosing.   Diet: No new diet changes identified  Medication/supplement changes: None noted  New illness, injury, or hospitalization: No  Signs or symptoms of bleeding or clotting: No  Previous result: Subtherapeutic  Additional findings: None       PLAN     Recommended plan for temporary change(s) and ongoing change(s) affecting INR     Dosing Instructions: booster dose then Increase your warfarin dose (~20% change) from what pt was taking with next INR in 6 days       Summary  As of 9/17/2024      Full warfarin instructions:  9/17: 7.5 mg; Otherwise 2.5 mg every Mon, Wed, Fri; 5 mg all other days   Next INR check:  9/23/2024               Telephone call with Carlos who verbalizes understanding and agrees to plan and who agrees to plan and repeated back plan correctly    Lab visit scheduled    Education provided: Taking warfarin: Importance of taking warfarin as instructed  Symptom monitoring: monitoring for bleeding signs and symptoms, monitoring for clotting signs and symptoms, monitoring for stroke signs and symptoms, and when to seek medical attention/emergency care    Plan made per [facility] anticoagulation protocol    Neelima Arreaga RN  9/17/2024  Anticoagulation Clinic  [employer] for routing messages: mirlande WALLACE  [facility] patient phone line: [phone]        _______________________________________________________________________     Anticoagulation Episode Summary       Current INR goal:  2.0-3.0   TTR:  57.8% (11.2 mo)   Target end date:  Indefinite   [facility]    Indications    Factor V Leiden mutation (H24) [D68.51]  Venous thrombosis (Resolved) [I82.90]  Prothrombin mutation (H24) [D68.52]  Long term current use of anticoagulant therapy [Z79.01]             Comments:               Anticoagulation Care Providers       Provider Role Specialty Phone number    Stiven Donahue MD Referring Internal Medicine [phone]    Nate Cifuentes DO [facility] Internal Medicine [phone]

## 2024-09-23 ENCOUNTER — THERAPY VISIT (OUTPATIENT)
Dept: PHYSICAL THERAPY | Facility: CLINIC | Age: 89
End: 2024-09-23
Attending: INTERNAL MEDICINE
Payer: COMMERCIAL

## 2024-09-23 DIAGNOSIS — Z98.890 H/O LUMBOSACRAL SPINE SURGERY: Primary | ICD-10-CM

## 2024-09-23 PROCEDURE — 97112 NEUROMUSCULAR REEDUCATION: CPT | Mod: GP

## 2024-09-23 PROCEDURE — 97110 THERAPEUTIC EXERCISES: CPT | Mod: GP

## 2024-09-23 PROCEDURE — 97116 GAIT TRAINING THERAPY: CPT | Mod: GP

## 2024-09-24 ENCOUNTER — ANTICOAGULATION THERAPY VISIT (OUTPATIENT)
Dept: ANTICOAGULATION | Facility: CLINIC | Age: 89
End: 2024-09-24

## 2024-09-24 ENCOUNTER — LAB (OUTPATIENT)
Dept: LAB | Facility: CLINIC | Age: 89
End: 2024-09-24
Payer: COMMERCIAL

## 2024-09-24 DIAGNOSIS — Z79.01 LONG TERM CURRENT USE OF ANTICOAGULANT THERAPY: ICD-10-CM

## 2024-09-24 DIAGNOSIS — D68.52 PROTHROMBIN MUTATION (H): ICD-10-CM

## 2024-09-24 DIAGNOSIS — D68.51 FACTOR V LEIDEN MUTATION (H): Primary | ICD-10-CM

## 2024-09-24 DIAGNOSIS — D68.51 FACTOR V LEIDEN MUTATION (H): ICD-10-CM

## 2024-09-24 LAB — INR BLD: 1.1 (ref 0.9–1.1)

## 2024-09-24 PROCEDURE — 85610 PROTHROMBIN TIME: CPT

## 2024-09-24 PROCEDURE — 36416 COLLJ CAPILLARY BLOOD SPEC: CPT

## 2024-09-24 RX ORDER — WARFARIN SODIUM 5 MG/1
TABLET ORAL
Qty: 90 TABLET | Refills: 1 | Status: SHIPPED | OUTPATIENT
Start: 2024-09-24

## 2024-09-24 NOTE — PROGRESS NOTES
ANTICOAGULATION MANAGEMENT     Robert Richard 89 year old male is on warfarin with subtherapeutic INR result. (Goal INR 2.0-3.0)    Recent labs: (last 7 days)     09/24/24  1051   INR 1.1       ASSESSMENT     Source(s): Chart Review and Patient/Caregiver Call     Warfarin doses taken: Warfarin taken as instructed  Diet: No new diet changes identified  Medication/supplement changes: None noted  New illness, injury, or hospitalization: No  Signs or symptoms of bleeding or clotting: No  Previous result: Subtherapeutic  Additional findings: None and review tablet size read back what he took all week. States did not miss any.       PLAN     Recommended plan for no diet, medication or health factor changes affecting INR     Dosing Instructions: booster dose then Increase your warfarin dose (9% change) with next INR in 3 days       Summary  As of 9/24/2024      Full warfarin instructions:  9/24: 10 mg; Otherwise 2.5 mg every Mon, Fri; 5 mg all other days   Next INR check:  9/27/2024               Telephone call with Carlos who verbalizes understanding and agrees to plan    Lab visit scheduled    Education provided: Please call back if any changes to your diet, medications or how you've been taking warfarin  Symptom monitoring: monitoring for clotting signs and symptoms and monitoring for stroke signs and symptoms    Plan made per [facility] anticoagulation protocol    Regina Luo RN  9/24/2024  Anticoagulation Clinic  [facility] [employer] and [facility] and you for routing messages: mirlande WALLACE  [facility] patient phone line: [phone]        _______________________________________________________________________     Anticoagulation Episode Summary       Current INR goal:  2.0-3.0   TTR:  55.9% (11.3 mo)   Target end date:  Indefinite   Send INR reminders to:  [facility]    Indications    Factor V Leiden mutation (H24) [D68.51]  Venous thrombosis (Resolved) [I82.90]  Prothrombin mutation (H24) [D68.52]  Long term current use of anticoagulant  therapy [Z79.01]             Comments:               Anticoagulation Care Providers       Provider Role Specialty Phone number    Stiven Donahue MD Referring Internal Medicine [phone]    Nate Cifuentes DO [facility] Internal Medicine [phone]

## 2024-09-24 NOTE — PROGRESS NOTES
Opened in error   Lorraine Montano Rn [facility]     [FreeTextEntry1] : f/u pap results\par f/u mammo/sono results\par \par If bleeding reoccurs with intercourse pt to follow up for further evaluation.

## 2024-09-27 ENCOUNTER — ANTICOAGULATION THERAPY VISIT (OUTPATIENT)
Dept: ANTICOAGULATION | Facility: CLINIC | Age: 89
End: 2024-09-27

## 2024-09-27 ENCOUNTER — LAB (OUTPATIENT)
Dept: LAB | Facility: CLINIC | Age: 89
End: 2024-09-27
Payer: COMMERCIAL

## 2024-09-27 ENCOUNTER — TELEPHONE (OUTPATIENT)
Dept: INTERNAL MEDICINE | Facility: CLINIC | Age: 89
End: 2024-09-27

## 2024-09-27 DIAGNOSIS — D68.51 FACTOR V LEIDEN MUTATION (H): ICD-10-CM

## 2024-09-27 DIAGNOSIS — D68.52 PROTHROMBIN MUTATION (H): ICD-10-CM

## 2024-09-27 DIAGNOSIS — Z79.01 LONG TERM CURRENT USE OF ANTICOAGULANT THERAPY: ICD-10-CM

## 2024-09-27 DIAGNOSIS — D68.51 FACTOR V LEIDEN MUTATION (H): Primary | ICD-10-CM

## 2024-09-27 LAB — INR BLD: 1.7 (ref 0.9–1.1)

## 2024-09-27 PROCEDURE — 85610 PROTHROMBIN TIME: CPT

## 2024-09-27 PROCEDURE — 36416 COLLJ CAPILLARY BLOOD SPEC: CPT

## 2024-09-27 NOTE — PROGRESS NOTES
ANTICOAGULATION MANAGEMENT     Robert Richard 89 year old male is on warfarin with subtherapeutic INR result. (Goal INR 2.0-3.0)    Recent labs: (last 7 days)     09/27/24  1028   INR 1.7*       ASSESSMENT     Source(s): Chart Review  Previous INR was Subtherapeutic  Medication, diet, health changes since last INR chart reviewed; none identified         PLAN     Unable to reach pt today.    Left message to take 5 mg daily this weekend. Request call back for assessment.    Follow up required to confirm warfarin dose taken and assess for changes    Neelima Arreaga RN  9/27/2024  Anticoagulation Clinic  [facility] for routing messages: [facility]e [facility]LLO [facility]  [facility] patient phone line: [phone]

## 2024-09-27 NOTE — PROGRESS NOTES
ANTICOAGULATION MANAGEMENT     Robert Richard 89 year old male is on warfarin with subtherapeutic INR result. (Goal INR 2.0-3.0)    Recent labs: (last 7 days)     09/27/24  1028   INR 1.7*       ASSESSMENT     Source(s): Chart Review and Patient/Caregiver Call     Warfarin doses taken: Warfarin taken as instructed -recent warfarin dose change.   Diet: No new diet changes identified  Medication/supplement changes: None noted  New illness, injury, or hospitalization: No  Signs or symptoms of bleeding or clotting: No  Previous result: Subtherapeutic  Additional findings: None       PLAN     Recommended plan for temporary change(s) and ongoing change(s) affecting INR     Dosing Instructions: Increase your warfarin dose (8.3% change) with next INR in 3 days       Summary  As of 9/27/2024      Full warfarin instructions:  9/27: 5 mg; Otherwise 2.5 mg every Mon; 5 mg all other days   Next INR check:  10/4/2024               Telephone call with Carlos who verbalizes understanding and agrees to plan and who agrees to plan and repeated back plan correctly    Lab visit scheduled    Education provided: Goal range and lab monitoring: goal range and significance of current result, Importance of therapeutic range, and Importance of following up at instructed interval  Symptom monitoring: monitoring for bleeding signs and symptoms, monitoring for clotting signs and symptoms, and when to seek medical attention/emergency care  Contact [phone] with any changes, questions or concerns.     Plan made per [facility] anticoagulation protocol    Chantell Boston RN  9/27/2024  Anticoagulation Clinic  [facility] for routing messages: mirlande WALLACE  [facility] patient phone line: [phone]        _______________________________________________________________________     Anticoagulation Episode Summary       Current INR goal:  2.0-3.0   TTR:  55.0% (11.3 mo)   Target end date:  Indefinite   Send INR reminders to:  [facility]     Indications    Factor V Leiden mutation (H) [D68.51]  Venous thrombosis (Resolved) [I82.90]  Prothrombin mutation (H) [D68.52]  Long term current use of anticoagulant therapy [Z79.01]             Comments:               Anticoagulation Care Providers       Provider Role Specialty Phone number    Stiven Donahue MD Referring Internal Medicine [phone]    Nate Cifuentes DO [facility] Internal Medicine [phone]

## 2024-09-27 NOTE — TELEPHONE ENCOUNTER
General Call      Reason for Call:  RETURN CALL TO INR NURSE TEAM ABOUT DOSING INSTRUCTIONS.    What are your questions or concerns:  SEE ABOVE    Date of last appointment with provider: 9/27/24    Could we send this information to you in [place] or would you prefer to receive a phone call?:   Patient would prefer a phone call   Okay to leave a detailed message?: Yes at Cell number on file:    Telephone Information:   Mobile [phone]

## 2024-09-29 DIAGNOSIS — M48.061 SPINAL STENOSIS OF LUMBAR REGION WITHOUT NEUROGENIC CLAUDICATION: ICD-10-CM

## 2024-09-29 RX ORDER — GABAPENTIN 300 MG/1
300 CAPSULE ORAL AT BEDTIME
Qty: 30 CAPSULE | Refills: 0 | Status: SHIPPED | OUTPATIENT
Start: 2024-09-29

## 2024-09-30 ENCOUNTER — ANTICOAGULATION THERAPY VISIT (OUTPATIENT)
Dept: ANTICOAGULATION | Facility: CLINIC | Age: 89
End: 2024-09-30

## 2024-09-30 ENCOUNTER — LAB (OUTPATIENT)
Dept: LAB | Facility: CLINIC | Age: 89
End: 2024-09-30
Payer: COMMERCIAL

## 2024-09-30 ENCOUNTER — THERAPY VISIT (OUTPATIENT)
Dept: PHYSICAL THERAPY | Facility: CLINIC | Age: 89
End: 2024-09-30
Attending: INTERNAL MEDICINE
Payer: COMMERCIAL

## 2024-09-30 DIAGNOSIS — D68.51 FACTOR V LEIDEN MUTATION (H): Primary | ICD-10-CM

## 2024-09-30 DIAGNOSIS — D68.51 FACTOR V LEIDEN MUTATION (H): ICD-10-CM

## 2024-09-30 DIAGNOSIS — Z79.01 LONG TERM CURRENT USE OF ANTICOAGULANT THERAPY: ICD-10-CM

## 2024-09-30 DIAGNOSIS — D68.52 PROTHROMBIN MUTATION (H): ICD-10-CM

## 2024-09-30 DIAGNOSIS — Z98.890 H/O LUMBOSACRAL SPINE SURGERY: Primary | ICD-10-CM

## 2024-09-30 LAB — INR BLD: 1.6 (ref 0.9–1.1)

## 2024-09-30 PROCEDURE — 97110 THERAPEUTIC EXERCISES: CPT | Mod: GP

## 2024-09-30 PROCEDURE — 36416 COLLJ CAPILLARY BLOOD SPEC: CPT

## 2024-09-30 PROCEDURE — 97112 NEUROMUSCULAR REEDUCATION: CPT | Mod: GP

## 2024-09-30 PROCEDURE — 85610 PROTHROMBIN TIME: CPT

## 2024-09-30 NOTE — PROGRESS NOTES
ANTICOAGULATION MANAGEMENT     Robert Richard 89 year old male is on warfarin with subtherapeutic INR result. (Goal INR 2.0-3.0)    Recent labs: (last 7 days)     09/30/24  0825   INR 1.6*       ASSESSMENT     Source(s): Chart Review and Patient/Caregiver Call     Warfarin doses taken: Warfarin taken as instructed  Diet: No new diet changes identified  Medication/supplement changes: None noted  New illness, injury, or hospitalization: No  Signs or symptoms of bleeding or clotting: No  Previous result: Subtherapeutic  Additional findings: None       PLAN     Recommended plan for no diet, medication or health factor changes affecting INR     Dosing Instructions: booster dose then Increase your warfarin dose (7.7% change) with next INR in 1 week       Summary  As of 9/30/2024      Full warfarin instructions:  9/30: 7.5 mg; Otherwise 5 mg every day   Next INR check:  10/7/2024               Telephone call with Carlos who verbalizes understanding and agrees to plan    Lab visit scheduled    Education provided: Symptom monitoring: monitoring for clotting signs and symptoms and monitoring for stroke signs and symptoms    Plan made per [facility] anticoagulation protocol    Regina Luo RN  9/30/2024  Anticoagulation Clinic  [employer] for routing messages: mirlande WALLACE  [facility] patient phone line: [phone]        _______________________________________________________________________     Anticoagulation Episode Summary       Current INR goal:  2.0-3.0   TTR:  54.1% (11.3 mo)   Target end date:  Indefinite   Send INR reminders to:  [facility] [place]    Indications    Factor V Leiden mutation (H) [D68.51]  Venous thrombosis (Resolved) [I82.90]  Prothrombin mutation (H) [D68.52]  Long term current use of anticoagulant therapy [Z79.01]             Comments:               Anticoagulation Care Providers       Provider Role Specialty Phone number    Stiven Donahue MD Referring Internal Medicine [phone]    [place]  Nate Oconnell DO [facility] Internal Medicine [phone]

## 2024-09-30 NOTE — PROGRESS NOTES
PLAN  Continue therapy per current plan of care.    Beginning/End Dates of Progress Note Reporting Period:  08/20/24 to 09/30/2024 09/30/24 0500   Appointment Info   Signing clinician's name / credentials Randall Cerna, PT, DPT   Total/Authorized Visits [employer]   Visits Used 6   Medical Diagnosis H/o Lumbosacral spine surgery.   PT Tx Diagnosis H/o Lumbosacral spine surgery.   Other pertinent information 20 pound lifting restriction.   Quick Adds Certification   Progress Note/Certification   Start of Care Date 08/20/24   Onset of illness/injury or Date of Surgery 05/28/24   Therapy Frequency 1x/week   Predicted Duration 12 weeks   Certification date from 08/20/24   Certification date to 11/12/24   Progress Note Due Date 10/29/24   Progress Note Completed Date 08/20/24       Present No   GOALS   PT Goals 2   PT Goal 1   Goal Identifier HEP   Goal Description Pt will be independent with HEP in order to improve LE strength and balance.   Rationale to maximize safety and independence with performance of ADLs and functional tasks;to maximize safety and independence within the home;to maximize safety and independence within the community   Goal Progress Patient reports good adherence to his HEP since his previous session.   Target Date 10/01/24   Date Met 09/30/24   PT Goal 2   Goal Identifier Ambulation   Goal Description Pt will demonstrate ability to ambulate without AD for 500 feet with normalized gait pattern in order to improve independence with household mobility.   Rationale to maximize safety and independence with performance of ADLs and functional tasks;to maximize safety and independence within the home;to maximize safety and independence within the community   Goal Progress Not assessed today.   Target Date 11/12/24   Subjective Report   Subjective Report Patient in good spirits during today's session. Patient reports good adherence to his HEP since his previous  session, reports his exercises tend to exacebate a left gluteal muscle spasm posteromedial to his left greater trochanter. Patient reports no falls since his previous session, reports to therapy ambulating with bilateral wooden poles today.   Treatment Interventions (PT)   Interventions Therapeutic Procedure/Exercise;Neuromuscular Re-education;Gait Training   Therapeutic Procedure/Exercise   Therapeutic Procedures: strength, endurance, ROM, flexibility minutes (99989) 25   Ther Proc 1 - Details Nustep x10 minutes at 4-6 workload (630 steps); Trial of seated LLE PF stretch but patient struggling to attain position so not included in updated HEP; Supine belt assisted LLE SKTC hamstring and above 90 degree PF stretches x1 minute; Time taken between exercises for patient reduced mobility efficiency from station to station today, as well as DPT observed cardiovascular fatigue for rest.   Skilled Intervention Selection, instruction, and modification of selected exercises for optimal therapeutic benefit. Education and cueing as detailed above.   Patient Response/Progress Patient reports and demonstrates understanding of today's given education.   Neuromuscular Re-education   Neuromuscular re-ed of mvmt, balance, coord, kinesthetic sense, posture, proprioception minutes (76501) 15   Neuro Re-ed 1 - Details Romberg stance x1 minute on flat surface, as well as semi-tandem stance with each foot forward x1 minute; Each stance repeated with horizontal head turns x10 reps each direction, further repeated with eye tracking to end of SEC in all directions x2 minutes in each stance position; Each exercise performed with occasional UE tapping for support on parallel bars, but patient demonstrates improved ability to use hip strategy with less VC today.   Skilled Intervention Selection, instruction, and modification of selected exercises for optimal therapeutic benefit. Education and cueing as detailed above.   Patient  Response/Progress Patient reports and demonstrates understanding of today's given education.   Education   Learner/Method Patient;Listening;Demonstration;No Barriers to Learning   Education Comments Patient reports understanding of his future therapeutic progression.   Plan   Home program Squat or STS's, marching ambulation with green to blue TB, sidestepping with green to blue TB, seated HS curl with green to blue TB; Romberg stance and semi tandem stance with each foot forward x30-45 seconds each, progressed to without UE support and horizontal head turns as tolerated; Exercises each performed 2-3 times per day for 4-5 days per week.   Plan for next session Review HEP, progress balance and strength.   Total Session Time   Timed Code Treatment Minutes 40   Total Treatment Time (sum of timed and untimed services) 40     Randall Cerna, PT, DPT    [facility]  O: [phone]  E: [email or web address]     Referring Provider:  Stiven Donahue MD

## 2024-10-07 ENCOUNTER — LAB (OUTPATIENT)
Dept: LAB | Facility: CLINIC | Age: 89
End: 2024-10-07
Payer: COMMERCIAL

## 2024-10-07 ENCOUNTER — THERAPY VISIT (OUTPATIENT)
Dept: PHYSICAL THERAPY | Facility: CLINIC | Age: 89
End: 2024-10-07
Attending: INTERNAL MEDICINE
Payer: COMMERCIAL

## 2024-10-07 ENCOUNTER — ANTICOAGULATION THERAPY VISIT (OUTPATIENT)
Dept: ANTICOAGULATION | Facility: CLINIC | Age: 89
End: 2024-10-07

## 2024-10-07 DIAGNOSIS — Z98.890 H/O LUMBOSACRAL SPINE SURGERY: Primary | ICD-10-CM

## 2024-10-07 DIAGNOSIS — D68.51 FACTOR V LEIDEN MUTATION (H): ICD-10-CM

## 2024-10-07 DIAGNOSIS — Z79.01 LONG TERM CURRENT USE OF ANTICOAGULANT THERAPY: ICD-10-CM

## 2024-10-07 DIAGNOSIS — D68.51 FACTOR V LEIDEN MUTATION (H): Primary | ICD-10-CM

## 2024-10-07 DIAGNOSIS — D68.52 PROTHROMBIN MUTATION (H): ICD-10-CM

## 2024-10-07 LAB — INR BLD: 2.3 (ref 0.9–1.1)

## 2024-10-07 PROCEDURE — 36416 COLLJ CAPILLARY BLOOD SPEC: CPT

## 2024-10-07 PROCEDURE — 97140 MANUAL THERAPY 1/> REGIONS: CPT | Mod: GP

## 2024-10-07 PROCEDURE — 85610 PROTHROMBIN TIME: CPT

## 2024-10-07 PROCEDURE — 97110 THERAPEUTIC EXERCISES: CPT | Mod: GP

## 2024-10-07 NOTE — PROGRESS NOTES
ANTICOAGULATION MANAGEMENT     Robert Richard 89 year old male is on warfarin with therapeutic INR result. (Goal INR 2.0-3.0)    Recent labs: (last 7 days)     10/07/24  0922   INR 2.3*       ASSESSMENT     Source(s): Chart Review and Patient/Caregiver Call     Warfarin doses taken: Booster dose(s) recently taken which may be affecting INR  Diet: No new diet changes identified  Medication/supplement changes: None noted  New illness, injury, or hospitalization: No  Signs or symptoms of bleeding or clotting: No  Previous result: Subtherapeutic  Additional findings: None       PLAN     Recommended plan for temporary change(s) affecting INR     Dosing Instructions: Continue your current warfarin dose with next INR in 2 weeks       Summary  As of 10/7/2024      Full warfarin instructions:  5 mg every day   Next INR check:  10/21/2024               Telephone call with Carlos who verbalizes understanding and agrees to plan and who agrees to plan and repeated back plan correctly    Lab visit scheduled    Education provided: Contact [phone] with any changes, questions or concerns.     Plan made per [facility] anticoagulation protocol    Chantell Boston RN  10/7/2024  Anticoagulation Clinic  [employer] for routing messages: mirlande WALLACE  [facility] patient phone line: [phone]        _______________________________________________________________________     Anticoagulation Episode Summary       Current INR goal:  2.0-3.0   TTR:  52.9% (11.3 mo)   Target end date:  Indefinite   Send INR reminders to:  [facility]    Indications    Factor V Leiden mutation (H) [D68.51]  Venous thrombosis (Resolved) [I82.90]  Prothrombin mutation (H) [D68.52]  Long term current use of anticoagulant therapy [Z79.01]             Comments:               Anticoagulation Care Providers       Provider Role Specialty Phone number    Stiven Donahue MD Referring Internal Medicine [phone]    Nate Cifuentes DO Responsible  Internal Medicine [phone]

## 2024-10-14 ENCOUNTER — THERAPY VISIT (OUTPATIENT)
Dept: PHYSICAL THERAPY | Facility: CLINIC | Age: 89
End: 2024-10-14
Attending: INTERNAL MEDICINE
Payer: COMMERCIAL

## 2024-10-14 DIAGNOSIS — Z98.890 H/O LUMBOSACRAL SPINE SURGERY: Primary | ICD-10-CM

## 2024-10-14 PROCEDURE — 97110 THERAPEUTIC EXERCISES: CPT | Mod: GP

## 2024-10-14 PROCEDURE — 97140 MANUAL THERAPY 1/> REGIONS: CPT | Mod: GP

## 2024-10-21 ENCOUNTER — THERAPY VISIT (OUTPATIENT)
Dept: PHYSICAL THERAPY | Facility: CLINIC | Age: 89
End: 2024-10-21
Attending: INTERNAL MEDICINE
Payer: COMMERCIAL

## 2024-10-21 ENCOUNTER — LAB (OUTPATIENT)
Dept: LAB | Facility: CLINIC | Age: 89
End: 2024-10-21
Payer: COMMERCIAL

## 2024-10-21 ENCOUNTER — ANTICOAGULATION THERAPY VISIT (OUTPATIENT)
Dept: ANTICOAGULATION | Facility: CLINIC | Age: 89
End: 2024-10-21

## 2024-10-21 DIAGNOSIS — D68.51 FACTOR V LEIDEN MUTATION (H): ICD-10-CM

## 2024-10-21 DIAGNOSIS — D68.52 PROTHROMBIN MUTATION (H): ICD-10-CM

## 2024-10-21 DIAGNOSIS — Z79.01 LONG TERM CURRENT USE OF ANTICOAGULANT THERAPY: ICD-10-CM

## 2024-10-21 DIAGNOSIS — Z98.890 H/O LUMBOSACRAL SPINE SURGERY: Primary | ICD-10-CM

## 2024-10-21 DIAGNOSIS — D68.51 FACTOR V LEIDEN MUTATION (H): Primary | ICD-10-CM

## 2024-10-21 LAB — INR BLD: 3.2 (ref 0.9–1.1)

## 2024-10-21 PROCEDURE — 36416 COLLJ CAPILLARY BLOOD SPEC: CPT

## 2024-10-21 PROCEDURE — 97110 THERAPEUTIC EXERCISES: CPT | Mod: GP

## 2024-10-21 PROCEDURE — 85610 PROTHROMBIN TIME: CPT

## 2024-10-21 PROCEDURE — 97112 NEUROMUSCULAR REEDUCATION: CPT | Mod: GP

## 2024-10-21 NOTE — PROGRESS NOTES
ANTICOAGULATION MANAGEMENT     Robert Richard 89 year old male is on warfarin with supratherapeutic INR result. (Goal INR 2.0-3.0)    Recent labs: (last 7 days)     10/21/24  0831   INR 3.2*       ASSESSMENT     Source(s): Chart Review and Patient/Caregiver Call     Warfarin doses taken: Warfarin taken as instructed  Diet: Decreased greens/vitamin K in diet; plans to resume previous intake  Medication/supplement changes: None noted   New illness, injury, or hospitalization: No  Signs or symptoms of bleeding or clotting: No  Previous result: Therapeutic last visit; previously outside of goal range  Additional findings: None       PLAN     Recommended plan for temporary change(s) affecting INR     Dosing Instructions: partial hold then continue your current warfarin dose with next INR in 2 weeks       Summary  As of 10/21/2024      Full warfarin instructions:  10/21: 2.5 mg; Otherwise 5 mg every day   Next INR check:  11/4/2024               Telephone call with Carlos who verbalizes understanding and agrees to plan    Lab visit scheduled    Education provided: Symptom monitoring: monitoring for bleeding signs and symptoms    Plan made per [facility] anticoagulation protocol    Regina Luo RN  10/21/2024  Anticoagulation Clinic  [employer] for routing messages: [facility]lande [facility]  [facility] patient phone line: [phone]        _______________________________________________________________________     Anticoagulation Episode Summary       Current INR goal:  2.0-3.0   TTR:  52.0% (11.3 mo)   Target end date:  Indefinite   Send INR reminders to:  AVEL [place]    Indications    Factor V Leiden mutation (H) [D68.51]  Venous thrombosis (Resolved) [I82.90]  Prothrombin mutation (H) [D68.52]  Long term current use of anticoagulant therapy [Z79.01]             Comments:               Anticoagulation Care Providers       Provider Role Specialty Phone number    Stiven Donahue MD Referring Internal Medicine [phone]     Nate Cifuentes DO [facility] Internal Medicine [phone]

## 2024-10-28 ENCOUNTER — THERAPY VISIT (OUTPATIENT)
Dept: PHYSICAL THERAPY | Facility: CLINIC | Age: 89
End: 2024-10-28
Attending: INTERNAL MEDICINE
Payer: COMMERCIAL

## 2024-10-28 DIAGNOSIS — Z98.890 H/O LUMBOSACRAL SPINE SURGERY: Primary | ICD-10-CM

## 2024-10-28 PROCEDURE — 97112 NEUROMUSCULAR REEDUCATION: CPT | Mod: GP

## 2024-10-28 PROCEDURE — 97116 GAIT TRAINING THERAPY: CPT | Mod: GP

## 2024-10-28 PROCEDURE — 97110 THERAPEUTIC EXERCISES: CPT | Mod: GP

## 2024-10-30 ENCOUNTER — LAB (OUTPATIENT)
Dept: LAB | Facility: CLINIC | Age: 89
End: 2024-10-30
Payer: COMMERCIAL

## 2024-10-30 ENCOUNTER — ANTICOAGULATION THERAPY VISIT (OUTPATIENT)
Dept: ANTICOAGULATION | Facility: CLINIC | Age: 89
End: 2024-10-30

## 2024-10-30 DIAGNOSIS — J31.0 CHRONIC RHINITIS: ICD-10-CM

## 2024-10-30 DIAGNOSIS — D68.52 PROTHROMBIN MUTATION (H): ICD-10-CM

## 2024-10-30 DIAGNOSIS — D68.51 FACTOR V LEIDEN MUTATION (H): ICD-10-CM

## 2024-10-30 DIAGNOSIS — D68.51 FACTOR V LEIDEN MUTATION (H): Primary | ICD-10-CM

## 2024-10-30 DIAGNOSIS — Z79.01 LONG TERM CURRENT USE OF ANTICOAGULANT THERAPY: ICD-10-CM

## 2024-10-30 DIAGNOSIS — M48.061 SPINAL STENOSIS OF LUMBAR REGION WITHOUT NEUROGENIC CLAUDICATION: ICD-10-CM

## 2024-10-30 LAB — INR BLD: 2.3 (ref 0.9–1.1)

## 2024-10-30 PROCEDURE — 36416 COLLJ CAPILLARY BLOOD SPEC: CPT

## 2024-10-30 PROCEDURE — 85610 PROTHROMBIN TIME: CPT

## 2024-10-30 RX ORDER — GABAPENTIN 300 MG/1
300 CAPSULE ORAL AT BEDTIME
Qty: 30 CAPSULE | Refills: 0 | Status: SHIPPED | OUTPATIENT
Start: 2024-10-30

## 2024-10-30 RX ORDER — IPRATROPIUM BROMIDE 42 UG/1
SPRAY, METERED NASAL
Qty: 45 ML | Refills: 0 | Status: SHIPPED | OUTPATIENT
Start: 2024-10-30

## 2024-10-30 NOTE — PROGRESS NOTES
ANTICOAGULATION MANAGEMENT     Robert Richard 89 year old male is on warfarin with therapeutic INR result. (Goal INR 2.0-3.0)    Recent labs: (last 7 days)     10/30/24  0855   INR 2.3*       ASSESSMENT     Source(s): Chart Review and Patient/Caregiver Call     Warfarin doses taken: Warfarin taken as instructed  Diet: No new diet changes identified  Medication/supplement changes: None noted  New illness, injury, or hospitalization: No  Signs or symptoms of bleeding or clotting: No  Previous result: Supratherapeutic  Additional findings: None       PLAN     Recommended plan for no diet, medication or health factor changes affecting INR     Dosing Instructions: Continue your current warfarin dose with next INR in 2 weeks       Summary  As of 10/30/2024      Full warfarin instructions:  5 mg every day   Next INR check:  11/13/2024               Telephone call with Carlos who verbalizes understanding and agrees to plan and who agrees to plan and repeated back plan correctly    Lab visit scheduled    Education provided: None required    Plan made per [facility] anticoagulation protocol    Neelima Arreaga RN  10/30/2024  Anticoagulation Clinic  [employer] for routing messages: mirlande WALLACE  [facility] patient phone line: [phone]        _______________________________________________________________________     Anticoagulation Episode Summary       Current INR goal:  2.0-3.0   TTR:  51.4% (11.3 mo)   Target end date:  Indefinite   Send INR reminders to:  AVEL WALLACE    Indications    Factor V Leiden mutation (H) [D68.51]  Venous thrombosis (Resolved) [I82.90]  Prothrombin mutation (H) [D68.52]  Long term current use of anticoagulant therapy [Z79.01]             Comments:  --             Anticoagulation Care Providers       Provider Role Specialty Phone number    Stiven Donahue MD Referring Internal Medicine [phone]    Nate Cifuentes DO Responsible Internal Medicine [phone]

## 2024-11-07 ENCOUNTER — THERAPY VISIT (OUTPATIENT)
Dept: PHYSICAL THERAPY | Facility: CLINIC | Age: 89
End: 2024-11-07
Attending: INTERNAL MEDICINE
Payer: COMMERCIAL

## 2024-11-07 DIAGNOSIS — Z98.890 H/O LUMBOSACRAL SPINE SURGERY: Primary | ICD-10-CM

## 2024-11-07 PROCEDURE — 97112 NEUROMUSCULAR REEDUCATION: CPT | Mod: GP

## 2024-11-07 PROCEDURE — 97110 THERAPEUTIC EXERCISES: CPT | Mod: GP

## 2024-11-12 ENCOUNTER — ANTICOAGULATION THERAPY VISIT (OUTPATIENT)
Dept: ANTICOAGULATION | Facility: CLINIC | Age: 89
End: 2024-11-12

## 2024-11-12 ENCOUNTER — THERAPY VISIT (OUTPATIENT)
Dept: PHYSICAL THERAPY | Facility: CLINIC | Age: 89
End: 2024-11-12
Attending: INTERNAL MEDICINE
Payer: COMMERCIAL

## 2024-11-12 ENCOUNTER — LAB (OUTPATIENT)
Dept: LAB | Facility: CLINIC | Age: 89
End: 2024-11-12
Payer: COMMERCIAL

## 2024-11-12 DIAGNOSIS — D68.52 PROTHROMBIN MUTATION (H): ICD-10-CM

## 2024-11-12 DIAGNOSIS — Z79.01 LONG TERM CURRENT USE OF ANTICOAGULANT THERAPY: ICD-10-CM

## 2024-11-12 DIAGNOSIS — D68.51 FACTOR V LEIDEN MUTATION (H): Primary | ICD-10-CM

## 2024-11-12 DIAGNOSIS — D68.51 FACTOR V LEIDEN MUTATION (H): ICD-10-CM

## 2024-11-12 DIAGNOSIS — Z98.890 H/O LUMBOSACRAL SPINE SURGERY: Primary | ICD-10-CM

## 2024-11-12 LAB — INR BLD: 3.6 (ref 0.9–1.1)

## 2024-11-12 PROCEDURE — 97112 NEUROMUSCULAR REEDUCATION: CPT | Mod: GP

## 2024-11-12 PROCEDURE — 85610 PROTHROMBIN TIME: CPT

## 2024-11-12 PROCEDURE — 97110 THERAPEUTIC EXERCISES: CPT | Mod: GP

## 2024-11-12 PROCEDURE — 36416 COLLJ CAPILLARY BLOOD SPEC: CPT

## 2024-11-12 NOTE — PROGRESS NOTES
ANTICOAGULATION MANAGEMENT     Robert Richard 89 year old male is on warfarin with supratherapeutic INR result. (Goal INR 2.0-3.0)    Recent labs: (last 7 days)     11/12/24  1024   INR 3.6*       ASSESSMENT     Source(s): Chart Review  Previous INR was Therapeutic last visit; previously outside of goal range  Medication, diet, health changes since last INR chart reviewed; none identified         PLAN     Unable to reach Carlos today.    Left message to take reduced dose of warfarin, 2.5 mg tonight. Request call back for assessment.    Follow up required to discuss out of range result     Amina Bertrand, RN  11/12/2024  Anticoagulation Clinic  [facility] for routing messages: mirlande VALLECILLO [facility] patient phone line: [phone]

## 2024-11-12 NOTE — PROGRESS NOTES
[facility]                                                                                   OUTPATIENT PHYSICAL THERAPY    PLAN OF TREATMENT FOR OUTPATIENT REHABILITATION   Patient's Last Name, First Name, Robert Camp    YOB: 1935   Provider's Name   [facility] Medical Record No.  [id number]     Onset Date: 05/28/24  Start of Care Date: 08/20/24     Medical Diagnosis:  H/o Lumbosacral spine surgery.      PT Treatment Diagnosis:  H/o Lumbosacral spine surgery. Plan of Treatment  Frequency/Duration: 1x/week/ 10 weeks    Certification date from 11/12/24 to 01/21/25         See note for plan of treatment details and functional goals       11/12/24 0500   Appointment Info   Signing clinician's name / credentials Randall Cerna, PT, DPT   Total/Authorized Visits [employer]   Visits Used 12   Medical Diagnosis H/o Lumbosacral spine surgery.   PT Tx Diagnosis H/o Lumbosacral spine surgery.   Other pertinent information 20 pound lifting restriction.   Quick Adds Certification   Progress Note/Certification   Start of Care Date 08/20/24   Onset of illness/injury or Date of Surgery 05/28/24   Therapy Frequency 1x/week   Predicted Duration 12 weeks   Certification date from 11/12/24   Certification date to 01/21/25   Progress Note Due Date 01/21/25   Progress Note Completed Date 11/12/24       Present No   GOALS   PT Goals 2;3   PT Goal 1   Goal Identifier HEP   Goal Description Pt will be independent with HEP in order to improve LE strength and balance.   Rationale to maximize safety and independence with performance of ADLs and functional tasks;to maximize safety and independence within the home;to maximize safety and independence within the community   Goal Progress Patient reports good adherence to his HEP since his previous session.   Target Date 10/01/24   Date Met 09/30/24   PT Goal 2   Goal  Identifier Ambulation   Goal Description Pt will demonstrate ability to ambulate without AD for 500 feet with normalized gait pattern in order to improve independence with household mobility.   Rationale to maximize safety and independence with performance of ADLs and functional tasks;to maximize safety and independence within the home;to maximize safety and independence within the community   Goal Progress Patient progressing from 4WW to SEC ambulation with SBA-CGA x1 for assist during his current progress note reporting period. Goal to be continued to continue progression of ambulation with least restrictive assistive device.   Target Date 01/21/25   PT Goal 3   Goal Identifier Timed Up and Go   Goal Description Patient will reduce his initial TUG score with use of single end cane by 3 seconds or greater to demonstrate improved safety with his required ambulation demands with use of a single end cane.   Rationale to maximize safety and independence with performance of ADLs and functional tasks;to maximize safety and independence within the home;to maximize safety and independence within the community   Goal Progress 30.43 seconds with SEC (11/12/2024)   Target Date 01/21/25   Subjective Report   Subjective Report Patient in good spirits during today's session. Patient reports good adherence to his HEP since his previous session, reports no falls since his previous session. Patient reports being generally sore from HEP performance this morning before today's session. Patient ambulating with SEC to today's session.   Objective Measures   Objective Measures Objective Measure 1;Objective Measure 2   Objective Measure 1   Objective Measure Pain   Details 0/10 lumbar spine pain   Objective Measure 2   Objective Measure Timed Up and Go   Details 30.43 seconds with SEC (11/12/2024)   Treatment Interventions (PT)   Interventions Therapeutic Procedure/Exercise;Neuromuscular Re-education   Therapeutic Procedure/Exercise    Therapeutic Procedures: strength, endurance, ROM, flexibility minutes (69528) 28   Ther Proc 1 - Details Nustep x11 minutes at 6 workload with two rest breaks taken due to cardiovascular fatigue during today's session (723 steps total); Instruction and performance of TUG assessment with use of a SEC; Significant discussion regarding not doing HEP in mornings of his OP PT treatment session days to avoid excessive cardiovascular fatigue during his sessions, therapeutic rest between Nustep and TUG taken due to significant cardiovascular fatigue during today's session.   Skilled Intervention Selection, instruction, and modification of selected exercises for optimal therapeutic benefit. Education and cueing as detailed above.   Patient Response/Progress Patient reports and demonstrates understanding of today's given education.   Neuromuscular Re-education   Neuromuscular re-ed of mvmt, balance, coord, kinesthetic sense, posture, proprioception minutes (16722) 12   Neuro Re-ed 1 - Details Semi-tandem stance with each foot forward within parallel bars without UE support x1-2 minutes each, progressed to x1-2 minutes of min DPT manual pertubations in all directions, patient needing min assist for stability with anterior trunk pertubations during today's session along with VC for increased quad activity to push back up to vertical trunk position; Standing rest breaks taken between today's selected balance exercises due to BLE muscular fatigue during today's session.   Skilled Intervention Selection, instruction, and modification of selected exercises for optimal therapeutic benefit. Education and cueing as detailed above.   Patient Response/Progress Patient reports and demonstrates understanding of today's given education.   Education   Learner/Method Patient;Listening;Demonstration;No Barriers to Learning   Education Comments Patient reports understanding of his future therapeutic progression.   Plan   Home program  Standing BLE hip ABD SLR's x15-20 reps each; 4-inch step ups x10 reps leading with right leg; Squat or STS's, marching ambulation with green to blue TB, sidestepping with green to blue TB, seated HS curl with green to blue TB; Romberg stance and semi tandem stance with each foot forward x30-45 seconds each, progressed to without UE support and horizontal head turns as tolerated; Exercises each performed 2-3 times per day for 4-5 days per week.   Plan for next session Review HEP, progress balance and strength.   Comments   Comments Patient will benefit from continued skilled physical therapy services for continued improvements with balance and safety with ambulation using a SEC.   Total Session Time   Timed Code Treatment Minutes 40   Total Treatment Time (sum of timed and untimed services) 40     Randall Cerna, PT, DPT    [facility]  O: [phone]  E: [email or web address]                          I CERTIFY THE NEED FOR THESE SERVICES FURNISHED UNDER        THIS PLAN OF TREATMENT AND WHILE UNDER MY CARE .    Physician Signature               Date    X_____________________________________________________                Referring Provider:  Stiven Donahue MD    Initial Assessment  See Epic Evaluation- Start of Care Date: 08/20/24      PLAN  Continue therapy per current plan of care.    Beginning/End Dates of Progress Note Reporting Period:  09/30/2024 to 11/12/2024    Referring Provider:  Stiven Donahue MD

## 2024-11-13 NOTE — PROGRESS NOTES
ANTICOAGULATION MANAGEMENT     Robert Richard 89 year old male is on warfarin with supratherapeutic INR result. (Goal INR 2.0-3.0)    Recent labs: (last 7 days)     11/12/24  1024   INR 3.6*       ASSESSMENT     Source(s): Chart Review  Previous INR was Therapeutic last visit; previously outside of goal range  Medication, diet, health changes since last INR chart reviewed; none identified    I left a detailed voicemail with the orders reflected in flowsheet. I have also requested a call back if there have been any missed doses, concerns, illness, fever, or if there have been any changes in medications, activity level, or diet         PLAN     Recommended plan for no diet, medication or health factor changes affecting INR     Dosing Instructions: decrease your warfarin dose (7.1% change) with next INR in 2 weeks       Summary  As of 11/12/2024      Full warfarin instructions:  11/12: 2.5 mg; Otherwise 2.5 mg every Tue; 5 mg all other days   Next INR check:  11/26/2024               Detailed voice message left for Carlos with dosing instructions and follow up date.     Contact [phone] to schedule and with any changes, questions or concerns.     Education provided: Please call back if any changes to your diet, medications or how you've been taking warfarin  Goal range and lab monitoring: goal range and significance of current result, Importance of therapeutic range, and Importance of following up at instructed interval  Symptom monitoring: monitoring for bleeding signs and symptoms and if you hit your head or have a bad fall seek emergency care    Plan made per [facility] anticoagulation protocol    Raquel Pelletier RN  11/13/2024  Anticoagulation Clinic  [facility] for routing messages: mirlande WALLACE  [facility] patient phone line: [phone]        _______________________________________________________________________     Anticoagulation Episode Summary       Current INR goal:  2.0-3.0   TTR:  50.8% (11.2 mo)    Target end date:  Indefinite   Send INR reminders to:  [facility]    Indications    Factor V Leiden mutation (H) [D68.51]  Venous thrombosis (Resolved) [I82.90]  Prothrombin mutation (H) [D68.52]  Long term current use of anticoagulant therapy [Z79.01]             Comments:  --             Anticoagulation Care Providers       Provider Role Specialty Phone number    Stiven Donahue MD Referring Internal Medicine [phone]    Nate Cifuentes DO [facility] Internal Medicine [phone]

## 2024-11-19 ENCOUNTER — THERAPY VISIT (OUTPATIENT)
Dept: PHYSICAL THERAPY | Facility: CLINIC | Age: 89
End: 2024-11-19
Attending: INTERNAL MEDICINE
Payer: COMMERCIAL

## 2024-11-19 DIAGNOSIS — Z98.890 H/O LUMBOSACRAL SPINE SURGERY: Primary | ICD-10-CM

## 2024-11-19 PROCEDURE — 97110 THERAPEUTIC EXERCISES: CPT | Mod: GP

## 2024-11-19 PROCEDURE — 97112 NEUROMUSCULAR REEDUCATION: CPT | Mod: GP

## 2024-11-21 ENCOUNTER — ANCILLARY PROCEDURE (OUTPATIENT)
Dept: GENERAL RADIOLOGY | Facility: CLINIC | Age: 89
End: 2024-11-21
Attending: NURSE PRACTITIONER
Payer: COMMERCIAL

## 2024-11-21 ENCOUNTER — OFFICE VISIT (OUTPATIENT)
Dept: NEUROSURGERY | Facility: CLINIC | Age: 89
End: 2024-11-21
Payer: COMMERCIAL

## 2024-11-21 VITALS
TEMPERATURE: 96.3 F | SYSTOLIC BLOOD PRESSURE: 130 MMHG | HEART RATE: 66 BPM | OXYGEN SATURATION: 100 % | RESPIRATION RATE: 16 BRPM | DIASTOLIC BLOOD PRESSURE: 94 MMHG

## 2024-11-21 DIAGNOSIS — Z98.1 S/P LUMBAR SPINAL FUSION: Primary | ICD-10-CM

## 2024-11-21 DIAGNOSIS — Z98.1 S/P LUMBAR SPINAL FUSION: ICD-10-CM

## 2024-11-21 PROCEDURE — 72100 X-RAY EXAM L-S SPINE 2/3 VWS: CPT | Mod: TC | Performed by: RADIOLOGY

## 2024-11-21 ASSESSMENT — PAIN SCALES - GENERAL: PAINLEVEL_OUTOF10: MODERATE PAIN (5)

## 2024-11-21 NOTE — NURSING NOTE
"Robert Richard is a 89 year old male who presents for:  Chief Complaint   Patient presents with    Neurologic Problem     5/28/2024 Lumbar 4 to Lumbar 5 Transforaminal Interbody Fusion with decompression of stenosis,   Lumbar 2 to Lumbar 3 bilateral laminectomy for decompression of stenosis              Initial Vitals:  BP (!) 130/94   Pulse 66   Temp (!) 96.3  F (35.7  C) (Temporal)   Resp 16   SpO2 100%  Estimated body mass index is 33.86 kg/m  as calculated from the following:    Height as of 8/2/24: 5' 11\" (1.803 m).    Weight as of 8/2/24: 242 lb 12.8 oz (110.1 kg).. There is no height or weight on file to calculate BSA. BP completed using cuff size: regular  Moderate Pain (5)    Nursing Comments:     Carlos to follow up with Primary Care provider regarding elevated blood pressure.      Coby Jeronimo    "

## 2024-11-21 NOTE — LETTER
"11/21/2024      Robert Richard  [address] [facility] [address]      Dear Colleague,    Thank you for referring your patient, Robert Richard, to the [facility] NEUROSURGERY [facility]INIC [place]. Please see a copy of my visit note below.    [facility] Neuro[facility]  Neurosurgery Follow Up:    HPI: 6 months s/p L4-5 TLIF with decompression with Dr. Cleary on 5/28/2024. Doing well. Minimal pain. Some \"mild ache\" in his mid low back. No radicular leg pain, paresthesias or overt weakness. No incisional complaints.   Medical, surgical, family, and social history unchanged since prior exam.  Exam:  Constitutional:  Alert, well nourished, NAD.  HEENT: Normocephalic, atraumatic.   Pulm:  Without shortness of breath   CV:  No pitting edema of BLE.      Vital Signs:  BP (!) 130/94   Pulse 66   Temp (!) 96.3  F (35.7  C) (Temporal)   Resp 16   SpO2 100%     Neurological:  Awake  Alert  Oriented x 3  Motor exam:        IP Q DF PF EHL  R   5  5   5   5    5  L   5  5   5   5    5     Able to spontaneously move L/E bilaterally  Sensation intact throughout all L/E dermatomes     Incisions:  Healed nicely  Imaging: AP and lateral films reveal intact and stable hardware.     A/P: s/p lumbar spine fusion  Doing ok. May continue physical therapy as he continues to see improvement with it. Follow up in 6 months with lumbar xray prior. He verbalized understanding and agreement.   Patient Instructions   -Continue to increase activities as tolerated.   -Continue physical therapy exercises.   -Please contact our clinic with questions or concerns at [phone].    Radha Lozano CNP  [facility]  [address] [place] [address]  [address] [place] [address]  Tel [phone]  Fax [phone]      Again, thank you for allowing me to participate in the care of your patient.        Sincerely,        Radha Lozano NP  "

## 2024-11-21 NOTE — PROGRESS NOTES
"[facility]  Neurosurgery Follow Up:    HPI: 6 months s/p L4-5 TLIF with decompression with Dr. Cleary on 5/28/2024. Doing well. Minimal pain. Some \"mild ache\" in his mid low back. No radicular leg pain, paresthesias or overt weakness. No incisional complaints.   Medical, surgical, family, and social history unchanged since prior exam.  Exam:  Constitutional:  Alert, well nourished, NAD.  HEENT: Normocephalic, atraumatic.   Pulm:  Without shortness of breath   CV:  No pitting edema of BLE.      Vital Signs:  BP (!) 130/94   Pulse 66   Temp (!) 96.3  F (35.7  C) (Temporal)   Resp 16   SpO2 100%     Neurological:  Awake  Alert  Oriented x 3  Motor exam:        IP Q DF PF EHL  R   5  5   5   5    5  L   5  5   5   5    5     Able to spontaneously move L/E bilaterally  Sensation intact throughout all L/E dermatomes     Incisions:  Healed nicely  Imaging: AP and lateral films reveal intact and stable hardware.     A/P: s/p lumbar spine fusion  Doing ok. May continue physical therapy as he continues to see improvement with it. Follow up in 6 months with lumbar xray prior. He verbalized understanding and agreement.   Patient Instructions   -Continue to increase activities as tolerated.   -Continue physical therapy exercises.   -Please contact our clinic with questions or concerns at [phone].    Radha Loznao, JODI  [facility]  [address] [place] [address]  Tel [phone]  Fax [phone]    "

## 2024-11-21 NOTE — PATIENT INSTRUCTIONS
-Continue to increase activities as tolerated.   -Continue physical therapy exercises.   -Please contact our clinic with questions or concerns at [phone].

## 2024-12-29 NOTE — PLAN OF CARE
Goal Outcome Evaluation:       Patient vital signs are at baseline: No,  Pt had decreased oxygen sats on 2 liters and was drowsy. Pt's home CPAP applied and continued to have decreased oxygen sat. Respiratory therapy applied BYPAP. Oxygen saturations on BIPAP are95 % with FIO2 at 45%.   Patient able to ambulate as they were prior to admission or with assist devices provided by therapies during their stay:  No,  Pt needs assistance of 2 with use of Sera Steady.   Patient MUST void prior to discharge:  No,  Pt unable to urinate. Straight cath for 320 ml. Next Bladder scan at 07:35.   Patient able to tolerate oral intake:  No,  Pt did not eat dinner and has drank very little fluids. NS infusing at 75 ml/hr.   Pain has adequate pain control using Oral analgesics:  Yes  Does patient have an identified :  Yes  Has goal D/C date and time been discussed with patient:  Yes TBD.     A&O x4. Back dressing is CDI. MONICA intact with gravity drainage. CMS intact. [place] is [place] and wears hearing aids.  Scheduled [place] held due to HR of 54. BS at 0200 156. Pt was drowsy earlier in the evening and aroused only with voice. Pt is now more wide awake and offering conversation. Pain managed with Tylenol                 
Goal Outcome Evaluation:       Patient vital signs are at baseline: Yes  Patient able to ambulate as they were prior to admission or with assist devices provided by therapies during their stay:  No,  Pt needs assistance of 2 with sera steady  Patient MUST void prior to discharge:  No,  Pt has freitas cath due to retention  Patient able to tolerate oral intake:  Yes  Pain has adequate pain control using Oral analgesics:  Yes  Does patient have an identified :  Yes  Has goal D/C date and time been discussed with patient:  Yes TBD/In Progress    A&O x4. VSS on RA. Back dressing is CDI. MONICA intact. CMS intact. Pain managed with Tylenol and Oxycodone.  Pt [place] wears hearing aids. Turn and reposition every 2 hours.                  
Goal Outcome Evaluation:    .Patient vital signs are at baseline: No,  Reason:  Still requiring supplemental O2 to maintain saturations >90%.  Patient able to ambulate as they were prior to admission or with assist devices provided by therapies during their stay:  No,  Reason:  Able to take a few steps in the room today with nursing, A2 nohemy steady for distances.   Patient MUST void prior to discharge:  No,  Reason:  Failed initial voiding trial, freitas placed yesterday 5/30/24. Request for clarification left with MD, per progress note TOV in 1-2 days once patient able to ambulate.   Patient able to tolerate oral intake:  Yes  Pain has adequate pain control using Oral analgesics:  Yes  Does patient have an identified :  Yes  Has goal D/C date and time been discussed with patient:  No,  Reason:  TBD pending medically ready. [facility] is accepting of patient. SW following.    
Goal Outcome Evaluation:    .Patient vital signs are at baseline: No,  Reason:  Still requiring supplemental O2 to maintain saturations >90%.  Patient able to ambulate as they were prior to admission or with assist devices provided by therapies during their stay:  No,  Reason:  Up A2 [place] steady, has not ambulated yet.   Patient MUST void prior to discharge:  No,  Reason:  Up to toilet x3 today, some dribbling of urine this afternoon last PVR 289cc. Patient still DTV  Patient able to tolerate oral intake:  Yes  Pain has adequate pain control using Oral analgesics:  Yes  Does patient have an identified :  Yes  Has goal D/C date and time been discussed with patient:  No,  Reason:  TBD pending medical clearance.     MONICA drain to gravity in place approx 180ml serosanginous output emptied since 0700. Dressing CDI. Hgb recheck POD2 at 7.4 Neurosurgery aware.     
Goal Outcome Evaluation:    .Patient vital signs are at baseline: Yes  Patient able to ambulate as they were prior to admission or with assist devices provided by therapies during their stay:  No,  Reason:  A1-2 gb and walker.   Patient MUST void prior to discharge:  No,  Reason:  Smith in place for retention, with plans to TOV at TCU  Patient able to tolerate oral intake:  Yes  Pain has adequate pain control using Oral analgesics:  Yes  Does patient have an identified :  Yes  Has goal D/C date and time been discussed with patient:  Yes, W/c transport ride set for [id number] to TCU. Patient has all of his belongings in his possession including home supply of nasal spray. Daughter informed of discharge plan.     
Goal Outcome Evaluation:    Denies CP, SOB. All pt needs met at this time. Capno on per order. Hob up gradually per order, MD came in and put pt at 28 degree height, RN came in 1 hour later to put up to 40 degrees, 1 hour after that 50 degrees. Pt denied headache throughout. Pt got up with PT and denied symptoms per pt as well. Ax2 with nohemy steady per PT. RN called [facility] neurosurg PAC, pt being drowsy -1 after 0.2 mg dilaudid this afternoon and oxycodone this late afternoon. Pt arouses to voice and and even reported being very tired after pain meds. Pt verbalized that pain was controlled with meds. RN verbalized this to PAC who put in new order. Pt wears CPAP at night so RN applied 2 L NC when pt wanted to nap. RN called pharmacist to see if she could link the oxycodone orders and she said she would and to add the same admin instructions as the oxycodone orders before, she also verbalized that she would.   
Goal Outcome Evaluation:    Patient vital signs are at baseline: Yes  Patient able to ambulate as they were prior to admission or with assist devices provided by therapies during their stay:  Yes  Patient MUST void prior to discharge:  Yes  Patient able to tolerate oral intake:  Yes  Pain has adequate pain control using Oral analgesics:  Yes  Does patient have an identified :  Yes  Has goal D/C date and time been discussed with patient:  Yes      
Goal Outcome Evaluation:  Patient vital signs are at baseline: Yes  Patient able to ambulate as they were prior to admission or with assist devices provided by therapies during their stay:  No,  Reason:  A of 2 SS  Patient MUST void prior to discharge:  No,  Reason:  Smith in place for retention  Patient able to tolerate oral intake:  Yes  Pain has adequate pain control using Oral analgesics:  Yes, PRN oxy given  Does patient have an identified :  Yes  Has goal D/C date and time been discussed with patient:  No,  Reason:  TBD anticipating TCU    POD 4 of L4-5 fusion and L2-3 laminectomy. Dried drainage on posterior dressing. CMS intact. Scattered bruising. XR of shoulder completed overnight. CPAP worn at night. PIV (2) SL. BS checks. Monitoring Hgb.           
Occupational Therapy Discharge Summary    Reason for therapy discharge:    Discharged to transitional care facility.    Progress towards therapy goal(s). [facility] for goal details.  Goals partially met.  Barriers to achieving goals:   discharge from facility.    Therapy recommendation(s):    Pt functioning below baseline w ADLs d/t decreased activity tolerance and pain s/p spinal fusion and laminectomy. Pt lives at home w spouse, who can support w cares, but pt continues to require min-mod ax2 w SS for ADL and transfers. Pt would benefit from continued therapy to regain ind in ADLs.   
Physical Therapy Discharge Summary    Reason for therapy discharge:    Discharged to transitional care facility.    Progress towards therapy goal(s). See goals on Care Plan in [facility] electronic health record for goal details.  Goals partially met.  Barriers to achieving goals:   discharge from facility.    Therapy recommendation(s):    Continued therapy is recommended.  Rationale/Recommendations:  cont PT at TCU to further address deficits and optimize functional recovery.        
No

## 2024-12-31 ENCOUNTER — LAB (OUTPATIENT)
Dept: LAB | Facility: CLINIC | Age: 89
End: 2024-12-31
Payer: COMMERCIAL

## 2024-12-31 ENCOUNTER — DOCUMENTATION ONLY (OUTPATIENT)
Dept: ANTICOAGULATION | Facility: CLINIC | Age: 89
End: 2024-12-31

## 2024-12-31 ENCOUNTER — ANTICOAGULATION THERAPY VISIT (OUTPATIENT)
Dept: ANTICOAGULATION | Facility: CLINIC | Age: 89
End: 2024-12-31

## 2024-12-31 DIAGNOSIS — D68.51 FACTOR V LEIDEN MUTATION: Primary | ICD-10-CM

## 2024-12-31 DIAGNOSIS — D68.52 PROTHROMBIN MUTATION (H): ICD-10-CM

## 2024-12-31 DIAGNOSIS — D68.52 PROTHROMBIN MUTATION: ICD-10-CM

## 2024-12-31 DIAGNOSIS — Z13.6 SCREENING FOR CARDIOVASCULAR CONDITION: ICD-10-CM

## 2024-12-31 DIAGNOSIS — Z79.01 LONG TERM CURRENT USE OF ANTICOAGULANT THERAPY: ICD-10-CM

## 2024-12-31 DIAGNOSIS — D68.51 FACTOR V LEIDEN MUTATION (H): ICD-10-CM

## 2024-12-31 DIAGNOSIS — E11.40 CONTROLLED TYPE 2 DIABETES WITH NEUROPATHY (H): Primary | ICD-10-CM

## 2024-12-31 DIAGNOSIS — D68.51 FACTOR V LEIDEN MUTATION (H): Primary | ICD-10-CM

## 2024-12-31 DIAGNOSIS — Z86.718 HISTORY OF DEEP VENOUS THROMBOSIS (DVT) OF DISTAL VEIN OF LEFT LOWER EXTREMITY: ICD-10-CM

## 2024-12-31 LAB — INR BLD: 1.2 (ref 0.9–1.1)

## 2024-12-31 PROCEDURE — 36416 COLLJ CAPILLARY BLOOD SPEC: CPT

## 2024-12-31 PROCEDURE — 85610 PROTHROMBIN TIME: CPT

## 2024-12-31 NOTE — PROGRESS NOTES
ANTICOAGULATION CLINIC REFERRAL RENEWAL REQUEST       An annual renewal order is required for all patients referred to [facility].?  Please review and sign the pended referral order for Robert Richard.       ANTICOAGULATION SUMMARY      Warfarin indication(s)   Heterozygous Factor V Leiden, Prothrombin Gene Mutation, and venous thrombosis    Mechanical heart valve present?  NO       Current goal range   INR: 2.0-3.0     Goal appropriate for indication? Goal INR 2-3, standard for indication(s) above     Time in Therapeutic Range (TTR)  (Goal > 60%) 56.7%       Office visit with referring provider's group within last year yes on 8/2/24       Amina Bertrand RN  [facility]

## 2024-12-31 NOTE — PROGRESS NOTES
ANTICOAGULATION MANAGEMENT     Robert Richard 89 year old male is on warfarin with subtherapeutic INR result. (Goal INR 2.0-3.0)    Recent labs: (last 7 days)     12/31/24  1025   INR 1.2*       ASSESSMENT     Source(s): Chart Review and Patient/Caregiver Call     Warfarin doses taken: Template incorrect; verbally confirmed home dose with 5 MG daily    Diet: No new diet changes identified  Medication/supplement changes: None noted  New illness, injury, or hospitalization: No  Signs or symptoms of bleeding or clotting: No  Previous result: Supratherapeutic  Additional findings:  denies any missed doses or diet changes.  Has been taking 5 mg daily        PLAN     Recommended plan for no diet, medication or health factor changes affecting INR     Dosing Instructions: booster dose then Increase your warfarin dose (8% change) with next INR in 1 week       Summary  As of 12/31/2024      Full warfarin instructions:  12/31: 10 mg; Otherwise 7.5 mg every Mon; 5 mg all other days   Next INR check:  1/7/2025               Telephone call with Carlos who verbalizes understanding and agrees to plan    Lab visit scheduled    Education provided: Goal range and lab monitoring: goal range and significance of current result  Contact [phone] with any changes, questions or concerns.     Plan made per [facility] anticoagulation protocol    Amina Bertrand RN  12/31/2024  Anticoagulation Clinic  [employer] for routing messages: mirlande WALLACE  [facility] patient phone line: [phone]        _______________________________________________________________________     Anticoagulation Episode Summary       Current INR goal:  2.0-3.0   TTR:  56.7% (11.3 mo)   Target end date:  Indefinite   Send INR reminders to:  AVEL WALLACE    Indications    Factor V Leiden mutation (H) [D68.51]  Venous thrombosis (Resolved) [I82.90]  Prothrombin mutation (H) [D68.52]  Long term current use of anticoagulant therapy [Z79.01]             Comments:  --  Vit D 400 IU/day    Skin- organic coconut oil OR vaseline     Patient Education    Freebase[employer] HANDOUT- PARENT  FIRST WEEK VISIT (3 TO 5 DAYS)  Here are some suggestions from [employer] experts that may be of value to your family.     HOW YOUR FAMILY IS DOING  If you are worried about your living or food situation, talk with us. Community agencies and programs such as WIC and SNAP can also provide information and assistance.  Tobacco-free spaces keep children healthy. Don t smoke or use e-cigarettes. Keep your home and car smoke-free.  Take help from family and friends.    FEEDING YOUR BABY    Feed your baby only breast milk or iron-fortified formula until he is about 6 months old.    Feed your baby when he is hungry. Look for him to    Put his hand to his mouth.    Suck or root.    Fuss.    Stop feeding when you see your baby is full. You can tell when he    Turns away    Closes his mouth    Relaxes his arms and hands    Know that your baby is getting enough to eat if he has more than 5 wet diapers and at least 3 soft stools per day and is gaining weight appropriately.    Hold your baby so you can look at each other while you feed him.    Always hold the bottle. Never prop it.  If Breastfeeding    Feed your baby on demand. Expect at least 8 to 12 feedings per day.    A lactation consultant can give you information and support on how to breastfeed your baby and make you more comfortable.    Begin giving your baby vitamin D drops (400 IU a day).    Continue your prenatal vitamin with iron.    Eat a healthy diet; avoid fish high in mercury.  If Formula Feeding    Offer your baby 2 oz of formula every 2 to 3 hours. If he is still hungry, offer him more.    HOW YOU ARE FEELING    Try to sleep or rest when your baby sleeps.    Spend time with your other children.    Keep up routines to help your family adjust to the new baby.    BABY CARE    Sing, talk, and read to your baby; avoid TV and digital media.    Help              Anticoagulation Care Providers       Provider Role Specialty Phone number    Stiven Donahue MD Referring Internal Medicine [phone]    Nate Cifuentes DO [facility] Internal Medicine [phone]             your baby wake for feeding by patting her, changing her diaper, and undressing her.    Calm your baby by stroking her head or gently rocking her.    Never hit or shake your baby.    Take your baby s temperature with a rectal thermometer, not by ear or skin; a fever is a rectal temperature of 100.4 F/38.0 C or higher. Call us anytime if you have questions or concerns.    Plan for emergencies: have a first aid kit, take first aid and infant CPR classes, and make a list of phone numbers.    Wash your hands often.    Avoid crowds and keep others from touching your baby without clean hands.    Avoid sun exposure.    SAFETY    Use a rear-facing-only car safety seat in the back seat of all vehicles.    Make sure your baby always stays in his car safety seat during travel. If he becomes fussy or needs to feed, stop the vehicle and take him out of his seat.    Your baby s safety depends on you. Always wear your lap and shoulder seat belt. Never drive after drinking alcohol or using drugs. Never text or use a cell phone while driving.    Never leave your baby in the car alone. Start habits that prevent you from ever forgetting your baby in the car, such as putting your cell phone in the back seat.    Always put your baby to sleep on his back in his own crib, not your bed.    Your baby should sleep in your room until he is at least 6 months old.    Make sure your baby s crib or sleep surface meets the most recent safety guidelines.    If you choose to use a mesh playpen, get one made after February 28, 2013.    Swaddling is not safe for sleeping. It may be used to calm your baby when he is awake.    Prevent scalds or burns. Don t drink hot liquids while holding your baby.    Prevent tap water burns. Set the water heater so the temperature at the faucet is at or below 120 F /49 C.    WHAT TO EXPECT AT YOUR BABY S 1 MONTH VISIT  We will talk about  Taking care of your baby, your family, and yourself  Promoting your health and  "recovery  Feeding your baby and watching her grow  Caring for and protecting your baby  Keeping your baby safe at home and in the car      Helpful Resources: Smoking Quit Line: 439.225.3752  Poison Help Line:  486.340.1095  Information About Car Safety Seats: www.safercar.gov/parents  Toll-free Auto Safety Hotline: 664.998.9973  Consistent with Bright Futures: Guidelines for Health Supervision of Infants, Children, and Adolescents, 4th Edition  For more information, go to https://brightfutures.aap.org.         COLIC  Most common at 2-8 weeks of life, sometimes longer.  1) physical ideas:  Comfort baby with the \"5 S's\" outlined in Abdon Dowell's The Happiest Baby on the Block.  The 5 S's are - Swaddle, Side-Stomach Position (when held), Shush, Swing and Suck.    Ideas for various holding techniquest: https://TuneUp/new-colic-cures/  Sit on big rubber \"birthing ball\" and and bounce baby.  Tight swaddle (key is tight between shoulders to elbows)  Pacifier   2) Probiotics  Probiotics (lactobacillus reuteri) have been associated with decreased crying (usually takes 5-7 days to see results).  These can be purchased as Enfamil Colic Drops, [place] Soothe and BioGaia (note that BioGaia includes vit D), [place] [employer] There-biotic, [place], Udo's or other at co=op/whole foods market (buy local b/c may sit [facility]gerated [facility]).  Liquid tends to be easier to administer than powder for infants.  3) Digestive Enzymes (less data but theoretically plausible).  Colief lactase enzyme to help digest lactase (evangelina if born less than 40 weeks and breastfeeding as breastmilk is 100% lactose carbohydrate).  4) Mother's diet if breastfeeding: some studies show changes correlated with maternal elimination diet - most commonly all dairy products or caffeine.   5) Formula: (less data but theoretically plausible).  Use formula w probiotics or add them as above, if born  use partially hydrolyzed with less " lactose (altrenate carb source is maltodextrin, sucrose, corn syrup solids), reflux use 100% whey it leaves stomach more quickly (all Surinder = 100% whey), constipation use palm oil free (Similac or ISREAL).  Example: Surinder goodstart GENTLE is 70% lactose, 100% whey, has probiotic and is partially hydrolyzed. Babyforumlaexpert.com     THE FOLLOWING IS FROM BABY FORMULA EXPERT      Little Remedies: Zingiber officinale (lizette) root extract (5mg per serving), Foeniculum vulgare (fennel) seed extract (4mg per serving), purified water, agave, vegetable glycerin, glycerin, natural lizette flavor, potassium sorbate, citric acid, xanthan gum     Mommy s Bliss: Deionized Water, Vegetable Glycerin,?Sodium Bicarbonate, Citrus Bioflavonoid Extract, Citric Acid, Potassium Sorbate, Organic Zingiber officinale (Lizette Root) Extract (5mg per serving), Organic Foeniculum vulgare (Fennel Seed) Extract (5mg per serving), Natural Fennel Flavor.  Lizette Root Extract: has been very well studied in the context of cancer patients receiving chemotherapy and pregnant women both with nausea (1-4). Biologically, lizette improves gastrointestinal?motility and increases gastric emptying rate (5). Translated to babies - this means lizette may help breast milk/formula empty out of the stomach and help the intestines keep things moving along steadily.   Fennel Seed Extract: Research has shown that Fennel Seed Oil reduces intestinal spasms and increases motility of the small intestines - which may encourage natural peristalsis (the natural rhythmic smooth movement of the intestines). There s some pretty research that shows fennel is one of the only treatments that may actually improve colic! (6, 7).  Agave (or other sugar): Little remedies has Agave (which is fructose) in it. Other brands sometimes have sugar (or sucrose). Research shows that Sugar (8) and Fructose (9) have an analgesic effect on infants.   Citric Acid and Sodium Bicarbonate: In  essence, these are alkalizing agents. This means, they may help neutralize stomach acid. This will help if your little one is being bothered by excess acid coming back up that little esophagus. Citric acid can help decrease the acidity of the stomach contents. Sodium Bicarbonate is the active ingredient in baking soda and is a quick acting antacid. Excess consumption of sodium bicarbonate can cause the pH of your blood to go too high (this is called alkalosis). There was a case report published of a baby admitted for alkalosis that was caused by overconsumption of a Gripe Water with high concentrations of sodium bicarbonate (10). So, be sure to not exceed the maximum dosage. Note- Bry Cheng does include this but Little Remedies does not.  Glycerin is a clear viscous (which means thick like oil or honey) solvent. It s in Gripe Water so the Lizette and Fennel extracts stay in solution. It also is relatively sweet (roughly half as sweet as sugar) which helps your baby accept the dose.  Potassium Sorbate is an antimicrobial preservative. It helps to keep bad bacteria from growing.  Citrus Bioflavonoid Extract comes from citrus fruits. It is a powerful antioxidant and one of the reasons citrus fruit has health benefits. However, the research is relatively new, so I m not going to say much more. I think it s probably healthy for your baby but I can t think of how it would have immediate impact on digestive distress. I ve only seen this ingredient in the Bry Cheng Gripe Water.  Last Potential Mechanism = The Placebo Effect - Hey, don t knock the placebo effect if it works! I think some proportion of effectiveness of Gripe Water is due to placebo. Let me explain. Babies are very emotionally sensitive little creatures. They are very aware of their caregivers  emotions, mood, and anxiety. If providing Gripe Water calms YOU down (because you are trying something new instead of just crying yourself   we ve all been  there!), this may calm your baby down! Anything that decreases your own anxiety can trickle down to calm your baby as well. But hey, if that works - then everyone s happy! Win win!  Choosing Brands and Collecting Data  I gave two examples of Gripe Water since the ingredients can differ from brand to brand! So pay attention to the label in the store. The differences mean that one brand may work better for your baby than another.  For example, based on the ingredients, Bry s [place] may provide more comfort to a baby with some acid-reflux issues because of the Sodium Bicarbonate. But, Little Remedies may provide more relief to an easily-overstimulated baby because of the agave. If one brand works for you and one doesn t, what does that tell you about your baby s biology?  Also, what does the timing of relief tell you?    Instant relief suggests your baby needs a little help calming himself down.     Relief 2 - 10 minutes later may suggest that the antacid effect helped soothe your baby.     More delayed relief (more than 10 minutes) likely suggests that fennel and eris helped calm the intestinal muscles so digestion and stooling could occur normally.  Gas Drops - Ingredients and Mechanism of Relief  So what about gas drops? As opposed to Gripe Water, which has lots of ingredients that all address different types of intestinal issues, gas drops have one active ingredient: Simethicone. The biggest name brand is Mylicon. Simethicone is an anti-foaming agent. Basically, it helps break up large bubble of gas into lots of smaller bubbles. Let s get real and talk baby farts - because you know it controls your life anyway. Large pockets of air are tooted out in those explosive sounding, often loud toots. Simethicone can help change that gas to the more  machine gun  sounding gas that comes out as loads of tiny bubbles.  The idea is - smaller bubbles are easier to pass through the rectum, and are less likely to get  stuck   in the intestines. Simethicone works great if large bubbles of air are your problem. It can also be mixed right into the bottle, which is nice. Simethicone is also very safe and is just excreted with those toots/poop.  Babies are most likely to develop painful gas at night when they are laying still for a long period of time. I have clients who have had great luck mixing one dose of gas drops into the last bottle before bed. Simethicone is worthless to you if your baby is uncomfortable because he has some reflux, or he doesn t like the new onesie you put him in. But then at least you know it isn t gas!  Unlike Gripe Water, simethicone is universal in all gas drops that I have seen, so go ahead and save yourself some money and get the generic version!  Final thoughts on using Gas Drops and Gripe Water  That s it! You ve broken the magic spell over Gripe Water and Gas Drops! Turns out, it s all just good-old-fashioned science! Now you can include these tools in your parenting toolbox to both treat your baby AND help figure out what is actually bothering your baby!  In closing, always check with your doctor about anything you give your baby. And also tell your pediatrician about what you learned about your baby from watching how they responded to either one.  Also, remember that your baby is always changing. He won t have gas/reflux/colic forever. So, if Gripe Water or Gas drops have become a consistent part of your routine, try to have a weaning-off strategy since the underlying source of the problem will likely resolve itself as your baby matures.  References  1.Gertrude F, Maria Antonia R, Neto G, Vivian MH, Alhaji AA. Effectiveness and safety of eris in the treatment of pregnancy-induced nausea and vomiting. Obstet Gynecol. 2005;105(4):849-56.  2.Leela PILLAI, Kristopher N, Elvis S, Zach BLANCHARD, Isaac QUIROZ. The efficacy of eris for the prevention of postoperative nausea and vomiting: a  meta-analysis. Am J Obstet Gynecol. 2006;194(1):95-9.  3.Ana MN, Patel AH. Pharmacological basis for the medicinal use of eris in gastrointestinal disorders. Dig Dis Sci. 2005;50(10):1889-97.  4.Kristen A, Meg P, Freedom E, Naila A, Chanell Man G, Kaz M. Can nausea and vomiting be treated with eris extract? Eur Rev Med Pharmacol Sci. 2015;19(7):1291-6.  5.Marlon W, Stevie K, Alena AL, Raimundo L, Juancarlos A, Estela ALBARRAN, et al. Eris-Mechanism of action in chemotherapy-induced nausea and vomiting: A review. Crit Rev Food Sci Nutr. 2017;57(1):141-6.  6.Wong I, Arlyn O, Jorge E, Sidorova T, Shushunov S. The effect of fennel (Foeniculum Vulgare) seed oil emulsion in infantile colic: a randomized, placebo-controlled study. Altern Ther Health Med. 2003;9(4):58-61.  7.Alejo R, Mendez K, Petros E. Nutritional supplements and other complementary medicines for infantile colic: a systematic review. Pediatrics. 2011;127(4):720-33.  8.Robinson LA, Jake K, Ari M, Cosme J, Jeaneth RC. The analgesic properties of intraoral sucrose: an integrative review. Adv  Care. 2011;11(2):83-92; quiz 3-4.  9.Doug LOBATO. Oral fructose solution as an analgesic in the : a randomized, placebo-controlled and masked study. Pediatr Int. 2004;46(4):459-62.  10.Margaon M, Bahat H, Gamsu S, Alexander N, Micah Z, Reji LOBATO. Case 1: Recurrent Apneic Episodes in a 6-week-old Infant. Pediatr Rev. 2015;36(6):260-1.

## 2025-01-05 ENCOUNTER — HEALTH MAINTENANCE LETTER (OUTPATIENT)
Age: 89
End: 2025-01-05

## 2025-01-07 ENCOUNTER — ANTICOAGULATION THERAPY VISIT (OUTPATIENT)
Dept: ANTICOAGULATION | Facility: CLINIC | Age: 89
End: 2025-01-07

## 2025-01-07 ENCOUNTER — LAB (OUTPATIENT)
Dept: LAB | Facility: CLINIC | Age: 89
End: 2025-01-07
Payer: COMMERCIAL

## 2025-01-07 DIAGNOSIS — D68.51 FACTOR V LEIDEN MUTATION: ICD-10-CM

## 2025-01-07 DIAGNOSIS — Z86.718 HISTORY OF DEEP VENOUS THROMBOSIS (DVT) OF DISTAL VEIN OF LEFT LOWER EXTREMITY: ICD-10-CM

## 2025-01-07 DIAGNOSIS — D68.52 PROTHROMBIN MUTATION: ICD-10-CM

## 2025-01-07 DIAGNOSIS — Z79.01 LONG TERM CURRENT USE OF ANTICOAGULANT THERAPY: ICD-10-CM

## 2025-01-07 DIAGNOSIS — E11.40 CONTROLLED TYPE 2 DIABETES WITH NEUROPATHY (H): ICD-10-CM

## 2025-01-07 DIAGNOSIS — D68.51 FACTOR V LEIDEN MUTATION: Primary | ICD-10-CM

## 2025-01-07 LAB
CHOLEST SERPL-MCNC: 212 MG/DL
EST. AVERAGE GLUCOSE BLD GHB EST-MCNC: 189 MG/DL
FASTING STATUS PATIENT QL REPORTED: NO
HBA1C MFR BLD: 8.2 %
HDLC SERPL-MCNC: 66 MG/DL
INR BLD: 1.4 (ref 0.9–1.1)
LDLC SERPL CALC-MCNC: 104 MG/DL
NONHDLC SERPL-MCNC: 146 MG/DL
TRIGL SERPL-MCNC: 208 MG/DL

## 2025-01-07 PROCEDURE — 83036 HEMOGLOBIN GLYCOSYLATED A1C: CPT

## 2025-01-07 PROCEDURE — 80061 LIPID PANEL: CPT

## 2025-01-07 PROCEDURE — 36415 COLL VENOUS BLD VENIPUNCTURE: CPT

## 2025-01-07 PROCEDURE — 85610 PROTHROMBIN TIME: CPT

## 2025-01-07 NOTE — PROGRESS NOTES
ANTICOAGULATION MANAGEMENT     Robert Richard 89 year old male is on warfarin with subtherapeutic INR result. (Goal INR 2.0-3.0)    Recent labs: (last 7 days)     01/07/25  1059   INR 1.4*       ASSESSMENT     Source(s): Chart Review and Patient/Caregiver Call     Warfarin doses taken: Warfarin taken as instructed  Diet: No new diet changes identified  Medication/supplement changes:  Nyquil/Dayquil started on 1/3 not expected to affect INR, but may increase risk of bleeding  New illness, injury, or hospitalization: Yes: Cold  Signs or symptoms of bleeding or clotting: No  Previous result: Subtherapeutic  Additional findings: None       PLAN     Recommended plan for temporary change(s) affecting INR     Dosing Instructions: booster dose then continue your current warfarin dose with next INR in 7-10 days       Summary  As of 1/7/2025      Full warfarin instructions:  1/7: 7.5 mg; Otherwise 7.5 mg every Mon; 5 mg all other days   Next INR check:  1/14/2025               Telephone call with Carlos who verbalizes understanding and agrees to plan    Patient offered & declined to schedule next visit    Education provided: Contact [phone] with any changes, questions or concerns.     Plan made per [facility] anticoagulation protocol    Camryn WILLS Rn  1/7/2025  Anticoagulation Clinic  [employer] [place] for routing messages: mirlande WALLACE  [facility] patient phone line: [phone]        _______________________________________________________________________     Anticoagulation Episode Summary       Current INR goal:  2.0-3.0   TTR:  56.7% (11.3 mo)   Target end date:  Indefinite   Send INR reminders to:  AVEL WALLACE    Indications    Factor V Leiden mutation (H) [D68.51]  Venous thrombosis (Resolved) [I82.90]  Prothrombin mutation (H) [D68.52]  Long term current use of anticoagulant therapy [Z79.01]  History of deep venous thrombosis (DVT) of distal vein of left lower extremity [Z86.718]             Comments:  --              Anticoagulation Care Providers       Provider Role Specialty Phone number    Stiven Donahue MD Referring Internal Medicine [phone]    Nate Cifuentes DO [facility] Internal Medicine [phone]

## 2025-01-10 PROBLEM — Z98.890 HX OF LUMBOSACRAL SPINE SURGERY: Status: RESOLVED | Noted: 2023-10-02 | Resolved: 2025-01-10

## 2025-01-15 ENCOUNTER — TELEPHONE (OUTPATIENT)
Dept: ANTICOAGULATION | Facility: CLINIC | Age: 89
End: 2025-01-15
Payer: COMMERCIAL

## 2025-01-15 NOTE — TELEPHONE ENCOUNTER
ANTICOAGULATION     Robert Richard is overdue for an INR check.     Spoke with Carlos and scheduled lab appointment on 1/21/25    Chantell Boston, RN  1/15/2025  Anticoagulation Clinic  [facility] for routing messages: mirlande VALLECILLO [facility] patient phone line: [phone]

## 2025-01-20 ENCOUNTER — TELEPHONE (OUTPATIENT)
Dept: PODIATRY | Facility: CLINIC | Age: 89
End: 2025-01-20
Payer: COMMERCIAL

## 2025-01-20 NOTE — TELEPHONE ENCOUNTER
Left message for patient to call back to confirm reason for appointment with Dr. García 1/21/25. We have seen him for Left ankle in the past. If it is the Right - please verify where he is having pain. Update the appointment not accordingly and close the encounter. Thank you.  Mendy Becerra CMA, January 20, 2025

## 2025-01-21 ENCOUNTER — ANTICOAGULATION THERAPY VISIT (OUTPATIENT)
Dept: ANTICOAGULATION | Facility: CLINIC | Age: 89
End: 2025-01-21

## 2025-01-21 ENCOUNTER — LAB (OUTPATIENT)
Dept: LAB | Facility: CLINIC | Age: 89
End: 2025-01-21
Payer: COMMERCIAL

## 2025-01-21 ENCOUNTER — OFFICE VISIT (OUTPATIENT)
Dept: PODIATRY | Facility: CLINIC | Age: 89
End: 2025-01-21
Payer: COMMERCIAL

## 2025-01-21 ENCOUNTER — ANCILLARY PROCEDURE (OUTPATIENT)
Dept: GENERAL RADIOLOGY | Facility: CLINIC | Age: 89
End: 2025-01-21
Attending: PODIATRIST
Payer: COMMERCIAL

## 2025-01-21 VITALS
HEIGHT: 71 IN | DIASTOLIC BLOOD PRESSURE: 88 MMHG | SYSTOLIC BLOOD PRESSURE: 142 MMHG | TEMPERATURE: 97.3 F | BODY MASS INDEX: 35.42 KG/M2 | WEIGHT: 253 LBS

## 2025-01-21 DIAGNOSIS — E11.40 CONTROLLED TYPE 2 DIABETES WITH NEUROPATHY (H): ICD-10-CM

## 2025-01-21 DIAGNOSIS — N18.32 STAGE 3B CHRONIC KIDNEY DISEASE (H): Primary | ICD-10-CM

## 2025-01-21 DIAGNOSIS — M15.3 POST-TRAUMATIC OSTEOARTHRITIS OF MULTIPLE JOINTS: ICD-10-CM

## 2025-01-21 DIAGNOSIS — Z86.718 HISTORY OF DEEP VENOUS THROMBOSIS (DVT) OF DISTAL VEIN OF LEFT LOWER EXTREMITY: ICD-10-CM

## 2025-01-21 DIAGNOSIS — Q66.6 PES VALGUS OF LEFT FOOT: ICD-10-CM

## 2025-01-21 DIAGNOSIS — D68.52 PROTHROMBIN MUTATION: ICD-10-CM

## 2025-01-21 DIAGNOSIS — D68.51 FACTOR V LEIDEN MUTATION: ICD-10-CM

## 2025-01-21 DIAGNOSIS — M79.671 RIGHT FOOT PAIN: ICD-10-CM

## 2025-01-21 DIAGNOSIS — Z79.01 LONG TERM CURRENT USE OF ANTICOAGULANT THERAPY: ICD-10-CM

## 2025-01-21 DIAGNOSIS — D68.51 FACTOR V LEIDEN MUTATION: Primary | ICD-10-CM

## 2025-01-21 DIAGNOSIS — M79.671 RIGHT FOOT PAIN: Primary | ICD-10-CM

## 2025-01-21 LAB — INR BLD: 2.2 (ref 0.9–1.1)

## 2025-01-21 PROCEDURE — 36416 COLLJ CAPILLARY BLOOD SPEC: CPT

## 2025-01-21 PROCEDURE — 85610 PROTHROMBIN TIME: CPT

## 2025-01-21 PROCEDURE — 73630 X-RAY EXAM OF FOOT: CPT | Mod: TC | Performed by: RADIOLOGY

## 2025-01-21 PROCEDURE — 99213 OFFICE O/P EST LOW 20 MIN: CPT | Performed by: PODIATRIST

## 2025-01-21 ASSESSMENT — PAIN SCALES - GENERAL: PAINLEVEL_OUTOF10: MODERATE PAIN (4)

## 2025-01-21 NOTE — PROGRESS NOTES
ANTICOAGULATION MANAGEMENT     Robert Richard 89 year old male is on warfarin with therapeutic INR result. (Goal INR 2.0-3.0)    Recent labs: (last 7 days)     01/21/25  1002   INR 2.2*       ASSESSMENT     Source(s): Chart Review  Previous INR was Supratherapeutic  Medication, diet, health changes since last INR chart reviewed; none identified         PLAN     Recommended plan for no diet, medication or health factor changes affecting INR     Dosing Instructions: Continue your current warfarin dose with next INR in 3 weeks       Summary  As of 1/21/2025      Full warfarin instructions:  7.5 mg every Mon; 5 mg all other days   Next INR check:  3/11/2025               Detailed voice message left for Carlos with dosing instructions and follow up date.     Lab visit scheduled no but instructed on when to make follow up INR    Education provided: Please call back if any changes to your diet, medications or how you've been taking warfarin    Plan made per [facility] anticoagulation protocol    Regina Luo RN  1/21/2025  Anticoagulation Clinic  [facility] for routing messages: mirlande WALLACE  [facility] patient phone line: [phone]        _______________________________________________________________________     Anticoagulation Episode Summary       Current INR goal:  2.0-3.0   TTR:  57.4% (11.3 mo)   Target end date:  Indefinite   Send INR reminders to:  AVEL WALLACE    Indications    Factor V Leiden mutation [D68.51]  Venous thrombosis (Resolved) [I82.90]  Prothrombin mutation [D68.52]  Long term current use of anticoagulant therapy [Z79.01]  History of deep venous thrombosis (DVT) of distal vein of left lower extremity [Z86.718]             Comments:  --             Anticoagulation Care Providers       Provider Role Specialty Phone number    Stiven Donahue MD Referring Internal Medicine [phone]    Nate Cifuentes DO Responsible Internal Medicine [phone]

## 2025-01-21 NOTE — PATIENT INSTRUCTIONS
"Nail Debridement    A high quality instrument makes trimming toenails MUCH easier.  Search [employer] for any 5\" nail nipper manufactured by reliable brands such as Miltex, Integra or Jarit as these quality instruments will help manage difficult nails more effectively and comfortably. We use Miltex -SS.  A physician is not necessary to trim nails even if you are taking blood thinners or are diabetic.  Your family or care givers may help manage your toenails.      Trim or sand the nails once weekly.  Do not wait until they are long and painful or trimming will become too difficult and painful and will increase your risk of complications or infection.  A course file or 120 grit sandpaper on a sanding block can be helpful.  For very thick nails many people prefer battery operated duran such as an Amope', Personal Pedi and Emjoi for regular use or heavy painful callouses or thick toenails.    Trim or skive any portion of nail that is thick, loose, crumbling, or not well attached. Do not tear the nail away, but rather cut them with a nail nipperor sand or sand them down.  You may follow up with your Podiatric Physician if you have pain, bleeding, infection, questions or other concerns.      You may also contact the following Registered Nurses for further help with nail debridement and minor hygiene concnerns.  They may come to your home or meet them at their clinic to trim your toenails and soak your feet, as well as monitor for any complications that would require evaluation by a Physician.      [facility]  Raquel Askew RN  Phone & text [phone]    [facility]  Magda Ying RN  Office [phone]    [facility]  [phone]  [email or web address]    For up to date list and to find foot care nurses in other communities visit [employer] website:  afcna.org.     Calluses, Corns, IPKs, Porokeratosis    When there is " excessive friction or pressure on the skin, the body responds by making the skin thicker.  While this may protect the deeper structures, the thickened skin can take up more space and thus increase pressure over a bony prominence or become an open sore or skin ulcer as this skin becomes less flexible.    Flat, diffuse thickening are simple calluses and they are usually caused by friction.  Often these are the result of rubbing on a shoe or going barefoot.    Calluses with a central core between the toes are called corns.  These often result from prominent joints on adjacent toes rubbing together.  Theses are often a symptom of bone malalignment and will usually recur unless the underlying bones are addressed.    Many of these lesions can be kept comfortable with routine maintenance. This consists of filing them with a Ped Egg, callus file, or 120 grit sandpaper on a block, every day during your bath or shower.  Most people prefer battery operated duran such as an Amope', Personal Pedi and Emjoi for regular use or heavy painful callouses.  Heavy creams or ointments can be applied 1-2 times every day to keep them soft. Toe spacers can be used for corns, gel pads can be used for other lesions on the bottom of the foot. If there is a deformity noted, such as a prominent bone, often this can be addressed to minimize recurrence. However, sometimes the pressure and lesion simply migrates to another spot after surgery, so it is not a guaranteed cure.     If you have severe callouses and cracking, you may apply heavy ointments that you scoop up such as Cetaphil cream, Eucerin, Aquaphor or Vaseline.  Be sure to obtain cream or ointment in these brands and not lotion (lotion is water based and not durable enough for feet). For more aggressive help apply heavy creams or ointment under occlusive dressings such as Saran Wrap or Jelly Feet while sleeping.   Jelly Feet can be obtained at [email or web address].     To be successful  with managing hyperkeratotic skin, you must manage hygiene daily.  Apply the cream once or twice EVERY day.  At your bath or shower time is the easiest time to work on this when skin is most soft.  There is no medical or surgical treatment that will absolutely eliminate many of these symptoms.      Pedifix is a reliable source for all sorts of foot pads, cushions, or interdigital spacers and foot appliances. Go to www.pedifixNeuronex or request a catalog at 9-718-[facility].        Please call with any additional questions.        DIABETES AND YOUR FEET    What effect does diabetes have on the feet?  Diabetes can result in several problems in the feet including contractures of the tendons leading to deformities and reduced function of the bones, skin ulcers or open sores on pressure points or prominent deformities, reduced sensation, reduced blood flow and thus reduced oxygen and immune cells to the tiny vessels in our feet. This all leads to higher risk of hospitalization, infections, and amputations.     What is neuropathy?  Neuropathy is a term used to describe a loss of nerve function.  Patients with diabetes are at risk of developing neuropathy if their sugars continue to run high and are above the normal value of 140.  The elevated blood sugar in the body enters the nerves causing it to swell and impair nerve function.  The higher the blood sugar and the longer it is elevated, the more damage is done to nerves.  This damage is permanent and irreversible.  These damaged sensory nerves can then cause reduced feeling or cause pain.  Damaged motor nerves can reduce blood flow and white blood cells into into your foot, skin and bones reducing your ability to heal a small problem. And neuropathy can cause tendons to become unbalanced and contribute to the formation of deformity and contractures in our feet. Often times, neuropathy can be prevented by controlling your blood sugar.  Your risk of developing neuropathy goes up  "dramatically as your hemoglobin A1C raises above 7.5.      How do I know if I have neuropathy?  When a person develops neuropathy, they usually begin to feel numbness or tingling in their feet and sometimes in their legs.  Other symptoms may include painful burning or hot feet, tingling, electrical sensations or feeling like insects or ants are crawling on your feet or legs.  If blood sugar remains above 140  for long periods of time, neuropathy can also occur in the hands.  When a person loses their \"protective threshold\" or ability to detect a 5.07 [place] monofilament is when they have elevated risk for developing foot deformity, contractures, foot infections, amputations, Charcot arthropathy, or other complications. Keep your hemoglobin A1C below 7.5 to reduce this risk.    What is vascular disease?  Peripheral vascular disease is a term used to describe a loss or decrease in circulation (blood flow).  There is a problem in getting blood, immune cells, and oxygen to areas that need it.  Similar to neuropathy, sugars can build up in the walls of the arteries (blood vessels) and cause them to become swollen, thickened and hardened.  This decreases the amount of blood that can go to an area that needs it.  Though this is common in the legs of diabetic patients, it can also affect other arteries (blood vessels) in the body such as in the heart, kidney, eyes, and the blood flow into bones.  It is often seen first in the small vessels of her body notably our feet and toes.    How do I know if I have vascular disease?  In the legs, vascular disease usually results in cramping.  Patients who develop leg cramps after walking the same distance every time (i.e. One block, half a mile, ect.) need to let their doctors know so that their circulation may be checked.  Cramps causing severe pain in the feet and/or legs while sleeping and the cramps go away when you stand or hang your legs off the side of the bed, may " also be a sign of poor blood circulation.  Occasional cramping in cold weather or on rare occasions with activity may not be due to poor circulation, but you should inform your doctor.    How can these problems be prevented?  The key to prevention is good blood sugar control all day every day.  Inadequate blood sugar control is the most common way patients experience these problems. Reducing, controlling and measuring your daily consumption of sugar or carbohydrates is essential to understanding and managing diabetes.  Physical activity (exercise) is a very good way to help decrease your blood sugars.  Exercise can lower your blood sugar, blood pressure, and cholesterol.  It also reduces your risk for heart disease and stroke, relieves stress, and strengthens your heart, muscles and bones. Physical activity also increases your balance and reduces development of contractures and foot deformities over time. In addition, regular activity helps insulin work better, improves your blood circulation, and keeps your joints flexible.  If you're trying to lose weight, a combination of exercise and wise food choices can help you reach your target weight and maintain it.  Activity and exercise alone can not make up for poor diet choices, eating too much, or eating too many sugars or carbohydrates.  Ask your doctor for help when you are not meeting your blood sugar goals. Changes or increases in medication are powerful tools in reducing your blood sugar.    Know your blood sugar and hemoglobin A1C trend.  Upon first diagnosis or during acute illness, checking your blood sugar 4 times a day can help you understand how your diet, activity, and lifestyle affect your blood sugar.  Monitoring your hemoglobin A1C can help you understand how well you are managing blood sugar over the long run.  Your hemoglobin A1C tells you what your blood sugar averages all day, every day, over the past 90 days.       To experience the lowest risk of  complications associated with diabetes such as neuropathy, loss of blood flow, bone or joint infection, charcot arthropathy, or amputation, the American Diabetes Association recommends a target hemoglobin A1C of less than 7.0%, while the American Association of Clinical Endocrinologists' recommendation is 6.5% or less.  Both organizations advise that the goals be individualized based on patient factors such as other health conditions, history of hypoglycemia, education, and life expectancy.  A patients risk of experiencing complications associated with diabetes is only slightly elevated with a hemoglobin A1C above 6.0.  However, this risk goes up exponentially when the hemoglobin A1C is above 7.5.  The longer the hemoglobin A1C is elevated, the more risk that patient will experience in their lifetime. The damage that occurs to nerves, blood vessels, tendons, bones and body organs, while their hemoglobin A1C is elevated is mostly irreversible and worsens with each additional time period of elevated hemoglobin A1C.     You must understand and manage your disease.  Your health insurance or medical team cannot manage this disease for you.  When you take responsibility for understanding and managing your disease, you can expect to experience fewer problems associated with diabetes in your lifetime.  You will  Also experience a higher quality of life and health and reduced cost of health care.    Diabetic Foot Care Recommendations  The following are recommendations for avoiding serious foot problems or injury    DO'S  1. Be aware of your hemoglobin A1C and continue to follow up with your medical team for adjustments in your lifestyle and medication until your reach your A1C goal.  Keep this below 7.5 to reduce your risk of developing complications associated with diabetes.    2.  Wash your feet with lukewarm water and a mild soap and then dry them thoroughly, especially between the toes.  Gently floss your towel or  washcloth between each toe at every bath.  Soaking your feet in water cannot clean dead skin, debris, and bacteria from your feet and is not necessary.   3. Examine your feet daily looking for cuts, corns, blisters, cracks, ect..., especially after wearing new shoes or increased or changed activities.  Make sure to look between your toes.  If you cannot see the bottom of your feet, set a mirror on the floor and hold your foot over it, or ask a family member to examine your feet for you daily.  Contact your doctor immediately if new problems are noted or if sores are not healing.  4.  Immediately apply moisturizer cream such as Cetaphil to the tops and bottoms of your feet, avoiding areas between the toes.  Apply sunscreen or cover your feet if they will be exposed to extended sunlight.  5.Use clean comfortable shoes.  Socks should not have thick seams or cut off the circulation around the leg.  Break in new shoes slowly and rotate with older shoes until broken in.  Check the inside of your shoes with your hand to look for areas of irritation or objects that may have fallen into your shoes.    6. Keep slippers by the side of your bed for use during the night.  7. Shoes should be fitted by a professional and should not cause areas of irritation.  Check your feet regularly when wearing a new pair of shoes and replace them as needed.  8.  Talk to your doctor about proper exercise.  Exercise and stretching stimulate blood flow to your feet and maintain proper glucose levels.  Use it or lose it!  9.  Monitor your blood glucose level and your hemoglobin A1C.  Notify your doctor immediately if your blood sugar is abnormally high or low.  10.  Cut your nails straight across, but then gently round any sharp edges with a nail file.  If you have neuropathy, peripheral vascular disease or cannot see that well to trim your own toenails, see a medical professional for care.    DONT'S  1.  Do not soak your feet if you have an open  sore or your provider has informed you that you have neuropathy or loss of protective threshold.  Use only lukewarm water and always check the temperature with your hand as hot water can easily burn your feet.    2.  Never use a hot water bottle or heating pad on your feet.  Also do not apply hot or cold compresses to your feet.  With decreased sensation, you could burn or freeze your feet.  Do not rest your feet near a heat source such as a heater or heat register.    3.  Do not apply any of these to your feet:    - over the counter medicine for corns or warts    -  Harsh chemicals like boric acid    -  Do not self-treat corns, cuts, blisters or infections.  Always consult your doctor.   4.  Do not wear sandals, slippers or walk barefoot, especially on harsh surfaces.  5.  If you smoke, stop!!!

## 2025-01-21 NOTE — PROGRESS NOTES
HPI:  Robert Richard is a 89 year old male who is seen in consultation at the request of self.    Pt presents for eval of:   (Onset, Location, L/R, Character, Treatments, Injury if yes)    DM type 2    WARFARIN    XR Right foot 1/21/2025     Some lateral foot discomfort off and on.  He notes his current shoe gear is completely compressed.  Occasional discomfort about the lateral column causing limping or guarding but only last for few seconds.  He notes he can no longer wear any other shoes besides diabetic shoes.    Retired.    ROS:  10 point ROS neg other than the symptoms noted above in the HPI.    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)    Stage 3 chronic kidney disease, unspecified whether stage 3a or 3b CKD (H)    Asymmetrical sensorineural hearing loss    Hemifacial spasm    Subjective tinnitus    Venous stasis dermatitis of right lower extremity    S/P lumbar fusion    Spondylolisthesis of lumbar region    Generalized muscle weakness    Anemia, unspecified type    History of deep venous thrombosis (DVT) of distal vein of left lower extremity    Urine retention    Stage 3b chronic kidney disease (H)       PAST MEDICAL HISTORY:   Past Medical History:   Diagnosis Date    Basal cell carcinoma     Cancer (H)     DVT (deep venous thrombosis) (H) 11/2003    DVT (deep venous thrombosis) (H) 12/2008    DVT (deep venous thrombosis) (H) 10/2010    Factor V Leiden     Gout     Other and unspecified hyperlipidemia     Prostate cancer (H) 2003    prostatectomy     Restless leg syndrome     Sleep apnea      Unspecified essential hypertension         PAST SURGICAL HISTORY:   Past Surgical History:   Procedure Laterality Date    APPENDECTOMY  1950    FUSION TRANSFORAMINAL LUMBAR INTERBODY, 1 LEVEL, POSTERIOR APPROACH, USING OTS SURG IMAGING GUIDANCE Bilateral 5/28/2024    Procedure: Lumbar 4 to Lumbar 5 Transforaminal Interbody Fusion with decompression of stenosis;  Surgeon: Cj Cleary MD;  Location: [facility] OR    JOINT REPLACEMENT  4/2011    R hip    LAMINECTOMY LUMBAR ONE LEVEL  12/2008    LAMINECTOMY LUMBAR ONE LEVEL Bilateral 5/28/2024    Procedure: Lumbar 2 to Lumbar 3 bilateral laminectomy for decompression of stenosis;  Surgeon: Cj Cleary MD;  Location: [facility] OR    LAPAROSCOPIC HERNIORRHAPHY UMBILICAL N/A 1/3/2019    Procedure: laparoscopic umbilical hernia repair with mesh;  Surgeon: Jamal Thompson DO;  Location:  OR    [facility] REVISE TOTAL HIP REPLACEMENT  1/18/13    R hip        MEDICATIONS:   Current Outpatient Medications:     acetaminophen (TYLENOL) 500 MG tablet, Take 1-2 tablets (500-1,000 mg) by mouth 3 times daily, Disp: , Rfl:     albuterol (PROAIR HFA/PROVENTIL HFA/VENTOLIN HFA) 108 (90 Base) MCG/ACT inhaler, Inhale 2 puffs into the lungs every 6 hours as needed for shortness of breath, wheezing or cough, Disp: 18 g, Rfl: 0    atenolol (TENORMIN) 50 MG tablet, Take 1 tablet (50 mg) by mouth 2 times daily, Disp: 180 tablet, Rfl: 3    gabapentin (NEURONTIN) 300 MG capsule, Take 1 capsule by mouth at bedtime, Disp: 30 capsule, Rfl: 0    glipiZIDE (GLUCOTROL XL) 5 MG 24 hr tablet, Take 1 tablet (5 mg) by mouth daily, Disp: 90 tablet, Rfl: 3    ipratropium (ATROVENT) 0.06 % nasal spray, USE 2 SPRAY(S) IN EACH NOSTRIL 4 TIMES DAILY, Disp: 45 mL, Rfl: 0    losartan (COZAAR) 100 MG tablet, Take 1 tablet by mouth once daily, Disp: 90 tablet, Rfl: 1    multiple vitamin  tablet TABS, Take 1 tablet by mouth daily, Disp: , Rfl:     pramipexole (MIRAPEX) 1 MG tablet, TAKE 1 TABLET BY MOUTH TWICE  DAILY AT  4  PM  AND  9  PM, Disp: 180 tablet, Rfl: 3    spironolactone (ALDACTONE) 25 MG tablet, Take 1 tablet (25 mg) by mouth daily, Disp: 90 tablet, Rfl: 3    warfarin ANTICOAGULANT (COUMADIN) 5 MG tablet, Take 2.5 mg every Monday and Friday and   5mg all other days or as directed by the INR clinic., Disp: 90 tablet, Rfl: 1    amoxicillin (AMOXIL) 500 MG capsule, Take 4 caps 1 hour before procedure. (Patient not taking: Reported on 1/21/2025), Disp: 8 capsule, Rfl: 1    STATIN NOT PRESCRIBED (INTENTIONAL), Please choose reason not prescribed, below (Patient not taking: Reported on 1/21/2025), Disp: , Rfl:      ALLERGIES:    Allergies   Allergen Reactions    Gabapentin Dizziness    Lipitor [Atorvastatin Calcium]      Muscle pain, weakness    Pravastatin Other (See Comments)     muscle aches    Simvastatin Other (See Comments)     muscle aches    Statins Muscle Pain (Myalgia)        SOCIAL HISTORY:   Social History     Socioeconomic History    Marital status: Single     Spouse name: Not on file    Number of children: Not on file    Years of education: Not on file    Highest education level: Not on file   Occupational History    Not on file   Tobacco Use    Smoking status: Never    Smokeless tobacco: Never   Vaping Use    Vaping status: Never Used   Substance and Sexual Activity    Alcohol use: Yes     Alcohol/week: 0.0 standard drinks of alcohol     Comment: 1 drink every 1-2 weeks.    Drug use: No    Sexual activity: Yes     Partners: Female   Other Topics Concern    Parent/sibling w/ CABG, MI or angioplasty before 65F 55M? Not Asked   Social History Narrative    Not on file     Social Drivers of Health     Financial Resource Strain: Low Risk  (1/26/2024)    Financial Resource Strain     Within the past 12 months, have you or your family members you live with been unable to get utilities (heat, electricity) when it was really needed?: No   Food Insecurity: Low Risk  (1/26/2024)    Food Insecurity     Within the  "past 12 months, did you worry that your food would run out before you got money to buy more?: No     Within the past 12 months, did the food you bought just not last and you didn t have money to get more?: No   Transportation Needs: Low Risk  (1/26/2024)    Transportation Needs     Within the past 12 months, has lack of transportation kept you from medical appointments, getting your medicines, non-medical meetings or appointments, work, or from getting things that you need?: No   Physical Activity: Not on file   Stress: Not on file   Social Connections: Not on file   Interpersonal Safety: Low Risk  (8/2/2024)    Interpersonal Safety     Do you feel physically and emotionally safe where you currently live?: Yes     Within the past 12 months, have you been hit, slapped, kicked or otherwise physically hurt by someone?: No     Within the past 12 months, have you been humiliated or emotionally abused in other ways by your partner or ex-partner?: No   Housing Stability: Low Risk  (1/26/2024)    Housing Stability     Do you have housing? : Yes     Are you worried about losing your housing?: No        FAMILY HISTORY:   Family History   Problem Relation Age of Onset    Prostate Cancer Paternal Grandfather         EXAM:Vitals: BP (!) 142/88 (BP Location: Right arm, Patient Position: Sitting, Cuff Size: Adult Large)   Temp 97.3  F (36.3  C) (Temporal)   Ht 1.803 m (5' 11\")   Wt 114.8 kg (253 lb)   BMI 35.29 kg/m    BMI= Body mass index is 35.29 kg/m .    General appearance: Patient is alert and fully cooperative with history & exam.  No sign of distress is noted during the visit.     Psychiatric: Affect is pleasant & appropriate.  Patient appears motivated to improve health.     Respiratory: Breathing is regular & unlabored while sitting.     HEENT: Hearing is intact to spoken word.  Speech is clear.  No gross evidence of visual impairment that would impact ambulation.     Vascular: DP & PT pulses are diminished bilateral " temperature warm to warm proximal to distal on the left and warm to cool on the right.  Considerable edema tight edema noted bilateral and he normally wears Velcro boots to reduce edema.     Neurologic: Lower extremity sensation is intact to light touch.  Complete loss of protective threshold on the left foot.    Dermatologic: Skin is intact to both lower extremities with diminished texture, turgor and tone about the integument.  No open lesions or abrasions noted.    Musculoskeletal: Patient is ambulatory without assistive device or brace.  Patient does have mild generalized venous stasis edema bilateral lower extremities both pitting and nonpitting edema are noted.  Severe hemosiderin pigmentation bilateral lower extremities.  Today most discomfort is noted about the anterior medial joint line of the left ankle.  Subtle crepitus is noted throughout and mildly reduced range of motion through bilateral ankles.  Manual muscle strength equal bilateral.    Radiographs 3 views left foot 12/29/2022 demonstrate degenerative changes throughout the midfoot rear foot with osteophytes noted dorsally consistent with osteoarthritis.  No acute cortical reaction or fracture or joint diastases.    Hemoglobin A1C (%)   Date Value   01/07/2025 8.2 (H)   06/05/2024 6.8 (H)   04/30/2024 7.0 (H)   01/26/2024 7.8 (H)   11/07/2022 6.9 (H)   01/25/2022 7.1 (H)   06/15/2021 7.2 (H)   02/05/2021 8.3 (H)   01/21/2020 7.9 (H)   06/20/2019 7.1 (H)   10/16/2018 6.4 (H)   05/15/2018 6.5 (H)   10/18/2017 6.6 (H)   04/20/2016 6.5     Creatinine (mg/dL)   Date Value   06/05/2024 1.24 (H)   06/01/2024 1.42 (H)   05/30/2024 1.40 (H)   05/29/2024 1.60 (H)   05/28/2024 1.76 (H)   04/30/2024 1.47 (H)   05/17/2021 1.47 (H)   02/05/2021 1.44 (H)   01/21/2020 1.48 (H)   08/16/2019 1.42 (H)   06/20/2019 1.39 (H)   12/27/2018 1.51 (H)       ASSESSMENT:       ICD-10-CM    1. Right foot pain  M79.671 XR Foot Right G/E 3 Views     Orthotics, Mastectomy and  Custom Compression Orders      2. 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 Pes valgus of left foot  Q66.6 Orthotics, Mastectomy and Custom Compression Orders      4. Post-traumatic osteoarthritis of multiple joints  M15.3 Orthotics, Mastectomy and Custom Compression Orders          PLAN:  Reviewed [facility] in [facility].      12/29/2022   Obtained and interpreted radiographs    Fracture boot, WB to tolerance in the boot and may rest without the fracture boot  compression during the day and off at night   RTC 3 weeks  Discussed risk of developing Charcot.    1/23/2023  Last DM shoes 2 years ago.  Ordered new DM shoes  Is almost pain free so start coming out of boot as tolerated and stay in DM shoes.  He was wearing slip on shoes and increased standing in shop likely cause this.   Follow-up once yearly for diabetic foot exam and if he is unable to progress out of the fracture boot.    3/13/2023  Patient does have donning aid for compression socks.    He has a $30 pay for PT and does not want to do that.  Offered it to him  Also offered injection if he would like  Follow-up as needed.     1/21/2025  Placed order for custom molded diabetic shoes secondary to loss of protective threshold and deformity.  Follow-up once yearly.      Khadar García DPM

## 2025-01-21 NOTE — LETTER
1/21/2025      Robert Richard  [address] [place] [address]      Dear Colleague,    Thank you for referring your patient, Robert Richard, to the [facility]. Please see a copy of my visit note below.    HPI:  Robert Richard is a 89 year old male who is seen in consultation at the request of self.    Pt presents for eval of:   (Onset, Location, L/R, Character, Treatments, Injury if yes)    DM type 2    WARFARIN    XR Right foot 1/21/2025     Some lateral foot discomfort off and on.  He notes his current shoe gear is completely compressed.  Occasional discomfort about the lateral column causing limping or guarding but only last for few seconds.  He notes he can no longer wear any other shoes besides diabetic shoes.    Retired.    ROS:  10 point ROS neg other than the symptoms noted above in the HPI.    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)     Stage 3 chronic kidney disease, unspecified whether stage 3a or 3b CKD (H)     Asymmetrical sensorineural hearing loss     Hemifacial spasm     Subjective tinnitus     Venous stasis dermatitis of right lower extremity     S/P lumbar fusion     Spondylolisthesis of lumbar region     Generalized muscle weakness     Anemia, unspecified type     History of deep venous thrombosis (DVT) of distal vein of left lower extremity     Urine retention     Stage 3b chronic kidney disease (H)       PAST MEDICAL HISTORY:   Past Medical History:   Diagnosis Date     Basal cell carcinoma      Cancer (H)      DVT (deep venous  thrombosis) (H) 11/2003     DVT (deep venous thrombosis) (H) 12/2008     DVT (deep venous thrombosis) (H) 10/2010     Factor V Leiden      Gout      Other and unspecified hyperlipidemia      Prostate cancer (H) 2003    prostatectomy      Restless leg syndrome      Sleep apnea      Unspecified essential hypertension         PAST SURGICAL HISTORY:   Past Surgical History:   Procedure Laterality Date     APPENDECTOMY  1950     FUSION TRANSFORAMINAL LUMBAR INTERBODY, 1 LEVEL, POSTERIOR APPROACH, USING OTS SURG IMAGING GUIDANCE Bilateral 5/28/2024    Procedure: Lumbar 4 to Lumbar 5 Transforaminal Interbody Fusion with decompression of stenosis;  Surgeon: Cj Cleary MD;  Location: [facility] OR     JOINT REPLACEMENT  4/2011    R hip     LAMINECTOMY LUMBAR ONE LEVEL  12/2008     LAMINECTOMY LUMBAR ONE LEVEL Bilateral 5/28/2024    Procedure: Lumbar 2 to Lumbar 3 bilateral laminectomy for decompression of stenosis;  Surgeon: Cj Cleary MD;  Location: [facility] OR     LAPAROSCOPIC HERNIORRHAPHY UMBILICAL N/A 1/3/2019    Procedure: laparoscopic umbilical hernia repair with mesh;  Surgeon: Jamal Thompson DO;  Location:  OR     [facility] REVISE TOTAL HIP REPLACEMENT  1/18/13    R hip        MEDICATIONS:   Current Outpatient Medications:      acetaminophen (TYLENOL) 500 MG tablet, Take 1-2 tablets (500-1,000 mg) by mouth 3 times daily, Disp: , Rfl:      albuterol (PROAIR HFA/PROVENTIL HFA/VENTOLIN HFA) 108 (90 Base) MCG/ACT inhaler, Inhale 2 puffs into the lungs every 6 hours as needed for shortness of breath, wheezing or cough, Disp: 18 g, Rfl: 0     atenolol (TENORMIN) 50 MG tablet, Take 1 tablet (50 mg) by mouth 2 times daily, Disp: 180 tablet, Rfl: 3     gabapentin (NEURONTIN) 300 MG capsule, Take 1 capsule by mouth at bedtime, Disp: 30 capsule, Rfl: 0     glipiZIDE (GLUCOTROL XL) 5 MG 24 hr tablet, Take 1 tablet (5 mg) by mouth daily, Disp: 90 tablet, Rfl: 3     ipratropium (ATROVENT) 0.06 % nasal spray, USE 2  SPRAY(S) IN EACH NOSTRIL 4 TIMES DAILY, Disp: 45 mL, Rfl: 0     losartan (COZAAR) 100 MG tablet, Take 1 tablet by mouth once daily, Disp: 90 tablet, Rfl: 1     multiple vitamin  tablet TABS, Take 1 tablet by mouth daily, Disp: , Rfl:      pramipexole (MIRAPEX) 1 MG tablet, TAKE 1 TABLET BY MOUTH TWICE DAILY AT  4  PM  AND  9  PM, Disp: 180 tablet, Rfl: 3     spironolactone (ALDACTONE) 25 MG tablet, Take 1 tablet (25 mg) by mouth daily, Disp: 90 tablet, Rfl: 3     warfarin ANTICOAGULANT (COUMADIN) 5 MG tablet, Take 2.5 mg every Monday and Friday and   5mg all other days or as directed by the INR clinic., Disp: 90 tablet, Rfl: 1     amoxicillin (AMOXIL) 500 MG capsule, Take 4 caps 1 hour before procedure. (Patient not taking: Reported on 1/21/2025), Disp: 8 capsule, Rfl: 1     STATIN NOT PRESCRIBED (INTENTIONAL), Please choose reason not prescribed, below (Patient not taking: Reported on 1/21/2025), Disp: , Rfl:      ALLERGIES:    Allergies   Allergen Reactions     Gabapentin Dizziness     Lipitor [Atorvastatin Calcium]      Muscle pain, weakness     Pravastatin Other (See Comments)     muscle aches     Simvastatin Other (See Comments)     muscle aches     Statins Muscle Pain (Myalgia)        SOCIAL HISTORY:   Social History     Socioeconomic History     Marital status: Single     Spouse name: Not on file     Number of children: Not on file     Years of education: Not on file     Highest education level: Not on file   Occupational History     Not on file   Tobacco Use     Smoking status: Never     Smokeless tobacco: Never   Vaping Use     Vaping status: Never Used   Substance and Sexual Activity     Alcohol use: Yes     Alcohol/week: 0.0 standard drinks of alcohol     Comment: 1 drink every 1-2 weeks.     Drug use: No     Sexual activity: Yes     Partners: Female   Other Topics Concern     Parent/sibling w/ CABG, MI or angioplasty before 65F 55M? Not Asked   Social History Narrative     Not on file     Social Drivers  "of Health     Financial Resource Strain: Low Risk  (1/26/2024)    Financial Resource Strain      Within the past 12 months, have you or your family members you live with been unable to get utilities (heat, electricity) when it was really needed?: No   Food Insecurity: Low Risk  (1/26/2024)    Food Insecurity      Within the past 12 months, did you worry that your food would run out before you got money to buy more?: No      Within the past 12 months, did the food you bought just not last and you didn t have money to get more?: No   Transportation Needs: Low Risk  (1/26/2024)    Transportation Needs      Within the past 12 months, has lack of transportation kept you from medical appointments, getting your medicines, non-medical meetings or appointments, work, or from getting things that you need?: No   Physical Activity: Not on file   Stress: Not on file   Social Connections: Not on file   Interpersonal Safety: Low Risk  (8/2/2024)    Interpersonal Safety      Do you feel physically and emotionally safe where you currently live?: Yes      Within the past 12 months, have you been hit, slapped, kicked or otherwise physically hurt by someone?: No      Within the past 12 months, have you been humiliated or emotionally abused in other ways by your partner or ex-partner?: No   Housing Stability: Low Risk  (1/26/2024)    Housing Stability      Do you have housing? : Yes      Are you worried about losing your housing?: No        FAMILY HISTORY:   Family History   Problem Relation Age of Onset     Prostate Cancer Paternal Grandfather         EXAM:Vitals: BP (!) 142/88 (BP Location: Right arm, Patient Position: Sitting, Cuff Size: Adult Large)   Temp 97.3  F (36.3  C) (Temporal)   Ht 1.803 m (5' 11\")   Wt 114.8 kg (253 lb)   BMI 35.29 kg/m    BMI= Body mass index is 35.29 kg/m .    General appearance: Patient is alert and fully cooperative with history & exam.  No sign of distress is noted during the visit.   "   Psychiatric: Affect is pleasant & appropriate.  Patient appears motivated to improve health.     Respiratory: Breathing is regular & unlabored while sitting.     HEENT: Hearing is intact to spoken word.  Speech is clear.  No gross evidence of visual impairment that would impact ambulation.     Vascular: DP & PT pulses are diminished bilateral temperature warm to warm proximal to distal on the left and warm to cool on the right.  Considerable edema tight edema noted bilateral and he normally wears Velcro boots to reduce edema.     Neurologic: Lower extremity sensation is intact to light touch.  Complete loss of protective threshold on the left foot.    Dermatologic: Skin is intact to both lower extremities with diminished texture, turgor and tone about the integument.  No open lesions or abrasions noted.    Musculoskeletal: Patient is ambulatory without assistive device or brace.  Patient does have mild generalized venous stasis edema bilateral lower extremities both pitting and nonpitting edema are noted.  Severe hemosiderin pigmentation bilateral lower extremities.  Today most discomfort is noted about the anterior medial joint line of the left ankle.  Subtle crepitus is noted throughout and mildly reduced range of motion through bilateral ankles.  Manual muscle strength equal bilateral.    Radiographs 3 views left foot 12/29/2022 demonstrate degenerative changes throughout the midfoot rear foot with osteophytes noted dorsally consistent with osteoarthritis.  No acute cortical reaction or fracture or joint diastases.    Hemoglobin A1C (%)   Date Value   01/07/2025 8.2 (H)   06/05/2024 6.8 (H)   04/30/2024 7.0 (H)   01/26/2024 7.8 (H)   11/07/2022 6.9 (H)   01/25/2022 7.1 (H)   06/15/2021 7.2 (H)   02/05/2021 8.3 (H)   01/21/2020 7.9 (H)   06/20/2019 7.1 (H)   10/16/2018 6.4 (H)   05/15/2018 6.5 (H)   10/18/2017 6.6 (H)   04/20/2016 6.5     Creatinine (mg/dL)   Date Value   06/05/2024 1.24 (H)   06/01/2024 1.42  (H)   05/30/2024 1.40 (H)   05/29/2024 1.60 (H)   05/28/2024 1.76 (H)   04/30/2024 1.47 (H)   05/17/2021 1.47 (H)   02/05/2021 1.44 (H)   01/21/2020 1.48 (H)   08/16/2019 1.42 (H)   06/20/2019 1.39 (H)   12/27/2018 1.51 (H)       ASSESSMENT:       ICD-10-CM    1. Right foot pain  M79.671 XR Foot Right G/E 3 Views     Orthotics, Mastectomy and Custom Compression Orders      2. 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 Pes valgus of left foot  Q66.6 Orthotics, Mastectomy and Custom Compression Orders      4. Post-traumatic osteoarthritis of multiple joints  M15.3 Orthotics, Mastectomy and Custom Compression Orders          PLAN:  Reviewed patient's chart in [facility].      12/29/2022   Obtained and interpreted radiographs    Fracture boot, WB to tolerance in the boot and may rest without the fracture boot  compression during the day and off at night   RTC 3 weeks  Discussed risk of developing Charcot.    1/23/2023  Last DM shoes 2 years ago.  Ordered new DM shoes  Is almost pain free so start coming out of boot as tolerated and stay in DM shoes.  He was wearing slip on shoes and increased standing in shop likely cause this.   Follow-up once yearly for diabetic foot exam and if he is unable to progress out of the fracture boot.    3/13/2023  Patient does have donning aid for compression socks.    He has a $30 pay for PT and does not want to do that.  Offered it to him  Also offered injection if he would like  Follow-up as needed.     1/21/2025  Placed order for custom molded diabetic shoes secondary to loss of protective threshold and deformity.  Follow-up once yearly.      Khadar García DPM        Again, thank you for allowing me to participate in the care of your patient.        Sincerely,        Khadar García DPM    Electronically signed

## 2025-03-04 NOTE — MR AVS SNAPSHOT
Robert Castanongeovany   9/19/2017 8:15 AM   Anticoagulation Therapy Visit    Description:  82 year old male   Provider:  PH ANTI COAG   Department:  Ph Anticoag           INR as of 9/19/2017     Today's INR 2.0      Anticoagulation Summary as of 9/19/2017     INR goal 2.0-3.0   Today's INR 2.0   Full instructions 2.5 mg on Fri; 5 mg all other days   Next INR check 10/17/2017    Indications   Factor V Leiden mutation (H) [D68.51]  Venous thrombosis [I82.90]  Prothrombin mutation (H) [D68.52]  Long-term (current) use of anticoagulants [Z79.01] [Z79.01]         Your next Anticoagulation Clinic appointment(s)     Sep 19, 2017  8:15 AM CDT   Anticoagulation Visit with PH ANTI COAG   [facility] ([address] [address]   [phone]            Oct 17, 2017  8:30 AM CDT   Anticoagulation Visit with PH ANTI COAG   [facility] ([address] [address]   [phone]              Contact Numbers     Clinic Number:         September 2017 Details    Sun Mon Tue Wed Thu Fri Sat          1               2                 3               4               5               6               7               8               9                 10               11               12               13               14               15               16                 17               18               19      5 mg   See details      20      5 mg         21      5 mg         22      2.5 mg         23      5 mg           24      5 mg         25      5 mg         26      5 mg         27      5 mg         28      5 mg         29      2.5 mg         30      5 mg          Date Details   09/19 This INR check               How to take your warfarin dose     To take:  2.5 mg Take 0.5 of a 5 mg tablet.    To take:  5 mg Take 1 of the 5 mg tablets.           October 2017 Details    Sun Mon Tue Wed Thu Fri Sat     1      5  mg         2      5 mg         3      5 mg         4      5 mg         5      5 mg         6      2.5 mg         7      5 mg           8      5 mg         9      5 mg         10      5 mg         11      5 mg         12      5 mg         13      2.5 mg         14      5 mg           15      5 mg         16      5 mg         17            18               19               20               21                 22               23               24               25               26               27               28                 29               30               31                    Date Details   No additional details    Date of next INR:  10/17/2017         How to take your warfarin dose     To take:  2.5 mg Take 0.5 of a 5 mg tablet.    To take:  5 mg Take 1 of the 5 mg tablets.            yes

## 2025-03-08 ENCOUNTER — HEALTH MAINTENANCE LETTER (OUTPATIENT)
Age: 89
End: 2025-03-08

## 2025-03-09 DIAGNOSIS — G25.81 RESTLESS LEG SYNDROME: ICD-10-CM

## 2025-03-09 DIAGNOSIS — I10 HYPERTENSION GOAL BP (BLOOD PRESSURE) < 140/90: ICD-10-CM

## 2025-03-10 RX ORDER — PRAMIPEXOLE DIHYDROCHLORIDE 1 MG/1
TABLET ORAL
Qty: 180 TABLET | Refills: 3 | Status: SHIPPED | OUTPATIENT
Start: 2025-03-10

## 2025-03-10 RX ORDER — ATENOLOL 50 MG/1
50 TABLET ORAL 2 TIMES DAILY
Qty: 180 TABLET | Refills: 3 | Status: SHIPPED | OUTPATIENT
Start: 2025-03-10

## 2025-03-14 ENCOUNTER — ANTICOAGULATION THERAPY VISIT (OUTPATIENT)
Dept: ANTICOAGULATION | Facility: CLINIC | Age: 89
End: 2025-03-14

## 2025-03-14 DIAGNOSIS — D68.52 PROTHROMBIN MUTATION: ICD-10-CM

## 2025-03-14 DIAGNOSIS — Z86.718 HISTORY OF DEEP VENOUS THROMBOSIS (DVT) OF DISTAL VEIN OF LEFT LOWER EXTREMITY: ICD-10-CM

## 2025-03-14 DIAGNOSIS — Z79.01 LONG TERM CURRENT USE OF ANTICOAGULANT THERAPY: ICD-10-CM

## 2025-03-14 DIAGNOSIS — D68.51 FACTOR V LEIDEN MUTATION: Primary | ICD-10-CM

## 2025-03-14 NOTE — PROGRESS NOTES
ANTICOAGULATION MANAGEMENT     Robert Richard 89 year old male is on warfarin with subtherapeutic INR result. (Goal INR 2.0-3.0)    Recent labs: (last 7 days)     03/14/25  1041   INR 1.9*       ASSESSMENT     Source(s): Chart Review  Previous INR was Subtherapeutic  Medication, diet, health changes since last INR chart reviewed; none identified  Booster dose given last Friday.         PLAN     Unable to reach pt today.    Left message to continue MD this weekend. Request call back for assessment.    Follow up required to confirm warfarin dose taken and assess for changes    Neelima Arreaga, RN  3/14/2025  Anticoagulation Clinic  [facility] for routing messages: mirlande VALLECILLO [facility] patient phone line: [phone]

## 2025-03-16 DIAGNOSIS — J31.0 CHRONIC RHINITIS: ICD-10-CM

## 2025-03-17 ENCOUNTER — TELEPHONE (OUTPATIENT)
Dept: INTERNAL MEDICINE | Facility: CLINIC | Age: 89
End: 2025-03-17
Payer: COMMERCIAL

## 2025-03-17 RX ORDER — IPRATROPIUM BROMIDE 42 UG/1
SPRAY, METERED NASAL
Qty: 45 ML | Refills: 0 | Status: SHIPPED | OUTPATIENT
Start: 2025-03-17

## 2025-03-17 NOTE — PROGRESS NOTES
ANTICOAGULATION MANAGEMENT     Robert Richard 89 year old male is on warfarin with subtherapeutic INR result. (Goal INR 2.0-3.0)    Recent labs: (last 7 days)     03/14/25  1041   INR 1.9*       ASSESSMENT     Source(s): Chart Review and Patient/Caregiver Call     Warfarin doses taken: Warfarin taken as instructed booster dose given last week   Diet: No new diet changes identified  Medication/supplement changes: None noted  New illness, injury, or hospitalization: No  Signs or symptoms of bleeding or clotting: No  Previous result: Subtherapeutic  Additional findings: None       PLAN     Recommended plan for no diet, medication or health factor changes affecting INR     Dosing Instructions: Increase your warfarin dose (7% change) with next INR in 2 weeks       Summary  As of 3/14/2025      Full warfarin instructions:  7.5 mg every Mon, Fri; 5 mg all other days   Next INR check:  3/28/2025               Telephone call with Carlos who verbalizes understanding and agrees to plan    Lab visit scheduled    Education provided: Goal range and lab monitoring: goal range and significance of current result  Contact [phone] with any changes, questions or concerns.     Plan made per [facility] anticoagulation protocol    Amina Bertrand, RN  3/17/2025  Anticoagulation Clinic  [employer] for routing messages: mirlande WALLACE  [facility] patient phone line: [phone]        _______________________________________________________________________     Anticoagulation Episode Summary       Current INR goal:  2.0-3.0   TTR:  49.3% (11.2 mo)   Target end date:  Indefinite   Send INR reminders to:  AVEL WALLACE    Indications    Factor V Leiden mutation [D68.51]  Venous thrombosis (Resolved) [I82.90]  Prothrombin mutation [D68.52]  Long term current use of anticoagulant therapy [Z79.01]  History of deep venous thrombosis (DVT) of distal vein of left lower extremity [Z86.718]             Comments:  --             Anticoagulation Care  Providers       Provider Role Specialty Phone number    Stiven Donahue MD Referring Internal Medicine [phone]    Nate Cifuentes DO [facility] Internal Medicine [phone]

## 2025-03-17 NOTE — TELEPHONE ENCOUNTER
Please see [facility] encounter from today.    Amina Bertrand RN  [facility] Anticoagulation [facility]

## 2025-03-17 NOTE — TELEPHONE ENCOUNTER
Patient Returning Call    Reason for call:  call pt back    Information relayed to patient:  No    Patient has additional questions:  Yes    What are your questions/concerns:  no    Who does the patient want to speak with:  RN    Is an  needed?:  No      Could we send this information to you in [employer] or would you prefer to receive a phone call?:   No preference   Okay to leave a detailed message?: Yes at Cell number on file:    Telephone Information:   Mobile [phone]

## 2025-03-31 ENCOUNTER — TELEPHONE (OUTPATIENT)
Dept: ANTICOAGULATION | Facility: CLINIC | Age: 89
End: 2025-03-31
Payer: COMMERCIAL

## 2025-03-31 NOTE — TELEPHONE ENCOUNTER
ANTICOAGULATION     Robert Richard is overdue for an INR check.     Left message for patient to call and schedule lab appointment as soon as possible. If returning call, please schedule.     Chantell Boston RN  3/31/2025  Anticoagulation Clinic  [facility] for routing messages: mirlande WALLACE  [facility] patient phone line: [phone]

## 2025-04-07 ENCOUNTER — TELEPHONE (OUTPATIENT)
Dept: ANTICOAGULATION | Facility: CLINIC | Age: 89
End: 2025-04-07
Payer: COMMERCIAL

## 2025-04-07 NOTE — TELEPHONE ENCOUNTER
ANTICOAGULATION     Robert Richard is overdue for an INR check.     Left message for patient to call and schedule lab appointment as soon as possible. If returning call, please schedule.     Chantell Boston RN  4/7/2025  Anticoagulation Clinic  [facility] for routing messages: mirlande WALLACE  [facility] patient phone line: [phone]

## 2025-04-08 ENCOUNTER — ANTICOAGULATION THERAPY VISIT (OUTPATIENT)
Dept: ANTICOAGULATION | Facility: CLINIC | Age: 89
End: 2025-04-08

## 2025-04-08 ENCOUNTER — LAB (OUTPATIENT)
Dept: LAB | Facility: CLINIC | Age: 89
End: 2025-04-08
Payer: COMMERCIAL

## 2025-04-08 DIAGNOSIS — Z79.01 LONG TERM CURRENT USE OF ANTICOAGULANT THERAPY: ICD-10-CM

## 2025-04-08 DIAGNOSIS — D68.52 PROTHROMBIN MUTATION: ICD-10-CM

## 2025-04-08 DIAGNOSIS — D68.51 FACTOR V LEIDEN MUTATION: ICD-10-CM

## 2025-04-08 DIAGNOSIS — D68.51 FACTOR V LEIDEN MUTATION: Primary | ICD-10-CM

## 2025-04-08 DIAGNOSIS — Z86.718 HISTORY OF DEEP VENOUS THROMBOSIS (DVT) OF DISTAL VEIN OF LEFT LOWER EXTREMITY: ICD-10-CM

## 2025-04-08 LAB — INR BLD: 2.6 (ref 0.9–1.1)

## 2025-04-08 PROCEDURE — 85610 PROTHROMBIN TIME: CPT

## 2025-04-08 PROCEDURE — 36416 COLLJ CAPILLARY BLOOD SPEC: CPT

## 2025-04-08 NOTE — PROGRESS NOTES
ANTICOAGULATION MANAGEMENT     Robert Richard 89 year old male is on warfarin with therapeutic INR result. (Goal INR 2.0-3.0)    Recent labs: (last 7 days)     04/08/25  1032   INR 2.6*       ASSESSMENT     Source(s): Chart Review  Previous INR was Subtherapeutic  Medication, diet, health changes since last INR chart reviewed; none identified         PLAN     Recommended plan for no diet, medication or health factor changes affecting INR     Dosing Instructions: Continue your current warfarin dose with next INR in 2 weeks       Summary  As of 4/8/2025      Full warfarin instructions:  7.5 mg every Mon, Fri; 5 mg all other days   Next INR check:  4/22/2025               Detailed voice message left for Carlos (unable to reach after multiple attempts) with dosing instructions and follow up date.     Contact [phone] to schedule and with any changes, questions or concerns.     Education provided: Please call back if any changes to your diet, medications or how you've been taking warfarin    Plan made per [facility] anticoagulation protocol    Neelima Arreaga RN  4/8/2025  Anticoagulation Clinic  [employer] for routing messages: mirlande WALLACE  [facility] patient phone line: [phone]        _______________________________________________________________________     Anticoagulation Episode Summary       Current INR goal:  2.0-3.0   TTR:  48.7% (11.3 mo)   Target end date:  Indefinite   Send INR reminders to:  AVEL WALLACE    Indications    Factor V Leiden mutation [D68.51]  Venous thrombosis (Resolved) [I82.90]  Prothrombin mutation [D68.52]  Long term current use of anticoagulant therapy [Z79.01]  History of deep venous thrombosis (DVT) of distal vein of left lower extremity [Z86.718]             Comments:  --             Anticoagulation Care Providers       Provider Role Specialty Phone number    Stiven Donahue MD Referring Internal Medicine [phone]    Nate Cifuentes DO Responsible Internal  Medicine [phone]

## 2025-04-09 ENCOUNTER — TELEPHONE (OUTPATIENT)
Dept: NEUROSURGERY | Facility: CLINIC | Age: 89
End: 2025-04-09
Payer: COMMERCIAL

## 2025-04-09 NOTE — TELEPHONE ENCOUNTER
Left Voicemail (1st Attempt) for the patient to call back and schedule the following:    Location:  Neurosurgery  Provider: BRITTNY  Appointment type: return adult  Appointment mode: in clinic  Return date: End of May ~5/28    Specialty phone number: [phone]    Is Imaging Needed: yes - XR  Imaging Phone Number to provide to patient: [phone]    Additional Notes: patient due for 1 year surgery follow up at the end of May with XR prior

## 2025-04-24 ENCOUNTER — TELEPHONE (OUTPATIENT)
Dept: ANTICOAGULATION | Facility: CLINIC | Age: 89
End: 2025-04-24
Payer: COMMERCIAL

## 2025-04-24 NOTE — TELEPHONE ENCOUNTER
ANTICOAGULATION     Robert Richard is overdue for an INR check.     Left message for patient to call and schedule lab appointment as soon as possible. If returning call, please schedule.     Neelima Arreaga RN  4/24/2025  Anticoagulation Clinic  [facility] for routing messages: mirlande WALLACE  [facility] patient phone line: [phone]

## 2025-04-28 ENCOUNTER — OFFICE VISIT (OUTPATIENT)
Dept: NEUROSURGERY | Facility: CLINIC | Age: 89
End: 2025-04-28
Payer: COMMERCIAL

## 2025-04-28 ENCOUNTER — ANCILLARY PROCEDURE (OUTPATIENT)
Dept: GENERAL RADIOLOGY | Facility: CLINIC | Age: 89
End: 2025-04-28
Attending: NURSE PRACTITIONER
Payer: COMMERCIAL

## 2025-04-28 VITALS
DIASTOLIC BLOOD PRESSURE: 84 MMHG | OXYGEN SATURATION: 94 % | SYSTOLIC BLOOD PRESSURE: 142 MMHG | HEART RATE: 68 BPM | WEIGHT: 254 LBS | BODY MASS INDEX: 35.43 KG/M2

## 2025-04-28 DIAGNOSIS — Z98.1 S/P LUMBAR SPINAL FUSION: ICD-10-CM

## 2025-04-28 DIAGNOSIS — Z98.1 S/P LUMBAR SPINAL FUSION: Primary | ICD-10-CM

## 2025-04-28 PROCEDURE — 1126F AMNT PAIN NOTED NONE PRSNT: CPT | Performed by: NURSE PRACTITIONER

## 2025-04-28 PROCEDURE — 99213 OFFICE O/P EST LOW 20 MIN: CPT | Performed by: NURSE PRACTITIONER

## 2025-04-28 PROCEDURE — 72100 X-RAY EXAM L-S SPINE 2/3 VWS: CPT | Mod: TC | Performed by: RADIOLOGY

## 2025-04-28 ASSESSMENT — PAIN SCALES - GENERAL: PAINLEVEL_OUTOF10: NO PAIN (0)

## 2025-04-28 NOTE — LETTER
4/28/2025      Robert Richard  [address] [facility] [address]      Dear Colleague,    Thank you for referring your patient, Robert Richard, to the [facility] NEUROSURGERY CLINIC [place]. Please see a copy of my visit note below.    [facility]  Neurosurgery Follow Up:    HPI: Almost 1 year s/p L4-5 TLIF with decompression with Dr. Cleary on 5/28/2024. Doing well. Denies pain. No radicular leg pain, paresthesias or overt weakness. Continues to struggle with walking longer distances. Using walker. Unable to start PT due to illness since last visit. Would like to participate in PT at this time.     Medical, surgical, family, and social history unchanged since prior exam.  Exam:  Constitutional:  Alert, well nourished, NAD.  HEENT: Normocephalic, atraumatic.   Pulm:  Without shortness of breath   CV:  No pitting edema of BLE.      Vital Signs:  Pulse 68   Wt 254 lb (115.2 kg)   SpO2 94%   BMI 35.43 kg/m      Neurological:  Awake  Alert  Oriented x 3  Motor exam:        IP Q DF PF EHL  R   5  5   5   5    5  L   5  5   5   5    5     Able to spontaneously move L/E bilaterally  Sensation intact throughout all L/E dermatomes     Incisions:  No concerns  Imaging: AP and lateral films reveal intact and stable hardware.    A/P: s/p lumbar spine fusion  May continue to increase activities as tolerated. Ok to begin PT at this time. Follow up as needed. He verbalized understanding and agreement.   Patient Instructions   - May continue to increase activities as tolerated.   - Physical therapy ordered. They will contact you to schedule.  - Follow up as needed.   - Call the clinic at [phone] for increased pain or any other questions and concerns.    Radha Lozano, JODI  [facility]  [address] [place] [address]  [address] [place] [address]  Tel [phone]  Fax [phone]      Again, thank you for allowing me to participate in the care of your patient.         Sincerely,        Radha Lozano NP    Electronically signed

## 2025-04-28 NOTE — PROGRESS NOTES
[facility] Neurosurgery  Neurosurgery Follow Up:    HPI: Almost 1 year s/p L4-5 TLIF with decompression with Dr. Cleary on 5/28/2024. Doing well. Denies pain. No radicular leg pain, paresthesias or overt weakness. Continues to struggle with walking longer distances. Using walker. Unable to start PT due to illness since last visit. Would like to participate in PT at this time.     Medical, surgical, family, and social history unchanged since prior exam.  Exam:  Constitutional:  Alert, well nourished, NAD.  HEENT: Normocephalic, atraumatic.   Pulm:  Without shortness of breath   CV:  No pitting edema of BLE.      Vital Signs:  Pulse 68   Wt 254 lb (115.2 kg)   SpO2 94%   BMI 35.43 kg/m      Neurological:  Awake  Alert  Oriented x 3  Motor exam:        IP Q DF PF EHL  R   5  5   5   5    5  L   5  5   5   5    5     Able to spontaneously move L/E bilaterally  Sensation intact throughout all L/E dermatomes     Incisions:  No concerns  Imaging: AP and lateral films reveal intact and stable hardware.    A/P: s/p lumbar spine fusion  May continue to increase activities as tolerated. Ok to begin PT at this time. Follow up as needed. He verbalized understanding and agreement.   Patient Instructions   - May continue to increase activities as tolerated.   - Physical therapy ordered. They will contact you to schedule.  - Follow up as needed.   - Call the clinic at [phone] for increased pain or any other questions and concerns.    Radha Lozano CNP  [facility] Neurosurgery  [address] [place] [address]  Tel [phone]  Fax [phone]

## 2025-04-28 NOTE — PATIENT INSTRUCTIONS
- May continue to increase activities as tolerated.   - Physical therapy ordered. They will contact you to schedule.  - Follow up as needed.   - Call the clinic at [phone] for increased pain or any other questions and concerns.  
set-up required/verbal cues/1 person assist

## 2025-05-12 ENCOUNTER — LAB (OUTPATIENT)
Dept: LAB | Facility: CLINIC | Age: 89
End: 2025-05-12
Payer: COMMERCIAL

## 2025-05-12 ENCOUNTER — ANTICOAGULATION THERAPY VISIT (OUTPATIENT)
Dept: ANTICOAGULATION | Facility: CLINIC | Age: 89
End: 2025-05-12

## 2025-05-12 ENCOUNTER — RESULTS FOLLOW-UP (OUTPATIENT)
Dept: ANTICOAGULATION | Facility: CLINIC | Age: 89
End: 2025-05-12

## 2025-05-12 DIAGNOSIS — D68.51 FACTOR V LEIDEN MUTATION: ICD-10-CM

## 2025-05-12 DIAGNOSIS — D68.52 PROTHROMBIN MUTATION: ICD-10-CM

## 2025-05-12 DIAGNOSIS — D68.51 FACTOR V LEIDEN MUTATION: Primary | ICD-10-CM

## 2025-05-12 DIAGNOSIS — Z86.718 HISTORY OF DEEP VENOUS THROMBOSIS (DVT) OF DISTAL VEIN OF LEFT LOWER EXTREMITY: ICD-10-CM

## 2025-05-12 DIAGNOSIS — Z79.01 LONG TERM CURRENT USE OF ANTICOAGULANT THERAPY: ICD-10-CM

## 2025-05-12 LAB — INR BLD: 6.5 (ref 0.9–1.1)

## 2025-05-12 PROCEDURE — 36416 COLLJ CAPILLARY BLOOD SPEC: CPT

## 2025-05-12 PROCEDURE — 85610 PROTHROMBIN TIME: CPT

## 2025-05-12 NOTE — PROGRESS NOTES
ANTICOAGULATION MANAGEMENT     Robert Richard 89 year old male is on warfarin with supratherapeutic INR result. (Goal INR 2.0-3.0)    Recent labs: (last 7 days)     05/12/25  1001   INR 6.5*       ASSESSMENT     Source(s): Chart Review and Patient/Caregiver Call     Warfarin doses taken: Warfarin taken as instructed maintenance dose was recently increased  Diet: No new diet changes identified  Medication/supplement changes: None noted  New illness, injury, or hospitalization: No  Signs or symptoms of bleeding or clotting: No  Previous result: Subtherapeutic  Additional findings: None       PLAN     Recommended plan for no diet, medication or health factor changes affecting INR     Dosing Instructions: hold 2 doses then decrease your warfarin dose (11% change) with next INR in 3 days   took dose already today so will hold for the next 2 days and he will not take dose on Thursday until after [facility] contacts him with results    Summary  As of 5/12/2025      Full warfarin instructions:  5/12: 7.5 mg; 5/13: Hold; 5/14: Hold; Otherwise 7.5 mg every Mon, Wed, Fri; 5 mg all other days   Next INR check:  5/15/2025               Telephone call with Carlos who verbalizes understanding and agrees to plan    Lab visit scheduled    Education provided: Goal range and lab monitoring: goal range and significance of current result  Symptom monitoring: monitoring for bleeding signs and symptoms and when to seek medical attention/emergency care  Contact [phone] with any changes, questions or concerns.     Plan made per [facility] anticoagulation protocol    Amina Bertrand RN  5/12/2025  Anticoagulation Clinic  [facility] for routing messages: mirlande WALLACE  [facility] patient phone line: [phone]        _______________________________________________________________________     Anticoagulation Episode Summary       Current INR goal:  2.0-3.0   TTR:  43.1% (11.3 mo)   Target end date:  Indefinite   Send INR reminders to:  AVEL  [place]    Indications    Factor V Leiden mutation [D68.51]  Venous thrombosis (Resolved) [I82.90]  Prothrombin mutation [D68.52]  Long term current use of anticoagulant therapy [Z79.01]  History of deep venous thrombosis (DVT) of distal vein of left lower extremity [Z86.718]             Comments:  --             Anticoagulation Care Providers       Provider Role Specialty Phone number    Stiven Donahue MD Referring Internal Medicine [phone]    Nate Cifuentes DO [facility] Internal Medicine [phone]

## 2025-05-15 ENCOUNTER — ANTICOAGULATION THERAPY VISIT (OUTPATIENT)
Dept: ANTICOAGULATION | Facility: CLINIC | Age: 89
End: 2025-05-15

## 2025-05-15 ENCOUNTER — RESULTS FOLLOW-UP (OUTPATIENT)
Dept: ANTICOAGULATION | Facility: CLINIC | Age: 89
End: 2025-05-15

## 2025-05-15 ENCOUNTER — TELEPHONE (OUTPATIENT)
Dept: INTERNAL MEDICINE | Facility: CLINIC | Age: 89
End: 2025-05-15

## 2025-05-15 ENCOUNTER — LAB (OUTPATIENT)
Dept: LAB | Facility: CLINIC | Age: 89
End: 2025-05-15
Payer: COMMERCIAL

## 2025-05-15 DIAGNOSIS — D68.52 PROTHROMBIN MUTATION: ICD-10-CM

## 2025-05-15 DIAGNOSIS — D68.51 FACTOR V LEIDEN MUTATION: ICD-10-CM

## 2025-05-15 DIAGNOSIS — Z79.01 LONG TERM CURRENT USE OF ANTICOAGULANT THERAPY: ICD-10-CM

## 2025-05-15 DIAGNOSIS — Z86.718 HISTORY OF DEEP VENOUS THROMBOSIS (DVT) OF DISTAL VEIN OF LEFT LOWER EXTREMITY: ICD-10-CM

## 2025-05-15 DIAGNOSIS — D68.51 FACTOR V LEIDEN MUTATION: Primary | ICD-10-CM

## 2025-05-15 LAB — INR BLD: 5 (ref 0.9–1.1)

## 2025-05-15 NOTE — TELEPHONE ENCOUNTER
General Call    Contacts       Contact Date/Time Type Contact Phone/Fax    05/15/2025 11:22 AM CDT Phone (Incoming) Carlos Richard (Self) [phone] (M)          Reason for Call:  returning INR call, please call him back    What are your questions or concerns:  na    Date of last appointment with provider: na    Could we send this information to you in [employer] or would you prefer to receive a phone call?:   Patient would prefer a phone call   Okay to leave a detailed message?: No at Cell number on file:    Telephone Information:   Mobile [phone]

## 2025-05-15 NOTE — PROGRESS NOTES
ANTICOAGULATION MANAGEMENT     Robert Richard 89 year old male is on warfarin with supratherapeutic INR result. (Goal INR 2.0-3.0)    Recent labs: (last 7 days)     05/15/25  0938   INR 5.0*       ASSESSMENT     Source(s): Chart Review and Patient/Caregiver Call     Warfarin doses taken: Held for high  recently which may be affecting INR  Diet: No new diet changes identified  Medication/supplement changes: None noted  New illness, injury, or hospitalization: No  Signs or symptoms of bleeding or clotting: No  Previous result: Supratherapeutic  Additional findings: None       PLAN     Recommended plan for temporary change(s) affecting INR     Dosing Instructions: hold 2 doses then continue your current warfarin dose with next INR in 4 days       Summary  As of 5/15/2025      Full warfarin instructions:  5/15: Hold; 5/16: Hold; Otherwise 7.5 mg every Mon; 5 mg all other days   Next INR check:  5/19/2025               Telephone call with Carlos who verbalizes understanding and agrees to plan and who agrees to plan and repeated back plan correctly    Lab visit scheduled    Education provided: Goal range and lab monitoring: goal range and significance of current result, Importance of therapeutic range, and Importance of following up at instructed interval  Symptom monitoring: monitoring for bleeding signs and symptoms, monitoring for clotting signs and symptoms, when to seek medical attention/emergency care, and if you hit your head or have a bad fall seek emergency care  Contact [phone] with any changes, questions or concerns.     Plan made per [facility] anticoagulation protocol    Chantell Boston RN  5/15/2025  Anticoagulation Clinic  [employer] for routing messages: mirlande WALLACE  [facility] patient phone line: [phone]        _______________________________________________________________________     Anticoagulation Episode Summary       Current INR goal:  2.0-3.0   TTR:  42.3% (11.3 mo)   Target end date:   Indefinite   [facility]    Indications    Factor V Leiden mutation [D68.51]  Venous thrombosis (Resolved) [I82.90]  Prothrombin mutation [D68.52]  Long term current use of anticoagulant therapy [Z79.01]  History of deep venous thrombosis (DVT) of distal vein of left lower extremity [Z86.718]             Comments:  --             Anticoagulation Care Providers       Provider Role Specialty Phone number    Stiven Donahue MD Referring Internal Medicine [phone]    Nate Cifuentes DO [facility] Internal Medicine [phone]

## 2025-05-19 ENCOUNTER — ANTICOAGULATION THERAPY VISIT (OUTPATIENT)
Dept: ANTICOAGULATION | Facility: CLINIC | Age: 89
End: 2025-05-19

## 2025-05-19 ENCOUNTER — LAB (OUTPATIENT)
Dept: LAB | Facility: CLINIC | Age: 89
End: 2025-05-19
Payer: COMMERCIAL

## 2025-05-19 DIAGNOSIS — Z86.718 HISTORY OF DEEP VENOUS THROMBOSIS (DVT) OF DISTAL VEIN OF LEFT LOWER EXTREMITY: ICD-10-CM

## 2025-05-19 DIAGNOSIS — D68.51 FACTOR V LEIDEN MUTATION: ICD-10-CM

## 2025-05-19 DIAGNOSIS — D68.52 PROTHROMBIN MUTATION: ICD-10-CM

## 2025-05-19 DIAGNOSIS — I10 HYPERTENSION GOAL BP (BLOOD PRESSURE) < 140/90: ICD-10-CM

## 2025-05-19 DIAGNOSIS — D68.51 FACTOR V LEIDEN MUTATION: Primary | ICD-10-CM

## 2025-05-19 DIAGNOSIS — E11.8 TYPE 2 DIABETES MELLITUS WITH COMPLICATION, WITHOUT LONG-TERM CURRENT USE OF INSULIN (H): ICD-10-CM

## 2025-05-19 DIAGNOSIS — I10 ESSENTIAL HYPERTENSION WITH GOAL BLOOD PRESSURE LESS THAN 140/90: ICD-10-CM

## 2025-05-19 DIAGNOSIS — G25.81 RESTLESS LEG SYNDROME: ICD-10-CM

## 2025-05-19 DIAGNOSIS — Z79.01 LONG TERM CURRENT USE OF ANTICOAGULANT THERAPY: ICD-10-CM

## 2025-05-19 LAB — INR BLD: 1.5 (ref 0.9–1.1)

## 2025-05-19 PROCEDURE — 36416 COLLJ CAPILLARY BLOOD SPEC: CPT

## 2025-05-19 PROCEDURE — 85610 PROTHROMBIN TIME: CPT

## 2025-05-19 RX ORDER — GLIPIZIDE 5 MG/1
5 TABLET, FILM COATED, EXTENDED RELEASE ORAL DAILY
Qty: 90 TABLET | Refills: 0 | OUTPATIENT
Start: 2025-05-19

## 2025-05-19 RX ORDER — WARFARIN SODIUM 5 MG/1
TABLET ORAL
Qty: 90 TABLET | Refills: 0 | Status: SHIPPED | OUTPATIENT
Start: 2025-05-19

## 2025-05-19 RX ORDER — PRAMIPEXOLE DIHYDROCHLORIDE 1 MG/1
TABLET ORAL
Qty: 180 TABLET | Refills: 0 | OUTPATIENT
Start: 2025-05-19

## 2025-05-19 RX ORDER — SPIRONOLACTONE 25 MG/1
25 TABLET ORAL DAILY
Qty: 90 TABLET | Refills: 0 | Status: SHIPPED | OUTPATIENT
Start: 2025-05-19

## 2025-05-19 RX ORDER — LOSARTAN POTASSIUM 100 MG/1
100 TABLET ORAL DAILY
Qty: 90 TABLET | Refills: 0 | Status: SHIPPED | OUTPATIENT
Start: 2025-05-19

## 2025-05-19 NOTE — PROGRESS NOTES
ANTICOAGULATION MANAGEMENT     Robert Richard 89 year old male is on warfarin with subtherapeutic INR result. (Goal INR 2.0-3.0)    Recent labs: (last 7 days)     05/19/25  0825   INR 1.5*       ASSESSMENT     Source(s): Chart Review and Patient/Caregiver Call     Warfarin doses taken: Held for 2 dose for supra inr  recently which may be affecting INR  Diet: No new diet changes identified  Medication/supplement changes: None noted  New illness, injury, or hospitalization: No  Signs or symptoms of bleeding or clotting: No  Previous result: Supratherapeutic  Additional findings: None       PLAN     Recommended plan for no diet, medication or health factor changes affecting INR     Dosing Instructions: Continue your current warfarin dose with next INR in 4 days   dose adjusted down with last inr    Summary  As of 5/19/2025      Full warfarin instructions:  7.5 mg every Mon; 5 mg all other days   Next INR check:  5/23/2025               Telephone call with Carlos who verbalizes understanding and agrees to plan    Lab visit scheduled    Education provided: Please call back if any changes to your diet, medications or how you've been taking warfarin    Plan made per [facility] anticoagulation protocol    Regina Luo RN  5/19/2025  Anticoagulation Clinic  [employer] for routing messages: mirlande WALLACE  [facility] patient phone line: [phone]        _______________________________________________________________________     Anticoagulation Episode Summary       Current INR goal:  2.0-3.0   TTR:  42.1% (11.4 mo)   Target end date:  Indefinite   Send INR reminders to:  AVEL WALLACE    Indications    Factor V Leiden mutation [D68.51]  Venous thrombosis (Resolved) [I82.90]  Prothrombin mutation [D68.52]  Long term current use of anticoagulant therapy [Z79.01]  History of deep venous thrombosis (DVT) of distal vein of left lower extremity [Z86.718]             Comments:  --             Anticoagulation Care Providers        Provider Role Specialty Phone number    Stiven Donahue MD Referring Internal Medicine [phone]    Nate Cifuentes DO [facility] Internal Medicine [phone]

## 2025-05-20 ENCOUNTER — HOSPITAL ENCOUNTER (OUTPATIENT)
Dept: WOUND CARE | Facility: CLINIC | Age: 89
Discharge: HOME | End: 2025-05-20
Attending: INTERNAL MEDICINE
Payer: COMMERCIAL

## 2025-05-20 PROCEDURE — G0463 HOSPITAL OUTPT CLINIC VISIT: HCPCS

## 2025-05-20 NOTE — PROGRESS NOTES
[facility]     Start of Care in [facility]ound [facility]: 5/20/25  Referring Provider: Godfrey Saleem MD from past referral, requested new referral for new concerns.  Primary Care Provider: Stiven Donahue   Wound Location: Left lower leg, pretibial and medial    Wound Clinic Visit:  Pt initiated appointment     Reason for Visit:  New wound to the left leg      Subjective:  On arrival today alone, 5/20/25, for his initial return to the Wound and Ostomy clinic, last visit was Dec of 2023, pt adds to the wound history below.     Wound History:   Pt well known to the Wound and Ostomy clinic here at [facility] from past visits.  Seen - October into November of 2019 for left medial leg venous ulcer, June - August 2021 for right lower leg venous ulcer, and November into December 2023 for left medial ankle venous ulcers..  During those visits the wounds were primaryily treated with compression with standard Medline Unna's boot visco paste wraps and ace wraps, and past wounds healed in time.  Past LE venous Medical History also significant for ablation procedures with Dr Gio NUNN on the left in 2021.    Pt reports today 5/20 at his initial return to the Wound and Ostomy clinic that the past wounds all healed and remained healed.  A few weeks ago he developed a new wound to the left shin and then soon after another to the left inner calf.  The shin wound dried up and scabbed over but the inner left calf wound remains open.  Pt also notes some pain to his right heel, near the back achilles area but has not noticed any drainage.    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)    Stage 3 chronic kidney disease, unspecified whether stage 3a or 3b CKD (H)    Asymmetrical sensorineural hearing loss    Hemifacial spasm    Subjective tinnitus    Venous stasis dermatitis of right lower extremity    S/P lumbar fusion    Spondylolisthesis of lumbar region    Generalized muscle weakness    Anemia, unspecified type    History of deep venous thrombosis (DVT) of distal vein of left lower extremity    Urine retention    Stage 3b chronic kidney disease (H)      Tobacco Use:     Tobacco Use      Smoking status: Never      Smokeless tobacco: Never     Diabetic: Type 2  HgbA1C:   Hemoglobin A1C   Date Value Ref Range Status   01/07/2025 8.2 (H) <5.7 % Final     Comment:     Normal <5.7%   Prediabetes 5.7-6.4%    Diabetes 6.5% or higher     Note: Adopted from ADA consensus guidelines.   06/15/2021 7.2 (H) 0 - 5.6 % Final     Comment:     Normal <5.7% Prediabetes 5.7-6.4%  Diabetes 6.5% or higher - adopted from ADA   consensus guidelines.     Checks Blood Glucose?:  Pt does not check blood glucose levels in the home.    Personal/social history:  Pt lives alone independently with no home care services.    Objective:   Current treatment plan: Cares pt has been doing at home prior to his initial visit to the Wound and Ostomy clinic today 5/20.   Left lower leg pretibial scab and open medial lower leg ulcers:  - Using a moisturizer, he believes is Aquaphor or CeveVe, in a small tube, applies to both sites and leave open to air.  Last changed: Last applied yesterday    Wound #1 Left lower leg, wound consistent with venous stasis ulcers.  Stage/tissue depth: anterior pretibial wound appears partial thickness but can not confirm as of 5/20.  Anterior/Medial lower leg ulcer is full thickness.  - Pretibial 1 cm L x  0.6 cm W x 0 cm D, wound bed is 100 % dry, appears to be a mix of dried drainage and hyperkeratotic tissue, no drainage.   - Anterior/Medial lower leg 1.5 cm L x 1 cm W x 0.3 cm D, wound bed is approximately 10 % scabs of dried sanguinous drainage along edge and 90 % clean scant moist red tissue with no granular buds present, scant serous drainage.  Tunneling: none  Undermining: none  Wound bed type/amount: As above for each site; Not fluctuant  Wound Edges: open where there is no scabbing present on the anterior/medial wound, NA on the pretibial wound as no open wound bed.  Periwound: firm edema with some pitting, wide spread hemosiderin staining and varicose veins  Drainage: as above for each site  Odor: no  Pain: small amount of pain with direct cares to the anterior/medial lower leg wound    Photo's from today's visit 5/20/25, initial return to the Wound and Ostomy clinic after last visit in 12/2023.    Dorsalis Pedal Pulse: weak but palpable: NA doppler: NA phasic  Posterior Tibial Pulse: unable to palpate: NA doppler: NA phasic  Hair growth: diminished knee distally  Capillary Refill: 3 to 4 seconds  Feet/toes color: hemosiderin staining, hammer toes present  Nails: wnl  R Leg: Edema plus 2 pitting edema in areas, but mostly firm woody nonpitting edema present. Ankle circumference 37 cm. Calf circumference 45 cm.  L Leg: Edema plus 2 pitting edema in areas, but mostly firm woody nonpitting edema present. Ankle circumference 37 cm. Calf circumference 45.5 cm     Mobility: with single pointed cane is independent, slow with altered gait.    Current offloading/footwear: large diabetic shoes  Sensation: peripheral neuropathy and hypersensitivity    Other callusing/areas of concern:   - Right lower leg edema, scratches to the right achilles and right heel.  No open wounds noted.  With discussion and showing pt picture of the scratches, he recalls using back scratcher to the site due to itch and believes that was the  source of the skin damage.    Photo's from today's visit 5/20/25, initial return to the Wound and Ostomy clinic after last visit in 12/2023.    Diet: Regular    Assessment:  Pt has significant edema and hemosiderin staining to the bilateral lower legs and feet.  Both legs have very aidan appearance and intact waxy areas.  The right leg is intact with only the two scratches noted on the posterior heel and achilles area, dry with no concerns noted.  The left leg has scab/hyperkeratotic tissue closed site to the mid pretibial and an open wound that is full thickness to the anterior/medial lower leg.  The open wound is mostly dry with scant amounts of moisture, but when left open to air during assessment today the site did weep consistently.  No signs of infection noted today in the left leg.  Note edema in the left calf is greatly increased since our last visit in clinic in 2023.    Also noting pt is now using a wheeled walker to ambulate and is slower than in the past significantly.    Factors impacting wound healing:   Poor nutrition: inadequate supply of protein, carbohydrates, fatty acids, and trace elements essential for all phases of wound healing.  Did reminder teaching on need for increased protein in diet for healing, review of foods high in protein, meats, dairy products, peanut butter, beans yogurt, also available in protein supplement drinks and snacks.  Delayed healing as part of normal aging process, present  Reduced Blood Supply: inadequate perfusion to heal wound  Medication: None, not currently taking prednisone as of initial return visit 11/15/23.  Chemotherapy: suppresses the immune system and inflammatory response, NA  Radiotherapy: increases production of free radical which damage cells, NA  Psychological stress: none noted  Obesity: decreases tissue perfusion, present  Infection: prolongs inflammatory phase, uses vital nutrients, impairs epithelialization and releases toxins, none  noted.  Underlying Disease: diabetes mellitis, venous insufficiency.   Maceration: reduces wound tensile strength and inhibits epithelial migration, none noted.  Patient compliance, has been compliant in all past encounters for wound and compression care.  Unrelieved pressure, NA  Immobility, limited but not immobile  Substance abuse: NA    Plan:  Today I discussed with the pt options for wound healing.  Fastest way and most commonly successful is to apply multilayered compression wraps to the lower leg and foot with wounds present, changed weekly in clinic.  We have done compression in the past and pt tolerated Medline visco paste Unna's boot wrap with Kerlix and Ace wrap.  Pt is willing to try compression again but would like to try more simple topical wound cares with no compression first.  New plan of care created and taught to the pt today.  Pt is able to manipulated his lower leg to reach the wound on the left medial leg for needed wound cares.  I will see pt again in about 3 weeks to follow up.  Pt will contact the clinic sooner if any new concerns are noted prior.    I will reach out to PCP for new referral to the Wound and Ostomy clinic as list is greater than one year old.    Discussed/Education provided: plan of care with rationale;     Topical care: Cares provided today and recommended ongoing daily between clinic appointments by the pt in his home.  Left pretibial lower leg, scab.    - Open to air.  Left medial lower leg.  - Remove the old bandage, wash in the shower and dry well.  - Using a tongue depressor apply a pea size amount of the Triad paste (yellow tube) to the wound.  - Cover with the Mepilex foam 4x4 inch bandage.  - Change the bandage daily.    Pt is able to perform cares/has been caring for wounds.    Additional recommendations: Keep lower legs elevated as much as possible    The following discharge instructions were reviewed with and sent home with the patient:  See AVS    The following  supplies were sent home with the patient:  Remains of the Triad opened today plus extra Mepilex    Return visit: 6/13/25.    Verbal, written, & demonstrative education provided.  Face to face time: approximately 45 minutes  Procedure: HERNÁN Malhotra RN, CWOCN  [phone]

## 2025-05-20 NOTE — PATIENT INSTRUCTIONS
Today we looked at both of your lower legs.    There are two scratches on the right heel and right lower leg, all dry scabs present.    The left leg has an open wound on the inner leg, we will start a topical treatment for that today as listed below.  If the wound does not want to heal we will have to consider applying the Unna's boot or similar compression as we have done in the past.    For now try to keep your legs elevated to help with the swelling.  Try to get a good amount of protein in your diet which helps with healing.    I will see you again in about 3 weeks on Friday June the 13 th at 10 am.  Any concerns or questions call [phone] and ask to leave a message for me.    Wound cares to the left inner leg wound.  1 Remove the old bandage, wash in the shower and dry well.  2 Using a tongue depressor apply a pea size amount of the Triad paste (yellow tube) to the wound.  3 Cover with the Mepilex foam 4x4 inch bandage.  4 Change the bandage daily.    Reji Malhotra RN cwocn

## 2025-05-22 ENCOUNTER — TELEPHONE (OUTPATIENT)
Dept: INTERNAL MEDICINE | Facility: CLINIC | Age: 89
End: 2025-05-22

## 2025-05-22 DIAGNOSIS — L97.909 VENOUS ULCER (H): Primary | ICD-10-CM

## 2025-05-22 DIAGNOSIS — I83.009 VENOUS ULCER (H): Primary | ICD-10-CM

## 2025-05-22 NOTE — TELEPHONE ENCOUNTER
Can we get a new referral for Carlos to be seen in the Wound and Ostomy clinic for a left lower leg venous ulcer.  He came to see me in clinic on 5/20 but last referral for similar wounds is well over a year old.    Thank you  Reji Malhotra RN cwocn

## 2025-05-31 DIAGNOSIS — E11.8 TYPE 2 DIABETES MELLITUS WITH COMPLICATION, WITHOUT LONG-TERM CURRENT USE OF INSULIN (H): ICD-10-CM

## 2025-06-02 ENCOUNTER — ANTICOAGULATION THERAPY VISIT (OUTPATIENT)
Dept: ANTICOAGULATION | Facility: CLINIC | Age: 89
End: 2025-06-02

## 2025-06-02 ENCOUNTER — THERAPY VISIT (OUTPATIENT)
Dept: PHYSICAL THERAPY | Facility: CLINIC | Age: 89
End: 2025-06-02
Attending: NURSE PRACTITIONER
Payer: COMMERCIAL

## 2025-06-02 ENCOUNTER — LAB (OUTPATIENT)
Dept: LAB | Facility: CLINIC | Age: 89
End: 2025-06-02
Payer: COMMERCIAL

## 2025-06-02 DIAGNOSIS — D68.52 PROTHROMBIN MUTATION: ICD-10-CM

## 2025-06-02 DIAGNOSIS — Z86.718 HISTORY OF DEEP VENOUS THROMBOSIS (DVT) OF DISTAL VEIN OF LEFT LOWER EXTREMITY: ICD-10-CM

## 2025-06-02 DIAGNOSIS — D68.51 FACTOR V LEIDEN MUTATION: Primary | ICD-10-CM

## 2025-06-02 DIAGNOSIS — D68.51 FACTOR V LEIDEN MUTATION: ICD-10-CM

## 2025-06-02 DIAGNOSIS — Z98.1 S/P LUMBAR SPINAL FUSION: ICD-10-CM

## 2025-06-02 DIAGNOSIS — Z79.01 LONG TERM CURRENT USE OF ANTICOAGULANT THERAPY: ICD-10-CM

## 2025-06-02 LAB — INR BLD: 1.6 (ref 0.9–1.1)

## 2025-06-02 PROCEDURE — 85610 PROTHROMBIN TIME: CPT

## 2025-06-02 PROCEDURE — 36416 COLLJ CAPILLARY BLOOD SPEC: CPT

## 2025-06-02 PROCEDURE — 97110 THERAPEUTIC EXERCISES: CPT | Mod: GP

## 2025-06-02 PROCEDURE — 97161 PT EVAL LOW COMPLEX 20 MIN: CPT | Mod: GP

## 2025-06-02 RX ORDER — GLIPIZIDE 5 MG/1
5 TABLET, FILM COATED, EXTENDED RELEASE ORAL DAILY
Qty: 90 TABLET | Refills: 0 | OUTPATIENT
Start: 2025-06-02

## 2025-06-02 NOTE — PROGRESS NOTES
PHYSICAL THERAPY EVALUATION  Type of Visit: Evaluation       Fall Risk Screen:  Have you fallen 2 or more times in the past year?: No  Have you fallen and had an injury in the past year?: No    Subjective      Patient is an 89 year old male presenting to physical therapy with chronic insidious right hip pain of which he reports is aggravated by prolonged standing and walking demands. Patient reports using relative rest for his primary pain relief up to this point in time. Patient reports wishing to participate in physical therapy services for reduced right hip pain with performance of his required upright mobility demands with use of his four wheeled walker. Patient will benefit from skilled physical therapy services and has sufficient social support.         Presenting condition or subjective complaint: lower back and right hip    Date of onset: 04/28/25 (Date of order.)    Relevant medical history: Cancer; Diabetes; Hearing problems; High blood pressure     Past Medical History:   Diagnosis Date    Basal cell carcinoma     Cancer (H)     DVT (deep venous thrombosis) (H) 11/2003    DVT (deep venous thrombosis) (H) 12/2008    DVT (deep venous thrombosis) (H) 10/2010    Factor V Leiden     Gout     Other and unspecified hyperlipidemia     Prostate cancer (H) 2003    prostatectomy     Restless leg syndrome     Sleep apnea     Unspecified essential hypertension         Dates & types of surgery:      Past Surgical History:   Procedure Laterality Date    APPENDECTOMY  1950    FUSION TRANSFORAMINAL LUMBAR INTERBODY, 1 LEVEL, POSTERIOR APPROACH, USING OTS SURG IMAGING GUIDANCE Bilateral 5/28/2024    Procedure: Lumbar 4 to Lumbar 5 Transforaminal Interbody Fusion with decompression of stenosis;  Surgeon: Cj Cleary MD;  Location: [facility] OR    JOINT REPLACEMENT  4/2011    R hip    LAMINECTOMY LUMBAR ONE LEVEL  12/2008    LAMINECTOMY LUMBAR ONE LEVEL Bilateral 5/28/2024    Procedure: Lumbar 2 to Lumbar 3 bilateral  laminectomy for decompression of stenosis;  Surgeon: Cj Cleary MD;  Location: [facility] OR    LAPAROSCOPIC HERNIORRHAPHY UMBILICAL N/A 1/3/2019    Procedure: laparoscopic umbilical hernia repair with mesh;  Surgeon: Jamal Thompson DO;  Location:  OR    [facility] REVISE TOTAL HIP REPLACEMENT  13    R hip       Prior diagnostic imaging/testing results: MRI; CT scan; X-ray; EMG       - See electronic medical record for relevant spine and hip imaging studies.     Prior therapy history for the same diagnosis, illness or injury: Yes cannot recal    Prior Level of Function  Transfers: Assistive equipment, 4 wheeled walker  Ambulation: Assistive equipment, 4 wheeled walker  ADL: Assistive equipment, 4 wheeled walker  IADL: Driving, Finances, Housekeeping, Laundry, Meal preparation, Medication management    Living Environment  Social support: With a significant other or spouse   Type of home: [facility]; 1 level   Stairs to enter the home: No       Ramp: No   Stairs inside the home: No       Help at home:   None listed.   Equipment owned: Straight Cane; Four-point cane; Walker; Walker with wheels     Employment: No    Hobbies/Interests: woodwork wood carving  repairs    Patient goals for therapy: golf    Pain assessment: Pain present  Location: Right hip pain/Ratin-4/10  See objective evaluation for additional pain details     Objective   HIP EVALUATION  PAIN: Pain Level at Rest: 2/10  Pain Level with Use: 4/10  Pain Location: hip  Pain Quality: Sharp  Pain Frequency: constant or daily  Pain is Exacerbated By: Walking, standing.  Pain is Relieved By: rest and    INTEGUMENTARY (edema, incisions): BLE lower leg open wounds, lateral RLE lower leg and medial LLE lower leg; Has consult for would care from his PCP at this time.   POSTURE: Sitting Posture: Rounded shoulders, Forward head, Thoracic kyphosis increased  GAIT:   Weightbearing Status: FWB for BLE's  Assistive Device(s): Walker (four wheeled)  Gait  Deviations: Antalgic  Base of support decreased  Stride length decreased  Tayler decreased  BALANCE/PROPRIOCEPTION: Significant balance deficits from chronic neuropathy in his BLE's and significant history of falls.   WEIGHTBEARING ALIGNMENT: WFL  NON-WEIGHTBEARING ALIGNMENT: WFL   ROM: WFL's for BLE's  PELVIC/SI SCREEN: Not assessed today.   STRENGTH: 4/5 for seated BLE MMT's without abnormal RLE hip pain exacerbation.   LE FLEXIBILITY:   SPECIAL TESTS: Not assessed due to s/p RLE hip RAMANA status.   FUNCTIONAL TESTS:   PALPATION: TTP along right greater trochanter and right PSIS with very firm palpation.   JOINT MOBILITY: Not assessed today.  SENSATION: Seated BLE light touch sensation testing is WNL's except for mild hyposensation due to mild edema along RLE at today's session.     Assessment & Plan   CLINICAL IMPRESSIONS  Medical Diagnosis: S/P lumbar spinal fusion (Z98.1)    Treatment Diagnosis: S/P lumbar spinal fusion (Z98.1)   Impression/Assessment: Patient is a 89 year old male with right hip pain complaints.  The following significant findings have been identified: Pain, Decreased ROM/flexibility, Decreased joint mobility, Decreased strength, Impaired balance, Decreased proprioception, Impaired sensation, Edema, Impaired gait, Impaired muscle performance, Decreased activity tolerance, Impaired posture, and Instability. These impairments interfere with their ability to perform recreational activities, household chores, and household mobility as compared to previous level of function.     Clinical Decision Making (Complexity):  Clinical Presentation: Stable/Uncomplicated  Clinical Presentation Rationale: based on medical and personal factors listed in PT evaluation  Clinical Decision Making (Complexity): Low complexity    PLAN OF CARE  Treatment Interventions:  Modalities: Cryotherapy, Hot Pack  Interventions: Gait Training, Manual Therapy, Neuromuscular Re-education, Therapeutic Activity, Therapeutic  Exercise    Long Term Goals     PT Goal 1  Goal Identifier: Home Exercise Programs  Goal Description: Patient will demonstrate proper performance and good adherence to his HEP's for 10 weeks for 5 of 7 days per week to demonstrate improved long term independence with management of his right hip pain.  Rationale: to maximize safety and independence with performance of ADLs and functional tasks;to maximize safety and independence within the home;to maximize safety and independence within the community  Goal Progress: Patient reports understanding of instructed upon initial HEP.  Target Date: 08/11/25  PT Goal 2  Goal Identifier: Hip Outcome Score  Goal Description: Patient will improve his initial HOS assessment score by 10 points or greater to demonstrate reduced restriction of his right hip pain with tolerable performance of his required upright functional mobility demands.  Rationale: to maximize safety and independence with performance of ADLs and functional tasks;to maximize safety and independence within the home;to maximize safety and independence within the community  Goal Progress: To be assessed at next treatment session.  Target Date: 08/11/25  PT Goal 3  Goal Identifier: Ambulation  Goal Description: Patient will ambulate for 300 feet with 2/10 or less right hip pain to demonstrate improved tolerance for performance of his required daily physical activity demands.  Rationale: to maximize safety and independence within the home;to maximize safety and independence within the community  Goal Progress: See initial gait assessment in initial evaluation note.  Target Date: 08/11/25      Frequency of Treatment: 1 visit per week  Duration of Treatment: 10 weeks    Recommended Referrals to Other Professionals: Not at this time, is in communication with medical provider for ongoing lumbar spine and right hip pain cares at this time.   Education Assessment:   Learner/Method: Patient;Listening;Demonstration;No  Barriers to Learning  Education Comments: Patient reports understanding of his future therapeutic progression.    Risks and benefits of evaluation/treatment have been explained.   Patient/Family/caregiver agrees with Plan of Care.     Evaluation Time:     PT Eval, Low Complexity Minutes (55795): 30   Present: Not applicable     Signing Clinician:     Randall Cerna PT, DPT    [facility]  O: [phone]  E: [email or web address]                                                                                   OUTPATIENT PHYSICAL THERAPY      PLAN OF TREATMENT FOR OUTPATIENT REHABILITATION   Patient's Last Name, First Name, Robert Camp    YOB: 1935   Provider's Name   [facility]   Medical Record No.  [id number]     Onset Date: 04/28/25 (Date of order.)  Start of Care Date: 06/02/25     Medical Diagnosis:  S/P lumbar spinal fusion (Z98.1)      PT Treatment Diagnosis:  S/P lumbar spinal fusion (Z98.1) Plan of Treatment  Frequency/Duration: 1 visit per week/ 10 weeks    Certification date from 06/02/25 to 08/11/25         See note for plan of treatment details and functional goals     Randall Cerna PT, DPT    [facility]  O: [phone]  E: [email or web address]                         I CERTIFY THE NEED FOR THESE SERVICES FURNISHED UNDER        THIS PLAN OF TREATMENT AND WHILE UNDER MY CARE .    Physician Signature               Date    X_____________________________________________________                Referring Provider:  Radha Lozano NP    Initial Assessment  See Epic Evaluation- Start of Care Date: 06/02/25

## 2025-06-02 NOTE — PROGRESS NOTES
ANTICOAGULATION MANAGEMENT     Robert Richard 89 year old male is on warfarin with subtherapeutic INR result. (Goal INR 2.0-3.0)    Recent labs: (last 7 days)     06/02/25  1231   INR 1.6*       ASSESSMENT     Source(s): Chart Review and Patient/Caregiver Call     Warfarin doses taken: Missed dose(s) may be affecting INR  Diet: No new diet changes identified  Medication/supplement changes: None noted  New illness, injury, or hospitalization: No  Signs or symptoms of bleeding or clotting: No  Previous result: Subtherapeutic  Additional findings: None       PLAN     Recommended plan for temporary change(s) affecting INR     Dosing Instructions: booster dose then continue your current warfarin dose with next INR in 2 weeks       Summary  As of 6/2/2025      Full warfarin instructions:  6/2: 10 mg; Otherwise 7.5 mg every Mon; 5 mg all other days   Next INR check:  6/16/2025               Telephone call with Carlos who verbalizes understanding and agrees to plan    Lab visit scheduled    Education provided: Please call back if any changes to your diet, medications or how you've been taking warfarin    Plan made per [facility] anticoagulation protocol    Regina Luo RN  6/2/2025  Anticoagulation Clinic  APS for routing messages: mirlande WALLACE  [facility] patient phone line: [phone]        _______________________________________________________________________     Anticoagulation Episode Summary       Current INR goal:  2.0-3.0   TTR:  42.1% (11.9 mo)   Target end date:  Indefinite   Send INR reminders to:  AVEL WALLACE    Indications    Factor V Leiden mutation [D68.51]  Venous thrombosis (Resolved) [I82.90]  Prothrombin mutation [D68.52]  Long term current use of anticoagulant therapy [Z79.01]  History of deep venous thrombosis (DVT) of distal vein of left lower extremity [Z86.718]             Comments:  --             Anticoagulation Care Providers       Provider Role Specialty Phone number    Godfrey  Stiven WILLS MD [facility] Internal Medicine [phone]    Nate Cifuentes DO [facility] Internal Medicine [phone]             no

## 2025-06-09 ENCOUNTER — TELEPHONE (OUTPATIENT)
Dept: INTERNAL MEDICINE | Facility: CLINIC | Age: 89
End: 2025-06-09
Payer: COMMERCIAL

## 2025-06-09 NOTE — TELEPHONE ENCOUNTER
Reason for Call:  Appointment Request    Patient requesting this type of appt:  new injury fall - pt can barely walk     Requested provider: Stiven Donahue    Reason patient unable to be scheduled: Not within requested timeframe    When does patient want to be seen/preferred time: ASAP    Comments: Pt had a fall - nothing visible. Bruise on same hip that pt had replaced several years ago. Pt fell on Saturday June 7th. - fell in the yard at home.     Could we send this information to you in [facility] or would you prefer to receive a phone call?:   Patient would prefer a phone call   Okay to leave a detailed message?: Yes at Other phone number:  [phone] - spouse phone Opal    Call taken on 6/9/2025 at 5:58 PM by Beto Willard

## 2025-06-10 ENCOUNTER — DOCUMENTATION ONLY (OUTPATIENT)
Dept: ANTICOAGULATION | Facility: CLINIC | Age: 89
End: 2025-06-10

## 2025-06-10 ENCOUNTER — HOSPITAL ENCOUNTER (OUTPATIENT)
Dept: GENERAL RADIOLOGY | Facility: CLINIC | Age: 89
Discharge: HOME | End: 2025-06-10
Attending: INTERNAL MEDICINE
Payer: COMMERCIAL

## 2025-06-10 ENCOUNTER — RESULTS FOLLOW-UP (OUTPATIENT)
Dept: FAMILY MEDICINE | Facility: CLINIC | Age: 89
End: 2025-06-10

## 2025-06-10 ENCOUNTER — OFFICE VISIT (OUTPATIENT)
Dept: INTERNAL MEDICINE | Facility: CLINIC | Age: 89
End: 2025-06-10
Payer: COMMERCIAL

## 2025-06-10 VITALS
HEART RATE: 77 BPM | RESPIRATION RATE: 19 BRPM | DIASTOLIC BLOOD PRESSURE: 78 MMHG | WEIGHT: 248.1 LBS | OXYGEN SATURATION: 97 % | SYSTOLIC BLOOD PRESSURE: 135 MMHG | BODY MASS INDEX: 34.73 KG/M2 | HEIGHT: 71 IN | TEMPERATURE: 97.2 F

## 2025-06-10 DIAGNOSIS — N18.32 STAGE 3B CHRONIC KIDNEY DISEASE (H): ICD-10-CM

## 2025-06-10 DIAGNOSIS — R05.2 SUBACUTE COUGH: ICD-10-CM

## 2025-06-10 DIAGNOSIS — E66.812 CLASS 2 SEVERE OBESITY WITH BODY MASS INDEX (BMI) OF 35 TO 39.9 WITH SERIOUS COMORBIDITY (H): ICD-10-CM

## 2025-06-10 DIAGNOSIS — H02.402 PTOSIS OF LEFT EYELID: ICD-10-CM

## 2025-06-10 DIAGNOSIS — E66.01 CLASS 2 SEVERE OBESITY WITH BODY MASS INDEX (BMI) OF 35 TO 39.9 WITH SERIOUS COMORBIDITY (H): ICD-10-CM

## 2025-06-10 DIAGNOSIS — M25.551 HIP PAIN, RIGHT: ICD-10-CM
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
## 2025-06-10 DIAGNOSIS — F51.01 PRIMARY INSOMNIA: ICD-10-CM

## 2025-06-10 DIAGNOSIS — R05.2 SUBACUTE COUGH: Primary | ICD-10-CM

## 2025-06-10 DIAGNOSIS — Z98.890 H/O LUMBOSACRAL SPINE SURGERY: ICD-10-CM

## 2025-06-10 PROCEDURE — 71046 X-RAY EXAM CHEST 2 VIEWS: CPT

## 2025-06-10 PROCEDURE — 73502 X-RAY EXAM HIP UNI 2-3 VIEWS: CPT

## 2025-06-10 RX ORDER — AZITHROMYCIN 250 MG/1
TABLET, FILM COATED ORAL
Qty: 6 TABLET | Refills: 0 | Status: SHIPPED | OUTPATIENT
Start: 2025-06-10 | End: 2025-06-21

## 2025-06-10 RX ORDER — TRAZODONE HYDROCHLORIDE 50 MG/1
50 TABLET ORAL AT BEDTIME
Qty: 30 TABLET | Refills: 3 | Status: SHIPPED | OUTPATIENT
Start: 2025-06-10

## 2025-06-10 ASSESSMENT — PAIN SCALES - GENERAL: PAINLEVEL_OUTOF10: SEVERE PAIN (10)

## 2025-06-10 NOTE — PROGRESS NOTES
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  We did work him in for a fall that happened 3 days ago.  He got knocked down by the dog and landed on his right side has had some history of lumbar sacral spinal surgery in the past but now having more right hip pain.  He normally uses a walker and is needing a walker to walk today cannot use just a cane.  He can put some weight on the right side but is limited.  He does not have pain with internal/external rotation of the hip but is pain inside the hip on the lateral edge of the hip no back pain today.  He has had a previous hip replacement surgery in 2011 with revision in 2012 and 13.  We will get an x-ray for further evaluation of this.  He can use Tylenol for his pain relief.    Due to his CKD stage 3b do not want nsaids for pain, has to only use tylenol.  Stay hydrated.     He also complains about insomnia not getting much sleep at night we talked about the negative effects of Tylenol PM and instead we will try him on some trazodone to take daily.    Ptosis of his left eyelid he is working with an eye surgeon and has had Botox which seemed to make it worse he will follow-up with them again in July.    He also talks about COVID in the  "winter when he went to urgent care and a possible pneumonia continues to have a cough.  His lungs are clear on exam he is encouraged to use the albuterol inhaler if he feels short of breath.  I will check a chest x-ray to reevaluate for any infiltrate that is continue to be present.           BMI  Estimated body mass index is 34.6 kg/m  as calculated from the following:    Height as of this encounter: 1.803 m (5' 11\").    Weight as of this encounter: 112.5 kg (248 lb 1.6 oz).           Subjective   Carlos is a 89 year old, presenting for the following health issues:  Follow Up      6/10/2025     1:47 PM   Additional Questions   Roomed by Bre     History of Present Illness       Reason for visit:  Follow up   He is taking medications regularly.      3 days ago, dog jumped on him and fell down backwards hit his right side on a chair. Got up with some help.     Then day later got muscle aches and pains. Yesterday got pain in the right groin, and hard to step on the leg. Slightly better today.     Right hip surgery back in 2011, replaced in 2012 and repeat surgery in 2013.     Ulcer on left leg being cared for by wound clinic.    Takes tylenol and tylenol PM for pain. Has oxycodone he could use if needed.     Has PT on Monday     Botox in the left eyelid was working for flutter, now has dropped the eyelid, gets from [facility]. Will go back there.     Cough had covid this winter at urgent care,  has an inhaler, can try it again.       Objective    /78 (BP Location: Left arm, Patient Position: Sitting, Cuff Size: Adult Regular)   Pulse 77   Temp 97.2  F (36.2  C) (Temporal)   Resp 19   Ht 1.803 m (5' 11\")   Wt 112.5 kg (248 lb 1.6 oz)   SpO2 97%   BMI 34.60 kg/m    Body mass index is 34.6 kg/m .  Physical Exam   NAD   Gait with walker,   Difficult to weight bear, has ok internal and external rotation.       Lungs are clear today     Signed Electronically by: Stiven Donahue MD  "

## 2025-06-10 NOTE — PROGRESS NOTES
"ANTICOAGULATION  MANAGEMENT     Interacting Medication Review    Interacting medication(s): Azithromycin (Zithromax, Z-tabitha) with warfarin.    Duration: 5 days  Indication: lung infection    New medication?: Yes, interaction may increase INR and risk of bleeding. Closer INR monitoring recommended.        PLAN     Continue your current warfarin dose with next INR in 5 days            Patient was not contacted has apt in 5 days     New recheck date updated on anticoagulation calendar     [facility] priority set/moved to \"high\" for new major drug interaction    Plan made per [facility] anticoagulation protocol    Regina Luo RN  6/10/2025  Anticoagulation Clinic  [facility] for routing messages: mirlande VALLECILLO [facility] patient phone line: [phone]    "

## 2025-06-13 ENCOUNTER — RESULTS FOLLOW-UP (OUTPATIENT)
Dept: ANTICOAGULATION | Facility: CLINIC | Age: 89
End: 2025-06-13

## 2025-06-16 ENCOUNTER — RESULTS FOLLOW-UP (OUTPATIENT)
Dept: ANTICOAGULATION | Facility: CLINIC | Age: 89
End: 2025-06-16

## 2025-06-16 ENCOUNTER — ANTICOAGULATION THERAPY VISIT (OUTPATIENT)
Dept: ANTICOAGULATION | Facility: CLINIC | Age: 89
End: 2025-06-16

## 2025-06-16 ENCOUNTER — LAB (OUTPATIENT)
Dept: LAB | Facility: CLINIC | Age: 89
End: 2025-06-16
Payer: COMMERCIAL

## 2025-06-16 DIAGNOSIS — Z79.01 LONG TERM CURRENT USE OF ANTICOAGULANT THERAPY: ICD-10-CM

## 2025-06-16 DIAGNOSIS — D68.52 PROTHROMBIN MUTATION: ICD-10-CM

## 2025-06-16 DIAGNOSIS — D68.51 FACTOR V LEIDEN MUTATION: ICD-10-CM

## 2025-06-16 DIAGNOSIS — Z86.718 HISTORY OF DEEP VENOUS THROMBOSIS (DVT) OF DISTAL VEIN OF LEFT LOWER EXTREMITY: ICD-10-CM

## 2025-06-16 DIAGNOSIS — D68.51 FACTOR V LEIDEN MUTATION: Primary | ICD-10-CM

## 2025-06-16 LAB — INR BLD: 1.9 (ref 0.9–1.1)

## 2025-06-16 PROCEDURE — 36416 COLLJ CAPILLARY BLOOD SPEC: CPT

## 2025-06-16 PROCEDURE — 85610 PROTHROMBIN TIME: CPT

## 2025-06-16 NOTE — PROGRESS NOTES
ANTICOAGULATION MANAGEMENT     Robert Richard 89 year old male is on warfarin with therapeutic INR result. (Goal INR 2.0-3.0)    Recent labs: (last 7 days)     06/16/25  1033   INR 1.9*       ASSESSMENT     Source(s): Chart Review and Patient/Caregiver Call     Warfarin doses taken: Booster dose(s) recently taken which may be affecting INR - due to missed dose last week  Diet: No new diet changes identified  Medication/supplement changes: None noted  New illness, injury, or hospitalization: No  Signs or symptoms of bleeding or clotting: No  Previous result: Subtherapeutic  Additional findings: None       PLAN     Recommended plan for temporary change(s) affecting INR     Dosing Instructions: Continue your current warfarin dose with next INR in 1 week       Summary  As of 6/16/2025      Full warfarin instructions:  7.5 mg every Mon; 5 mg all other days   Next INR check:  6/23/2025               Telephone call with Carlos who verbalizes understanding and agrees to plan and who agrees to plan and repeated back plan correctly    Lab visit scheduled    Education provided: Contact [phone] with any changes, questions or concerns.     Plan made per [facility] anticoagulation protocol    Chantell Boston RN  6/16/2025  Anticoagulation Clinic  [employer] for routing messages: mirlande WALLACE  [facility] patient phone line: [phone]        _______________________________________________________________________     Anticoagulation Episode Summary       Current INR goal:  2.0-3.0   TTR:  39.5% (1 y)   Target end date:  Indefinite   Send INR reminders to:  AVEL WALLACE    Indications    Factor V Leiden mutation [D68.51]  Venous thrombosis (Resolved) [I82.90]  Prothrombin mutation [D68.52]  Long term current use of anticoagulant therapy [Z79.01]  History of deep venous thrombosis (DVT) of distal vein of left lower extremity [Z86.718]             Comments:  --             Anticoagulation Care Providers       Provider Role  Specialty Phone number    Stiven Donahue MD Referring Internal Medicine [phone]    Nate Cifuentes DO [facility] Internal Medicine [phone]

## 2025-06-19 ENCOUNTER — LAB (OUTPATIENT)
Dept: LAB | Facility: CLINIC | Age: 89
End: 2025-06-19
Payer: COMMERCIAL

## 2025-06-19 ENCOUNTER — RESULTS FOLLOW-UP (OUTPATIENT)
Dept: ANTICOAGULATION | Facility: CLINIC | Age: 89
End: 2025-06-19

## 2025-06-19 ENCOUNTER — ANTICOAGULATION THERAPY VISIT (OUTPATIENT)
Dept: ANTICOAGULATION | Facility: CLINIC | Age: 89
End: 2025-06-19

## 2025-06-19 ENCOUNTER — HOSPITAL ENCOUNTER (OUTPATIENT)
Dept: WOUND CARE | Facility: CLINIC | Age: 89
Discharge: HOME | End: 2025-06-19
Attending: INTERNAL MEDICINE | Admitting: INTERNAL MEDICINE
Payer: COMMERCIAL

## 2025-06-19 DIAGNOSIS — D68.52 PROTHROMBIN MUTATION: ICD-10-CM

## 2025-06-19 DIAGNOSIS — D68.51 FACTOR V LEIDEN MUTATION: Primary | ICD-10-CM

## 2025-06-19 DIAGNOSIS — Z79.01 LONG TERM CURRENT USE OF ANTICOAGULANT THERAPY: ICD-10-CM

## 2025-06-19 DIAGNOSIS — Z86.718 HISTORY OF DEEP VENOUS THROMBOSIS (DVT) OF DISTAL VEIN OF LEFT LOWER EXTREMITY: ICD-10-CM

## 2025-06-19 DIAGNOSIS — D68.51 FACTOR V LEIDEN MUTATION: ICD-10-CM

## 2025-06-19 LAB — INR BLD: 3.5 (ref 0.9–1.1)

## 2025-06-19 PROCEDURE — G0463 HOSPITAL OUTPT CLINIC VISIT: HCPCS

## 2025-06-19 NOTE — PATIENT INSTRUCTIONS
Today the right and left leg wounds all are improved.  I think we would be best to apply the Unna's boot again but with the hot upcoming weekend I can understand your desire not to be in the boots.    We will change the plan of care to a plan for you with families assist to complete once daily.    I will see you again in clinic again next week on Monday at 9 am, just before your lab appointment.    New wound cares.  1 Cleanse the wounds with soap and water and dry well.  2 For the Right leg,   - Cut 1/2 a 4x4 inch pad of the Silvercel silver felt like material, place it on top of the raw tissue on the right outer leg.  - Cover the entire area including the Silvercel with one of the Mextra Superabsorbent pads.  - Secure the bandages with the 4 inch Stretch gauze roll and tape.  - Change the bandage once a day.  3 For the two wounds on the Left leg.  - Cut circular pieces of the Silvercel about the size and shape of the wounds and place over the wounds.  - Cover both with one of the Mepilex 4x4 inch gentle adhesive foam dressings.  - Change the bandages once a day.    Reji Malhotra RN cwocn  [phone]

## 2025-06-19 NOTE — PROGRESS NOTES
[facility] Wound Clinic [facility]     Start of Care in [facility] Wound Clinic: 5/20/25  Referring Provider: Godfrey Saleem MD dated 5/22/25.  Primary Care Provider: Stiven Donahue   Wound Location: Left lower leg, pretibial and medial.  New wound site to the right lateral posterior lower leg and left lateral lower leg 6/13.    Wound Clinic Visit:  Scheduled follow up     Reason for Visit:  Follow up on left and right leg wounds.     Subjective:  On arrival today alone, 6/19/25, the pt reports the following:  Was able to tolerate the bilateral LE Coflex Unna's boots applied in clinic 6/13.  Itched but no pain.       Wound History:   Pt well known to the Wound and Ostomy clinic here at [facility] from past visits.  Seen - October into November of 2019 for left medial leg venous ulcer, June - August 2021 for right lower leg venous ulcer, and November into December 2023 for left medial ankle venous ulcers.  During those visits the wounds were primaryily treated with compression with standard Medline Unna's boot visco paste wraps and ace wraps, and past wounds healed in time.  - Past LE venous Medical History also significant for ablation procedures with Dr Gio NUNN on the left in 2021.    - Pt reported at his 5/20 initial return to the Wound and Ostomy clinic that the past wounds all healed and remained healed.  A few weeks ago he developed a new wound to the left shin and then soon after another to the left inner calf.  The shin wound dried up and scabbed over but the inner left calf wound remains open.  Pt also notes some pain to his right heel, near the back achilles area but has not noticed any drainage.  - 6/13, new wounds noted: Right lateral posterior and Left lateral, preexisting wound to the left medial lower leg remains.  Use of CoFlex Unna's boot's bilaterally, stopped after first application per pt request.    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)    Stage 3 chronic kidney disease, unspecified whether stage 3a or 3b CKD (H)    Asymmetrical sensorineural hearing loss    Hemifacial spasm    Subjective tinnitus    Venous stasis dermatitis of right lower extremity    S/P lumbar fusion    Spondylolisthesis of lumbar region    Generalized muscle weakness    Anemia, unspecified type    History of deep venous thrombosis (DVT) of distal vein of left lower extremity    Urine retention    Stage 3b chronic kidney disease (H)      Tobacco Use:     Tobacco Use      Smoking status: Never      Smokeless tobacco: Never     Diabetic: Type 2  HgbA1C:   Hemoglobin A1C   Date Value Ref Range Status   01/07/2025 8.2 (H) <5.7 % Final     Comment:     Normal <5.7%   Prediabetes 5.7-6.4%    Diabetes 6.5% or higher     Note: Adopted from ADA consensus guidelines.   06/15/2021 7.2 (H) 0 - 5.6 % Final     Comment:     Normal <5.7% Prediabetes 5.7-6.4%  Diabetes 6.5% or higher - adopted from ADA   consensus guidelines.     Checks Blood Glucose?:  Pt does not check blood glucose levels in the home.    Personal/social history:  Pt lives alone independently with no home care services.    Objective:   Current treatment plan: Cares performed at last clinic.    Left and Right lower legs and feet:  - Cleansed with soap and water, dried with gauze, rinsed the wound sites again with saline.  - Applied cut to fit piece of Silvercel to the left medial lower leg wound.  - Applied CoFlex Unna's boots bilaterally to the  lower legs and feet with pink foam and Coban layers.  - To be changed once weekly  Last changed: 6/13 applied in Wound and Ostomy clinic.     Wound #1 Left lower leg, wound consistent with venous stasis ulcers.  Stage/tissue depth: Medial and lateral lower leg ulcers are full thickness.  - Medial lower leg 1.9 cm L x 1.4 cm W x 0.2 cm D, wound bed is approximately 60 % granular tissue and 40 % clean moist red tissue with no granular buds present, moderate amounts serous drainage.  - New site noted 6/13, Lateral lower leg 2.5 cm L x 2 cm W x 0.1 cm D, wound bed is is a mix of partially denuded tissue surrounding the original distinct open wound which wound bed today is approximately 40 % granular buts and 60 % slough, moderate amounts serosanguinous drainage.  Tunneling: none  Undermining: none  Wound bed type/amount: As above for each site; Not fluctuant  Wound Edges: open at both the medial and lateral wounds.  Periwound: firm edema with some pitting, wide spread hemosiderin staining and varicose veins  Drainage: as above for each site  Odor: no  Pain: small amount of pain with direct cares to the anterior/medial lower leg wound    Photo's from today's visit 6/19/25.  L - R medial lower leg, lateral lower leg.  Unna's boot placed on hole per pt request.        Photo's from 6/13/25.  Started use of CoFlex Unna's boot's to the bilateral lower legs and feet.  L - R medial, lateral, anterior, foot.      Photo's from 5/20/25, initial return to the Wound and Ostomy clinic after last visit in 12/2023.    Wound #2 Right lower leg, wound consistent with venous stasis ulcers.  Newly noted 6/13/25.  Stage/tissue depth: partial thickness  15 cm L x 7 cm W x 0.1 cm D, located on the lateral/posterior lower leg.  Tunneling: none noted  Undermining: none noted  Wound bed type/amount: of the total area approximately 30 % denuded dermal tissue red with no granular buds present, 30 % intact aidan skin and 40 % waxy yellow skin with  small amounts of moisture and weeping; Not fluctuant  Wound Edges: open where there is depth  Periwound: edema, hemosiderin staing  Drainage: Large amounts serous and seropurulent  Odor: no  Pain: denied pain with cares today.    Photo's from today's visit 6/19/25.  Unna's boot placed on hold per pt request.      Photo's from 6/13/25.  Started use of CoFlex Unna's boot's to the bilateral lower legs and feet.    Dorsalis Pedal Pulse: weak but palpable: NA doppler: NA phasic  Posterior Tibial Pulse: unable to palpate: NA doppler: NA phasic  Hair growth: diminished knee distally  Capillary Refill: 3 to 4 seconds  Feet/toes color: hemosiderin staining, hammer toes present  Nails: wnl  R Leg: Edema plus 2 pitting edema in areas, but mostly firm woody nonpitting edema present. Ankle circumference 37.5 cm. Calf circumference 46 cm.  L Leg: Edema plus 2 pitting edema in areas, but mostly firm woody nonpitting edema present. Ankle circumference 37 cm. Calf circumference 44 cm     Mobility: with single pointed cane is independent, slow with altered gait.    Current offloading/footwear: large diabetic shoes  Sensation: peripheral neuropathy and hypersensitivity    Other callusing/areas of concern:   None currently    Diet: Regular    Assessment:  The Right and left leg CoFlex Unna's boots in place on arrival are dry on the surface, when removed moderate to large amounts of drainage noted from the right lateral site, moderate amounts to the two sites on the right leg.  The left leg medial and lateral wounds are larger this visit, more drainage, no odor.    The right leg wounds had some odor noted prior to washing with soap and water.  The site is more moist in general, larger amounts of drainage, waxy yellowish tissue very moist today and weeping serous drainage itself (was mostly dry last visit).  Edema in the lower legs did not reduce much over the last 6 days with the CoFlex compression in place.    Factors impacting wound  healing:   Poor nutrition: inadequate supply of protein, carbohydrates, fatty acids, and trace elements essential for all phases of wound healing.  Did reminder teaching on need for increased protein in diet for healing at initial return visit, reviewed foods high in protein, meats, dairy products, peanut butter, beans yogurt, also available in protein supplement drinks and snacks.  Reminder on protein importance at each follow up appointment.    Delayed healing as part of normal aging process, present  Reduced Blood Supply: inadequate perfusion to heal wound  Medication: None, not currently taking prednisone as of initial return visit 11/15/23.  Chemotherapy: suppresses the immune system and inflammatory response, NA  Radiotherapy: increases production of free radical which damage cells, NA  Psychological stress: none noted  Obesity: decreases tissue perfusion, present  Infection: prolongs inflammatory phase, uses vital nutrients, impairs epithelialization and releases toxins, none noted, use of silver alginate wound sites  Underlying Disease: diabetes mellitis, venous insufficiency.   Maceration: reduces wound tensile strength and inhibits epithelial migration, none noted.  Patient compliance, has been compliant in all past encounters for wound and compression care.  Unrelieved pressure, NA  Immobility, limited but not immobile  Substance abuse: NA    Plan:  Today with the upcoming weekend suspected to be very hot and humid, we discussed wound options  I think it would be best to apply the Unna's boot again bilaterally.  Pt respectfully requested other options so as not to be covered foot to knee.  Pt reports he has family willing and able to assist with daily cares and we started the below wound cares today.  Pt scheduled to return early next week for follow up.    We will change the plan of care to a plan for you with families assist to complete once daily.    I will see you again in clinic again next week on  Monday at 9 am, just before your lab appointment.    Discussed/Education provided: plan of care with rationale;     Topical care: Cares provided today and recommended ongoing daily between clinic appointments by the pt in his home.  Wound cares to the Right and Left lower legs.   1 Cleanse the wounds with soap and water and dry well.  2 For the Right leg,   - Cut 1/2 a 4x4 inch pad of the Silvercel silver felt like material, place it on top of the raw tissue on the right outer leg.  - Cover the entire area including the Silvercel with one of the Mextra Superabsorbent pads.  - Secure the bandages with the 4 inch Stretch gauze roll and tape.  - Change the bandage once a day.  3 For the two wounds on the Left leg.  - Cut circular pieces of the Silvercel about the size and shape of the wounds and place over the wounds.  - Cover both with one of the Mepilex 4x4 inch gentle adhesive foam dressings.  - Change the bandages once a day.    Pt is able to perform wound cares to the wounds he can visualize, he can not visualize the right lateral lower leg wound area.  Pt has family willing and able to perform the daily wound cares.    Additional recommendations: Keep lower legs elevated as much as possible    The following discharge instructions were reviewed with and sent home with the patient:  See AVS    The following supplies were sent home with the patient:  Extra wound cares supplies    Return visit: 6/23/25.    Verbal, written, & demonstrative education provided.  Face to face time: approximately 45 minutes  Procedure: HERNÁN Malhotra RN, CWOCN  [phone]

## 2025-06-19 NOTE — PROGRESS NOTES
ANTICOAGULATION MANAGEMENT     Robert Richard 89 year old male is on warfarin with supratherapeutic INR result. (Goal INR 2.0-3.0)    Recent labs: (last 7 days)     06/19/25  0935   INR 3.5*       ASSESSMENT     Source(s): Chart Review  Previous INR was Subtherapeutic Booster dose given 6/13/25  Medication, diet, health changes since last INR: chart reviewed; none identified         PLAN     Recommended plan for temporary change(s) affecting INR     Dosing Instructions: Continue your current warfarin dose with next INR in 1 week       Summary  As of 6/19/2025      Full warfarin instructions:  7.5 mg every Mon; 5 mg all other days   Next INR check:  6/26/2025               Detailed voice message left for Carlos with dosing instructions and follow up date.     Contact [phone] to schedule and with any changes, questions or concerns.     Education provided: Please call back if any changes to your diet, medications or how you've been taking warfarin    Plan made per [facility] anticoagulation protocol    Neelima Arreaga RN  6/19/2025  Anticoagulation Clinic  [employer] for routing messages: mirlande WALLACE  [facility] patient phone line: [phone]        _______________________________________________________________________     Anticoagulation Episode Summary       Current INR goal:  2.0-3.0   TTR:  39.2% (1 y)   Target end date:  Indefinite   Send INR reminders to:  AVEL Luz    Factor V Leiden mutation [D68.51]  Venous thrombosis (Resolved) [I82.90]  Prothrombin mutation [D68.52]  Long term current use of anticoagulant therapy [Z79.01]  History of deep venous thrombosis (DVT) of distal vein of left lower extremity [Z86.718]             Comments:  --             Anticoagulation Care Providers       Provider Role Specialty Phone number    Stiven Donahue MD Referring Internal Medicine [phone]    Nate Cifuentes DO Responsible Internal Medicine [phone]

## 2025-06-21 ENCOUNTER — HOSPITAL ENCOUNTER (EMERGENCY)
Facility: CLINIC | Age: 89
Discharge: ANOTHER HOSPITAL | End: 2025-06-21
Attending: FAMILY MEDICINE | Admitting: FAMILY MEDICINE
Payer: COMMERCIAL

## 2025-06-21 ENCOUNTER — APPOINTMENT (OUTPATIENT)
Dept: CT IMAGING | Facility: CLINIC | Age: 89
End: 2025-06-21
Attending: FAMILY MEDICINE
Payer: COMMERCIAL

## 2025-06-21 VITALS
OXYGEN SATURATION: 97 % | HEART RATE: 65 BPM | DIASTOLIC BLOOD PRESSURE: 78 MMHG | SYSTOLIC BLOOD PRESSURE: 149 MMHG | TEMPERATURE: 96.9 F | RESPIRATION RATE: 18 BRPM

## 2025-06-21 DIAGNOSIS — S09.90XA CLOSED HEAD INJURY, INITIAL ENCOUNTER: ICD-10-CM

## 2025-06-21 DIAGNOSIS — J93.9 PNEUMOTHORAX ON RIGHT: ICD-10-CM

## 2025-06-21 DIAGNOSIS — S29.8XXA BLUNT TRAUMA TO CHEST, INITIAL ENCOUNTER: ICD-10-CM

## 2025-06-21 DIAGNOSIS — S22.069A CLOSED FRACTURE OF SEVENTH THORACIC VERTEBRA, UNSPECIFIED FRACTURE MORPHOLOGY, INITIAL ENCOUNTER (H): ICD-10-CM
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
## 2025-06-21 DIAGNOSIS — J98.2 PNEUMOMEDIASTINUM (H): ICD-10-CM

## 2025-06-21 DIAGNOSIS — Z79.01 CHRONIC ANTICOAGULATION: ICD-10-CM

## 2025-06-21 LAB
ALBUMIN SERPL BCG-MCNC: 4 G/DL (ref 3.5–5.2)
ALP SERPL-CCNC: 126 U/L (ref 40–150)
ALT SERPL W P-5'-P-CCNC: 17 U/L (ref 0–70)
ANION GAP SERPL CALCULATED.3IONS-SCNC: 10 MMOL/L (ref 7–15)
AST SERPL W P-5'-P-CCNC: 19 U/L (ref 0–45)
ATRIAL RATE - MUSE: 57 BPM
BASOPHILS # BLD AUTO: 0.1 10E3/UL (ref 0–0.2)
BASOPHILS NFR BLD AUTO: 1 %
BILIRUB SERPL-MCNC: 1 MG/DL
BUN SERPL-MCNC: 33.5 MG/DL (ref 8–23)
CALCIUM SERPL-MCNC: 9.6 MG/DL (ref 8.8–10.4)
CHLORIDE SERPL-SCNC: 101 MMOL/L (ref 98–107)
CREAT SERPL-MCNC: 1.67 MG/DL (ref 0.67–1.17)
DIASTOLIC BLOOD PRESSURE - MUSE: NORMAL MMHG
EGFRCR SERPLBLD CKD-EPI 2021: 39 ML/MIN/1.73M2
EOSINOPHIL # BLD AUTO: 0.3 10E3/UL (ref 0–0.7)
EOSINOPHIL NFR BLD AUTO: 4 %
ERYTHROCYTE [DISTWIDTH] IN BLOOD BY AUTOMATED COUNT: 12.5 % (ref 10–15)
GLUCOSE SERPL-MCNC: 248 MG/DL (ref 70–99)
HCO3 SERPL-SCNC: 26 MMOL/L (ref 22–29)
HCT VFR BLD AUTO: 36.7 % (ref 40–53)
HGB BLD-MCNC: 12.3 G/DL (ref 13.3–17.7)
IMM GRANULOCYTES # BLD: 0.1 10E3/UL
IMM GRANULOCYTES NFR BLD: 1 %
INR PPP: 2.65 (ref 0.85–1.15)
INTERPRETATION ECG - MUSE: NORMAL
LYMPHOCYTES # BLD AUTO: 1.2 10E3/UL (ref 0.8–5.3)
LYMPHOCYTES NFR BLD AUTO: 15 %
MCH RBC QN AUTO: 30.9 PG (ref 26.5–33)
MCHC RBC AUTO-ENTMCNC: 33.5 G/DL (ref 31.5–36.5)
MCV RBC AUTO: 92 FL (ref 78–100)
MONOCYTES # BLD AUTO: 0.7 10E3/UL (ref 0–1.3)
MONOCYTES NFR BLD AUTO: 8 %
NEUTROPHILS # BLD AUTO: 6.1 10E3/UL (ref 1.6–8.3)
NEUTROPHILS NFR BLD AUTO: 73 %
NRBC # BLD AUTO: 0 10E3/UL
NRBC BLD AUTO-RTO: 0 /100
P AXIS - MUSE: 62 DEGREES
PLATELET # BLD AUTO: 193 10E3/UL (ref 150–450)
POTASSIUM SERPL-SCNC: 4.5 MMOL/L (ref 3.4–5.3)
PR INTERVAL - MUSE: 190 MS
PROT SERPL-MCNC: 7.6 G/DL (ref 6.4–8.3)
PROTHROMBIN TIME: 27.8 SECONDS (ref 11.8–14.8)
QRS DURATION - MUSE: 86 MS
QT - MUSE: 470 MS
QTC - MUSE: 457 MS
R AXIS - MUSE: 51 DEGREES
RADIOLOGIST FLAGS: ABNORMAL
RBC # BLD AUTO: 3.98 10E6/UL (ref 4.4–5.9)
SODIUM SERPL-SCNC: 137 MMOL/L (ref 135–145)
SYSTOLIC BLOOD PRESSURE - MUSE: NORMAL MMHG
T AXIS - MUSE: 54 DEGREES
TROPONIN T SERPL HS-MCNC: 47 NG/L
TROPONIN T SERPL HS-MCNC: 52 NG/L
VENTRICULAR RATE- MUSE: 57 BPM
WBC # BLD AUTO: 8.4 10E3/UL (ref 4–11)

## 2025-06-21 PROCEDURE — 96374 THER/PROPH/DIAG INJ IV PUSH: CPT | Mod: 59 | Performed by: FAMILY MEDICINE

## 2025-06-21 PROCEDURE — 250N000009 HC RX 250: Performed by: FAMILY MEDICINE

## 2025-06-21 PROCEDURE — 84484 ASSAY OF TROPONIN QUANT: CPT | Performed by: FAMILY MEDICINE

## 2025-06-21 PROCEDURE — 93005 ELECTROCARDIOGRAM TRACING: CPT | Performed by: FAMILY MEDICINE

## 2025-06-21 PROCEDURE — 93010 ELECTROCARDIOGRAM REPORT: CPT | Performed by: FAMILY MEDICINE

## 2025-06-21 PROCEDURE — 85610 PROTHROMBIN TIME: CPT | Performed by: FAMILY MEDICINE

## 2025-06-21 PROCEDURE — 71260 CT THORAX DX C+: CPT

## 2025-06-21 PROCEDURE — 250N000011 HC RX IP 250 OP 636: Performed by: FAMILY MEDICINE

## 2025-06-21 PROCEDURE — 250N000011 HC RX IP 250 OP 636: Performed by: STUDENT IN AN ORGANIZED HEALTH CARE EDUCATION/TRAINING PROGRAM

## 2025-06-21 PROCEDURE — 70450 CT HEAD/BRAIN W/O DYE: CPT

## 2025-06-21 PROCEDURE — 84155 ASSAY OF PROTEIN SERUM: CPT | Performed by: FAMILY MEDICINE

## 2025-06-21 PROCEDURE — 99285 EMERGENCY DEPT VISIT HI MDM: CPT | Mod: 25 | Performed by: FAMILY MEDICINE

## 2025-06-21 PROCEDURE — 99285 EMERGENCY DEPT VISIT HI MDM: CPT | Performed by: FAMILY MEDICINE

## 2025-06-21 PROCEDURE — 36415 COLL VENOUS BLD VENIPUNCTURE: CPT | Performed by: FAMILY MEDICINE

## 2025-06-21 PROCEDURE — 85025 COMPLETE CBC W/AUTO DIFF WBC: CPT | Performed by: FAMILY MEDICINE

## 2025-06-21 RX ORDER — WARFARIN SODIUM 5 MG/1
5 TABLET ORAL
COMMUNITY

## 2025-06-21 RX ORDER — IOPAMIDOL 755 MG/ML
100 INJECTION, SOLUTION INTRAVASCULAR ONCE
Status: COMPLETED | OUTPATIENT
Start: 2025-06-21 | End: 2025-06-21

## 2025-06-21 RX ORDER — FENTANYL CITRATE 50 UG/ML
50 INJECTION, SOLUTION INTRAMUSCULAR; INTRAVENOUS ONCE
Status: COMPLETED | OUTPATIENT
Start: 2025-06-21 | End: 2025-06-21

## 2025-06-21 RX ORDER — ALBUTEROL SULFATE 0.83 MG/ML
3 SOLUTION RESPIRATORY (INHALATION) 4 TIMES DAILY PRN
COMMUNITY
Start: 2025-06-12

## 2025-06-21 RX ADMIN — SODIUM CHLORIDE 60 ML: 9 INJECTION, SOLUTION INTRAVENOUS at 07:42

## 2025-06-21 RX ADMIN — IOPAMIDOL 100 ML: 755 INJECTION, SOLUTION INTRAVENOUS at 07:42

## 2025-06-21 RX ADMIN — FENTANYL CITRATE 50 MCG: 50 INJECTION, SOLUTION INTRAMUSCULAR; INTRAVENOUS at 12:15

## 2025-06-21 ASSESSMENT — ACTIVITIES OF DAILY LIVING (ADL)
ADLS_ACUITY_SCORE: 58

## 2025-06-21 ASSESSMENT — COLUMBIA-SUICIDE SEVERITY RATING SCALE - C-SSRS: IS THE PATIENT NOT ABLE TO COMPLETE C-SSRS: REFUSES TO ANSWER

## 2025-06-21 NOTE — ED NOTES
Bed: ED10  Expected date:   Expected time:   Means of arrival:   Comments:  [facility]  89 y/o M  Fall

## 2025-06-21 NOTE — MEDICATION SCRIBE - ADMISSION MEDICATION HISTORY
Medication Scribe Admission Medication History    Admission medication history is complete. The information provided in this note is only as accurate as the sources available at the time of the update.    Information Source(s): Patient via in-person    Pertinent Information:     Changes made to PTA medication list:  Added: Albuterol NEB - from reconcile list as confirmed by patient   Deleted: Azithromycin - completed therapy   Changed: Warfarin dosage changed to reflect current orders effective 6/19 until 6/26 (INR due)     Allergies reviewed with patient and updates made in EHR: yes    Medication History Completed By: COREY SALMERON 6/21/2025 11:27 AM    PTA Med List   Medication Sig Note Last Dose/Taking    acetaminophen (TYLENOL) 500 MG tablet Take 1-2 tablets (500-1,000 mg) by mouth 3 times daily  6/20/2025 Morning    albuterol (PROAIR HFA/PROVENTIL HFA/VENTOLIN HFA) 108 (90 Base) MCG/ACT inhaler Inhale 2 puffs into the lungs every 6 hours as needed for shortness of breath, wheezing or cough  6/20/2025 Morning    albuterol (PROVENTIL) (2.5 MG/3ML) 0.083% neb solution Take 3 mLs by nebulization 4 times daily as needed for shortness of breath or wheezing.  6/19/2025    amoxicillin (AMOXIL) 500 MG capsule Take 4 caps 1 hour before procedure. 6/21/2025: Dental Appointments  More than a month Noon    atenolol (TENORMIN) 50 MG tablet Take 1 tablet by mouth twice daily  6/20/2025 Evening    glipiZIDE (GLUCOTROL XL) 5 MG 24 hr tablet Take 1 tablet (5 mg) by mouth 2 times daily. (Patient taking differently: Take 5 mg by mouth daily.)  6/20/2025 Morning    ipratropium (ATROVENT) 0.06 % nasal spray USE 2 SPRAY(S) IN EACH NOSTRIL 4 TIMES DAILY (Patient taking differently: Spray 2 sprays into both nostrils 4 times daily as needed for rhinitis.)  6/20/2025 Morning    losartan (COZAAR) 100 MG tablet Take 1 tablet by mouth once daily  6/20/2025 Morning    multiple vitamin  tablet TABS Take 1 tablet by mouth daily  Past Week     pramipexole (MIRAPEX) 1 MG tablet TAKE 1 TABLET BY MOUTH TWICE DAILY AT  4  PM  AND  9  PM  6/20/2025 Evening    spironolactone (ALDACTONE) 25 MG tablet Take 1 tablet by mouth once daily  6/20/2025 Morning    traZODone (DESYREL) 50 MG tablet Take 1 tablet (50 mg) by mouth at bedtime.  6/20/2025 Bedtime    warfarin ANTICOAGULANT (COUMADIN) 5 MG tablet TAKE 0.5 TABLET BY MOUTH ON MONDAY AND FRIDAY, AND 1 TABLET ON ALL OTHER DAYS OR AS DIRECTED BY THE INR CLINIC (Patient taking differently: Take 7.5 mg by mouth. Take 1.5 tablet (7.5 mg) every Monday) 6/21/2025: Not started first dose Due: 6/23/25 Taking Differently    warfarin ANTICOAGULANT (COUMADIN/JANTOVEN) 5 MG tablet Take 5 mg by mouth. Take 1 tablet (5 mg) Tuesday through Sunday 6/20/2025 Morning

## 2025-06-21 NOTE — ED PROVIDER NOTES
Emergency Department Patient Sign-out       Brief HPI:        Patient was signed out to me by the previous ER physician.  Previous documentation and workup reviewed.    CT head does not show any acute traumatic abnormalities.  I did receive a call from the radiologist about his CT chest abdomen pelvis, does have a small right-sided pneumothorax that is less than 10%, mild pneumomediastinum without evidence of rib fracture or hemothorax.  Does not require a chest tube during his time here, placed him on a nonrebreather.  Remains with oxygen saturation of 93% on room air prior to this.  We also discussed acute mildly displaced obliquely oriented fracture of the T7 vertebral body, does not have any retropulsion into the spinal canal however the characteristics have unstable features.  Radiology mention that a dedicated lumbar CT scan would be helpful.  Reviewed other nonemergent and incidental findings with the patient.  Given the multitrauma the patient would benefit from transfer for higher level of care.  I discussed this case with the ER physician at [facility].  They accept the patient as a transfer, states they will order the dedicated lumbar CT upon arrival.  Prior to discharge the patient was notified of all the findings of today's visit, states he is comfortable and currently sleeping.  He has mild baseline peripheral neuropathy of the legs however appears to have a normal neurovascular exam of the bilateral lower extremities during the rest of his time here.  He remains in a flat position.    Exam:   Patient Vitals for the past 24 hrs:   BP Temp Temp src Pulse Resp SpO2   06/21/25 1215 (!) 149/78 -- -- 65 18 97 %   06/21/25 1201 (!) 163/86 -- -- 65 25 100 %   06/21/25 1146 (!) 163/79 -- -- 62 20 100 %   06/21/25 1126 (!) 172/85 -- -- 63 22 100 %   06/21/25 1101 (!) 166/89 -- -- 62 21 100 %   06/21/25 1041 (!) 172/81 -- -- 57 19 100 %   06/21/25 1031 (!) 169/82 -- -- 57 20 100 %   06/21/25  0958 (!) 179/87 -- -- 54 19 100 %   06/21/25 0953 -- -- -- 55 20 98 %   06/21/25 0948 -- -- -- 52 19 100 %   06/21/25 0943 (!) 185/88 -- -- 51 26 100 %   06/21/25 0929 -- -- -- 63 18 93 %   06/21/25 0846 (!) 152/67 -- -- 53 16 92 %   06/21/25 0831 (!) 157/85 -- -- 57 20 --   06/21/25 0800 (!) 159/91 -- -- 63 22 (!) 91 %   06/21/25 0721 (!) 157/104 -- -- 56 -- 93 %   06/21/25 0706 (!) 176/99 -- -- 52 -- 93 %   06/21/25 0655 (!) 204/108 -- -- 56 -- 95 %   06/21/25 0624 (!) 196/115 96.9  F (36.1  C) Axillary -- 18 96 %           ED RESULTS:   Results for orders placed or performed during the hospital encounter of 06/21/25 (from the past 24 hours)   EKG 12-lead, tracing only     Status: None (Preliminary result)    Collection Time: 06/21/25  6:36 AM   Result Value Ref Range    Systolic Blood Pressure  mmHg    Diastolic Blood Pressure  mmHg    Ventricular Rate 57 BPM    Atrial Rate 57 BPM    MT Interval 190 ms    QRS Duration 86 ms     ms    QTc 457 ms    P Axis 62 degrees    R AXIS 51 degrees    T Axis 54 degrees    Interpretation ECG       Sinus bradycardia  Otherwise normal ECG  No previous ECGs available     CBC with platelets differential     Status: Abnormal    Collection Time: 06/21/25  6:50 AM    Narrative    The following orders were created for panel order CBC with platelets differential.  Procedure                               Abnormality         Status                     ---------                               -----------         ------                     CBC with platelets and ...[[id number]]  Abnormal            Final result                 Please view results for these tests on the individual orders.   Comprehensive metabolic panel     Status: Abnormal    Collection Time: 06/21/25  6:50 AM   Result Value Ref Range    Sodium 137 135 - 145 mmol/L    Potassium 4.5 3.4 - 5.3 mmol/L    Carbon Dioxide (CO2) 26 22 - 29 mmol/L    Anion Gap 10 7 - 15 mmol/L    Urea Nitrogen 33.5 (H) 8.0 - 23.0 mg/dL     Creatinine 1.67 (H) 0.67 - 1.17 mg/dL    GFR Estimate 39 (L) >60 mL/min/1.73m2    Calcium 9.6 8.8 - 10.4 mg/dL    Chloride 101 98 - 107 mmol/L    Glucose 248 (H) 70 - 99 mg/dL    Alkaline Phosphatase 126 40 - 150 U/L    AST 19 0 - 45 U/L    ALT 17 0 - 70 U/L    Protein Total 7.6 6.4 - 8.3 g/dL    Albumin 4.0 3.5 - 5.2 g/dL    Bilirubin Total 1.0 <=1.2 mg/dL   INR     Status: Abnormal    Collection Time: 06/21/25  6:50 AM   Result Value Ref Range    INR 2.65 (H) 0.85 - 1.15    PT 27.8 (H) 11.8 - 14.8 Seconds   Troponin T, High Sensitivity     Status: Abnormal    Collection Time: 06/21/25  6:50 AM   Result Value Ref Range    Troponin T, High Sensitivity 52 (H) <=22 ng/L   CBC with platelets and differential     Status: Abnormal    Collection Time: 06/21/25  6:50 AM   Result Value Ref Range    WBC Count 8.4 4.0 - 11.0 10e3/uL    RBC Count 3.98 (L) 4.40 - 5.90 10e6/uL    Hemoglobin 12.3 (L) 13.3 - 17.7 g/dL    Hematocrit 36.7 (L) 40.0 - 53.0 %    MCV 92 78 - 100 fL    MCH 30.9 26.5 - 33.0 pg    MCHC 33.5 31.5 - 36.5 g/dL    RDW 12.5 10.0 - 15.0 %    Platelet Count 193 150 - 450 10e3/uL    % Neutrophils 73 %    % Lymphocytes 15 %    % Monocytes 8 %    % Eosinophils 4 %    % Basophils 1 %    % Immature Granulocytes 1 %    NRBCs per 100 WBC 0 <1 /100    Absolute Neutrophils 6.1 1.6 - 8.3 10e3/uL    Absolute Lymphocytes 1.2 0.8 - 5.3 10e3/uL    Absolute Monocytes 0.7 0.0 - 1.3 10e3/uL    Absolute Eosinophils 0.3 0.0 - 0.7 10e3/uL    Absolute Basophils 0.1 0.0 - 0.2 10e3/uL    Absolute Immature Granulocytes 0.1 <=0.4 10e3/uL    Absolute NRBCs 0.0 10e3/uL   Head CT w/o contrast     Status: None    Collection Time: 06/21/25  7:59 AM    Narrative    EXAM: CT HEAD WITHOUT CONTRAST  LOCATION: [facility]  DATE: 06/21/2025    INDICATION: Fall, closed head injury. On Coumadin.  COMPARISON: None.  TECHNIQUE: Routine CT Head without IV contrast. Multiplanar reformats. Dose reduction techniques were  used.    FINDINGS:  INTRACRANIAL CONTENTS: No intracranial hemorrhage, extra-axial collection, or mass effect.  No CT evidence of acute infarct. Mild presumed chronic small vessel ischemic changes. Mild generalized volume loss. No hydrocephalus.     VISUALIZED ORBITS/SINUSES/MASTOIDS: No intraorbital abnormality. No paranasal sinus mucosal disease. No middle ear or mastoid effusion.    BONES/SOFT TISSUES: No acute abnormality.      Impression    IMPRESSION:  1.  No CT evidence for acute intracranial process.  2.  Brain atrophy and presumed chronic microvascular ischemic changes as above.       CT Chest/Abdomen/Pelvis w Contrast     Status: Abnormal    Collection Time: 06/21/25  8:07 AM   Result Value Ref Range    Radiologist flags Acute T7 vertebral body fracture (AA)     Narrative    EXAM: CT CHEST/ABDOMEN/PELVIS W CONTRAST  LOCATION: [facility]  DATE: 6/21/2025    INDICATION: fall, blunt chest abd trauma  COMPARISON: CT 5/31/2024  TECHNIQUE: CT scan of the chest, abdomen, and pelvis was performed following injection of IV contrast. Multiplanar reformats were obtained. Dose reduction techniques were used.   CONTRAST: 100mL, Isovue 370    FINDINGS:   LUNGS AND PLEURA: The central airways are clear. Small right-sided pneumothorax with 2 mm pleural separation at the apex and 9 mm pleural separation anterior to the right middle lobe. Trace layering simple right-sided pleural effusion with adjacent   atelectasis. No hemorrhagic component. No pulmonary contusion. Mild left basilar atelectasis. Stable 6 mm left major fissural intrapulmonary lymph node.    MEDIASTINUM/AXILLAE: Mild pneumomediastinum. No chest wall or mediastinal hematoma. No evidence for traumatic aortic injury. Upper normal heart size. No pericardial effusion. Tiny hiatal hernia.    CORONARY ARTERY CALCIFICATION: Mild.    HEPATOBILIARY: Indeterminate chronic appearing 8 mm hypodense lesion at the posterior aspect of liver  segment 6. Normal gallbladder and bile ducts.    PANCREAS: Atrophic with partial fatty replacement.    SPLEEN: Normal.    ADRENAL GLANDS: Normal.    KIDNEYS/BLADDER: No acute findings. Bilateral multifocal renal cortical scarring. Small right renal cyst. No follow-up is indicated.    BOWEL: No acute findings. No focal bowel wall thickening. Appendectomy. Colonic diverticulosis without evidence of acute diverticulitis. No free air or free fluid.    LYMPH NODES: Normal.    VASCULATURE: Normal.    PELVIC ORGANS: Prostatectomy. No pelvic free fluid.    MUSCULOSKELETAL: Intact right hip arthroplasty and L4-L5 lumbar spinal fusion hardware. There is an acute mildly displaced obliquely oriented fracture of the T7 vertebral body. No osseous retropulsion or central canal stenosis. No critical foraminal   stenosis. Stable T8 and T9 vertebral body compression fractures.      Impression    IMPRESSION:  1.  Acute mildly displaced obliquely oriented fracture of the T7 vertebral body. This may be an unstable fracture. No associated osseous retropulsion or central canal stenosis.  2.  Mild pneumomediastinum and small right-sided pneumothorax. No evidence for an acute rib fracture.  3.  Small simple right-sided pleural effusion with adjacent atelectasis. No significant hemorrhagic component.  4.  No acute posttraumatic findings in the abdomen or pelvis.  5.  Chronic appearing indeterminate 8 mm liver segment 6 hypodense lesion. Recommend nonemergent follow-up with ultrasound.        [Critical Result: Acute T7 vertebral body fracture]    Finding was identified on 6/21/2025 8:35 AM CDT.     Dr. Johnnie Yee was contacted by me on 6/21/2025 9:00 AM CDT and verbalized understanding of the critical result.    Troponin T, High Sensitivity     Status: Abnormal    Collection Time: 06/21/25 10:20 AM   Result Value Ref Range    Troponin T, High Sensitivity 47 (H) <=22 ng/L       ED MEDICATIONS:   Medications   sodium chloride 0.9 % bag for CT  scan flush (60 mLs Intravenous $Given 6/21/25 0742)   iopamidol (ISOVUE-370) solution 100 mL (100 mLs Intravenous $Given 6/21/25 0742)   fentaNYL (PF) (SUBLIMAZE) injection 50 mcg (50 mcg Intravenous $Given 6/21/25 1215)         Impression:    ICD-10-CM    1. Fall, initial encounter  W19.XXXA       2. Blunt trauma to chest  S29.8XXA       3. Chronic anticoagulation  Z79.01       4. Closed head injury  S09.90XA       5. Pneumothorax on right  J93.9       6. Pneumomediastinum (H)  J98.2       7. Closed fracture of seventh thoracic vertebra, unspecified fracture morphology, initial encounter (H)  S22.069A                 MD Cristian Crawford Jake, MD  06/21/25 1300

## 2025-06-21 NOTE — ED TRIAGE NOTES
Triage Assessment (Adult)       Row Name 06/21/25 0625          Triage Assessment    Airway WDL WDL        Respiratory WDL    Respiratory WDL WDL        Peripheral/Neurovascular WDL    Peripheral Neurovascular WDL neurovascular assessment lower        LLE Neurovascular Assessment    Color LLE aidan        RLE Neurovascular Assessment    Color RLE aidan                   Patient arrived via EMS, had a fall at home and patient called 911 because he could not get up. Hit his head while falling, on blood thinner. Patient has complaint of pain on chest, noted left eye not opening well, some mouth asymmetry noted. Had history of Botox injection few days ago. Speech is slow but clear.

## 2025-06-21 NOTE — ED PROVIDER NOTES
[facility] ED Provider Note   Patient: Robert Richard  MRN #:  [id number]  Date of Visit: June 21, 2025    CC:     Chief Complaint   Patient presents with    Fall     HPI:  Robert Richard is a 89 year old male on chronic warfarin therapy for factor V Leiden mutation with 4 previous DVT episodes who presented to the emergency department by EMS for evaluation after he fell at home between 3 and 4 this morning.  Patient could not get up from the floor, and struggled for a while before calling for help.  Patient states that he had some weeping from his bilateral lower extremity and was planning on taking the towel to wrap it around the legs so that he would not have fluid draining onto the bed.  When he tried to  the towels, he lost his balance, fell backward, hit his chest and upper abdomen before hitting the back of his head.  He had no loss of consciousness.  Patient takes his Coumadin medication in the morning.  He has severe restless leg syndrome, and took 2 Mirapex tablets last night which will sometimes cause him drowsiness.  He thinks that he had a leg spasm at the time that he fell which caused his legs to give out.  He had been getting some Botox injections for his twitchy left eye, and the last Botox injection caused some more widespread paralysis of his left thigh muscles.  He has difficulty opening the left eye.  He denies any significant headache, nausea, vomiting.  He has some chest and upper abdomen discomfort.  Patient lives with his wife.  He denies any recent illness.  He is right-hand dominant.  Past medical history as noted below.    Problem List:  Patient Active Problem List    Diagnosis Date Noted    Stage 3b chronic kidney disease (H) 06/27/2024     Priority: Medium    Urine retention 06/06/2024     Priority: Medium    Spondylolisthesis of lumbar region 06/03/2024     Priority: Medium    Generalized muscle weakness  06/03/2024     Priority: Medium    Anemia, unspecified type 06/03/2024     Priority: Medium    History of deep venous thrombosis (DVT) of distal vein of left lower extremity 06/03/2024     Priority: Medium    S/P lumbar fusion 05/28/2024     Priority: Medium    Asymmetrical sensorineural hearing loss 06/09/2021     Priority: Medium    Subjective tinnitus 06/09/2021     Priority: Medium    Venous stasis dermatitis of right lower extremity 06/09/2021     Priority: Medium    Stage 3 chronic kidney disease, unspecified whether stage 3a or 3b CKD (H) 06/20/2019     Priority: Medium    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) 10/12/2018     Priority: Medium    Essential hypertension 09/06/2016     Priority: Medium    Long term current use of anticoagulant therapy 03/21/2016     Priority: Medium    Hemifacial spasm 09/17/2014     Priority: Medium    Hip joint replacement status - right 01/21/2014     Priority: Medium    Abnormal gait 01/21/2014     Priority: Medium    Class 2 severe obesity with body mass index (BMI) of 35 to 39.9 with serious comorbidity (H) 01/21/2014     Priority: Medium    Personal history of prostate cancer 01/21/2014     Priority: Medium    Malignant basal cell neoplasm of skin 07/24/2012     Priority: Medium     Do you wish to do the replacement in the background? yes        Gout 07/03/2012     Priority: Medium    Back pain 09/27/2011     Priority: Medium    Hyperlipidemia LDL goal <100 09/27/2011     Priority: Medium    Factor V Leiden mutation 09/08/2011     Priority: Medium    Prothrombin mutation 09/08/2011     Priority: Medium     [id number]      Sleep apnea 05/19/2011     Priority: Medium    Restless leg syndrome 10/20/2010     Priority: Medium       Past Medical History:   Diagnosis Date    Basal cell carcinoma     Cancer (H)     DVT (deep venous thrombosis) (H) 11/2003    DVT (deep venous thrombosis) (H) 12/2008    DVT (deep venous thrombosis) (H) 10/2010    Factor V Leiden     Gout      Other and unspecified hyperlipidemia     Prostate cancer (H) 2003    Restless leg syndrome     Sleep apnea     Unspecified essential hypertension        MEDS: acetaminophen (TYLENOL) 500 MG tablet  albuterol (PROAIR HFA/PROVENTIL HFA/VENTOLIN HFA) 108 (90 Base) MCG/ACT inhaler  albuterol (PROVENTIL) (2.5 MG/3ML) 0.083% neb solution  amoxicillin (AMOXIL) 500 MG capsule  atenolol (TENORMIN) 50 MG tablet  glipiZIDE (GLUCOTROL XL) 5 MG 24 hr tablet  ipratropium (ATROVENT) 0.06 % nasal spray  losartan (COZAAR) 100 MG tablet  multiple vitamin  tablet TABS  pramipexole (MIRAPEX) 1 MG tablet  spironolactone (ALDACTONE) 25 MG tablet  traZODone (DESYREL) 50 MG tablet  warfarin ANTICOAGULANT (COUMADIN) 5 MG tablet  warfarin ANTICOAGULANT (COUMADIN/JANTOVEN) 5 MG tablet  STATIN NOT PRESCRIBED (INTENTIONAL)        ALLERGIES:    Allergies   Allergen Reactions    Gabapentin Dizziness    Lipitor [Atorvastatin Calcium]      Muscle pain, weakness    Pravastatin Other (See Comments)     muscle aches    Simvastatin Other (See Comments)     muscle aches    Statins Muscle Pain (Myalgia)       Past Surgical History:   Procedure Laterality Date    APPENDECTOMY  1950    BIOPSY  May 2003    prostate    COLONOSCOPY      [facility]    EYE SURGERY  april 2016    [facility]    FUSION TRANSFORAMINAL LUMBAR INTERBODY, 1 LEVEL, POSTERIOR APPROACH, USING OTS SURG IMAGING GUIDANCE Bilateral 05/28/2024    Procedure: Lumbar 4 to Lumbar 5 Transforaminal Interbody Fusion with decompression of stenosis;  Surgeon: Cj Cleary MD;  Location:  OR    HEAD & NECK SURGERY      [facility]    JOINT REPLACEMENT  04/2011    R hip    LAMINECTOMY LUMBAR ONE LEVEL  12/2008    LAMINECTOMY LUMBAR ONE LEVEL Bilateral 05/28/2024    Procedure: Lumbar 2 to Lumbar 3 bilateral laminectomy for decompression of stenosis;  Surgeon: Cj Cleary MD;  Location:  OR    LAPAROSCOPIC HERNIORRHAPHY UMBILICAL N/A 01/03/2019    Procedure: laparoscopic umbilical  hernia repair with mesh;  Surgeon: Jamal Thompson DO;  Location:  OR    [facility] REVISE TOTAL HIP REPLACEMENT  01/18/2013    R hip       Social History     Tobacco Use    Smoking status: Never    Smokeless tobacco: Never   Vaping Use    Vaping status: Never Used   Substance Use Topics    Alcohol use: Yes     Alcohol/week: 0.0 standard drinks of alcohol     Comment: 1 drink every 1-2 weeks.    Drug use: No         Review of Systems   Except as noted in HPI, all other systems were reviewed and are negative    Physical Exam   Vitals were reviewed  No data found.    GENERAL APPEARANCE: Pleasant elderly male, alert and oriented to person, place and date.  HEAD: Contusion to the right occiput with slight abrasion but no open wounds  FACE: normal facies  EYES: Left eye ptosis, with reported recent Botox injection  HENT: normal external exam  NECK: no adenopathy or asymmetry  RESP: normal respiratory effort; clear breath sounds bilaterally  CV: regular rate and rhythm; no significant murmurs, gallops or rubs  ABD: Difficult to assess due to protuberance and firmness.  No signs of ecchymosis  MS: no gross deformities noted; normal muscle tone.  EXT: Chronic bilateral lower extremity stasis dermatitis changes.  SKIN: Intact, no open wounds.  Recent reported wound care through the wound clinic  NEURO: no facial droop; no focal deficits, speech is normal          Available Lab/Imaging Results     Results for orders placed or performed during the hospital encounter of 06/21/25 (from the past 24 hours)   Head CT w/o contrast    Narrative    EXAM: CT HEAD WITHOUT CONTRAST  LOCATION: [facility]  DATE: 06/21/2025    INDICATION: Fall, closed head injury. On Coumadin.  COMPARISON: None.  TECHNIQUE: Routine CT Head without IV contrast. Multiplanar reformats. Dose reduction techniques were used.    FINDINGS:  INTRACRANIAL CONTENTS: No intracranial hemorrhage, extra-axial collection, or mass effect.   No CT evidence of acute infarct. Mild presumed chronic small vessel ischemic changes. Mild generalized volume loss. No hydrocephalus.     VISUALIZED ORBITS/SINUSES/MASTOIDS: No intraorbital abnormality. No paranasal sinus mucosal disease. No middle ear or mastoid effusion.    BONES/SOFT TISSUES: No acute abnormality.      Impression    IMPRESSION:  1.  No CT evidence for acute intracranial process.  2.  Brain atrophy and presumed chronic microvascular ischemic changes as above.       CT Chest/Abdomen/Pelvis w Contrast   Result Value Ref Range    Radiologist flags Acute T7 vertebral body fracture (AA)     Narrative    EXAM: CT CHEST/ABDOMEN/PELVIS W CONTRAST  LOCATION: [facility]  DATE: 6/21/2025    INDICATION: fall, blunt chest abd trauma  COMPARISON: CT 5/31/2024  TECHNIQUE: CT scan of the chest, abdomen, and pelvis was performed following injection of IV contrast. Multiplanar reformats were obtained. Dose reduction techniques were used.   CONTRAST: 100mL, Isovue 370    FINDINGS:   LUNGS AND PLEURA: The central airways are clear. Small right-sided pneumothorax with 2 mm pleural separation at the apex and 9 mm pleural separation anterior to the right middle lobe. Trace layering simple right-sided pleural effusion with adjacent   atelectasis. No hemorrhagic component. No pulmonary contusion. Mild left basilar atelectasis. Stable 6 mm left major fissural intrapulmonary lymph node.    MEDIASTINUM/AXILLAE: Mild pneumomediastinum. No chest wall or mediastinal hematoma. No evidence for traumatic aortic injury. Upper normal heart size. No pericardial effusion. Tiny hiatal hernia.    CORONARY ARTERY CALCIFICATION: Mild.    HEPATOBILIARY: Indeterminate chronic appearing 8 mm hypodense lesion at the posterior aspect of liver segment 6. Normal gallbladder and bile ducts.    PANCREAS: Atrophic with partial fatty replacement.    SPLEEN: Normal.    ADRENAL GLANDS: Normal.    KIDNEYS/BLADDER: No  acute findings. Bilateral multifocal renal cortical scarring. Small right renal cyst. No follow-up is indicated.    BOWEL: No acute findings. No focal bowel wall thickening. Appendectomy. Colonic diverticulosis without evidence of acute diverticulitis. No free air or free fluid.    LYMPH NODES: Normal.    VASCULATURE: Normal.    PELVIC ORGANS: Prostatectomy. No pelvic free fluid.    MUSCULOSKELETAL: Intact right hip arthroplasty and L4-L5 lumbar spinal fusion hardware. There is an acute mildly displaced obliquely oriented fracture of the T7 vertebral body. No osseous retropulsion or central canal stenosis. No critical foraminal   stenosis. Stable T8 and T9 vertebral body compression fractures.      Impression    IMPRESSION:  1.  Acute mildly displaced obliquely oriented fracture of the T7 vertebral body. This may be an unstable fracture. No associated osseous retropulsion or central canal stenosis.  2.  Mild pneumomediastinum and small right-sided pneumothorax. No evidence for an acute rib fracture.  3.  Small simple right-sided pleural effusion with adjacent atelectasis. No significant hemorrhagic component.  4.  No acute posttraumatic findings in the abdomen or pelvis.  5.  Chronic appearing indeterminate 8 mm liver segment 6 hypodense lesion. Recommend nonemergent follow-up with ultrasound.        [Critical Result: Acute T7 vertebral body fracture]    Finding was identified on 6/21/2025 8:35 AM CDT.     Dr. Johnnie Yee was contacted by me on 6/21/2025 9:00 AM CDT and verbalized understanding of the critical result.    Troponin T, High Sensitivity   Result Value Ref Range    Troponin T, High Sensitivity 47 (H) <=22 ng/L     *Note: Due to a large number of results and/or encounters for the requested time period, some results have not been displayed. A complete set of results can be found in Results Review.     EKG reviewed by me: Sinus bradycardia with heart rate of 57, normal [place], QT and QRS intervals.  Normal axis.   No acute ST-T wave changes.  Impression: Normal EKG except for sinus bradycardia.           Impression     Final diagnoses:   Fall, initial encounter   Blunt trauma to chest   Chronic anticoagulation   Closed head injury   Pneumothorax on right   Pneumomediastinum (H)   Closed fracture of seventh thoracic vertebra, unspecified fracture morphology, initial encounter (H)         ED Course & Medical Decision Making   Robert Richard is a 89 year old male with history of restless leg syndrome, chronic anticoagulation for factor V Leiden and multiple episodes of DVT who presented to the emergency department for evaluation after he fell at home.  Patient was trying to apply some towels around his legs due to chronic stasis dermatitis and pedal edema.  Patient fell when he tried to  the towels, lost his balance, and landed on his chest and abdomen followed by hitting the back of his head.  He reported no loss of consciousness.  Patient takes his Coumadin dose in the mornings.  He attributes some of his dizziness and somnolence to taking 2 of the Mirapex tablets last night for restless legs.    Vital signs reveal a temp of 96.9, blood pressure 190/115, respiratory rate of 18, heart rate around 60 with 94% oxygen saturation.  On exam, patient has a negative BEFAST for stroke.  He has difficulty opening the left eye due to recent Botox injection due to uncontrolled eye fluttering.  Chest and upper abdomen was voluntarily guarded, without apparent ecchymosis.    Patient's EKG revealed sinus bradycardia with heart rate of 57 without any acute ischemic changes.  I initiated a workup including lab work and CT imaging of his head, chest/abdomen/pelvis due to his fall and some continued discomfort.    Patient will be signed out to Dr. Foster at the change of shift.         Disclaimer: This note consists of words and symbols derived from keyboarding and dictation using voice recognition software.  As a result, there may  be errors that have gone undetected.  Please consider this when interpreting information found in this note.       Johnnie Yee MD  06/22/25 0722

## 2025-06-23 ENCOUNTER — DOCUMENTATION ONLY (OUTPATIENT)
Dept: ANTICOAGULATION | Facility: CLINIC | Age: 89
End: 2025-06-23

## 2025-06-23 NOTE — PROGRESS NOTES
ANTICOAGULATION  MANAGEMENT: Discharge Review    Robert Richard chart reviewed for anticoagulation continuity of care    Emergency room visit on 6/23 for fall  blunt trauma to chest , closed head injury, pneumothorax, closed fracture of 7th thoracic vertebrae     Discharge disposition: Transferred hospitals to [facility]    Results:    Recent labs: (last 7 days)     06/19/25  0935 06/21/25  0650   INR 3.5* 2.65*     Anticoagulation inpatient management:     Will be managed inpatient    Anticoagulation discharge instructions:     Warfarin dosing: in patient   Bridging: no   INR goal change:  no   Medication changes affecting anticoagulation: in patient       PLAN   AC will resume dosing upon discharge from hospital currently admitted at [facility].    Regina Luo, RN  6/23/2025  Anticoagulation Clinic  [facility] for routing messages: mirlande WALLACE  [facility] patient phone line: [phone]

## 2025-06-26 ENCOUNTER — LAB REQUISITION (OUTPATIENT)
Dept: LAB | Facility: CLINIC | Age: 89
End: 2025-06-26
Payer: COMMERCIAL

## 2025-06-26 DIAGNOSIS — A15.0 TUBERCULOSIS OF LUNG: ICD-10-CM

## 2025-06-27 ENCOUNTER — TELEPHONE (OUTPATIENT)
Dept: ADMISSION | Facility: CLINIC | Age: 89
End: 2025-06-27

## 2025-06-27 PROBLEM — S22.069A: Status: ACTIVE | Noted: 2025-06-21

## 2025-06-27 PROBLEM — G47.33 OBSTRUCTIVE SLEEP APNEA OF ADULT: Status: ACTIVE | Noted: 2025-06-27
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
## 2025-06-27 PROBLEM — E11.9 TYPE 2 DIABETES MELLITUS WITHOUT COMPLICATIONS (H): Status: ACTIVE | Noted: 2025-06-27

## 2025-06-27 PROBLEM — F51.01 PRIMARY INSOMNIA: Status: ACTIVE | Noted: 2025-06-23

## 2025-06-27 PROBLEM — H93.13 SUBJECTIVE TINNITUS OF BOTH EARS: Status: ACTIVE | Noted: 2025-06-27

## 2025-06-27 PROBLEM — R07.89 ACUTE CHEST WALL PAIN: Status: ACTIVE | Noted: 2025-06-23

## 2025-06-27 PROCEDURE — 86481 TB AG RESPONSE T-CELL SUSP: CPT | Mod: ORL | Performed by: NURSE PRACTITIONER

## 2025-06-27 PROCEDURE — 36415 COLL VENOUS BLD VENIPUNCTURE: CPT | Mod: ORL | Performed by: NURSE PRACTITIONER

## 2025-06-27 PROCEDURE — P9603 ONE-WAY ALLOW PRORATED MILES: HCPCS | Mod: ORL | Performed by: NURSE PRACTITIONER

## 2025-06-27 NOTE — PROGRESS NOTES
[facility] GERIATRICS    PRIMARY CARE PROVIDER AND CLINIC: Stiven Donahue MD, [address] [facility] [address]  Chief Complaint   Patient presents with    Hospital F/U     [facility] 6/21/2025 - 6/26/2025      [facility] Medical Record Number: [id number]  Place of Service where encounter took place: [facility] ([facility]) [8]    Robert Richard is a 89 year old (1935), admitted to the above facility from  [facility]. Hospital stay 6/21/25 through 6/26/25.  HPI:    Hospital Course:    DISCHARGE DIAGNOSES:  Principal Problem:  Fall from ground level  Active Problems:  RLS (restless legs syndrome)  Closed fracture of seventh thoracic vertebra (HCC)  Palliative care encounter  Advanced care planning/counseling discussion  Acute chest wall pain  Primary insomnia  HOSPITAL COURSE:  Robert Richard is a 89 y.o. male s/p fall sustaining T7 fx, pneumomediastinum, sm R PTX. Neurosurgery was consulted and no acute surgical intervention is needed with recommendations of TLSO brace when out of bed, patient's upright x-rays were stable, and the patient will follow-up in 4 weeks with repeat x-rays with neurosurgery. Over the course of his stay the patient developed an ARNALDO on top of his CKD, this was likely due to dehydration, the patient received fluids and additional p.o. hydration orders with improvement of his ARNALDO prior to discharge. Patient had an occult right pneumothorax on arrival, he remained on room air during his stay, and there were no acute concerns. Patient was assessed by physical and occupational therapies and deemed safe to discharge to [facility]. On day of discharge, patient was tolerating a regular diet, voiding without issue, pain well controlled on oral analgesics, and vitally stable. Patient education and return precautions provided. The patient was discharged in stable condition with instructions to follow up as outlined above. Please see patient's permanent  medical record for further hospital course.     Patient seen today for follow up, oriented, pain controlled, ambulatory, using TLSO, VS WNL, afebrile, lower legs slough with weeping and edema 2-3+, INR 1.9, does not want to take insulin, overall doing well.    CODE STATUS/ADVANCE DIRECTIVES DISCUSSION: Prior - CPR/Full code   ALLERGIES:   Allergies   Allergen Reactions    Gabapentin Dizziness    Lipitor [Atorvastatin Calcium] Muscle Pain (Myalgia)     And weakness    Pravastatin Muscle Pain (Myalgia)     Muscle aches    Simvastatin Muscle Pain (Myalgia)     Muscle aches    Statins Muscle Pain (Myalgia)     Muscle aches        PAST MEDICAL HISTORY:   Past Medical History:   Diagnosis Date    Basal cell carcinoma     Cancer (H)     DVT (deep venous thrombosis) (H) 11/2003    DVT (deep venous thrombosis) (H) 12/2008    DVT (deep venous thrombosis) (H) 10/2010    Factor V Leiden     Gout     Other and unspecified hyperlipidemia     Prostate cancer (H) 2003    prostatectomy     Restless leg syndrome     Sleep apnea     Unspecified essential hypertension       PAST SURGICAL HISTORY:   has a past surgical history that includes Laminectomy lumbar one level (12/2008); joint replacement (04/2011); REVISE TOTAL HIP REPLACEMENT (01/18/2013); Laparoscopic herniorrhaphy umbilical (N/A, 01/03/2019); appendectomy (1950); Fusion Transforaminal Lumbar Interbody, 1 Level, Posterior Approach, Using [facility] Surg Imaging Guidance (Bilateral, 05/28/2024); Laminectomy lumbar one level (Bilateral, 05/28/2024); biopsy (May 2003); Eye surgery (april 2016); Head and neck surgery; and colonoscopy.  FAMILY HISTORY: family history includes Breast Cancer in his sister and sister; Colon Cancer in his sister and sister; Prostate Cancer in his maternal grandfather and paternal grandfather.  SOCIAL HISTORY:   reports that he has never smoked. He has never used smokeless tobacco. He reports current alcohol use. He reports that he does not use  "drugs.  Patient's living condition: lives with spouse    Post Discharge Medication Reconciliation Status:   MED REC REQUIRED  Post Medication Reconciliation Status: discharge medications reconciled and changed, per note/orders       Current Outpatient Medications   Medication Sig Dispense Refill    acetaminophen (TYLENOL) 500 MG tablet Take 1,000 mg by mouth 3 times daily.      albuterol (PROAIR HFA/PROVENTIL HFA/VENTOLIN HFA) 108 (90 Base) MCG/ACT inhaler Inhale 2 puffs into the lungs every 4 hours as needed for shortness of breath, wheezing or cough.      albuterol (PROVENTIL) (2.5 MG/3ML) 0.083% neb solution Take 2.5 mg by nebulization every 6 hours as needed for shortness of breath, wheezing or cough.      atenolol (TENORMIN) 50 MG tablet Take 1 tablet by mouth twice daily 180 tablet 3    ipratropium (ATROVENT) 0.06 % nasal spray Spray 2 sprays into both nostrils 4 times daily as needed for rhinitis.      losartan (COZAAR) 100 MG tablet Take 1 tablet by mouth once daily 90 tablet 0    multivitamin w/minerals (THERA-VIT-M) tablet Take 1 tablet by mouth daily.      oxyCODONE (ROXICODONE) 5 MG tablet Take 5 mg by mouth every 4 hours as needed for pain.      pramipexole (MIRAPEX) 1 MG tablet TAKE 1 TABLET BY MOUTH TWICE DAILY AT  4  PM  AND  9   tablet 3    sennosides (SENOKOT) 8.6 MG tablet Take 2 tablets by mouth 2 times daily as needed for constipation.      spironolactone (ALDACTONE) 25 MG tablet Take 50 mg by mouth daily.      traZODone (DESYREL) 50 MG tablet Take 1 tablet (50 mg) by mouth at bedtime. 30 tablet 3     No current facility-administered medications for this visit.       ROS:  4 point ROS including Respiratory, CV, GI and , other than that noted in the HPI,  is negative    Vitals:  BP (!) 173/90   Pulse 77   Temp 97.6  F (36.4  C)   Resp 18   Ht 1.803 m (5' 11\")   Wt 110 kg (242 lb 6.4 oz)   SpO2 (!) 88%   BMI 33.81 kg/m    Exam:  GENERAL APPEARANCE:  in no distress, appears " healthy  ENT:  Mouth and posterior oropharynx normal, moist mucous membranes  RESP:  lungs clear to auscultation , no respiratory distress  CV:  peripheral edema 2-3+ in lower legs, rate-normal  ABDOMEN:  bowel sounds normal  M/S:   Gait and station abnormal unsteady  SKIN:  Inspection of skin and subcutaneous tissue baseline  NEURO:   Examination of sensation by touch normal  PSYCH:  affect and mood normal    Lab/Diagnostic data:  [facility]. Please refer to them using [facility]/Care Everywhere.    ASSESSMENT/PLAN:    (S22.260D) Closed wedge compression fracture of T7 vertebra with routine healing, subsequent encounter  (primary encounter diagnosis)  (W19.XXXD) Fall, subsequent encounter  Comment: pain controlled, ambulatory  Plan:   -PT/OT eval and treat  -wear TLSO when OOB  -change APAP to TID  -discontinue lidocaine and robaxin  -discontinue insulin  -continue oxycodone PRN  -CBC, BMP, A1C on 7/2    (N18.32) Stage 3b chronic kidney disease (H)  Comment: creat 1.67, GFR 39  Plan:   -dose meds renally  -BMP on 7/2    (Z86.718) History of deep venous thrombosis (DVT) of distal vein of left lower extremity  Comment:  INR 1.9  Plan:  -warfarin 5mg daily  -recheck INR on 7/3    (I10) Essential hypertension  Comment: SBP's WNL  Plan:   -continue losartan, spironolactone, atenolol  -daily vitals    (E11.40) 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

## 2025-06-27 NOTE — TELEPHONE ENCOUNTER
Reason for Call:  Other call back    Detailed comments: Kenny Misty Colon is calling to request wound care orders - they state he never came home with them last time he was in the hospital - ok to leave detailed msg or fax orders to: [phone]    Phone Number Patient can be reached at: Other phone number:  [phone]     Best Time: any    Can we leave a detailed message on this number? YES    Call taken on 6/27/2025 at 2:27 PM by Sri Thornton

## 2025-06-29 LAB
GAMMA INTERFERON BACKGROUND BLD IA-ACNC: 0.11 IU/ML
M TB IFN-G BLD-IMP: NEGATIVE
M TB IFN-G CD4+ BCKGRND COR BLD-ACNC: 9.62 IU/ML
MITOGEN IGNF BCKGRD COR BLD-ACNC: -0.03 IU/ML
MITOGEN IGNF BCKGRD COR BLD-ACNC: 0 IU/ML
QUANTIFERON MITOGEN: 9.73 IU/ML
QUANTIFERON NIL TUBE: 0.11 IU/ML
QUANTIFERON TB1 TUBE: 0.11 IU/ML
QUANTIFERON TB2 TUBE: 0.08

## 2025-06-30 ENCOUNTER — TRANSITIONAL CARE UNIT VISIT (OUTPATIENT)
Dept: GERIATRICS | Facility: CLINIC | Age: 89
End: 2025-06-30
Payer: COMMERCIAL

## 2025-06-30 VITALS
BODY MASS INDEX: 33.94 KG/M2 | HEIGHT: 71 IN | DIASTOLIC BLOOD PRESSURE: 90 MMHG | RESPIRATION RATE: 18 BRPM | OXYGEN SATURATION: 88 % | HEART RATE: 77 BPM | TEMPERATURE: 97.6 F | WEIGHT: 242.4 LBS | SYSTOLIC BLOOD PRESSURE: 173 MMHG

## 2025-06-30 DIAGNOSIS — Z86.718 HISTORY OF DEEP VENOUS THROMBOSIS (DVT) OF DISTAL VEIN OF LEFT LOWER EXTREMITY: ICD-10-CM
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
## 2025-06-30 DIAGNOSIS — E11.40 CONTROLLED TYPE 2 DIABETES WITH NEUROPATHY (H): ICD-10-CM

## 2025-06-30 DIAGNOSIS — I87.2 VENOUS STASIS DERMATITIS OF RIGHT LOWER EXTREMITY: ICD-10-CM

## 2025-06-30 DIAGNOSIS — N18.32 STAGE 3B CHRONIC KIDNEY DISEASE (H): ICD-10-CM

## 2025-06-30 DIAGNOSIS — I10 ESSENTIAL HYPERTENSION: ICD-10-CM

## 2025-06-30 DIAGNOSIS — S22.060D CLOSED WEDGE COMPRESSION FRACTURE OF T7 VERTEBRA WITH ROUTINE HEALING, SUBSEQUENT ENCOUNTER: Primary | ICD-10-CM

## 2025-06-30 RX ORDER — IPRATROPIUM BROMIDE 42 UG/1
2 SPRAY, METERED NASAL 4 TIMES DAILY PRN
Status: ON HOLD | COMMUNITY

## 2025-06-30 RX ORDER — SPIRONOLACTONE 25 MG/1
50 TABLET ORAL DAILY
Status: ON HOLD | COMMUNITY

## 2025-06-30 RX ORDER — ALBUTEROL SULFATE 90 UG/1
2 INHALANT RESPIRATORY (INHALATION) EVERY 4 HOURS PRN
Status: ON HOLD | COMMUNITY

## 2025-06-30 RX ORDER — MULTIPLE VITAMINS W/ MINERALS TAB 9MG-400MCG
1 TAB ORAL DAILY
Status: ON HOLD | COMMUNITY

## 2025-06-30 RX ORDER — METHOCARBAMOL 750 MG/1
750 TABLET, FILM COATED ORAL 3 TIMES DAILY PRN
COMMUNITY
End: 2025-06-30

## 2025-06-30 RX ORDER — OXYCODONE HYDROCHLORIDE 5 MG/1
5 TABLET ORAL EVERY 4 HOURS PRN
Status: ON HOLD | COMMUNITY

## 2025-06-30 RX ORDER — ALBUTEROL SULFATE 0.83 MG/ML
2.5 SOLUTION RESPIRATORY (INHALATION) EVERY 6 HOURS PRN
Status: ON HOLD | COMMUNITY

## 2025-06-30 RX ORDER — ACETAMINOPHEN 500 MG
1000 TABLET ORAL 3 TIMES DAILY
Status: ON HOLD | COMMUNITY

## 2025-06-30 RX ORDER — LIDOCAINE 4 G/G
3 PATCH TOPICAL EVERY 24 HOURS
COMMUNITY
End: 2025-06-30

## 2025-06-30 RX ORDER — SENNOSIDES 8.6 MG
2 TABLET ORAL 2 TIMES DAILY PRN
Status: ON HOLD | COMMUNITY

## 2025-06-30 NOTE — TELEPHONE ENCOUNTER
Carlos calling to request a call from Rjei regarding leg wound that hasn't been healing. Please call pt back

## 2025-06-30 NOTE — LETTER
6/30/2025      Robert Richard  [address] [facility] [address]        [facility] GERIATRICS    PRIMARY CARE PROVIDER AND CLINIC: Stiven Donahue MD, [address] [facility] [address]  Chief Complaint   Patient presents with     Hospital F/U     [facility] 6/21/2025 - 6/26/2025      [place] Medical Record Number: [id number]  Place of Service where encounter took place: [facility] ([facility]) [8]    Robert Richard is a 89 year old (1935), admitted to the above facility from  [facility]. Hospital stay 6/21/25 through 6/26/25.  HPI:    Hospital Course:    DISCHARGE DIAGNOSES:  Principal Problem:  Fall from ground level  Active Problems:  RLS (restless legs syndrome)  Closed fracture of seventh thoracic vertebra (HCC)  Palliative care encounter  Advanced care planning/counseling discussion  Acute chest wall pain  Primary insomnia  HOSPITAL COURSE:  Robert Richard is a 89 y.o. male s/p fall sustaining T7 fx, pneumomediastinum, sm R PTX. Neurosurgery was consulted and no acute surgical intervention is needed with recommendations of TLSO brace when out of bed, patient's upright x-rays were stable, and the patient will follow-up in 4 weeks with repeat x-rays with neurosurgery. Over the course of his stay the patient developed an ARNALDO on top of his CKD, this was likely due to dehydration, the patient received fluids and additional p.o. hydration orders with improvement of his ARNALDO prior to discharge. Patient had an occult right pneumothorax on arrival, he remained on room air during his stay, and there were no acute concerns. Patient was assessed by physical and occupational therapies and deemed safe to discharge to [facility]. On day of discharge, patient was tolerating a regular diet, voiding without issue, pain well controlled on oral analgesics, and vitally stable. Patient education and return precautions provided. The patient was discharged in stable condition with  instructions to follow up as outlined above. Please see patient's permanent medical record for further hospital course.     Patient seen today for follow up, oriented, pain controlled, ambulatory, using TLSO, VS WNL, afebrile, lower legs slough with weeping and edema 2-3+, INR 1.9, does not want to take insulin, overall doing well.    CODE STATUS/ADVANCE DIRECTIVES DISCUSSION: Prior - CPR/Full code   ALLERGIES:   Allergies   Allergen Reactions     Gabapentin Dizziness     Lipitor [Atorvastatin Calcium] Muscle Pain (Myalgia)     And weakness     Pravastatin Muscle Pain (Myalgia)     Muscle aches     Simvastatin Muscle Pain (Myalgia)     Muscle aches     Statins Muscle Pain (Myalgia)     Muscle aches        PAST MEDICAL HISTORY:   Past Medical History:   Diagnosis Date     Basal cell carcinoma      Cancer (H)      DVT (deep venous thrombosis) (H) 11/2003     DVT (deep venous thrombosis) (H) 12/2008     DVT (deep venous thrombosis) (H) 10/2010     Factor V Leiden      Gout      Other and unspecified hyperlipidemia      Prostate cancer (H) 2003    prostatectomy      Restless leg syndrome      Sleep apnea      Unspecified essential hypertension       PAST SURGICAL HISTORY:   has a past surgical history that includes Laminectomy lumbar one level (12/2008); joint replacement (04/2011); REVISE TOTAL HIP REPLACEMENT (01/18/2013); Laparoscopic herniorrhaphy umbilical (N/A, 01/03/2019); appendectomy (1950); Fusion Transforaminal Lumbar Interbody, 1 Level, Posterior Approach, Using [facility] Surg Imaging Guidance (Bilateral, 05/28/2024); Laminectomy lumbar one level (Bilateral, 05/28/2024); biopsy (May 2003); Eye surgery (april 2016); Head and neck surgery; and colonoscopy.  FAMILY HISTORY: family history includes Breast Cancer in his sister and sister; Colon Cancer in his sister and sister; Prostate Cancer in his maternal grandfather and paternal grandfather.  SOCIAL HISTORY:   reports that he has never smoked. He has never used  "smokeless tobacco. He reports current alcohol use. He reports that he does not use drugs.  Patient's living condition: lives with spouse    Post Discharge Medication Reconciliation Status:   MED REC REQUIRED  Post Medication Reconciliation Status: discharge medications reconciled and changed, per note/orders       Current Outpatient Medications   Medication Sig Dispense Refill     acetaminophen (TYLENOL) 500 MG tablet Take 1,000 mg by mouth 3 times daily.       albuterol (PROAIR HFA/PROVENTIL HFA/VENTOLIN HFA) 108 (90 Base) MCG/ACT inhaler Inhale 2 puffs into the lungs every 4 hours as needed for shortness of breath, wheezing or cough.       albuterol (PROVENTIL) (2.5 MG/3ML) 0.083% neb solution Take 2.5 mg by nebulization every 6 hours as needed for shortness of breath, wheezing or cough.       atenolol (TENORMIN) 50 MG tablet Take 1 tablet by mouth twice daily 180 tablet 3     ipratropium (ATROVENT) 0.06 % nasal spray Spray 2 sprays into both nostrils 4 times daily as needed for rhinitis.       losartan (COZAAR) 100 MG tablet Take 1 tablet by mouth once daily 90 tablet 0     multivitamin w/minerals (THERA-VIT-M) tablet Take 1 tablet by mouth daily.       oxyCODONE (ROXICODONE) 5 MG tablet Take 5 mg by mouth every 4 hours as needed for pain.       pramipexole (MIRAPEX) 1 MG tablet TAKE 1 TABLET BY MOUTH TWICE DAILY AT  4  PM  AND  9   tablet 3     sennosides (SENOKOT) 8.6 MG tablet Take 2 tablets by mouth 2 times daily as needed for constipation.       spironolactone (ALDACTONE) 25 MG tablet Take 50 mg by mouth daily.       traZODone (DESYREL) 50 MG tablet Take 1 tablet (50 mg) by mouth at bedtime. 30 tablet 3     No current facility-administered medications for this visit.       ROS:  4 point ROS including Respiratory, CV, GI and , other than that noted in the HPI,  is negative    Vitals:  BP (!) 173/90   Pulse 77   Temp 97.6  F (36.4  C)   Resp 18   Ht 1.803 m (5' 11\")   Wt 110 kg (242 lb 6.4 oz)   " SpO2 (!) 88%   BMI 33.81 kg/m    Exam:  GENERAL APPEARANCE:  in no distress, appears healthy  ENT:  Mouth and posterior oropharynx normal, moist mucous membranes  RESP:  lungs clear to auscultation , no respiratory distress  CV:  peripheral edema 2-3+ in lower legs, rate-normal  ABDOMEN:  bowel sounds normal  M/S:   Gait and station abnormal unsteady  SKIN:  Inspection of skin and subcutaneous tissue baseline  NEURO:   Examination of sensation by touch normal  PSYCH:  affect and mood normal    Lab/Diagnostic data:  Labs done in [facility]F [facility]. Please refer to them using [facility] Everywhere.    ASSESSMENT/PLAN:    (S22.060D) Closed wedge compression fracture of T7 vertebra with routine healing, subsequent encounter  (primary encounter diagnosis)  (W19.XXXD) Fall, subsequent encounter  Comment: pain controlled, ambulatory  Plan:   -PT/OT eval and treat  -wear TLSO when OOB  -change APAP to TID  -discontinue lidocaine and robaxin  -discontinue insulin  -continue oxycodone PRN  -CBC, BMP, A1C on 7/2    (N18.32) Stage 3b chronic kidney disease (H)  Comment: creat 1.67, GFR 39  Plan:   -dose meds renally  -BMP on 7/2    (Z86.718) History of deep venous thrombosis (DVT) of distal vein of left lower extremity  Comment:  INR 1.9  Plan:  -warfarin 5mg daily  -recheck INR on 7/3    (I10) Essential hypertension  Comment: SBP's WNL  Plan:   -continue losartan, spironolactone, atenolol  -daily vitals    (E11.40) 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

## 2025-07-01 ENCOUNTER — APPOINTMENT (OUTPATIENT)
Dept: CT IMAGING | Facility: CLINIC | Age: 89
End: 2025-07-01
Attending: STUDENT IN AN ORGANIZED HEALTH CARE EDUCATION/TRAINING PROGRAM
Payer: COMMERCIAL

## 2025-07-01 ENCOUNTER — LAB REQUISITION (OUTPATIENT)
Dept: LAB | Facility: CLINIC | Age: 89
End: 2025-07-01
Payer: COMMERCIAL

## 2025-07-01 ENCOUNTER — APPOINTMENT (OUTPATIENT)
Dept: GENERAL RADIOLOGY | Facility: CLINIC | Age: 89
End: 2025-07-01
Attending: STUDENT IN AN ORGANIZED HEALTH CARE EDUCATION/TRAINING PROGRAM
Payer: COMMERCIAL

## 2025-07-01 ENCOUNTER — HOSPITAL ENCOUNTER (INPATIENT)
Facility: CLINIC | Age: 89
End: 2025-07-01
Attending: STUDENT IN AN ORGANIZED HEALTH CARE EDUCATION/TRAINING PROGRAM | Admitting: STUDENT IN AN ORGANIZED HEALTH CARE EDUCATION/TRAINING PROGRAM
Payer: COMMERCIAL

## 2025-07-01 DIAGNOSIS — E08.9 DIABETES MELLITUS DUE TO UNDERLYING CONDITION WITHOUT COMPLICATIONS (H): ICD-10-CM

## 2025-07-01 DIAGNOSIS — N18.9 CHRONIC KIDNEY DISEASE, UNSPECIFIED: ICD-10-CM

## 2025-07-01 DIAGNOSIS — R09.02 HYPOXIA: ICD-10-CM

## 2025-07-01 DIAGNOSIS — F51.01 PRIMARY INSOMNIA: ICD-10-CM

## 2025-07-01 DIAGNOSIS — I87.2 VENOUS STASIS DERMATITIS OF RIGHT LOWER EXTREMITY: Primary | ICD-10-CM

## 2025-07-01 DIAGNOSIS — Z71.89 OTHER SPECIFIED COUNSELING: Chronic | ICD-10-CM

## 2025-07-01 DIAGNOSIS — J18.9 PNEUMONIA OF RIGHT LUNG DUE TO INFECTIOUS ORGANISM, UNSPECIFIED PART OF LUNG: ICD-10-CM

## 2025-07-01 DIAGNOSIS — G93.41 METABOLIC ENCEPHALOPATHY: ICD-10-CM
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
## 2025-07-01 DIAGNOSIS — A41.9 SEPSIS, DUE TO UNSPECIFIED ORGANISM, UNSPECIFIED WHETHER ACUTE ORGAN DYSFUNCTION PRESENT (H): ICD-10-CM

## 2025-07-01 DIAGNOSIS — D64.9 ANEMIA, UNSPECIFIED: ICD-10-CM

## 2025-07-01 DIAGNOSIS — E11.9 TYPE 2 DIABETES MELLITUS WITHOUT COMPLICATION, WITHOUT LONG-TERM CURRENT USE OF INSULIN (H): ICD-10-CM

## 2025-07-01 LAB
ALBUMIN SERPL BCG-MCNC: 3.7 G/DL (ref 3.5–5.2)
ALBUMIN UR-MCNC: 100 MG/DL
ALP SERPL-CCNC: 121 U/L (ref 40–150)
ALT SERPL W P-5'-P-CCNC: 20 U/L (ref 0–70)
ANION GAP SERPL CALCULATED.3IONS-SCNC: 13 MMOL/L (ref 7–15)
APPEARANCE UR: CLEAR
AST SERPL W P-5'-P-CCNC: 24 U/L (ref 0–45)
ATRIAL RATE - MUSE: 96 BPM
BASE EXCESS BLDV CALC-SCNC: 1.6 MMOL/L (ref -3–3)
BASOPHILS # BLD AUTO: 0 10E3/UL (ref 0–0.2)
BASOPHILS NFR BLD AUTO: 0 %
BILIRUB SERPL-MCNC: 0.6 MG/DL
BILIRUB UR QL STRIP: NEGATIVE
BUN SERPL-MCNC: 25.4 MG/DL (ref 8–23)
CALCIUM SERPL-MCNC: 9.4 MG/DL (ref 8.8–10.4)
CHLORIDE SERPL-SCNC: 100 MMOL/L (ref 98–107)
CK SERPL-CCNC: 158 U/L (ref 39–308)
COLOR UR AUTO: ABNORMAL
CREAT SERPL-MCNC: 1.61 MG/DL (ref 0.67–1.17)
CRP SERPL-MCNC: 57.93 MG/L
D DIMER PPP FEU-MCNC: 3.87 UG/ML FEU (ref 0–0.5)
DIASTOLIC BLOOD PRESSURE - MUSE: NORMAL MMHG
EGFRCR SERPLBLD CKD-EPI 2021: 41 ML/MIN/1.73M2
EOSINOPHIL # BLD AUTO: 0 10E3/UL (ref 0–0.7)
EOSINOPHIL NFR BLD AUTO: 0 %
ERYTHROCYTE [DISTWIDTH] IN BLOOD BY AUTOMATED COUNT: 12.8 % (ref 10–15)
FLUAV RNA SPEC QL NAA+PROBE: NEGATIVE
FLUBV RNA RESP QL NAA+PROBE: NEGATIVE
GLUCOSE BLDC GLUCOMTR-MCNC: 129 MG/DL (ref 70–99)
GLUCOSE BLDC GLUCOMTR-MCNC: 146 MG/DL (ref 70–99)
GLUCOSE SERPL-MCNC: 208 MG/DL (ref 70–99)
GLUCOSE UR STRIP-MCNC: 50 MG/DL
HCO3 BLDV-SCNC: 27 MMOL/L (ref 21–28)
HCO3 SERPL-SCNC: 23 MMOL/L (ref 22–29)
HCT VFR BLD AUTO: 35.9 % (ref 40–53)
HGB BLD-MCNC: 11.9 G/DL (ref 13.3–17.7)
HGB UR QL STRIP: ABNORMAL
HOLD SPECIMEN: NORMAL
IMM GRANULOCYTES # BLD: 0.1 10E3/UL
IMM GRANULOCYTES NFR BLD: 1 %
INR PPP: 1.7 (ref 0.85–1.15)
INR PPP: 1.93 (ref 0.85–1.15)
INTERPRETATION ECG - MUSE: NORMAL
KETONES UR STRIP-MCNC: NEGATIVE MG/DL
LACTATE SERPL-SCNC: 1.7 MMOL/L (ref 0.7–2)
LEUKOCYTE ESTERASE UR QL STRIP: NEGATIVE
LYMPHOCYTES # BLD AUTO: 0.9 10E3/UL (ref 0.8–5.3)
LYMPHOCYTES NFR BLD AUTO: 6 %
MAGNESIUM SERPL-MCNC: 1.7 MG/DL (ref 1.7–2.3)
MCH RBC QN AUTO: 30.7 PG (ref 26.5–33)
MCHC RBC AUTO-ENTMCNC: 33.1 G/DL (ref 31.5–36.5)
MCV RBC AUTO: 93 FL (ref 78–100)
MONOCYTES # BLD AUTO: 0.9 10E3/UL (ref 0–1.3)
MONOCYTES NFR BLD AUTO: 6 %
MRSA DNA SPEC QL NAA+PROBE: NEGATIVE
MUCOUS THREADS #/AREA URNS LPF: PRESENT /LPF
NEUTROPHILS # BLD AUTO: 12.3 10E3/UL (ref 1.6–8.3)
NEUTROPHILS NFR BLD AUTO: 87 %
NITRATE UR QL: NEGATIVE
NRBC # BLD AUTO: 0 10E3/UL
NRBC BLD AUTO-RTO: 0 /100
NT-PROBNP SERPL-MCNC: 4438 PG/ML (ref 0–852)
O2/TOTAL GAS SETTING VFR VENT: 28 %
OXYHGB MFR BLDV: 44 % (ref 70–75)
P AXIS - MUSE: 48 DEGREES
PCO2 BLDV: 43 MM HG (ref 40–50)
PH BLDV: 7.4 [PH] (ref 7.32–7.43)
PH UR STRIP: 5.5 [PH] (ref 5–7)
PLATELET # BLD AUTO: 214 10E3/UL (ref 150–450)
PO2 BLDV: 26 MM HG (ref 25–47)
POTASSIUM SERPL-SCNC: 4.7 MMOL/L (ref 3.4–5.3)
PR INTERVAL - MUSE: 174 MS
PROCALCITONIN SERPL IA-MCNC: 2.16 NG/ML
PROT SERPL-MCNC: 7.5 G/DL (ref 6.4–8.3)
PROTHROMBIN TIME: 19.9 SECONDS (ref 11.8–14.8)
PROTHROMBIN TIME: 21.9 SECONDS (ref 11.8–14.8)
QRS DURATION - MUSE: 82 MS
QT - MUSE: 346 MS
QTC - MUSE: 437 MS
R AXIS - MUSE: 41 DEGREES
RBC # BLD AUTO: 3.88 10E6/UL (ref 4.4–5.9)
RBC URINE: 8 /HPF
RSV RNA SPEC NAA+PROBE: NEGATIVE
SA TARGET DNA: NEGATIVE
SAO2 % BLDV: 45.2 % (ref 70–75)
SARS-COV-2 RNA RESP QL NAA+PROBE: NEGATIVE
SODIUM SERPL-SCNC: 136 MMOL/L (ref 135–145)
SP GR UR STRIP: 1.02 (ref 1–1.03)
SQUAMOUS EPITHELIAL: <1 /HPF
SYSTOLIC BLOOD PRESSURE - MUSE: NORMAL MMHG
T AXIS - MUSE: 48 DEGREES
TROPONIN T SERPL HS-MCNC: 58 NG/L
TROPONIN T SERPL HS-MCNC: <6 NG/L
TSH SERPL DL<=0.005 MIU/L-ACNC: 1.59 UIU/ML (ref 0.3–4.2)
UROBILINOGEN UR STRIP-MCNC: NORMAL MG/DL
VENTRICULAR RATE- MUSE: 96 BPM
WBC # BLD AUTO: 14.2 10E3/UL (ref 4–11)
WBC URINE: 0 /HPF

## 2025-07-01 PROCEDURE — 36415 COLL VENOUS BLD VENIPUNCTURE: CPT | Performed by: STUDENT IN AN ORGANIZED HEALTH CARE EDUCATION/TRAINING PROGRAM

## 2025-07-01 PROCEDURE — 96365 THER/PROPH/DIAG IV INF INIT: CPT

## 2025-07-01 PROCEDURE — 99418 PROLNG IP/OBS E/M EA 15 MIN: CPT | Performed by: STUDENT IN AN ORGANIZED HEALTH CARE EDUCATION/TRAINING PROGRAM

## 2025-07-01 PROCEDURE — 120N000013 HC R&B IMCU

## 2025-07-01 PROCEDURE — 71045 X-RAY EXAM CHEST 1 VIEW: CPT

## 2025-07-01 PROCEDURE — 82550 ASSAY OF CK (CPK): CPT | Performed by: STUDENT IN AN ORGANIZED HEALTH CARE EDUCATION/TRAINING PROGRAM

## 2025-07-01 PROCEDURE — 87154 CUL TYP ID BLD PTHGN 6+ TRGT: CPT | Performed by: STUDENT IN AN ORGANIZED HEALTH CARE EDUCATION/TRAINING PROGRAM

## 2025-07-01 PROCEDURE — 96375 TX/PRO/DX INJ NEW DRUG ADDON: CPT

## 2025-07-01 PROCEDURE — 85379 FIBRIN DEGRADATION QUANT: CPT | Performed by: STUDENT IN AN ORGANIZED HEALTH CARE EDUCATION/TRAINING PROGRAM

## 2025-07-01 PROCEDURE — 99291 CRITICAL CARE FIRST HOUR: CPT | Mod: 25

## 2025-07-01 PROCEDURE — 87641 MR-STAPH DNA AMP PROBE: CPT | Performed by: STUDENT IN AN ORGANIZED HEALTH CARE EDUCATION/TRAINING PROGRAM

## 2025-07-01 PROCEDURE — 250N000009 HC RX 250: Performed by: STUDENT IN AN ORGANIZED HEALTH CARE EDUCATION/TRAINING PROGRAM

## 2025-07-01 PROCEDURE — 250N000013 HC RX MED GY IP 250 OP 250 PS 637: Performed by: STUDENT IN AN ORGANIZED HEALTH CARE EDUCATION/TRAINING PROGRAM

## 2025-07-01 PROCEDURE — 84443 ASSAY THYROID STIM HORMONE: CPT | Performed by: STUDENT IN AN ORGANIZED HEALTH CARE EDUCATION/TRAINING PROGRAM

## 2025-07-01 PROCEDURE — 250N000011 HC RX IP 250 OP 636: Performed by: STUDENT IN AN ORGANIZED HEALTH CARE EDUCATION/TRAINING PROGRAM

## 2025-07-01 PROCEDURE — 94640 AIRWAY INHALATION TREATMENT: CPT

## 2025-07-01 PROCEDURE — 84155 ASSAY OF PROTEIN SERUM: CPT | Performed by: STUDENT IN AN ORGANIZED HEALTH CARE EDUCATION/TRAINING PROGRAM

## 2025-07-01 PROCEDURE — 99223 1ST HOSP IP/OBS HIGH 75: CPT | Performed by: STUDENT IN AN ORGANIZED HEALTH CARE EDUCATION/TRAINING PROGRAM

## 2025-07-01 PROCEDURE — 83880 ASSAY OF NATRIURETIC PEPTIDE: CPT | Performed by: STUDENT IN AN ORGANIZED HEALTH CARE EDUCATION/TRAINING PROGRAM

## 2025-07-01 PROCEDURE — 86140 C-REACTIVE PROTEIN: CPT | Performed by: STUDENT IN AN ORGANIZED HEALTH CARE EDUCATION/TRAINING PROGRAM

## 2025-07-01 PROCEDURE — 250N000009 HC RX 250

## 2025-07-01 PROCEDURE — 85025 COMPLETE CBC W/AUTO DIFF WBC: CPT | Performed by: STUDENT IN AN ORGANIZED HEALTH CARE EDUCATION/TRAINING PROGRAM

## 2025-07-01 PROCEDURE — 87186 SC STD MICRODIL/AGAR DIL: CPT | Performed by: STUDENT IN AN ORGANIZED HEALTH CARE EDUCATION/TRAINING PROGRAM

## 2025-07-01 PROCEDURE — 83605 ASSAY OF LACTIC ACID: CPT | Performed by: STUDENT IN AN ORGANIZED HEALTH CARE EDUCATION/TRAINING PROGRAM

## 2025-07-01 PROCEDURE — 83735 ASSAY OF MAGNESIUM: CPT | Performed by: STUDENT IN AN ORGANIZED HEALTH CARE EDUCATION/TRAINING PROGRAM

## 2025-07-01 PROCEDURE — 258N000003 HC RX IP 258 OP 636: Performed by: STUDENT IN AN ORGANIZED HEALTH CARE EDUCATION/TRAINING PROGRAM

## 2025-07-01 PROCEDURE — 70450 CT HEAD/BRAIN W/O DYE: CPT

## 2025-07-01 PROCEDURE — 93010 ELECTROCARDIOGRAM REPORT: CPT | Performed by: STUDENT IN AN ORGANIZED HEALTH CARE EDUCATION/TRAINING PROGRAM

## 2025-07-01 PROCEDURE — 94660 CPAP INITIATION&MGMT: CPT

## 2025-07-01 PROCEDURE — 85610 PROTHROMBIN TIME: CPT | Performed by: STUDENT IN AN ORGANIZED HEALTH CARE EDUCATION/TRAINING PROGRAM

## 2025-07-01 PROCEDURE — 72125 CT NECK SPINE W/O DYE: CPT

## 2025-07-01 PROCEDURE — 87040 BLOOD CULTURE FOR BACTERIA: CPT | Performed by: STUDENT IN AN ORGANIZED HEALTH CARE EDUCATION/TRAINING PROGRAM

## 2025-07-01 PROCEDURE — 81001 URINALYSIS AUTO W/SCOPE: CPT | Performed by: STUDENT IN AN ORGANIZED HEALTH CARE EDUCATION/TRAINING PROGRAM

## 2025-07-01 PROCEDURE — 99285 EMERGENCY DEPT VISIT HI MDM: CPT | Performed by: STUDENT IN AN ORGANIZED HEALTH CARE EDUCATION/TRAINING PROGRAM

## 2025-07-01 PROCEDURE — 87637 SARSCOV2&INF A&B&RSV AMP PRB: CPT | Performed by: STUDENT IN AN ORGANIZED HEALTH CARE EDUCATION/TRAINING PROGRAM

## 2025-07-01 PROCEDURE — 999N000157 HC STATISTIC RCP TIME EA 10 MIN

## 2025-07-01 PROCEDURE — 84145 PROCALCITONIN (PCT): CPT | Performed by: STUDENT IN AN ORGANIZED HEALTH CARE EDUCATION/TRAINING PROGRAM

## 2025-07-01 PROCEDURE — 96372 THER/PROPH/DIAG INJ SC/IM: CPT | Performed by: STUDENT IN AN ORGANIZED HEALTH CARE EDUCATION/TRAINING PROGRAM

## 2025-07-01 PROCEDURE — 96367 TX/PROPH/DG ADDL SEQ IV INF: CPT

## 2025-07-01 PROCEDURE — 84484 ASSAY OF TROPONIN QUANT: CPT | Performed by: STUDENT IN AN ORGANIZED HEALTH CARE EDUCATION/TRAINING PROGRAM

## 2025-07-01 PROCEDURE — 99292 CRITICAL CARE ADDL 30 MIN: CPT

## 2025-07-01 PROCEDURE — 82805 BLOOD GASES W/O2 SATURATION: CPT | Performed by: STUDENT IN AN ORGANIZED HEALTH CARE EDUCATION/TRAINING PROGRAM

## 2025-07-01 RX ORDER — AMOXICILLIN 250 MG
2 CAPSULE ORAL 2 TIMES DAILY PRN
Status: ACTIVE | OUTPATIENT
Start: 2025-07-01

## 2025-07-01 RX ORDER — ALBUTEROL SULFATE 0.83 MG/ML
2.5 SOLUTION RESPIRATORY (INHALATION)
Status: DISCONTINUED | OUTPATIENT
Start: 2025-07-01 | End: 2025-07-01

## 2025-07-01 RX ORDER — DIAZEPAM 10 MG/2ML
2.5 INJECTION, SOLUTION INTRAMUSCULAR; INTRAVENOUS ONCE
Status: COMPLETED | OUTPATIENT
Start: 2025-07-01 | End: 2025-07-01

## 2025-07-01 RX ORDER — METOPROLOL TARTRATE 1 MG/ML
2.5 INJECTION, SOLUTION INTRAVENOUS EVERY 12 HOURS
Status: DISCONTINUED | OUTPATIENT
Start: 2025-07-01 | End: 2025-07-02

## 2025-07-01 RX ORDER — CEFAZOLIN SODIUM 1 G/50ML
2000 SOLUTION INTRAVENOUS ONCE
Status: COMPLETED | OUTPATIENT
Start: 2025-07-01 | End: 2025-07-01

## 2025-07-01 RX ORDER — ENOXAPARIN SODIUM 100 MG/ML
40 INJECTION SUBCUTANEOUS EVERY 24 HOURS
Status: DISCONTINUED | OUTPATIENT
Start: 2025-07-01 | End: 2025-07-02

## 2025-07-01 RX ORDER — OLANZAPINE 10 MG/2ML
5 INJECTION, POWDER, FOR SOLUTION INTRAMUSCULAR ONCE
Status: COMPLETED | OUTPATIENT
Start: 2025-07-01 | End: 2025-07-01

## 2025-07-01 RX ORDER — KETOROLAC TROMETHAMINE 15 MG/ML
15 INJECTION, SOLUTION INTRAMUSCULAR; INTRAVENOUS ONCE
Status: COMPLETED | OUTPATIENT
Start: 2025-07-01 | End: 2025-07-01

## 2025-07-01 RX ORDER — CALCIUM CARBONATE 500 MG/1
1000 TABLET, CHEWABLE ORAL 4 TIMES DAILY PRN
Status: ACTIVE | OUTPATIENT
Start: 2025-07-01

## 2025-07-01 RX ORDER — ACETAMINOPHEN 325 MG/1
650 TABLET ORAL EVERY 4 HOURS PRN
Status: DISPENSED | OUTPATIENT
Start: 2025-07-01

## 2025-07-01 RX ORDER — CEFTRIAXONE 1 G/1
1 INJECTION, POWDER, FOR SOLUTION INTRAMUSCULAR; INTRAVENOUS ONCE
Status: COMPLETED | OUTPATIENT
Start: 2025-07-01 | End: 2025-07-01

## 2025-07-01 RX ORDER — ACETAMINOPHEN 650 MG/1
650 SUPPOSITORY RECTAL ONCE
Status: COMPLETED | OUTPATIENT
Start: 2025-07-01 | End: 2025-07-01

## 2025-07-01 RX ORDER — NICOTINE POLACRILEX 4 MG
15-30 LOZENGE BUCCAL
Status: ACTIVE | OUTPATIENT
Start: 2025-07-01

## 2025-07-01 RX ORDER — ORPHENADRINE CITRATE 30 MG/ML
60 INJECTION INTRAMUSCULAR; INTRAVENOUS ONCE
Status: COMPLETED | OUTPATIENT
Start: 2025-07-01 | End: 2025-07-01

## 2025-07-01 RX ORDER — AMOXICILLIN 250 MG
1 CAPSULE ORAL 2 TIMES DAILY PRN
Status: ACTIVE | OUTPATIENT
Start: 2025-07-01

## 2025-07-01 RX ORDER — PRAMIPEXOLE DIHYDROCHLORIDE 1 MG/1
1 TABLET ORAL 2 TIMES DAILY
Status: ON HOLD | COMMUNITY

## 2025-07-01 RX ORDER — WARFARIN SODIUM 5 MG/1
5 TABLET ORAL
Status: COMPLETED | OUTPATIENT
Start: 2025-07-01 | End: 2025-07-01

## 2025-07-01 RX ORDER — VANCOMYCIN HYDROCHLORIDE 500 MG/10ML
500 INJECTION, POWDER, LYOPHILIZED, FOR SOLUTION INTRAVENOUS EVERY 12 HOURS
Status: DISCONTINUED | OUTPATIENT
Start: 2025-07-02 | End: 2025-07-03

## 2025-07-01 RX ORDER — LIDOCAINE 40 MG/G
CREAM TOPICAL
Status: ACTIVE | OUTPATIENT
Start: 2025-07-01

## 2025-07-01 RX ORDER — DEXTROSE MONOHYDRATE 25 G/50ML
25-50 INJECTION, SOLUTION INTRAVENOUS
Status: ACTIVE | OUTPATIENT
Start: 2025-07-01

## 2025-07-01 RX ORDER — ALBUTEROL SULFATE 0.83 MG/ML
2.5 SOLUTION RESPIRATORY (INHALATION) 4 TIMES DAILY
Status: DISCONTINUED | OUTPATIENT
Start: 2025-07-01 | End: 2025-07-02

## 2025-07-01 RX ORDER — GABAPENTIN 100 MG/1
100 CAPSULE ORAL 3 TIMES DAILY
Status: ON HOLD | COMMUNITY

## 2025-07-01 RX ORDER — BUMETANIDE 0.25 MG/ML
0.5 INJECTION, SOLUTION INTRAMUSCULAR; INTRAVENOUS ONCE
Status: COMPLETED | OUTPATIENT
Start: 2025-07-01 | End: 2025-07-01

## 2025-07-01 RX ORDER — ENALAPRILAT 1.25 MG/ML
1.25 INJECTION INTRAVENOUS DAILY PRN
Status: DISCONTINUED | OUTPATIENT
Start: 2025-07-01 | End: 2025-07-01

## 2025-07-01 RX ORDER — CEFTRIAXONE 2 G/1
2 INJECTION, POWDER, FOR SOLUTION INTRAMUSCULAR; INTRAVENOUS EVERY 24 HOURS
Status: DISPENSED | OUTPATIENT
Start: 2025-07-02

## 2025-07-01 RX ORDER — SODIUM CHLORIDE 9 MG/ML
INJECTION, SOLUTION INTRAVENOUS CONTINUOUS
Status: DISCONTINUED | OUTPATIENT
Start: 2025-07-01 | End: 2025-07-03

## 2025-07-01 RX ORDER — DOXYCYCLINE 100 MG/10ML
100 INJECTION, POWDER, LYOPHILIZED, FOR SOLUTION INTRAVENOUS ONCE
Status: COMPLETED | OUTPATIENT
Start: 2025-07-01 | End: 2025-07-01

## 2025-07-01 RX ORDER — WARFARIN SODIUM 5 MG/1
5 TABLET ORAL EVERY EVENING
Status: ON HOLD | COMMUNITY

## 2025-07-01 RX ORDER — ACETAMINOPHEN 650 MG/1
650 SUPPOSITORY RECTAL EVERY 4 HOURS PRN
Status: DISPENSED | OUTPATIENT
Start: 2025-07-01

## 2025-07-01 RX ORDER — DIAZEPAM 10 MG/2ML
5 INJECTION, SOLUTION INTRAMUSCULAR; INTRAVENOUS ONCE
Status: DISCONTINUED | OUTPATIENT
Start: 2025-07-01 | End: 2025-07-01

## 2025-07-01 RX ORDER — FENTANYL CITRATE 50 UG/ML
50 INJECTION, SOLUTION INTRAMUSCULAR; INTRAVENOUS ONCE
Status: COMPLETED | OUTPATIENT
Start: 2025-07-01 | End: 2025-07-01

## 2025-07-01 RX ORDER — ENALAPRILAT 1.25 MG/ML
1.25 INJECTION INTRAVENOUS EVERY 6 HOURS PRN
Status: DISCONTINUED | OUTPATIENT
Start: 2025-07-01 | End: 2025-07-02

## 2025-07-01 RX ORDER — DIAZEPAM 10 MG/2ML
2.5 INJECTION, SOLUTION INTRAMUSCULAR; INTRAVENOUS EVERY 4 HOURS PRN
Status: DISPENSED | OUTPATIENT
Start: 2025-07-01

## 2025-07-01 RX ORDER — WARFARIN SODIUM 5 MG/1
5 TABLET ORAL EVERY EVENING
Status: DISCONTINUED | OUTPATIENT
Start: 2025-07-01 | End: 2025-07-01

## 2025-07-01 RX ADMIN — DOXYCYCLINE 100 MG: 100 INJECTION, POWDER, LYOPHILIZED, FOR SOLUTION INTRAVENOUS at 12:01

## 2025-07-01 RX ADMIN — OLANZAPINE 5 MG: 10 INJECTION, POWDER, FOR SOLUTION INTRAMUSCULAR at 10:02

## 2025-07-01 RX ADMIN — CEFTRIAXONE SODIUM 1 G: 1 INJECTION, POWDER, FOR SOLUTION INTRAMUSCULAR; INTRAVENOUS at 10:41

## 2025-07-01 RX ADMIN — ACETAMINOPHEN 650 MG: 650 SUPPOSITORY RECTAL at 15:49

## 2025-07-01 RX ADMIN — DIAZEPAM 2.5 MG: 5 INJECTION INTRAMUSCULAR; INTRAVENOUS at 18:32

## 2025-07-01 RX ADMIN — ORPHENADRINE CITRATE 60 MG: 60 INJECTION INTRAMUSCULAR; INTRAVENOUS at 16:02

## 2025-07-01 RX ADMIN — ACETAMINOPHEN 650 MG: 650 SUPPOSITORY RECTAL at 11:22

## 2025-07-01 RX ADMIN — SODIUM CHLORIDE 500 ML: 0.9 INJECTION, SOLUTION INTRAVENOUS at 11:36

## 2025-07-01 RX ADMIN — SODIUM CHLORIDE: 0.9 INJECTION, SOLUTION INTRAVENOUS at 12:47

## 2025-07-01 RX ADMIN — METOPROLOL TARTRATE 2.5 MG: 1 INJECTION, SOLUTION INTRAVENOUS at 15:33

## 2025-07-01 RX ADMIN — OLANZAPINE 5 MG: 10 INJECTION, POWDER, FOR SOLUTION INTRAMUSCULAR at 09:51

## 2025-07-01 RX ADMIN — VANCOMYCIN HYDROCHLORIDE 2000 MG: 1 INJECTION, POWDER, LYOPHILIZED, FOR SOLUTION INTRAVENOUS at 16:02

## 2025-07-01 RX ADMIN — BUMETANIDE 0.5 MG: 0.25 INJECTION INTRAMUSCULAR; INTRAVENOUS at 17:39

## 2025-07-01 RX ADMIN — CEFTRIAXONE SODIUM 1 G: 1 INJECTION, POWDER, FOR SOLUTION INTRAMUSCULAR; INTRAVENOUS at 20:16

## 2025-07-01 RX ADMIN — FENTANYL CITRATE 50 MCG: 50 INJECTION, SOLUTION INTRAMUSCULAR; INTRAVENOUS at 11:47

## 2025-07-01 RX ADMIN — ENOXAPARIN SODIUM 40 MG: 40 INJECTION SUBCUTANEOUS at 18:32

## 2025-07-01 RX ADMIN — DIAZEPAM 2.5 MG: 5 INJECTION INTRAMUSCULAR; INTRAVENOUS at 11:19

## 2025-07-01 RX ADMIN — ALBUTEROL SULFATE 2.5 MG: 2.5 SOLUTION RESPIRATORY (INHALATION) at 19:52

## 2025-07-01 RX ADMIN — KETOROLAC TROMETHAMINE 15 MG: 15 INJECTION, SOLUTION INTRAMUSCULAR; INTRAVENOUS at 13:40

## 2025-07-01 RX ADMIN — DIAZEPAM 2.5 MG: 5 INJECTION INTRAMUSCULAR; INTRAVENOUS at 21:45

## 2025-07-01 ASSESSMENT — COLUMBIA-SUICIDE SEVERITY RATING SCALE - C-SSRS
1. IN THE PAST MONTH, HAVE YOU WISHED YOU WERE DEAD OR WISHED YOU COULD GO TO SLEEP AND NOT WAKE UP?: NO
6. HAVE YOU EVER DONE ANYTHING, STARTED TO DO ANYTHING, OR PREPARED TO DO ANYTHING TO END YOUR LIFE?: NO
2. HAVE YOU ACTUALLY HAD ANY THOUGHTS OF KILLING YOURSELF IN THE PAST MONTH?: NO

## 2025-07-01 ASSESSMENT — ACTIVITIES OF DAILY LIVING (ADL)
ADLS_ACUITY_SCORE: 58
ADLS_ACUITY_SCORE: 62
ADLS_ACUITY_SCORE: 67
ADLS_ACUITY_SCORE: 57
ADLS_ACUITY_SCORE: 57
ADLS_ACUITY_SCORE: 67
ADLS_ACUITY_SCORE: 67
ADLS_ACUITY_SCORE: 62
ADLS_ACUITY_SCORE: 68
ADLS_ACUITY_SCORE: 67
ADLS_ACUITY_SCORE: 58
ADLS_ACUITY_SCORE: 66
ADLS_ACUITY_SCORE: 62
ADLS_ACUITY_SCORE: 62
ADLS_ACUITY_SCORE: 68

## 2025-07-01 NOTE — ED NOTES
Patient pulling off primafit- RN and EDT placed brief and repositioned patient. Pt continues to be restless

## 2025-07-01 NOTE — ED NOTES
Unable to obtain accurate BP due to patient's restlessness. Attempted to reassure and hold patient's arm while taking but patient continues to move around. Kenyatta Baldwin RN

## 2025-07-01 NOTE — ED TRIAGE NOTES
Patient comes by EMS after [facility]. Fall occurred about 0630 and EMS states the day staff did not know much about the event but did not think pt hit his head or had LOC, pt is on Warfarin. Patient seems to have some confusion but does state that he hit the back of his head. Trauma eval called. Patient incontinent of urine upon arrival.       Duo Neb en route

## 2025-07-01 NOTE — ED NOTES
Patient is back from imaging and is extremely restless. Patient's spouse, Opal states that he has had restless legs for years, but this is nothing like that. She reports he is very agitated and is normally very calm and pleasant. She reports this is very out of his norm. Dr. Foster notified. Kenyatta Baldwin RN

## 2025-07-01 NOTE — ED NOTES
Patient continues to be restless in bed after zyprexa, valium, and fentanyl. Pt was repositioned in bed with primafit in place. Pt disoriented Xs 4. Pt receiving antibiotics and fluids at this time. TITUS Keene

## 2025-07-01 NOTE — PLAN OF CARE
Goal Outcome Evaluation:      Plan of Care Reviewed With: patient, child    Overall Patient Progress: no changeOverall Patient Progress: no change    Outcome Evaluation: Pt able to state first, last name, and .  Still otherwise disoriented and lethargic, but restless.  BP labile due to moving arm.  Currently on 1 L O2 NC keeping sats >90%.  Max temp 101 axillary. PRN tylenol suppository x 1 with 99.4 rectal recheck.  Denies pain. Primofit in place and due to void. Bladder scan 78. Bumex IV x 1.  NS @150ml/hr. 1-2+ BLE edema.  Wound cleansed and dressed with foam dressing. Plan for WOC to see tomorrow. Diazepam x 1 for continued restlessness.  Lovenox given in place of coumadin. Antibotics IV Vancomycin and Rocephin. Family at bedside.

## 2025-07-01 NOTE — H&P
[facility]    History and Physical - Hospitalist Service       Date of Admission:  7/1/2025    Assessment & Plan      Robert Richard is a 89 year old male with a medical history of diabetes mellitus type 2, essential hypertension, CKD stage IIIb, who was recently hospitalized at [facility] from May 21 to May 26 after a fall that caused a T7 fracture, pneumomediastinum and small right pneumothorax, and was discharged to [facility] TCU.  Patient was brought in to the emergency department via EMS after he was found on the ground by staff this morning at 6:30 AM, was noted to have a saturation of 86% on room air.  Workup in the emergency room notable for altered mental status, patient met sepsis criteria, source thought to be respiratory and admitted on 7/1/2025 for further workup.      AMS; Acute Toxic metabolic Encephalopathy  Restlessness  Patient presented with altered mental status, agitation, restlessness.  CT head without evidence for acute intracranial process. UA without infection, LA within normal limits, VBG 7.4 /43, WBC 14.2, Cr 1.61, Troponin 52, trended down to less than 6, EKG normal sinus rhythm with PVCs, Hgb 11.9, , electrolytes within normal limits. Etiology of encephalopathy likely multi-factorial I, strongly suspecting infection.  As per wife and daughter, granddaughter saw patient last night and he was in his usual state of health, just more fatigued.  - No concern for drug overdose.  Patient does have oxycodone 5 mg every 4 hours as needed as needed, no last dose indicated at TCU  - Patient has been having a cough and some sputum production, chest x-ray showed shallow inspiration mild opacity in the right lung base may be related to atelectasis or infection.  No pneumothorax  - Adding on TSH  - Treating infection and watching for resolution  - In the emergency room received Valium IV 2.5 mg times once, Zyprexa IV 5 mg x 2  -  Patient does have a history of restless leg syndrome, wife and daughter state that the only thing that has helped him in the past was pramipexole.   --  At this time patient is not taking medications orally, will order Norflex 60 mg IV x 1 dose      Fever - temperature on admission 102.4 rectally.    Patient does meet sepsis criteria based on fever, tachycardia, elevated WBC, likely source of infection pulmonary versus soft tissue, encephalopathy  Possible respiratory infection, right lung base  -- Repeat CBC with diff tomorrow morning, follow blood cultures,  -- Adding on CRP, procalcitonin  -- Patient not hypotensive, lactic acid within normal limits  -- In the emergency room received 500 cc fluid bolus, may need more fluids, adding on proBNP to assess fluid status  -- Patient received 1 g ceftriaxone, adding 1 more gram today   -- Continue ceftriaxone IV 2 g daily  -- Adding IV vancomycin, pharmacy to dose  -- MRSA added     Addendum: Procalcitonin elevated at 2.16, CRP elevated at 57, repeat CRP tomorrow morning      Acute hypoxic respiratory failure  -- Requiring 3 L/min to keep SpO2 above 90%.  Patient does not wear oxygen at baseline.   VBG pH 7.4, pCO2 43.  -- Added on proBNP once patient came up to the floor  -- Added on D-dimer, age-adjusted D-dimer, VTE unlikely, patient is anticoagulated with warfarin    Addendum/update :   proBNP elevated at over 4000  -- Stat echo ordered, this would likely be done tomorrow morning  -- Patient is not taking any medications orally, is usually on spironolactone 50 mg daily, will give a  dose of Bumex 0.5 mg IV x 1 dose      Venous stasis dermatitis, bilaterally  Lower extremity edema, some open shallow wounds  Could be the source of infection  PTA spironolactone  -- Follows with wound care  -- Wound care order placed      Fall  Recent closed fracture of the seventh thoracic vertebra  Patient was hospitalized at [facility]  from 6/21/2025 to 6/26/2025 for a fall  sustaining a T7 fracture, pneumomediastinum, small right pneumothorax.  At that time, neurosurgery cleared patient, TLSO brace was recommended.  During that hospitalization patient developed ARNALDO on top of his CKD, received IV fluids, and was discharged to TCU.  -- Wear TLSO brace, family will bring this in  -- Once restlessness is improved, recommend repeating CT cervical spine and CT thoracic spine      Stage IIIb chronic kidney disease  -- Dose medication renally  -- At baseline      History of deep vein thrombosis of distal vein of left lower extremity  Factor V Leiden deficiency   --Pharmacy to dose warfarin    Addendum:   Not taking any medications orally, will cover with Lovenox in the meantime      Essential hypertension  PTA losartan, spironolactone, atenolol  -- Not able to have oral medications due to encephalopathy  -- Ordered Bumex 0.5 mg IV x 1 dose  -- Ordering Vasotec injection 1.25 mg every 6 hours as needed for systolic blood pressure over 170  -- Patient takes atenolol 50 mg twice daily, ordered metoprolol 2.5 mg IV twice daily      Diabetes mellitus type 2, with neuropathy  Most recent A1c 8.2  -- Once encephalopathy improves, start carb controlled diet  -- For now, n.p.o.  -- POC glucose checks every 6 hours  -- Hypoglycemia protocol      Obstructive sleep apnea  --CPAP          Diet: NPO for Medical/Clinical Reasons Except for: Meds, Ice Chips  DVT Prophylaxis: Warfarin  Smith Catheter: Not present  Lines: None     Cardiac Monitoring: ACTIVE order. Indication: tachycardia  Code Status: No CPR- Do NOT Intubate     Clinically Significant Risk Factors Present on Admission                # Drug Induced Coagulation Defect: home medication list includes an anticoagulant medication    # Hypertension: Noted on problem list          # DMII: A1C = N/A within past 6 months    # Obesity: Estimated body mass index is 30.52 kg/m  as calculated from the following:    Height as of this encounter: 1.829 m  (6').    Weight as of this encounter: 102.1 kg (225 lb).         # Financial/Environmental Concerns:           Disposition Plan     Medically Ready for Discharge: Anticipated in 2-4 Days           Katherine Liz MD  Hospitalist Service  [facility]  Securely message with [employer] (more info)  Text page via [facility] Paging/Directory     ______________________________________________________________________    Chief Complaint   Found on the floor     History is obtained from the chart, family     History of Present Illness     Robert Richard is a 89 year old male with a medical history of diabetes mellitus type 2, essential hypertension, CKD stage IIIb, who was recently hospitalized at [facility] from May 21 to May 26 after a fall that caused a T7 fracture, pneumomediastinum and small right pneumothorax, and was discharged to [facility] TCU.  Patient was brought in to the emergency department via EMS after he was found on the ground by staff this morning at 6:30 AM, was noted to have a saturation of 86% on room air.  Granddaughter saw patient yesterday and he was in his usual state of health, besides being fatigued.  Workup in the emergency room notable for altered mental status, patient met sepsis criteria, source thought to be respiratory and admitted on 7/1/2025 for further workup.      Past Medical History    Past Medical History:   Diagnosis Date    Basal cell carcinoma     Cancer (H)     DVT (deep venous thrombosis) (H) 11/2003    DVT (deep venous thrombosis) (H) 12/2008    DVT (deep venous thrombosis) (H) 10/2010    Factor V Leiden     Gout     Other and unspecified hyperlipidemia     Prostate cancer (H) 2003    prostatectomy     Restless leg syndrome     Sleep apnea     Unspecified essential hypertension        Past Surgical History   Past Surgical History:   Procedure Laterality Date    APPENDECTOMY  1950    BIOPSY  May 2003    prostate     COLONOSCOPY      [facility]    EYE SURGERY  april 2016    [facility]    FUSION TRANSFORAMINAL LUMBAR INTERBODY, 1 LEVEL, POSTERIOR APPROACH, USING OTS SURG IMAGING GUIDANCE Bilateral 05/28/2024    Procedure: Lumbar 4 to Lumbar 5 Transforaminal Interbody Fusion with decompression of stenosis;  Surgeon: Cj Cleary MD;  Location:  OR    HEAD & NECK SURGERY      [facility]    JOINT REPLACEMENT  04/2011    R hip    LAMINECTOMY LUMBAR ONE LEVEL  12/2008    LAMINECTOMY LUMBAR ONE LEVEL Bilateral 05/28/2024    Procedure: Lumbar 2 to Lumbar 3 bilateral laminectomy for decompression of stenosis;  Surgeon: Cj Cleary MD;  Location:  OR    LAPAROSCOPIC HERNIORRHAPHY UMBILICAL N/A 01/03/2019    Procedure: laparoscopic umbilical hernia repair with mesh;  Surgeon: Jamal Thompson DO;  Location:  OR    [facility] REVISE TOTAL HIP REPLACEMENT  01/18/2013    R hip       Prior to Admission Medications   Prior to Admission Medications   Prescriptions Last Dose Informant Patient Reported? Taking?   acetaminophen (TYLENOL) 500 MG tablet 6/30/2025 at  8:00 PM  Yes Yes   Sig: Take 1,000 mg by mouth 3 times daily.   albuterol (PROAIR HFA/PROVENTIL HFA/VENTOLIN HFA) 108 (90 Base) MCG/ACT inhaler   Yes Yes   Sig: Inhale 2 puffs into the lungs every 4 hours as needed for shortness of breath, wheezing or cough.   albuterol (PROVENTIL) (2.5 MG/3ML) 0.083% neb solution   Yes Yes   Sig: Take 2.5 mg by nebulization every 6 hours as needed for shortness of breath, wheezing or cough.   atenolol (TENORMIN) 50 MG tablet 6/30/2025 at  8:00 PM Self No Yes   Sig: Take 1 tablet by mouth twice daily   gabapentin (NEURONTIN) 100 MG capsule 6/30/2025 at  8:00 PM  Yes Yes   Sig: Take 100 mg by mouth 3 times daily. 0800, 1200 & 2000   ipratropium (ATROVENT) 0.06 % nasal spray   Yes Yes   Sig: Spray 2 sprays into both nostrils 4 times daily as needed for rhinitis.   losartan (COZAAR) 100 MG tablet 6/30/2025 at  8:00 AM Self No Yes    Sig: Take 1 tablet by mouth once daily   multivitamin w/minerals (THERA-VIT-M) tablet 6/30/2025 at  8:00 AM  Yes Yes   Sig: Take 1 tablet by mouth daily.   oxyCODONE (ROXICODONE) 5 MG tablet   Yes Yes   Sig: Take 5 mg by mouth every 4 hours as needed for pain.   pramipexole (MIRAPEX) 1 MG tablet 6/30/2025 at  8:00 PM  Yes Yes   Sig: Take 1 mg by mouth 2 times daily. At 1600 & 2000   sennosides (SENOKOT) 8.6 MG tablet   Yes Yes   Sig: Take 2 tablets by mouth 2 times daily as needed for constipation.   spironolactone (ALDACTONE) 25 MG tablet 6/30/2025 at  8:00 AM  Yes Yes   Sig: Take 50 mg by mouth daily.   traZODone (DESYREL) 50 MG tablet 6/30/2025 at  8:00 PM Self No Yes   Sig: Take 1 tablet (50 mg) by mouth at bedtime.   warfarin ANTICOAGULANT (COUMADIN/JANTOVEN) 5 MG tablet 6/30/2025 at  8:00 PM  Yes Yes   Sig: Take 5 mg by mouth every evening. 2000      Facility-Administered Medications: None        Review of Systems    Review of systems not obtained due to patient factors - confusion    Social History   I have reviewed this patient's social history and updated it with pertinent information if needed.  Social History     Tobacco Use    Smoking status: Never    Smokeless tobacco: Never   Vaping Use    Vaping status: Never Used   Substance Use Topics    Alcohol use: Yes     Alcohol/week: 0.0 standard drinks of alcohol     Comment: 1 drink every 1-2 weeks.    Drug use: No         Family History   I have reviewed this patient's family history and updated it with pertinent information if needed.  Family History   Problem Relation Age of Onset    Prostate Cancer Paternal Grandfather     Prostate Cancer Maternal Grandfather     Breast Cancer Sister     Colon Cancer Sister     Breast Cancer Sister     Colon Cancer Sister          Allergies   Allergies   Allergen Reactions    Statins Muscle Pain (Myalgia)     Muscle aches  Lipitor/atorvastatin, pravastatin, simvastatin    Gabapentin Dizziness     Neurontin         Physical Exam   Vital Signs: Temp: 100.7  F (38.2  C) Temp src: Axillary BP: (!) 145/90 Pulse: 84   Resp: 19 SpO2: 93 % O2 Device: Oxymask Oxygen Delivery: 2 LPM  Weight: 225 lbs 0 oz    Physical Exam  Constitutional:       General: He is in acute distress.      Appearance: He is ill-appearing.   HENT:      Head: Normocephalic and atraumatic.      Nose: Nose normal.      Mouth/Throat:      Mouth: Mucous membranes are moist.   Eyes:      Extraocular Movements: Extraocular movements intact.      Pupils: Pupils are equal, round, and reactive to light.   Cardiovascular:      Rate and Rhythm: Normal rate and regular rhythm.   Pulmonary:      Effort: Pulmonary effort is normal. No respiratory distress.      Breath sounds: Normal breath sounds.   Abdominal:      General: Bowel sounds are normal. There is no distension.      Palpations: Abdomen is soft.      Tenderness: There is no abdominal tenderness.   Musculoskeletal:         General: Normal range of motion.      Cervical back: Normal range of motion and neck supple.   Skin:     General: Skin is warm and dry.      Capillary Refill: Capillary refill takes less than 2 seconds.   Neurological:      General: No focal deficit present.      Mental Status: He is disoriented and confused.   Psychiatric:         Behavior: Behavior is agitated.                          Medical Decision Making       90 MINUTES SPENT BY ME on the date of service doing chart review, history, exam, documentation & further activities per the note.      Data   ------------------------- PAST 24 HR DATA REVIEWED -----------------------------------------------    I have personally reviewed the following data over the past 24 hrs:    14.2 (H)  \   11.9 (L)   / 214     136 100 25.4 (H) /  146 (H)   4.7 23 1.61 (H) \     ALT: 20 AST: 24 AP: 121 TBILI: 0.6   ALB: 3.7 TOT PROTEIN: 7.5 LIPASE: N/A     Trop: <6 BNP: 4,438 (H)     TSH: 1.59 T4: N/A A1C: N/A     Procal: 2.16 (H) CRP: 57.93 (H) Lactic Acid: 1.7        INR:  1.93 (H) PTT:  N/A   D-dimer:  3.87 (H) Fibrinogen:  N/A       Imaging results reviewed over the past 24 hrs:   Recent Results (from the past 24 hours)   Head CT w/o contrast    Narrative    EXAM: CT HEAD W/O CONTRAST, CT CERVICAL SPINE W/O CONTRAST  LOCATION: [facility]  DATE: 7/1/2025    INDICATION: fall  COMPARISON: Head CT 06/21/2025.  TECHNIQUE:   1) Routine CT Head without IV contrast. Multiplanar reformats. Dose reduction techniques were used.  2) Routine CT Cervical Spine without IV contrast. Multiplanar reformats. Dose reduction techniques were used.    FINDINGS:   Images are degraded by motion.  HEAD CT:   INTRACRANIAL CONTENTS: No intracranial hemorrhage, extraaxial collection, or mass effect.  No CT evidence of acute infarct. Normal parenchymal attenuation. Normal ventricles and sulci.     VISUALIZED ORBITS/SINUSES/MASTOIDS: No intraorbital abnormality. No paranasal sinus mucosal disease. No middle ear or mastoid effusion.    BONES/SOFT TISSUES: No acute abnormality.    CERVICAL SPINE CT:   Severely limited study due to motion. Images are essentially nondiagnostic. Bones appear demineralized. There are postoperative changes from posterior decompression of the mid cervical spine. Unable to adequately evaluate for fracture.      Impression    IMPRESSION:  HEAD CT:  1.  No CT evidence for acute intracranial process.  2.  Brain atrophy and presumed chronic microvascular ischemic changes as above.    CERVICAL SPINE CT:  1.  Nondiagnostic study due to patient motion. Consider repeat imaging when patient condition allows.   CT Cervical Spine w/o Contrast    Narrative    EXAM: CT HEAD W/O CONTRAST, CT CERVICAL SPINE W/O CONTRAST  LOCATION: [facility]  DATE: 7/1/2025    INDICATION: fall  COMPARISON: Head CT 06/21/2025.  TECHNIQUE:   1) Routine CT Head without IV contrast. Multiplanar reformats. Dose reduction techniques were used.  2)  Routine CT Cervical Spine without IV contrast. Multiplanar reformats. Dose reduction techniques were used.    FINDINGS:   Images are degraded by motion.  HEAD CT:   INTRACRANIAL CONTENTS: No intracranial hemorrhage, extraaxial collection, or mass effect.  No CT evidence of acute infarct. Normal parenchymal attenuation. Normal ventricles and sulci.     VISUALIZED ORBITS/SINUSES/MASTOIDS: No intraorbital abnormality. No paranasal sinus mucosal disease. No middle ear or mastoid effusion.    BONES/SOFT TISSUES: No acute abnormality.    CERVICAL SPINE CT:   Severely limited study due to motion. Images are essentially nondiagnostic. Bones appear demineralized. There are postoperative changes from posterior decompression of the mid cervical spine. Unable to adequately evaluate for fracture.      Impression    IMPRESSION:  HEAD CT:  1.  No CT evidence for acute intracranial process.  2.  Brain atrophy and presumed chronic microvascular ischemic changes as above.    CERVICAL SPINE CT:  1.  Nondiagnostic study due to patient motion. Consider repeat imaging when patient condition allows.   XR Chest Port 1 View    Narrative    EXAM: XR CHEST PORT 1 VIEW  LOCATION: [facility]  DATE: 7/1/2025    INDICATION: Fall. Hypoxia.  COMPARISON: 6/10/2025.      Impression    IMPRESSION: Shallow inspiration accentuates heart size and pulmonary vascularity. Mild opacity at the right lung base may be related to atelectasis or infection. No pneumothorax.

## 2025-07-01 NOTE — PROGRESS NOTES
ED Nursing criteria listed below was addressed during verbal handoff:     Abnormal vitals: Yes  Abnormal results: Yes  Med Reconciliation completed: Yes  Meds given in ED: Yes  Any Overdue Meds: No  Core Measures: Yes  Isolation: No  Special needs: Yes  Skin assessment: Yes    Observation Patient  Education provided: N/A

## 2025-07-01 NOTE — PHARMACY-VANCOMYCIN DOSING SERVICE
"Pharmacy Vancomycin Initial Note  Date of Service 2025  Patient's  1935  89 year old, male    Indication: Sepsis    Current estimated CrCl = Estimated Creatinine Clearance: 39.2 mL/min (A) (based on SCr of 1.61 mg/dL (H)).    Creatinine for last 3 days  2025:  8:56 AM Creatinine 1.61 mg/dL    Recent Vancomycin Level(s) for last 3 days  No results found for requested labs within last 3 days.      Vancomycin IV Administrations (past 72 hours)        No vancomycin orders with administrations in past 72 hours.                    Nephrotoxins and other renal medications (From now, onward)      Start     Dose/Rate Route Frequency Ordered Stop    25 0330  vancomycin (VANCOCIN) 500 mg vial to attach to  mL bag        Placed in \"Followed by\" Linked Group    500 mg  over 1 Hours Intravenous EVERY 12 HOURS 25 1512      25 1530  vancomycin (VANCOCIN) 2,000 mg in sodium chloride 0.9 % 500 mL intermittent infusion        Placed in \"Followed by\" Linked Group    2,000 mg  over 2 Hours Intravenous ONCE 25 1512      25 1426  [Held by provider]  enalaprilat (VASOTEC) injection 1.25 mg        (On hold since today at 1453 until manually unheld; held by Katherine Liz MDHold Reason: Other)    1.25 mg  over 5 Minutes Intravenous DAILY PRN 25 1426              Contrast Orders - past 72 hours (72h ago, onward)      None            [employer] Prediction of Planned Initial Vancomycin Regimen  Loading dose: 2000 mg at 15:30 2025.  Regimen: 500 mg IV every 12 hours.  Start time: 03:30 on 2025  Exposure target: AUC24 (range) 400-600 mg/L.hr   AUC24,ss: 483 mg/L.hr  Probability of AUC24 > 400: 69 %  Ctrough,ss: 17.9 mg/L  Probability of Ctrough,ss > 20: 39 %  Probability of nephrotoxicity (Lodise LAURENCE ): 14 %          Plan:  Start vancomycin 2000 mg IV ONCE as a 20 mg/kg bolus followed by 500 mg IV every 12 hours.   Vancomycin monitoring method: AUC  Vancomycin " therapeutic monitoring goal: 400-600 mg*h/L  Pharmacy will check vancomycin levels as appropriate in 1-3 Days.    Serum creatinine levels will be ordered daily for the first week of therapy and at least twice weekly for subsequent weeks.      Mirna Mayen RPH

## 2025-07-01 NOTE — ED NOTES
Patient remains very agitated and moving about bed. Re-positioned and CXR completed with assist of 3. Kenyatta Baldwin RN

## 2025-07-01 NOTE — ED PROVIDER NOTES
History     Chief Complaint   Patient presents with    Fall     HPI  Robert Richard is a 89 year old male who presents to the emergency department after being found down at a local care center.  He is normally oriented to name and situation.  Morning staff found him on the ground without his TLSO brace on.  The nurse that found him also noted an oxygen saturation of 86% on room air, no chronic oxygen.  EMS did give a DuoNeb and nasal cannula and route.  Patient does not have any particular complaints.  There is no overt bleeding from anywhere.  According to the documentation he had a recent compression fracture of T7 and T8.    Allergies:  Allergies   Allergen Reactions    Statins Muscle Pain (Myalgia)     Muscle aches  Lipitor/atorvastatin, pravastatin, simvastatin    Gabapentin Dizziness     Neurontin       Problem List:    Patient Active Problem List    Diagnosis Date Noted    Metabolic encephalopathy 07/01/2025     Priority: Medium    Hypoxia 07/01/2025     Priority: Medium    Fall, initial encounter 07/01/2025     Priority: Medium    Pneumonia of right lung due to infectious organism, unspecified part of lung 07/01/2025     Priority: Medium    Sepsis, due to unspecified organism, unspecified whether acute organ dysfunction present (H) 07/01/2025     Priority: Medium    Fall, subsequent encounter 06/30/2025     Priority: Medium    Obstructive sleep apnea of adult 06/27/2025     Priority: Medium    Subjective tinnitus of both ears 06/27/2025     Priority: Medium    Type 2 diabetes mellitus without complications (H) 06/27/2025     Priority: Medium    Acute chest wall pain 06/23/2025     Priority: Medium    Primary insomnia 06/23/2025     Priority: Medium    Closed fracture of seventh thoracic vertebra (H) 06/21/2025     Priority: Medium    Fall from ground level 06/21/2025     Priority: Medium    Stage 3b chronic kidney disease (H) 06/27/2024     Priority: Medium    Urine retention 06/06/2024     Priority:  Medium    Spondylolisthesis of lumbar region 06/03/2024     Priority: Medium    Generalized muscle weakness 06/03/2024     Priority: Medium    Anemia, unspecified type 06/03/2024     Priority: Medium    History of deep venous thrombosis (DVT) of distal vein of left lower extremity 06/03/2024     Priority: Medium    S/P lumbar fusion 05/28/2024     Priority: Medium    Asymmetrical sensorineural hearing loss 06/09/2021     Priority: Medium    Subjective tinnitus 06/09/2021     Priority: Medium    Venous stasis dermatitis of right lower extremity 06/09/2021     Priority: Medium    Stage 3 chronic kidney disease, unspecified whether stage 3a or 3b CKD (H) 06/20/2019     Priority: Medium    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) 10/12/2018     Priority: Medium    Essential hypertension 09/06/2016     Priority: Medium    Long term current use of anticoagulant therapy 03/21/2016     Priority: Medium    Hemifacial spasm 09/17/2014     Priority: Medium    Hip joint replacement status - right 01/21/2014     Priority: Medium    Abnormal gait 01/21/2014     Priority: Medium    Class 2 severe obesity with body mass index (BMI) of 35 to 39.9 with serious comorbidity (H) 01/21/2014     Priority: Medium    Personal history of prostate cancer 01/21/2014     Priority: Medium    Malignant basal cell neoplasm of skin 07/24/2012     Priority: Medium     Do you wish to do the replacement in the background? yes        Gout 07/03/2012     Priority: Medium    Back pain 09/27/2011     Priority: Medium    Hyperlipidemia LDL goal <100 09/27/2011     Priority: Medium    Factor V Leiden mutation 09/08/2011     Priority: Medium    Prothrombin mutation 09/08/2011     Priority: Medium     [id number]      Sleep apnea 05/19/2011     Priority: Medium    Restless leg syndrome 10/20/2010     Priority: Medium    Knee joint pain 10/05/2010     Priority: Medium    Renal insufficiency syndrome 10/16/2009     Priority: Medium        Past Medical  History:    Past Medical History:   Diagnosis Date    Basal cell carcinoma     Cancer (H)     DVT (deep venous thrombosis) (H) 11/2003    DVT (deep venous thrombosis) (H) 12/2008    DVT (deep venous thrombosis) (H) 10/2010    Factor V Leiden     Gout     Other and unspecified hyperlipidemia     Prostate cancer (H) 2003    Restless leg syndrome     Sleep apnea     Unspecified essential hypertension        Past Surgical History:    Past Surgical History:   Procedure Laterality Date    APPENDECTOMY  1950    BIOPSY  May 2003    prostate    COLONOSCOPY      [facility]    EYE SURGERY  april 2016    [facility]    FUSION TRANSFORAMINAL LUMBAR INTERBODY, 1 LEVEL, POSTERIOR APPROACH, USING OTS SURG IMAGING GUIDANCE Bilateral 05/28/2024    Procedure: Lumbar 4 to Lumbar 5 Transforaminal Interbody Fusion with decompression of stenosis;  Surgeon: Cj Cleary MD;  Location:  OR    HEAD & NECK SURGERY      [facility]    JOINT REPLACEMENT  04/2011    R hip    LAMINECTOMY LUMBAR ONE LEVEL  12/2008    LAMINECTOMY LUMBAR ONE LEVEL Bilateral 05/28/2024    Procedure: Lumbar 2 to Lumbar 3 bilateral laminectomy for decompression of stenosis;  Surgeon: Cj Cleary MD;  Location:  OR    LAPAROSCOPIC HERNIORRHAPHY UMBILICAL N/A 01/03/2019    Procedure: laparoscopic umbilical hernia repair with mesh;  Surgeon: Jamal Thompson DO;  Location:  OR    [facility] REVISE TOTAL HIP REPLACEMENT  01/18/2013    R hip       Family History:    Family History   Problem Relation Age of Onset    Prostate Cancer Paternal Grandfather     Prostate Cancer Maternal Grandfather     Breast Cancer Sister     Colon Cancer Sister     Breast Cancer Sister     Colon Cancer Sister        Social History:  Marital Status:  Single [1]  Social History     Tobacco Use    Smoking status: Never    Smokeless tobacco: Never   Vaping Use    Vaping status: Never Used   Substance Use Topics    Alcohol use: Yes     Alcohol/week: 0.0 standard drinks of alcohol  "    Comment: 1 drink every 1-2 weeks.    Drug use: No        Medications:    No current outpatient medications on file.        Review of Systems  Unable to adequately obtain due to patient's current mental status    Physical Exam   BP: (!) 152/89  Pulse: 108  Temp: 100.2  F (37.9  C)  Resp: 24  Height: 180.3 cm (5' 11\")  Weight: 112.9 kg (249 lb)  SpO2: 94 %      Physical Exam  Gen: Awake, alert, no distress  Head: No gross lacerations, no appreciated irregularities of the skull  Face: No tenderness, no gross lesions, no bruising  Eyes: Conjunctiva clear,Pupils are equal round and reactive bilaterally  Ears: No gross evidence of external trauma, no gross blood, no hemotympanum bilaterally, negative Forde sign bilaterally  Nose: Grossly midline, no bleeding, no septal hematoma, no CSF leak  Mouth: No appreciated lacerations or bruising, no tender or missing/fractured teeth, posterior pharynx open and clear  Neck: Trachea grossly midline, no bruising, no subcutaneous emphysema, no tenderness, no cervical spine tenderness, no c-collar  Chest: No gross bruising, symmetrical chest wall movement, no gross lesions, no tenderness, S1 and S2 cardiac sounds appreciated, lungs clear bilaterally, no respiratory distress  Abdomen: No gross lesions, no tenderness, no ecchymosis, no distention  Pelvis: Stable without tenderness  Right upper extremity: No joint or bony tenderness, painless range of motion, intact neurovascular exam, no skin lesions  Left upper extremity: No joint or bony tenderness, painless range of motion, intact neurovascular exam, no skin lesions  Right lower extremity: No joint or bony tenderness, painless range of motion, intact neurovascular exam, no skin lesions, chronic skin changes  Left lower extremity: No joint or bony tenderness, painless range of motion, intact neurovascular exam, no skin lesions, chronic skin changes  Back: no midline tenderness, no skin lesions       ED Course        Procedures     "              Recent Results (from the past 24 hours)   XR Pelvis 1/2 Views    Narrative    EXAM: XR PELVIS 1/2 VIEWS  LOCATION: [facility]  DATE: 7/2/2025    INDICATION: fall  COMPARISON: CT chest abdomen pelvis 6/21/2025.       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 Right total hip arthroplasty. No evidence of component loosening. No definite fracture or dislocation, however, only AP views were obtained. Postoperative changes of the lower lumbar spine.   Degenerative arthrosis of both SI joints. Surgical clips scattered over the pelvis.    Thoracic spine XR, 3 views    Narrative    EXAM: XR THORACIC SPINE 3 VIEWS  LOCATION: [facility]  DATE: 7/2/2025    INDICATION: Fall, recent thoracic fracture.  COMPARISON: 6/21/2025.      Impression    IMPRESSION: The recent T7 fracture is not well demonstrated on this radiograph. Alignment is unchanged. Multiple interspaces are fused by bridging osteophytes. Chronic appearing compression fracture deformities of T8 and T9.   Glucose by meter   Result Value Ref Range    GLUCOSE BY METER POCT 129 (H) 70 - 99 mg/dL   Glucose by meter   Result Value Ref Range    GLUCOSE BY METER POCT 158 (H) 70 - 99 mg/dL   Glucose by meter   Result Value Ref Range    GLUCOSE BY METER POCT 124 (H) 70 - 99 mg/dL   INR   Result Value Ref Range    INR 2.16 (H) 0.85 - 1.15    PT 23.8 (H) 11.8 - 14.8 Seconds   Comprehensive metabolic panel   Result Value Ref Range    Sodium 142 135 - 145 mmol/L    Potassium 3.9 3.4 - 5.3 mmol/L    Carbon Dioxide (CO2) 23 22 - 29 mmol/L    Anion Gap 8 7 - 15 mmol/L    Urea Nitrogen 24.5 (H) 8.0 - 23.0 mg/dL    Creatinine 1.52 (H) 0.67 - 1.17 mg/dL    GFR Estimate 44 (L) >60 mL/min/1.73m2    Calcium 8.8 8.8 - 10.4 mg/dL    Chloride 111 (H) 98 - 107 mmol/L    Glucose 125 (H) 70 - 99 mg/dL    Alkaline Phosphatase 86 40 - 150 U/L    AST 53 (H) 0 - 45 U/L    ALT 26 0 - 70 U/L     Protein Total 6.1 (L) 6.4 - 8.3 g/dL    Albumin 2.7 (L) 3.5 - 5.2 g/dL    Bilirubin Total 0.3 <=1.2 mg/dL   CRP inflammation   Result Value Ref Range    CRP Inflammation 117.76 (H) <5.00 mg/L   CBC with platelets   Result Value Ref Range    WBC Count 6.0 4.0 - 11.0 10e3/uL    RBC Count 3.31 (L) 4.40 - 5.90 10e6/uL    Hemoglobin 10.2 (L) 13.3 - 17.7 g/dL    Hematocrit 31.0 (L) 40.0 - 53.0 %    MCV 94 78 - 100 fL    MCH 30.8 26.5 - 33.0 pg    MCHC 32.9 31.5 - 36.5 g/dL    RDW 13.1 10.0 - 15.0 %    Platelet Count 177 150 - 450 10e3/uL   Glucose by meter   Result Value Ref Range    GLUCOSE BY METER POCT 124 (H) 70 - 99 mg/dL   Glucose by meter   Result Value Ref Range    GLUCOSE BY METER POCT 142 (H) 70 - 99 mg/dL   XR Chest Port 1 View    Narrative    EXAM: XR CHEST PORT 1 VIEW  LOCATION: [facility]  DATE: 7/3/2025    INDICATION: Worsening acute respiratory failure in patient with suspected right lower lobe pneumonia and recent large volume fluid resuscitation.  COMPARISON: 07/01/2025, 06/21/2025.      Impression    IMPRESSION: There is mild blunting of the costophrenic angles along with streak-like opacities in the lung bases, suggestive of small volume pleural effusions, pleural thickening, atelectasis, and/or scarring. Mild interstitial thickening is noted as   well, suggestive of edema. No pneumothorax. Heart size is within normal limits accounting for portable technique. Pulmonary vasculature is normal appearing. No acute osseous abnormality.       Medications   Warfarin Dose Required Daily - Pharmacist Managed ( Oral Unheld by provider 7/2/25 5972)   cefTRIAXone (ROCEPHIN) 2 g vial to attach to  ml bag for ADULTS or NS 50 ml bag for PEDS (2 g Intravenous $New Bag 7/3/25 8803)   lidocaine 1 % 0.1-1 mL (has no administration in time range)   lidocaine (LMX4) cream (has no administration in time range)   sodium chloride (PF) 0.9% PF flush 3 mL ( Intracatheter Canceled Entry  7/3/25 0600)   sodium chloride (PF) 0.9% PF flush 3 mL (has no administration in time range)   senna-docusate (SENOKOT-S/PERICOLACE) 8.6-50 MG per tablet 1 tablet (has no administration in time range)     Or   senna-docusate (SENOKOT-S/PERICOLACE) 8.6-50 MG per tablet 2 tablet (has no administration in time range)   calcium carbonate (TUMS) chewable tablet 1,000 mg (has no administration in time range)   Patient is already receiving anticoagulation with heparin, enoxaparin (LOVENOX), warfarin (COUMADIN)  or other anticoagulant medication (has no administration in time range)   acetaminophen (TYLENOL) tablet 650 mg (650 mg Oral $Given 7/2/25 1100)     Or   acetaminophen (TYLENOL) Suppository 650 mg ( Rectal See Alternative 7/2/25 1100)   glucose gel 15-30 g (has no administration in time range)     Or   dextrose 50 % injection 25-50 mL (has no administration in time range)     Or   glucagon injection 1 mg (has no administration in time range)   diazepam (VALIUM) injection 2.5 mg (2.5 mg Intravenous $Given 7/2/25 0340)   albuterol (PROVENTIL) neb solution 2.5 mg (2.5 mg Nebulization $Given 7/3/25 1121)   insulin aspart (NovoLOG) injection (RAPID ACTING) ( Subcutaneous Not Given 7/3/25 1236)   insulin aspart (NovoLOG) injection (RAPID ACTING) ( Subcutaneous Not Given 7/2/25 2119)   atenolol (TENORMIN) tablet 50 mg (50 mg Oral $Given 7/3/25 0914)   pramipexole (MIRAPEX) tablet 1 mg ( Oral Unheld by provider 7/3/25 1136)   spironolactone (ALDACTONE) tablet 50 mg (50 mg Oral $Given 7/3/25 0914)   traZODone (DESYREL) tablet 50 mg ( Oral Unheld by provider 7/3/25 1136)   losartan (COZAAR) tablet 100 mg (100 mg Oral $Given 7/3/25 0914)   warfarin ANTICOAGULANT (COUMADIN/JANTOVEN) tablet 5 mg (has no administration in time range)   hydrALAZINE (APRESOLINE) injection 10 mg (has no administration in time range)   OLANZapine (zyPREXA) injection 5 mg (5 mg Intramuscular $Given 7/1/25 4829)   OLANZapine (zyPREXA) injection 5 mg (5  mg Intramuscular $Given 7/1/25 1002)   cefTRIAXone (ROCEPHIN) 1 g vial to attach to  mL bag for ADULTS or NS 50 mL bag for PEDS (0 g Intravenous Stopped 7/1/25 1113)   diazepam (VALIUM) injection 2.5 mg (2.5 mg Intravenous $Given 7/1/25 1119)   acetaminophen (TYLENOL) Suppository 650 mg (650 mg Rectal $Given 7/1/25 1122)   sodium chloride 0.9% BOLUS 500 mL (0 mLs Intravenous Stopped 7/1/25 1247)   fentaNYL (PF) (SUBLIMAZE) injection 50 mcg (50 mcg Intravenous $Given 7/1/25 1147)   doxycycline (VIBRAMYCIN) 100 mg vial to attach to  mL bag (0 mg Intravenous Stopped 7/1/25 1314)   cefTRIAXone (ROCEPHIN) 1 g vial to attach to  mL bag for ADULTS or NS 50 mL bag for PEDS (1 g Intravenous $New Bag 7/1/25 2016)   ketorolac (TORADOL) injection 15 mg (15 mg Intravenous $Given 7/1/25 1340)   warfarin ANTICOAGULANT (COUMADIN/JANTOVEN) tablet 5 mg (5 mg Oral Not Given 7/1/25 1707)   vancomycin (VANCOCIN) 2,000 mg in sodium chloride 0.9 % 500 mL intermittent infusion (2,000 mg Intravenous $New Bag 7/1/25 1602)   orphenadrine (NORFLEX) injection 60 mg (60 mg Intravenous $Given 7/1/25 1602)   bumetanide (BUMEX) injection 0.5 mg (0.5 mg Intravenous $Given 7/1/25 1739)   warfarin ANTICOAGULANT (COUMADIN/JANTOVEN) tablet 5 mg (5 mg Oral $Given 7/2/25 1728)   furosemide (LASIX) injection 20 mg (20 mg Intravenous $Given 7/3/25 1156)       Assessments & Plan (with Medical Decision Making)   Temperature 100.2, current oxygen saturation 86% on room air, with nasal cannula he is 94%.  Tachycardic to 108.  ABCs quickly evaluated noted to be reassuring.  Reviewed his previous EMR and the included nursing home documentation.    Patient had persistent agitation during his time in the ER.  We did have to give him various medications including Zyprexa and Valium to obtain head CT imaging that was ultimately negative for acute pathology.  There was motion artifact obscuring the cervical spine.  In regard to the thoracic spine,  patient would not sit still enough for this.  We did have a conversation of possible sedation for this CT image however the risks seem to outweigh the benefits.  We did perform a thoracic spine x-ray that did not show any gross pathology.  His wife did go home to bring his brace to the hospital so he can continue to wear it here.  Chest x-ray shows possible opacities in the right lung base, no traumatic injuries.  Given the new hypoxia, cough, elevated temperature today and white count, we will treat him for community-acquired pneumonia, empiric Rocephin was previously given and I have added doxycycline to this.  We did obtain a urine sample that did not have signs of acute infection.  Venous blood gases reassuring.  Blood cultures obtained and still pending.  We did perform a twelve-lead EKG that showed a sinus rhythm with a rate of 96, reassuring intervals without ischemic changes.  Troponin is negative.  Does have an elevated creatinine of 1.6 however this is roughly his baseline.  Hemoglobin is 11.9 however this is roughly his baseline as well.  Viral swab was negative.  Given the alteration of mental status, febrile illness with a likely source of pneumonia patient will be best further treated and investigated in the hospital, discussed this case with hospital service and they are agreeable with admission.      I have reviewed the nursing notes.    I have reviewed the findings, diagnosis, plan and need for follow up with the patient.          Current Discharge Medication List          Final diagnoses:   Metabolic encephalopathy   Sepsis, due to unspecified organism, unspecified whether acute organ dysfunction present (H)   Fall, initial encounter   Hypoxia   Pneumonia of right lung due to infectious organism, unspecified part of lung       7/1/2025   [facility] EMERGENCY DEPT       Lamberto Foster MD  07/03/25 6952

## 2025-07-01 NOTE — ED NOTES
Patient appears to be in distress with increased confusion. Pt agitated and restless in bed. MD aware. No no orders at this time  Requested pain meds - awaiting orders  Disoriented Xs 4. Remains on 3 LPMs. Temp 100.1

## 2025-07-01 NOTE — ED NOTES
Patient continues to be restless and moving about in bed. Repositioned for comfort and bed alarm in place. Kenyatta Baldwin RN

## 2025-07-01 NOTE — MEDICATION SCRIBE - ADMISSION MEDICATION HISTORY
Medication Scribe Admission Medication History    Admission medication history is complete. The information provided in this note is only as accurate as the sources available at the time of the update.    Information Source(s): Caregiver, Facility (TCU/NH/) medication list/MAR, and CareEverywhere/SureScripts via phone    Pertinent Information: Patient's MAR arrived with patient. Today being July 1, it printed for current month with no last doses. Writer called [facility] [phone] spoke with Kaylene who confirmed last doses of each medication. She verified Neurontin is on patient's allergy list as causing dizziness, and that his very first dose with them was last night at 8pm. No dispense information found on dispense report for current 100mg dose. Last gabapentin dispense was last Oct 2024 for 300mg strength at quantity 30 for 30 days supply. Patient has been on warfarin 5mg for many months.    Changes made to PTA medication list:  Added: gabapentin and warfarin  Deleted: None  Changed: evening pramipexole dose from 2100 to 2000.     Allergies reviewed with patient and updates made in EHR: yes    Medication History Completed By: KOREY REAL 7/1/2025 11:01 AM    PTA Med List   Medication Sig Note Last Dose/Taking    acetaminophen (TYLENOL) 500 MG tablet Take 1,000 mg by mouth 3 times daily.  6/30/2025 at  8:00 PM    albuterol (PROAIR HFA/PROVENTIL HFA/VENTOLIN HFA) 108 (90 Base) MCG/ACT inhaler Inhale 2 puffs into the lungs every 4 hours as needed for shortness of breath, wheezing or cough. 7/1/2025: Patient keeps at bedside/self-administers Taking As Needed    albuterol (PROVENTIL) (2.5 MG/3ML) 0.083% neb solution Take 2.5 mg by nebulization every 6 hours as needed for shortness of breath, wheezing or cough. 7/1/2025: No last dose indicated at [facility] Taking As Needed    atenolol (TENORMIN) 50 MG tablet Take 1 tablet by mouth twice daily  6/30/2025 at  8:00 PM    gabapentin (NEURONTIN) 100 MG  capsule Take 100 mg by mouth 3 times daily. 0800, 1200 & 2000 7/1/2025: No dispense information for gabapentin 100mg. (Last dispensed Gabapentin 300mg October of 2024.) Kaylene at [facility] reports his first dose took place last night at 8pm. 6/30/2025 at  8:00 PM    ipratropium (ATROVENT) 0.06 % nasal spray Spray 2 sprays into both nostrils 4 times daily as needed for rhinitis. 7/1/2025: No last dose indicated at [facility] Taking As Needed    losartan (COZAAR) 100 MG tablet Take 1 tablet by mouth once daily  6/30/2025 at  8:00 AM    multivitamin w/minerals (THERA-VIT-M) tablet Take 1 tablet by mouth daily.  6/30/2025 at  8:00 AM    oxyCODONE (ROXICODONE) 5 MG tablet Take 5 mg by mouth every 4 hours as needed for pain. 7/1/2025: No last dose [facility] at [facility] Taking As Needed    pramipexole (MIRAPEX) 1 MG tablet Take 1 mg by mouth 2 times daily. At 1600 & 2000 6/30/2025 at  8:00 PM    sennosides (SENOKOT) 8.6 MG tablet Take 2 tablets by mouth 2 times daily as needed for constipation. 7/1/2025: No last dose indicated at [facility] Taking As Needed    spironolactone (ALDACTONE) 25 MG tablet Take 50 mg by mouth daily.  6/30/2025 at  8:00 AM    traZODone (DESYREL) 50 MG tablet Take 1 tablet (50 mg) by mouth at bedtime.  6/30/2025 at  8:00 PM    warfarin ANTICOAGULANT (COUMADIN/JANTOVEN) 5 MG tablet Take 5 mg by mouth every evening. 2000 6/30/2025 at  8:00 PM

## 2025-07-01 NOTE — PROGRESS NOTES
Assessment requested by RN for wheezing. Pt received neb from EMS prior to arrival. Pt does not have any respiratory dgns. Pt had an episode of wheezing on 5/19/24 and bedside spirometry was done at that time. Results are as follows:     Spirometry patient's age is ouside the predicted's window  However Pre and Post FVC FEV1 shows no improvement post Neb treatment  Loops show upper airway possible restriction          Pt does appear to be slightly distressed but claims his breathing feels fine and complains about left hip pain and cannot stay still. Breath sounds are clear except for upper airway wheezing. Eyes are edematous and legs are weeping. Pt is requiring 3 LPM to keep SpO2 >90%. Pt does not wear O2 at baseline. ABGs as follows:     Latest Reference Range & Units 07/01/25 08:56   FIO2  28   Ph Venous 7.32 - 7.43  7.40   PCO2 Venous 40 - 50 mm Hg 43   PO2 Venous 25 - 47 mm Hg 26   O2 Sat, Venous 70.0 - 75.0 % 45.2 (L)   Bicarbonate Venous 21 - 28 mmol/L 27   Base Excess Venous -3.0 - 3.0 mmol/L 1.6   Oxyhemoglobin Venous 70 - 75 % 44 (L)     RT to follow as needed

## 2025-07-01 NOTE — PHARMACY-ANTICOAGULATION SERVICE
Clinical Pharmacy - Warfarin Dosing Consult     Pharmacy has been consulted to manage this patient s warfarin therapy.  Indication: Other - specify in comments (Factor V Leiden mutation)  Therapy Goal: INR 2-3  Provider/Team: TCU  Warfarin Prior to Admission: Yes  Warfarin PTA Regimen: Per med rec - 5 mg daily. From TCU note 6/27, patient was to take 2.5 mg daily with a recheck INR 6/30 -- at 6/30 geriatric visit, INR was 1.9 and patient was instructed to take 5 mg daily with a recheck 7/3. Prior to discharging to the TCU, patient was on 7.5mg Mon and 5mg AOD  Recent documented change in oral intake/nutrition: Unknown    INR   Date Value Ref Range Status   07/01/2025 1.70 (H) 0.85 - 1.15 Final   06/21/2025 2.65 (H) 0.85 - 1.15 Final       Recommend warfarin 5 mg today.  Pharmacy will monitor Robert Travica daily and order warfarin doses to achieve specified goal.      Please contact pharmacy as soon as possible if the warfarin needs to be held for a procedure or if the warfarin goals change.      Thank you,   Mirna Mayen RPH on 7/1/2025 at 2:42 PM

## 2025-07-01 NOTE — PROGRESS NOTES
Pt was discharged from hospital on 6/26/2025 on 2-3 LPM at night instead of CPAP.   Pt was also given nebs for his upper airway wheezes that never resolved despite days and days of nebulizers.     Pt bedside spirometry from 5/19/2024 as follows:   Spirometry patient's age is ouside the predicted's window  However Pre and Post FVC FEV1 shows no improvement post Neb treatment  Loops show upper airway possible restriction      Pt continues with upper airway wheezes.  RT to follow

## 2025-07-02 ENCOUNTER — APPOINTMENT (OUTPATIENT)
Dept: GENERAL RADIOLOGY | Facility: CLINIC | Age: 89
End: 2025-07-02
Attending: STUDENT IN AN ORGANIZED HEALTH CARE EDUCATION/TRAINING PROGRAM
Payer: COMMERCIAL

## 2025-07-02 ENCOUNTER — APPOINTMENT (OUTPATIENT)
Dept: CARDIOLOGY | Facility: CLINIC | Age: 89
End: 2025-07-02
Attending: STUDENT IN AN ORGANIZED HEALTH CARE EDUCATION/TRAINING PROGRAM
Payer: COMMERCIAL

## 2025-07-02 LAB
ACB COMPLEX DNA BLD POS QL NAA+NON-PROBE: NOT DETECTED
ALBUMIN SERPL BCG-MCNC: 2.8 G/DL (ref 3.5–5.2)
ALP SERPL-CCNC: 85 U/L (ref 40–150)
ALT SERPL W P-5'-P-CCNC: 23 U/L (ref 0–70)
ANION GAP SERPL CALCULATED.3IONS-SCNC: 9 MMOL/L (ref 7–15)
AST SERPL W P-5'-P-CCNC: 63 U/L (ref 0–45)
B FRAGILIS DNA BLD POS QL NAA+NON-PROBE: NOT DETECTED
BASOPHILS # BLD AUTO: 0 10E3/UL (ref 0–0.2)
BASOPHILS NFR BLD AUTO: 1 %
BILIRUB SERPL-MCNC: 0.3 MG/DL
BUN SERPL-MCNC: 27 MG/DL (ref 8–23)
C ALBICANS DNA BLD POS QL NAA+NON-PROBE: NOT DETECTED
C AURIS DNA BLD POS QL NAA+NON-PROBE: NOT DETECTED
C GATTII+NEOFOR DNA BLD POS QL NAA+N-PRB: NOT DETECTED
C GLABRATA DNA BLD POS QL NAA+NON-PROBE: NOT DETECTED
C KRUSEI DNA BLD POS QL NAA+NON-PROBE: NOT DETECTED
C PARAP DNA BLD POS QL NAA+NON-PROBE: NOT DETECTED
C TROPICLS DNA BLD POS QL NAA+NON-PROBE: NOT DETECTED
CALCIUM SERPL-MCNC: 8.3 MG/DL (ref 8.8–10.4)
CHLORIDE SERPL-SCNC: 108 MMOL/L (ref 98–107)
CREAT SERPL-MCNC: 1.7 MG/DL (ref 0.67–1.17)
CRP SERPL-MCNC: 136.85 MG/L
E CLOAC COMP DNA BLD POS NAA+NON-PROBE: NOT DETECTED
E COLI DNA BLD POS QL NAA+NON-PROBE: NOT DETECTED
E FAECALIS DNA BLD POS QL NAA+NON-PROBE: NOT DETECTED
E FAECIUM DNA BLD POS QL NAA+NON-PROBE: NOT DETECTED
EGFRCR SERPLBLD CKD-EPI 2021: 38 ML/MIN/1.73M2
ENTEROBACTERALES DNA BLD POS NAA+N-PRB: NOT DETECTED
EOSINOPHIL # BLD AUTO: 0.2 10E3/UL (ref 0–0.7)
EOSINOPHIL NFR BLD AUTO: 3 %
ERYTHROCYTE [DISTWIDTH] IN BLOOD BY AUTOMATED COUNT: 13 % (ref 10–15)
EST. AVERAGE GLUCOSE BLD GHB EST-MCNC: 180 MG/DL
GLUCOSE BLDC GLUCOMTR-MCNC: 106 MG/DL (ref 70–99)
GLUCOSE BLDC GLUCOMTR-MCNC: 124 MG/DL (ref 70–99)
GLUCOSE BLDC GLUCOMTR-MCNC: 129 MG/DL (ref 70–99)
GLUCOSE BLDC GLUCOMTR-MCNC: 158 MG/DL (ref 70–99)
GLUCOSE SERPL-MCNC: 120 MG/DL (ref 70–99)
GP B STREP DNA BLD POS QL NAA+NON-PROBE: NOT DETECTED
HAEM INFLU DNA BLD POS QL NAA+NON-PROBE: NOT DETECTED
HBA1C MFR BLD: 7.9 %
HCO3 SERPL-SCNC: 23 MMOL/L (ref 22–29)
HCT VFR BLD AUTO: 30.8 % (ref 40–53)
HGB BLD-MCNC: 10 G/DL (ref 13.3–17.7)
HOLD SPECIMEN: NORMAL
HOLD SPECIMEN: NORMAL
IMM GRANULOCYTES # BLD: 0 10E3/UL
IMM GRANULOCYTES NFR BLD: 0 %
INR PPP: 2.24 (ref 0.85–1.15)
K OXYTOCA DNA BLD POS QL NAA+NON-PROBE: NOT DETECTED
KLEBSIELLA SP DNA BLD POS QL NAA+NON-PRB: NOT DETECTED
KLEBSIELLA SP DNA BLD POS QL NAA+NON-PRB: NOT DETECTED
L MONOCYTOG DNA BLD POS QL NAA+NON-PROBE: NOT DETECTED
LVEF ECHO: NORMAL
LYMPHOCYTES # BLD AUTO: 0.9 10E3/UL (ref 0.8–5.3)
LYMPHOCYTES NFR BLD AUTO: 13 %
MAGNESIUM SERPL-MCNC: 1.8 MG/DL (ref 1.7–2.3)
MCH RBC QN AUTO: 30.5 PG (ref 26.5–33)
MCHC RBC AUTO-ENTMCNC: 32.5 G/DL (ref 31.5–36.5)
MCV RBC AUTO: 94 FL (ref 78–100)
MONOCYTES # BLD AUTO: 0.5 10E3/UL (ref 0–1.3)
MONOCYTES NFR BLD AUTO: 7 %
N MEN DNA BLD POS QL NAA+NON-PROBE: NOT DETECTED
NEUTROPHILS # BLD AUTO: 5.1 10E3/UL (ref 1.6–8.3)
NEUTROPHILS NFR BLD AUTO: 76 %
NRBC # BLD AUTO: 0 10E3/UL
NRBC BLD AUTO-RTO: 0 /100
P AERUGINOSA DNA BLD POS NAA+NON-PROBE: NOT DETECTED
PLATELET # BLD AUTO: 152 10E3/UL (ref 150–450)
POTASSIUM SERPL-SCNC: 4 MMOL/L (ref 3.4–5.3)
PROT SERPL-MCNC: 5.7 G/DL (ref 6.4–8.3)
PROTEUS SP DNA BLD POS QL NAA+NON-PROBE: NOT DETECTED
PROTHROMBIN TIME: 24.5 SECONDS (ref 11.8–14.8)
RBC # BLD AUTO: 3.28 10E6/UL (ref 4.4–5.9)
S AUREUS DNA BLD POS QL NAA+NON-PROBE: NOT DETECTED
S AUREUS+CONS DNA BLD POS NAA+NON-PROBE: NOT DETECTED
S EPIDERMIDIS DNA BLD POS QL NAA+NON-PRB: NOT DETECTED
S LUGDUNENSIS DNA BLD POS QL NAA+NON-PRB: NOT DETECTED
S MALTOPHILIA DNA BLD POS QL NAA+NON-PRB: NOT DETECTED
S MARCESCENS DNA BLD POS NAA+NON-PROBE: NOT DETECTED
S PNEUM DNA BLD POS QL NAA+NON-PROBE: NOT DETECTED
S PYO DNA BLD POS QL NAA+NON-PROBE: NOT DETECTED
SALMONELLA DNA BLD POS QL NAA+NON-PROBE: NOT DETECTED
SODIUM SERPL-SCNC: 140 MMOL/L (ref 135–145)
STREPTOCOCCUS DNA BLD POS NAA+NON-PROBE: DETECTED
WBC # BLD AUTO: 6.7 10E3/UL (ref 4–11)

## 2025-07-02 PROCEDURE — 94660 CPAP INITIATION&MGMT: CPT

## 2025-07-02 PROCEDURE — 72170 X-RAY EXAM OF PELVIS: CPT

## 2025-07-02 PROCEDURE — 250N000011 HC RX IP 250 OP 636: Performed by: STUDENT IN AN ORGANIZED HEALTH CARE EDUCATION/TRAINING PROGRAM

## 2025-07-02 PROCEDURE — G0463 HOSPITAL OUTPT CLINIC VISIT: HCPCS | Mod: 25

## 2025-07-02 PROCEDURE — 99233 SBSQ HOSP IP/OBS HIGH 50: CPT | Performed by: FAMILY MEDICINE

## 2025-07-02 PROCEDURE — 258N000003 HC RX IP 258 OP 636: Performed by: FAMILY MEDICINE

## 2025-07-02 PROCEDURE — 36415 COLL VENOUS BLD VENIPUNCTURE: CPT | Performed by: STUDENT IN AN ORGANIZED HEALTH CARE EDUCATION/TRAINING PROGRAM

## 2025-07-02 PROCEDURE — 250N000009 HC RX 250

## 2025-07-02 PROCEDURE — 83036 HEMOGLOBIN GLYCOSYLATED A1C: CPT | Performed by: FAMILY MEDICINE

## 2025-07-02 PROCEDURE — 999N000157 HC STATISTIC RCP TIME EA 10 MIN

## 2025-07-02 PROCEDURE — 85004 AUTOMATED DIFF WBC COUNT: CPT | Performed by: STUDENT IN AN ORGANIZED HEALTH CARE EDUCATION/TRAINING PROGRAM

## 2025-07-02 PROCEDURE — 83735 ASSAY OF MAGNESIUM: CPT | Performed by: INTERNAL MEDICINE

## 2025-07-02 PROCEDURE — 93306 TTE W/DOPPLER COMPLETE: CPT

## 2025-07-02 PROCEDURE — 93306 TTE W/DOPPLER COMPLETE: CPT | Mod: 26 | Performed by: INTERNAL MEDICINE

## 2025-07-02 PROCEDURE — 86140 C-REACTIVE PROTEIN: CPT | Performed by: STUDENT IN AN ORGANIZED HEALTH CARE EDUCATION/TRAINING PROGRAM

## 2025-07-02 PROCEDURE — 85610 PROTHROMBIN TIME: CPT | Performed by: STUDENT IN AN ORGANIZED HEALTH CARE EDUCATION/TRAINING PROGRAM

## 2025-07-02 PROCEDURE — 5A09357 ASSISTANCE WITH RESPIRATORY VENTILATION, LESS THAN 24 CONSECUTIVE HOURS, CONTINUOUS POSITIVE AIRWAY PRESSURE: ICD-10-PCS | Performed by: STUDENT IN AN ORGANIZED HEALTH CARE EDUCATION/TRAINING PROGRAM

## 2025-07-02 PROCEDURE — 82310 ASSAY OF CALCIUM: CPT | Performed by: STUDENT IN AN ORGANIZED HEALTH CARE EDUCATION/TRAINING PROGRAM

## 2025-07-02 PROCEDURE — 120N000001 HC R&B MED SURG/OB

## 2025-07-02 PROCEDURE — 250N000009 HC RX 250: Performed by: STUDENT IN AN ORGANIZED HEALTH CARE EDUCATION/TRAINING PROGRAM

## 2025-07-02 PROCEDURE — 97602 WOUND(S) CARE NON-SELECTIVE: CPT

## 2025-07-02 PROCEDURE — 258N000003 HC RX IP 258 OP 636: Performed by: STUDENT IN AN ORGANIZED HEALTH CARE EDUCATION/TRAINING PROGRAM

## 2025-07-02 PROCEDURE — 250N000013 HC RX MED GY IP 250 OP 250 PS 637: Performed by: FAMILY MEDICINE

## 2025-07-02 PROCEDURE — 72072 X-RAY EXAM THORAC SPINE 3VWS: CPT

## 2025-07-02 PROCEDURE — 250N000013 HC RX MED GY IP 250 OP 250 PS 637: Performed by: STUDENT IN AN ORGANIZED HEALTH CARE EDUCATION/TRAINING PROGRAM

## 2025-07-02 RX ORDER — HYDRALAZINE HYDROCHLORIDE 20 MG/ML
2 INJECTION INTRAMUSCULAR; INTRAVENOUS EVERY 4 HOURS PRN
Status: DISCONTINUED | OUTPATIENT
Start: 2025-07-02 | End: 2025-07-03

## 2025-07-02 RX ORDER — WARFARIN SODIUM 5 MG/1
5 TABLET ORAL
Status: COMPLETED | OUTPATIENT
Start: 2025-07-02 | End: 2025-07-02

## 2025-07-02 RX ORDER — ALBUTEROL SULFATE 0.83 MG/ML
2.5 SOLUTION RESPIRATORY (INHALATION) EVERY 4 HOURS PRN
Status: DISPENSED | OUTPATIENT
Start: 2025-07-02

## 2025-07-02 RX ORDER — PRAMIPEXOLE DIHYDROCHLORIDE 0.5 MG/1
1 TABLET ORAL 2 TIMES DAILY
Status: DISPENSED | OUTPATIENT
Start: 2025-07-02

## 2025-07-02 RX ORDER — SPIRONOLACTONE 25 MG/1
50 TABLET ORAL DAILY
Status: DISPENSED | OUTPATIENT
Start: 2025-07-02

## 2025-07-02 RX ORDER — LOSARTAN POTASSIUM 50 MG/1
100 TABLET ORAL DAILY
Status: DISPENSED | OUTPATIENT
Start: 2025-07-02

## 2025-07-02 RX ORDER — TRAZODONE HYDROCHLORIDE 50 MG/1
50 TABLET ORAL AT BEDTIME
Status: DISPENSED | OUTPATIENT
Start: 2025-07-02

## 2025-07-02 RX ORDER — LOSARTAN POTASSIUM 50 MG/1
100 TABLET ORAL DAILY
Status: DISCONTINUED | OUTPATIENT
Start: 2025-07-02 | End: 2025-07-02

## 2025-07-02 RX ORDER — ATENOLOL 50 MG/1
50 TABLET ORAL 2 TIMES DAILY
Status: DISPENSED | OUTPATIENT
Start: 2025-07-02

## 2025-07-02 RX ADMIN — VANCOMYCIN HYDROCHLORIDE 500 MG: 500 INJECTION, POWDER, LYOPHILIZED, FOR SOLUTION INTRAVENOUS at 02:41

## 2025-07-02 RX ADMIN — LOSARTAN POTASSIUM 100 MG: 50 TABLET, FILM COATED ORAL at 14:47

## 2025-07-02 RX ADMIN — ALBUTEROL SULFATE 2.5 MG: 2.5 SOLUTION RESPIRATORY (INHALATION) at 09:38

## 2025-07-02 RX ADMIN — CEFTRIAXONE SODIUM 2 G: 2 INJECTION, POWDER, FOR SOLUTION INTRAMUSCULAR; INTRAVENOUS at 09:14

## 2025-07-02 RX ADMIN — DIAZEPAM 2.5 MG: 5 INJECTION INTRAMUSCULAR; INTRAVENOUS at 03:40

## 2025-07-02 RX ADMIN — ATENOLOL 50 MG: 50 TABLET ORAL at 13:02

## 2025-07-02 RX ADMIN — ACETAMINOPHEN 650 MG: 325 TABLET, FILM COATED ORAL at 11:00

## 2025-07-02 RX ADMIN — WARFARIN SODIUM 5 MG: 5 TABLET ORAL at 17:28

## 2025-07-02 RX ADMIN — ACETAMINOPHEN 650 MG: 325 TABLET, FILM COATED ORAL at 02:41

## 2025-07-02 RX ADMIN — METOPROLOL TARTRATE 2.5 MG: 1 INJECTION, SOLUTION INTRAVENOUS at 02:43

## 2025-07-02 RX ADMIN — SODIUM CHLORIDE: 0.9 INJECTION, SOLUTION INTRAVENOUS at 14:46

## 2025-07-02 RX ADMIN — SODIUM CHLORIDE: 0.9 INJECTION, SOLUTION INTRAVENOUS at 10:58

## 2025-07-02 RX ADMIN — VANCOMYCIN HYDROCHLORIDE 500 MG: 500 INJECTION, POWDER, LYOPHILIZED, FOR SOLUTION INTRAVENOUS at 16:19

## 2025-07-02 RX ADMIN — ATENOLOL 50 MG: 50 TABLET ORAL at 21:25

## 2025-07-02 ASSESSMENT — ACTIVITIES OF DAILY LIVING (ADL)
ADLS_ACUITY_SCORE: 68
ADLS_ACUITY_SCORE: 69
ADLS_ACUITY_SCORE: 69
ADLS_ACUITY_SCORE: 68
ADLS_ACUITY_SCORE: 69
ADLS_ACUITY_SCORE: 67
ADLS_ACUITY_SCORE: 67
ADLS_ACUITY_SCORE: 68
ADLS_ACUITY_SCORE: 67
ADLS_ACUITY_SCORE: 68
ADLS_ACUITY_SCORE: 67
ADLS_ACUITY_SCORE: 68
ADLS_ACUITY_SCORE: 69
ADLS_ACUITY_SCORE: 68
ADLS_ACUITY_SCORE: 69
ADLS_ACUITY_SCORE: 69
ADLS_ACUITY_SCORE: 68
ADLS_ACUITY_SCORE: 69

## 2025-07-02 NOTE — PLAN OF CARE
Goal Outcome Evaluation:      Plan of Care Reviewed With: patient    Overall Patient Progress: improvingOverall Patient Progress: improving    Outcome Evaluation: This morning when first woke up, only alert to self but writer reorientated pt and then assessed a couple hours later and pt A & O x 4, but some forgetfulness. Pt initally on 2L NC, weaned to RA. Hypertensive today, MD notified and home medications restarted. Prn tylenol given x 1 for RLE pain. WOC seen pt today and did wound cares to BLE and left elbow, dressings C/D/I. Voided 450 mL this shift. Expiratory wheeze noted to lung, prn neb given x 1. Pt denies feeling short of breathe. Pt passed bedside swallow today and tolerated pills and thin liquids. At lunch pt had a coughing episode and 02 went down to 86%-88%. MD notified and changed to full liquid diet. Pt transferred from ICU to Med/Surg room 266 this afternoon. Pt able to ambulate with 1 - 2 assist with walker/GB. Per wife, pt is to wear back brace when OOB.

## 2025-07-02 NOTE — CONSULTS
SPIRITUAL HEALTH SERVICES Consult Note    Medical Surgical Intensive Care Unit at [facility]    REFERRAL SOURCE/REASON FOR VISIT - Patient request     SUMMARY - I checked in on Carlos, who, according to his documentation, came in yesterday from [facility] after a fall, with AMS, sepsis, restlessness and agitation.  This morning, he has been A/O x 1, with discomfort to his BLEs, increased confusion and continued restlessness.  I will wait to visit him until he becomes a bit more oriented and less agitated.         Plan - No plan to follow due to upcoming multiple days off around the Holiday.    Shai Wilson, Ph.D,   Spiritual Health Services  [address] [place] [address] [phone]  [email or web address]    Assessment -     Strengths, Coping, and Resources - Significant other, family, gui    Meaning, Beliefs, and Spirituality - Patient reported at admission that he is [employer] [place] [employer].

## 2025-07-02 NOTE — PLAN OF CARE
Goal Outcome Evaluation:      Plan of Care Reviewed With: patient    Overall Patient Progress: improvingOverall Patient Progress: improving    Outcome Evaluation: VSS. afebrile. pt does c/o some discomfort to BLE. PRN tylenol given per MAR without difficulty. pt's son at bedsied until 0200.  Son appropriate and attentive to pt.  Pt alert and oriented to self only. speach clear and logical.  pt able to use urinal per request in bed with moderate UOP (see flow sheet). pt denies any chest pain/SOB. pt remains on 2L O2/nc for sleep.  pt unable to tolerate home c-pap this shift. PRN valium given per MAR for increased adgitation and reslessness. pt grabbing at air with increased confusion. PRN valium effective. pt noted to have frequent PVSc this am. MD made aware with new order for mag lab added on.  no other concerns at this time.

## 2025-07-02 NOTE — PHARMACY-ANTICOAGULATION SERVICE
Clinical Pharmacy - Warfarin Dosing Consult     Pharmacy has been consulted to manage this patient s warfarin therapy.  Indication: Other - specify in comments (Factor V Leiden mutation)  Therapy Goal: INR 2-3  Provider/Team: TCU  Warfarin Prior to Admission: Yes  Warfarin PTA Regimen: Per med rec - 5 mg daily. From TCU note 6/27, patient was to take 2.5 mg daily with a recheck INR 6/30 -- at 6/30 geriatric visit, INR was 1.9 and patient was instructed to take 5 mg daily with a recheck 7/3. Prior to discharging to the TCU, patient was on 7.5mg Mon and 5mg AOD  Recent documented change in oral intake/nutrition: Unknown    INR   Date Value Ref Range Status   07/02/2025 2.24 (H) 0.85 - 1.15 Final   07/01/2025 1.93 (H) 0.85 - 1.15 Final     Patient still NPO.    Recommend warfarin NO DOSE mg today. Depending on his status later today, may consider starting back on warfarin with dose of 5 mg tonight. Pharmacy will monitor Robert Richard daily and order warfarin doses to achieve specified goal.      Addendum 7/2/2025 at 1440: Patient now advancing diet and warfarin orders unheld. Will restart with 5 mg TONIGHT.    Please contact pharmacy as soon as possible if the warfarin needs to be held for a procedure or if the warfarin goals change.      Thank you,  Aj Gallardo, PharmD  Clinical Pharmacist - [facility]

## 2025-07-02 NOTE — CONSULTS
Reason For Visit: Robert Richard, 89 year old male, seen for a [facility] consultation to evaluate and treat Left elbow abrasion, right and left LE venous stasis ulcers. Patient was admitted on 7/1/25 for s/p fall, AMS, sepsis.  Co woc nurse La JUARES RN cwocn is also present with patient in the room.  Patient is A&Ox4 some residual confusion at times, pleasant upon interaction.     Start of Care in [facility]: 5/20/25 most recent return, see Wound History for more details  Referring Provider: Most recent o/p referral Godfrey Saleem MD dated 5/22/25.  Inpatient referral per Dr Shalonda NUNN dated 7/1/25.  Primary Care Provider: Stiven Donahue   Wound Location: Venous stasis ulcer of the; Left medial and lateral lower leg and Right lateral lower leg.  Left elbow skin traumatic wound DOI 7/1/25.    Brief Admission History:  Per current admission H&P - Robert Richard is a 89 year old male with a medical history of diabetes mellitus type 2, essential hypertension, CKD stage IIIb, who was recently hospitalized at [facility] from June 21 to June 26 after a fall at home that caused a T7 fracture, pneumomediastinum and small right pneumothorax.  He also developed ARNALDO on top of his CKD, received IV fluids.  Neurosurgery cleared patient, TLSO brace was recommended and was discharged to [facility].  Family will bring in the TLSO brace to [facility].  On 7/1/25 patient was brought in to the emergency department via EMS after he was found on the ground by staff the same morning at 6:30 AM, was noted to have a saturation of 86% on room air.  Workup in the emergency room notable for altered mental status, patient met sepsis criteria, source thought to be respiratory and admitted on 7/1/2025 for further workup.    Wound History:   Pt well known to the Wound and Ostomy clinic here at [facility].  Was seen multiple times in the past as detailed below for LE venous ulcers and recent returned to  clinic with new wounds on 5/20/25.  [facility] saw pt - October into November of 2019 for left medial leg venous ulcer, June - August 2021 for right lower leg venous ulcer, and November into December 2023 for left medial ankle venous ulcers.  During those visits the wounds were primaryily treated with compression with standard Medline Unna's boot visco paste wraps and ace wraps, and past wounds healed in time.  - Past LE venous Medical History also significant for ablation procedures with Dr Gio NUNN on the left in 2021.    - Pt reported at his 5/20/25 initial return to the Wound and Ostomy clinic that the past wounds all healed and remained healed.  A few weeks prior he developed a new wound to the left shin and then soon after another to the left inner calf.  The shin wound dried up and scabbed over but the inner left calf wound remains open.  At his 6/13 follow up appointment, new wounds noted: Right lateral posterior and Left lateral, preexisting wound to the left medial lower leg remained.  Use of CoFlex Unna's boot's bilaterally, stopped after first application per pt request.  At the time of his fall on 6/21 at home, wound cares for the right and left lower legs were Silvercel as primary dressing, with Mextra superabsorbent pad as secondary dressing to the right and Mepilex to the left.    - Today 7/2 at [facility] nurse initial inpatient consult visit this admission the pt has the right lateral lower leg venous ulcers scattered, left medial and lateral lower leg distinct venous ulcers and new skin trauma to the left elbow related to fall of 7/1/25.      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)    Stage 3 chronic kidney disease, unspecified whether stage 3a or 3b CKD (H)    Asymmetrical sensorineural hearing loss    Hemifacial spasm    Subjective tinnitus    Venous stasis dermatitis of right lower extremity    S/P lumbar fusion    Spondylolisthesis of lumbar region    Generalized muscle weakness    Anemia, unspecified type    History of deep venous thrombosis (DVT) of distal vein of left lower extremity    Urine retention    Stage 3b chronic kidney disease (H)    Acute chest wall pain    Closed fracture of seventh thoracic vertebra (H)    Fall from ground level    Knee joint pain    Primary insomnia    Obstructive sleep apnea of adult    Renal insufficiency syndrome    Subjective tinnitus of both ears    Type 2 diabetes mellitus without complications (H)    Fall, subsequent encounter    Metabolic encephalopathy    Hypoxia    Fall, initial encounter    Pneumonia of right lung due to infectious organism, unspecified part of lung    Sepsis, due to unspecified organism, unspecified whether acute organ dysfunction present (H)                   Tobacco Use:     Tobacco Use      Smoking status: Never      Smokeless tobacco: Never     Diabetic: Type 2  HgbA1C:   Hemoglobin A1C   Date Value Ref Range Status   01/07/2025 8.2 (H) <5.7 % Final     Comment:     Normal <5.7%   Prediabetes 5.7-6.4%    Diabetes 6.5% or higher     Note: Adopted from ADA consensus guidelines.   06/15/2021 7.2 (H) 0 - 5.6 % Final     Comment:     Normal <5.7% Prediabetes 5.7-6.4%  Diabetes 6.5% or higher - adopted from ADA   consensus guidelines.     Checks Blood Glucose?:  See hospital flow sheet for values since hospitalized.  Pt prior to NH admission was not check his blood sugars in the home.    Personal/social history:  Prior to his  hospitalization at [facility] 6/21 for spinal fracture s/p fall, pt was living independently with family with no home care services.  At time of discharge from [facility] 6[facility] [place] where he suffered second fall on 7/1/25.     Objective:   Current treatment plan: Cares provided since current admission 7/1/25.  Left medial and lateral lower leg distinct venous stasis ulcers - Mepilex gentle adhesive foam dressings.  Right lateral lower leg scattered venous stasis ulcer - Open to air on absorptive pads   Left Elbow - Mepilex gentle adhesive foam dressing.  Last changed: Mepilex all applied 7/1/25 in ICU.     Wound #1 Left lower leg, wounds consistent with venous stasis ulcers.  Stage/tissue depth: Medial and lateral lower leg ulcers are full thickness.  - Medial lower leg 2.5 cm L x 1.5 cm W x 0.2 cm D, wound bed is approximately 80 % fibrinous yellow slough and 20 % clean moist red tissue with no granular buds present, moderate amounts serous drainage.  - Lateral lower leg total area of 4 cm L x 4 cm W x 0.1 cm D, wound bed is is a mix of partially denuded tissue surrounding the original distinct open wound of 1.5 cm L x 1.5 cm W x 0.1 cm D, which wound bed today is approximately 10 % red tissue with no granular buds present and 90 % yellow moist slough, moderate amounts serosanguinous drainage.  Tunneling: none  Undermining: none  Wound bed type/amount: As above for each site; Not fluctuant  Wound Edges: open at both the medial and lateral wounds.  Periwound: firm edema with some pitting, wide spread hemosiderin staining and varicose veins  Drainage: as above for each site  Odor: no  Pain: bilateral lower legs very sensitive and hypersensitive at times, mostly to the posterior but also at the direct wound sites.    Photo's from today initial inpatient woc nurse consult 7/2/25.  L - R medial lower leg, lateral lower leg.      Photo's from 6/19/25 clinic  visit.  L - R medial lower leg, lateral lower leg.  Unna's boot placed on hole per pt request.         Photo's from 5/20/25, initial return to the Wound and Ostomy clinic after last visit in 12/2023.     Wound #2 Right lower leg, wound consistent with venous stasis ulcers.  Newly noted 6/13/25.  Stage/tissue depth: partial thickness  15 cm L x 9 cm W x 0.1 cm D, located on the lateral/posterior lower leg.  Tunneling: none noted  Undermining: none noted  Wound bed type/amount: of the total area approximately 20 % denuded dermal tissue red with no granular buds present, 30 % intact aidan skin, 40 % mix of nonviable dermal tissue/moist yellow drainage and 10 % waxy yellow skin with small amounts of moisture and weeping; Not fluctuant  Wound Edges: open where there is depth and clean tissue surrounding  Periwound: edema, hemosiderin staing  Drainage: Large amounts serous and seropurulent  Odor: no  Pain: bilateral lower legs very sensitive and hypersensitive at times, mostly to the posterior but also at the direct wound sites.    Photo's from today initial inpatient woc nurse consult 7/2/25.  L - R lateral lower leg x's 2, medial lower leg.      Photo's from 6/13/25, initial presentation of new venous ulcer.  Started use of CoFlex Unna's boot's to the bilateral lower legs and feet.    Wound #3 Left elbow, traumatic abrasion DOI 7/1/25.  Stage/tissue depth: partial thickness  1.5 cm L x 3 cm W x 0 cm D  Tunneling: none  Undermining: none  Wound bed type/amount: 100 % moist denuded dermal tissue; Not fluctuant  Wound Edges: NA no depth  Periwound: thin skin and minor bruising.  Drainage: moderate amounts serosanguinous  Odor: no  Pain: tender but denies pain  Photo from today initial inpatient woc nurse consult 7/2/25.     Photo from day of admission 7/1/25.    Dorsalis Pedal Pulse: weak but palpable: NA doppler: NA phasic  Posterior Tibial Pulse: unable to palpate: NA doppler: NA phasic  Hair growth: diminished knee  distally  Capillary Refill: 3 to 4 seconds  Feet/toes color: hemosiderin staining, hammer toes present  Nails: wnl  R Leg: Edema plus 2 pitting edema in areas, but mostly firm woody nonpitting edema present. Ankle circumference 37.5 cm. Calf circumference 46 cm.  - Not measured this visit 7/2.  L Leg: Edema plus 2 pitting edema in areas, but mostly firm woody nonpitting edema present. Ankle circumference 37 cm. Calf circumference 44 cm.  - Not measured this visit 7/2.     Mobility: currently very limited  Current offloading/footwear: has large diabetic shoes, is wearing only gripper socks while in TCU  Sensation: peripheral neuropathy and hypersensitivity    Other callusing/areas of concern: None noted, note no head to toe inspection conducted this visit.    Diet: NPO    Assessment:  Left lower leg has the two distinct ulcers still present from assessment in clinic 6/19.    Medial wound is larger and mostly slough covered, remains a distinct ulcer with intact immediate skin around the hole.    The lateral wound is also large and similar to last appearance with distinct hole surrounded by partial thickness denudement.    The right lateral lower leg has large scattered area of venous stasis ulcers and venous dermatitis.    Prior to cleansing the site was mostly nonviable tissue and moist drainage mix.  After cleansing and removing some of the nonviable tissue and moist drainage some clean red tissue was present.    Site remains scattered and fragile and all appears partial thickness.    The left elbow has open partial thickness wound with no depth, appears more like an abrasion than a skin tear, both in person today and in photo from Med Surg yesterday.    Barriers to wound healing:   Poor nutrition: inadequate supply of protein, carbohydrates, fatty acids, and trace elements essential for all phases of wound healing, pt has been educated in clinic on need for increased protein in diet for wound healing, currently is  NPO 7/2/25.  Reduced Blood Supply: inadequate perfusion to heal wound, appears adequate  Medication: NA  Chemotherapy: suppresses the immune system and inflammatory response, NA  Radiotherapy: increases production of free radical which damage cells, NA  Psychological stress: none noted  Obesity: decreases tissue perfusion, presnt  Infection: prolongs inflammatory phase, uses vital nutrients, impairs epithelialization and releases toxins, lower leg wounds and left elbow show no signs of infection today  Underlying Disease: diabetes mellitis, LE edema, venous insuffiencey.    Maceration: reduces wound tensile strength and inhibits epithelial migration, present on the right and left lateral leg wounds, addressed with use of Triad paste today 7/2/25.   Patient compliance, has been compliant in the past with appointments and recommended wound cares  Unrelieved pressure, NA  Immobility, present, very limited  Substance abuse: NA    Plan:  For the left elbow will write new orders as below, limited frequency of dressing changes to every 3 days and prn for saturated or loose dressing.  For the right lateral, left medial and lateral wounds, will start use of Triad paste as detailed below.  At this time with pt's current health challenges I do not recommend compression.    Interventions to Barriers:  As below.    Topical cares/Cares provided today by [facility] nurse and recommended ongoing starting today 7/2/25.  Left medial and lateral lower leg and Right lateral lower leg, venous stasis ulcers.  1 Cleanse with Microklenz, soap and water or saline and gauze, pat dry.   2 Apply Triad paste to the wound sites, fill Left medial and lateral wound with Triad and small smear to surrounding intact and smear thin layer over the right lateral lower leg wound and immediate surrounding skin.  3 Cover wounds with ABD pads.  4 Secure with Kerlix and tape to the gauze.  5 Change once daily and prn for loose or saturated dressings.    Left elbow  traumatic abrasion.  1 Cleanse with Microklenz, soap and water or saline and gauze, pat dry.   2 Cover wound with Nonadherent strip gauze (Adaptic).  3 Cover wound and secure Adaptic with Mepilex gentle adhesive foam dressing.  4 Change every 3 days and prn for loose or saturated dressing.    Offloading:  NA at this time  Additional recommendations:      Discussed plan of care with patient and his nurse Areli RN. Teaching done with patient and his nurse Areli RN for dressing changes; Pt is unable to perform cares currently.  Will have nursing staff perform while hospitalized and TCU staff if discharge back to [facility].    Discharge instructions updated to include:  - Above wound cares to be added to discharge instructions, will update if needed prior to discharge.  - Due to current mobility issues I do not feel an out patient clinic follow up is appropriate till pt ambulatory.  TCU can manage and modify cares as their providers see fit.    Supplies:  All hospital stock.  Left in room today remains of large tube of Triad paste.    WOC Return visit: [facility] woc will not be available 7/4, will do inpatient follow up 7/7/25 if pt is still hospitalized and woc schedule allows.  Nursing to notify Provider and WOC Nurse if wound deteriorates.    Verbal, written, & demonstrative education provided.  Face to face time (excluding procedure): approximately 25 minutes.  Procedure: less than 1 minute application of Triad paste to sloughy tissue of the left medial and lateral lower leg wounds.  Care plan was changed.    Reji Malhotra RN [facility]  [phone]

## 2025-07-02 NOTE — PROGRESS NOTES
[facility]    Medicine Progress Note - Hospitalist Service    Date of Admission:  7/1/2025    Assessment & Plan   Robert Richard is a 89 year old male with a medical history of diabetes mellitus type 2, essential hypertension, CKD stage IIIb, who was recently hospitalized at [facility] from May 21 to May 26 after a fall that caused a T7 fracture, pneumomediastinum and small right pneumothorax, and was discharged to [facility] TCU.  Patient was brought in to the emergency department via EMS after he was found on the ground by staff in early morning hours and he was noted to have a saturation of 86% on room air.  Workup in the emergency room notable for sepsis with suspected pneumonia in the right lower lung base with acute encephalopathy and patient was admitted on 7/1/2025 for further workup.  He has been placed on Rocephin and Vanco with clinical improvement noted and labs improving.  Blood culture 1/2 now growing gram positive cocci with ID pending.  Patient initially placed intermediate overflow status but given his clinical improvement and stability overnight will be transition to MedSurg status for ongoing medical management.    Severe sepsis with acute organ dysfunction based on fever, tachycardia, elevated WBC, likely source of infection pulmonary versus soft tissue, encephalopathy  Possible respiratory infection, right lung base  Patient presented with fever of 102.4 rectally, leukocytosis of 14.2, acute respiratory failure with hypoxia, acute encephalopathy, procalcitonin of 2.16.  Patient has already started to improve overnight with mentation starting to clear, white blood cell count normalizing, and oxygen needs decreasing.    -- Transition patient to MedSurg status for ongoing medical management  -- Continue Rocephin and vancomycin  -- Monitor for results of blood culture -may be a contaminant but given patient's skin wounds may also be  source of sepsis  -- Continue monitoring of CBC, CRP, BMP daily  -- Wean oxygen supplementation as able    Gram positive cocci bacteremia  1 of 2 blood cultures is growing out staph species by Verigene with ID and sensitivities pending  - Continue with IV vancomycin is awaiting culture ID and sensitivities    Acute hypoxic respiratory failure  On presentation patient was requiring 3 L/min to keep SpO2 above 90%.  Patient does not wear oxygen at baseline and acute failure is expected secondary to pneumonia as above.   VBG pH 7.4, pCO2 43.  Patient was decreased to 2 L overnight and is trialing transition to room air this morning and so far is tolerating it fairly well with saturations in the low 90% range echocardiogram shows EF over 70%.  With no diastolic dysfunction  -- Proceed with treatment plan as outlined above and wean O2 supplementation as able    Acute Toxic metabolic Encephalopathy  Patient presented with altered mental status, agitation, restlessness.  CT head without evidence for acute intracranial process. UA without infection, LA within normal limits, VBG 7.4 /43, WBC 14.2, Cr 1.61, Troponin 52, trended down to less than 6, EKG normal sinus rhythm with PVCs, Hgb 11.9, , electrolytes within normal limits. Etiology of encephalopathy suspected secondary to sepsis and patient is already having significant improvement this morning.   - Continue with treatment of acute infection as outlined above and monitor for ongoing resolution.    Moderate aortic stenosis  Not previously known but noted on echocardiogram during this hospitalization with mean gradient 21 mmHg  -will monitor closely and be more cautious with any further fluid resuscitation that may be needed  - Would recommend outpatient cardiology follow-up    Pulmonary HTN  Not previously known but noted on echocardiogram and may be contributing to patient's respiratory symptoms as outlined above  - Proceed with treatment of pneumonia as outlined  above and monitor for any chronic O2 needs that could be present secondary to pulmonary hypertension.    Venous stasis dermatitis, bilaterally  Lower extremity edema, some open shallow wounds  Could be the source of infection, especially with blood culture showing strep species.  Patient is on spironolactone at baseline to help with edema which has been held since admission secondary to initial low normal blood pressures  -- Wound care order placed  -- Continue to hold spironolactone at this time but would restart as patient continues to clinically improve.     Fall  Recent closed fracture of the seventh thoracic vertebra  Patient was hospitalized at [facility]  from 6/21/2025 to 6/26/2025 for a fall sustaining a T7 fracture, pneumomediastinum, small right pneumothorax.  At that time, neurosurgery cleared patient, TLSO brace was recommended.  During that hospitalization patient developed ARNALDO on top of his CKD, received IV fluids, and was discharged to TCU.  This morning patient reports no worsening pain in his back  -- Wear TLSO brace, family will bring this in  -- will get x-ray of thoracic spine today to re-evalutae T7 fracture given recent fall.    Stage IIIb chronic kidney disease  Patient appears to have baseline creatinine of 1.4-1.7.  Has been 1.6-1.7 so far during this hospital stay  -- Continue to monitor for stability  -- Dose medication renally    History of deep vein thrombosis of distal vein of left lower extremity  Factor V Leiden deficiency   --Pharmacy to dose warfarin    Essential hypertension  PTA losartan, spironolactone, atenolol oral held initially secondary to n.p.o. status and encephalopathy.  Patient was placed on IV metoprolol and valsartan with persistent hypertension developing.  Patient is now mentating well and is able to take oral pills  -- Will restart atenolol 50 mg twice daily  -- Continue to hold losartan and spironolactone but would consider recent initiation later today if  hypertension still is present after atenolol administration     Diabetes mellitus type 2, with neuropathy  Most recent A1c 8.2 which was over 6 months ago.  Patient is diet controlled  -- Start moderate carbohydrate diet  --Sliding scale NovoLog with meals and bedtime  -- Hypoglycemia protocol in place    Obstructive sleep apnea  --CPAP as per home settings during this stay           Diet: NPO for Medical/Clinical Reasons Except for: Meds, Ice Chips    DVT Prophylaxis: Warfarin  Smith Catheter: Not present  Lines: None     Cardiac Monitoring: ACTIVE order. Indication: tachycardia  Code Status: No CPR- Do NOT Intubate      Clinically Significant Risk Factors Present on Admission          # Hyperchloremia: Highest Cl = 108 mmol/L in last 2 days, will monitor as appropriate          # Hypoalbuminemia: Lowest albumin = 2.8 g/dL at 7/2/2025  5:18 AM, will monitor as appropriate  # Drug Induced Coagulation Defect: home medication list includes an anticoagulant medication    # Hypertension: Noted on problem list      # Anemia: based on hgb <11      # DMII: A1C = N/A within past 6 months    # Obesity: Estimated body mass index is 32.09 kg/m  as calculated from the following:    Height as of this encounter: 1.829 m (6').    Weight as of this encounter: 107.3 kg (236 lb 9.6 oz).       # Financial/Environmental Concerns:           Social Drivers of Health    Interpersonal Safety: Unknown (7/1/2025)    Interpersonal Safety     Do you feel physically and emotionally safe where you currently live?: Patient unable to answer     Within the past 12 months, have you been hit, slapped, kicked or otherwise physically hurt by someone?: Patient unable to answer     Within the past 12 months, have you been humiliated or emotionally abused in other ways by your partner or ex-partner?: Patient unable to answer          Disposition Plan   Medically Ready for Discharge: Anticipated in 2-4 Days             Jyothi Chopra  MD  Hospitalist Service  [facility]  Securely message with [employer] (more info)  Text page via [employer] Paging/Directory   ______________________________________________________________________    Interval History   Patient remained fairly confused overnight however this morning is becoming much more oriented and is retaining information given to him.  He has no memory of what happened that led to a fall and states the last 2 to 3 days are somewhat blurry to him.  Denies any current pain.  No new nursing concerns.  Patient has been afebrile.  Blood pressures are starting to trend upward.  Patient was decreased in 2 L nasal cannula while sleeping and nurse has just ordered trialing him on room air and so far he is doing well at rest.    Physical Exam   Vital Signs: Temp: 97.8  F (36.6  C) Temp src: Oral BP: (!) 149/85 Pulse: 57   Resp: 19 SpO2: 95 % O2 Device: None (Room air) Oxygen Delivery: 2 LPM  Weight: 236 lbs 9.6 oz    Constitutional: awake, alert, cooperative, no apparent distress, and appears stated age  Respiratory: No increased work of breathing, decreased breath sounds in bilateral bases, worse on the right than the left with small crackles noted on right lower base  Cardiovascular: Regular rate and rhythm in the low 60s  GI: Bowel sounds present, abdomen soft and nondistended  Musculoskeletal: no lower extremity pitting edema present  Neurologic: Awake, alert, oriented to person and place as well as month and year but less certain about day or date.  He does remember he is here for a bad infection that caused him to be confused but he cannot remember what type of infection he has.    Medical Decision Making       58 MINUTES SPENT BY ME on the date of service doing chart review, history, exam, documentation & further activities per the note.      Data     I have personally reviewed the following data over the past 24 hrs:    6.7  \   10.0 (L)   / 152     140 108 (H) 27.0 (H) /   158 (H)   4.0 23 1.70 (H) \     ALT: 23 AST: 63 (H) AP: 85 TBILI: 0.3   ALB: 2.8 (L) TOT PROTEIN: 5.7 (L) LIPASE: N/A     TSH: N/A T4: N/A A1C: 7.9 (H)     Procal: N/A CRP: 136.85 (H) Lactic Acid: N/A       INR:  2.24 (H) PTT:  N/A   D-dimer:  N/A Fibrinogen:  N/A       Imaging results reviewed over the past 24 hrs:   Recent Results (from the past 24 hours)   Echocardiogram Complete   Result Value    LVEF  >70%    Narrative    [id number]  [id number]  [id number]  [id number]^KENNY^     [facility]  Echocardiography Laboratory  [address] [place] [address]     Name: MARC BRAGG  MRN: [id number]  : 1935  Study Date: 2025 08:03 AM  Age: 89 yrs  Gender: Male  [facility]  Reason For Study: Heart Failure  History: htn, dm, ckd, hyperlipidemia  Ordering Physician: KENNY MONTEMAYOR  Referring Physician: Stiven Donahue  Performed By: Anabella Tse     BSA: 2.2 m2  Height: 72 in  Weight: 225 lb  HR: 58  BP: 122/77 mmHg  ______________________________________________________________________________  Procedure  Echocardiogram with two-dimensional, color and spectral Doppler.  ______________________________________________________________________________  Interpretation Summary     Hyperdynamic left ventricular function. The visual ejection fraction is >70%.  Flattened septum is consistent with RV pressure overload.  The right ventricle is normal in size and function.  Pulmonary hypertension present. The right ventricular systolic pressure is  approximated at 51mmHg plus the right atrial pressure.  Moderate aortic stenosis, VMax 3.1 m/s, mean gradient 21 mmHg, ARLEY 1.2 cm2. DI  0.32. SVi 50 cc/m2.  IVC diameter and respiratory changes fall into an intermediate range  suggesting an RA pressure of 8 mmHg.  There is no pericardial effusion.     This study was compared to a TTE from 2024. Estimated right-sided  pressures are  increased.  ______________________________________________________________________________  Left Ventricle  The left ventricle is normal in size. There is concentric remodeling present.  Proximal septal thickening is noted. Hyperdynamic left ventricular function.  The visual ejection fraction is >70%. Left ventricular diastolic function is  normal. Flattened septum is consistent with RV pressure overload.     Right Ventricle  The right ventricle is normal in size and function.     Atria  Normal left atrial size. Right atrial size is normal.     Mitral Valve  The mitral valve is normal in structure and function.     Tricuspid Valve  There is mild (1+) tricuspid regurgitation. Pulmonary hypertension. The right  ventricular systolic pressure is approximated at 51mmHg plus the right atrial  pressure.     Aortic Valve  Moderate aortic stenosis, VMax 3.1 m/s, mean gradient 21 mmHg, ARLEY 1.2 cm2. DI  0.32. SVi 50 cc/m2.     Pulmonic Valve  The pulmonic valve is not well seen, but is grossly normal.     Vessels  The aortic root is normal size. Normal size ascending aorta. IVC diameter and  respiratory changes fall into an intermediate range suggesting an RA pressure  of 8 mmHg.     Pericardium  There is no pericardial effusion.     ______________________________________________________________________________  MMode/2D Measurements & Calculations  IVSd: 1.6 cm  LVIDd: 3.8 cm  LVIDs: 2.4 cm  LVPWd: 1.3 cm  FS: 37.6 %  LV mass(C)d: 210.1 grams  LV mass(C)dI: 93.8 grams/m2     Ao root diam: 3.7 cm  LA dimension: 4.2 cm  asc Aorta Diam: 3.4 cm  LA/Ao: 1.1  LVOT diam: 2.2 cm  LVOT area: 3.7 cm2  Ao root diam index Ht(cm/m): 2.0  Ao root diam index BSA (cm/m2): 1.6  Asc Ao diam index BSA (cm/m2): 1.5  Asc Ao diam index Ht(cm/m): 1.8  LA Volume (BP): 58.6 ml  LA Volume Index (BP): 26.2 ml/m2  RWT: 0.71     TAPSE: 2.4 cm     Doppler Measurements & Calculations  MV E max geoffrey: 78.8 cm/sec  MV A max geoffrey: 92.1 cm/sec  MV E/A: 0.86  MV  dec time: 0.22 sec  Ao V2 max: 309.3 cm/sec  Ao max P.0 mmHg  Ao V2 mean: 212.5 cm/sec  Ao mean P.0 mmHg  Ao V2 VTI: 84.3 cm  ARLEY(I,D): 1.3 cm2  ARLEY(V,D): 1.2 cm2  LV V1 max P.0 mmHg  LV V1 max: 99.8 cm/sec  LV V1 VTI: 30.2 cm  SV(LVOT): 111.9 ml  SI(LVOT): 50.0 ml/m2  PA V2 max: 98.9 cm/sec  PA max PG: 3.9 mmHg  PA acc time: 0.10 sec  TR max esa: 358.3 cm/sec  TR max P.4 mmHg  AV Esa Ratio (DI): 0.32  ARLEY Index (cm2/m2): 0.59  Medial E/e': 12.7  RV S Esa: 10.9 cm/sec     ______________________________________________________________________________  Report approved by: Russ Spivey MD on 2025 10:46 AM         XR Pelvis 1/2 Views    Narrative    EXAM: XR PELVIS 1/2 VIEWS  LOCATION: [facility]  DATE: 2025    INDICATION: fall  COMPARISON: CT chest abdomen pelvis 2025.       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 Right total hip arthroplasty. No evidence of component loosening. No definite fracture or dislocation, however, only AP views were obtained. Postoperative changes of the lower lumbar spine.   Degenerative arthrosis of both SI joints. Surgical clips scattered over the pelvis.    Thoracic spine XR, 3 views    Narrative    EXAM: XR THORACIC SPINE 3 VIEWS  LOCATION: [facility]  DATE: 2025    INDICATION: Fall, recent thoracic fracture.  COMPARISON: 2025.      Impression    IMPRESSION: The recent T7 fracture is not well demonstrated on this radiograph. Alignment is unchanged. Multiple interspaces are fused by bridging osteophytes. Chronic appearing compression fracture deformities of T8 and T9.

## 2025-07-02 NOTE — PROGRESS NOTES
07/02/25 1444   Vital Signs   Resp 20   Pulse 68   BP (!) 161/84   Oxygen Therapy   SpO2 92 %   O2 Device None (Room air)   Daily Care   Activity Management activity adjusted per tolerance   Activity Assistance Provided assistance, 2 people   Perineal Care absorbent brief/pad changed   Positioning   Body Position supine, head elevated   Head of Bed (HOB) Positioning HOB at 45 degrees   Positioning/Transfer Devices pillows     RN notified

## 2025-07-02 NOTE — PLAN OF CARE
Goal Outcome Evaluation:      Plan of Care Reviewed With: family    Overall Patient Progress: no changeOverall Patient Progress: no change    Outcome Evaluation: 4 Hour Shift Note: Patient has been resting this evening. His legs are constantly moving. Family states he has terrible restless legs and has for years. Unable to take his typical oral meds so family has requested valium overnight to help. Valium given x 1. Pt did arouse when he needed to void and was able to use the urinal. He was able to state his first and last name and birth year. Speech is garbled. Cpap was attempted tonight. Pt did not tolerate. Oxygen increased to 2L for sleep apnea.

## 2025-07-03 ENCOUNTER — APPOINTMENT (OUTPATIENT)
Dept: GENERAL RADIOLOGY | Facility: CLINIC | Age: 89
End: 2025-07-03
Attending: FAMILY MEDICINE
Payer: COMMERCIAL

## 2025-07-03 ENCOUNTER — APPOINTMENT (OUTPATIENT)
Dept: SPEECH THERAPY | Facility: CLINIC | Age: 89
End: 2025-07-03
Attending: FAMILY MEDICINE
Payer: COMMERCIAL

## 2025-07-03 ENCOUNTER — APPOINTMENT (OUTPATIENT)
Dept: PHYSICAL THERAPY | Facility: CLINIC | Age: 89
End: 2025-07-03
Attending: FAMILY MEDICINE
Payer: COMMERCIAL

## 2025-07-03 VITALS
TEMPERATURE: 98 F | DIASTOLIC BLOOD PRESSURE: 94 MMHG | HEIGHT: 72 IN | BODY MASS INDEX: 33.38 KG/M2 | OXYGEN SATURATION: 91 % | HEART RATE: 64 BPM | WEIGHT: 246.47 LBS | RESPIRATION RATE: 18 BRPM | SYSTOLIC BLOOD PRESSURE: 162 MMHG

## 2025-07-03 LAB
ALBUMIN SERPL BCG-MCNC: 2.7 G/DL (ref 3.5–5.2)
ALP SERPL-CCNC: 86 U/L (ref 40–150)
ALT SERPL W P-5'-P-CCNC: 26 U/L (ref 0–70)
ANION GAP SERPL CALCULATED.3IONS-SCNC: 8 MMOL/L (ref 7–15)
AST SERPL W P-5'-P-CCNC: 53 U/L (ref 0–45)
BACTERIA SPEC CULT: ABNORMAL
BACTERIA SPEC CULT: ABNORMAL
BACTERIA SPEC CULT: NORMAL
BILIRUB SERPL-MCNC: 0.3 MG/DL
BUN SERPL-MCNC: 24.5 MG/DL (ref 8–23)
CALCIUM SERPL-MCNC: 8.8 MG/DL (ref 8.8–10.4)
CHLORIDE SERPL-SCNC: 111 MMOL/L (ref 98–107)
CREAT SERPL-MCNC: 1.52 MG/DL (ref 0.67–1.17)
CRP SERPL-MCNC: 117.76 MG/L
EGFRCR SERPLBLD CKD-EPI 2021: 44 ML/MIN/1.73M2
ERYTHROCYTE [DISTWIDTH] IN BLOOD BY AUTOMATED COUNT: 13.1 % (ref 10–15)
GLUCOSE BLDC GLUCOMTR-MCNC: 124 MG/DL (ref 70–99)
GLUCOSE BLDC GLUCOMTR-MCNC: 124 MG/DL (ref 70–99)
GLUCOSE BLDC GLUCOMTR-MCNC: 142 MG/DL (ref 70–99)
GLUCOSE BLDC GLUCOMTR-MCNC: 160 MG/DL (ref 70–99)
GLUCOSE BLDC GLUCOMTR-MCNC: 198 MG/DL (ref 70–99)
GLUCOSE SERPL-MCNC: 125 MG/DL (ref 70–99)
HCO3 SERPL-SCNC: 23 MMOL/L (ref 22–29)
HCT VFR BLD AUTO: 31 % (ref 40–53)
HGB BLD-MCNC: 10.2 G/DL (ref 13.3–17.7)
INR PPP: 2.16 (ref 0.85–1.15)
MCH RBC QN AUTO: 30.8 PG (ref 26.5–33)
MCHC RBC AUTO-ENTMCNC: 32.9 G/DL (ref 31.5–36.5)
MCV RBC AUTO: 94 FL (ref 78–100)
PLATELET # BLD AUTO: 177 10E3/UL (ref 150–450)
POTASSIUM SERPL-SCNC: 3.9 MMOL/L (ref 3.4–5.3)
PROT SERPL-MCNC: 6.1 G/DL (ref 6.4–8.3)
PROTHROMBIN TIME: 23.8 SECONDS (ref 11.8–14.8)
RBC # BLD AUTO: 3.31 10E6/UL (ref 4.4–5.9)
SODIUM SERPL-SCNC: 142 MMOL/L (ref 135–145)
WBC # BLD AUTO: 6 10E3/UL (ref 4–11)

## 2025-07-03 PROCEDURE — 92610 EVALUATE SWALLOWING FUNCTION: CPT | Mod: GN

## 2025-07-03 PROCEDURE — 86140 C-REACTIVE PROTEIN: CPT | Performed by: FAMILY MEDICINE

## 2025-07-03 PROCEDURE — 250N000013 HC RX MED GY IP 250 OP 250 PS 637: Performed by: FAMILY MEDICINE

## 2025-07-03 PROCEDURE — 85014 HEMATOCRIT: CPT | Performed by: FAMILY MEDICINE

## 2025-07-03 PROCEDURE — 97163 PT EVAL HIGH COMPLEX 45 MIN: CPT | Mod: GP | Performed by: PHYSICAL THERAPIST

## 2025-07-03 PROCEDURE — 250N000012 HC RX MED GY IP 250 OP 636 PS 637: Performed by: FAMILY MEDICINE

## 2025-07-03 PROCEDURE — 94660 CPAP INITIATION&MGMT: CPT

## 2025-07-03 PROCEDURE — 84155 ASSAY OF PROTEIN SERUM: CPT | Performed by: FAMILY MEDICINE

## 2025-07-03 PROCEDURE — 36415 COLL VENOUS BLD VENIPUNCTURE: CPT | Performed by: FAMILY MEDICINE

## 2025-07-03 PROCEDURE — 97530 THERAPEUTIC ACTIVITIES: CPT | Mod: GP | Performed by: PHYSICAL THERAPIST

## 2025-07-03 PROCEDURE — 250N000011 HC RX IP 250 OP 636: Performed by: FAMILY MEDICINE

## 2025-07-03 PROCEDURE — 99233 SBSQ HOSP IP/OBS HIGH 50: CPT | Performed by: FAMILY MEDICINE

## 2025-07-03 PROCEDURE — 87040 BLOOD CULTURE FOR BACTERIA: CPT | Performed by: FAMILY MEDICINE

## 2025-07-03 PROCEDURE — 999N000157 HC STATISTIC RCP TIME EA 10 MIN

## 2025-07-03 PROCEDURE — 71045 X-RAY EXAM CHEST 1 VIEW: CPT

## 2025-07-03 PROCEDURE — 120N000001 HC R&B MED SURG/OB

## 2025-07-03 PROCEDURE — 250N000009 HC RX 250

## 2025-07-03 PROCEDURE — 250N000011 HC RX IP 250 OP 636: Performed by: STUDENT IN AN ORGANIZED HEALTH CARE EDUCATION/TRAINING PROGRAM

## 2025-07-03 PROCEDURE — 85610 PROTHROMBIN TIME: CPT | Performed by: STUDENT IN AN ORGANIZED HEALTH CARE EDUCATION/TRAINING PROGRAM

## 2025-07-03 RX ORDER — FUROSEMIDE 10 MG/ML
20 INJECTION INTRAMUSCULAR; INTRAVENOUS ONCE
Status: COMPLETED | OUTPATIENT
Start: 2025-07-03 | End: 2025-07-03

## 2025-07-03 RX ORDER — HYDRALAZINE HYDROCHLORIDE 20 MG/ML
10 INJECTION INTRAMUSCULAR; INTRAVENOUS EVERY 4 HOURS PRN
Status: ACTIVE | OUTPATIENT
Start: 2025-07-03

## 2025-07-03 RX ORDER — WARFARIN SODIUM 5 MG/1
5 TABLET ORAL
Status: COMPLETED | OUTPATIENT
Start: 2025-07-03 | End: 2025-07-03

## 2025-07-03 RX ADMIN — FUROSEMIDE 20 MG: 10 INJECTION, SOLUTION INTRAMUSCULAR; INTRAVENOUS at 15:34

## 2025-07-03 RX ADMIN — TRAZODONE HYDROCHLORIDE 50 MG: 50 TABLET ORAL at 20:39

## 2025-07-03 RX ADMIN — CEFTRIAXONE SODIUM 2 G: 2 INJECTION, POWDER, FOR SOLUTION INTRAMUSCULAR; INTRAVENOUS at 09:14

## 2025-07-03 RX ADMIN — PRAMIPEXOLE DIHYDROCHLORIDE 1 MG: 0.5 TABLET ORAL at 15:34

## 2025-07-03 RX ADMIN — PRAMIPEXOLE DIHYDROCHLORIDE 1 MG: 0.5 TABLET ORAL at 20:39

## 2025-07-03 RX ADMIN — ATENOLOL 50 MG: 50 TABLET ORAL at 09:14

## 2025-07-03 RX ADMIN — SPIRONOLACTONE 50 MG: 25 TABLET, FILM COATED ORAL at 09:14

## 2025-07-03 RX ADMIN — INSULIN ASPART 1 UNITS: 100 INJECTION, SOLUTION INTRAVENOUS; SUBCUTANEOUS at 17:53

## 2025-07-03 RX ADMIN — ATENOLOL 50 MG: 50 TABLET ORAL at 20:39

## 2025-07-03 RX ADMIN — VANCOMYCIN HYDROCHLORIDE 500 MG: 500 INJECTION, POWDER, LYOPHILIZED, FOR SOLUTION INTRAVENOUS at 04:11

## 2025-07-03 RX ADMIN — ALBUTEROL SULFATE 2.5 MG: 2.5 SOLUTION RESPIRATORY (INHALATION) at 11:21

## 2025-07-03 RX ADMIN — WARFARIN SODIUM 5 MG: 5 TABLET ORAL at 17:24

## 2025-07-03 RX ADMIN — FUROSEMIDE 20 MG: 10 INJECTION, SOLUTION INTRAMUSCULAR; INTRAVENOUS at 11:56

## 2025-07-03 RX ADMIN — LOSARTAN POTASSIUM 100 MG: 50 TABLET, FILM COATED ORAL at 09:14

## 2025-07-03 RX ADMIN — HYDRALAZINE HYDROCHLORIDE 2 MG: 20 INJECTION INTRAMUSCULAR; INTRAVENOUS at 11:37

## 2025-07-03 ASSESSMENT — ACTIVITIES OF DAILY LIVING (ADL)
ADLS_ACUITY_SCORE: 64
ADLS_ACUITY_SCORE: 65
ADLS_ACUITY_SCORE: 63
ADLS_ACUITY_SCORE: 64
ADLS_ACUITY_SCORE: 68
ADLS_ACUITY_SCORE: 64
ADLS_ACUITY_SCORE: 68
ADLS_ACUITY_SCORE: 65
ADLS_ACUITY_SCORE: 65
ADLS_ACUITY_SCORE: 67
ADLS_ACUITY_SCORE: 63
ADLS_ACUITY_SCORE: 64
ADLS_ACUITY_SCORE: 63
ADLS_ACUITY_SCORE: 65
ADLS_ACUITY_SCORE: 63
ADLS_ACUITY_SCORE: 65
ADLS_ACUITY_SCORE: 64
ADLS_ACUITY_SCORE: 65
ADLS_ACUITY_SCORE: 65
ADLS_ACUITY_SCORE: 63

## 2025-07-03 NOTE — PROGRESS NOTES
Formerly [facility]    Medicine Progress Note - Hospitalist Service    Date of Admission:  7/1/2025    Assessment & Plan    Robert Richard is a 89 year old male with a medical history of diabetes mellitus type 2, essential hypertension, CKD stage IIIb, who was recently hospitalized at [facility] from May 21 to May 26 after a fall that caused a T7 fracture, pneumomediastinum and small right pneumothorax, and was discharged to [facility] TCU.  Patient was brought in to the emergency department via EMS after he was found on the ground by staff in early morning hours and he was noted to have a saturation of 86% on room air.  Workup in the emergency room notable for sepsis with suspected pneumonia in the right lower lung base with acute encephalopathy and patient was admitted on 7/1/2025 for further workup.  He has been placed on Rocephin and Vanco with clinical improvement noted and labs improving and patient now having signs of hypertension and patient has increased oxygen needs this morning but concerns for volume overload and pulmonary edema and will be started on cautious IV lasix.  Blood culture 1/2 now growing strep canis with antibiotic stewardship team recommending narrowing antibiotic to Rocephin alone with repeat blood cultures x2 today and consideration of virtual ID consultation if bacteremia is not resolved.  Patient requires ongoing inpatient management at this time with discharge expected in 2-4 days based on clinical course.      Severe sepsis with acute organ dysfunction based on fever, tachycardia, elevated WBC, likely source of infection pulmonary versus soft tissue, encephalopathy  Possible respiratory infection, right lung base  Possible strep canis infection (1/2 blood cultures) in patient with chronic skin ulcers  Patient presented with fever of 102.4 rectally, leukocytosis of 14.2, acute respiratory failure with hypoxia, acute encephalopathy,  procalcitonin of 2.16.  Patient has already started to improve overnight with mentation starting to clear, white blood cell count normalizing, and oxygen needs decreasing until this morning.  1/2 blood cultures positive for strep canis  -- Narrow antibiotics to Rocephin along based on antibiotic stewardship recommendations  -- Repeat blood cultures x2 today with virtual ID consultation if still positive  -- Continue monitoring of CBC, CRP, BMP daily  -- Wean oxygen supplementation as able    Acute hypoxic respiratory failure  On presentation patient was requiring 3 L/min to keep SpO2 above 90%.  Patient does not wear oxygen at baseline and acute failure is expected secondary to pneumonia as above.  Patient was improving and weaned down to RA however this morning he had sudden acute worsening and required 3L NC again with signs of clinical overload.  Echocardiogram shows EF over 70%.  With no diastolic dysfunction.  Lasix 20 mg x1 given this morning as well as prn neb with improvement of oxygen needs noted.  -- Rocephin 2 grams for suspected pneumonia as above  -- chest x-ray now to evaluate worsening further but given clinical picture pulmonary edema suspected.  Would consider repeat Lasix IV again later today if signs of overload continue.      Acute Toxic metabolic Encephalopathy  Patient presented with altered mental status, agitation, restlessness.  CT head without evidence for acute intracranial process. UA without infection, LA within normal limits, VBG 7.4 /43, WBC 14.2, Cr 1.61, Troponin 52, trended down to less than 6, EKG normal sinus rhythm with PVCs, Hgb 11.9, , electrolytes within normal limits. Etiology of encephalopathy suspected secondary to sepsis and patient is already having significant improvement and is near baseline mentation   - Continue with treatment of acute infection as outlined above and monitor for ongoing resolution.    Moderate aortic stenosis  Not previously known but noted on  echocardiogram during this hospitalization with mean gradient 21 mmHg  -will monitor closely and be more cautious with any further fluid resuscitation that may be needed  - Would recommend outpatient cardiology follow-up    Pulmonary HTN  Not previously known but noted on echocardiogram and may be contributing to patient's respiratory symptoms as outlined above  - Proceed with treatment of pneumonia as outlined above and monitor for any chronic O2 needs that could be present secondary to pulmonary hypertension.    Venous stasis dermatitis, bilaterally  Lower extremity edema, some open shallow wounds  Could be the source of infection, especially with blood culture showing strep species as above.  Patient is on spironolactone at baseline to help with edema which has been held since admission secondary to initial low normal blood pressures  -- Wound care order placed  --  spironolactone restarted this morning and now IV Lasix x1 given to help with edema and respiratory worsening.      Fall  Recent closed fracture of the seventh thoracic vertebra  Patient was hospitalized at [facility]  from 6/21/2025 to 6/26/2025 for a fall sustaining a T7 fracture, pneumomediastinum, small right pneumothorax.  At that time, neurosurgery cleared patient, TLSO brace was recommended.  During that hospitalization patient developed ARNALDO on top of his CKD, received IV fluids, and was discharged to TCU.  Patient reports no worsening pain in his back after his fall and x-ray of the thoracic spine appears stable.    -- Wear TLSO brace faithfully when not in a lying position.    -- will need outpatient follow up as previously planned    Stage IIIb chronic kidney disease  Patient appears to have baseline creatinine of 1.4-1.7.  Has been 1.5-1.7 so far during this hospital stay  -- Continue to monitor for stability especially after IV diuresis   -- Dose medication renally    History of deep vein thrombosis of distal vein of left lower  extremity  Factor V Leiden deficiency   --Pharmacy to dose warfarin    Essential hypertension  PTA losartan, spironolactone, atenolol oral held initially secondary to n.p.o. status and encephalopathy.  Patient was placed on IV metoprolol and valsartan with persistent hypertension developing.  Patient is now mentating well and is able to take oral pills  -- Continue atenolol 50 mg twice daily  -- Restart losartan and spironolactone today     Diabetes mellitus type 2, with neuropathy  Most recent A1c 8.2 which was over 6 months ago.  Patient is diet controlled  -- Continue moderate carbohydrate diet  --Sliding scale NovoLog with meals and bedtime  -- Hypoglycemia protocol in place    Obstructive sleep apnea  --CPAP as per home settings during this stay           Diet: Combination Diet Full Liquid; Moderate Consistent Carb (60 g CHO per Meal) Diet    DVT Prophylaxis: Warfarin  Smith Catheter: Not present  Lines: None     Cardiac Monitoring: None  Code Status: No CPR- Do NOT Intubate      Clinically Significant Risk Factors          # Hyperchloremia: Highest Cl = 111 mmol/L in last 2 days, will monitor as appropriate          # Hypoalbuminemia: Lowest albumin = 2.7 g/dL at 7/3/2025  6:07 AM, will monitor as appropriate     # Hypertension: Noted on problem list           # DMII: A1C = 7.9 % (Ref range: <5.7 %) within past 6 months, PRESENT ON ADMISSION  # Obesity: Estimated body mass index is 33.43 kg/m  as calculated from the following:    Height as of this encounter: 1.829 m (6').    Weight as of this encounter: 111.8 kg (246 lb 7.6 oz)., PRESENT ON ADMISSION     # Financial/Environmental Concerns:           Social Drivers of Health    Interpersonal Safety: Unknown (7/1/2025)    Interpersonal Safety     Do you feel physically and emotionally safe where you currently live?: Patient unable to answer     Within the past 12 months, have you been hit, slapped, kicked or otherwise physically hurt by someone?: Patient unable  to answer     Within the past 12 months, have you been humiliated or emotionally abused in other ways by your partner or ex-partner?: Patient unable to answer          Disposition Plan     Medically Ready for Discharge: Anticipated in 2-4 Days             Jyothi Chopra MD  Hospitalist Service  [facility]  Securely message with [place] (more info)  Text page via [employer] Paging/Directory   ______________________________________________________________________    Interval History   Patient has been afebrile, ongoing improvement in mentation.  Blood pressures have been persistently elevated and worsening overnight.  Patient was on RA overnight but had sudden onset of acute respiratory failure again with 3L NC needed with diffuse crackles and increased work of breathing but already improving following IV Lasix.  No new patient symptoms.      Physical Exam   Vital Signs: Temp: 98.9  F (37.2  C) Temp src: Oral BP: (!) 169/70 Pulse: 61   Resp: 19 SpO2: 92 % O2 Device: None (Room air) Oxygen Delivery: 2 LPM  Weight: 246 lbs 7.59 oz    Constitutional: awake, alert, cooperative, no apparent distress, and appears stated age  Respiratory: Mild tachypnea, patient has decreased breath sounds and crackles diffusely noted  Cardiovascular: RRR  GI: bowel sounds present, abdomen soft and non-tender   Musculoskeletal: patient has 1+ pitting edema present that is new from previous   Neurologic: Awake, alert, oriented to name, place and situation     Medical Decision Making       63 MINUTES SPENT BY ME on the date of service doing chart review, history, exam, documentation & further activities per the note.      Data     I have personally reviewed the following data over the past 24 hrs:    6.0  \   10.2 (L)   / 177     142 111 (H) 24.5 (H) /  160 (H)   3.9 23 1.52 (H) \     ALT: 26 AST: 53 (H) AP: 86 TBILI: 0.3   ALB: 2.7 (L) TOT PROTEIN: 6.1 (L) LIPASE: N/A     Procal: N/A CRP: 117.76 (H)  Lactic Acid: N/A       INR:  2.16 (H) PTT:  N/A   D-dimer:  N/A Fibrinogen:  N/A       Imaging results reviewed over the past 24 hrs:   Recent Results (from the past 24 hours)   XR Chest Port 1 View    Narrative    EXAM: XR CHEST PORT 1 VIEW  LOCATION: [facility]  DATE: 7/3/2025    INDICATION: Worsening acute respiratory failure in patient with suspected right lower lobe pneumonia and recent large volume fluid resuscitation.  COMPARISON: 07/01/2025, 06/21/2025.      Impression    IMPRESSION: There is mild blunting of the costophrenic angles along with streak-like opacities in the lung bases, suggestive of small volume pleural effusions, pleural thickening, atelectasis, and/or scarring. Mild interstitial thickening is noted as   well, suggestive of edema. No pneumothorax. Heart size is within normal limits accounting for portable technique. Pulmonary vasculature is normal appearing. No acute osseous abnormality.

## 2025-07-03 NOTE — PLAN OF CARE
Goal Outcome Evaluation:      Plan of Care Reviewed With: patient    Overall Patient Progress: improving    Outcome Evaluation: A & O x4, intermittent confusion, able to make needs known.  Takes pills whole.  VSS except hypertensive w , scheduled meds given, recheck , prn hydralazine available for SBP > 180.  On RA w SpO2 > 90%, lungs diminished.  Pt had expiratory wheezes early morning which resolved on their own by late morning.  LFA and R hand jose IV patent, received IV Rocephin, infusing continuous NS at 50mL/hr.  Mostly continent BB, TLSO brace to be worn when out of bed, +1 assist w walker and gb to chair/bathroom.  No c/o pain throughout shift.  Pt has BLE venous stasis ulcers, w daily dressing changes to be completed today.  Speech consult also planned for today.      Myrna Thomas RN on 7/3/2025 at 11:54 AM

## 2025-07-03 NOTE — PROGRESS NOTES
Antimicrobial Stewardship Team Note    Antimicrobial Stewardship Program - A joint venture between [facility] and  Physicians to optimize antibiotic management.  NOT a formal consult - Restricted Antimicrobial Review     Patient: Robert Richard  MRN: [id number]  Allergies: Statins and Gabapentin    Brief Summary: Carlos Richard is an 89 year old male with a PMHx of HTN, T2DM, CKD IIIb (baseline SCr 1.4-1.7 mg/dL), Factor V Leiden deficiency w/ h/o LLE thrombosis (on warfarin), and LISSETTE (uses CPAP) who was admitted on 7/1/2025 after being found down at his TCU with an oxygen saturation of 86% while on room air.     History of Present Illness: Patient was recently hospitalized at [facility] 5/21-5/26 after a fall that caused a T7 fracture, pneumomediastinum, and small R pneumothorax. He was discharged to [facility] TCU. On 7/1, he was found down on the ground without his back brace on by TCU staff at 0630. They noted his oxygen saturation of 86% while on room air, prompting EMS to be called. Patient's granddaughter saw him on 6/30 and reported him being in his usual state of health, other than being fatigued. Patient was noted to be somewhat confused on arrival to the ED, though reported hitting the back of his head. He spiked fevers (Tmax 102.4) shortly after presentation, was tachycardic (), hypertensive (/112), RR 20s, and placed on 4 LPM via Oxymask. Patient was described as having venous stasis dermatitis bilaterally with lower extremity edema and some open shallow wounds. Initial labs included a leukocytosis (WBC 14.2), lactic acid 1.7, CRP 57.93, procalcitonin 2.16, and pro-BNP 4438. Head CT was unremarkable. Cervical spine CT limited due to patient motion. CXR showed a mild opacity at the R lung base, no pneumothorax, and shallow inspiration accentuating his heart size and pulmonary vascularity. TTE with no obvious valvular vegetations or masses. He received a one-time  "doxycycline dose, then was started on ceftriaxone and vancomycin IV. Peripheral blood cultures isolated Gram-positive cocci in pairs and chains in 1/4 bottles, identified as a Strep species per [employer]. MRSA nares PCR, COVID-19, Influenza A/B, and RSV PCR were negative.                Active Anti-infective Medications   (From admission, onward)                 Start     Stop    07/02/25 0900  cefTRIAXone  2 g,   Intravenous,   EVERY 24 HOURS        Sepsis       --    07/02/25 0330  vancomycin  500 mg,   Intravenous,   EVERY 12 HOURS        Sepsis      Placed in \"Followed by\" Linked Group    --                  Assessment: Transient Streptococcus species bacteremia due to more likely respiratory (RLL inflitrate) versus less likely skin source   Patient's fever curve, tachycardia, and oxygen saturations have improved. He is now on room air. His leukocytosis quickly resolved and CRP is trending down. Suspect the most likely source of patient's bacteremia to be respiratory with acute hypoxic respiratory failure and mild opacity in the RLL on imaging. Patient may have aspirated as Strep is a known mouth colonizer - unclear what patient's dentition is like. However, patient has not had a cough or sputum production. Another potential Strep bacteremia source is translocation from the skin. Patient has some chronic wounds on his lower extremities in the setting of chronic venous stasis dermatitis. He previously underwent a R RAMANA, no erythema, swelling, tenderness reported around this area per chart review, lessening suspicion of prosthetic joint involvement. An endocardiac source is unlikely presently with only 1/4 blood culture bottles positive and negative TTE. Please repeat blood cultures today to ensure bacteremia clearance. With most Strep species being ceftriaxone susceptible (91% of Viridans group Strep are ceftriaxone susceptible per [facility]'s antibiogram - not enough isolates seen at [facility]), may stop vancomycin IV. "      This bacteremia appears transient presently due to a respiratory source. Favor continuing ceftriaxone while hospitalized. On discharge, may transition to oral Augmentin to complete a 10-day total antibiotic course.                Recommendations:  Please repeat blood cultures today to ensure bacteremia clearance.  With most Strep species being ceftriaxone susceptible (91% of Viridans group Strep are ceftriaxone susceptible per [facility]'s antibiogram - not enough isolates seen at [facility]), may stop vancomycin IV.  Favor continuing ceftriaxone while hospitalized. On discharge, may transition to oral Augmentin 875/125 mg PO BID to complete a 10-day total antibiotic course.    Pharmacy took the following actions: Electronic note created, Called/paged provider.    Discussed with ID Staff DO Karlene Gillespie, PharmD, BCIDP  Pager: [phone]    Vital Signs/Clinical Features:  Vitals         07/01 0700 07/02 0659 07/02 0700 07/03 0659 07/03 0700  07/03 1025   Most Recent      Temp ( F) 99.1 -  102.4    97.8 -  99.5      98     98 (36.7) 07/03 0743    Pulse 61 -  116    56 -  74      66     66 07/03 0743    Resp 14 -  30    13 -  23      18     18 07/03 0743    /72 -  184/128    121/73 -  187/103    176/89 -  182/76     176/89 07/03 1017    SpO2 (%) 84 -  97    89 -  97      92     92 07/03 0743            Labs  Estimated Creatinine Clearance: 43.4 mL/min (A) (based on SCr of 1.52 mg/dL (H)).  Recent Labs   Lab Test 06/01/24  1621 06/05/24  0704 06/21/25  0650 07/01/25  0856 07/02/25  0518 07/03/25  0607   CR 1.42* 1.24* 1.67* 1.61* 1.70* 1.52*       Recent Labs   Lab Test 08/16/19  0756 02/05/21  0901 05/17/21  1709 01/26/24  0943 06/05/24  0704 06/10/24  0702 06/24/24  0710 08/08/24  0818 06/21/25  0650 07/01/25  0856 07/02/25  0518 07/03/25  0607   WBC 4.7   < > 6.5   < > 6.0  --   --  5.9 8.4 14.2* 6.7 6.0   ANEU 2.7  --  4.2  --   --   --   --   --  6.1 12.3* 5.1  --    ALYM 1.2  --  1.4   --   --   --   --   --  1.2 0.9 0.9  --    BENI 0.5  --  0.6  --   --   --   --   --  0.7 0.9 0.5  --    AEOS  --   --  0.3  --   --   --   --   --  0.3 0.0 0.2  --    HGB 13.1*   < > 12.7*   < > 7.7*   < > 10.8* 12.4* 12.3* 11.9* 10.0* 10.2*   HCT 39.5*   < > 38.7*   < > 23.8*  --   --  37.9* 36.7* 35.9* 30.8* 31.0*   MCV 94   < > 93   < > 98  --   --  93 92 93 94 94      < > 176   < > 224  --   --  170 193 214 152 177    < > = values in this interval not displayed.       Recent Labs   Lab Test 01/26/24  0943 04/30/24  1059 06/21/25  0650 07/01/25  0856 07/02/25  0518 07/03/25  0607   BILITOTAL 0.3 0.6 1.0 0.6 0.3 0.3   ALKPHOS 118 98 126 121 85 86   ALBUMIN 4.2 4.1 4.0 3.7 2.8* 2.7*   AST 19 22 19 24 63* 53*   ALT 17 21 17 20 23 26       Recent Labs   Lab Test 08/16/19  0756 05/28/24  1505 05/29/24  0807 07/01/25  0856 07/01/25  1111 07/02/25  0518 07/03/25  0607   PCAL  --   --   --   --  2.16*  --   --    LACT  --  1.1 1.5 1.7  --   --   --    CRP 4.4  --   --   --   --   --   --    CRPI  --   --   --   --  57.93* 136.85* 117.76*             Culture Results:  7-Day Micro Results       Procedure Component Value Units Date/Time    MRSA MSSA PCR, Nasal Swab [[id number]] Collected: 07/01/25 1731    Order Status: Completed Lab Status: Final result Updated: 07/01/25 2687    Specimen: Swab from Nares, Bilateral      MRSA Target DNA Negative     SA Target DNA Negative    Narrative:      The What's Trending  Xpert SA Nasal Complete assay performed in the [employer]  Dx System is a qualitative in vitro diagnostic test designed for rapid detection of Staphylococcus aureus (SA) and methicillin-resistant Staphylococcus aureus (MRSA) from nasal swabs in patients at risk for nasal colonization. The test utilizes automated real-time polymerase chain reaction (PCR) to detect MRSA/SA DNA. The Xpert SA Nasal Complete assay is intended to aid in the prevention and control of MRSA/SA infections in healthcare settings. The assay is  not intended to diagnose, guide or monitor treatment for MRSA/SA infections, or provide results of susceptibility to methicillin. A negative result does not preclude MRSA/SA nasal colonization.     Blood Culture Peripheral blood (BC) Arm, Right [[id number]]  (Normal) Collected: 07/01/25 1111    Order Status: Completed Lab Status: Preliminary result Updated: 07/02/25 1546    Specimen: Peripheral blood (BC) from Arm, Right      Culture No growth after 1 day    Blood Culture Peripheral blood (BC) Arm, Left [[id number]]  (Abnormal) Collected: 07/01/25 0857    Order Status: Completed Lab Status: Preliminary result Updated: 07/02/25 0156    Specimen: Peripheral blood (BC) from Arm, Left      Culture Positive on the 1st day of incubation      Gram positive cocci in pairs and chains     Comment: 1 of 2 bottles       Blood Culture ID Panel, PCR [[id number]]  (Abnormal) Collected: 07/01/25 0857    Order Status: Completed Lab Status: Final result Updated: 07/02/25 0325    Specimen: Peripheral blood (BC) from Arm, Left      Enterococcus faecalis Not Detected     Enterococcus faecium Not Detected     Listeria monocytogenes Not Detected     Staphylococcus species Not Detected     Staphylococcus aureus Not Detected     Staphylococcus epidermidis Not Detected     Staphylococcus lugdunensis Not Detected     Streptococcus species Detected     Comment: Streptococcus species other than S. agalactiae, S. pyogenes or S. pneumoniae detected by [employer] BCID2 assay. Final identification and antimicrobial susceptibility testing will be verified by standard methods.        Streptococcus agalactiae Not Detected     Streptococcus pneumoniae Not Detected     Streptococcus pyogenes Not Detected     A. baumannii complex Not Detected     Bacteroides fragilis Not Detected     Enterobacterales Not Detected     Enterobacter cloacae complex Not Detected     Escherichia coli Not Detected     Klebsiella aerogenes Not Detected     Klebsiella oxytoca  Not Detected     Klebsiella pneumoniae group Not Detected     Proteus species Not Detected     Salmonella species Not Detected     Serratia marcescens Not Detected     Haemophilus influenzae Not Detected     Neisseria meningitidis Not Detected     Pseudomonas aeruginosa Not Detected     Stenotrophomonas maltophilia Not Detected     Candida albicans Not Detected     Candida auris Not Detected     Candida glabrata Not Detected     Candida krusei Not Detected     Candida parapsilosis Not Detected     Candida tropicalis Not Detected     Cryptococcus neoformans/gattii Not Detected    Narrative:      Assay performed using the FDA-cleared [employer] Software Blood Culture Identification 2 (BCID2) Panel, a multiplexed nucleic acid test for the detection and identification of multiple bacterial and yeast nucleic acids and select genetic determinants associated with antimicrobial resistance.    A negative BCID2 result does not exclude the possibility of bloodstream infection. Positive results do not rule out co-infection with organisms not included in the [employer] BCID2 Panel. Results are intended to aid in the diagnosis of illness and are meant to be used in conjunction with other clinical findings.  This test has been verified and performed by the Infectious Diseases Diagnostic Laboratory at [facility]. This laboratory is certified under the Clinical Laboratory Improvement Amendments of 1988 (CLIA-88) as qualified to perform high complexity clinical laboratory testing.      Blood Culture Peripheral blood (BC)     Order Status: Canceled Lab Status: No result     Specimen: Peripheral blood (BC)     Quantiferon TB Gold Plus Collection [[id number]] Collected: 06/27/25 0523    Order Status: Completed Lab Status: Final result Updated: 06/27/25 1321    Specimen: Blood from Arm, Right     Quantiferon TB Gold Plus Primary [[id number]] Collected: 06/27/25 0523    Order Status: Completed Lab Status: Final result Updated: 06/27/25 1406     Specimen: Blood from Arm, Right     Quantiferon TB Gold Plus Grey Tube [[id number]] Collected: 06/27/25 0523    Order Status: Completed Lab Status: Final result Updated: 06/29/25 1005    Specimen: Blood from Arm, Right      Quantiferon Nil Tube 0.11 IU/mL     Quantiferon TB Gold Plus Green Tube [[id number]] Collected: 06/27/25 0523    Order Status: Completed Lab Status: Final result Updated: 06/29/25 1004    Specimen: Blood from Arm, Right      Quantiferon TB1 Tube 0.11 IU/mL     Quantiferon TB Gold Plus Yellow Tube [[id number]] Collected: 06/27/25 0523    Order Status: Completed Lab Status: Final result Updated: 06/29/25 1004    Specimen: Blood from Arm, Right      Quantiferon TB2 Tube 0.08    Quantiferon TB Gold Plus Purple Tube [[id number]] Collected: 06/27/25 0523    Order Status: Completed Lab Status: Final result Updated: 06/29/25 1004    Specimen: Blood from Arm, Right      Quantiferon Mitogen 9.73 IU/mL     Quantiferon TB Gold Plus [[id number]] Collected: 06/27/25 0523    Order Status: Completed Lab Status: Final result Updated: 06/29/25 1008    Specimen: Blood from Arm, Right      Quantiferon-TB Gold Plus Negative     Comment: No interferon gamma response to M.tuberculosis antigens was detected. Infection with M.tuberculosis is unlikely, however a single negative result does not exclude infection. In patients at high risk for infection, a second test should be considered in accordance with the 2017 ATS/IDSA/CDC Clinical Pract  ice Guidelines for Diagnosis of Tuberculosis in Adults and Children         TB1 Ag minus Nil Value 0.00 IU/mL      TB2 Ag minus Nil Value -0.03 IU/mL      Mitogen minus Nil Result 9.62 IU/mL      Nil Result 0.11 IU/mL             Recent Labs   Lab Test 06/20/19  1515 04/30/24  1112 07/01/25  1052   URINEPH 5.5 5.0 5.5   NITRITE Negative Negative Negative   LEUKEST Negative Negative Negative   WBCU 0 - 5 1 0                         Imaging: Thoracic spine XR, 3  views  Result Date: 2025  EXAM: XR THORACIC SPINE 3 VIEWS LOCATION: [facility] DATE: 2025 INDICATION: Fall, recent thoracic fracture. COMPARISON: 2025.     IMPRESSION: The recent T7 fracture is not well demonstrated on this radiograph. Alignment is unchanged. Multiple interspaces are fused by bridging osteophytes. Chronic appearing compression fracture deformities of T8 and T9.    XR Pelvis 1/2 Views  Result Date: 2025  EXAM: XR PELVIS 1/2 VIEWS LOCATION: [facility] DATE: 2025 INDICATION: fall COMPARISON: CT chest abdomen pelvis 2025.     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 Right total hip arthroplasty. No evidence of component loosening. No definite fracture or dislocation, however, only AP views were obtained. Postoperative changes of the lower lumbar spine. Degenerative arthrosis of both SI joints. Surgical clips scattered over the pelvis.     Echocardiogram Complete  Result Date: 2025  [id number]^SIM^KENNY^  [facility] [address] [place] [address]  Name: MARC BRAGG MRN: [id number] : 1935 Study Date: 2025 08:03 AM Age: 89 yrs Gender: Male Patient Location: [facility] Reason For Study: Heart Failure History: htn, dm, ckd, hyperlipidemia Ordering Physician: KENNY MONTEMAYOR Referring Physician: Stiven Donahue Performed By: Anabella Tse  BSA: 2.2 m2 Height: 72 in Weight: 225 lb HR: 58 BP: 122/77 mmHg ______________________________________________________________________________ Procedure Echocardiogram with two-dimensional, color and spectral Doppler. ______________________________________________________________________________ Interpretation Summary  Hyperdynamic left ventricular function. The visual ejection fraction is >70%. Flattened septum is consistent with RV pressure  overload. The right ventricle is normal in size and function. Pulmonary hypertension present. The right ventricular systolic pressure is approximated at 51mmHg plus the right atrial pressure. Moderate aortic stenosis, VMax 3.1 m/s, mean gradient 21 mmHg, ARLEY 1.2 cm2. DI 0.32. SVi 50 cc/m2. IVC diameter and respiratory changes fall into an intermediate range suggesting an RA pressure of 8 mmHg. There is no pericardial effusion.  This study was compared to a TTE from 2/7/2024. Estimated right-sided pressures are increased. ______________________________________________________________________________ Left Ventricle The left ventricle is normal in size. There is concentric remodeling present. Proximal septal thickening is noted. Hyperdynamic left ventricular function. The visual ejection fraction is >70%. Left ventricular diastolic function is normal. Flattened septum is consistent with RV pressure overload.  Right Ventricle The right ventricle is normal in size and function.  Atria Normal left atrial size. Right atrial size is normal.  Mitral Valve The mitral valve is normal in structure and function.  Tricuspid Valve There is mild (1+) tricuspid regurgitation. Pulmonary hypertension. The right ventricular systolic pressure is approximated at 51mmHg plus the right atrial pressure.  Aortic Valve Moderate aortic stenosis, VMax 3.1 m/s, mean gradient 21 mmHg, ARLEY 1.2 cm2. DI 0.32. SVi 50 cc/m2.  Pulmonic Valve The pulmonic valve is not well seen, but is grossly normal.  Vessels The aortic root is normal size. Normal size ascending aorta. IVC diameter and respiratory changes fall into an intermediate range suggesting an RA pressure of 8 mmHg.  Pericardium There is no pericardial effusion.  ______________________________________________________________________________ MMode/2D Measurements & Calculations IVSd: 1.6 cm LVIDd: 3.8 cm LVIDs: 2.4 cm LVPWd: 1.3 cm FS: 37.6 % LV mass(C)d: 210.1 grams LV mass(C)dI: 93.8 grams/m2   Ao root diam: 3.7 cm LA dimension: 4.2 cm asc Aorta Diam: 3.4 cm LA/Ao: 1.1 LVOT diam: 2.2 cm LVOT area: 3.7 cm2 Ao root diam index Ht(cm/m): 2.0 Ao root diam index BSA (cm/m2): 1.6 Asc Ao diam index BSA (cm/m2): 1.5 Asc Ao diam index Ht(cm/m): 1.8 LA Volume (BP): 58.6 ml LA Volume Index (BP): 26.2 ml/m2 RWT: 0.71  TAPSE: 2.4 cm  Doppler Measurements & Calculations MV E max esa: 78.8 cm/sec MV A max esa: 92.1 cm/sec MV E/A: 0.86 MV dec time: 0.22 sec Ao V2 max: 309.3 cm/sec Ao max P.0 mmHg Ao V2 mean: 212.5 cm/sec Ao mean P.0 mmHg Ao V2 VTI: 84.3 cm ARLEY(I,D): 1.3 cm2 ARLEY(V,D): 1.2 cm2 LV V1 max P.0 mmHg LV V1 max: 99.8 cm/sec LV V1 VTI: 30.2 cm SV(LVOT): 111.9 ml SI(LVOT): 50.0 ml/m2 PA V2 max: 98.9 cm/sec PA max PG: 3.9 mmHg PA acc time: 0.10 sec TR max esa: 358.3 cm/sec TR max P.4 mmHg AV Esa Ratio (DI): 0.32 ARLEY Index (cm2/m2): 0.59 Medial E/e': 12.7 RV S Esa: 10.9 cm/sec  ______________________________________________________________________________ Report approved by: Russ Spivey MD on 2025 10:46 AM       XR Chest Port 1 View  Result Date: 2025  EXAM: XR CHEST PORT 1 VIEW LOCATION: [facility] DATE: 2025 INDICATION: Fall. Hypoxia. COMPARISON: 6/10/2025.     IMPRESSION: Shallow inspiration accentuates heart size and pulmonary vascularity. Mild opacity at the right lung base may be related to atelectasis or infection. No pneumothorax.    Head CT w/o contrast  Result Date: 2025  EXAM: CT HEAD W/O CONTRAST, CT CERVICAL SPINE W/O CONTRAST LOCATION: [facility] DATE: 2025 INDICATION: fall COMPARISON: Head CT 2025. TECHNIQUE: 1) Routine CT Head without IV contrast. Multiplanar reformats. Dose reduction techniques were used. 2) Routine CT Cervical Spine without IV contrast. Multiplanar reformats. Dose reduction techniques were used. FINDINGS: Images are degraded by motion. HEAD CT: INTRACRANIAL CONTENTS: No  intracranial hemorrhage, extraaxial collection, or mass effect.  No CT evidence of acute infarct. Normal parenchymal attenuation. Normal ventricles and sulci. VISUALIZED ORBITS/SINUSES/MASTOIDS: No intraorbital abnormality. No paranasal sinus mucosal disease. No middle ear or mastoid effusion. BONES/SOFT TISSUES: No acute abnormality. CERVICAL SPINE CT: Severely limited study due to motion. Images are essentially nondiagnostic. Bones appear demineralized. There are postoperative changes from posterior decompression of the mid cervical spine. Unable to adequately evaluate for fracture.     IMPRESSION: HEAD CT: 1.  No CT evidence for acute intracranial process. 2.  Brain atrophy and presumed chronic microvascular ischemic changes as above. CERVICAL SPINE CT: 1.  Nondiagnostic study due to patient motion. Consider repeat imaging when patient condition allows.    CT Cervical Spine w/o Contrast  Result Date: 7/1/2025  EXAM: CT HEAD W/O CONTRAST, CT CERVICAL SPINE W/O CONTRAST LOCATION: [facility] DATE: 7/1/2025 INDICATION: fall COMPARISON: Head CT 06/21/2025. TECHNIQUE: 1) Routine CT Head without IV contrast. Multiplanar reformats. Dose reduction techniques were used. 2) Routine CT Cervical Spine without IV contrast. Multiplanar reformats. Dose reduction techniques were used. FINDINGS: Images are degraded by motion. HEAD CT: INTRACRANIAL CONTENTS: No intracranial hemorrhage, extraaxial collection, or mass effect.  No CT evidence of acute infarct. Normal parenchymal attenuation. Normal ventricles and sulci. VISUALIZED ORBITS/SINUSES/MASTOIDS: No intraorbital abnormality. No paranasal sinus mucosal disease. No middle ear or mastoid effusion. BONES/SOFT TISSUES: No acute abnormality. CERVICAL SPINE CT: Severely limited study due to motion. Images are essentially nondiagnostic. Bones appear demineralized. There are postoperative changes from posterior decompression of the mid cervical spine.  Unable to adequately evaluate for fracture.     IMPRESSION: HEAD CT: 1.  No CT evidence for acute intracranial process. 2.  Brain atrophy and presumed chronic microvascular ischemic changes as above. CERVICAL SPINE CT: 1.  Nondiagnostic study due to patient motion. Consider repeat imaging when patient condition allows.

## 2025-07-03 NOTE — PHARMACY-ANTICOAGULATION SERVICE
Clinical Pharmacy - Warfarin Dosing Consult     Pharmacy has been consulted to manage this patient s warfarin therapy.  Indication: Other - specify in comments (Factor V Leiden mutation)  Therapy Goal: INR 2-3  Provider/Team: TCU  Warfarin Prior to Admission: Yes  Warfarin PTA Regimen: Per med rec - 5 mg daily. From TCU note 6/27, patient was to take 2.5 mg daily with a recheck INR 6/30 -- at 6/30 geriatric visit, INR was 1.9 and patient was instructed to take 5 mg daily with a recheck 7/3. Prior to discharging to the TCU, patient was on 7.5mg Mon and 5mg AOD  Recent documented change in oral intake/nutrition: Unknown    INR   Date Value Ref Range Status   07/03/2025 2.16 (H) 0.85 - 1.15 Final   07/02/2025 2.24 (H) 0.85 - 1.15 Final       Recommend warfarin 5 mg today.  Pharmacy will monitor Robert Travica daily and order warfarin doses to achieve specified goal.      Please contact pharmacy as soon as possible if the warfarin needs to be held for a procedure or if the warfarin goals change.

## 2025-07-03 NOTE — PROGRESS NOTES
Patient with hx of LISSETTE requested the use of hospital CPAP due to his is not able to be brought to the hospital.  Patient setup and presently asleep on ResMed CPAP pressure settings of auto 5-20 cmH2O with room air .

## 2025-07-03 NOTE — PLAN OF CARE
Goal Outcome Evaluation:      Plan of Care Reviewed With: patient    Overall Patient Progress: improvingOverall Patient Progress: improving    Outcome Evaluation: Vss, low grade temp 99.0(ax), now is 98.9(o). Pt is alert, oriented to person, place, time, and vague to situation. Denies pain. Lungs are diminished, scattered expiratory wheezes. CPAP on t/o night and O2 sats were low-mid 90s. Pt now on RA with O2 sats 90-92%. RQ=848 overnight.

## 2025-07-03 NOTE — PROVIDER NOTIFICATION
Pt desatted to mid/low 80s when TSLO brace was being applied by PT. PRN Neb given. 3L NC applied, patient in chair for meal. O2 weaned to 1L. IV Lasix given, PRN IV hydralazine given. Pt asymptomatic.     BP: 176/89 and 186/90.

## 2025-07-03 NOTE — PROGRESS NOTES
[id number] Shift Note:    Pt is A&Ox4 intermittently confused. Pt is on RA. TSLO brace on. Pt up in chair. Ambulating A1. Blood pressures hypertensive, not qualifying for PRN hydralazine SBP >190. Pt asymptomatic.     BP (!) 160/81 (BP Location: Left arm)   Pulse 75   Temp 98  F (36.7  C) (Oral)   Resp 19   Ht 1.829 m (6')   Wt 111.8 kg (246 lb 7.6 oz)   SpO2 94%   BMI 33.43 kg/m

## 2025-07-03 NOTE — PLAN OF CARE
Goal Outcome Evaluation:                 Outcome Evaluation: Family plans for Patient to return back to [facility] at time of discharge. Has an active bed hold in place

## 2025-07-03 NOTE — PROGRESS NOTES
07/03/25 1200   Appointment Info   Signing Clinician's Name / Credentials (SLP) Nettie Yates MS, CCC-SLP   General Information   Patient/Family Therapy Goal Statement (SLP) To check swallow   Pertinent History of Current Problem Pt had a choking episode during meal time and provider placed order to determine safest diet.   General Observations Pt upright in chair and alert to self, time, and place.   Pain Assessment   Patient Currently in Pain No   Type of Evaluation   Type of Evaluation Swallow Evaluation   Oral Motor   Oral Musculature generally intact   Structural Abnormalities none present   Mucosal Quality good   Facial Symmetry (Oral Motor)   Facial Symmetry (Oral Motor) WNL   Lip Function (Oral Motor)   Lip Range of Motion (Oral Motor) WNL   Lip Strength (Oral Motor) WNL   Tongue Function (Oral Motor)   Tongue ROM (Oral Motor) WNL   Tongue Strength (Oral Motor) WNL   Tongue Coordination/Speed (Oral Motor) WNL   Vocal Quality/Secretion Management (Oral Motor)   Vocal Quality (Oral Motor) WNL   Secretion Management (Oral Motor) WNL   General Swallowing Observations   Current Diet/Method of Nutritional Intake (General Swallowing Observations, NIS) full liquid diet   Respiratory Support room air   Comment, General Swallowing Observations Pt was upright and alert in bed and is drinking from a straw d/t mobility restrictions with brace.   Swallowing Evaluation Clinical swallow evaluation   Clinical Swallow Evaluation   Feeding Assistance set up only required   Additional evaluation(s) completed today No   Clinical Swallow Evaluation Textures Trialed thin liquids;pureed;soft & bite-sized;solid foods   Clinical Swallow Eval: Thin Liquid Texture Trial   Mode of Presentation, Thin Liquids self-fed;straw   Volume of Liquid or Food Presented 4 oz   Oral Phase of Swallow WFL   Pharyngeal Phase of Swallow intact   Diagnostic Statement Pt swallow WFL. No overt s/sx of aspiration.   Clinical Swallow Evaluation: Puree  Solid Texture Trial   Mode of Presentation, Puree spoon;self-fed   Volume of Puree Presented 3 oz   Oral Phase, Puree WFL   Pharyngeal Phase, Puree intact   Diagnostic Statement Pt swallow WFL. No overt s/sx of aspiration.   Clinical Swallow Eval: Soft & Bite Sized   Mode of Presentation spoon;self-fed   Volume Presented 2 oz   Oral Phase WFL   Pharyngeal Phase intact   Diagnostic Statement Pt swallow WFL. No overt s/sx of aspiration.   Clinical Swallow Evaluation: Solid Food Texture Trial   Mode of Presentation self-fed   Volume Presented Half Russell Cracker   Oral Phase WFL   Pharyngeal Phase intact   Diagnostic Statement Pt swallow WFL. No overt s/sx of aspiration.   Esophageal Phase of Swallow   Patient reports or presents with symptoms of esophageal dysphagia No   Swallowing Recommendations   Diet Consistency Recommendations thin liquids (level 0);easy to chew (level 7)   Supervision Level for Intake patient independent   Recommended Feeding/Eating Techniques (Swallow Eval) patient is independent, no specific recommendations   Medication Administration Recommendations, Swallowing (SLP) whole with thin liquids   Instrumental Assessment Recommendations instrumental evaluation not recommended at this time   Comment, Swallowing Recommendations Recommend thin liquids and easy to chew diet. Pt swallow WFL. No overt s/sx of aspiration.   Clinical Impression   Criteria for Skilled Therapeutic Interventions Met (SLP Eval) Evaluation only   SLP Diagnosis Functional Oropharyngeal Swallow   Problem List (SLP) N/a   Activity Limitations Related to Problem List (SLP) N/a   Risks & Benefits of therapy have been explained patient;evaluation/treatment results reviewed   Clinical Impression Comments Pt swallow and speech is at baseline. Easy to chew diet was recommended d/t pt showing slight a fatigue with cracker characterized by slowed bolus preparation, still WFL. Recommend pt take small bites, avoid tough meats, and eat  slowly.   SLP Total Evaluation Time   Eval: oral/pharyngeal swallow function, clinical swallow Minutes (51154) 30   SLP Discharge Planning   SLP Plan Eval only Reg. solids, Thin liquids   SLP Discharge Recommendation home   SLP Rationale for DC Rec Pt is functioning at baseline. No IP ST needs.   SLP Brief overview of current status  Pt swallow WFL. No overt s/sx of aspiration. Recommend regular solids and thin liquids.   SLP Time and Intention   Total Session Time (sum of timed and untimed services) 30     Thank you for your referral.    Nettie Yates M.S. CCC-SLP  [facility]  [phone]

## 2025-07-03 NOTE — PROGRESS NOTES
07/03/25 1100   Appointment Info   Signing Clinician's Name / Credentials (PT) Judith Mosqueda PT, DPT, ATC, LAT   Rehab Comments (PT) PT eval and treat weakness, dc planning Activity orders: none       Present no   Living Environment   People in Home spouse   Current Living Arrangements house   Home Accessibility no concerns   Transportation Anticipated family or friend will provide   Living Environment Comments single level [facility]. step in shower. flat bed.   Self-Care   Usual Activity Tolerance moderate   Current Activity Tolerance poor   Regular Exercise No   Equipment Currently Used at Home walker, rolling;grab bar, toilet;grab bar, tub/shower;raised toilet seat  (4WW. shower chair available but not used.)   Fall history within last six months yes   Number of times patient has fallen within last six months 6   General Information   Onset of Illness/Injury or Date of Surgery 07/01/25   Referring Physician Jyothi Chopra MD   Patient/Family Therapy Goals Statement (PT) return to TCU   Pertinent History of Current Problem (include personal factors and/or comorbidities that impact the POC) Patient is a 89 year old male, presented to the ED after being found down at his TCU where he was recovering from a fall wit T7 fracture and placement in a clamshell TLSO. Patient found to have sepsis, bacteremia, acute toxic metabolic encephalopathy, and acute hypoxic respiratory failure. Patient with a previous medical history of pulmonary HTN, aortic stenosis, LE edema with shallow open wounds, fall, CKD, DVT, factor V leiden deficiency, HTN, DM type 2 with neuropathy, LISSETTE.   Existing Precautions/Restrictions fall;spinal  (TLSO on out of bed)   Weight-Bearing Status - LUE full weight-bearing   Weight-Bearing Status - RUE full weight-bearing   Weight-Bearing Status - LLE full weight-bearing   Weight-Bearing Status - RLE full weight-bearing   General Observations on room air, IV infusing,  family present. hard of hearing and aides used   Cognition   Affect/Mental Status (Cognition) WFL   Orientation Status (Cognition) oriented x 4   Follows Commands (Cognition) WFL   Pain Assessment   Patient Currently in Pain Yes, see Vital Sign flowsheet   Integumentary/Edema   Integumentary/Edema Comments scattered ecchymosis bilat UEs   Posture    Posture Forward head position   Range of Motion (ROM)   Range of Motion ROM is WFL   Strength (Manual Muscle Testing)   Strength (Manual Muscle Testing) Able to perform R SLR;Able to perform L SLR   Strength Comments pain with leg strength assessment   Bed Mobility   Bed Mobility rolling left;rolling right;supine-sit;scooting/bridging   Rolling Left [place] (Bed Mobility) modified independence   Rolling Right [place] (Bed Mobility) modified independence   Scooting/Bridging [place] (Bed Mobility) modified independence   Supine-Sit [place] (Bed Mobility) minimum assist (75% patient effort)   Bed Mobility Limitations decreased ability to use legs for bridging/pushing;impaired ability to control trunk for mobility   Impairments Contributing to Impaired Bed Mobility pain;decreased strength   Assistive Device (Bed Mobility) bed rails   Comment, (Bed Mobility) once sitting EOB significantly short of breath. declined to stay in bed and requested to get up in chair. desat to 82% at lowest in sitting.   Transfers   Transfers sit-stand transfer;bed-chair transfer   Bed-Chair Transfer   Bed-Chair [place] (Transfers) contact guard;verbal cues   Assistive Device (Bed-Chair Transfers) walker, front-wheeled   Sit-Stand Transfer   Sit-Stand [place] (Transfers) minimum assist (75% patient effort)   Assistive Device (Sit-Stand Transfers) walker, front-wheeled   Gait/Stairs (Locomotion)   Comment, (Gait/Stairs) unable to progress due to oxygen status   Balance   Balance Comments IND sitting balance. poor transitional and standing balance with walker support  demosntrating leg weakness and poor environment navigation   Sensory Examination   Sensory Perception patient reports no sensory changes   Coordination   Coordination Comments unable to manage TLSO   Clinical Impression   Criteria for Skilled Therapeutic Intervention Yes, treatment indicated   PT Diagnosis (PT) impaired mobility, weakness, deconditioning, impaired balance   Influenced by the following impairments acute medical status, recent falls with fracture   Functional limitations due to impairments dependent for TLSO management, unable to ambulate and assistance for cares and safety   Clinical Presentation (PT Evaluation Complexity) unstable   Clinical Presentation Rationale HTN and hypoxic with mobility. medical status. clinical jugement, fracture   Clinical Decision Making (Complexity) high complexity   Planned Therapy Interventions (PT) balance training;bed mobility training;cryotherapy;gait training;home exercise program;orthotic fitting/training;patient/family education;ROM (range of motion);strengthening;transfer training;progressive activity/exercise;home program guidelines   Risk & Benefits of therapy have been explained evaluation/treatment results reviewed;participants voiced agreement with care plan;participants included;patient   Clinical Impression Comments Patient presents with mobility below his baseline. Patient demonstrates poor tolerance to functional mobility due to medical status. Anticipate with medical management he will be able to mobilize and progress, however anticipate he will require TCU placement in order to safely progress to a mobility sufficient to return home with his wife.   PT Total Evaluation Time   PT Eval, High Complexity Minutes (15748) 35   Physical Therapy Goals   PT Frequency 5x/week   PT Predicted Duration/Target Date for Goal Attainment 07/10/25   PT Goals Bed Mobility;Transfers;Gait   PT: Bed Mobility Supine to/from sit;Independent;Within precautions   PT: Transfers  Modified independent;Sit to/from stand;Assistive device;Within precautions   PT: Gait Modified independent;Rolling walker;50 feet;Within precautions   PT Discharge Planning   PT Plan 1/5 Th. transfers, TLSO management   PT Discharge Recommendation (DC Rec) Transitional Care Facility   PT Rationale for DC Rec Patient from home with wife, no stairs,  using a 4WW with many falls. Patient most recently from TCU. Patient presents with mobility below his baseline. Patient demonstrates poor tolerance to functional mobility due to medical status. Anticipate with medical management he will be able to mobilize and progress, however anticipate he will require TCU placement in order to safely progress to a mobility sufficient to return home with his wife.   PT Brief overview of current status MOD IND rolling bilat. MIN assist supine to sitting. MIN assist sit to stand with 2WW. CGA bed to chair transfer with 2WW. dependent TLSO. desat to 82% on RA with bed mobility, 3L NC for transfer. HTN noted   PT Total Distance Amb During Session (feet) 1   Physical Therapy Time and Intention   Total Session Time (sum of timed and untimed services) 45     Thank you for your referral.  Judith Mosqueda, PT, DPT, ATC, LAT    [facility]  O: [phone]  E: [email or web address]

## 2025-07-03 NOTE — CONSULTS
Care Management Initial Consult    General Information  Assessment completed with:  Odessa Castillo    Type of CM/SW Visit: Offer D/C Planning    Primary Care Provider verified and updated as needed: Yes   Readmission within the last 30 days:   YES    06/21/25--06/26/25: [facility]     Reason for Consult: discharge planning    Advance Care Planning:          Communication Assessment  Patient's communication style: spoken language (English or Bilingual)    Hearing Difficulty or Deaf: yes   Wear Glasses or Blind: yes    Cognitive  Cognitive/Neuro/Behavioral: .WDL except, level of consciousness  Level of Consciousness: intermittent confusion  Arousal Level: opens eyes spontaneously  Orientation: oriented x 4  Mood/Behavior: calm, cooperative  Best Language: 0 - No aphasia  Speech: clear, spontaneous, logical    Living Environment:   People in home: spouse     Current living Arrangements: house      Able to return to prior arrangements: yes : Return back to [facility]    Living Arrangement Comments: Prior to U - had been living with spouse at home    Family/Social Support:  Care provided by: self  Provides care for: no one    Marital Status:   Support system: Wife, Children        Description of Support System: Supportive, Involved    Support Assessment: Lacks adequate physical care    Current Resources:   Patient receiving home care services: No  Community Resources: Transitional Care : Currently receiving rehab at [facility]    Equipment currently used at home: walker, rolling, grab bar, toilet, grab bar, tub/shower, raised toilet seat (4WW. shower chair available but not used.)  Supplies currently used at home: Wound Care Supplies, Oxygen Tubing/Supplies    Employment/Financial:  Employment Status: retired     Financial Concerns:   No concerns noted     Does the patient's insurance plan have a 3 day qualifying hospital stay waiver?  Yes     Which insurance plan 3 day waiver is  available? Alternative insurance waiver    Will the waiver be used for post-acute placement? No    Lifestyle & Psychosocial Needs:  Social Drivers of Health     Food Insecurity: Low Risk  (7/1/2025)    Food Insecurity     Within the past 12 months, did you worry that your food would run out before you got money to buy more?: No     Within the past 12 months, did the food you bought just not last and you didn t have money to get more?: No   Depression: Not at risk (1/21/2025)    PHQ-2     PHQ-2 Score: 0   Housing Stability: Low Risk  (7/1/2025)    Housing Stability     Do you have housing? : Yes     Are you worried about losing your housing?: No   Tobacco Use: Low Risk  (6/30/2025)    Patient History     Smoking Tobacco Use: Never     Smokeless Tobacco Use: Never     Passive Exposure: Not on file   Financial Resource Strain: Low Risk  (7/1/2025)    Financial Resource Strain     Within the past 12 months, have you or your family members you live with been unable to get utilities (heat, electricity) when it was really needed?: No   Alcohol Use: Not on file   Transportation Needs: Low Risk  (7/1/2025)    Transportation Needs     Within the past 12 months, has lack of transportation kept you from medical appointments, getting your medicines, non-medical meetings or appointments, work, or from getting things that you need?: No   Physical Activity: Not on file   Interpersonal Safety: Unknown (7/1/2025)    Interpersonal Safety     Do you feel physically and emotionally safe where you currently live?: Patient unable to answer     Within the past 12 months, have you been hit, slapped, kicked or otherwise physically hurt by someone?: Patient unable to answer     Within the past 12 months, have you been humiliated or emotionally abused in other ways by your partner or ex-partner?: Patient unable to answer   Stress: Not on file   Social Connections: Not on file   Health Literacy: Not on file       Functional Status:  Prior to  "admission patient needed assistance:   Dependent ADLs:: Ambulation-walker  Dependent IADLs:: Cooking, Cleaning, Meal Preparation, Medication Management  Assesssment of Functional Status: Needs placement in a SNF/TCF for rehabilitation    Mental Health Status:  Mental Health Status: No Current Concerns       Chemical Dependency Status:  Chemical Dependency Status: No Current Concerns           Values/Beliefs:  Spiritual, Cultural Beliefs, Zoroastrianism Practices, Values that affect care: no  Description of Beliefs that Will Affect Care: Yadiel            Discussed  Partnership in Safe Discharge Planning  document with patient/family: Yes    Additional Information:  Referral received to assist with discharge planning and services.     CM met with the Daughter Anna to introduce self/role. Pt working with Rehab.     Daughter stated the family has been in communication with [facility]'s Director # [phone] at [facility]. They have elected to HOLD the bed in TCU. They wish to have the patient return back on discharge.     Several times family voiced their hope that the Patient would not return until Monday if possible however stated they did not want him transferred to any other facility. Stated the staff have \"really heard their concerns and are making improvements to better accommodate\" the patient's needs upon his return.     Odessa Castillo requested to have wheelchair transport arranged. Aware transport will be private pay.       [facility]'s Director at [facility] returned call and confirmed bed hold.       Patient has a BCBS insurance plan.   Requires an [facility] Prior Auth -PRIOR to his return to TCU.           Anticipate discharge plan: Return back to [facility] -TCU  Bed Hold- Confirmed   Phone: [phone]   Fax: [phone]    Transport : MERON to arrange wheelchair transport           VIKRAM Currie  [facility] [phone]   [facility]  " [phone]

## 2025-07-04 ENCOUNTER — APPOINTMENT (OUTPATIENT)
Dept: PHYSICAL THERAPY | Facility: CLINIC | Age: 89
End: 2025-07-04
Payer: COMMERCIAL

## 2025-07-04 LAB
ANION GAP SERPL CALCULATED.3IONS-SCNC: 9 MMOL/L (ref 7–15)
BACTERIA SPEC CULT: ABNORMAL
BACTERIA SPEC CULT: ABNORMAL
BUN SERPL-MCNC: 22.8 MG/DL (ref 8–23)
CALCIUM SERPL-MCNC: 9.1 MG/DL (ref 8.8–10.4)
CHLORIDE SERPL-SCNC: 108 MMOL/L (ref 98–107)
CREAT SERPL-MCNC: 1.55 MG/DL (ref 0.67–1.17)
CRP SERPL-MCNC: 64.81 MG/L
EGFRCR SERPLBLD CKD-EPI 2021: 43 ML/MIN/1.73M2
ERYTHROCYTE [DISTWIDTH] IN BLOOD BY AUTOMATED COUNT: 13 % (ref 10–15)
GLUCOSE BLDC GLUCOMTR-MCNC: 155 MG/DL (ref 70–99)
GLUCOSE BLDC GLUCOMTR-MCNC: 155 MG/DL (ref 70–99)
GLUCOSE BLDC GLUCOMTR-MCNC: 170 MG/DL (ref 70–99)
GLUCOSE BLDC GLUCOMTR-MCNC: 186 MG/DL (ref 70–99)
GLUCOSE BLDC GLUCOMTR-MCNC: 194 MG/DL (ref 70–99)
GLUCOSE SERPL-MCNC: 165 MG/DL (ref 70–99)
HCO3 SERPL-SCNC: 25 MMOL/L (ref 22–29)
HCT VFR BLD AUTO: 33.5 % (ref 40–53)
HGB BLD-MCNC: 11 G/DL (ref 13.3–17.7)
INR PPP: 2.34 (ref 0.85–1.15)
MCH RBC QN AUTO: 30.6 PG (ref 26.5–33)
MCHC RBC AUTO-ENTMCNC: 32.8 G/DL (ref 31.5–36.5)
MCV RBC AUTO: 93 FL (ref 78–100)
PLATELET # BLD AUTO: 194 10E3/UL (ref 150–450)
POTASSIUM SERPL-SCNC: 3.8 MMOL/L (ref 3.4–5.3)
PROTHROMBIN TIME: 25.4 SECONDS (ref 11.8–14.8)
RBC # BLD AUTO: 3.6 10E6/UL (ref 4.4–5.9)
SODIUM SERPL-SCNC: 142 MMOL/L (ref 135–145)
WBC # BLD AUTO: 6 10E3/UL (ref 4–11)

## 2025-07-04 PROCEDURE — 250N000011 HC RX IP 250 OP 636: Performed by: STUDENT IN AN ORGANIZED HEALTH CARE EDUCATION/TRAINING PROGRAM

## 2025-07-04 PROCEDURE — 250N000013 HC RX MED GY IP 250 OP 250 PS 637: Performed by: FAMILY MEDICINE

## 2025-07-04 PROCEDURE — 80048 BASIC METABOLIC PNL TOTAL CA: CPT | Performed by: FAMILY MEDICINE

## 2025-07-04 PROCEDURE — 97530 THERAPEUTIC ACTIVITIES: CPT | Mod: GP

## 2025-07-04 PROCEDURE — 85610 PROTHROMBIN TIME: CPT | Performed by: STUDENT IN AN ORGANIZED HEALTH CARE EDUCATION/TRAINING PROGRAM

## 2025-07-04 PROCEDURE — 86140 C-REACTIVE PROTEIN: CPT | Performed by: FAMILY MEDICINE

## 2025-07-04 PROCEDURE — 999N000123 HC STATISTIC OXYGEN O2DAILY TECH TIME

## 2025-07-04 PROCEDURE — 36415 COLL VENOUS BLD VENIPUNCTURE: CPT | Performed by: STUDENT IN AN ORGANIZED HEALTH CARE EDUCATION/TRAINING PROGRAM

## 2025-07-04 PROCEDURE — 120N000001 HC R&B MED SURG/OB

## 2025-07-04 PROCEDURE — 999N000157 HC STATISTIC RCP TIME EA 10 MIN

## 2025-07-04 PROCEDURE — 99233 SBSQ HOSP IP/OBS HIGH 50: CPT | Performed by: FAMILY MEDICINE

## 2025-07-04 PROCEDURE — 85027 COMPLETE CBC AUTOMATED: CPT | Performed by: FAMILY MEDICINE

## 2025-07-04 PROCEDURE — 272N000202 HC AEROBIKA WITH MANOMETER

## 2025-07-04 RX ORDER — WARFARIN SODIUM 5 MG/1
5 TABLET ORAL
Status: COMPLETED | OUTPATIENT
Start: 2025-07-04 | End: 2025-07-04

## 2025-07-04 RX ORDER — CEFDINIR 300 MG/1
300 CAPSULE ORAL EVERY 12 HOURS SCHEDULED
Status: DISCONTINUED | OUTPATIENT
Start: 2025-07-04 | End: 2025-07-04

## 2025-07-04 RX ORDER — CEFTRIAXONE 2 G/1
2 INJECTION, POWDER, FOR SOLUTION INTRAMUSCULAR; INTRAVENOUS EVERY 24 HOURS
Status: DISCONTINUED | OUTPATIENT
Start: 2025-07-05 | End: 2025-07-05

## 2025-07-04 RX ADMIN — PRAMIPEXOLE DIHYDROCHLORIDE 1 MG: 0.5 TABLET ORAL at 20:43

## 2025-07-04 RX ADMIN — PRAMIPEXOLE DIHYDROCHLORIDE 1 MG: 0.5 TABLET ORAL at 08:15

## 2025-07-04 RX ADMIN — LOSARTAN POTASSIUM 100 MG: 50 TABLET, FILM COATED ORAL at 08:15

## 2025-07-04 RX ADMIN — INSULIN ASPART 1 UNITS: 100 INJECTION, SOLUTION INTRAVENOUS; SUBCUTANEOUS at 17:47

## 2025-07-04 RX ADMIN — ATENOLOL 50 MG: 50 TABLET ORAL at 08:15

## 2025-07-04 RX ADMIN — CEFTRIAXONE SODIUM 2 G: 2 INJECTION, POWDER, FOR SOLUTION INTRAMUSCULAR; INTRAVENOUS at 08:15

## 2025-07-04 RX ADMIN — INSULIN ASPART 1 UNITS: 100 INJECTION, SOLUTION INTRAVENOUS; SUBCUTANEOUS at 08:14

## 2025-07-04 RX ADMIN — WARFARIN SODIUM 5 MG: 5 TABLET ORAL at 17:47

## 2025-07-04 RX ADMIN — ATENOLOL 50 MG: 50 TABLET ORAL at 20:43

## 2025-07-04 RX ADMIN — INSULIN ASPART 1 UNITS: 100 INJECTION, SOLUTION INTRAVENOUS; SUBCUTANEOUS at 12:05

## 2025-07-04 RX ADMIN — TRAZODONE HYDROCHLORIDE 50 MG: 50 TABLET ORAL at 20:45

## 2025-07-04 RX ADMIN — SPIRONOLACTONE 50 MG: 25 TABLET, FILM COATED ORAL at 08:15

## 2025-07-04 ASSESSMENT — ACTIVITIES OF DAILY LIVING (ADL)
ADLS_ACUITY_SCORE: 64

## 2025-07-04 NOTE — PROGRESS NOTES
07/04/25 1454   Vital Signs   Temp 97.5  F (36.4  C)   Temp src Oral   Resp 18   Pulse 63   Pulse Rate Source Monitor   BP (!) 149/76   BP Location Right arm   Cuff Size Adult   Oxygen Therapy   SpO2 (!) 91 %   O2 Device None (Room air)     RN notified

## 2025-07-04 NOTE — PLAN OF CARE
Goal Outcome Evaluation:      Plan of Care Reviewed With: patient    Overall Patient Progress: no changeOverall Patient Progress: no change    Outcome Evaluation: Patient A&O, able to eat meals with good appetite, up in chair with TLSO brace on. Wound dressing CDI. Call light appropriate. Chair alarms in place for safety.

## 2025-07-04 NOTE — PROGRESS NOTES
[facility]    Medicine Progress Note - Hospitalist Service    Date of Admission:  7/1/2025    Assessment & Plan   oRbert Richard is a 89 year old male with a medical history of diabetes mellitus type 2, essential hypertension, CKD stage IIIb, who was recently hospitalized at [facility] from May 21 to May 26 after a fall that caused a T7 fracture, pneumomediastinum and small right pneumothorax, and was discharged to [facility] TCU.  Patient was brought in to the emergency department via EMS after he was found on the ground by staff in early morning hours and he was noted to have a saturation of 86% on room air.  Workup in the emergency room notable for sepsis with suspected pneumonia in the right lower lung base with acute encephalopathy and patient was admitted on 7/1/2025 for further workup.  He was placed on Rocephin and Vanco with clinical improvement noted and labs improving.  Blood culture 1/2 now growing strep canis with antibiotic stewardship team recommending narrowing antibiotic to Rocephin alone with repeat blood cultures x2 (performed 7/3/25) and consideration of virtual ID consultation if bacteremia is not resolved.  Patient requires ongoing inpatient management at this time with discharge expected in 2 days based on clinical course and negative repeat blood cultures.  Patient will discharge back to [facility] when medically ready.      Severe sepsis with acute organ dysfunction based on fever, tachycardia, elevated WBC, likely source of infection pulmonary versus soft tissue, encephalopathy  Possible respiratory infection, right lung base  Possible strep canis infection (1/2 blood cultures) in patient with chronic skin ulcers  Patient presented with fever of 102.4 rectally, leukocytosis of 14.2, acute respiratory failure with hypoxia, acute encephalopathy, procalcitonin of 2.16.  Patient has improved with signs of sepsis resolved.   1/2 blood cultures positive for strep canis from time of admission with no growth so far on repeat blood cultures performed on 7/3.   -- Narrow antibiotics to Rocephin along based on antibiotic stewardship recommendations.  If repeat blood cultures are negative will discharge on Augmentin 875/125 mg for 10 day total antibiotic completion course   -- Consult virtual ID if repeat blood cultures return positive for same species   -- Continue monitoring of CBC, CRP, BMP daily  -- Wean oxygen supplementation as able    Acute hypoxic respiratory failure  On presentation patient was requiring 3 L/min to keep SpO2 above 90%.  Patient does not wear oxygen at baseline and acute failure is expected secondary to pneumonia as above.  Patient was improving and weaned down to RA however on 7/3 he had sudden acute worsening and required 3L NC again with signs of clinical overload and symptoms improved after 2 doses of Lasix.  Echocardiogram shows EF over 70%.  With no diastolic dysfunction.  Patient had been able to wean to RA while awake but still needed 1L NC overnight while sleeping  -- Rocephin 2 grams for suspected pneumonia as above  -- saline lock IV fluids and monitor for any further symptoms of volume overload    Acute Toxic metabolic Encephalopathy  Patient presented with altered mental status, agitation, restlessness.  CT head without evidence for acute intracranial process. UA without infection, LA within normal limits, VBG 7.4 /43, WBC 14.2, Cr 1.61, Troponin 52, trended down to less than 6, EKG normal sinus rhythm with PVCs, Hgb 11.9, , electrolytes within normal limits. Etiology of encephalopathy suspected secondary to sepsis and patient is now at baseline mentation per family   - Monitor for continued resolution.    Moderate aortic stenosis  Not previously known but noted on echocardiogram during this hospitalization with mean gradient 21 mmHg  -will monitor closely and be more cautious with any further fluid  resuscitation that may be needed  - Would recommend outpatient cardiology follow-up    Pulmonary HTN  Not previously known but noted on echocardiogram and may be contributing to patient's respiratory symptoms as outlined above  - Proceed with treatment of pneumonia as outlined above and monitor for any chronic O2 needs that could be present secondary to pulmonary hypertension.    Venous stasis dermatitis, bilaterally  Lower extremity edema, some open shallow wounds  Could be the source of infection, especially with blood culture showing strep species as above.  Patient is on spironolactone at baseline to help with edema which has been held since admission secondary to initial low normal blood pressures  -- Wound care order following and appreciate their recommendations  --  spironolactone continued as per home regimen     Fall  Recent closed fracture of the seventh thoracic vertebra  Patient was hospitalized at [facility]  from 6/21/2025 to 6/26/2025 for a fall sustaining a T7 fracture, pneumomediastinum, small right pneumothorax.  At that time, neurosurgery cleared patient, TLSO brace was recommended.  During that hospitalization patient developed ARNALDO on top of his CKD, received IV fluids, and was discharged to TCU.  Patient reports no worsening pain in his back after his fall and x-ray of the thoracic spine appears stable.    -- Wear TLSO brace faithfully when not in a lying position.    -- will need outpatient follow up as previously planned    Stage IIIb chronic kidney disease  Patient appears to have baseline creatinine of 1.4-1.7.  Has been 1.5-1.7 so far during this hospital stay  -- Continue to monitor for stability   -- Dose medication renally    History of deep vein thrombosis of distal vein of left lower extremity  Factor V Leiden deficiency   --Pharmacy to dose warfarin    Essential hypertension  PTA losartan, spironolactone, atenolol oral held initially secondary to n.p.o. status and encephalopathy.   Patient was placed on IV metoprolol and valsartan with persistent hypertension developing but blood pressures improving as home regimen was restarted.    -- Continue atenolol 50 mg twice daily  -- Continue losartan and spironolactone      Diabetes mellitus type 2, with neuropathy  Patient is diet controlled at baseline with hemoglobin A1C of 7.9 during this stay which is improved slightly from previous  -- Continue moderate carbohydrate diet  -- Sliding scale NovoLog with meals and bedtime  -- Hypoglycemia protocol in place    Obstructive sleep apnea  --CPAP as per home settings during this stay and significant other did bring in home CPAP for use tonight        Diet: Combination Diet Moderate Consistent Carb (60 g CHO per Meal) Diet; Easy to Chew (level 7); Thin Liquids (level 0)    DVT Prophylaxis: Enoxaparin (Lovenox) SQ  Smith Catheter: Not present  Lines: None     Cardiac Monitoring: None  Code Status: No CPR- Do NOT Intubate      Clinically Significant Risk Factors          # Hyperchloremia: Highest Cl = 111 mmol/L in last 2 days, will monitor as appropriate          # Hypoalbuminemia: Lowest albumin = 2.7 g/dL at 7/3/2025  6:07 AM, will monitor as appropriate     # Hypertension: Noted on problem list           # DMII: A1C = 7.9 % (Ref range: <5.7 %) within past 6 months, PRESENT ON ADMISSION  # Overweight: Estimated body mass index is 29.43 kg/m  as calculated from the following:    Height as of this encounter: 1.829 m (6').    Weight as of this encounter: 98.4 kg (217 lb)., PRESENT ON ADMISSION     # Financial/Environmental Concerns:           Social Drivers of Health    Interpersonal Safety: Unknown (7/1/2025)    Interpersonal Safety     Do you feel physically and emotionally safe where you currently live?: Patient unable to answer     Within the past 12 months, have you been hit, slapped, kicked or otherwise physically hurt by someone?: Patient unable to answer     Within the past 12 months, have you been  humiliated or emotionally abused in other ways by your partner or ex-partner?: Patient unable to answer          Disposition Plan     Medically Ready for Discharge: Anticipated in 2-4 Days         Jyothi Chopra MD  Hospitalist Service  [facility]  Securely message with [employer] (more info)  Text page via [facility] Paging/Directory   ______________________________________________________________________    Interval History   Patient continues to improve slowly with cognition back to baseline, vitals stable with blood pressures improving after restarting of home regimen.  Patient did need 1L NC last evening but family has brought in CPAP from home for use tonight.  Patient still feels tight in his breathing as though she cannot get the phlegm up and the TLSO brace also is making his breathing feeling restricted.  No new symptoms or concerns.     Physical Exam   Vital Signs: Temp: 97.5  F (36.4  C) Temp src: Oral BP: (!) 149/76 Pulse: 63   Resp: 18 SpO2: 94 % O2 Device: None (Room air) Oxygen Delivery: 1 LPM  Weight: 217 lbs 0 oz    Constitutional: awake, alert, cooperative, no apparent distress, and appears stated age  Respiratory: No increased work of breathing, decreased air exchange, clear to auscultation bilaterally in the upper airways - unable to hear lower airway well secondary to TLSO brace in place   Cardiovascular: RRR  GI: bowel sounds present, abdomen soft and non-tender  Neurologic: Awake, alert, oriented to name, place and situation     Medical Decision Making       55 MINUTES SPENT BY ME on the date of service doing chart review, history, exam, documentation & further activities per the note.      Data     I have personally reviewed the following data over the past 24 hrs:    6.0  \   11.0 (L)   / 194     142 108 (H) 22.8 /  170 (H)   3.8 25 1.55 (H) \     Procal: N/A CRP: 64.81 (H) Lactic Acid: N/A       INR:  2.34 (H) PTT:  N/A   D-dimer:  N/A Fibrinogen:  N/A

## 2025-07-04 NOTE — PLAN OF CARE
Goal Outcome Evaluation:      Plan of Care Reviewed With: patient    Overall Patient Progress: improvingOverall Patient Progress: improving    Outcome Evaluation: Latest BP-149/79, TLSO brace ON as per order. VSS and afebrile. On room air. No complaint of pain. Wound care done as per WOC plan.

## 2025-07-04 NOTE — PLAN OF CARE
Goal Outcome Evaluation:      Plan of Care Reviewed With: patient    Overall Patient Progress: improvingOverall Patient Progress: improving    Outcome Evaluation: Vss, /94 though not in parameters for prn hydralazine. Pt did not tolerate CPAP mask at bedtime, was removed and RA O2 sats eventually decreased to 87-88% consistently. 1L nc applied and O2 sats are 90-94%. Lungs are diminished though has audible wheezes with activity, even if in bed repositioning. Denies pain. Noted blisters to L lower side of back after TLSO brace was removed last eveing, one intact and one open. Dresssings to BLE clean dry and intact.

## 2025-07-04 NOTE — PROGRESS NOTES
Patient previously setup and placed on hospital CPAP but was having difficulty tolerating hospital CPAP and mask and no longer wanted to use it. It was suggested that patient have family bring his CPAP from home.

## 2025-07-04 NOTE — PLAN OF CARE
Goal Outcome Evaluation:      Plan of Care Reviewed With: patient    Overall Patient Progress: improvingOverall Patient Progress: improving    Outcome Evaluation: Patient has been alert and oriented. He reports not recalling the events earlier this week. Ambulating assist of 1/walker. TLSO brace on at all times while up. No complaints of pain. BP elevated in the 160's but improved from earlier today. 1 time Lasix order obtained this afternoon. He is audbily wheezy at times but remains on room air. Leg dressings changed. Dressings needed to be soaked with saline for several minutes in order to remove and not tear his skin further. CPAP on at HS. When TLSO brace removed tonight once patient was lying down, pt noted to have 2 blister areas on his left lower back. 1 blister intact. The other has opened. Areas left open to air.      2315: Pt is not tolerating the hospital supplied cpap mask. Will see if family is able to bring his in sometime tomorrow.

## 2025-07-04 NOTE — PHARMACY-ANTICOAGULATION SERVICE
Clinical Pharmacy - Warfarin Dosing Consult     Pharmacy has been consulted to manage this patient s warfarin therapy.  Indication: Other - specify in comments (Factor V Leiden mutation)  Therapy Goal: INR 2-3  Provider/Team: TCU  Warfarin Prior to Admission: Yes  Warfarin PTA Regimen: Per med rec - 5 mg daily. From TCU note 6/27, patient was to take 2.5 mg daily with a recheck INR 6/30 -- at 6/30 geriatric visit, INR was 1.9 and patient was instructed to take 5 mg daily with a recheck 7/3. Prior to discharging to the TCU, patient was on 7.5mg Mon and 5mg AOD  Recent documented change in oral intake/nutrition: Unknown    INR   Date Value Ref Range Status   07/04/2025 2.34 (H) 0.85 - 1.15 Final   07/03/2025 2.16 (H) 0.85 - 1.15 Final       Recommend warfarin 5 mg today.  Pharmacy will monitor Robert Travica daily and order warfarin doses to achieve specified goal.      Please contact pharmacy as soon as possible if the warfarin needs to be held for a procedure or if the warfarin goals change.

## 2025-07-04 NOTE — PROGRESS NOTES
07/04/25 1648   Oxygen Therapy   SpO2 94 %   O2 Device None (Room air)   Positive Expiratory Pressure (PEP)   Daily Review of Necessity (PEP Therapy) completed   Type (PEP Therapy) vibratory/oscillatory   Device (PEP Therapy) flutter   Route (PEP Therapy) instruction provided, initial   Breaths per Cycle (PEP Therapy) 12   Cycles (PEP Therapy) 1   Settings (PEP Therapy) PEP 5   Patient Position Vera's   Posttreatment Assessment (PEP) breath sounds unchanged;patient reports breathing improved   Signs of Intolerance (PEP Therapy) none     Aerobika started for airway clearance

## 2025-07-04 NOTE — PROGRESS NOTES
07/04/25 0723   Vital Signs   Temp 98.1  F (36.7  C)   Temp src Oral   Resp 16   Pulse 65   Pulse Rate Source Monitor   BP (!) 149/79   BP Location Right arm   Cuff Size Adult   Oxygen Therapy   SpO2 94 %     RN notified

## 2025-07-05 LAB
ANION GAP SERPL CALCULATED.3IONS-SCNC: 10 MMOL/L (ref 7–15)
BUN SERPL-MCNC: 29 MG/DL (ref 8–23)
CALCIUM SERPL-MCNC: 8.6 MG/DL (ref 8.8–10.4)
CHLORIDE SERPL-SCNC: 107 MMOL/L (ref 98–107)
CREAT SERPL-MCNC: 1.63 MG/DL (ref 0.67–1.17)
EGFRCR SERPLBLD CKD-EPI 2021: 40 ML/MIN/1.73M2
ERYTHROCYTE [DISTWIDTH] IN BLOOD BY AUTOMATED COUNT: 13 % (ref 10–15)
GLUCOSE BLDC GLUCOMTR-MCNC: 245 MG/DL (ref 70–99)
GLUCOSE BLDC GLUCOMTR-MCNC: 249 MG/DL (ref 70–99)
GLUCOSE BLDC GLUCOMTR-MCNC: 278 MG/DL (ref 70–99)
GLUCOSE BLDC GLUCOMTR-MCNC: 290 MG/DL (ref 70–99)
GLUCOSE SERPL-MCNC: 306 MG/DL (ref 70–99)
HCO3 SERPL-SCNC: 23 MMOL/L (ref 22–29)
HCT VFR BLD AUTO: 32.6 % (ref 40–53)
HGB BLD-MCNC: 10.6 G/DL (ref 13.3–17.7)
INR PPP: 2.74 (ref 0.85–1.15)
MCH RBC QN AUTO: 30.4 PG (ref 26.5–33)
MCHC RBC AUTO-ENTMCNC: 32.5 G/DL (ref 31.5–36.5)
MCV RBC AUTO: 93 FL (ref 78–100)
PLATELET # BLD AUTO: 192 10E3/UL (ref 150–450)
POTASSIUM SERPL-SCNC: 4.1 MMOL/L (ref 3.4–5.3)
PROTHROMBIN TIME: 28.6 SECONDS (ref 11.8–14.8)
RBC # BLD AUTO: 3.49 10E6/UL (ref 4.4–5.9)
SODIUM SERPL-SCNC: 140 MMOL/L (ref 135–145)
WBC # BLD AUTO: 5.8 10E3/UL (ref 4–11)

## 2025-07-05 PROCEDURE — 94799 UNLISTED PULMONARY SVC/PX: CPT

## 2025-07-05 PROCEDURE — 85610 PROTHROMBIN TIME: CPT | Performed by: STUDENT IN AN ORGANIZED HEALTH CARE EDUCATION/TRAINING PROGRAM

## 2025-07-05 PROCEDURE — 120N000001 HC R&B MED SURG/OB

## 2025-07-05 PROCEDURE — 250N000013 HC RX MED GY IP 250 OP 250 PS 637: Performed by: FAMILY MEDICINE

## 2025-07-05 PROCEDURE — 250N000013 HC RX MED GY IP 250 OP 250 PS 637: Performed by: STUDENT IN AN ORGANIZED HEALTH CARE EDUCATION/TRAINING PROGRAM

## 2025-07-05 PROCEDURE — 80048 BASIC METABOLIC PNL TOTAL CA: CPT | Performed by: FAMILY MEDICINE

## 2025-07-05 PROCEDURE — 99232 SBSQ HOSP IP/OBS MODERATE 35: CPT | Performed by: STUDENT IN AN ORGANIZED HEALTH CARE EDUCATION/TRAINING PROGRAM

## 2025-07-05 PROCEDURE — 250N000011 HC RX IP 250 OP 636: Performed by: FAMILY MEDICINE

## 2025-07-05 PROCEDURE — 36415 COLL VENOUS BLD VENIPUNCTURE: CPT | Performed by: STUDENT IN AN ORGANIZED HEALTH CARE EDUCATION/TRAINING PROGRAM

## 2025-07-05 PROCEDURE — 85027 COMPLETE CBC AUTOMATED: CPT | Performed by: FAMILY MEDICINE

## 2025-07-05 PROCEDURE — 999N000157 HC STATISTIC RCP TIME EA 10 MIN

## 2025-07-05 RX ORDER — GLIPIZIDE 5 MG/1
5 TABLET, FILM COATED, EXTENDED RELEASE ORAL
Status: DISCONTINUED | OUTPATIENT
Start: 2025-07-06 | End: 2025-07-06 | Stop reason: HOSPADM

## 2025-07-05 RX ORDER — WARFARIN SODIUM 2.5 MG/1
2.5 TABLET ORAL
Status: COMPLETED | OUTPATIENT
Start: 2025-07-05 | End: 2025-07-05

## 2025-07-05 RX ORDER — GABAPENTIN 100 MG/1
100 CAPSULE ORAL AT BEDTIME
Status: DISCONTINUED | OUTPATIENT
Start: 2025-07-05 | End: 2025-07-06 | Stop reason: HOSPADM

## 2025-07-05 RX ADMIN — WARFARIN SODIUM 2.5 MG: 2.5 TABLET ORAL at 17:58

## 2025-07-05 RX ADMIN — ATENOLOL 50 MG: 50 TABLET ORAL at 08:18

## 2025-07-05 RX ADMIN — PRAMIPEXOLE DIHYDROCHLORIDE 1 MG: 0.5 TABLET ORAL at 08:17

## 2025-07-05 RX ADMIN — INSULIN ASPART 2 UNITS: 100 INJECTION, SOLUTION INTRAVENOUS; SUBCUTANEOUS at 17:56

## 2025-07-05 RX ADMIN — LOSARTAN POTASSIUM 100 MG: 50 TABLET, FILM COATED ORAL at 08:18

## 2025-07-05 RX ADMIN — GABAPENTIN 100 MG: 100 CAPSULE ORAL at 21:26

## 2025-07-05 RX ADMIN — ATENOLOL 50 MG: 50 TABLET ORAL at 21:25

## 2025-07-05 RX ADMIN — CEFTRIAXONE SODIUM 2 G: 2 INJECTION, POWDER, FOR SOLUTION INTRAMUSCULAR; INTRAVENOUS at 08:18

## 2025-07-05 RX ADMIN — INSULIN ASPART 2 UNITS: 100 INJECTION, SOLUTION INTRAVENOUS; SUBCUTANEOUS at 12:32

## 2025-07-05 RX ADMIN — PRAMIPEXOLE DIHYDROCHLORIDE 1 MG: 0.5 TABLET ORAL at 21:26

## 2025-07-05 RX ADMIN — AMOXICILLIN AND CLAVULANATE POTASSIUM 1 TABLET: 875; 125 TABLET, COATED ORAL at 21:25

## 2025-07-05 RX ADMIN — INSULIN ASPART 2 UNITS: 100 INJECTION, SOLUTION INTRAVENOUS; SUBCUTANEOUS at 08:18

## 2025-07-05 RX ADMIN — SPIRONOLACTONE 50 MG: 25 TABLET, FILM COATED ORAL at 08:17

## 2025-07-05 ASSESSMENT — ACTIVITIES OF DAILY LIVING (ADL)
ADLS_ACUITY_SCORE: 64

## 2025-07-05 NOTE — PLAN OF CARE
Goal Outcome Evaluation:      Plan of Care Reviewed With: patient    Overall Patient Progress: improving    Outcome Evaluation: A & O x4, able to make needs known.  Takes pills whole.  VSS.  On RA w SpO2 > 90%, lungs clear but diminished.  LFA and R hand jose IV patent, received IV Rocephin.  Mostly continent BB, TLSO on when out of bed, +1 assist w walker and gb to chair/bathroom.  No c/o pain throughout shift.  BLE edema +2, venous stasis ulcer dressings on ankles changed.    /75 (BP Location: Right arm)   Pulse 59   Temp 98  F (36.7  C) (Oral)   Resp 20   Ht 1.829 m (6')   Wt 110.2 kg (243 lb)   SpO2 92%   BMI 32.96 kg/m      Myrna Thomas RN on 7/5/2025 at 1:33 PM

## 2025-07-05 NOTE — PROGRESS NOTES
07/05/25 1500   Vital Signs   Temp 97.5  F (36.4  C)   Temp src Oral   Resp 18   Pulse 71   Pulse Rate Source Monitor   BP (!) 151/79   BP Location Right arm   Oxygen Therapy   SpO2 92 %   O2 Device None (Room air)     Nurse was notified of High Blood Pressure

## 2025-07-05 NOTE — PROGRESS NOTES
[facility]    Medicine Progress Note - Hospitalist Service    Date of Admission:  7/1/2025    Assessment & Plan     Robert Richard is a 89 year old male with a medical history of diabetes mellitus type 2, essential hypertension, CKD stage IIIb, who was recently hospitalized at [facility] from May 21 to May 26 after a fall that caused a T7 fracture, pneumomediastinum and small right pneumothorax, and was discharged to [facility].  Patient was brought in to the emergency department via EMS after he was found on the ground by staff in early morning hours and he was noted to have a saturation of 86% on room air.  Workup in the emergency room notable for sepsis with suspected pneumonia in the right lower lung base with acute encephalopathy and patient was admitted on 7/1/2025 for further workup.  He was placed on Rocephin and Vanco with clinical improvement noted and labs improving.  Blood culture 1/2 now growing strep canis with antibiotic stewardship team recommending narrowing antibiotic to Rocephin alone with repeat blood cultures x2 (performed 7/3/25) and consideration of virtual ID consultation if bacteremia is not resolved.    -- blood cultures x2 negative so far   -- [facility], has prior auth, medically ready, ride arranged for tomorrow      Severe sepsis with acute organ dysfunction based on fever, tachycardia, elevated WBC, likely source of infection pulmonary versus soft tissue, encephalopathy  Possible respiratory infection, right lung base  Possible strep canis infection (1/2 blood cultures) in patient with chronic skin ulcers  Patient presented with fever of 102.4 rectally, leukocytosis of 14.2, acute respiratory failure with hypoxia, acute encephalopathy, procalcitonin of 2.16.  Patient has improved with signs of sepsis resolved.  1/2 blood cultures positive for strep canis from time of admission  with no growth so far on repeat blood cultures performed on 7/3.   -- Narrow antibiotics to Rocephin along based on antibiotic stewardship recommendations.  Repeat blood cultures are negative so far, will discharge on Augmentin 875/125 mg for 10 day total antibiotic completion course   -- Continue monitoring of CBC, CRP, BMP daily  -- Wean oxygen supplementation as able     Acute hypoxic respiratory failure, resolved   On presentation patient was requiring 3 L/min to keep SpO2 above 90%.  Patient does not wear oxygen at baseline and acute failure is expected secondary to pneumonia as above.  Patient was improving and weaned down to RA however on 7/3 he had sudden acute worsening and required 3L NC again with signs of clinical overload and symptoms improved after 2 doses of Lasix.  Echocardiogram shows EF over 70%.  With no diastolic dysfunction.On room air now.        Acute Toxic metabolic Encephalopathy, resolved   Patient presented with altered mental status, agitation, restlessness.  CT head without evidence for acute intracranial process. UA without infection, LA within normal limits, VBG 7.4 /43, WBC 14.2, Cr 1.61, Troponin 52, trended down to less than 6, EKG normal sinus rhythm with PVCs, Hgb 11.9, , electrolytes within normal limits. Etiology of encephalopathy suspected secondary to sepsis and patient is now at baseline mentation per family   - Monitor for continued resolution.     Moderate aortic stenosis  Not previously known but noted on echocardiogram during this hospitalization with mean gradient 21 mmHg  -will monitor closely and be more cautious with any further fluid resuscitation that may be needed  - Would recommend outpatient cardiology follow-up     Pulmonary HTN  Not previously known but noted on echocardiogram and may be contributing to patient's respiratory symptoms as outlined above  - Proceed with treatment of pneumonia as outlined above and monitor for any chronic O2 needs that could  be present secondary to pulmonary hypertension.     Venous stasis dermatitis, bilaterally  Lower extremity edema, some open shallow wounds  Could be the source of infection, especially with blood culture showing strep species as above.  Patient is on spironolactone at baseline to help with edema which has been held since admission secondary to initial low normal blood pressures  -- Wound care order following and appreciate their recommendations  --  spironolactone continued as per home regimen      Fall  Recent closed fracture of the seventh thoracic vertebra  Patient was hospitalized at [facility]  from 6/21/2025 to 6/26/2025 for a fall sustaining a T7 fracture, pneumomediastinum, small right pneumothorax.  At that time, neurosurgery cleared patient, TLSO brace was recommended.  During that hospitalization patient developed ARNALDO on top of his CKD, received IV fluids, and was discharged to TCU.  Patient reports no worsening pain in his back after his fall and x-ray of the thoracic spine appears stable.    -- Wear TLSO brace faithfully when not in a lying position.    -- will need outpatient follow up as previously planned     Stage IIIb chronic kidney disease  Patient appears to have baseline creatinine of 1.4-1.7.  Has been 1.5-1.7 so far during this hospital stay  -- Continue to monitor for stability   -- Dose medication renally     History of deep vein thrombosis of distal vein of left lower extremity  Factor V Leiden deficiency   --Pharmacy to dose warfarin     Essential hypertension  PTA losartan, spironolactone, atenolol oral held initially secondary to n.p.o. status and encephalopathy.  Patient was placed on IV metoprolol and valsartan with persistent hypertension developing but blood pressures improving as home regimen was restarted.    -- Continue atenolol 50 mg twice daily  -- Continue losartan and spironolactone       Diabetes mellitus type 2, with neuropathy  Patient is diet controlled at baseline  with hemoglobin A1C of 7.9 during this stay which is improved slightly from previous  -- Continue moderate carbohydrate diet  -- Sliding scale NovoLog with meals and bedtime  -- Hypoglycemia protocol in place     Obstructive sleep apnea  --CPAP as per home settings during this stay and significant other did bring in home CPAP for use tonight          Diet: Combination Diet Moderate Consistent Carb (60 g CHO per Meal) Diet; Easy to Chew (level 7); Thin Liquids (level 0)    DVT Prophylaxis: Enoxaparin (Lovenox) SQ  Smith Catheter: Not present  Lines: None     Cardiac Monitoring: None  Code Status: No CPR- Do NOT Intubate      Clinically Significant Risk Factors          # Hyperchloremia: Highest Cl = 108 mmol/L in last 2 days, will monitor as appropriate          # Hypoalbuminemia: Lowest albumin = 2.7 g/dL at 7/3/2025  6:07 AM, will monitor as appropriate     # Hypertension: Noted on problem list           # DMII: A1C = 7.9 % (Ref range: <5.7 %) within past 6 months   # Obesity: Estimated body mass index is 32.96 kg/m  as calculated from the following:    Height as of this encounter: 1.829 m (6').    Weight as of this encounter: 110.2 kg (243 lb).      # Financial/Environmental Concerns:           Social Drivers of Health    Interpersonal Safety: Unknown (7/1/2025)    Interpersonal Safety     Do you feel physically and emotionally safe where you currently live?: Patient unable to answer     Within the past 12 months, have you been hit, slapped, kicked or otherwise physically hurt by someone?: Patient unable to answer     Within the past 12 months, have you been humiliated or emotionally abused in other ways by your partner or ex-partner?: Patient unable to answer          Disposition Plan     Medically Ready for Discharge: Anticipated Tomorrow             Katehrine Liz MD  Hospitalist Service  [facility]  Securely message with [employer] (more info)  Text page via [employer]  Rustying/y   ______________________________________________________________________    Interval History    No acute events overnight     Physical Exam   Vital Signs: Temp: 98  F (36.7  C) Temp src: Oral BP: 132/75 Pulse: 59   Resp: 20 SpO2: 92 % O2 Device: None (Room air)    Weight: 243 lbs 0 oz    Constitutional: awake, alert, cooperative, no apparent distress, and appears stated age  Respiratory: No increased work of breathing, good air exchange, clear to auscultation bilaterally, no crackles or wheezing  Cardiovascular: Normal apical impulse, regular rate and rhythm, normal S1 and S2, no S3 or S4, and no murmur noted    Medical Decision Making       35 MINUTES SPENT BY ME on the date of service doing chart review, history, exam, documentation & further activities per the note.      Data   ------------------------- PAST 24 HR DATA REVIEWED -----------------------------------------------    I have personally reviewed the following data over the past 24 hrs:    5.8  \   10.6 (L)   / 192     140 107 29.0 (H) /  249 (H)   4.1 23 1.63 (H) \     INR:  2.74 (H) PTT:  N/A   D-dimer:  N/A Fibrinogen:  N/A       Imaging results reviewed over the past 24 hrs:   No results found for this or any previous visit (from the past 24 hours).

## 2025-07-05 NOTE — PHARMACY-ANTICOAGULATION SERVICE
Clinical Pharmacy - Warfarin Dosing Consult     Pharmacy has been consulted to manage this patient s warfarin therapy.  Indication: Other - specify in comments (Factor V Leiden mutation)  Therapy Goal: INR 2-3  Provider/Team: TCU  Warfarin Prior to Admission: Yes  Warfarin PTA Regimen: Per med rec - 5 mg daily. From TCU note 6/27, patient was to take 2.5 mg daily with a recheck INR 6/30 -- at 6/30 geriatric visit, INR was 1.9 and patient was instructed to take 5 mg daily with a recheck 7/3. Prior to discharging to the TCU, patient was on 7.5mg Mon and 5mg AOD  Recent documented change in oral intake/nutrition: Unknown    INR   Date Value Ref Range Status   07/05/2025 2.74 (H) 0.85 - 1.15 Final   07/04/2025 2.34 (H) 0.85 - 1.15 Final     INR trending up quickly after warfarin restarted on 7/2 ( dose was held on 7/1).    Recommend warfarin 2.5 mg today.  Pharmacy will monitor Robert Richard daily and order warfarin doses to achieve specified goal.      Please contact pharmacy as soon as possible if the warfarin needs to be held for a procedure or if the warfarin goals change.

## 2025-07-05 NOTE — PROGRESS NOTES
Patient alert and oriented x4. Pleasant and cooperative. Lung sounds diminished. Denies pain. TLSO when out of bed. Ax1 to bathroom. Dressings on place. .

## 2025-07-05 NOTE — PROGRESS NOTES
Care Management Follow Up    Length of Stay (days): 4    Expected Discharge Date: 07/06/2025     Concerns to be Addressed: discharge planning  Requires a New BCBS Prior Auth- BEFORE return- COMPLETED on 7/5/25. Auth #[id number]    Patient plan of care discussed at interdisciplinary rounds: Yes    Anticipated Discharge Disposition: Transitional Care  [facility] Comments[facility]     Anticipated Discharge Services: Transportation     Anticipated Discharge DME: Wheelchair    Patient/family educated on Medicare website which has current facility and service quality ratings: no (TCU return, Has Bed Hold)    Education Provided on the Discharge Plan: No    Patient/Family in Agreement with the Plan: yes    Referrals Placed by CM/SW: Post Acute Facilities, Transportation, Communication hand-offs to next level of Care Providers, Internal Clinic Care Coordination    Private pay costs discussed: Costs of wheelchair agency transport     Discussed  Partnership in Safe Discharge Planning  document with patient/family: No     Handoff Completed: No, handoff not indicated or clinically appropriate    Additional Information:  Per physician at morning IDT rounds, it is anticipated that patient will be ready for [facility].     [phone] to obtain prior authorization from patient's insurance for return to the TCU, as patient has been hospitalized over 72 hours.  Patient approved and prior authorization number is #[id number] valid from July 6th to July 14th, 2025.      Writer has called and left a message with admissions at [facility].  There appears to be no admissions staff there this weekend. Writer has called and spoken with the nurse, Carla, at [facility] #[phone].  Discussed that it is anticipated that patient will be ready for return to the TCU tomorrow and that patient has a  confirmed bed hold there.  Carla plans to check in with staff and then call writer back.      Next Steps: Finalize discharge plan and transportation back to TCU at [facility] for tomorrow.      2489- Writer received call back from TITUS Redd at [facility]. Discussed anticipated patient return for tomorrow and that insurance prior authorization has been secured as of today.  Tramaine in agreement with plan for return for tomorrow and gave nurse to nurse report # of [phone] for tomorrow.      3064- Writer has visited with patient and his significant other, Opal, per patient permission.  Discussed that the physician is anticipating [facility].  Discussed writer has obtained the new prior authorization from patient's insurance and that writer has spoken with Carla and TITUS Redd's at [facility], and they are anticipating patient's return tomorrow and are in agreement with the return.  Patient and Opal reported that they were told that the TCU does not accept any admissions on the weekends.  Discussed that is typically for new admissions only and patient is a current patient there and has a bed hold, so is able to return at any time.  Patient and Opal stated understanding of this.      Discussed transportation.  Opal feels patient should transport via wheelchair agency transport.  Patient ok with this.  Discussed that agency transport is a private pay cost.  They stated understanding of this.       has called and left a message with [employer], but they typically do not have transports on Sundays, so anticipate they will not be an option for transport tomorrow.  has called and spoken with [employer] #[phone].  They are available tomorrow for a wheelchair transport between 10:11 a.m. and 10:56 a.m.    2704-  has received a call back from Fallon with [employer]. She stated the they do not  "have any planned drivers for tomorrow, but writer could call back tomorrow to see if anyone is available.      1531- Writer has visited with patient and his s.o., Opal.  Discussed [facility] () and provided in writing what their timeline for transport and it is private pay that patient will get an invoice for and provided their billing office number.  Patient ok and in agreement with this plan.      1553- S.O. requested to talk to writer.  She had just spoken with patient's daughterAnna, and daughter does not want patient discharging tomorrow. Opal also stated that patient \"will not be done eating breakfast by 10:00 a.m., so can we move the transport time to later?\"  Writer explained that [facility] already stated they are fitting this patient in for transport at the time given.  Discussed that writer will inform physician and request physician talk with daughter directly.  Opal ok with this.      Writer has informed patient's hospitalist regarding the information above.  She plans to call daughterAnna.        Ronit Kendrick [facility]   [phone]      "

## 2025-07-06 VITALS
HEIGHT: 72 IN | WEIGHT: 244.9 LBS | DIASTOLIC BLOOD PRESSURE: 72 MMHG | BODY MASS INDEX: 33.17 KG/M2 | RESPIRATION RATE: 18 BRPM | SYSTOLIC BLOOD PRESSURE: 129 MMHG | HEART RATE: 59 BPM | TEMPERATURE: 98.5 F | OXYGEN SATURATION: 93 %

## 2025-07-06 LAB
BACTERIA SPEC CULT: NO GROWTH
GLUCOSE BLDC GLUCOMTR-MCNC: 170 MG/DL (ref 70–99)
GLUCOSE BLDC GLUCOMTR-MCNC: 188 MG/DL (ref 70–99)
HOLD SPECIMEN: NORMAL
HOLD SPECIMEN: NORMAL
INR PPP: 3.09 (ref 0.85–1.15)
PROTHROMBIN TIME: 31.3 SECONDS (ref 11.8–14.8)

## 2025-07-06 PROCEDURE — 85610 PROTHROMBIN TIME: CPT | Performed by: STUDENT IN AN ORGANIZED HEALTH CARE EDUCATION/TRAINING PROGRAM

## 2025-07-06 PROCEDURE — 250N000013 HC RX MED GY IP 250 OP 250 PS 637: Performed by: STUDENT IN AN ORGANIZED HEALTH CARE EDUCATION/TRAINING PROGRAM

## 2025-07-06 PROCEDURE — 94799 UNLISTED PULMONARY SVC/PX: CPT

## 2025-07-06 PROCEDURE — 250N000013 HC RX MED GY IP 250 OP 250 PS 637: Performed by: FAMILY MEDICINE

## 2025-07-06 PROCEDURE — 36415 COLL VENOUS BLD VENIPUNCTURE: CPT | Performed by: STUDENT IN AN ORGANIZED HEALTH CARE EDUCATION/TRAINING PROGRAM

## 2025-07-06 RX ORDER — WARFARIN SODIUM 5 MG/1
TABLET ORAL
COMMUNITY
Start: 2025-07-06

## 2025-07-06 RX ORDER — GLIPIZIDE 5 MG/1
5 TABLET, FILM COATED, EXTENDED RELEASE ORAL
Qty: 30 TABLET | Refills: 0 | Status: SHIPPED | OUTPATIENT
Start: 2025-07-06 | End: 2025-07-17

## 2025-07-06 RX ORDER — ACETAMINOPHEN 500 MG
1000 TABLET ORAL 3 TIMES DAILY PRN
COMMUNITY
Start: 2025-07-05

## 2025-07-06 RX ORDER — WARFARIN SODIUM 2.5 MG/1
TABLET ORAL
COMMUNITY
Start: 2025-07-06

## 2025-07-06 RX ORDER — GABAPENTIN 100 MG/1
CAPSULE ORAL
Qty: 63 CAPSULE | Refills: 0 | Status: SHIPPED | OUTPATIENT
Start: 2025-07-06 | End: 2025-08-03

## 2025-07-06 RX ADMIN — ATENOLOL 50 MG: 50 TABLET ORAL at 08:12

## 2025-07-06 RX ADMIN — GLIPIZIDE 5 MG: 5 TABLET, EXTENDED RELEASE ORAL at 08:12

## 2025-07-06 RX ADMIN — SPIRONOLACTONE 50 MG: 25 TABLET, FILM COATED ORAL at 08:12

## 2025-07-06 RX ADMIN — LOSARTAN POTASSIUM 100 MG: 50 TABLET, FILM COATED ORAL at 08:12

## 2025-07-06 RX ADMIN — PRAMIPEXOLE DIHYDROCHLORIDE 1 MG: 0.5 TABLET ORAL at 08:11

## 2025-07-06 RX ADMIN — AMOXICILLIN AND CLAVULANATE POTASSIUM 1 TABLET: 875; 125 TABLET, COATED ORAL at 08:12

## 2025-07-06 RX ADMIN — INSULIN ASPART 1 UNITS: 100 INJECTION, SOLUTION INTRAVENOUS; SUBCUTANEOUS at 08:12

## 2025-07-06 ASSESSMENT — ACTIVITIES OF DAILY LIVING (ADL)
ADLS_ACUITY_SCORE: 64

## 2025-07-06 NOTE — PLAN OF CARE
Goal Outcome Evaluation: Progressing      Pt remains alert and oriented.   Oxygen saturations have been stable.   Cpap used for sleep.   Pt has been up with assist of one, walker and gait belt. He is slow but steady.   Pt denies pain and has been able to rest.   Back brace is in place when up.   Vitals have been stable.   BP (!) 152/81   Pulse 68   Temp 98.3  F (36.8  C) (Oral)   Resp 18   Ht 1.829 m (6')   Wt 110.2 kg (243 lb)   SpO2 95%   BMI 32.96 kg/m

## 2025-07-06 NOTE — PROGRESS NOTES
S-(situation): Patient discharged to [facility] via [facility] with wheelchair    B-(background): Fell at TCU, pre-existing T7 fracture but no new fracture    A-(assessment): A & O x4, VSS, on RA w SpO2 > 90%, SBA-+1 assist w walker, gb, and TLSO brace, no c/o pain    R-(recommendations): Discharge instructions reviewed with patient and patient's family. Listed belongings gathered and returned to patient. NA         Discharge Nursing Criteria:   Patient Education given and documented: (STROKE, CHF, Diabetes):  {NA   Care Plan and Patient education resolved: Yes    New Medications- pt has been educated about purpose and side effects: Yes    Vaccines  Influenza status verified at discharge:  Not Applicable    Intentionally Retained Items (examples: Drains, Wound packing, ureteral stents, freitas, PICC line or IV)  Patient was sent home with nothing left in place.   Follow up appointment made for removal: No    MISC  Prescriptions if needed, hard copies sent with patient  NA  Home medications returned to patient: NA  Medication Bin checked and emptied on discharge Yes  Patient reports post-discharge pain management plan is effective: Yes    Myrna Thomas RN on 7/6/2025 at 10:34 AM

## 2025-07-06 NOTE — PROGRESS NOTES
Care Management Discharge Note    Discharge Date: 07/06/2025       Discharge Disposition: Transitional Care- Returning to TCU at [facility]     Discharge Services:  Wheelchair Transportation by [facility]    Discharge DME: Wheelchair    Discharge Transportation: agency- [facility]     Private pay costs discussed: transportation costs    Does the patient's insurance plan have a 3 day qualifying hospital stay waiver?  Yes     Which insurance plan 3 day waiver is available? Alternative insurance waiver    Will the waiver be used for post-acute placement? No    PAS Confirmation Code: N/A- TCU return    Patient/family educated on Medicare website which has current facility and service quality ratings: no (TCU return, Has Bed Hold)    Education Provided on the Discharge Plan: No    Persons Notified of Discharge Plans: Patient, Significant other- Opal, Daughter- Anna, RN at [facility]    Patient/Family in Agreement with the Plan: yes    Handoff Referral Completed: Yes, [facility] PCP: Internal handoff referral completed    Additional Information:  Per physician, patient is ready for discharge today.    Patient is returning to the TCU at [facility] (Admissions: [phone] Main Phone: [phone] Fax: [phone]).      [phone], but no answer. Will try again. Writer has given bedside nurse this number as well to give nurse to nurse report.      [facility] is scheduled to  patient between 10:11 and 10:56 a.m.  They will bring a wheelchair for the transport.      1004- Writer has called [facility]  and was transferred to the TCU.  Again no answer after the phone rang for a long time with no answer.  Writer called the  again and was transferred to another unit and spoke with Amelia.  She said she will assist with the communication with the 500 wing TCU regarding patient's return  this morning and she will look for the orders that were faxed.  Bedside nurse to keep trying the nurse to nurse phone number given yesterday, as Amelia will talk with the staff and have them answer the phone.      Patient and family in agreement with the discharge plan for today.      VIKRAM Parker  [facility]   [phone]

## 2025-07-06 NOTE — DISCHARGE SUMMARY
"Formerly [facility]  Hospitalist Discharge Summary      Date of Admission:  7/1/2025  Date of Discharge:  7/6/2025  Discharging Provider: Katherine Liz MD  Discharge Service: Hospitalist Service    Discharge Diagnoses   ***    Clinically Significant Risk Factors     # DMII: A1C = 7.9 % (Ref range: <5.7 %) within past 6 months  # Obesity: Estimated body mass index is 33.21 kg/m  as calculated from the following:    Height as of this encounter: 1.829 m (6').    Weight as of this encounter: 111.1 kg (244 lb 14.4 oz).       Follow-ups Needed After Discharge   Follow-up Appointments       Follow Up and recommended labs and tests      Follow up with Nursing home physician.    INR check within the next 7 days, continue with your usual INR checks            {Additional follow-up instructions/to-do's for PCP    :***}    Unresulted Labs Ordered in the Past 30 Days of this Admission       Date and Time Order Name Status Description    7/3/2025 11:40 AM Blood Culture Peripheral blood (BC) Hand, Right Preliminary     7/3/2025 11:40 AM Blood Culture Peripheral blood (BC) Hand, Left Preliminary     7/1/2025  9:07 AM Blood Culture Peripheral blood (BC) Arm, Right Preliminary         These results will be followed up by ***    Discharge Disposition   {Discharge to:5335253::\"Discharged to home\"}  Condition at discharge: {CONDITION:313383::\"Stable\"}    Hospital Course   {OPTIONAL -- use to select A&P section   :815583}    Consultations This Hospital Stay   PHARMACY TO DOSE WARFARIN  WOUND OSTOMY CONTINENCE NURSE  IP CONSULT  PHARMACY TO DOSE VANCO  CARE MANAGEMENT / SOCIAL WORK IP CONSULT  SPEECH LANGUAGE PATH ADULT IP CONSULT  CARE MANAGEMENT / SOCIAL WORK IP CONSULT  PHYSICAL THERAPY ADULT IP CONSULT  PHYSICAL THERAPY ADULT IP CONSULT  OCCUPATIONAL THERAPY ADULT IP CONSULT    Code Status   No CPR- Do NOT Intubate    Time Spent on this Encounter   {How much time did you spend on discharge?:63724661}   "     Katherine Liz MD  [facility] 2A MEDICAL SURGICAL  [address]   [place] [address]  Phone: [phone]  ______________________________________________________________________    Physical Exam   Vital Signs: Temp: 98.5  F (36.9  C) Temp src: Oral BP: 129/72 Pulse: 59   Resp: 18 SpO2: 93 % O2 Device: None (Room air)    Weight: 244 lbs 14.4 oz  {Recommend personal SmartPhrase or Notewriter for exam (OPTIONAL)   :554600}       Primary Care Physician   Stiven Donahue    Discharge Orders      Primary Care - Care Coordination Referral      General info for SNF    Length of Stay Estimate: Short Term Care: Estimated # of Days <30  Condition at Discharge: Improving  Level of care:skilled   Rehabilitation Potential: Good  Admission H&P remains valid and up-to-date: Yes  Recent Chemotherapy: N/A  Use Nursing Home Standing Orders: Yes     Follow Up and recommended labs and tests    Follow up with Nursing home physician.    INR check within the next 7 days, continue with your usual INR checks     Activity - Up with assistive device     Wound care    Site:   Left elbow traumatic abrasion.    Instructions:    1 Cleanse with Microklenz, soap and water or saline and gauze, pat dry.   2 Cover wound with Nonadherent strip gauze (Adaptic).  3 Cover wound and secure Adaptic with Mepilex gentle adhesive foam dressing.  4 Change every 3 days and prn for loose or saturated dressing.     Wound care (specify)    Site:    Left medial and lateral lower leg and Right lateral lower leg, venous stasis ulcers.    Instructions:    1 Cleanse with Microklenz, soap and water or saline and gauze, pat dry.   2 Apply Triad paste to the wound sites, fill Left medial and lateral wound with Triad and small smear to surrounding intact and smear thin layer over the right lateral lower leg wound and immediate surrounding skin.  3 Cover wounds with ABD pads.     Additional Discharge Instructions    Wear TLSO brace faithfully  when not in a lying position.     Reason for your hospital stay    Mr. Richard presented with fever of 102.4 rectally, leukocytosis of 14.2, acute respiratory failure with hypoxia, acute encephalopathy, procalcitonin of 2.16. Possible respiratory infection, right lung base vs Possible strep canis infection (1/4 blood cultures) in patient with chronic skin ulcers. Was treated with IV antibiotics and now transitioned to oral antibiotics.     No CPR- Do NOT Intubate     Physical Therapy Adult Consult    Evaluate and treat as clinically indicated.    Reason:  continue treatment as prior     Occupational Therapy Adult Consult    Evaluate and treat as clinically indicated.    Reason:  continue treatment as prior     Diet    Follow this diet upon discharge: Current Diet:Orders Placed This Encounter      Combination Diet Moderate Consistent Carb (60 g CHO per Meal) Diet; Easy to Chew (level 7); Thin Liquids (level 0)       Significant Results and Procedures   {Data for Discharge Summary:581790}    Discharge Medications      Review of your medicines        START taking        Dose / Directions   amoxicillin-clavulanate 875-125 MG tablet  Commonly known as: AUGMENTIN  Indication: Community Acquired Pneumonia      Dose: 1 tablet  Take 1 tablet by mouth every 12 hours for 9 doses.  Quantity: 9 tablet  Refills: 0     glipiZIDE 5 MG 24 hr tablet  Commonly known as: GLUCOTROL XL  Indication: Type 2 Diabetes  Used for: Type 2 diabetes mellitus without complication, without long-term current use of insulin (H)      Dose: 5 mg  Take 1 tablet (5 mg) by mouth daily (with breakfast).  Quantity: 30 tablet  Refills: 0            CHANGE how you take these medications        Dose / Directions   gabapentin 100 MG capsule  Commonly known as: NEURONTIN  Indication: Restless Leg Syndrome, insomnia  This may have changed: See the new instructions.  Used for: Primary insomnia      Start taking on: July 6, 2025  Take 1 capsule (100 mg) by mouth at  bedtime for 7 days, THEN 2 capsules (200 mg) at bedtime for 7 days, THEN 3 capsules (300 mg) at bedtime for 14 days. 0800, 1200 & 2000.  Quantity: 63 capsule  Refills: 0     * warfarin ANTICOAGULANT 5 MG tablet  Commonly known as: COUMADIN/JANTOVEN  Indication: Factor V Leiden deficiency  This may have changed:   how much to take  how to take this  when to take this  additional instructions      Take as directed. If you are unsure how to take this medication, talk to your nurse or doctor.  Original instructions: at 2000  Refills: 0     * warfarin ANTICOAGULANT 2.5 MG tablet  Commonly known as: COUMADIN/JANTOVEN  This may have changed: Another medication with the same name was changed. Make sure you understand how and when to take each.      Take as directed. If you are unsure how to take this medication, talk to your nurse or doctor.  Original instructions: Warfarin 2.5 mg on Sun/Tue/Thu and Warfarin 5 mg on Mon/Wed/Fri/Sat  Refills: 0           * This list has 2 medication(s) that are the same as other medications prescribed for you. Read the directions carefully, and ask your doctor or other care provider to review them with you.                CONTINUE these medicines which have NOT CHANGED        Dose / Directions   acetaminophen 500 MG tablet  Commonly known as: TYLENOL      Dose: 1,000 mg  Take 2 tablets (1,000 mg) by mouth 3 times daily as needed for mild pain.  Refills: 0     * albuterol 108 (90 Base) MCG/ACT inhaler  Commonly known as: PROAIR HFA/PROVENTIL HFA/VENTOLIN HFA  Indication: Spasm of Lung Air Passages      Dose: 2 puff  Inhale 2 puffs into the lungs every 4 hours as needed for shortness of breath, wheezing or cough.  Refills: 0     * albuterol (2.5 MG/3ML) 0.083% neb solution  Commonly known as: PROVENTIL  Indication: Spasm of Lung Air Passages      Dose: 2.5 mg  Take 2.5 mg by nebulization every 6 hours as needed for shortness of breath, wheezing or cough.  Refills: 0     atenolol 50 MG  tablet  Commonly known as: TENORMIN  Indication: High Blood Pressure  Used for: Hypertension goal BP (blood pressure) < 140/90      Dose: 50 mg  Take 1 tablet by mouth twice daily  Quantity: 180 tablet  Refills: 3     ipratropium 0.06 % nasal spray  Commonly known as: ATROVENT  Indication: Runny Nose associated with a Cold, Hayfever      Dose: 2 spray  Spray 2 sprays into both nostrils 4 times daily as needed for rhinitis.  Refills: 0     losartan 100 MG tablet  Commonly known as: COZAAR  Indication: High Blood Pressure  Used for: Hypertension goal BP (blood pressure) < 140/90      Dose: 100 mg  Take 1 tablet by mouth once daily  Quantity: 90 tablet  Refills: 0     multivitamin w/minerals tablet  Indication: multivitamin      Dose: 1 tablet  Take 1 tablet by mouth daily.  Refills: 0     pramipexole 1 MG tablet  Commonly known as: MIRAPEX  Indication: Restless Leg Syndrome      Dose: 1 mg  Take 1 mg by mouth 2 times daily. At 1600 & 2000  Refills: 0     sennosides 8.6 MG tablet  Commonly known as: SENOKOT  Indication: Constipation      Dose: 2 tablet  Take 2 tablets by mouth 2 times daily as needed for constipation.  Refills: 0     spironolactone 25 MG tablet  Commonly known as: ALDACTONE  Indication: High Blood Pressure      Dose: 50 mg  Take 50 mg by mouth daily.  Refills: 0           * This list has 2 medication(s) that are the same as other medications prescribed for you. Read the directions carefully, and ask your doctor or other care provider to review them with you.                STOP taking      oxyCODONE 5 MG tablet  Commonly known as: ROXICODONE        traZODone 50 MG tablet  Commonly known as: DESYREL                  Where to get your medicines        These medications were sent to [facility], [address]  [address], [place] [address]      Phone: [phone]   amoxicillin-clavulanate 875-125 MG tablet  gabapentin 100 MG  capsule  glipiZIDE 5 MG 24 hr tablet       Allergies   Allergies   Allergen Reactions    Statins Muscle Pain (Myalgia)     Muscle aches  Lipitor/atorvastatin, pravastatin, simvastatin    Gabapentin Dizziness     Neurontin      pleural effusions, pleural thickening, atelectasis, and/or scarring. Mild interstitial thickening is noted as   well, suggestive of edema. No pneumothorax. Heart size is within normal limits accounting for portable technique. Pulmonary vasculature is normal appearing. No acute osseous abnormality.   Echocardiogram Complete     Value    LVEF  >70%    [facility]    [id number]  [id number]  [id number]  [id number]^SIM^KENNY^     [facility]  Echocardiography Laboratory  [address] [place] [address]     Name: MARC BRAGG  MRN: [id number]  : 1935  Study Date: 2025 08:03 AM  Age: 89 yrs  Gender: Male  Patient Location: [facility]  Reason For Study: Heart Failure  History: htn, dm, ckd, hyperlipidemia  Ordering Physician: KENNY MONTEMAYOR  Referring Physician: Stiven Donahue  Performed By: Anaeblla Tse     BSA: 2.2 m2  Height: 72 in  Weight: 225 lb  HR: 58  BP: 122/77 mmHg  ______________________________________________________________________________  Procedure  Echocardiogram with two-dimensional, color and spectral Doppler.  ______________________________________________________________________________  Interpretation Summary     Hyperdynamic left ventricular function. The visual ejection fraction is >70%.  Flattened septum is consistent with RV pressure overload.  The right ventricle is normal in size and function.  Pulmonary hypertension present. The right ventricular systolic pressure is  approximated at 51mmHg plus the right atrial pressure.  Moderate aortic stenosis, VMax 3.1 m/s, mean gradient 21 mmHg, ARLEY 1.2 cm2. DI  0.32. SVi 50 cc/m2.  IVC diameter and respiratory changes fall into an intermediate range  suggesting an RA pressure of 8 mmHg.  There is no pericardial effusion.     This study was compared to a TTE from 2024. Estimated right-sided  pressures are increased.  ______________________________________________________________________________  Left  Ventricle  The left ventricle is normal in size. There is concentric remodeling present.  Proximal septal thickening is noted. Hyperdynamic left ventricular function.  The visual ejection fraction is >70%. Left ventricular diastolic function is  normal. Flattened septum is consistent with RV pressure overload.     Right Ventricle  The right ventricle is normal in size and function.     Atria  Normal left atrial size. Right atrial size is normal.     Mitral Valve  The mitral valve is normal in structure and function.     Tricuspid Valve  There is mild (1+) tricuspid regurgitation. Pulmonary hypertension. The right  ventricular systolic pressure is approximated at 51mmHg plus the right atrial  pressure.     Aortic Valve  Moderate aortic stenosis, VMax 3.1 m/s, mean gradient 21 mmHg, ARLEY 1.2 cm2. DI  0.32. SVi 50 cc/m2.     Pulmonic Valve  The pulmonic valve is not well seen, but is grossly normal.     Vessels  The aortic root is normal size. Normal size ascending aorta. IVC diameter and  respiratory changes fall into an intermediate range suggesting an RA pressure  of 8 mmHg.     Pericardium  There is no pericardial effusion.     ______________________________________________________________________________  MMode/2D Measurements & Calculations  IVSd: 1.6 cm  LVIDd: 3.8 cm  LVIDs: 2.4 cm  LVPWd: 1.3 cm  FS: 37.6 %  LV mass(C)d: 210.1 grams  LV mass(C)dI: 93.8 grams/m2     Ao root diam: 3.7 cm  LA dimension: 4.2 cm  asc Aorta Diam: 3.4 cm  LA/Ao: 1.1  LVOT diam: 2.2 cm  LVOT area: 3.7 cm2  Ao root diam index Ht(cm/m): 2.0  Ao root diam index BSA (cm/m2): 1.6  Asc Ao diam index BSA (cm/m2): 1.5  Asc Ao diam index Ht(cm/m): 1.8  LA Volume (BP): 58.6 ml  LA Volume Index (BP): 26.2 ml/m2  RWT: 0.71     TAPSE: 2.4 cm     Doppler Measurements & Calculations  MV E max geoffrey: 78.8 cm/sec  MV A max geoffrey: 92.1 cm/sec  MV E/A: 0.86  MV dec time: 0.22 sec  Ao V2 max: 309.3 cm/sec  Ao max P.0 mmHg  Ao V2 mean: 212.5 cm/sec  Ao  mean P.0 mmHg  Ao V2 VTI: 84.3 cm  ARLEY(I,D): 1.3 cm2  ARLEY(V,D): 1.2 cm2  LV V1 max P.0 mmHg  LV V1 max: 99.8 cm/sec  LV V1 VTI: 30.2 cm  SV(LVOT): 111.9 ml  SI(LVOT): 50.0 ml/m2  PA V2 max: 98.9 cm/sec  PA max PG: 3.9 mmHg  PA acc time: 0.10 sec  TR max esa: 358.3 cm/sec  TR max P.4 mmHg  AV Esa Ratio (DI): 0.32  ARLEY Index (cm2/m2): 0.59  Medial E/e': 12.7  RV S Esa: 10.9 cm/sec     ______________________________________________________________________________  Report approved by: Russ Spivey MD on 2025 10:46 AM           *Note: Due to a large number of results and/or encounters for the requested time period, some results have not been displayed. A complete set of results can be found in Results Review.       Discharge Medications      Review of your medicines        START taking        Dose / Directions   amoxicillin-clavulanate 875-125 MG tablet  Commonly known as: AUGMENTIN  Indication: Community Acquired Pneumonia      Dose: 1 tablet  Take 1 tablet by mouth every 12 hours for 9 doses.  Quantity: 9 tablet  Refills: 0     glipiZIDE 5 MG 24 hr tablet  Commonly known as: GLUCOTROL XL  Indication: Type 2 Diabetes  Used for: Type 2 diabetes mellitus without complication, without long-term current use of insulin (H)      Dose: 5 mg  Take 1 tablet (5 mg) by mouth daily (with breakfast).  Quantity: 30 tablet  Refills: 0            CHANGE how you take these medications        Dose / Directions   gabapentin 100 MG capsule  Commonly known as: NEURONTIN  Indication: Restless Leg Syndrome, insomnia  This may have changed: See the new instructions.  Used for: Primary insomnia      Start taking on: 2025  Take 1 capsule (100 mg) by mouth at bedtime for 7 days, THEN 2 capsules (200 mg) at bedtime for 7 days, THEN 3 capsules (300 mg) at bedtime for 14 days. 0800, 1200 & 2000.  Quantity: 63 capsule  Refills: 0     * warfarin ANTICOAGULANT 5 MG tablet  Commonly known as: COUMADIN/JANTOVEN  Indication:  Factor V Leiden deficiency  This may have changed:   how much to take  how to take this  when to take this  additional instructions      Take as directed. If you are unsure how to take this medication, talk to your nurse or doctor.  Original instructions: at 2000  Refills: 0     * warfarin ANTICOAGULANT 2.5 MG tablet  Commonly known as: COUMADIN/JANTOVEN  This may have changed: Another medication with the same name was changed. Make sure you understand how and when to take each.      Take as directed. If you are unsure how to take this medication, talk to your nurse or doctor.  Original instructions: Warfarin 2.5 mg on Sun/Tue/Thu and Warfarin 5 mg on Mon/Wed/Fri/Sat  Refills: 0           * This list has 2 medication(s) that are the same as other medications prescribed for you. Read the directions carefully, and ask your doctor or other care provider to review them with you.                CONTINUE these medicines which have NOT CHANGED        Dose / Directions   acetaminophen 500 MG tablet  Commonly known as: TYLENOL      Dose: 1,000 mg  Take 2 tablets (1,000 mg) by mouth 3 times daily as needed for mild pain.  Refills: 0     * albuterol 108 (90 Base) MCG/ACT inhaler  Commonly known as: PROAIR HFA/PROVENTIL HFA/VENTOLIN HFA  Indication: Spasm of Lung Air Passages      Dose: 2 puff  Inhale 2 puffs into the lungs every 4 hours as needed for shortness of breath, wheezing or cough.  Refills: 0     * albuterol (2.5 MG/3ML) 0.083% neb solution  Commonly known as: PROVENTIL  Indication: Spasm of Lung Air Passages      Dose: 2.5 mg  Take 2.5 mg by nebulization every 6 hours as needed for shortness of breath, wheezing or cough.  Refills: 0     atenolol 50 MG tablet  Commonly known as: TENORMIN  Indication: High Blood Pressure  Used for: Hypertension goal BP (blood pressure) < 140/90      Dose: 50 mg  Take 1 tablet by mouth twice daily  Quantity: 180 tablet  Refills: 3     ipratropium 0.06 % nasal spray  Commonly known as:  ATROVENT  Indication: Runny Nose associated with a Cold, Hayfever      Dose: 2 spray  Spray 2 sprays into both nostrils 4 times daily as needed for rhinitis.  Refills: 0     losartan 100 MG tablet  Commonly known as: COZAAR  Indication: High Blood Pressure  Used for: Hypertension goal BP (blood pressure) < 140/90      Dose: 100 mg  Take 1 tablet by mouth once daily  Quantity: 90 tablet  Refills: 0     multivitamin w/minerals tablet  Indication: multivitamin      Dose: 1 tablet  Take 1 tablet by mouth daily.  Refills: 0     pramipexole 1 MG tablet  Commonly known as: MIRAPEX  Indication: Restless Leg Syndrome      Dose: 1 mg  Take 1 mg by mouth 2 times daily. At 1600 & 2000  Refills: 0     sennosides 8.6 MG tablet  Commonly known as: SENOKOT  Indication: Constipation      Dose: 2 tablet  Take 2 tablets by mouth 2 times daily as needed for constipation.  Refills: 0     spironolactone 25 MG tablet  Commonly known as: ALDACTONE  Indication: High Blood Pressure      Dose: 50 mg  Take 50 mg by mouth daily.  Refills: 0           * This list has 2 medication(s) that are the same as other medications prescribed for you. Read the directions carefully, and ask your doctor or other care provider to review them with you.                STOP taking      oxyCODONE 5 MG tablet  Commonly known as: ROXICODONE        traZODone 50 MG tablet  Commonly known as: DESYREL                  Where to get your medicines        These medications were sent to [facility], [address]  [address] [facility], [facility] [address]      Phone: [phone]   amoxicillin-clavulanate 875-125 MG tablet  gabapentin 100 MG capsule  glipiZIDE 5 MG 24 hr tablet       Allergies   Allergies   Allergen Reactions    Statins Muscle Pain (Myalgia)     Muscle aches  Lipitor/atorvastatin, pravastatin, simvastatin    Gabapentin Dizziness     Neurontin

## 2025-07-06 NOTE — PLAN OF CARE
Goal Outcome Evaluation:      Plan of Care Reviewed With: patient    Overall Patient Progress: improving    A & O x4, able to make needs known.  Takes pills whole.  VSS.  On RA w SpO2 > 90%.  LFA and R hand jose IV patent.  Mostly continent BB, TLSO on when out of bed, SBA-+1 assist w walker and gb to chair/bathroom.  No c/o pain throughout shift.  BLE edema +2, venous stasis ulcer dressings intact. Planned to discharge this morning to [facility].     /72 (BP Location: Right arm)   Pulse 59   Temp 98.5  F (36.9  C) (Oral)   Resp 18   Ht 1.829 m (6')   Wt 111.1 kg (244 lb 14.4 oz)   SpO2 93%   BMI 33.21 kg/m      Myrna Thomas RN on 7/6/2025 at 09:00 AM

## 2025-07-07 ENCOUNTER — TELEPHONE (OUTPATIENT)
Dept: GERIATRICS | Facility: CLINIC | Age: 89
End: 2025-07-07
Payer: COMMERCIAL

## 2025-07-07 ENCOUNTER — DOCUMENTATION ONLY (OUTPATIENT)
Dept: ANTICOAGULATION | Facility: CLINIC | Age: 89
End: 2025-07-07
Payer: COMMERCIAL

## 2025-07-07 ENCOUNTER — PATIENT OUTREACH (OUTPATIENT)
Dept: CARE COORDINATION | Facility: CLINIC | Age: 89
End: 2025-07-07
Payer: COMMERCIAL

## 2025-07-07 NOTE — TELEPHONE ENCOUNTER
"Provider: SKIP Rucker CNP   Facility: [facility]   Facility Type:  TCU    Caller: Khadar  Call Back Number: [phone]  Reason for call: INR  Diagnosis/Goal: Factor V Leiden  Heparin/Lovenox:  No  Currently on ABX?: Augmentin---ends on 7/11/25  Other interacting medication:  None  Missed or refused doses: No    Date INR Previous Dose/Orders             7/6/25 3.09 2.5mg   7/7/25 3.8      Per hospital discharge orders:  Warfarin 2.5mg Nij-Akih-Wrxin and 5mg all other days.      Excerpted from hospital anticoagulation notes:    Warfarin PTA Regimen: Per med rec - 5 mg daily. From TCU note 6/27, patient was to take 2.5 mg daily with a recheck INR 6/30 -- at 6/30 geriatric visit, INR was 1.9 and patient was instructed to take 5 mg daily with a recheck 7/3. Prior to discharging to the TCU, patient was on 7.5mg Mon and 5mg AOD       Telephone encounter sent to: SKIP Baig    Requesting orders for Warfarin dosing and date of next INR draw  Please respond to \"Geriatrics Nurse Pool\".    Harvey Thornton RN   "

## 2025-07-07 NOTE — PLAN OF CARE
Physical Therapy Discharge Summary    Reason for therapy discharge:    Discharged to transitional care facility.    Progress towards therapy goal(s). See goals on Care Plan in [facility] electronic health record for goal details.  Goals partially met.  Barriers to achieving goals:   discharge from facility.    Therapy recommendation(s):    Continued therapy is recommended.  Rationale/Recommendations:  to address impaired functional mobility, safety, TLSO management, self care training and function progression in order to improve safety and independence.      Thank you for your referral.  Judith Mosqueda, PT, DPT, ATC, [facility]  O: [phone]  E: [email or web address]

## 2025-07-07 NOTE — TELEPHONE ENCOUNTER
[facility]   2025     Name: Robert Richard   : 1935     Background:  History of DVT/ Factor V Leiden    Orders:  Hold warfarin on  and .  Recheck INR on 25.       Electronically signed by SKIP Baig CNP

## 2025-07-07 NOTE — LETTER
[facility]   To:             Please give to facility    From:   Sabrina Haywood  RN  Care Coordinator   [facility]   P: [phone]  [email or web address]   Patient Name:  Robert Richard YOB: 1935   Admit date: 7/6/2025      *Information Needed:  Please contact me when the patient will discharge (or if they will move to long term care)- include the discharge date, disposition, and main diagnosis   If the patient is discharged with home care services, please provide the name of the agency    Also- Please inform me if a care conference is being held.   Phone, Fax or Email with information                        Thank you

## 2025-07-07 NOTE — PROGRESS NOTES
ANTICOAGULATION  MANAGEMENT: Discharge Review    Robert Richard chart reviewed for anticoagulation continuity of care    Hospital Admission on 7/1/25 for an fall and  pneumonia.    Discharge disposition: TCU    Results:    Recent labs: (last 7 days)     07/01/25  0856 07/01/25  1451 07/02/25  0641 07/03/25  0607 07/04/25  0520 07/05/25  0542 07/06/25  0559   INR 1.70* 1.93* 2.24* 2.16* 2.34* 2.74* 3.09*     Anticoagulation inpatient management:     not applicable     Anticoagulation discharge instructions:     Warfarin dosing: TCU to manage until discharge   Bridging: No   INR goal change: No      Medication changes affecting anticoagulation: Yes: started on augmentin     Additional factors affecting anticoagulation: Yes: infection can affect INR      PLAN     No adjustment to anticoagulation plan needed    ACC will resume monitoring upon discharge from TCU    No adjustment to Anticoagulation Calendar was required    Amina Bertrand, RN  7/7/2025  Anticoagulation Clinic  [facility] for routing messages: mirlande WALLACE  ACC patient phone line: [phone]

## 2025-07-07 NOTE — PROGRESS NOTES
Clinic Care Coordination Contact  Care Coordination Transition Communication    Clinical Data: Patient was hospitalized at [facility] from 7/1/2025 to 7/6/2025 with diagnosis of Metabolic encephalopathy.     Assessment: Patient has transitioned to TCU/ARU for short term rehabilitation:    Facility Name: [facility] (Phone: [phone] Fax: [phone])    Transition Communication:  Notified facility of Primary Care- Care Coordination support via Epic fax.    Plan: Care Coordinator will await notification from facility staff informing of patient's discharge plans/needs. Care Coordinator will review chart and outreach to facility staff every 4 weeks and as needed.     Sabrina Haywood RN Care Coordination   [facility]  [place], [place], [place]  Email: [email or web address]  Phone: [phone]

## 2025-07-08 LAB
BACTERIA SPEC CULT: NO GROWTH
BACTERIA SPEC CULT: NO GROWTH

## 2025-07-09 ENCOUNTER — TELEPHONE (OUTPATIENT)
Dept: GERIATRICS | Facility: CLINIC | Age: 89
End: 2025-07-09
Payer: COMMERCIAL

## 2025-07-09 NOTE — TELEPHONE ENCOUNTER
"Provider: SKIP Rucker CNP   Facility: [facility] [place]   Facility Type:  TCU    Caller: Fallon  Call Back Number: [phone]  Reason for call: INR  Diagnosis/Goal: Factor V Leiden  Heparin/Lovenox:  No  Currently on ABX?: Yes; please explain: Augmentin until 7/11  Other interacting medication:  None  Missed or refused doses: No    Date INR Previous Dose/Orders   7/6/25 3.09 2.5mg   7/7/25 3.8 Held x2   7/9 2.8          Telephone encounter sent to: SKIP Baig    Requesting orders for Warfarin dosing and date of next INR draw  Please respond to \"Geriatrics Nurse Pool\".    Allie Frey RN   "

## 2025-07-09 NOTE — TELEPHONE ENCOUNTER
[facility]   2025     Name: Robert Richard   : 1935     Background:  Factor V Leiden    Orders:  Warfarin 2.5 mg po daily on  and 7/10.   Recheck INR on 25.    Electronically signed by SKIP Baig CNP

## 2025-07-14 ENCOUNTER — TRANSITIONAL CARE UNIT VISIT (OUTPATIENT)
Dept: GERIATRICS | Facility: CLINIC | Age: 89
End: 2025-07-14
Payer: COMMERCIAL

## 2025-07-14 VITALS
TEMPERATURE: 97.7 F | OXYGEN SATURATION: 94 % | SYSTOLIC BLOOD PRESSURE: 145 MMHG | DIASTOLIC BLOOD PRESSURE: 79 MMHG | RESPIRATION RATE: 14 BRPM | BODY MASS INDEX: 32.69 KG/M2 | HEART RATE: 77 BPM | WEIGHT: 241 LBS

## 2025-07-14 DIAGNOSIS — I87.2 VENOUS STASIS DERMATITIS OF RIGHT LOWER EXTREMITY: ICD-10-CM

## 2025-07-14 DIAGNOSIS — E11.40 CONTROLLED TYPE 2 DIABETES WITH NEUROPATHY (H): Primary | ICD-10-CM

## 2025-07-14 DIAGNOSIS — Z86.718 HISTORY OF DEEP VENOUS THROMBOSIS (DVT) OF DISTAL VEIN OF LEFT LOWER EXTREMITY: ICD-10-CM

## 2025-07-14 DIAGNOSIS — N18.32 STAGE 3B CHRONIC KIDNEY DISEASE (H): ICD-10-CM

## 2025-07-14 PROCEDURE — 99309 SBSQ NF CARE MODERATE MDM 30: CPT | Performed by: NURSE PRACTITIONER

## 2025-07-14 NOTE — LETTER
7/14/2025      Robert Richard  [address] [place] [address]        [facility]    Chief Complaint   Patient presents with     RECHECK     HPI:  Robert Richard is a 89 year old  (1935), who is being seen today for an episodic care visit at: [facility] ([facility]) [8]. Today's concern is:   1. 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 History of deep venous thrombosis (DVT) of distal vein of left lower extremity    3. Venous stasis dermatitis of right lower extremity    4. Stage 3b chronic kidney disease (H)      Patient seen for follow up, recent hospitalizations for fall, back pain, A1C 7.9, moderate leg neuropathy, dislikes DM diet and requesting regular, leg edema bilateral 3+ firm with orange peel texture and aidan, no overt s/s DVT present, INR 1.9, creat 1.7 with GFR 38, overall oriented, multiple health concerns.    Allergies, and PMH/PSH reviewed in EPIC today.  REVIEW OF SYSTEMS:  4 point ROS including Respiratory, CV, GI and , other than that noted in the HPI,  is negative    Objective:   BP (!) 145/79   Pulse 77   Temp 97.7  F (36.5  C)   Resp 14   Wt 109.3 kg (241 lb)   SpO2 94%   BMI 32.69 kg/m    GENERAL APPEARANCE:  in no distress, appears healthy  ENT:  Mouth and posterior oropharynx normal, moist mucous membranes  RESP:  no respiratory distress, diminished breath sounds bases  CV:  peripheral edema 3+ in lower legs, rate-normal  ABDOMEN:  normal bowel sounds, soft, nontender, no hepatosplenomegaly or other masses, bowel sounds normal  M/S:   Gait and station abnormal transfer assist  SKIN:  Inspection of skin and subcutaneous tissue baseline  NEURO:   Examination of sensation by touch normal  PSYCH:  affect and mood normal    Labs done in SNF are in [facility]. Please refer to them using [employer]/Care Everywhere.    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)  (primary encounter diagnosis)  Comment: A1C 7.9,  [address]  Plan:   -continue glipizide 5mg  -change diet to regular on request  -repeat A1C in 2-3 months  -plan to increase glipizide to 10mg pending BG results      (Z86.718) History of deep venous thrombosis (DVT) of distal vein of left lower extremity  (I87.2) Venous stasis dermatitis of right lower extremity  Comment: edema bilateral 3+, INR 1.9  Plan:   -warfarin 2.5mg daily  -repeat INR on 7/17  -elevate legs as possible  -lymph has wrapped and following  -continue spironolactone (K was 4.0)  -oxycodone recently changed to gabapentin  -completed course of augmentin  -CBC, BMP on 7/16    (N18.32) Stage 3b chronic kidney disease (H)  Comment: creat 1.7, GFR 38  Plan:   -dose meds renally  -repeat BMP on 7/16    MED REC REQUIRED  Post Medication Reconciliation Status: medication reconcilation previously completed during another office visit        Electronically signed by: SKIP Maxwell CNP          Sincerely,        SKIP Maxwell CNP    Electronically signed

## 2025-07-14 NOTE — PROGRESS NOTES
[facility] GERIATRICS    Chief Complaint   Patient presents with    RECHECK     HPI:  Robert Richard is a 89 year old  (1935), who is being seen today for an episodic care visit at: [facility] ([facility]) [8]. Today's concern is:   1. 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 History of deep venous thrombosis (DVT) of distal vein of left lower extremity    3. Venous stasis dermatitis of right lower extremity    4. Stage 3b chronic kidney disease (H)      Patient seen for follow up, recent hospitalizations for fall, back pain, A1C 7.9, moderate leg neuropathy, dislikes DM diet and requesting regular, leg edema bilateral 3+ firm with orange peel texture and aidan, no overt s/s DVT present, INR 1.9, creat 1.7 with GFR 38, overall oriented, multiple health concerns.    Allergies, and PMH/PSH reviewed in EPIC today.  REVIEW OF SYSTEMS:  4 point ROS including Respiratory, CV, GI and , other than that noted in the HPI,  is negative    Objective:   BP (!) 145/79   Pulse 77   Temp 97.7  F (36.5  C)   Resp 14   Wt 109.3 kg (241 lb)   SpO2 94%   BMI 32.69 kg/m    GENERAL APPEARANCE:  in no distress, appears healthy  ENT:  Mouth and posterior oropharynx normal, moist mucous membranes  RESP:  no respiratory distress, diminished breath sounds bases  CV:  peripheral edema 3+ in lower legs, rate-normal  ABDOMEN:  normal bowel sounds, soft, nontender, no hepatosplenomegaly or other masses, bowel sounds normal  M/S:   Gait and station abnormal transfer assist  SKIN:  Inspection of skin and subcutaneous tissue baseline  NEURO:   Examination of sensation by touch normal  PSYCH:  affect and mood normal    Labs done in SNF are in [facility]. Please refer to them using [employer]/Care Everywhere.    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)  (primary encounter diagnosis)  Comment: A1C 7.9, BG [address]  Plan:   -continue glipizide 5mg  -change diet to regular on  request  -repeat A1C in 2-3 months  -plan to increase glipizide to 10mg pending BG results      (Z86.718) History of deep venous thrombosis (DVT) of distal vein of left lower extremity  (I87.2) Venous stasis dermatitis of right lower extremity  Comment: edema bilateral 3+, INR 1.9  Plan:   -warfarin 2.5mg daily  -repeat INR on 7/17  -elevate legs as possible  -lymph has wrapped and following  -continue spironolactone (K was 4.0)  -oxycodone recently changed to gabapentin  -completed course of augmentin  -CBC, BMP on 7/16    (N18.32) Stage 3b chronic kidney disease (H)  Comment: creat 1.7, GFR 38  Plan:   -dose meds renally  -repeat BMP on 7/16    MED REC REQUIRED  Post Medication Reconciliation Status: medication reconcilation previously completed during another office visit        Electronically signed by: SKIP Maxwell CNP

## 2025-07-15 ENCOUNTER — LAB REQUISITION (OUTPATIENT)
Dept: LAB | Facility: CLINIC | Age: 89
End: 2025-07-15
Payer: COMMERCIAL

## 2025-07-15 DIAGNOSIS — D64.9 ANEMIA, UNSPECIFIED: ICD-10-CM

## 2025-07-15 DIAGNOSIS — N18.9 CHRONIC KIDNEY DISEASE, UNSPECIFIED: ICD-10-CM

## 2025-07-16 LAB
ANION GAP SERPL CALCULATED.3IONS-SCNC: 12 MMOL/L (ref 7–15)
BUN SERPL-MCNC: 28.2 MG/DL (ref 8–23)
CALCIUM SERPL-MCNC: 9.4 MG/DL (ref 8.8–10.4)
CHLORIDE SERPL-SCNC: 106 MMOL/L (ref 98–107)
CREAT SERPL-MCNC: 1.45 MG/DL (ref 0.67–1.17)
EGFRCR SERPLBLD CKD-EPI 2021: 46 ML/MIN/1.73M2
ERYTHROCYTE [DISTWIDTH] IN BLOOD BY AUTOMATED COUNT: 13.3 % (ref 10–15)
GLUCOSE SERPL-MCNC: 122 MG/DL (ref 70–99)
HCO3 SERPL-SCNC: 22 MMOL/L (ref 22–29)
HCT VFR BLD AUTO: 37.9 % (ref 40–53)
HGB BLD-MCNC: 11.5 G/DL (ref 13.3–17.7)
MCH RBC QN AUTO: 29.7 PG (ref 26.5–33)
MCHC RBC AUTO-ENTMCNC: 30.3 G/DL (ref 31.5–36.5)
MCV RBC AUTO: 98 FL (ref 78–100)
PLATELET # BLD AUTO: 222 10E3/UL (ref 150–450)
POTASSIUM SERPL-SCNC: 4.4 MMOL/L (ref 3.4–5.3)
RBC # BLD AUTO: 3.87 10E6/UL (ref 4.4–5.9)
SODIUM SERPL-SCNC: 140 MMOL/L (ref 135–145)
WBC # BLD AUTO: 7.1 10E3/UL (ref 4–11)

## 2025-07-16 PROCEDURE — 36415 COLL VENOUS BLD VENIPUNCTURE: CPT | Performed by: FAMILY MEDICINE

## 2025-07-16 PROCEDURE — 80048 BASIC METABOLIC PNL TOTAL CA: CPT | Performed by: FAMILY MEDICINE

## 2025-07-16 PROCEDURE — 85027 COMPLETE CBC AUTOMATED: CPT | Performed by: FAMILY MEDICINE

## 2025-07-16 PROCEDURE — P9604 ONE-WAY ALLOW PRORATED TRIP: HCPCS | Performed by: FAMILY MEDICINE

## 2025-07-17 ENCOUNTER — TELEPHONE (OUTPATIENT)
Dept: GERIATRICS | Facility: CLINIC | Age: 89
End: 2025-07-17

## 2025-07-17 ENCOUNTER — TRANSITIONAL CARE UNIT VISIT (OUTPATIENT)
Dept: GERIATRICS | Facility: CLINIC | Age: 89
End: 2025-07-17
Payer: COMMERCIAL

## 2025-07-17 VITALS
OXYGEN SATURATION: 91 % | WEIGHT: 241 LBS | BODY MASS INDEX: 32.69 KG/M2 | HEART RATE: 56 BPM | RESPIRATION RATE: 20 BRPM | DIASTOLIC BLOOD PRESSURE: 88 MMHG | TEMPERATURE: 97.3 F | SYSTOLIC BLOOD PRESSURE: 148 MMHG

## 2025-07-17 DIAGNOSIS — M48.061 SPINAL STENOSIS OF LUMBAR REGION WITHOUT NEUROGENIC CLAUDICATION: ICD-10-CM

## 2025-07-17 DIAGNOSIS — R60.0 BILATERAL LEG EDEMA: ICD-10-CM

## 2025-07-17 DIAGNOSIS — E11.9 TYPE 2 DIABETES MELLITUS WITHOUT COMPLICATION, WITHOUT LONG-TERM CURRENT USE OF INSULIN (H): Primary | ICD-10-CM

## 2025-07-17 DIAGNOSIS — Z86.718 HISTORY OF DEEP VENOUS THROMBOSIS (DVT) OF DISTAL VEIN OF LEFT LOWER EXTREMITY: ICD-10-CM

## 2025-07-17 RX ORDER — GLIPIZIDE 10 MG/1
10 TABLET, FILM COATED, EXTENDED RELEASE ORAL
Status: SHIPPED
Start: 2025-07-17

## 2025-07-17 NOTE — TELEPHONE ENCOUNTER
"Provider: SKIP Rucker CNP   Facility: [facility] [place]   Facility Type:  TCU    Caller: Chris   Call Back Number: [phone]  Reason for call: INR  Diagnosis/Goal: Factor V Leiden  Heparin/Lovenox:  No  Currently on ABX?: No  Other interacting medication:  None  Missed or refused doses: No    Date INR Previous Dose/Orders   7/6 3.09 2.5 mg   7/7 3.8 Held x 2   7/9 2.8 2.5 mg daily   7/11 1.7 3mg x1 then 2.5mg x2    7/14 1.9 2.5mg every day    7/17 1.6         Telephone encounter sent to: SKIP Rucker CNP     Requesting orders for Warfarin dosing and date of next INR draw  Please respond to \"Geriatrics Nurse Pool\".    Allie Frey RN   "

## 2025-07-17 NOTE — PROGRESS NOTES
[facility]ERIATRICS    Chief Complaint   Patient presents with    RECHECK     HPI:  Robert Richard is a 89 year old  (1935), who is being seen today for an episodic care visit at: [facility] ([facility]) [8]. Today's concern is:   1. Type 2 diabetes mellitus without complication, without long-term current use of insulin (H)    2. History of deep venous thrombosis (DVT) of distal vein of left lower extremity    3. Bilateral leg edema    4. Spinal stenosis of lumbar region without neurogenic claudication    5. S/P lumbar fusion      Patient seen for follow up, also spoke with daughter Anna RE: status and plan, A1C at 7.9, good appetite and regular diet, INR 1.6 with no s/s bleed nor bruise, leg edema 2+ bilateral with no s/s DVT present, post fall with T7-8 wedge Fx, pain controlled, ambulatory with assist, dislikes TLSO, rubbing L flank and causing skin breakdown, overall doing well.    Allergies, and PMH/PSH reviewed in EPIC today.  REVIEW OF SYSTEMS:  4 point ROS including Respiratory, CV, GI and , other than that noted in the HPI,  is negative    Objective:   BP (!) 148/88   Pulse 56   Temp 97.3  F (36.3  C)   Resp 20   Wt 109.3 kg (241 lb)   SpO2 (!) 91%   BMI 32.69 kg/m    GENERAL APPEARANCE:  in no distress, appears healthy  ENT:  Mouth and posterior oropharynx normal, moist mucous membranes  RESP:  lungs clear to auscultation , no respiratory distress  CV:  peripheral edema 2+ in lower legs, rate-normal  ABDOMEN:  bowel sounds normal  M/S:   Gait and station abnormal unsteady  SKIN:  Inspection of skin and subcutaneous tissue baseline  NEURO:   Examination of sensation by touch normal  PSYCH:  affect and mood normal    Labs done in SNF are in [facility]. Please refer to them using [employer]/Care Everywhere.    Assessment/Plan:  (E11.9) Type 2 diabetes mellitus without complication, without long-term current use of insulin (H)  (primary encounter diagnosis)  Comment: A1C was 7.9,  good appetite  Plan:   -increase glipizide ER to 10mg  -OK for regular diet vs DM diet, preference  -follow up A1C in 1-2 months    (Z86.718) History of deep venous thrombosis (DVT) of distal vein of left lower extremity  (R60.0) Bilateral leg edema  Comment: chronic edema bilateral, INR 1.6, goal 2-3  Plan:   -warfarin 3mg daily  -repeat INR on 7/21  -spoke with lymph therapist, ordering [employer] stocking for when lymph wraps are done  -elevate legs as possible    (M48.061) Spinal stenosis of lumbar region without neurogenic claudication  Comment: T7-8 wedge, pain controlled  Plan:   -ordered to have TLSO on when OOB, to contact daughter Anna if refusing to wear  -add foam mepilex pads to L flank where skin disturbed  -continue APAP TID, oxycodone PRN  -follow up spine on 7/31    MED REC REQUIRED  Post Medication Reconciliation Status: medication reconcilation previously completed during another office visit        Electronically signed by: SKIP Maxwell CNP

## 2025-07-17 NOTE — LETTER
7/17/2025      Robert Richard  [address] [place] [address]        [facility] GERIATRICS    Chief Complaint   Patient presents with     RECHECK     HPI:  Robert Richard is a 89 year old  (1935), who is being seen today for an episodic care visit at: [facility] ([facility]) [8]. Today's concern is:   1. Type 2 diabetes mellitus without complication, without long-term current use of insulin (H)    2. History of deep venous thrombosis (DVT) of distal vein of left lower extremity    3. Bilateral leg edema    4. Spinal stenosis of lumbar region without neurogenic claudication    5. S/P lumbar fusion      Patient seen for follow up, also spoke with daughter Anna RE: status and plan, A1C at 7.9, good appetite and regular diet, INR 1.6 with no s/s bleed nor bruise, leg edema 2+ bilateral with no s/s DVT present, post fall with T7-8 wedge Fx, pain controlled, ambulatory with assist, dislikes TLSO, rubbing L flank and causing skin breakdown, overall doing well.    Allergies, and PMH/PSH reviewed in EPIC today.  REVIEW OF SYSTEMS:  4 point ROS including Respiratory, CV, GI and , other than that noted in the HPI,  is negative    Objective:   BP (!) 148/88   Pulse 56   Temp 97.3  F (36.3  C)   Resp 20   Wt 109.3 kg (241 lb)   SpO2 (!) 91%   BMI 32.69 kg/m    GENERAL APPEARANCE:  in no distress, appears healthy  ENT:  Mouth and posterior oropharynx normal, moist mucous membranes  RESP:  lungs clear to auscultation , no respiratory distress  CV:  peripheral edema 2+ in lower legs, rate-normal  ABDOMEN:  bowel sounds normal  M/S:   Gait and station abnormal unsteady  SKIN:  Inspection of skin and subcutaneous tissue baseline  NEURO:   Examination of sensation by touch normal  PSYCH:  affect and mood normal    Labs done in SNF are in [facility]. Please refer to them using [employer]/Care Everywhere.    Assessment/Plan:  (E11.9) Type 2 diabetes mellitus without complication, without long-term  current use of insulin (H)  (primary encounter diagnosis)  Comment: A1C was 7.9, good appetite  Plan:   -increase glipizide ER to 10mg  -OK for regular diet vs DM diet, preference  -follow up A1C in 1-2 months    (Z86.718) History of deep venous thrombosis (DVT) of distal vein of left lower extremity  (R60.0) Bilateral leg edema  Comment: chronic edema bilateral, INR 1.6, goal 2-3  Plan:   -warfarin 3mg daily  -repeat INR on 7/21  -spoke with lymph therapist, ordering [employer] stocking for when lymph wraps are done  -elevate legs as possible    (M48.061) Spinal stenosis of lumbar region without neurogenic claudication  Comment: T7-8 wedge, pain controlled  Plan:   -ordered to have TLSO on when OOB, to contact daughter Anna if refusing to wear  -add foam mepilex pads to L flank where skin disturbed  -continue APAP TID, oxycodone PRN  -follow up spine on 7/31    MED REC REQUIRED  Post Medication Reconciliation Status: medication reconcilation previously completed during another office visit        Electronically signed by: SKIP Maxwell CNP          Sincerely,        SKIP Maxwell CNP    Electronically signed

## 2025-07-21 ENCOUNTER — TRANSITIONAL CARE UNIT VISIT (OUTPATIENT)
Dept: GERIATRICS | Facility: CLINIC | Age: 89
End: 2025-07-21
Payer: COMMERCIAL

## 2025-07-21 VITALS
WEIGHT: 243 LBS | RESPIRATION RATE: 16 BRPM | SYSTOLIC BLOOD PRESSURE: 144 MMHG | BODY MASS INDEX: 32.91 KG/M2 | OXYGEN SATURATION: 93 % | TEMPERATURE: 97.3 F | HEIGHT: 72 IN | HEART RATE: 62 BPM | DIASTOLIC BLOOD PRESSURE: 73 MMHG

## 2025-07-21 DIAGNOSIS — S22.069D CLOSED FRACTURE OF SEVENTH THORACIC VERTEBRA WITH ROUTINE HEALING, UNSPECIFIED FRACTURE MORPHOLOGY, SUBSEQUENT ENCOUNTER: Primary | ICD-10-CM

## 2025-07-21 DIAGNOSIS — E11.40 CONTROLLED TYPE 2 DIABETES WITH NEUROPATHY (H): ICD-10-CM

## 2025-07-21 DIAGNOSIS — I10 ESSENTIAL HYPERTENSION: ICD-10-CM

## 2025-07-21 DIAGNOSIS — G25.81 RESTLESS LEG SYNDROME: ICD-10-CM

## 2025-07-21 DIAGNOSIS — D68.51 FACTOR V LEIDEN MUTATION: ICD-10-CM

## 2025-07-21 PROBLEM — E11.9 TYPE 2 DIABETES MELLITUS WITHOUT COMPLICATIONS (H): Status: RESOLVED | Noted: 2025-06-27 | Resolved: 2025-07-21

## 2025-07-21 PROBLEM — J18.9 PNEUMONIA OF RIGHT LUNG DUE TO INFECTIOUS ORGANISM, UNSPECIFIED PART OF LUNG: Status: RESOLVED | Noted: 2025-07-01 | Resolved: 2025-07-21

## 2025-07-21 PROBLEM — A41.9 SEPSIS, DUE TO UNSPECIFIED ORGANISM, UNSPECIFIED WHETHER ACUTE ORGAN DYSFUNCTION PRESENT (H): Status: RESOLVED | Noted: 2025-07-01 | Resolved: 2025-07-21
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
## 2025-07-21 PROBLEM — R07.89 ACUTE CHEST WALL PAIN: Status: RESOLVED | Noted: 2025-06-23 | Resolved: 2025-07-21

## 2025-07-21 PROBLEM — N18.32 STAGE 3B CHRONIC KIDNEY DISEASE (H): Status: RESOLVED | Noted: 2024-06-27 | Resolved: 2025-07-21

## 2025-07-21 PROBLEM — R09.02 HYPOXIA: Status: RESOLVED | Noted: 2025-07-01 | Resolved: 2025-07-21

## 2025-07-21 PROBLEM — G93.41 METABOLIC ENCEPHALOPATHY: Status: RESOLVED | Noted: 2025-07-01 | Resolved: 2025-07-21

## 2025-07-21 PROBLEM — H93.19 SUBJECTIVE TINNITUS: Status: RESOLVED | Noted: 2021-06-09 | Resolved: 2025-07-21
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
## 2025-07-21 PROCEDURE — 99305 1ST NF CARE MODERATE MDM 35: CPT | Performed by: FAMILY MEDICINE

## 2025-07-21 ASSESSMENT — ENCOUNTER SYMPTOMS
NAUSEA: 0
CONSTIPATION: 0
BACK PAIN: 1
ABDOMINAL PAIN: 0
HEADACHES: 0
FEVER: 0
CHILLS: 0
APPETITE CHANGE: 0
DIARRHEA: 0
COUGH: 0
SHORTNESS OF BREATH: 0

## 2025-07-21 NOTE — PROGRESS NOTES
HISTORY OF PRESENT ILLNESS:  Robert is a 89 year old male, (1935), is being seen today at [facility] following hospitalization at [facility] from 7/1/2025 through 7/6/2025.  This was after he was admitted to TCU from prior admission.  That admission was at [facility] from 6/21 through 6/26/2025.    Robert Richard is a 89 year old male with a medical history of diabetes mellitus type 2, essential hypertension, CKD stage IIIb, who was recently hospitalized at [facility] from May 21 to May 26 after a fall that caused a T7 fracture, pneumomediastinum and small right pneumothorax, and was discharged to [facility] TCU.  Patient was brought in to the emergency department via EMS on 7/1/2025 after he was found on the ground by staff in early morning hours and he was noted to have a saturation of 86% on room air.  Workup in the emergency room notable for sepsis with suspected pneumonia in the right lower lung base with acute encephalopathy.  He was placed on Rocephin and Vanco with clinical improvement noted and labs improving.  Patient discharged back to [facility] TCU. Repeat blood cultures are negative so far, will discharge on Augmentin 875/125 mg for 10 day total antibiotic completion course     Today patient states he is doing well.  I watched him walk with his wheeled walker with physical therapy.  He is wearing his TLSO brace.  He is also wearing his lymphedema wraps.  He feels his pain is controlled.  He tells me he has a positive attitude in life and feels that this will help him with his rehab.  He is on warfarin chronically secondary to prior history of DVT and factor V mutation.          Past Medical History/  Patient Active Problem List    Diagnosis Date Noted    Fall, subsequent encounter 06/30/2025     Priority: Medium    Obstructive sleep apnea of adult 06/27/2025     Priority: Medium    Subjective tinnitus of both ears  06/27/2025     Priority: Medium    Primary insomnia 06/23/2025     Priority: Medium    Closed fracture of seventh thoracic vertebra (H) 06/21/2025     Priority: Medium    Urine retention 06/06/2024     Priority: Medium    Spondylolisthesis of lumbar region 06/03/2024     Priority: Medium    Generalized muscle weakness 06/03/2024     Priority: Medium    Anemia, unspecified type 06/03/2024     Priority: Medium    History of deep venous thrombosis (DVT) of distal vein of left lower extremity 06/03/2024     Priority: Medium    S/P lumbar fusion 05/28/2024     Priority: Medium    Asymmetrical sensorineural hearing loss 06/09/2021     Priority: Medium    Venous stasis dermatitis of right lower extremity 06/09/2021     Priority: Medium    Stage 3 chronic kidney disease, unspecified whether stage 3a or 3b CKD (H) 06/20/2019     Priority: Medium    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) 10/12/2018     Priority: Medium    Essential hypertension 09/06/2016     Priority: Medium    Long term current use of anticoagulant therapy 03/21/2016     Priority: Medium    Hemifacial spasm 09/17/2014     Priority: Medium    Hip joint replacement status - right 01/21/2014     Priority: Medium    Abnormal gait 01/21/2014     Priority: Medium    Class 1 obesity due to excess calories with serious comorbidity and body mass index (BMI) of 32.0 to 32.9 in adult 01/21/2014     Priority: Medium    Personal history of prostate cancer 01/21/2014     Priority: Medium    Malignant basal cell neoplasm of skin 07/24/2012     Priority: Medium     Do you wish to do the replacement in the background? yes        Gout 07/03/2012     Priority: Medium    Hyperlipidemia LDL goal <100 09/27/2011     Priority: Medium    Factor V Leiden mutation 09/08/2011     Priority: Medium    Prothrombin mutation 09/08/2011     Priority: Medium     [id number]      Restless leg syndrome 10/20/2010     Priority: Medium       Past Surgical History:   Procedure Laterality  Date    APPENDECTOMY  1950    BIOPSY  May 2003    prostate    COLONOSCOPY      [facility]    EYE SURGERY  april 2016    [facility]    FUSION TRANSFORAMINAL LUMBAR INTERBODY, 1 LEVEL, POSTERIOR APPROACH, USING OTS SURG IMAGING GUIDANCE Bilateral 05/28/2024    Procedure: Lumbar 4 to Lumbar 5 Transforaminal Interbody Fusion with decompression of stenosis;  Surgeon: Cj Cleary MD;  Location:  OR    HEAD & NECK SURGERY      [facility]    JOINT REPLACEMENT  04/2011    R hip    LAMINECTOMY LUMBAR ONE LEVEL  12/2008    LAMINECTOMY LUMBAR ONE LEVEL Bilateral 05/28/2024    Procedure: Lumbar 2 to Lumbar 3 bilateral laminectomy for decompression of stenosis;  Surgeon: Cj Cleary MD;  Location:  OR    LAPAROSCOPIC HERNIORRHAPHY UMBILICAL N/A 01/03/2019    Procedure: laparoscopic umbilical hernia repair with mesh;  Surgeon: Jamal Thompson DO;  Location:  OR    [facility] REVISE TOTAL HIP REPLACEMENT  01/18/2013    R hip       Family History   Problem Relation Age of Onset    Prostate Cancer Paternal Grandfather     Prostate Cancer Maternal Grandfather     Breast Cancer Sister     Colon Cancer Sister     Breast Cancer Sister     Colon Cancer Sister         Social History     Tobacco Use    Smoking status: Never    Smokeless tobacco: Never   Substance Use Topics    Alcohol use: Yes     Alcohol/week: 0.0 standard drinks of alcohol     Comment: 1 drink every 1-2 weeks.        Current Outpatient Medications   Medication Sig Dispense Refill    acetaminophen (TYLENOL) 500 MG tablet Take 2 tablets (1,000 mg) by mouth 3 times daily as needed for mild pain.      albuterol (PROAIR HFA/PROVENTIL HFA/VENTOLIN HFA) 108 (90 Base) MCG/ACT inhaler Inhale 2 puffs into the lungs every 4 hours as needed for shortness of breath, wheezing or cough.      albuterol (PROVENTIL) (2.5 MG/3ML) 0.083% neb solution Take 2.5 mg by nebulization every 6 hours as needed for shortness of breath, wheezing or cough.      atenolol  (TENORMIN) 50 MG tablet Take 1 tablet by mouth twice daily 180 tablet 3    gabapentin (NEURONTIN) 100 MG capsule Take 1 capsule (100 mg) by mouth at bedtime for 7 days, THEN 2 capsules (200 mg) at bedtime for 7 days, THEN 3 capsules (300 mg) at bedtime for 14 days. 0800, 1200 & 2000. 63 capsule 0    glipiZIDE (GLUCOTROL XL) 10 MG 24 hr tablet Take 1 tablet (10 mg) by mouth daily (with breakfast).      ipratropium (ATROVENT) 0.06 % nasal spray Spray 2 sprays into both nostrils 4 times daily as needed for rhinitis.      losartan (COZAAR) 100 MG tablet Take 1 tablet by mouth once daily 90 tablet 0    multivitamin w/minerals (THERA-VIT-M) tablet Take 1 tablet by mouth daily.      pramipexole (MIRAPEX) 1 MG tablet Take 1 mg by mouth 2 times daily. At 1600 & 2000      sennosides (SENOKOT) 8.6 MG tablet Take 2 tablets by mouth 2 times daily as needed for constipation.      spironolactone (ALDACTONE) 25 MG tablet Take 50 mg by mouth daily.      warfarin ANTICOAGULANT (COUMADIN/JANTOVEN) 2.5 MG tablet Warfarin 2.5 mg on Sun/Tue/Thu and Warfarin 5 mg on Mon/Wed/Fri/Sat       No current facility-administered medications for this visit.       Allergies   Allergen Reactions    Statins Muscle Pain (Myalgia)     Muscle aches  Lipitor/atorvastatin, pravastatin, simvastatin    Gabapentin Dizziness     Neurontin       Information reviewed:  Medications, vital signs, orders, and nursing notes.    Review of Systems   Constitutional:  Negative for appetite change, chills and fever.   Respiratory:  Negative for cough and shortness of breath.    Cardiovascular:  Positive for peripheral edema. Negative for chest pain.   Gastrointestinal:  Negative for abdominal pain, constipation, diarrhea and nausea.   Musculoskeletal:  Positive for back pain.   Skin:  Negative for rash.   Neurological:  Negative for headaches.        OBJECTIVE:  BP (!) 144/73   Pulse 62   Temp 97.3  F (36.3  C)   Resp 16   Ht 1.829 m (6')   Wt 110.2 kg (243 lb)    SpO2 93%   BMI 32.96 kg/m    Physical Exam  Constitutional:       General: He is not in acute distress.  Cardiovascular:      Rate and Rhythm: Normal rate and regular rhythm.      Heart sounds: No murmur heard.  Pulmonary:      Effort: Pulmonary effort is normal. No respiratory distress.      Breath sounds: Normal breath sounds. No wheezing or rales.   Abdominal:      General: Bowel sounds are normal. There is no distension.      Palpations: Abdomen is soft.      Tenderness: There is no abdominal tenderness.   Musculoskeletal:      Cervical back: Normal range of motion.      Right lower leg: Edema present.      Left lower leg: Edema present.   Skin:     General: Skin is warm and dry.      Findings: No rash.   Neurological:      Mental Status: He is alert.      Sensory: Sensory deficit present.   Psychiatric:         Mood and Affect: Mood normal.         Behavior: Behavior normal.         Thought Content: Thought content normal.         Judgment: Judgment normal.          ASSESSMENT/PLAN:    Closed fracture of seventh thoracic vertebra (H)  He continues to wear his TLSO when he is out of bed.  He follows up with spine specialist on 7/31/2025.  He feels his pain is controlled.  He feels that physical therapy and Occupational Therapy have been helpful.    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  His glipizide was increased to 10 mg daily.    Essential hypertension  Blood pressures mainly controlled on atenolol, spironolactone and losartan.    Factor V Leiden mutation  Continues on warfarin.  He has a history of DVT and known factor V Leiden mutation.    Restless leg syndrome  Worse if he sits in certain chairs that hit his posterior thighs and with certain position.  He does feel that Mirapex and gabapentin have been helpful.           Cristiane Marquez MD

## 2025-07-21 NOTE — ASSESSMENT & PLAN NOTE
He continues to wear his TLSO when he is out of bed.  He follows up with spine specialist on 7/31/2025.  He feels his pain is controlled.  He feels that physical therapy and Occupational Therapy have been helpful.

## 2025-07-21 NOTE — ASSESSMENT & PLAN NOTE
Worse if he sits in certain chairs that hit his posterior thighs and with certain position.  He does feel that Mirapex and gabapentin have been helpful.

## 2025-07-24 ENCOUNTER — DISCHARGE SUMMARY NURSING HOME (OUTPATIENT)
Dept: GERIATRICS | Facility: CLINIC | Age: 89
End: 2025-07-24
Payer: COMMERCIAL

## 2025-07-24 VITALS
RESPIRATION RATE: 20 BRPM | WEIGHT: 243 LBS | BODY MASS INDEX: 32.96 KG/M2 | HEART RATE: 59 BPM | SYSTOLIC BLOOD PRESSURE: 150 MMHG | OXYGEN SATURATION: 92 % | DIASTOLIC BLOOD PRESSURE: 83 MMHG | TEMPERATURE: 97.3 F

## 2025-07-24 DIAGNOSIS — Z86.718 HISTORY OF DEEP VENOUS THROMBOSIS (DVT) OF DISTAL VEIN OF LEFT LOWER EXTREMITY: ICD-10-CM

## 2025-07-24 DIAGNOSIS — N18.32 STAGE 3B CHRONIC KIDNEY DISEASE (H): ICD-10-CM

## 2025-07-24 DIAGNOSIS — I87.2 VENOUS STASIS DERMATITIS OF RIGHT LOWER EXTREMITY: ICD-10-CM

## 2025-07-24 DIAGNOSIS — I10 ESSENTIAL HYPERTENSION: ICD-10-CM

## 2025-07-24 DIAGNOSIS — E11.40 CONTROLLED TYPE 2 DIABETES WITH NEUROPATHY (H): ICD-10-CM

## 2025-07-24 DIAGNOSIS — S22.060D CLOSED WEDGE COMPRESSION FRACTURE OF T7 VERTEBRA WITH ROUTINE HEALING, SUBSEQUENT ENCOUNTER: Primary | ICD-10-CM
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
## 2025-07-24 NOTE — LETTER
7/24/2025      Robert Richard  [address] [place] [facility] [address]        [facility] GERIATRICS DISCHARGE SUMMARY  PATIENT'S NAME: Robert Richard  YOB: 1935  MEDICAL RECORD NUMBER:  [id number]  Place of Service where encounter took place:  [facility] ([facility]) [8]    PRIMARY CARE PROVIDER AND CLINIC RESPONSIBLE AFTER TRANSFER:   Stiven Donahue MD, [address] [facility] [address]    [facility]     Transferring providers:  SKIP Rucker CNP  Recent Hospitalization/ED:  [facility] stay 6/21/2025 to 6/26/2025.  Date of SNF Admission: June 26, 2025  Date of SNF (anticipated) Discharge: July 25, 2025  Discharged to: previous independent home  Cognitive Scores: NA  Physical Function: Pivot transfers  DME: Walker    CODE STATUS/ADVANCE DIRECTIVES DISCUSSION:  No CPR- Do NOT Intubate   ALLERGIES: Statins and Gabapentin    NURSING FACILITY COURSE   Medication Changes/Rationale:   See notes    Summary of nursing facility stay:      Closed wedge compression fracture of T7 vertebra with routine healing, subsequent encounter  Fall, subsequent encounter  Stage 3b chronic kidney disease (H)  History of deep venous thrombosis (DVT) of distal vein of left lower extremity  Essential hypertension  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)  Venous stasis dermatitis of right lower extremity    (S22.060D) Closed wedge compression fracture of T7 vertebra with routine healing, subsequent encounter  (primary encounter diagnosis)  (W19.XXXD) Fall, subsequent encounter  Comment: pain controlled, ambulatory  Plan:   -PT/OT eval and treat  -wear TLSO when OOB  -change APAP to TID  -start gabapentin 300mg TID  -discontinue lidocaine and robaxin  -discontinue insulin  -DC oxycodone PRN  -CBC, BMP, A1C on 7/2     (N18.32) Stage 3b chronic kidney disease (H)  Comment: creat 1.67, GFR 39  Plan:   -dose meds renally  -BMP on 7/2     (Z86.718) History of  deep venous thrombosis (DVT) of distal vein of left lower extremity  Comment:  INR 1.8  Plan:  -warfarin 4mg daily  -recheck INR on 7/31     (I10) Essential hypertension  Comment: SBP's WNL  Plan:   -continue losartan, spironolactone, atenolol  -daily vitals     (E11.40) 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)  Comment: recent A1C 8.2  Plan:   -discontinue sliding scale, does not want insulin  -discontinue BG checks  -glipizide change to 10mg  -A1C on 7/2     (I87.2) Venous stasis dermatitis of right lower extremity  Comment: leg edema 2-3+, weeping, slough  Plan:   -continue spironolactone  -elevate legs as possible  -daily cleanse, adaptic, kerlix and ACE wrap  -lymph eval pending    Discharge Medications:  MED REC REQUIRED  Post Medication Reconciliation Status: medication reconcilation previously completed during another office visit       Current Outpatient Medications   Medication Sig Dispense Refill     acetaminophen (TYLENOL) 500 MG tablet Take 2 tablets (1,000 mg) by mouth 3 times daily as needed for mild pain.       albuterol (PROAIR HFA/PROVENTIL HFA/VENTOLIN HFA) 108 (90 Base) MCG/ACT inhaler Inhale 2 puffs into the lungs every 4 hours as needed for shortness of breath, wheezing or cough.       albuterol (PROVENTIL) (2.5 MG/3ML) 0.083% neb solution Take 2.5 mg by nebulization every 6 hours as needed for shortness of breath, wheezing or cough.       atenolol (TENORMIN) 50 MG tablet Take 1 tablet by mouth twice daily 180 tablet 3     gabapentin (NEURONTIN) 100 MG capsule Take 1 capsule (100 mg) by mouth at bedtime for 7 days, THEN 2 capsules (200 mg) at bedtime for 7 days, THEN 3 capsules (300 mg) at bedtime for 14 days. 0800, 1200 & 2000. 63 capsule 0     glipiZIDE (GLUCOTROL XL) 10 MG 24 hr tablet Take 1 tablet (10 mg) by mouth daily (with breakfast).       ipratropium (ATROVENT) 0.06 % nasal spray Spray 2 sprays into both nostrils 4 times daily as needed for rhinitis.       losartan (COZAAR) 100  MG tablet Take 1 tablet by mouth once daily 90 tablet 0     multivitamin w/minerals (THERA-VIT-M) tablet Take 1 tablet by mouth daily.       pramipexole (MIRAPEX) 1 MG tablet Take 1 mg by mouth 2 times daily. At 1600 & 2000       sennosides (SENOKOT) 8.6 MG tablet Take 2 tablets by mouth 2 times daily as needed for constipation.       spironolactone (ALDACTONE) 25 MG tablet Take 50 mg by mouth daily.       warfarin ANTICOAGULANT (COUMADIN/JANTOVEN) 2.5 MG tablet Warfarin 2.5 mg on Sun/Tue/Thu and Warfarin 5 mg on Mon/Wed/Fri/Sat            Controlled medications:   not applicable/none     Past Medical History:   Past Medical History:   Diagnosis Date     Basal cell carcinoma      Cancer (H)      DVT (deep venous thrombosis) (H) 11/2003     DVT (deep venous thrombosis) (H) 12/2008     DVT (deep venous thrombosis) (H) 10/2010     Factor V Leiden      Gout      Other and unspecified hyperlipidemia      Prostate cancer (H) 2003    prostatectomy      Restless leg syndrome      Sleep apnea      Unspecified essential hypertension      Physical Exam:   Vitals: BP (!) 150/83   Pulse 59   Temp 97.3  F (36.3  C)   Resp 20   Wt 110.2 kg (243 lb)   SpO2 92%   BMI 32.96 kg/m    BMI: Body mass index is 32.96 kg/m .  GENERAL APPEARANCE:  in no distress, appears healthy  ENT:  Mouth and posterior oropharynx normal, moist mucous membranes  RESP:  lungs clear to auscultation , no respiratory distress  CV:  peripheral edema 1+ in lower legs, rate-normal  ABDOMEN:  bowel sounds normal  M/S:   Gait and station abnormal unsteady  SKIN:  Inspection of skin and subcutaneous tissue baseline  NEURO:   Examination of sensation by touch normal  PSYCH:  affect and mood normal     SNF labs: Labs done in SNF are in [facility] EPIC. Please refer to them using [facility] Everywhere.    DISCHARGE PLAN:  Follow up labs: INR due 7/31 (on or before is OK)  Medical Follow Up:      Follow up with primary care provider in 1 weeks  Current [facility]  scheduled appointments:  Appointments in Next Year      Jul 24, 2025 7:30 AM  Discharge Summary with SKIP Maxwell CNP  [facility] ([facility] ) [phone]           Discharge Services: Home Care:  Occupational Therapy, Physical Therapy, Registered Nurse, and Home Health Aide  Discharge Instructions Verbalized to Patient at Discharge:   None    TOTAL DISCHARGE TIME:   Greater than 30 minutes  Electronically signed by:  SKIP Maxwell CNP         Documentation of Face-to-Face and Certification for Home Health Services     Patient: Robert Richard   YOB: 1935  MR Number: [id number]  Today's Date: 7/24/2025    I certify that patient: Robert Richard is under my care and that I, or a nurse practitioner or physician's assistant working with me, had a face-to-face encounter that meets the physician face-to-face encounter requirements with this patient on: 7/24/2025.    This encounter with the patient was in whole, or in part, for the following medical condition, which is the primary reason for home health care:   1. Closed wedge compression fracture of T7 vertebra with routine healing, subsequent encounter    2. Fall, subsequent encounter    3. Stage 3b chronic kidney disease (H)    4. History of deep venous thrombosis (DVT) of distal vein of left lower extremity    5. Essential hypertension    6. 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 Venous stasis dermatitis of right lower extremity    .    I certify that, based on my findings, the following services are medically necessary home health services: Nursing, Occupational Therapy, Physical Therapy, and HHA.    My clinical findings support the need for the above services because: Nurse is needed: For complex aftercare of surgical procedures because the patient needs instruction and cannot perform care on their own due to: med teaching.., Occupational Therapy  Services are needed to assess and treat cognitive ability and address ADL safety due to impairment in transfer., Physical Therapy Services are needed to assess and treat the following functional impairments: gait., and HHA for bath.    Further, I certify that my clinical findings support that this patient is homebound (i.e. absences from home require considerable and taxing effort and are for medical reasons or [employer] services or infrequently or of short duration when for other reasons) because: Requires assistance of another person or specialized equipment to access medical services because patient: Is unable to operate assistive equipment on their own...    Based on the above findings. I certify that this patient is confined to the home and needs intermittent skilled nursing care, physical therapy and/or speech therapy.  The patient is under my care, and I have initiated the establishment of the plan of care.  This patient will be followed by a physician who will periodically review the plan of care.  Physician/Provider to provide follow up care: Stiven Donahue    Responsible Medicare certified PECFERNY Physician: Jay Rojas NP  Electronic Physician Signature  Date: 7/24/2025                 Sincerely,        SKIP Maxwell CNP    Electronically signed

## 2025-07-24 NOTE — PROGRESS NOTES
[facility] GERIATRICS DISCHARGE SUMMARY  PATIENT'S NAME: Robert Richard  YOB: 1935  MEDICAL RECORD NUMBER:  [id number]  Place of Service where encounter took place:  [facility], [place] ([facility]) [8]    PRIMARY CARE PROVIDER AND CLINIC RESPONSIBLE AFTER TRANSFER:   Stiven Donahue MD, [address] [facility] / [facility] [id number]    [facility] [place]     Transferring providers:  SKIP Rucker CNP  Recent Hospitalization/ED:  [facility] stay 6/21/2025 to 6/26/2025.  Date of SNF Admission: June 26, 2025  Date of SNF (anticipated) Discharge: July 25, 2025  Discharged to: previous independent home  Cognitive Scores: NA  Physical Function: Pivot transfers  DME: Walker    CODE STATUS/ADVANCE DIRECTIVES DISCUSSION:  No CPR- Do NOT Intubate   ALLERGIES: Statins and Gabapentin    NURSING FACILITY COURSE   Medication Changes/Rationale:   See notes    Summary of nursing facility stay:      Closed wedge compression fracture of T7 vertebra with routine healing, subsequent encounter  Fall, subsequent encounter  Stage 3b chronic kidney disease (H)  History of deep venous thrombosis (DVT) of distal vein of left lower extremity  Essential hypertension  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)  Venous stasis dermatitis of right lower extremity    (S22.060D) Closed wedge compression fracture of T7 vertebra with routine healing, subsequent encounter  (primary encounter diagnosis)  (W19.XXXD) Fall, subsequent encounter  Comment: pain controlled, ambulatory  Plan:   -PT/OT eval and treat  -wear TLSO when OOB  -change APAP to TID  -start gabapentin 300mg TID  -discontinue lidocaine and robaxin  -discontinue insulin  -DC oxycodone PRN  -CBC, BMP, A1C on 7/2     (N18.32) Stage 3b chronic kidney disease (H)  Comment: creat 1.67, GFR 39  Plan:   -dose meds renally  -BMP on 7/2     (Z86.718) History of deep venous thrombosis (DVT) of distal vein of left lower extremity  Comment:   INR 1.8  Plan:  -warfarin 4mg daily  -recheck INR on 7/31     (I10) Essential hypertension  Comment: SBP's WNL  Plan:   -continue losartan, spironolactone, atenolol  -daily vitals     (E11.40) 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)  Comment: recent A1C 8.2  Plan:   -discontinue sliding scale, does not want insulin  -discontinue BG checks  -glipizide change to 10mg  -A1C on 7/2     (I87.2) Venous stasis dermatitis of right lower extremity  Comment: leg edema 2-3+, weeping, slough  Plan:   -continue spironolactone  -elevate legs as possible  -daily cleanse, adaptic, kerlix and ACE wrap  -lymph eval pending    Discharge Medications:  MED REC REQUIRED  Post Medication Reconciliation Status: medication reconcilation previously completed during another office visit       Current Outpatient Medications   Medication Sig Dispense Refill    acetaminophen (TYLENOL) 500 MG tablet Take 2 tablets (1,000 mg) by mouth 3 times daily as needed for mild pain.      albuterol (PROAIR HFA/PROVENTIL HFA/VENTOLIN HFA) 108 (90 Base) MCG/ACT inhaler Inhale 2 puffs into the lungs every 4 hours as needed for shortness of breath, wheezing or cough.      albuterol (PROVENTIL) (2.5 MG/3ML) 0.083% neb solution Take 2.5 mg by nebulization every 6 hours as needed for shortness of breath, wheezing or cough.      atenolol (TENORMIN) 50 MG tablet Take 1 tablet by mouth twice daily 180 tablet 3    gabapentin (NEURONTIN) 100 MG capsule Take 1 capsule (100 mg) by mouth at bedtime for 7 days, THEN 2 capsules (200 mg) at bedtime for 7 days, THEN 3 capsules (300 mg) at bedtime for 14 days. 0800, 1200 & 2000. 63 capsule 0    glipiZIDE (GLUCOTROL XL) 10 MG 24 hr tablet Take 1 tablet (10 mg) by mouth daily (with breakfast).      ipratropium (ATROVENT) 0.06 % nasal spray Spray 2 sprays into both nostrils 4 times daily as needed for rhinitis.      losartan (COZAAR) 100 MG tablet Take 1 tablet by mouth once daily 90 tablet 0    multivitamin w/minerals  (THERA-VIT-M) tablet Take 1 tablet by mouth daily.      pramipexole (MIRAPEX) 1 MG tablet Take 1 mg by mouth 2 times daily. At 1600 & 2000      sennosides (SENOKOT) 8.6 MG tablet Take 2 tablets by mouth 2 times daily as needed for constipation.      spironolactone (ALDACTONE) 25 MG tablet Take 50 mg by mouth daily.      warfarin ANTICOAGULANT (COUMADIN/JANTOVEN) 2.5 MG tablet Warfarin 2.5 mg on Sun/Tue/Thu and Warfarin 5 mg on Mon/Wed/Fri/Sat            Controlled medications:   not applicable/none     Past Medical History:   Past Medical History:   Diagnosis Date    Basal cell carcinoma     Cancer (H)     DVT (deep venous thrombosis) (H) 11/2003    DVT (deep venous thrombosis) (H) 12/2008    DVT (deep venous thrombosis) (H) 10/2010    Factor V Leiden     Gout     Other and unspecified hyperlipidemia     Prostate cancer (H) 2003    prostatectomy     Restless leg syndrome     Sleep apnea     Unspecified essential hypertension      Physical Exam:   Vitals: BP (!) 150/83   Pulse 59   Temp 97.3  F (36.3  C)   Resp 20   Wt 110.2 kg (243 lb)   SpO2 92%   BMI 32.96 kg/m    BMI: Body mass index is 32.96 kg/m .  GENERAL APPEARANCE:  in no distress, appears healthy  ENT:  Mouth and posterior oropharynx normal, moist mucous membranes  RESP:  lungs clear to auscultation , no respiratory distress  CV:  peripheral edema 1+ in lower legs, rate-normal  ABDOMEN:  bowel sounds normal  M/S:   Gait and station abnormal unsteady  SKIN:  Inspection of skin and subcutaneous tissue baseline  NEURO:   Examination of sensation by touch normal  PSYCH:  affect and mood normal     SNF labs: Labs done in SNF are in [facility]. Please refer to them using [employer] Everywhere.    DISCHARGE PLAN:  Follow up labs: INR due 7/31 (on or before is OK)  Medical Follow Up:      Follow up with primary care provider in 1 weeks  Current [facility] scheduled appointments:  Appointments in Next Year      Jul 24, 2025 7:30 AM  Discharge Summary with  SKIP Maxwell CNP  [facility] ([facility] ) [phone]           Discharge Services: Home Care:  Occupational Therapy, Physical Therapy, Registered Nurse, and Home Health Aide  Discharge Instructions Verbalized to Patient at Discharge:   None    TOTAL DISCHARGE TIME:   Greater than 30 minutes  Electronically signed by:  SKIP Maxwell CNP         Documentation of Face-to-Face and Certification for Home Health Services     Patient: Robert Richard   YOB: 1935  MR Number: [id number]  Today's Date: 7/24/2025    I certify that patient: Robert Richard is under my care and that I, or a nurse practitioner or physician's assistant working with me, had a face-to-face encounter that meets the physician face-to-face encounter requirements with this patient on: 7/24/2025.    This encounter with the patient was in whole, or in part, for the following medical condition, which is the primary reason for home health care:   1. Closed wedge compression fracture of T7 vertebra with routine healing, subsequent encounter    2. Fall, subsequent encounter    3. Stage 3b chronic kidney disease (H)    4. History of deep venous thrombosis (DVT) of distal vein of left lower extremity    5. Essential hypertension    6. 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 Venous stasis dermatitis of right lower extremity    .    I certify that, based on my findings, the following services are medically necessary home health services: Nursing, Occupational Therapy, Physical Therapy, and HHA.    My clinical findings support the need for the above services because: Nurse is needed: For complex aftercare of surgical procedures because the patient needs instruction and cannot perform care on their own due to: med teaching.., Occupational Therapy Services are needed to assess and treat cognitive ability and address ADL safety due to impairment in  transfer., Physical Therapy Services are needed to assess and treat the following functional impairments: gait., and HHA for bath.    Further, I certify that my clinical findings support that this patient is homebound (i.e. absences from home require considerable and taxing effort and are for medical reasons or [employer] services or infrequently or of short duration when for other reasons) because: Requires assistance of another person or specialized equipment to access medical services because patient: Is unable to operate assistive equipment on their own...    Based on the above findings. I certify that this patient is confined to the home and needs intermittent skilled nursing care, physical therapy and/or speech therapy.  The patient is under my care, and I have initiated the establishment of the plan of care.  This patient will be followed by a physician who will periodically review the plan of care.  Physician/Provider to provide follow up care: Stiven Donahue    Responsible Medicare certified [place] Physician: Jay Rojas NP  Electronic Physician Signature  Date: 7/24/2025

## 2025-07-30 ENCOUNTER — TELEPHONE (OUTPATIENT)
Dept: INTERNAL MEDICINE | Facility: CLINIC | Age: 89
End: 2025-07-30
Payer: COMMERCIAL

## 2025-07-30 NOTE — TELEPHONE ENCOUNTER
LM with Jazlyn that an INR recheck would be ok Friday instead of Thursday and to just continue Carlos's discharge INR dosing orders. I gave her [facility] line number to call on Friday as well.     Chantell Boston RN, BSN  [facility] Anticoagulation Team

## 2025-07-30 NOTE — TELEPHONE ENCOUNTER
Home Care is calling regarding an established patient with [facility].  Requesting orders from: Stiven Donahue RN APPROVED: RN able to provide verbal orders.  Home Care will send orders for signature.  RN will close encounter.  Is this a request for a temporary pause in the home care episode?  No    Orders Requested    Skilled Nursing  Request for initial certification (first set of orders)   Frequency: 2 x per week for 8 weeks.  RN gave verbal order: Yes      Phone number Home Care can be reached at: [phone], TITUS [place]  Okay to leave a detailed message?: Yes      Tori Alberts, RN

## 2025-07-30 NOTE — TELEPHONE ENCOUNTER
Home Care calling inquiring if patients scheduled INR recheck can be changed from Thursday to Friday as he will not be around on Thursday due to scheduled tests outside of a [facility] facility.    Routing to INR team to advise    Tori Alberts RN, BSN

## 2025-08-04 ENCOUNTER — PATIENT OUTREACH (OUTPATIENT)
Dept: CARE COORDINATION | Facility: CLINIC | Age: 89
End: 2025-08-04
Payer: COMMERCIAL

## 2025-08-05 ENCOUNTER — ANTICOAGULATION THERAPY VISIT (OUTPATIENT)
Dept: ANTICOAGULATION | Facility: CLINIC | Age: 89
End: 2025-08-05
Payer: COMMERCIAL

## 2025-08-05 DIAGNOSIS — Z86.718 HISTORY OF DEEP VENOUS THROMBOSIS (DVT) OF DISTAL VEIN OF LEFT LOWER EXTREMITY: ICD-10-CM

## 2025-08-05 DIAGNOSIS — D68.51 FACTOR V LEIDEN MUTATION: Primary | ICD-10-CM

## 2025-08-05 DIAGNOSIS — D68.52 PROTHROMBIN MUTATION: ICD-10-CM

## 2025-08-05 DIAGNOSIS — Z79.01 LONG TERM CURRENT USE OF ANTICOAGULANT THERAPY: ICD-10-CM

## 2025-08-05 LAB — INR (EXTERNAL): 3.3

## 2025-08-08 ENCOUNTER — TELEPHONE (OUTPATIENT)
Dept: INTERNAL MEDICINE | Facility: CLINIC | Age: 89
End: 2025-08-08
Payer: COMMERCIAL

## 2025-08-11 DIAGNOSIS — Z53.9 DIAGNOSIS NOT YET DEFINED: Primary | ICD-10-CM

## 2025-08-11 PROCEDURE — G0180 MD CERTIFICATION HHA PATIENT: HCPCS | Performed by: INTERNAL MEDICINE

## 2025-08-12 ENCOUNTER — ANTICOAGULATION THERAPY VISIT (OUTPATIENT)
Dept: ANTICOAGULATION | Facility: CLINIC | Age: 89
End: 2025-08-12
Payer: COMMERCIAL

## 2025-08-12 DIAGNOSIS — D68.51 FACTOR V LEIDEN MUTATION: Primary | ICD-10-CM

## 2025-08-12 DIAGNOSIS — Z79.01 LONG TERM CURRENT USE OF ANTICOAGULANT THERAPY: ICD-10-CM

## 2025-08-12 DIAGNOSIS — D68.52 PROTHROMBIN MUTATION: ICD-10-CM

## 2025-08-12 DIAGNOSIS — F51.01 PRIMARY INSOMNIA: ICD-10-CM

## 2025-08-12 DIAGNOSIS — M48.061 SPINAL STENOSIS OF LUMBAR REGION WITHOUT NEUROGENIC CLAUDICATION: ICD-10-CM

## 2025-08-12 DIAGNOSIS — Z86.718 HISTORY OF DEEP VENOUS THROMBOSIS (DVT) OF DISTAL VEIN OF LEFT LOWER EXTREMITY: ICD-10-CM

## 2025-08-12 LAB — INR (EXTERNAL): 1.7 (ref 0.9–1.1)

## 2025-08-12 RX ORDER — GABAPENTIN 300 MG/1
300 CAPSULE ORAL AT BEDTIME
Qty: 90 CAPSULE | Refills: 1 | Status: SHIPPED | OUTPATIENT
Start: 2025-08-12

## 2025-08-19 ENCOUNTER — ANTICOAGULATION THERAPY VISIT (OUTPATIENT)
Dept: ANTICOAGULATION | Facility: CLINIC | Age: 89
End: 2025-08-19
Payer: COMMERCIAL

## 2025-08-19 DIAGNOSIS — Z79.01 LONG TERM CURRENT USE OF ANTICOAGULANT THERAPY: ICD-10-CM

## 2025-08-19 DIAGNOSIS — D68.51 FACTOR V LEIDEN MUTATION: Primary | ICD-10-CM

## 2025-08-19 DIAGNOSIS — D68.52 PROTHROMBIN MUTATION: ICD-10-CM

## 2025-08-19 DIAGNOSIS — Z86.718 HISTORY OF DEEP VENOUS THROMBOSIS (DVT) OF DISTAL VEIN OF LEFT LOWER EXTREMITY: ICD-10-CM

## 2025-08-19 LAB — INR (EXTERNAL): 1.6

## 2025-08-25 ENCOUNTER — ANTICOAGULATION THERAPY VISIT (OUTPATIENT)
Dept: ANTICOAGULATION | Facility: CLINIC | Age: 89
End: 2025-08-25
Payer: COMMERCIAL

## 2025-08-25 DIAGNOSIS — D68.52 PROTHROMBIN MUTATION: ICD-10-CM

## 2025-08-25 DIAGNOSIS — Z86.718 HISTORY OF DEEP VENOUS THROMBOSIS (DVT) OF DISTAL VEIN OF LEFT LOWER EXTREMITY: ICD-10-CM

## 2025-08-25 DIAGNOSIS — D68.51 FACTOR V LEIDEN MUTATION: Primary | ICD-10-CM

## 2025-08-25 DIAGNOSIS — Z79.01 LONG TERM CURRENT USE OF ANTICOAGULANT THERAPY: ICD-10-CM

## 2025-08-25 LAB — INR (EXTERNAL): 2.8 (ref 0.9–1.1)

## 2025-08-27 ENCOUNTER — NURSE TRIAGE (OUTPATIENT)
Dept: INTERNAL MEDICINE | Facility: CLINIC | Age: 89
End: 2025-08-27
Payer: COMMERCIAL

## 2025-08-29 ENCOUNTER — HOSPITAL ENCOUNTER (OUTPATIENT)
Dept: WOUND CARE | Facility: CLINIC | Age: 89
Discharge: HOME | End: 2025-08-29
Attending: INTERNAL MEDICINE | Admitting: INTERNAL MEDICINE
Payer: COMMERCIAL

## 2025-08-29 DIAGNOSIS — I83.029 VENOUS STASIS ULCERS OF BOTH LOWER EXTREMITIES (H): ICD-10-CM

## 2025-08-29 DIAGNOSIS — I83.019 VENOUS STASIS ULCERS OF BOTH LOWER EXTREMITIES (H): ICD-10-CM

## 2025-08-29 DIAGNOSIS — L97.919 VENOUS STASIS ULCERS OF BOTH LOWER EXTREMITIES (H): ICD-10-CM

## 2025-08-29 DIAGNOSIS — L97.929 VENOUS STASIS ULCERS OF BOTH LOWER EXTREMITIES (H): ICD-10-CM

## 2025-08-29 PROCEDURE — G0463 HOSPITAL OUTPT CLINIC VISIT: HCPCS

## 2025-09-03 ENCOUNTER — TELEPHONE (OUTPATIENT)
Dept: INTERNAL MEDICINE | Facility: CLINIC | Age: 89
End: 2025-09-03
Payer: COMMERCIAL

## 2025-09-03 ENCOUNTER — HOSPITAL ENCOUNTER (OUTPATIENT)
Dept: WOUND CARE | Facility: CLINIC | Age: 89
Discharge: HOME | End: 2025-09-03
Attending: INTERNAL MEDICINE | Admitting: INTERNAL MEDICINE
Payer: COMMERCIAL

## 2025-09-03 DIAGNOSIS — E11.8 TYPE 2 DIABETES MELLITUS WITH COMPLICATION, WITHOUT LONG-TERM CURRENT USE OF INSULIN (H): Primary | ICD-10-CM

## 2025-09-03 PROCEDURE — 29580 STRAPPING UNNA BOOT: CPT

## 2025-09-03 PROCEDURE — G0463 HOSPITAL OUTPT CLINIC VISIT: HCPCS

## (undated) DEVICE — POSITIONER PT PRONESAFE HEAD REST W/DERMAPROX INSERT 40599

## (undated) DEVICE — ESU ELEC BLADE 2.75" COATED/INSULATED E1455

## (undated) DEVICE — PACK LARGE SPINE SNE15LSFSE

## (undated) DEVICE — PACK GENERAL LAPAOSCOPY

## (undated) DEVICE — NDL INSUFFLATION 13GA 120MM C2201

## (undated) DEVICE — SOL WATER IRRIG 1000ML BOTTLE 2F7114

## (undated) DEVICE — STPL SKIN 35W 6.9MM  PXW35

## (undated) DEVICE — ENDO POUCH UNIVERSAL RETRIEVAL SYSTEM INZII 5MM CD003

## (undated) DEVICE — WIPES FOLEY CARE SURESTEP PROVON DFC100

## (undated) DEVICE — LINEN TOWEL PACK X5 5464

## (undated) DEVICE — NDL SPINAL 18GA 3.5" 405184

## (undated) DEVICE — DRAPE SHEET REV FOLD 3/4 9349

## (undated) DEVICE — GLOVE ESTEEM POWDER FREE 7.5  2D72PL75

## (undated) DEVICE — MARKER SPHERES PASSIVE MEDT PACK 5 8801075

## (undated) DEVICE — ESU GROUND PAD UNIVERSAL W/O CORD

## (undated) DEVICE — Device

## (undated) DEVICE — DRSG KERLIX FLUFFS X5

## (undated) DEVICE — MANIFOLD NEPTUNE 4 PORT 700-20

## (undated) DEVICE — SU VICRYL 2-0 CT-2 CR 8X18" J726D

## (undated) DEVICE — DEVICE SUTURE GRASPER TROCAR CLOSURE 14GA PMITCSG

## (undated) DEVICE — DRAPE COVER C-ARM SEAMLESS SNAP-KAP 03-KP26 LATEX FREE

## (undated) DEVICE — CATH TRAY FOLEY COUDE SURESTEP 16FR W/DRN BAG LATEX A304416A

## (undated) DEVICE — BLADE BONE MILL STRK 3.2MM FINE 5400-702-000

## (undated) DEVICE — RX SURGIFLO HEMOSTATIC MATRIX W/THROMBIN 8ML 2994

## (undated) DEVICE — SPONGE RAY-TEC 4X8" 7318

## (undated) DEVICE — DRAPE MICROSCOPE LEICA 54X150" AR8033650

## (undated) DEVICE — SOL NACL 0.9% IRRIG 1000ML BOTTLE 07138-09

## (undated) DEVICE — DRAIN JACKSON PRATT 07MM FLAT SU130-1310

## (undated) DEVICE — SPONGE SURGIFOAM 100 1974

## (undated) DEVICE — GLOVE BIOGEL PI MICRO SZ 7.5 48575

## (undated) DEVICE — SU VICRYL 0 CT-1 CR 8X18" J740D

## (undated) DEVICE — BLADE KNIFE SURG 15 371115

## (undated) DEVICE — GLOVE ESTEEM BLUE W/NEU-THERA 8.0  2D73PB80

## (undated) DEVICE — DRAPE MAYO STAND 23X54 8337

## (undated) DEVICE — MIS CANNULA ACCESSORY KIT

## (undated) DEVICE — SOL WATER IRRIG 1000ML BOTTLE 07139-09

## (undated) DEVICE — DRSG ADAPTIC 3X8" 6113

## (undated) DEVICE — DECANTER BAG 2002S

## (undated) DEVICE — SU DERMABOND ADVANCED .7ML DNX12

## (undated) DEVICE — DRAIN JACKSON PRATT RESERVOIR 100ML SU130-1305

## (undated) DEVICE — TOOL DISSECT MIDAS MR8 14CM MATCH HEAD 3MM MR8-14MH30

## (undated) DEVICE — ENDO TROCAR FIRST ENTRY KII FIOS Z-THRD 05X100MM CTF03

## (undated) DEVICE — PREP DURAPREP 26ML APL 8630

## (undated) DEVICE — PACK UNIVERSAL SPLIT 29131

## (undated) DEVICE — GLOVE BIOGEL PI MICRO INDICATOR UNDERGLOVE SZ 8.0 48980

## (undated) DEVICE — KIT PATIENT JACKSON 1 SPK10118

## (undated) DEVICE — TUBING SUCTION SOFT 20'X3/16" 0036570

## (undated) DEVICE — KIT SEALER DURASEAL EXACT SP HYDROGEL 5ML SGL USE 20-6520

## (undated) DEVICE — SU MONOCRYL 4-0 PS-2 18" UND Y496G

## (undated) DEVICE — SU ETHILON 2-0 FS 18" 664H

## (undated) DEVICE — SU VICRYL 0 CT-2 CR 8X18" J727D

## (undated) DEVICE — ENDO TROCAR SLEEVE KII Z-THREADED 05X100MM CTS02

## (undated) RX ORDER — ONDANSETRON 2 MG/ML
INJECTION INTRAMUSCULAR; INTRAVENOUS
Status: DISPENSED
Start: 2019-01-03

## (undated) RX ORDER — BUPIVACAINE HYDROCHLORIDE AND EPINEPHRINE 2.5; 5 MG/ML; UG/ML
INJECTION, SOLUTION EPIDURAL; INFILTRATION; INTRACAUDAL; PERINEURAL
Status: DISPENSED
Start: 2019-01-03

## (undated) RX ORDER — EPHEDRINE SULFATE 50 MG/ML
INJECTION, SOLUTION INTRAMUSCULAR; INTRAVENOUS; SUBCUTANEOUS
Status: DISPENSED
Start: 2024-05-28

## (undated) RX ORDER — PROPOFOL 10 MG/ML
INJECTION, EMULSION INTRAVENOUS
Status: DISPENSED
Start: 2024-05-28

## (undated) RX ORDER — DEXAMETHASONE SODIUM PHOSPHATE 10 MG/ML
INJECTION INTRAMUSCULAR; INTRAVENOUS
Status: DISPENSED
Start: 2019-01-03

## (undated) RX ORDER — FENTANYL CITRATE 50 UG/ML
INJECTION, SOLUTION INTRAMUSCULAR; INTRAVENOUS
Status: DISPENSED
Start: 2024-05-28

## (undated) RX ORDER — PROPOFOL 10 MG/ML
INJECTION, EMULSION INTRAVENOUS
Status: DISPENSED
Start: 2019-01-03

## (undated) RX ORDER — ONDANSETRON 2 MG/ML
INJECTION INTRAMUSCULAR; INTRAVENOUS
Status: DISPENSED
Start: 2024-05-28

## (undated) RX ORDER — BUPIVACAINE HYDROCHLORIDE AND EPINEPHRINE 2.5; 5 MG/ML; UG/ML
INJECTION, SOLUTION EPIDURAL; INFILTRATION; INTRACAUDAL; PERINEURAL
Status: DISPENSED
Start: 2024-05-28

## (undated) RX ORDER — FENTANYL CITRATE 50 UG/ML
INJECTION, SOLUTION INTRAMUSCULAR; INTRAVENOUS
Status: DISPENSED
Start: 2019-01-03

## (undated) RX ORDER — HYDROMORPHONE HYDROCHLORIDE 1 MG/ML
INJECTION, SOLUTION INTRAMUSCULAR; INTRAVENOUS; SUBCUTANEOUS
Status: DISPENSED
Start: 2024-05-28

## (undated) RX ORDER — CEFAZOLIN SODIUM 1 G/3ML
INJECTION, POWDER, FOR SOLUTION INTRAMUSCULAR; INTRAVENOUS
Status: DISPENSED
Start: 2024-05-28

## (undated) RX ORDER — BUPIVACAINE HYDROCHLORIDE AND EPINEPHRINE 5; 5 MG/ML; UG/ML
INJECTION, SOLUTION EPIDURAL; INTRACAUDAL; PERINEURAL
Status: DISPENSED
Start: 2019-01-03

## (undated) RX ORDER — DEXAMETHASONE SODIUM PHOSPHATE 4 MG/ML
INJECTION, SOLUTION INTRA-ARTICULAR; INTRALESIONAL; INTRAMUSCULAR; INTRAVENOUS; SOFT TISSUE
Status: DISPENSED
Start: 2024-05-28

## (undated) RX ORDER — ALBUTEROL SULFATE 0.83 MG/ML
SOLUTION RESPIRATORY (INHALATION)
Status: DISPENSED
Start: 2019-01-03

## (undated) RX ORDER — LIDOCAINE HYDROCHLORIDE 20 MG/ML
INJECTION, SOLUTION EPIDURAL; INFILTRATION; INTRACAUDAL; PERINEURAL
Status: DISPENSED
Start: 2019-01-03